# Patient Record
Sex: FEMALE | Race: WHITE | NOT HISPANIC OR LATINO | Employment: OTHER | URBAN - NONMETROPOLITAN AREA
[De-identification: names, ages, dates, MRNs, and addresses within clinical notes are randomized per-mention and may not be internally consistent; named-entity substitution may affect disease eponyms.]

---

## 2017-01-09 ENCOUNTER — INFUSION (OUTPATIENT)
Dept: ONCOLOGY | Facility: CLINIC | Age: 77
End: 2017-01-09

## 2017-01-09 DIAGNOSIS — C50.112 MALIGNANT NEOPLASM OF CENTRAL PORTION OF LEFT FEMALE BREAST (HCC): Primary | ICD-10-CM

## 2017-01-09 RX ORDER — SODIUM CHLORIDE 0.9 % (FLUSH) 0.9 %
10 SYRINGE (ML) INJECTION AS NEEDED
Status: CANCELLED | OUTPATIENT
Start: 2017-01-09

## 2017-01-09 RX ORDER — SODIUM CHLORIDE 0.9 % (FLUSH) 0.9 %
10 SYRINGE (ML) INJECTION AS NEEDED
Status: DISCONTINUED | OUTPATIENT
Start: 2017-01-09 | End: 2017-01-09 | Stop reason: HOSPADM

## 2017-01-09 RX ADMIN — Medication 10 ML: at 11:10

## 2017-03-07 ENCOUNTER — HOSPITAL ENCOUNTER (OUTPATIENT)
Dept: MRI IMAGING | Age: 77
Discharge: HOME | End: 2017-03-07
Payer: MEDICARE

## 2017-03-07 DIAGNOSIS — R26.89 BALANCE PROBLEM: ICD-10-CM

## 2017-03-07 PROCEDURE — 6360000004 HC RX CONTRAST MEDICATION: Performed by: INTERNAL MEDICINE

## 2017-03-07 PROCEDURE — 70553 MRI BRAIN STEM W/O & W/DYE: CPT

## 2017-03-07 PROCEDURE — A9579 GAD-BASE MR CONTRAST NOS,1ML: HCPCS | Performed by: INTERNAL MEDICINE

## 2017-03-07 RX ADMIN — GADOPENTETATE DIMEGLUMINE 17.5 ML: 469.01 INJECTION INTRAVENOUS at 09:56

## 2017-03-13 DIAGNOSIS — C50.112 MALIGNANT NEOPLASM OF CENTRAL PORTION OF LEFT FEMALE BREAST (HCC): Primary | ICD-10-CM

## 2017-03-14 ENCOUNTER — LAB (OUTPATIENT)
Dept: ONCOLOGY | Facility: CLINIC | Age: 77
End: 2017-03-14

## 2017-03-14 ENCOUNTER — OFFICE VISIT (OUTPATIENT)
Dept: ONCOLOGY | Facility: CLINIC | Age: 77
End: 2017-03-14

## 2017-03-14 VITALS
DIASTOLIC BLOOD PRESSURE: 74 MMHG | TEMPERATURE: 97.2 F | RESPIRATION RATE: 18 BRPM | WEIGHT: 185.8 LBS | OXYGEN SATURATION: 98 % | HEART RATE: 95 BPM | BODY MASS INDEX: 29.86 KG/M2 | HEIGHT: 66 IN | SYSTOLIC BLOOD PRESSURE: 130 MMHG

## 2017-03-14 DIAGNOSIS — C50.112 MALIGNANT NEOPLASM OF CENTRAL PORTION OF LEFT FEMALE BREAST (HCC): Primary | ICD-10-CM

## 2017-03-14 PROBLEM — C50.411 MALIGNANT NEOPLASM OF UPPER-OUTER QUADRANT OF RIGHT FEMALE BREAST (HCC): Status: ACTIVE | Noted: 2017-03-14

## 2017-03-14 LAB
ALBUMIN SERPL-MCNC: 4.6 G/DL (ref 3.5–5)
ALBUMIN/GLOB SERPL: 2.1 G/DL
ALP SERPL-CCNC: 70 U/L (ref 38–126)
ALT SERPL W P-5'-P-CCNC: 19 U/L (ref 9–52)
ANION GAP SERPL CALCULATED.3IONS-SCNC: 14 MMOL/L
AST SERPL-CCNC: 22 U/L (ref 5–40)
AUTO MIXED CELLS #: 0.6 10*3/UL (ref 0.1–1.5)
AUTO MIXED CELLS %: 8.9 % (ref 0.2–15.1)
BILIRUB SERPL-MCNC: 0.5 MG/DL (ref 0.2–1.3)
BUN BLD-MCNC: 25 MG/DL (ref 7–26)
BUN/CREAT SERPL: 27.8 (ref 7–25)
CALCIUM SPEC-SCNC: 9.5 MG/DL (ref 8.4–10.2)
CHLORIDE SERPL-SCNC: 101 MMOL/L (ref 98–107)
CO2 SERPL-SCNC: 30 MMOL/L (ref 22–30)
CREAT BLD-MCNC: 0.9 MG/DL (ref 0.7–1.4)
ERYTHROCYTE [DISTWIDTH] IN BLOOD BY AUTOMATED COUNT: 13.6 % (ref 11.5–14.5)
GFR SERPL CREATININE-BSD FRML MDRD: 61 ML/MIN/1.73
GLOBULIN UR ELPH-MCNC: 2.2 GM/DL
GLUCOSE BLD-MCNC: 115 MG/DL (ref 75–110)
HCT VFR BLD AUTO: 41.9 % (ref 37–47)
HGB BLD-MCNC: 13.8 G/DL (ref 12–16)
LYMPHOCYTES # BLD AUTO: 2.4 10*3/MM3 (ref 0.8–7)
LYMPHOCYTES NFR BLD AUTO: 36.2 % (ref 10–58.5)
MCH RBC QN AUTO: 32.1 PG (ref 27–31)
MCHC RBC AUTO-ENTMCNC: 32.9 G/DL (ref 33–37)
MCV RBC AUTO: 97.4 FL (ref 81–99)
NEUTROPHILS # BLD AUTO: 3.6 10*3/MM3 (ref 2–7.8)
NEUTROPHILS NFR BLD AUTO: 54.9 % (ref 37–92)
PLATELET # BLD AUTO: 361 10*3/MM3 (ref 130–400)
PMV BLD AUTO: 8 FL (ref 6–12)
POTASSIUM BLD-SCNC: 4.2 MMOL/L (ref 3.6–5)
PROT SERPL-MCNC: 6.8 G/DL (ref 6.3–8.2)
RBC # BLD AUTO: 4.3 10*6/MM3 (ref 4.2–5.4)
SODIUM BLD-SCNC: 145 MMOL/L (ref 137–145)
WBC NRBC COR # BLD: 6.5 10*3/MM3 (ref 4.8–10.8)

## 2017-03-14 PROCEDURE — 85025 COMPLETE CBC W/AUTO DIFF WBC: CPT | Performed by: INTERNAL MEDICINE

## 2017-03-14 PROCEDURE — 99214 OFFICE O/P EST MOD 30 MIN: CPT | Performed by: INTERNAL MEDICINE

## 2017-03-14 PROCEDURE — 36415 COLL VENOUS BLD VENIPUNCTURE: CPT | Performed by: INTERNAL MEDICINE

## 2017-03-14 PROCEDURE — 80053 COMPREHEN METABOLIC PANEL: CPT | Performed by: INTERNAL MEDICINE

## 2017-03-14 RX ORDER — SODIUM CHLORIDE 0.9 % (FLUSH) 0.9 %
10 SYRINGE (ML) INJECTION AS NEEDED
Status: DISCONTINUED | OUTPATIENT
Start: 2017-03-14 | End: 2017-03-14 | Stop reason: HOSPADM

## 2017-03-14 RX ORDER — SODIUM CHLORIDE 0.9 % (FLUSH) 0.9 %
10 SYRINGE (ML) INJECTION AS NEEDED
Status: CANCELLED | OUTPATIENT
Start: 2017-03-14

## 2017-03-14 RX ORDER — GLIPIZIDE 5 MG/1
5 TABLET ORAL DAILY
COMMUNITY

## 2017-03-14 RX ORDER — PRAVASTATIN SODIUM 40 MG
40 TABLET ORAL DAILY
COMMUNITY

## 2017-03-14 RX ADMIN — Medication 10 ML: at 12:00

## 2017-03-14 NOTE — PROGRESS NOTES
[facility]  HEMATOLOGY & ONCOLOGY        Subjective     VISIT DIAGNOSIS: No diagnosis found.    REASON FOR VISIT:     Chief Complaint   Patient presents with   • Follow-up     Breast CA f/u; she is here for f/u visit today. She c/o having several episodes of syncope and has seen her PCP for testing and has been referred to a Neurologist. She c/o increased dizziness. She has no other c/o. She will need mammo raul for last July 2017.         HEMATOLOGY / ONCOLOGY HISTORY:   Oncology/Hematology History    Tp: [id number] Mp: M0 Size: 2.6 cm Histopathologic Grade: G3? Histopathologic Type: DuctalInvasive  (NOS), left central? Stage  Grouping: IIA  08/06/2008: Core biopsy: at left breast mass, 12 o'clock: Infiltrating ductal carcinoma, gr 3, with foci of tumor necrosis? DNA index 1.78  (aneuploid), ER/TN 0%, her2/kofi 2+ (FISH ), p53 0%, Ki67  59%.  08/28/2008: MastectomyL  partial, USguided  needle localization, with SNB: IDC, gr 3, tumor measures 2.6 cm in greatest dimension,  with necrosis seen, extending to original deep margin....additional deep margin adds 1 cm to final margin of excision, residual fibrocystic  mastopathy? 2 left axillary sentinel nodes: 0/2+ve  Dose dense Adriamycin/cyclophosphamide, followed by weekly Taxol administered between September 2008 and March 2009 .  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  Followed by Radiation therapy        Malignant neoplasm of central portion of left female breast    11/9/2016 Initial Diagnosis    Malignant neoplasm of central portion of left female breast     [No treatment plan]  Cancer Staging Information:  No matching staging information was found for the patient.      INTERVAL HISTORY  Patient ID: Lorena Valdes is a 76 y.o. year old female         Review of Systems         Medications:    Current Outpatient Prescriptions   Medication Sig Dispense Refill   • glipiZIDE (GLUCOTROL) 5 MG tablet Take 5 mg  by mouth 2 (Two) Times a Day Before Meals.     • pravastatin (PRAVACHOL) 40 MG tablet Take 40 mg by mouth Daily.     • aspirin 81 MG EC tablet Take 81 mg by mouth daily.       • Calcium Carb-Cholecalciferol (CALCIUM 600+D3) 600-200 MG-UNIT tablet Take 1 tablet by mouth 2 (Two) Times a Day.     • Cholecalciferol (VITAMIN D3) 400 UNITS capsule Take 1 capsule by mouth Daily.     • ibuprofen (ADVIL,MOTRIN) 400 MG tablet Take 400 mg by mouth Every 6 (Six) Hours As Needed for mild pain (1-3).     • Omega-3 Fatty Acids (FISH OIL) 1000 MG capsule capsule Take 1,000 mg by mouth Daily.     • vitamin C (ASCORBIC ACID) 500 MG tablet Take 500 mg by mouth daily.         No current facility-administered medications for this visit.        ALLERGIES:  No Known Allergies    Objective      @VITALS    Current Status 3/14/2017   ECOG score 1       General Appearance: Patient is awake, alert, oriented and in no acute distress. Patient is welldeveloped, wellnourished, and appears stated age.  HEENT: Normocephalic. Sclerae clear, conjunctiva pink, extraocular movements intact, pupils, round, reactive to light and  accommodation. Mouth and throat are clear with moist oral mucosa.  NECK: Supple, no jugular venous distention, thyroid not enlarged.  LYMPH: No cervical, supraclavicular, axillary, or inguinal lymphadenopathy.  CHEST: Equal bilateral expansion, AP  diameter normal, resonant percussion note  LUNGS: Good air movement, no rales, rhonchi, rubs or wheezes with auscultation  CARDIO: Regular sinus rhythm, no murmurs, gallops or rubs.  ABDOMEN: Nondistended, soft, No tenderness, no guarding, no rebound, No hepatosplenomegaly. No abdominal masses. Bowel sounds positive. No hernia  GENITALIA: Not examined.  BREASTS: Not examined.  MUSKEL: No joint swelling, decreased motion, or inflammation  EXTREMS: No edema, clubbing, cyanosis, No varicose veins.  NEURO: Grossly nonfocal, Gait is coordinated and smooth, Cognition is preserved.  SKIN: No  rashes, no ecchymoses, no petechia.  PSYCH: Oriented to time, place and person. Memory is preserved. Mood and affect appear normal      RECENT LABS:  Lab on 03/14/2017   Component Date Value Ref Range Status   • Glucose 03/14/2017 115* 75 - 110 mg/dL Final   • BUN 03/14/2017 25  7 - 26 mg/dL Final   • Creatinine 03/14/2017 0.90  0.70 - 1.40 mg/dL Final   • Sodium 03/14/2017 145  137 - 145 mmol/L Final   • Potassium 03/14/2017 4.2  3.6 - 5.0 mmol/L Final   • Chloride 03/14/2017 101  98 - 107 mmol/L Final   • CO2 03/14/2017 30.0  22.0 - 30.0 mmol/L Final   • Calcium 03/14/2017 9.5  8.4 - 10.2 mg/dL Final   • Total Protein 03/14/2017 6.8  6.3 - 8.2 g/dL Final   • Albumin 03/14/2017 4.60  3.50 - 5.00 g/dL Final   • ALT (SGPT) 03/14/2017 19  9 - 52 U/L Final   • AST (SGOT) 03/14/2017 22  5 - 40 U/L Final   • Alkaline Phosphatase 03/14/2017 70  38 - 126 U/L Final   • Total Bilirubin 03/14/2017 0.5  0.2 - 1.3 mg/dL Final   • eGFR Non African Amer 03/14/2017 61  >60 mL/min/1.73 Final   • Globulin 03/14/2017 2.2  gm/dL Final   • A/G Ratio 03/14/2017 2.1  g/dL Final   • BUN/Creatinine Ratio 03/14/2017 27.8* 7.0 - 25.0 Final   • Anion Gap 03/14/2017 14.0  mmol/L Final   • WBC 03/14/2017 6.50  4.80 - 10.80 10*3/mm3 Final   • RBC 03/14/2017 4.30  4.20 - 5.40 10*6/mm3 Final   • Hemoglobin 03/14/2017 13.8  12.0 - 16.0 g/dL Final   • Hematocrit 03/14/2017 41.9  37.0 - 47.0 % Final   • MCV 03/14/2017 97.4  81.0 - 99.0 fL Final   • MCH 03/14/2017 32.1* 27.0 - 31.0 pg Final   • MCHC 03/14/2017 32.9* 33.0 - 37.0 g/dL Final   • RDW 03/14/2017 13.6  11.5 - 14.5 % Final   • MPV 03/14/2017 8.0  6.0 - 12.0 fL Final   • Platelets 03/14/2017 361  130 - 400 10*3/mm3 Final   • Neutrophil % 03/14/2017 54.9  37.0 - 92.0 % Final   • Lymphocyte % 03/14/2017 36.2  10.0 - 58.5 % Final   • Auto Mixed Cells % 03/14/2017 8.9  0.2 - 15.1 % Final   • Neutrophils, Absolute 03/14/2017 3.60  2.00 - 7.80 10*3/mm3 Final   • Lymphocytes, Absolute 03/14/2017  2.40  0.80 - 7.00 10*3/mm3 Final   • Auto Mixed Cells # 03/14/2017 0.60  0.10 - 1.50 10*3/uL Final       RADIOLOGY:  No results found.         Assessment/Plan          T2 NP M0 left-sided breast cancer 2008 status post lumpectomy 4 cycles of AC and radiation.  No evidence of recurrence clinically doing well.  Last mammogram done back in August 2016 was normal.  Transient ischemic attacks oral by neurologist.  Return to clinic in 6 months time CBC and chemistries are normal.            Mynor Preciado MD    3/14/2017    11:46 AM

## 2017-04-07 ENCOUNTER — OFFICE VISIT (OUTPATIENT)
Dept: NEUROLOGY | Age: 77
End: 2017-04-07
Payer: MEDICARE

## 2017-04-07 VITALS
DIASTOLIC BLOOD PRESSURE: 78 MMHG | HEART RATE: 71 BPM | BODY MASS INDEX: 30.11 KG/M2 | SYSTOLIC BLOOD PRESSURE: 149 MMHG | OXYGEN SATURATION: 97 % | HEIGHT: 66 IN | WEIGHT: 187.38 LBS

## 2017-04-07 DIAGNOSIS — R53.1 WEAKNESS: Primary | ICD-10-CM

## 2017-04-07 DIAGNOSIS — R26.89 IMBALANCE: ICD-10-CM

## 2017-04-07 DIAGNOSIS — R20.0 NUMBNESS: ICD-10-CM

## 2017-04-07 DIAGNOSIS — G62.9 NEUROPATHY: ICD-10-CM

## 2017-04-07 PROCEDURE — G8417 CALC BMI ABV UP PARAM F/U: HCPCS | Performed by: PSYCHIATRY & NEUROLOGY

## 2017-04-07 PROCEDURE — 1036F TOBACCO NON-USER: CPT | Performed by: PSYCHIATRY & NEUROLOGY

## 2017-04-07 PROCEDURE — 99204 OFFICE O/P NEW MOD 45 MIN: CPT | Performed by: PSYCHIATRY & NEUROLOGY

## 2017-04-07 PROCEDURE — 1123F ACP DISCUSS/DSCN MKR DOCD: CPT | Performed by: PSYCHIATRY & NEUROLOGY

## 2017-04-07 PROCEDURE — 3017F COLORECTAL CA SCREEN DOC REV: CPT | Performed by: PSYCHIATRY & NEUROLOGY

## 2017-04-07 PROCEDURE — G8400 PT W/DXA NO RESULTS DOC: HCPCS | Performed by: PSYCHIATRY & NEUROLOGY

## 2017-04-07 PROCEDURE — 1090F PRES/ABSN URINE INCON ASSESS: CPT | Performed by: PSYCHIATRY & NEUROLOGY

## 2017-04-07 PROCEDURE — 4040F PNEUMOC VAC/ADMIN/RCVD: CPT | Performed by: PSYCHIATRY & NEUROLOGY

## 2017-04-07 PROCEDURE — G8427 DOCREV CUR MEDS BY ELIG CLIN: HCPCS | Performed by: PSYCHIATRY & NEUROLOGY

## 2017-04-07 RX ORDER — GLIPIZIDE 5 MG/1
TABLET ORAL
COMMUNITY
Start: 2016-08-18 | End: 2017-08-30 | Stop reason: DRUGHIGH

## 2017-05-09 ENCOUNTER — INFUSION (OUTPATIENT)
Dept: ONCOLOGY | Facility: CLINIC | Age: 77
End: 2017-05-09

## 2017-05-09 DIAGNOSIS — C50.112 MALIGNANT NEOPLASM OF CENTRAL PORTION OF LEFT FEMALE BREAST (HCC): Primary | ICD-10-CM

## 2017-05-09 RX ORDER — SODIUM CHLORIDE 0.9 % (FLUSH) 0.9 %
10 SYRINGE (ML) INJECTION AS NEEDED
Status: CANCELLED | OUTPATIENT
Start: 2017-05-09

## 2017-05-09 RX ORDER — SODIUM CHLORIDE 0.9 % (FLUSH) 0.9 %
10 SYRINGE (ML) INJECTION AS NEEDED
Status: DISCONTINUED | OUTPATIENT
Start: 2017-05-09 | End: 2017-05-09 | Stop reason: HOSPADM

## 2017-05-09 RX ADMIN — Medication 10 ML: at 12:15

## 2017-07-17 ENCOUNTER — TELEPHONE (OUTPATIENT)
Dept: SURGERY | Age: 77
End: 2017-07-17

## 2017-08-28 DIAGNOSIS — C50.112 MALIGNANT NEOPLASM OF CENTRAL PORTION OF LEFT FEMALE BREAST (HCC): Primary | ICD-10-CM

## 2017-08-29 ENCOUNTER — OFFICE VISIT (OUTPATIENT)
Dept: ONCOLOGY | Facility: CLINIC | Age: 77
End: 2017-08-29

## 2017-08-29 ENCOUNTER — LAB (OUTPATIENT)
Dept: LAB | Facility: HOSPITAL | Age: 77
End: 2017-08-29

## 2017-08-29 VITALS
OXYGEN SATURATION: 98 % | TEMPERATURE: 97.4 F | HEART RATE: 86 BPM | RESPIRATION RATE: 18 BRPM | HEIGHT: 66 IN | BODY MASS INDEX: 29.73 KG/M2 | DIASTOLIC BLOOD PRESSURE: 74 MMHG | WEIGHT: 185 LBS | SYSTOLIC BLOOD PRESSURE: 136 MMHG

## 2017-08-29 DIAGNOSIS — M85.80 OSTEOPENIA: ICD-10-CM

## 2017-08-29 DIAGNOSIS — C50.112 MALIGNANT NEOPLASM OF CENTRAL PORTION OF LEFT FEMALE BREAST (HCC): Primary | ICD-10-CM

## 2017-08-29 DIAGNOSIS — C50.412 MALIGNANT NEOPLASM OF UPPER-OUTER QUADRANT OF LEFT FEMALE BREAST (HCC): Primary | ICD-10-CM

## 2017-08-29 DIAGNOSIS — R93.7 ABNORMAL FINDINGS ON DIAGNOSTIC IMAGING OF OTHER PARTS OF MUSCULOSKELETAL SYSTEM: ICD-10-CM

## 2017-08-29 LAB
ALBUMIN SERPL-MCNC: 4 G/DL (ref 3.5–5)
ALBUMIN/GLOB SERPL: 1 G/DL (ref 1.1–2.5)
ALP SERPL-CCNC: 74 U/L (ref 24–120)
ALT SERPL W P-5'-P-CCNC: 23 U/L (ref 0–54)
ANION GAP SERPL CALCULATED.3IONS-SCNC: 7 MMOL/L (ref 4–13)
AST SERPL-CCNC: 23 U/L (ref 7–45)
AUTO MIXED CELLS #: 0.8 10*3/MM3 (ref 0.1–2.6)
AUTO MIXED CELLS %: 9.7 % (ref 0.1–24)
BILIRUB SERPL-MCNC: 0.5 MG/DL (ref 0.1–1)
BUN BLD-MCNC: 20 MG/DL (ref 5–21)
BUN/CREAT SERPL: 20.8
CALCIUM SPEC-SCNC: 9.4 MG/DL (ref 8.4–10.4)
CHLORIDE SERPL-SCNC: 103 MMOL/L (ref 98–110)
CO2 SERPL-SCNC: 27 MMOL/L (ref 24–31)
CREAT BLD-MCNC: 0.96 MG/DL (ref 0.5–1.4)
ERYTHROCYTE [DISTWIDTH] IN BLOOD BY AUTOMATED COUNT: 12.6 % (ref 12–15)
GFR SERPL CREATININE-BSD FRML MDRD: 57 ML/MIN/1.73
GLOBULIN UR ELPH-MCNC: 4 GM/DL
GLUCOSE BLD-MCNC: 124 MG/DL (ref 70–100)
HCT VFR BLD AUTO: 36.7 % (ref 37–47)
HGB BLD-MCNC: 12.8 G/DL (ref 12–16)
HOLD SPECIMEN: NORMAL
LYMPHOCYTES # BLD AUTO: 2.6 10*3/MM3 (ref 0.8–7)
LYMPHOCYTES NFR BLD AUTO: 31.3 % (ref 15–45)
MCH RBC QN AUTO: 31.4 PG (ref 28–32)
MCHC RBC AUTO-ENTMCNC: 34.9 G/DL (ref 33–36)
MCV RBC AUTO: 90 FL (ref 82–98)
NEUTROPHILS # BLD AUTO: 4.9 10*3/MM3 (ref 1.5–8.3)
NEUTROPHILS NFR BLD AUTO: 59 % (ref 39–78)
PLATELET # BLD AUTO: 342 10*3/MM3 (ref 130–400)
PMV BLD AUTO: 8.2 FL (ref 6–12)
POTASSIUM BLD-SCNC: 4.3 MMOL/L (ref 3.5–5.3)
PROT SERPL-MCNC: 8 G/DL (ref 6.3–8.7)
RBC # BLD AUTO: 4.08 10*6/MM3 (ref 4.2–5.4)
SODIUM BLD-SCNC: 137 MMOL/L (ref 135–145)
WBC NRBC COR # BLD: 8.3 10*3/MM3 (ref 4.8–10.8)

## 2017-08-29 PROCEDURE — 80053 COMPREHEN METABOLIC PANEL: CPT | Performed by: INTERNAL MEDICINE

## 2017-08-29 PROCEDURE — 99214 OFFICE O/P EST MOD 30 MIN: CPT | Performed by: INTERNAL MEDICINE

## 2017-08-29 PROCEDURE — 85025 COMPLETE CBC W/AUTO DIFF WBC: CPT | Performed by: INTERNAL MEDICINE

## 2017-08-29 PROCEDURE — 36415 COLL VENOUS BLD VENIPUNCTURE: CPT

## 2017-08-29 NOTE — PROGRESS NOTES
[facility]  HEMATOLOGY & ONCOLOGY        Subjective     VISIT DIAGNOSIS: No diagnosis found.    REASON FOR VISIT:     Chief Complaint   Patient presents with   • Follow-up     Breast Ca f/u:  She is here for f/u visit today. No new c/o today        HEMATOLOGY / ONCOLOGY HISTORY:   Oncology/Hematology History    Tp: [id number] Mp: M0 Size: 2.6 cm Histopathologic Grade: G3? Histopathologic Type: DuctalInvasive  (NOS), left central? Stage  Grouping: IIA  08/06/2008: Core biopsy: at left breast mass, 12 o'clock: Infiltrating ductal carcinoma, gr 3, with foci of tumor necrosis? DNA index 1.78  (aneuploid), ER/OK 0%, her2/kofi 2+ (FISH ), p53 0%, Ki67  59%.  08/28/2008: MastectomyL  partial, USguided  needle localization, with SNB: IDC, gr 3, tumor measures 2.6 cm in greatest dimension,  with necrosis seen, extending to original deep margin....additional deep margin adds 1 cm to final margin of excision, residual fibrocystic  mastopathy? 2 left axillary sentinel nodes: 0/2+ve  Dose dense Adriamycin/cyclophosphamide, followed by weekly Taxol administered between September 2008 and March 2009 .  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  Followed by Radiation therapy        Malignant neoplasm of central portion of left female breast    11/9/2016 Initial Diagnosis     Malignant neoplasm of central portion of left female breast          Cancer Staging Information:  No matching staging information was found for the patient.      INTERVAL HISTORY  Patient ID: Lorena Valdes is a 76 y.o. year old female with a history of breast cancer in 2008.  She has no complaint except for occasional fatigue denies any lumps or bumps of the breasts.  She is due for mammogram tomorrow.  She is also followed following up with the surgeon.    Past Medical History:   Past Medical History:   Diagnosis Date   • Bladder disorder    • Fibrocystic disease of breast    • Heart disease    •  History of bone density study 2011   • Hyperglycemia    • Hyperlipidemia    • Hypotension    • Left ventricular diastolic dysfunction    • Malignant neoplasm of central portion of left female breast 11/9/2016   • Uterine prolapse      Past Surgical History:   Past Surgical History:   Procedure Laterality Date   • BREAST LUMPECTOMY  2008   • COLONOSCOPY  2010   • MAMMO BILATERAL  07/17/2013    Dr. Sabillon   • PAP SMEAR  2010     Social History:   Social History     Social History   • Marital status: Single     Spouse name: N/A   • Number of children: N/A   • Years of education: N/A     Occupational History   • Not on file.     Social History Main Topics   • Smoking status: Unknown If Ever Smoked   • Smokeless tobacco: Not on file   • Alcohol use Not on file   • Drug use: No   • Sexual activity: Not on file     Other Topics Concern   • Not on file     Social History Narrative     Family History:   Family History   Problem Relation Age of Onset   • Heart disease Father    • No Known Problems Daughter    • No Known Problems Son    • Hypertension Daughter    • Hypertension Daughter        Review of Systems   Constitutional: Positive for fatigue. Negative for activity change, appetite change, chills and unexpected weight change.   HENT: Negative.    Endocrine: Negative.    Genitourinary: Negative for dysuria, flank pain, frequency and hematuria.        Stress incontinent   Musculoskeletal: Negative.    Skin: Negative.    Allergic/Immunologic: Negative.    Neurological: Negative.    Hematological: Negative.    Psychiatric/Behavioral: Negative.         Performance Status:  Asymptomatic    Medications:    Current Outpatient Prescriptions   Medication Sig Dispense Refill   • aspirin 81 MG EC tablet Take 81 mg by mouth daily.       • Calcium Carb-Cholecalciferol (CALCIUM 600+D3) 600-200 MG-UNIT tablet Take 1 tablet by mouth 2 (Two) Times a Day.     • Cholecalciferol (VITAMIN D3) 400 UNITS capsule Take 1 capsule by mouth Daily.   "   • glipiZIDE (GLUCOTROL) 5 MG tablet Take 5 mg by mouth 2 (Two) Times a Day Before Meals.     • ibuprofen (ADVIL,MOTRIN) 400 MG tablet Take 400 mg by mouth Every 6 (Six) Hours As Needed for mild pain (1-3).     • Omega-3 Fatty Acids (FISH OIL) 1000 MG capsule capsule Take 1,000 mg by mouth Daily.     • pravastatin (PRAVACHOL) 40 MG tablet Take 40 mg by mouth Daily.     • vitamin C (ASCORBIC ACID) 500 MG tablet Take 500 mg by mouth daily.         No current facility-administered medications for this visit.        ALLERGIES:  No Known Allergies    Objective      Vitals:    08/29/17 1128   BP: 136/74   Pulse: 86   Resp: 18   Temp: 97.4 °F (36.3 °C)   TempSrc: Tympanic   SpO2: 98%   Weight: 185 lb (83.9 kg)   Height: 66\" (167.6 cm)         Current Status 8/29/2017   ECOG score 0         Physical Exam  General Appearance: Patient is awake, alert, oriented and in no acute distress. Patient is welldeveloped, wellnourished, and appears stated age.  HEENT: Normocephalic. Sclerae clear, conjunctiva pink, extraocular movements intact, pupils, round, reactive to light and  accommodation. Mouth and throat are clear with moist oral mucosa.  NECK: Supple, no jugular venous distention, thyroid not enlarged.  LYMPH: No cervical, supraclavicular, axillary, or inguinal lymphadenopathy.  CHEST: Equal bilateral expansion, AP  diameter normal, resonant percussion note  LUNGS: Good air movement, no rales, rhonchi, rubs or wheezes with auscultation  CARDIO: Regular sinus rhythm, no murmurs, gallops or rubs.  ABDOMEN: Nondistended, soft, No tenderness, no guarding, no rebound, No hepatosplenomegaly. No abdominal masses. Bowel sounds positive. No hernia  GENITALIA: Not examined.  BREASTS: left breast lumpectomy, no lumps or bumps, no nipple discharge. Right breast unremarkable. Bilateral dense breast.  MUSKEL: No joint swelling, decreased motion, or inflammation  EXTREMS: No edema, clubbing, cyanosis, No varicose veins.  NEURO: Grossly " nonfocal, Gait is coordinated and smooth, Cognition is preserved.  SKIN: No rashes, no ecchymoses, no petechia.  PSYCH: Oriented to time, place and person. Memory is preserved. Mood and affect appear normal  RECENT LABS:  Orders Only on 08/28/2017   Component Date Value Ref Range Status   • WBC 08/29/2017 8.30  4.80 - 10.80 10*3/mm3 Final   • RBC 08/29/2017 4.08* 4.20 - 5.40 10*6/mm3 Final   • Hemoglobin 08/29/2017 12.8  12.0 - 16.0 g/dL Final   • Hematocrit 08/29/2017 36.7* 37.0 - 47.0 % Final   • MCV 08/29/2017 90.0  82.0 - 98.0 fL Final   • MCH 08/29/2017 31.4  28.0 - 32.0 pg Final   • MCHC 08/29/2017 34.9  33.0 - 36.0 g/dL Final   • RDW 08/29/2017 12.6  12.0 - 15.0 % Final   • MPV 08/29/2017 8.2  6.0 - 12.0 fL Final   • Platelets 08/29/2017 342  130 - 400 10*3/mm3 Final   • Neutrophil % 08/29/2017 59.0  39.0 - 78.0 % Final   • Lymphocyte % 08/29/2017 31.3  15.0 - 45.0 % Final   • Auto Mixed Cells % 08/29/2017 9.7  0.1 - 24.0 % Final   • Neutrophils, Absolute 08/29/2017 4.90  1.50 - 8.30 10*3/mm3 Final   • Lymphocytes, Absolute 08/29/2017 2.60  0.80 - 7.00 10*3/mm3 Final   • Auto Mixed Cells # 08/29/2017 0.80  0.10 - 2.60 10*3/mm3 Final       RADIOLOGY:  No results found.         Assessment/Plan 76-year-old female diagnosed with left breast cancer T2 and 0 M0 2008 status post lumpectomy, ER/WV negative, HER-2 positive by fish,status post Adriamycin one cycle, discontinued due to cardiomyopathy.  Status post 4 cycles Cytoxan and 12 cycles of weekly Taxol.  Status postradiation therapy.   Last mammogram 2016 unremarkable.    Patient Active Problem List   Diagnosis   • Malignant neoplasm of central portion of left female breast   • Malignant neoplasm of upper-outer quadrant of right female breast          1.Breast cancer: NGOC I will follow up on her mammogram.  See her in one year.    2.  Osteopenia: We will get her DEXA scan.  Continue calcium and vitamin D      Olayinka Diaz MD    8/29/2017    11:38  AM

## 2017-08-30 ENCOUNTER — OFFICE VISIT (OUTPATIENT)
Dept: SURGERY | Age: 77
End: 2017-08-30
Payer: MEDICARE

## 2017-08-30 ENCOUNTER — HOSPITAL ENCOUNTER (OUTPATIENT)
Dept: WOMENS IMAGING | Age: 77
Discharge: HOME | End: 2017-08-30
Payer: MEDICARE

## 2017-08-30 VITALS
HEIGHT: 66 IN | BODY MASS INDEX: 29.89 KG/M2 | HEART RATE: 80 BPM | DIASTOLIC BLOOD PRESSURE: 80 MMHG | SYSTOLIC BLOOD PRESSURE: 130 MMHG | WEIGHT: 186 LBS

## 2017-08-30 DIAGNOSIS — Z12.31 ENCOUNTER FOR SCREENING MAMMOGRAM FOR HIGH-RISK PATIENT: ICD-10-CM

## 2017-08-30 DIAGNOSIS — Z12.31 ENCOUNTER FOR SCREENING MAMMOGRAM FOR HIGH-RISK PATIENT: Primary | ICD-10-CM

## 2017-08-30 DIAGNOSIS — M85.80 SENILE OSTEOPENIA: ICD-10-CM

## 2017-08-30 PROCEDURE — G8417 CALC BMI ABV UP PARAM F/U: HCPCS | Performed by: PHYSICIAN ASSISTANT

## 2017-08-30 PROCEDURE — 4040F PNEUMOC VAC/ADMIN/RCVD: CPT | Performed by: PHYSICIAN ASSISTANT

## 2017-08-30 PROCEDURE — 77080 DXA BONE DENSITY AXIAL: CPT

## 2017-08-30 PROCEDURE — 3017F COLORECTAL CA SCREEN DOC REV: CPT | Performed by: PHYSICIAN ASSISTANT

## 2017-08-30 PROCEDURE — G8427 DOCREV CUR MEDS BY ELIG CLIN: HCPCS | Performed by: PHYSICIAN ASSISTANT

## 2017-08-30 PROCEDURE — 77063 BREAST TOMOSYNTHESIS BI: CPT

## 2017-08-30 PROCEDURE — 1036F TOBACCO NON-USER: CPT | Performed by: PHYSICIAN ASSISTANT

## 2017-08-30 PROCEDURE — 99212 OFFICE O/P EST SF 10 MIN: CPT | Performed by: PHYSICIAN ASSISTANT

## 2017-08-30 PROCEDURE — G8399 PT W/DXA RESULTS DOCUMENT: HCPCS | Performed by: PHYSICIAN ASSISTANT

## 2017-08-30 PROCEDURE — 1123F ACP DISCUSS/DSCN MKR DOCD: CPT | Performed by: PHYSICIAN ASSISTANT

## 2017-08-30 PROCEDURE — 1090F PRES/ABSN URINE INCON ASSESS: CPT | Performed by: PHYSICIAN ASSISTANT

## 2017-08-30 RX ORDER — PRAVASTATIN SODIUM 40 MG
40 TABLET ORAL
COMMUNITY
End: 2018-04-17 | Stop reason: SDUPTHER

## 2017-08-30 RX ORDER — GLIPIZIDE 5 MG/1
2.5 TABLET ORAL
COMMUNITY
Start: 2016-08-18 | End: 2017-09-26 | Stop reason: SDUPTHER

## 2017-09-10 PROBLEM — Z12.11 SCREENING FOR COLORECTAL CANCER: Status: ACTIVE | Noted: 2017-09-10

## 2017-09-10 PROBLEM — Z12.12 SCREENING FOR COLORECTAL CANCER: Status: ACTIVE | Noted: 2017-09-10

## 2017-09-10 PROBLEM — Z87.891 FORMER SMOKER: Status: ACTIVE | Noted: 2017-09-10

## 2017-09-10 PROBLEM — M81.6 LOCALIZED OSTEOPOROSIS WITHOUT CURRENT PATHOLOGICAL FRACTURE: Status: ACTIVE | Noted: 2017-09-10

## 2017-09-10 PROBLEM — F41.1 GENERALIZED ANXIETY DISORDER: Status: ACTIVE | Noted: 2017-09-10

## 2017-09-10 PROBLEM — E78.00 PURE HYPERCHOLESTEROLEMIA: Status: ACTIVE | Noted: 2017-09-10

## 2017-09-10 PROBLEM — E55.9 VITAMIN D DEFICIENCY: Status: ACTIVE | Noted: 2017-09-10

## 2017-09-10 PROBLEM — E11.9 TYPE 2 DIABETES MELLITUS WITHOUT COMPLICATION (HCC): Status: ACTIVE | Noted: 2017-09-10

## 2017-09-12 DIAGNOSIS — E78.00 PURE HYPERCHOLESTEROLEMIA: ICD-10-CM

## 2017-09-12 DIAGNOSIS — E11.9 TYPE 2 DIABETES MELLITUS WITHOUT COMPLICATION, WITHOUT LONG-TERM CURRENT USE OF INSULIN (HCC): ICD-10-CM

## 2017-09-12 DIAGNOSIS — E55.9 VITAMIN D DEFICIENCY: Primary | ICD-10-CM

## 2017-09-12 DIAGNOSIS — Z01.89 ROUTINE LAB DRAW: ICD-10-CM

## 2017-09-13 DIAGNOSIS — E78.00 PURE HYPERCHOLESTEROLEMIA: ICD-10-CM

## 2017-09-13 DIAGNOSIS — E11.9 TYPE 2 DIABETES MELLITUS WITHOUT COMPLICATION, WITHOUT LONG-TERM CURRENT USE OF INSULIN (HCC): ICD-10-CM

## 2017-09-13 DIAGNOSIS — E55.9 VITAMIN D DEFICIENCY: ICD-10-CM

## 2017-09-13 DIAGNOSIS — Z01.89 ROUTINE LAB DRAW: ICD-10-CM

## 2017-09-13 LAB
ALBUMIN SERPL-MCNC: 3.7 G/DL (ref 3.5–5.2)
ALP BLD-CCNC: 69 U/L (ref 35–104)
ALT SERPL-CCNC: 9 U/L (ref 5–33)
ANION GAP SERPL CALCULATED.3IONS-SCNC: 13 MMOL/L (ref 7–19)
AST SERPL-CCNC: 15 U/L (ref 5–32)
BACTERIA: ABNORMAL /HPF
BILIRUB SERPL-MCNC: 0.5 MG/DL (ref 0.2–1.2)
BILIRUBIN URINE: NEGATIVE
BLOOD, URINE: ABNORMAL
BUN BLDV-MCNC: 24 MG/DL (ref 8–23)
CALCIUM SERPL-MCNC: 9.4 MG/DL (ref 8.8–10.2)
CHLORIDE BLD-SCNC: 103 MMOL/L (ref 98–111)
CHOLESTEROL, TOTAL: 167 MG/DL (ref 160–199)
CLARITY: ABNORMAL
CO2: 25 MMOL/L (ref 22–29)
COLOR: YELLOW
CREAT SERPL-MCNC: 0.7 MG/DL (ref 0.5–0.9)
CREATININE URINE: 86.5 MG/DL (ref 4.2–622)
EPITHELIAL CELLS, UA: 6 /HPF (ref 0–5)
GFR NON-AFRICAN AMERICAN: >60
GLUCOSE BLD-MCNC: 124 MG/DL (ref 74–109)
GLUCOSE URINE: NEGATIVE MG/DL
HBA1C MFR BLD: 7.2 %
HCT VFR BLD CALC: 38.2 % (ref 37–47)
HDLC SERPL-MCNC: 57 MG/DL (ref 65–121)
HEMOGLOBIN: 12.8 G/DL (ref 12–16)
HYALINE CASTS: 5 /HPF (ref 0–8)
KETONES, URINE: NEGATIVE MG/DL
LDL CHOLESTEROL CALCULATED: 88 MG/DL
LEUKOCYTE ESTERASE, URINE: ABNORMAL
MCH RBC QN AUTO: 31.7 PG (ref 27–31)
MCHC RBC AUTO-ENTMCNC: 33.5 G/DL (ref 33–37)
MCV RBC AUTO: 94.6 FL (ref 81–99)
MICROALBUMIN UR-MCNC: 2.5 MG/DL (ref 0–19)
MICROALBUMIN/CREAT UR-RTO: 28.9 MG/G
NITRITE, URINE: POSITIVE
PDW BLD-RTO: 13 % (ref 11.5–14.5)
PH UA: 6
PLATELET # BLD: 342 K/UL (ref 130–400)
PMV BLD AUTO: 9.2 FL (ref 9.4–12.3)
POTASSIUM SERPL-SCNC: 4.5 MMOL/L (ref 3.5–5)
PROTEIN UA: NEGATIVE MG/DL
RBC # BLD: 4.04 M/UL (ref 4.2–5.4)
RBC UA: 7 /HPF (ref 0–4)
SODIUM BLD-SCNC: 141 MMOL/L (ref 136–145)
SPECIFIC GRAVITY UA: 1.02
TOTAL PROTEIN: 8 G/DL (ref 6.6–8.7)
TRIGL SERPL-MCNC: 112 MG/DL (ref 150–199)
TSH SERPL DL<=0.05 MIU/L-ACNC: 4.76 UIU/ML (ref 0.27–4.2)
UROBILINOGEN, URINE: 0.2 E.U./DL
WBC # BLD: 7.7 K/UL (ref 4.8–10.8)
WBC UA: 646 /HPF (ref 0–5)

## 2017-09-16 LAB
ORGANISM: ABNORMAL
URINE CULTURE, ROUTINE: ABNORMAL
URINE CULTURE, ROUTINE: ABNORMAL
VITAMIN D2 AND D3, TOTAL: 30.1 NG/ML (ref 30–80)
VITAMIN D2, 25 HYDROXY: <1 NG/ML
VITAMIN D3,25 HYDROXY: 30.1 NG/ML

## 2017-09-18 RX ORDER — DICYCLOMINE HYDROCHLORIDE 10 MG/1
CAPSULE ORAL NIGHTLY
COMMUNITY
Start: 2016-04-18 | End: 2018-01-23 | Stop reason: CLARIF

## 2017-09-20 ENCOUNTER — OFFICE VISIT (OUTPATIENT)
Dept: INTERNAL MEDICINE | Age: 77
End: 2017-09-20
Payer: MEDICARE

## 2017-09-20 VITALS
WEIGHT: 185 LBS | DIASTOLIC BLOOD PRESSURE: 84 MMHG | HEART RATE: 94 BPM | OXYGEN SATURATION: 96 % | BODY MASS INDEX: 29.73 KG/M2 | SYSTOLIC BLOOD PRESSURE: 124 MMHG | HEIGHT: 66 IN | TEMPERATURE: 98.8 F

## 2017-09-20 DIAGNOSIS — Z12.12 SCREENING FOR COLORECTAL CANCER: ICD-10-CM

## 2017-09-20 DIAGNOSIS — M81.6 LOCALIZED OSTEOPOROSIS WITHOUT CURRENT PATHOLOGICAL FRACTURE: ICD-10-CM

## 2017-09-20 DIAGNOSIS — E55.9 VITAMIN D DEFICIENCY: ICD-10-CM

## 2017-09-20 DIAGNOSIS — E11.9 TYPE 2 DIABETES MELLITUS WITHOUT COMPLICATION, WITHOUT LONG-TERM CURRENT USE OF INSULIN (HCC): ICD-10-CM

## 2017-09-20 DIAGNOSIS — B96.20 E. COLI UTI: ICD-10-CM

## 2017-09-20 DIAGNOSIS — Z12.11 SCREENING FOR COLORECTAL CANCER: ICD-10-CM

## 2017-09-20 DIAGNOSIS — N39.0 E. COLI UTI: ICD-10-CM

## 2017-09-20 DIAGNOSIS — E78.00 PURE HYPERCHOLESTEROLEMIA: ICD-10-CM

## 2017-09-20 DIAGNOSIS — Z00.00 MEDICARE ANNUAL WELLNESS VISIT, SUBSEQUENT: Primary | ICD-10-CM

## 2017-09-20 DIAGNOSIS — F41.1 GENERALIZED ANXIETY DISORDER: ICD-10-CM

## 2017-09-20 PROBLEM — Z01.89 ROUTINE LAB DRAW: Status: RESOLVED | Noted: 2017-09-12 | Resolved: 2017-09-20

## 2017-09-20 PROCEDURE — G8417 CALC BMI ABV UP PARAM F/U: HCPCS | Performed by: INTERNAL MEDICINE

## 2017-09-20 PROCEDURE — G8427 DOCREV CUR MEDS BY ELIG CLIN: HCPCS | Performed by: INTERNAL MEDICINE

## 2017-09-20 PROCEDURE — 1123F ACP DISCUSS/DSCN MKR DOCD: CPT | Performed by: INTERNAL MEDICINE

## 2017-09-20 PROCEDURE — 99213 OFFICE O/P EST LOW 20 MIN: CPT | Performed by: INTERNAL MEDICINE

## 2017-09-20 PROCEDURE — G0439 PPPS, SUBSEQ VISIT: HCPCS | Performed by: INTERNAL MEDICINE

## 2017-09-20 PROCEDURE — 4005F PHARM THX FOR OP RXD: CPT | Performed by: INTERNAL MEDICINE

## 2017-09-20 PROCEDURE — 1036F TOBACCO NON-USER: CPT | Performed by: INTERNAL MEDICINE

## 2017-09-20 PROCEDURE — 4040F PNEUMOC VAC/ADMIN/RCVD: CPT | Performed by: INTERNAL MEDICINE

## 2017-09-20 PROCEDURE — G8399 PT W/DXA RESULTS DOCUMENT: HCPCS | Performed by: INTERNAL MEDICINE

## 2017-09-20 PROCEDURE — 3017F COLORECTAL CA SCREEN DOC REV: CPT | Performed by: INTERNAL MEDICINE

## 2017-09-20 PROCEDURE — 1090F PRES/ABSN URINE INCON ASSESS: CPT | Performed by: INTERNAL MEDICINE

## 2017-09-20 PROCEDURE — 3046F HEMOGLOBIN A1C LEVEL >9.0%: CPT | Performed by: INTERNAL MEDICINE

## 2017-09-20 RX ORDER — CIPROFLOXACIN 250 MG/1
250 TABLET, FILM COATED ORAL 2 TIMES DAILY
Qty: 10 TABLET | Refills: 0 | Status: SHIPPED | OUTPATIENT
Start: 2017-09-20 | End: 2017-09-25

## 2017-09-20 ASSESSMENT — ENCOUNTER SYMPTOMS
DIARRHEA: 0
SINUS PRESSURE: 0
WHEEZING: 0
SORE THROAT: 0
CHEST TIGHTNESS: 0
TROUBLE SWALLOWING: 0
EYE REDNESS: 0
BLOOD IN STOOL: 0
EYE PAIN: 0
VOMITING: 0
CONSTIPATION: 0
COUGH: 0
COLOR CHANGE: 0
VOICE CHANGE: 0
NAUSEA: 0
ABDOMINAL PAIN: 0

## 2017-09-20 ASSESSMENT — PATIENT HEALTH QUESTIONNAIRE - PHQ9
2. FEELING DOWN, DEPRESSED OR HOPELESS: 1
SUM OF ALL RESPONSES TO PHQ QUESTIONS 1-9: 2
SUM OF ALL RESPONSES TO PHQ9 QUESTIONS 1 & 2: 2
1. LITTLE INTEREST OR PLEASURE IN DOING THINGS: 1

## 2017-09-26 RX ORDER — GLIPIZIDE 5 MG/1
TABLET ORAL
Qty: 30 TABLET | Refills: 3 | Status: SHIPPED | OUTPATIENT
Start: 2017-09-26 | End: 2018-09-23 | Stop reason: SDUPTHER

## 2017-10-24 ENCOUNTER — INFUSION (OUTPATIENT)
Dept: ONCOLOGY | Facility: CLINIC | Age: 77
End: 2017-10-24

## 2017-10-24 DIAGNOSIS — C50.112 MALIGNANT NEOPLASM OF CENTRAL PORTION OF LEFT FEMALE BREAST, UNSPECIFIED ESTROGEN RECEPTOR STATUS (HCC): Primary | ICD-10-CM

## 2017-10-24 RX ORDER — SODIUM CHLORIDE 0.9 % (FLUSH) 0.9 %
10 SYRINGE (ML) INJECTION AS NEEDED
Status: DISCONTINUED | OUTPATIENT
Start: 2017-10-24 | End: 2017-10-24 | Stop reason: HOSPADM

## 2017-10-24 RX ORDER — SODIUM CHLORIDE 0.9 % (FLUSH) 0.9 %
10 SYRINGE (ML) INJECTION AS NEEDED
Status: CANCELLED | OUTPATIENT
Start: 2017-10-24

## 2017-10-24 RX ADMIN — Medication 10 ML: at 11:51

## 2018-01-03 ENCOUNTER — INFUSION (OUTPATIENT)
Dept: ONCOLOGY | Facility: CLINIC | Age: 78
End: 2018-01-03

## 2018-01-03 DIAGNOSIS — C50.112 MALIGNANT NEOPLASM OF CENTRAL PORTION OF LEFT FEMALE BREAST, UNSPECIFIED ESTROGEN RECEPTOR STATUS (HCC): Primary | ICD-10-CM

## 2018-01-03 RX ORDER — SODIUM CHLORIDE 0.9 % (FLUSH) 0.9 %
10 SYRINGE (ML) INJECTION AS NEEDED
Status: CANCELLED | OUTPATIENT
Start: 2018-01-03

## 2018-01-03 RX ORDER — SODIUM CHLORIDE 0.9 % (FLUSH) 0.9 %
10 SYRINGE (ML) INJECTION AS NEEDED
Status: DISCONTINUED | OUTPATIENT
Start: 2018-01-03 | End: 2018-01-03 | Stop reason: HOSPADM

## 2018-01-03 RX ADMIN — Medication 10 ML: at 14:48

## 2018-01-22 DIAGNOSIS — E55.9 VITAMIN D DEFICIENCY: ICD-10-CM

## 2018-01-22 DIAGNOSIS — E11.9 TYPE 2 DIABETES MELLITUS WITHOUT COMPLICATION, WITHOUT LONG-TERM CURRENT USE OF INSULIN (HCC): ICD-10-CM

## 2018-01-22 LAB
ALBUMIN SERPL-MCNC: 3.9 G/DL (ref 3.5–5.2)
ALP BLD-CCNC: 72 U/L (ref 35–104)
ALT SERPL-CCNC: 7 U/L (ref 5–33)
ANION GAP SERPL CALCULATED.3IONS-SCNC: 14 MMOL/L (ref 7–19)
AST SERPL-CCNC: 14 U/L (ref 5–32)
BILIRUB SERPL-MCNC: 0.5 MG/DL (ref 0.2–1.2)
BUN BLDV-MCNC: 26 MG/DL (ref 8–23)
CALCIUM SERPL-MCNC: 9.5 MG/DL (ref 8.8–10.2)
CHLORIDE BLD-SCNC: 100 MMOL/L (ref 98–111)
CO2: 27 MMOL/L (ref 22–29)
CREAT SERPL-MCNC: 0.8 MG/DL (ref 0.5–0.9)
GFR NON-AFRICAN AMERICAN: >60
GLUCOSE BLD-MCNC: 116 MG/DL (ref 74–109)
HBA1C MFR BLD: 6.7 %
POTASSIUM SERPL-SCNC: 4.1 MMOL/L (ref 3.5–5)
SODIUM BLD-SCNC: 141 MMOL/L (ref 136–145)
TOTAL PROTEIN: 8.1 G/DL (ref 6.6–8.7)
VITAMIN D 25-HYDROXY: 42 NG/ML

## 2018-01-23 ENCOUNTER — OFFICE VISIT (OUTPATIENT)
Dept: INTERNAL MEDICINE | Age: 78
End: 2018-01-23
Payer: MEDICARE

## 2018-01-23 VITALS
RESPIRATION RATE: 18 BRPM | DIASTOLIC BLOOD PRESSURE: 87 MMHG | HEART RATE: 66 BPM | HEIGHT: 66 IN | BODY MASS INDEX: 29.57 KG/M2 | SYSTOLIC BLOOD PRESSURE: 138 MMHG | WEIGHT: 184 LBS | OXYGEN SATURATION: 97 %

## 2018-01-23 DIAGNOSIS — E55.9 VITAMIN D DEFICIENCY: Primary | ICD-10-CM

## 2018-01-23 DIAGNOSIS — E78.00 PURE HYPERCHOLESTEROLEMIA: ICD-10-CM

## 2018-01-23 DIAGNOSIS — E11.9 TYPE 2 DIABETES MELLITUS WITHOUT COMPLICATION, WITHOUT LONG-TERM CURRENT USE OF INSULIN (HCC): ICD-10-CM

## 2018-01-23 DIAGNOSIS — F41.1 GENERALIZED ANXIETY DISORDER: ICD-10-CM

## 2018-01-23 PROCEDURE — 99214 OFFICE O/P EST MOD 30 MIN: CPT | Performed by: INTERNAL MEDICINE

## 2018-01-23 PROCEDURE — G8417 CALC BMI ABV UP PARAM F/U: HCPCS | Performed by: INTERNAL MEDICINE

## 2018-01-23 PROCEDURE — 4040F PNEUMOC VAC/ADMIN/RCVD: CPT | Performed by: INTERNAL MEDICINE

## 2018-01-23 PROCEDURE — 1090F PRES/ABSN URINE INCON ASSESS: CPT | Performed by: INTERNAL MEDICINE

## 2018-01-23 PROCEDURE — 1036F TOBACCO NON-USER: CPT | Performed by: INTERNAL MEDICINE

## 2018-01-23 PROCEDURE — G8427 DOCREV CUR MEDS BY ELIG CLIN: HCPCS | Performed by: INTERNAL MEDICINE

## 2018-01-23 PROCEDURE — 1123F ACP DISCUSS/DSCN MKR DOCD: CPT | Performed by: INTERNAL MEDICINE

## 2018-01-23 PROCEDURE — G8484 FLU IMMUNIZE NO ADMIN: HCPCS | Performed by: INTERNAL MEDICINE

## 2018-01-23 PROCEDURE — G8399 PT W/DXA RESULTS DOCUMENT: HCPCS | Performed by: INTERNAL MEDICINE

## 2018-01-23 ASSESSMENT — ENCOUNTER SYMPTOMS
COUGH: 0
CONSTIPATION: 0
CHEST TIGHTNESS: 0
WHEEZING: 0
ABDOMINAL PAIN: 0
SORE THROAT: 0

## 2018-01-23 NOTE — PROGRESS NOTES
very well controlled, she will continue her current dose of pravastatin 40 mg daily     Type 2 diabetes mellitus without complication, without long-term current use of insulin ([facility] [address]- her A1c is better! 6.7 ( 7.2)- cont Current glipizide dose     Generalized anxiety disorder- her anxiety issues are daily but she does not want to take rx        Previous history of breast cancer, follows with dr Jose Cardenas had recent mammogram    Orders Placed This Encounter   Procedures    Lipid Panel    Hemoglobin A1C    Comprehensive Metabolic Panel    Vitamin D 25 Hydroxy     New Prescriptions    No medications on file        Return in about 6 months (around 8/3/2018). There are no Patient Instructions on file for this visit. EMR Dragon/transcription disclaimer:Significant part of this  encounter note is electronic transcription/translation of spoken language to printed text. The electronic translation of spoken language may be erroneous, or at times, nonsensical words or phrases may be inadvertently transcribed.  Although I have reviewed the note for such errors, some may still exist.

## 2018-02-28 ENCOUNTER — INFUSION (OUTPATIENT)
Dept: ONCOLOGY | Facility: CLINIC | Age: 78
End: 2018-02-28

## 2018-02-28 DIAGNOSIS — C50.112 MALIGNANT NEOPLASM OF CENTRAL PORTION OF LEFT FEMALE BREAST, UNSPECIFIED ESTROGEN RECEPTOR STATUS (HCC): Primary | ICD-10-CM

## 2018-02-28 RX ORDER — SODIUM CHLORIDE 0.9 % (FLUSH) 0.9 %
10 SYRINGE (ML) INJECTION AS NEEDED
Status: CANCELLED | OUTPATIENT
Start: 2018-02-28

## 2018-02-28 RX ORDER — SODIUM CHLORIDE 0.9 % (FLUSH) 0.9 %
10 SYRINGE (ML) INJECTION AS NEEDED
Status: DISCONTINUED | OUTPATIENT
Start: 2018-02-28 | End: 2018-02-28 | Stop reason: HOSPADM

## 2018-02-28 RX ADMIN — Medication 10 ML: at 11:45

## 2018-04-12 PROBLEM — Z12.12 SCREENING FOR COLORECTAL CANCER: Status: RESOLVED | Noted: 2017-09-10 | Resolved: 2018-04-12

## 2018-04-12 PROBLEM — Z12.11 SCREENING FOR COLORECTAL CANCER: Status: RESOLVED | Noted: 2017-09-10 | Resolved: 2018-04-12

## 2018-04-12 PROBLEM — Z00.00 MEDICARE ANNUAL WELLNESS VISIT, SUBSEQUENT: Status: RESOLVED | Noted: 2017-09-20 | Resolved: 2018-04-12

## 2018-04-17 RX ORDER — PRAVASTATIN SODIUM 40 MG
TABLET ORAL
Qty: 30 TABLET | Refills: 5 | Status: SHIPPED | OUTPATIENT
Start: 2018-04-17 | End: 2019-02-06 | Stop reason: SDUPTHER

## 2018-04-24 ENCOUNTER — INFUSION (OUTPATIENT)
Dept: ONCOLOGY | Facility: CLINIC | Age: 78
End: 2018-04-24

## 2018-04-24 DIAGNOSIS — C50.112 MALIGNANT NEOPLASM OF CENTRAL PORTION OF LEFT FEMALE BREAST, UNSPECIFIED ESTROGEN RECEPTOR STATUS (HCC): ICD-10-CM

## 2018-06-19 ENCOUNTER — INFUSION (OUTPATIENT)
Dept: ONCOLOGY | Facility: CLINIC | Age: 78
End: 2018-06-19

## 2018-06-19 DIAGNOSIS — C50.112 MALIGNANT NEOPLASM OF CENTRAL PORTION OF LEFT FEMALE BREAST, UNSPECIFIED ESTROGEN RECEPTOR STATUS (HCC): Primary | ICD-10-CM

## 2018-06-19 RX ORDER — SODIUM CHLORIDE 0.9 % (FLUSH) 0.9 %
10 SYRINGE (ML) INJECTION AS NEEDED
Status: DISCONTINUED | OUTPATIENT
Start: 2018-06-19 | End: 2018-06-19 | Stop reason: HOSPADM

## 2018-06-19 RX ORDER — SODIUM CHLORIDE 0.9 % (FLUSH) 0.9 %
10 SYRINGE (ML) INJECTION AS NEEDED
Status: CANCELLED | OUTPATIENT
Start: 2018-06-19

## 2018-06-19 RX ADMIN — Medication 10 ML: at 10:15

## 2018-07-23 DIAGNOSIS — E55.9 VITAMIN D DEFICIENCY: ICD-10-CM

## 2018-07-23 DIAGNOSIS — E11.9 TYPE 2 DIABETES MELLITUS WITHOUT COMPLICATION, WITHOUT LONG-TERM CURRENT USE OF INSULIN (HCC): ICD-10-CM

## 2018-07-23 DIAGNOSIS — E78.00 PURE HYPERCHOLESTEROLEMIA: ICD-10-CM

## 2018-07-23 LAB
ALBUMIN SERPL-MCNC: 3.9 G/DL (ref 3.5–5.2)
ALP BLD-CCNC: 69 U/L (ref 35–104)
ALT SERPL-CCNC: 9 U/L (ref 5–33)
ANION GAP SERPL CALCULATED.3IONS-SCNC: 12 MMOL/L (ref 7–19)
AST SERPL-CCNC: 16 U/L (ref 5–32)
BILIRUB SERPL-MCNC: 0.4 MG/DL (ref 0.2–1.2)
BUN BLDV-MCNC: 22 MG/DL (ref 8–23)
CALCIUM SERPL-MCNC: 9.2 MG/DL (ref 8.8–10.2)
CHLORIDE BLD-SCNC: 103 MMOL/L (ref 98–111)
CHOLESTEROL, TOTAL: 160 MG/DL (ref 160–199)
CO2: 26 MMOL/L (ref 22–29)
CREAT SERPL-MCNC: 0.8 MG/DL (ref 0.5–0.9)
GFR NON-AFRICAN AMERICAN: >60
GLUCOSE BLD-MCNC: 92 MG/DL (ref 74–109)
HBA1C MFR BLD: 6.9 % (ref 4–6)
HDLC SERPL-MCNC: 55 MG/DL (ref 65–121)
LDL CHOLESTEROL CALCULATED: 86 MG/DL
POTASSIUM SERPL-SCNC: 4.2 MMOL/L (ref 3.5–5)
SODIUM BLD-SCNC: 141 MMOL/L (ref 136–145)
TOTAL PROTEIN: 7.9 G/DL (ref 6.6–8.7)
TRIGL SERPL-MCNC: 96 MG/DL (ref 0–149)
VITAMIN D 25-HYDROXY: 41.5 NG/ML

## 2018-08-07 ENCOUNTER — OFFICE VISIT (OUTPATIENT)
Dept: INTERNAL MEDICINE | Age: 78
End: 2018-08-07
Payer: MEDICARE

## 2018-08-07 ENCOUNTER — HOSPITAL ENCOUNTER (OUTPATIENT)
Dept: GENERAL RADIOLOGY | Age: 78
Discharge: HOME | End: 2018-08-07
Payer: MEDICARE

## 2018-08-07 VITALS
WEIGHT: 181.2 LBS | HEIGHT: 66 IN | SYSTOLIC BLOOD PRESSURE: 136 MMHG | DIASTOLIC BLOOD PRESSURE: 80 MMHG | BODY MASS INDEX: 29.12 KG/M2 | HEART RATE: 80 BPM | OXYGEN SATURATION: 98 %

## 2018-08-07 DIAGNOSIS — F41.1 GENERALIZED ANXIETY DISORDER: ICD-10-CM

## 2018-08-07 DIAGNOSIS — E11.9 TYPE 2 DIABETES MELLITUS WITHOUT COMPLICATION, WITHOUT LONG-TERM CURRENT USE OF INSULIN (HCC): ICD-10-CM

## 2018-08-07 DIAGNOSIS — R35.0 URINARY FREQUENCY: ICD-10-CM

## 2018-08-07 DIAGNOSIS — R79.89 ELEVATED TSH: ICD-10-CM

## 2018-08-07 DIAGNOSIS — R53.83 OTHER FATIGUE: ICD-10-CM

## 2018-08-07 DIAGNOSIS — M25.572 LEFT ANKLE PAIN, UNSPECIFIED CHRONICITY: ICD-10-CM

## 2018-08-07 DIAGNOSIS — E78.00 PURE HYPERCHOLESTEROLEMIA: ICD-10-CM

## 2018-08-07 DIAGNOSIS — E11.9 TYPE 2 DIABETES MELLITUS WITHOUT COMPLICATION, WITHOUT LONG-TERM CURRENT USE OF INSULIN (HCC): Primary | ICD-10-CM

## 2018-08-07 DIAGNOSIS — E55.9 VITAMIN D DEFICIENCY: ICD-10-CM

## 2018-08-07 LAB
BACTERIA: ABNORMAL /HPF
BASOPHILS ABSOLUTE: 0 K/UL (ref 0–0.2)
BASOPHILS RELATIVE PERCENT: 0.5 % (ref 0–1)
BILIRUBIN URINE: NEGATIVE
BLOOD, URINE: NEGATIVE
CLARITY: ABNORMAL
COLOR: YELLOW
EOSINOPHILS ABSOLUTE: 0.2 K/UL (ref 0–0.6)
EOSINOPHILS RELATIVE PERCENT: 2.4 % (ref 0–5)
EPITHELIAL CELLS, UA: 3 /HPF (ref 0–5)
GLUCOSE URINE: NEGATIVE MG/DL
HCT VFR BLD CALC: 37.8 % (ref 37–47)
HEMOGLOBIN: 12.4 G/DL (ref 12–16)
HYALINE CASTS: 7 /HPF (ref 0–8)
KETONES, URINE: NEGATIVE MG/DL
LEUKOCYTE ESTERASE, URINE: ABNORMAL
LYMPHOCYTES ABSOLUTE: 3 K/UL (ref 1.1–4.5)
LYMPHOCYTES RELATIVE PERCENT: 35.7 % (ref 20–40)
MCH RBC QN AUTO: 30.5 PG (ref 27–31)
MCHC RBC AUTO-ENTMCNC: 32.8 G/DL (ref 33–37)
MCV RBC AUTO: 93.1 FL (ref 81–99)
MONOCYTES ABSOLUTE: 0.9 K/UL (ref 0–0.9)
MONOCYTES RELATIVE PERCENT: 10.7 % (ref 0–10)
NEUTROPHILS ABSOLUTE: 4.2 K/UL (ref 1.5–7.5)
NEUTROPHILS RELATIVE PERCENT: 50.5 % (ref 50–65)
NITRITE, URINE: POSITIVE
PDW BLD-RTO: 13 % (ref 11.5–14.5)
PH UA: 6.5
PLATELET # BLD: 343 K/UL (ref 130–400)
PMV BLD AUTO: 9 FL (ref 9.4–12.3)
PROTEIN UA: NEGATIVE MG/DL
RBC # BLD: 4.06 M/UL (ref 4.2–5.4)
RBC UA: 5 /HPF (ref 0–4)
SPECIFIC GRAVITY UA: 1.02
T4 FREE: 1 NG/DL (ref 0.9–1.7)
TSH SERPL DL<=0.05 MIU/L-ACNC: 2.71 UIU/ML (ref 0.27–4.2)
URINE REFLEX TO CULTURE: YES
UROBILINOGEN, URINE: 0.2 E.U./DL
WBC # BLD: 8.3 K/UL (ref 4.8–10.8)
WBC UA: 111 /HPF (ref 0–5)

## 2018-08-07 PROCEDURE — 1036F TOBACCO NON-USER: CPT | Performed by: INTERNAL MEDICINE

## 2018-08-07 PROCEDURE — 1101F PT FALLS ASSESS-DOCD LE1/YR: CPT | Performed by: INTERNAL MEDICINE

## 2018-08-07 PROCEDURE — 73600 X-RAY EXAM OF ANKLE: CPT

## 2018-08-07 PROCEDURE — 1090F PRES/ABSN URINE INCON ASSESS: CPT | Performed by: INTERNAL MEDICINE

## 2018-08-07 PROCEDURE — G8399 PT W/DXA RESULTS DOCUMENT: HCPCS | Performed by: INTERNAL MEDICINE

## 2018-08-07 PROCEDURE — G8417 CALC BMI ABV UP PARAM F/U: HCPCS | Performed by: INTERNAL MEDICINE

## 2018-08-07 PROCEDURE — 99214 OFFICE O/P EST MOD 30 MIN: CPT | Performed by: INTERNAL MEDICINE

## 2018-08-07 PROCEDURE — 1123F ACP DISCUSS/DSCN MKR DOCD: CPT | Performed by: INTERNAL MEDICINE

## 2018-08-07 PROCEDURE — 4040F PNEUMOC VAC/ADMIN/RCVD: CPT | Performed by: INTERNAL MEDICINE

## 2018-08-07 PROCEDURE — G8427 DOCREV CUR MEDS BY ELIG CLIN: HCPCS | Performed by: INTERNAL MEDICINE

## 2018-08-07 ASSESSMENT — ENCOUNTER SYMPTOMS
SORE THROAT: 0
CHEST TIGHTNESS: 0
WHEEZING: 0
CONSTIPATION: 0
ABDOMINAL PAIN: 0
COUGH: 0

## 2018-08-07 NOTE — PROGRESS NOTES
Procedure Laterality Date    BREAST BIOPSY  8-6-2008    U/S Guided Mammotome Biopsy Left Breast x 2  infiltrating ductal carcinoma, grade III, ER/AR negative    BREAST BIOPSY  8-5-2009    Stereotactic biopsy right breast  No evidence of malignancy    BREAST BIOPSY Left 8/2015    COLONOSCOPY      MASTECTOMY, PARTIAL  8-     Left partial mastectomy and left sentinel node biopsy  2.6 cm infiltrating ductal carcinoma, grade III, 0/2 nodes positive, immunohistochemistry negative for micrometastasis     Current Outpatient Prescriptions   Medication Sig Dispense Refill    pravastatin (PRAVACHOL) 40 MG tablet TAKE ONE TABLET BY MOUTH ONCE DAILY 30 tablet 5    glipiZIDE (GLUCOTROL) 5 MG tablet TAKE ONE-HALF TABLET BY MOUTH ONCE DAILY 30 tablet 3    Calcium-Vitamin D 600-200 MG-UNIT TABS Take by mouth every morning (before breakfast)       aspirin 81 MG EC tablet Take 81 mg by mouth daily.  FISH OIL by Does not apply route.  Ascorbic Acid (VITAMIN C) 500 MG tablet Take 500 mg by mouth daily. No current facility-administered medications for this visit. No Known Allergies  Social History   Substance Use Topics    Smoking status: Former Smoker    Smokeless tobacco: Never Used    Alcohol use No      Comment: occ      Family History   Problem Relation Age of Onset    Diabetes Father     Heart Disease Father     Hypertension Mother        Review of Systems   Constitutional: Positive for fatigue. Negative for chills and fever. HENT: Negative for congestion, ear pain, nosebleeds, postnasal drip and sore throat. Respiratory: Negative for cough, chest tightness and wheezing. Cardiovascular: Negative for chest pain, palpitations and leg swelling. Gastrointestinal: Negative for abdominal pain and constipation. Genitourinary: Negative for dysuria and urgency. Musculoskeletal: Negative. Negative for arthralgias. Skin: Negative for rash.    Neurological: Negative for

## 2018-08-10 ENCOUNTER — TELEPHONE (OUTPATIENT)
Dept: INTERNAL MEDICINE | Age: 78
End: 2018-08-10

## 2018-08-10 LAB
ORGANISM: ABNORMAL
URINE CULTURE, ROUTINE: ABNORMAL
URINE CULTURE, ROUTINE: ABNORMAL

## 2018-08-10 RX ORDER — CEFUROXIME AXETIL 250 MG/1
250 TABLET ORAL 2 TIMES DAILY
Qty: 10 TABLET | Refills: 0 | Status: SHIPPED | OUTPATIENT
Start: 2018-08-10 | End: 2018-08-15

## 2018-08-10 NOTE — TELEPHONE ENCOUNTER
----- Message from Louisa Fuentes MD sent at 8/10/2018  1:33 PM CDT -----  Urine culture does show infection with bacteria  Suggest she takes Ceftin 250 twice daily for 5 days

## 2018-08-29 DIAGNOSIS — C50.112 MALIGNANT NEOPLASM OF CENTRAL PORTION OF LEFT FEMALE BREAST, UNSPECIFIED ESTROGEN RECEPTOR STATUS (HCC): Primary | ICD-10-CM

## 2018-09-04 ENCOUNTER — APPOINTMENT (OUTPATIENT)
Dept: LAB | Facility: HOSPITAL | Age: 78
End: 2018-09-04

## 2018-09-04 ENCOUNTER — OFFICE VISIT (OUTPATIENT)
Dept: SURGERY | Age: 78
End: 2018-09-04
Payer: MEDICARE

## 2018-09-04 ENCOUNTER — OFFICE VISIT (OUTPATIENT)
Dept: ONCOLOGY | Facility: CLINIC | Age: 78
End: 2018-09-04

## 2018-09-04 ENCOUNTER — HOSPITAL ENCOUNTER (OUTPATIENT)
Dept: WOMENS IMAGING | Age: 78
Discharge: HOME | End: 2018-09-04
Payer: MEDICARE

## 2018-09-04 VITALS
DIASTOLIC BLOOD PRESSURE: 70 MMHG | SYSTOLIC BLOOD PRESSURE: 122 MMHG | HEART RATE: 72 BPM | WEIGHT: 185 LBS | BODY MASS INDEX: 29.73 KG/M2 | HEIGHT: 66 IN

## 2018-09-04 VITALS
TEMPERATURE: 97.1 F | HEIGHT: 66 IN | SYSTOLIC BLOOD PRESSURE: 138 MMHG | DIASTOLIC BLOOD PRESSURE: 86 MMHG | OXYGEN SATURATION: 96 % | RESPIRATION RATE: 16 BRPM | HEART RATE: 84 BPM | BODY MASS INDEX: 29.6 KG/M2 | WEIGHT: 184.2 LBS

## 2018-09-04 DIAGNOSIS — C50.112 MALIGNANT NEOPLASM OF CENTRAL PORTION OF LEFT FEMALE BREAST, UNSPECIFIED ESTROGEN RECEPTOR STATUS (HCC): Primary | ICD-10-CM

## 2018-09-04 DIAGNOSIS — Z12.31 ENCOUNTER FOR SCREENING MAMMOGRAM FOR HIGH-RISK PATIENT: ICD-10-CM

## 2018-09-04 DIAGNOSIS — C50.411 MALIGNANT NEOPLASM OF UPPER-OUTER QUADRANT OF RIGHT BREAST IN FEMALE, ESTROGEN RECEPTOR NEGATIVE (HCC): Primary | ICD-10-CM

## 2018-09-04 DIAGNOSIS — Z12.39 SCREENING BREAST EXAMINATION: Primary | ICD-10-CM

## 2018-09-04 DIAGNOSIS — Z17.1 MALIGNANT NEOPLASM OF UPPER-OUTER QUADRANT OF RIGHT BREAST IN FEMALE, ESTROGEN RECEPTOR NEGATIVE (HCC): Primary | ICD-10-CM

## 2018-09-04 LAB
ALBUMIN SERPL-MCNC: 3.8 G/DL (ref 3.5–5)
ALBUMIN/GLOB SERPL: 1.1 G/DL (ref 1.1–2.5)
ALP SERPL-CCNC: 78 U/L (ref 24–120)
ALT SERPL W P-5'-P-CCNC: 20 U/L (ref 0–54)
ANION GAP SERPL CALCULATED.3IONS-SCNC: 6 MMOL/L (ref 4–13)
AST SERPL-CCNC: 27 U/L (ref 7–45)
BASOPHILS # BLD AUTO: 0.03 10*3/MM3 (ref 0–0.2)
BASOPHILS NFR BLD AUTO: 0.4 % (ref 0–2)
BILIRUB SERPL-MCNC: 0.4 MG/DL (ref 0.1–1)
BUN BLD-MCNC: 18 MG/DL (ref 5–21)
BUN/CREAT SERPL: 24 (ref 7–25)
CALCIUM SPEC-SCNC: 8.8 MG/DL (ref 8.4–10.4)
CHLORIDE SERPL-SCNC: 102 MMOL/L (ref 98–110)
CO2 SERPL-SCNC: 32 MMOL/L (ref 24–31)
CREAT BLD-MCNC: 0.75 MG/DL (ref 0.5–1.4)
DEPRECATED RDW RBC AUTO: 42.9 FL (ref 40–54)
EOSINOPHIL # BLD AUTO: 0.28 10*3/MM3 (ref 0–0.7)
EOSINOPHIL NFR BLD AUTO: 4 % (ref 0–4)
ERYTHROCYTE [DISTWIDTH] IN BLOOD BY AUTOMATED COUNT: 13.1 % (ref 12–15)
GFR SERPL CREATININE-BSD FRML MDRD: 75 ML/MIN/1.73
GLOBULIN UR ELPH-MCNC: 3.6 GM/DL
GLUCOSE BLD-MCNC: 124 MG/DL (ref 70–100)
HCT VFR BLD AUTO: 36.8 % (ref 37–47)
HGB BLD-MCNC: 12.6 G/DL (ref 12–16)
HOLD SPECIMEN: NORMAL
HOLD SPECIMEN: NORMAL
IMM GRANULOCYTES # BLD: 0.02 10*3/MM3 (ref 0–0.03)
IMM GRANULOCYTES NFR BLD: 0.3 % (ref 0–5)
LYMPHOCYTES # BLD AUTO: 2.1 10*3/MM3 (ref 0.72–4.86)
LYMPHOCYTES NFR BLD AUTO: 30.3 % (ref 15–45)
MCH RBC QN AUTO: 30.9 PG (ref 28–32)
MCHC RBC AUTO-ENTMCNC: 34.2 G/DL (ref 33–36)
MCV RBC AUTO: 90.2 FL (ref 82–98)
MONOCYTES # BLD AUTO: 0.97 10*3/MM3 (ref 0.19–1.3)
MONOCYTES NFR BLD AUTO: 14 % (ref 4–12)
NEUTROPHILS # BLD AUTO: 3.53 10*3/MM3 (ref 1.87–8.4)
NEUTROPHILS NFR BLD AUTO: 51 % (ref 39–78)
NRBC BLD MANUAL-RTO: 0 /100 WBC (ref 0–0)
PLATELET # BLD AUTO: 344 10*3/MM3 (ref 130–400)
PMV BLD AUTO: 8.9 FL (ref 6–12)
POTASSIUM BLD-SCNC: 3.8 MMOL/L (ref 3.5–5.3)
PROT SERPL-MCNC: 7.4 G/DL (ref 6.3–8.7)
RBC # BLD AUTO: 4.08 10*6/MM3 (ref 4.2–5.4)
SODIUM BLD-SCNC: 140 MMOL/L (ref 135–145)
WBC NRBC COR # BLD: 6.93 10*3/MM3 (ref 4.8–10.8)

## 2018-09-04 PROCEDURE — 80053 COMPREHEN METABOLIC PANEL: CPT | Performed by: INTERNAL MEDICINE

## 2018-09-04 PROCEDURE — 85025 COMPLETE CBC W/AUTO DIFF WBC: CPT | Performed by: INTERNAL MEDICINE

## 2018-09-04 PROCEDURE — G8427 DOCREV CUR MEDS BY ELIG CLIN: HCPCS | Performed by: PHYSICIAN ASSISTANT

## 2018-09-04 PROCEDURE — 77063 BREAST TOMOSYNTHESIS BI: CPT

## 2018-09-04 PROCEDURE — 99212 OFFICE O/P EST SF 10 MIN: CPT | Performed by: PHYSICIAN ASSISTANT

## 2018-09-04 PROCEDURE — G8399 PT W/DXA RESULTS DOCUMENT: HCPCS | Performed by: PHYSICIAN ASSISTANT

## 2018-09-04 PROCEDURE — 4040F PNEUMOC VAC/ADMIN/RCVD: CPT | Performed by: PHYSICIAN ASSISTANT

## 2018-09-04 PROCEDURE — G8417 CALC BMI ABV UP PARAM F/U: HCPCS | Performed by: PHYSICIAN ASSISTANT

## 2018-09-04 PROCEDURE — 36415 COLL VENOUS BLD VENIPUNCTURE: CPT

## 2018-09-04 PROCEDURE — 1101F PT FALLS ASSESS-DOCD LE1/YR: CPT | Performed by: PHYSICIAN ASSISTANT

## 2018-09-04 PROCEDURE — 99213 OFFICE O/P EST LOW 20 MIN: CPT | Performed by: INTERNAL MEDICINE

## 2018-09-04 PROCEDURE — 1036F TOBACCO NON-USER: CPT | Performed by: PHYSICIAN ASSISTANT

## 2018-09-04 PROCEDURE — 1090F PRES/ABSN URINE INCON ASSESS: CPT | Performed by: PHYSICIAN ASSISTANT

## 2018-09-04 PROCEDURE — 1123F ACP DISCUSS/DSCN MKR DOCD: CPT | Performed by: PHYSICIAN ASSISTANT

## 2018-09-04 NOTE — PROGRESS NOTES
[facility]  HEMATOLOGY & ONCOLOGY        Subjective     VISIT DIAGNOSIS: No diagnosis found.    REASON FOR VISIT:     No chief complaint on file.       HEMATOLOGY / ONCOLOGY HISTORY:   Oncology/Hematology History    Tp: P1vCjS5 Mp: M0 Size: 2.6 cm Histopathologic Grade: G3? Histopathologic Type: DuctalInvasive  (NOS), left central? Stage  Grouping: IIA  08/06/2008: Core biopsy: at left breast mass, 12 o'clock: Infiltrating ductal carcinoma, gr 3, with foci of tumor necrosis? DNA index 1.78  (aneuploid), ER/IN 0%, her2/kofi 2+ (FISH ), p53 0%, Ki67  59%.  08/28/2008: MastectomyL  partial, USguided  needle localization, with SNB: IDC, gr 3, tumor measures 2.6 cm in greatest dimension,  with necrosis seen, extending to original deep margin....additional deep margin adds 1 cm to final margin of excision, residual fibrocystic  mastopathy? 2 left axillary sentinel nodes: 0/2+ve  Dose dense Adriamycin/cyclophosphamide, followed by weekly Taxol administered between September 2008 and March 2009 .  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  Followed by Radiation therapy        Malignant neoplasm of central portion of left female breast (CMS/HCC)    11/9/2016 Initial Diagnosis     Malignant neoplasm of central portion of left female breast          Cancer Staging Information:  No matching staging information was found for the patient.      INTERVAL HISTORY  Patient ID: Lorena Valdes is a 77 y.o. year old female with a history of breast cancer in 2008.  She has no complaint except for occasional fatigue denies any lumps or bumps of the breasts.  She is due for mammogram today.  She is also followed following up with the surgeon.  --no issues or concerns    Past Medical History:   Past Medical History:   Diagnosis Date   • Bladder disorder    • Fibrocystic disease of breast    • Heart disease    • History of bone density study 2011   • Hyperglycemia    •  Hyperlipidemia    • Hypotension    • Left ventricular diastolic dysfunction    • Malignant neoplasm of central portion of left female breast (CMS/HCC) 11/9/2016   • Osteopenia 8/29/2017   • Uterine prolapse      Past Surgical History:   Past Surgical History:   Procedure Laterality Date   • BREAST LUMPECTOMY  2008   • COLONOSCOPY  2010   • MAMMO BILATERAL  07/17/2013    Dr. Sabillon   • PAP SMEAR  2010     Social History:   Social History     Social History   • Marital status: Single     Spouse name: N/A   • Number of children: N/A   • Years of education: N/A     Occupational History   • Not on file.     Social History Main Topics   • Smoking status: Unknown If Ever Smoked   • Smokeless tobacco: Not on file   • Alcohol use Not on file   • Drug use: No   • Sexual activity: Not on file     Other Topics Concern   • Not on file     Social History Narrative   • No narrative on file     Family History:   Family History   Problem Relation Age of Onset   • Heart disease Father    • No Known Problems Daughter    • No Known Problems Son    • Hypertension Daughter    • Hypertension Daughter        Review of Systems   Constitutional: Positive for fatigue. Negative for activity change, appetite change, chills and unexpected weight change.   HENT: Negative.    Endocrine: Negative.    Genitourinary: Negative for dysuria, flank pain, frequency and hematuria.        Stress incontinent   Musculoskeletal: Negative.    Skin: Negative.    Allergic/Immunologic: Negative.    Neurological: Negative.    Hematological: Negative.    Psychiatric/Behavioral: Negative.         Performance Status:  Asymptomatic    Medications:    Current Outpatient Prescriptions   Medication Sig Dispense Refill   • aspirin 81 MG EC tablet Take 81 mg by mouth daily.       • Calcium Carb-Cholecalciferol (CALCIUM 600+D3) 600-200 MG-UNIT tablet Take 1 tablet by mouth 2 (Two) Times a Day.     • Cholecalciferol (VITAMIN D3) 400 UNITS capsule Take 1 capsule by mouth  "Daily.     • glipiZIDE (GLUCOTROL) 5 MG tablet Take 5 mg by mouth 2 (Two) Times a Day Before Meals.     • ibuprofen (ADVIL,MOTRIN) 400 MG tablet Take 400 mg by mouth Every 6 (Six) Hours As Needed for mild pain (1-3).     • Omega-3 Fatty Acids (FISH OIL) 1000 MG capsule capsule Take 1,000 mg by mouth Daily.     • pravastatin (PRAVACHOL) 40 MG tablet Take 40 mg by mouth Daily.     • vitamin C (ASCORBIC ACID) 500 MG tablet Take 500 mg by mouth daily.         No current facility-administered medications for this visit.        ALLERGIES:  No Known Allergies    Objective      Vitals:    09/04/18 0920   Resp: 16   Weight: 83.6 kg (184 lb 3.2 oz)   Height: 167.6 cm (65.98\")         Current Status 9/4/2018   ECOG score 0         Physical Exam    General Appearance: Patient is awake, alert, oriented and in no acute distress. Patient is welldeveloped, wellnourished, and appears stated age.  HEENT: Normocephalic. Sclerae clear, conjunctiva pink, extraocular movements intact, pupils, round, reactive to light and  accommodation. Mouth and throat are clear with moist oral mucosa.  NECK: Supple, no jugular venous distention, thyroid not enlarged.  LYMPH: No cervical, supraclavicular, axillary, or inguinal lymphadenopathy.  CHEST: Equal bilateral expansion, AP  diameter normal, resonant percussion note  LUNGS: Good air movement, no rales, rhonchi, rubs or wheezes with auscultation  CARDIO: Regular sinus rhythm, no murmurs, gallops or rubs.  ABDOMEN: Nondistended, soft, No tenderness, no guarding, no rebound, No hepatosplenomegaly. No abdominal masses. Bowel sounds positive. No hernia  GENITALIA: Not examined.  BREASTS: left breast lumpectomy, no lumps or bumps, no nipple discharge. Right breast unremarkable. Bilateral dense breast.  MUSKEL: No joint swelling, decreased motion, or inflammation  EXTREMS: No edema, clubbing, cyanosis, No varicose veins.  NEURO: Grossly nonfocal, Gait is coordinated and smooth, Cognition is " preserved.  SKIN: No rashes, no ecchymoses, no petechia.  PSYCH: Oriented to time, place and person. Memory is preserved. Mood and affect appear normal  RECENT LABS:  No visits with results within 7 Day(s) from this visit.   Latest known visit with results is:   Lab on 08/29/2017   Component Date Value Ref Range Status   • Extra Tube 08/29/2017 Hold for add-ons.   Final    Auto resulted.       RADIOLOGY:  No results found.         Assessment/Plan 76-year-old female diagnosed with left breast cancer T2 and 0 M0 2008 status post lumpectomy, ER/NH negative, HER-2 positive by fish,status post Adriamycin one cycle, discontinued due to cardiomyopathy.  Status post 4 cycles Cytoxan and 12 cycles of weekly Taxol.  Status postradiation therapy.   Last mammogram 8/30/ 2017 unremarkable. Next mammo today    Patient Active Problem List   Diagnosis   • Malignant neoplasm of central portion of left female breast (CMS/HCC)   • Malignant neoplasm of upper-outer quadrant of right female breast (CMS/HCC)   • Osteopenia          1.Breast cancer: NGOC I will follow up on her mammogram.  See her in one year.    2.  Osteoporosis: Postmenopausal bone density screening. Senile osteopenia.  REFERENCE EXAM.  No reference studies are available for comparison.   FINDINGS.  Bone densitometry has been performed using a [employer] bone  densitometer. The routine evaluation includes the lumbar spine and  left hip.  BMD     T-score    1.014        - 1.5       Lumbar Spine (L1-L4)  0.683        - 2.6       Femoral Neck  0.786        - 1.8       Total Hip  Osteoporosis based on left femoral neck T score.   IMPRESSION.  Osteoporosis, high fracture risk.  Signed by Dr Tom Valencia on 8/30/2017 9:38 AM  --discussed pros and cons of prolia. We need dental clearance. She will think about it and let us know. Continue calcium and vitamin D  3. Diabetes: on glipizide  4. Hyperlipidemia: on lipitor      Lowelliagene Diaz MD    9/4/2018    9:22 AM

## 2018-09-05 NOTE — PROGRESS NOTES
Biopsy Left Breast x 2 infiltrating ductal carcinoma, grade III, ER/CO negative. PLAN:   Follow-up in 1 year for physical exam and bilateral mammograms.

## 2018-09-24 RX ORDER — GLIPIZIDE 5 MG/1
TABLET ORAL
Qty: 30 TABLET | Refills: 3 | Status: SHIPPED | OUTPATIENT
Start: 2018-09-24 | End: 2019-02-06 | Stop reason: SDUPTHER

## 2018-11-05 ENCOUNTER — INFUSION (OUTPATIENT)
Dept: ONCOLOGY | Facility: CLINIC | Age: 78
End: 2018-11-05

## 2018-11-05 DIAGNOSIS — C50.112 MALIGNANT NEOPLASM OF CENTRAL PORTION OF LEFT FEMALE BREAST, UNSPECIFIED ESTROGEN RECEPTOR STATUS (HCC): Primary | ICD-10-CM

## 2018-11-05 RX ORDER — SODIUM CHLORIDE 0.9 % (FLUSH) 0.9 %
10 SYRINGE (ML) INJECTION AS NEEDED
Status: CANCELLED | OUTPATIENT
Start: 2018-11-05

## 2018-11-05 RX ORDER — SODIUM CHLORIDE 0.9 % (FLUSH) 0.9 %
10 SYRINGE (ML) INJECTION AS NEEDED
Status: DISCONTINUED | OUTPATIENT
Start: 2018-11-05 | End: 2018-11-05 | Stop reason: HOSPADM

## 2018-11-05 RX ADMIN — Medication 10 ML: at 11:03

## 2019-01-08 ENCOUNTER — INFUSION (OUTPATIENT)
Dept: ONCOLOGY | Facility: CLINIC | Age: 79
End: 2019-01-08

## 2019-01-08 DIAGNOSIS — C50.112 MALIGNANT NEOPLASM OF CENTRAL PORTION OF LEFT FEMALE BREAST, UNSPECIFIED ESTROGEN RECEPTOR STATUS (HCC): Primary | ICD-10-CM

## 2019-01-08 RX ORDER — SODIUM CHLORIDE 0.9 % (FLUSH) 0.9 %
10 SYRINGE (ML) INJECTION AS NEEDED
Status: DISCONTINUED | OUTPATIENT
Start: 2019-01-08 | End: 2019-01-08 | Stop reason: HOSPADM

## 2019-01-08 RX ORDER — SODIUM CHLORIDE 0.9 % (FLUSH) 0.9 %
10 SYRINGE (ML) INJECTION AS NEEDED
Status: CANCELLED | OUTPATIENT
Start: 2019-01-08

## 2019-01-08 RX ADMIN — Medication 10 ML: at 10:57

## 2019-02-05 DIAGNOSIS — F41.1 GENERALIZED ANXIETY DISORDER: ICD-10-CM

## 2019-02-05 DIAGNOSIS — E55.9 VITAMIN D DEFICIENCY: ICD-10-CM

## 2019-02-05 DIAGNOSIS — E78.00 PURE HYPERCHOLESTEROLEMIA: ICD-10-CM

## 2019-02-05 DIAGNOSIS — E11.9 TYPE 2 DIABETES MELLITUS WITHOUT COMPLICATION, WITHOUT LONG-TERM CURRENT USE OF INSULIN (HCC): ICD-10-CM

## 2019-02-05 LAB
ALBUMIN SERPL-MCNC: 3.8 G/DL (ref 3.5–5.2)
ALP BLD-CCNC: 71 U/L (ref 35–104)
ALT SERPL-CCNC: 7 U/L (ref 5–33)
ANION GAP SERPL CALCULATED.3IONS-SCNC: 14 MMOL/L (ref 7–19)
AST SERPL-CCNC: 14 U/L (ref 5–32)
BILIRUB SERPL-MCNC: 0.4 MG/DL (ref 0.2–1.2)
BUN BLDV-MCNC: 19 MG/DL (ref 8–23)
CALCIUM SERPL-MCNC: 9.2 MG/DL (ref 8.8–10.2)
CHLORIDE BLD-SCNC: 104 MMOL/L (ref 98–111)
CHOLESTEROL, TOTAL: 165 MG/DL (ref 160–199)
CO2: 25 MMOL/L (ref 22–29)
CREAT SERPL-MCNC: 0.8 MG/DL (ref 0.5–0.9)
CREATININE URINE: 102.8 MG/DL (ref 4.2–622)
GFR NON-AFRICAN AMERICAN: >60
GLUCOSE BLD-MCNC: 122 MG/DL (ref 74–109)
HBA1C MFR BLD: 6.7 % (ref 4–6)
HDLC SERPL-MCNC: 63 MG/DL (ref 65–121)
LDL CHOLESTEROL CALCULATED: 84 MG/DL
MICROALBUMIN UR-MCNC: 2.7 MG/DL (ref 0–19)
MICROALBUMIN/CREAT UR-RTO: 26.3 MG/G
POTASSIUM SERPL-SCNC: 4.1 MMOL/L (ref 3.5–5)
SODIUM BLD-SCNC: 143 MMOL/L (ref 136–145)
TOTAL PROTEIN: 7.6 G/DL (ref 6.6–8.7)
TRIGL SERPL-MCNC: 88 MG/DL (ref 0–149)
VITAMIN D 25-HYDROXY: 48.1 NG/ML

## 2019-02-06 ENCOUNTER — OFFICE VISIT (OUTPATIENT)
Dept: INTERNAL MEDICINE | Age: 79
End: 2019-02-06
Payer: MEDICARE

## 2019-02-06 VITALS
WEIGHT: 188 LBS | RESPIRATION RATE: 18 BRPM | BODY MASS INDEX: 29.51 KG/M2 | HEART RATE: 79 BPM | OXYGEN SATURATION: 97 % | HEIGHT: 67 IN | SYSTOLIC BLOOD PRESSURE: 148 MMHG | DIASTOLIC BLOOD PRESSURE: 90 MMHG

## 2019-02-06 DIAGNOSIS — Z00.00 MEDICARE ANNUAL WELLNESS VISIT, SUBSEQUENT: Primary | ICD-10-CM

## 2019-02-06 DIAGNOSIS — R03.0 ELEVATED BLOOD PRESSURE READING: ICD-10-CM

## 2019-02-06 DIAGNOSIS — E11.9 TYPE 2 DIABETES MELLITUS WITHOUT COMPLICATION, WITHOUT LONG-TERM CURRENT USE OF INSULIN (HCC): ICD-10-CM

## 2019-02-06 DIAGNOSIS — R53.83 FATIGUE, UNSPECIFIED TYPE: ICD-10-CM

## 2019-02-06 DIAGNOSIS — E55.9 VITAMIN D DEFICIENCY: ICD-10-CM

## 2019-02-06 DIAGNOSIS — F41.1 GENERALIZED ANXIETY DISORDER: ICD-10-CM

## 2019-02-06 DIAGNOSIS — M81.6 LOCALIZED OSTEOPOROSIS WITHOUT CURRENT PATHOLOGICAL FRACTURE: ICD-10-CM

## 2019-02-06 DIAGNOSIS — R79.89 ELEVATED TSH: ICD-10-CM

## 2019-02-06 PROCEDURE — 1123F ACP DISCUSS/DSCN MKR DOCD: CPT | Performed by: INTERNAL MEDICINE

## 2019-02-06 PROCEDURE — 1101F PT FALLS ASSESS-DOCD LE1/YR: CPT | Performed by: INTERNAL MEDICINE

## 2019-02-06 PROCEDURE — 1036F TOBACCO NON-USER: CPT | Performed by: INTERNAL MEDICINE

## 2019-02-06 PROCEDURE — 99214 OFFICE O/P EST MOD 30 MIN: CPT | Performed by: INTERNAL MEDICINE

## 2019-02-06 PROCEDURE — 1090F PRES/ABSN URINE INCON ASSESS: CPT | Performed by: INTERNAL MEDICINE

## 2019-02-06 PROCEDURE — G8484 FLU IMMUNIZE NO ADMIN: HCPCS | Performed by: INTERNAL MEDICINE

## 2019-02-06 PROCEDURE — 4040F PNEUMOC VAC/ADMIN/RCVD: CPT | Performed by: INTERNAL MEDICINE

## 2019-02-06 PROCEDURE — G8417 CALC BMI ABV UP PARAM F/U: HCPCS | Performed by: INTERNAL MEDICINE

## 2019-02-06 PROCEDURE — G8399 PT W/DXA RESULTS DOCUMENT: HCPCS | Performed by: INTERNAL MEDICINE

## 2019-02-06 PROCEDURE — G0439 PPPS, SUBSEQ VISIT: HCPCS | Performed by: INTERNAL MEDICINE

## 2019-02-06 PROCEDURE — G8427 DOCREV CUR MEDS BY ELIG CLIN: HCPCS | Performed by: INTERNAL MEDICINE

## 2019-02-06 RX ORDER — LISINOPRIL 10 MG/1
10 TABLET ORAL DAILY
Qty: 30 TABLET | Refills: 3 | Status: SHIPPED | OUTPATIENT
Start: 2019-02-06 | End: 2019-02-09 | Stop reason: SINTOL

## 2019-02-06 RX ORDER — GLIPIZIDE 5 MG/1
TABLET ORAL
Qty: 135 TABLET | Refills: 1 | Status: SHIPPED | OUTPATIENT
Start: 2019-02-06 | End: 2020-02-07 | Stop reason: SDUPTHER

## 2019-02-06 RX ORDER — PRAVASTATIN SODIUM 40 MG
TABLET ORAL
Qty: 90 TABLET | Refills: 1 | Status: SHIPPED | OUTPATIENT
Start: 2019-02-06 | End: 2020-02-07 | Stop reason: SDUPTHER

## 2019-02-06 ASSESSMENT — ENCOUNTER SYMPTOMS
TROUBLE SWALLOWING: 0
SORE THROAT: 0
CHEST TIGHTNESS: 0
EYE REDNESS: 0
DIARRHEA: 0
VOMITING: 0
ABDOMINAL PAIN: 0
VOICE CHANGE: 0
COLOR CHANGE: 0
EYE PAIN: 0
COUGH: 0
BLOOD IN STOOL: 0
CONSTIPATION: 0
SINUS PRESSURE: 0
WHEEZING: 0
NAUSEA: 0

## 2019-02-06 ASSESSMENT — PATIENT HEALTH QUESTIONNAIRE - PHQ9
SUM OF ALL RESPONSES TO PHQ QUESTIONS 1-9: 0
SUM OF ALL RESPONSES TO PHQ QUESTIONS 1-9: 0

## 2019-02-06 ASSESSMENT — ANXIETY QUESTIONNAIRES: GAD7 TOTAL SCORE: 0

## 2019-02-06 ASSESSMENT — LIFESTYLE VARIABLES: HOW OFTEN DO YOU HAVE A DRINK CONTAINING ALCOHOL: 0

## 2019-02-09 ENCOUNTER — OFFICE VISIT (OUTPATIENT)
Dept: URGENT CARE | Age: 79
End: 2019-02-09
Payer: MEDICARE

## 2019-02-09 VITALS
SYSTOLIC BLOOD PRESSURE: 190 MMHG | TEMPERATURE: 97.8 F | WEIGHT: 186.2 LBS | HEIGHT: 67 IN | BODY MASS INDEX: 29.22 KG/M2 | RESPIRATION RATE: 18 BRPM | HEART RATE: 73 BPM | OXYGEN SATURATION: 98 % | DIASTOLIC BLOOD PRESSURE: 98 MMHG

## 2019-02-09 DIAGNOSIS — I10 ESSENTIAL HYPERTENSION: Primary | ICD-10-CM

## 2019-02-09 PROCEDURE — 1123F ACP DISCUSS/DSCN MKR DOCD: CPT | Performed by: FAMILY MEDICINE

## 2019-02-09 PROCEDURE — 1036F TOBACCO NON-USER: CPT | Performed by: FAMILY MEDICINE

## 2019-02-09 PROCEDURE — G8399 PT W/DXA RESULTS DOCUMENT: HCPCS | Performed by: FAMILY MEDICINE

## 2019-02-09 PROCEDURE — 99213 OFFICE O/P EST LOW 20 MIN: CPT | Performed by: FAMILY MEDICINE

## 2019-02-09 PROCEDURE — 1101F PT FALLS ASSESS-DOCD LE1/YR: CPT | Performed by: FAMILY MEDICINE

## 2019-02-09 PROCEDURE — G8417 CALC BMI ABV UP PARAM F/U: HCPCS | Performed by: FAMILY MEDICINE

## 2019-02-09 PROCEDURE — G8484 FLU IMMUNIZE NO ADMIN: HCPCS | Performed by: FAMILY MEDICINE

## 2019-02-09 PROCEDURE — G8427 DOCREV CUR MEDS BY ELIG CLIN: HCPCS | Performed by: FAMILY MEDICINE

## 2019-02-09 PROCEDURE — 4040F PNEUMOC VAC/ADMIN/RCVD: CPT | Performed by: FAMILY MEDICINE

## 2019-02-09 PROCEDURE — 1090F PRES/ABSN URINE INCON ASSESS: CPT | Performed by: FAMILY MEDICINE

## 2019-02-09 RX ORDER — AMLODIPINE BESYLATE 5 MG/1
5 TABLET ORAL DAILY
Qty: 30 TABLET | Refills: 0 | Status: SHIPPED | OUTPATIENT
Start: 2019-02-09 | End: 2019-02-18 | Stop reason: DRUGHIGH

## 2019-02-11 ENCOUNTER — OFFICE VISIT (OUTPATIENT)
Dept: INTERNAL MEDICINE | Age: 79
End: 2019-02-11
Payer: MEDICARE

## 2019-02-11 VITALS
HEART RATE: 76 BPM | OXYGEN SATURATION: 92 % | WEIGHT: 182 LBS | HEIGHT: 67 IN | DIASTOLIC BLOOD PRESSURE: 92 MMHG | SYSTOLIC BLOOD PRESSURE: 152 MMHG | BODY MASS INDEX: 28.56 KG/M2 | RESPIRATION RATE: 18 BRPM

## 2019-02-11 DIAGNOSIS — I10 ESSENTIAL HYPERTENSION: Primary | ICD-10-CM

## 2019-02-11 DIAGNOSIS — F41.1 GENERALIZED ANXIETY DISORDER: ICD-10-CM

## 2019-02-11 DIAGNOSIS — R03.0 ELEVATED BLOOD PRESSURE READING: ICD-10-CM

## 2019-02-11 DIAGNOSIS — E11.9 TYPE 2 DIABETES MELLITUS WITHOUT COMPLICATION, WITHOUT LONG-TERM CURRENT USE OF INSULIN (HCC): ICD-10-CM

## 2019-02-11 PROCEDURE — 1123F ACP DISCUSS/DSCN MKR DOCD: CPT | Performed by: INTERNAL MEDICINE

## 2019-02-11 PROCEDURE — 1101F PT FALLS ASSESS-DOCD LE1/YR: CPT | Performed by: INTERNAL MEDICINE

## 2019-02-11 PROCEDURE — G8484 FLU IMMUNIZE NO ADMIN: HCPCS | Performed by: INTERNAL MEDICINE

## 2019-02-11 PROCEDURE — G8427 DOCREV CUR MEDS BY ELIG CLIN: HCPCS | Performed by: INTERNAL MEDICINE

## 2019-02-11 PROCEDURE — 1036F TOBACCO NON-USER: CPT | Performed by: INTERNAL MEDICINE

## 2019-02-11 PROCEDURE — 99213 OFFICE O/P EST LOW 20 MIN: CPT | Performed by: INTERNAL MEDICINE

## 2019-02-11 PROCEDURE — 4040F PNEUMOC VAC/ADMIN/RCVD: CPT | Performed by: INTERNAL MEDICINE

## 2019-02-11 PROCEDURE — G8417 CALC BMI ABV UP PARAM F/U: HCPCS | Performed by: INTERNAL MEDICINE

## 2019-02-11 PROCEDURE — G8399 PT W/DXA RESULTS DOCUMENT: HCPCS | Performed by: INTERNAL MEDICINE

## 2019-02-11 PROCEDURE — 1090F PRES/ABSN URINE INCON ASSESS: CPT | Performed by: INTERNAL MEDICINE

## 2019-02-11 RX ORDER — TRIAMTERENE AND HYDROCHLOROTHIAZIDE 37.5; 25 MG/1; MG/1
TABLET ORAL
Qty: 30 TABLET | Refills: 3 | Status: SHIPPED | OUTPATIENT
Start: 2019-02-11 | End: 2020-02-07 | Stop reason: SDUPTHER

## 2019-02-11 ASSESSMENT — ENCOUNTER SYMPTOMS
ABDOMINAL PAIN: 0
COUGH: 0
CHEST TIGHTNESS: 0
SORE THROAT: 0
WHEEZING: 0
COLOR CHANGE: 0
SHORTNESS OF BREATH: 0
CONSTIPATION: 0
COUGH: 0

## 2019-02-15 ENCOUNTER — TELEPHONE (OUTPATIENT)
Dept: INTERNAL MEDICINE | Age: 79
End: 2019-02-15

## 2019-02-15 DIAGNOSIS — I10 ESSENTIAL HYPERTENSION: ICD-10-CM

## 2019-02-18 RX ORDER — AMLODIPINE BESYLATE 5 MG/1
7.5 TABLET ORAL DAILY
Qty: 30 TABLET | Refills: 0 | Status: SHIPPED
Start: 2019-02-18 | End: 2019-03-01 | Stop reason: SDUPTHER

## 2019-02-22 ENCOUNTER — OFFICE VISIT (OUTPATIENT)
Dept: INTERNAL MEDICINE | Age: 79
End: 2019-02-22
Payer: MEDICARE

## 2019-02-22 VITALS
BODY MASS INDEX: 29.99 KG/M2 | HEIGHT: 66 IN | WEIGHT: 186.6 LBS | DIASTOLIC BLOOD PRESSURE: 78 MMHG | HEART RATE: 86 BPM | SYSTOLIC BLOOD PRESSURE: 122 MMHG | OXYGEN SATURATION: 96 %

## 2019-02-22 DIAGNOSIS — R35.0 URINARY FREQUENCY: ICD-10-CM

## 2019-02-22 DIAGNOSIS — J20.9 ACUTE BRONCHITIS AND BRONCHIOLITIS: ICD-10-CM

## 2019-02-22 DIAGNOSIS — R05.9 COUGH: ICD-10-CM

## 2019-02-22 DIAGNOSIS — E11.9 TYPE 2 DIABETES MELLITUS WITHOUT COMPLICATION, WITHOUT LONG-TERM CURRENT USE OF INSULIN (HCC): ICD-10-CM

## 2019-02-22 DIAGNOSIS — I10 ESSENTIAL HYPERTENSION: Primary | ICD-10-CM

## 2019-02-22 DIAGNOSIS — J21.9 ACUTE BRONCHITIS AND BRONCHIOLITIS: ICD-10-CM

## 2019-02-22 DIAGNOSIS — R03.0 ELEVATED BLOOD PRESSURE READING: ICD-10-CM

## 2019-02-22 LAB
BACTERIA: ABNORMAL /HPF
BILIRUBIN URINE: NEGATIVE
BLOOD, URINE: NEGATIVE
CLARITY: ABNORMAL
COLOR: YELLOW
EPITHELIAL CELLS, UA: 3 /HPF (ref 0–5)
GLUCOSE URINE: NEGATIVE MG/DL
HYALINE CASTS: 4 /HPF (ref 0–8)
KETONES, URINE: NEGATIVE MG/DL
LEUKOCYTE ESTERASE, URINE: ABNORMAL
NITRITE, URINE: POSITIVE
PH UA: 6
PROTEIN UA: NEGATIVE MG/DL
RBC UA: 4 /HPF (ref 0–4)
SPECIFIC GRAVITY UA: 1.02
UROBILINOGEN, URINE: 0.2 E.U./DL
WBC UA: 255 /HPF (ref 0–5)

## 2019-02-22 PROCEDURE — G8417 CALC BMI ABV UP PARAM F/U: HCPCS | Performed by: INTERNAL MEDICINE

## 2019-02-22 PROCEDURE — 1036F TOBACCO NON-USER: CPT | Performed by: INTERNAL MEDICINE

## 2019-02-22 PROCEDURE — 1101F PT FALLS ASSESS-DOCD LE1/YR: CPT | Performed by: INTERNAL MEDICINE

## 2019-02-22 PROCEDURE — G8484 FLU IMMUNIZE NO ADMIN: HCPCS | Performed by: INTERNAL MEDICINE

## 2019-02-22 PROCEDURE — G8399 PT W/DXA RESULTS DOCUMENT: HCPCS | Performed by: INTERNAL MEDICINE

## 2019-02-22 PROCEDURE — G8427 DOCREV CUR MEDS BY ELIG CLIN: HCPCS | Performed by: INTERNAL MEDICINE

## 2019-02-22 PROCEDURE — 1090F PRES/ABSN URINE INCON ASSESS: CPT | Performed by: INTERNAL MEDICINE

## 2019-02-22 PROCEDURE — 99214 OFFICE O/P EST MOD 30 MIN: CPT | Performed by: INTERNAL MEDICINE

## 2019-02-22 PROCEDURE — 4040F PNEUMOC VAC/ADMIN/RCVD: CPT | Performed by: INTERNAL MEDICINE

## 2019-02-22 PROCEDURE — 1123F ACP DISCUSS/DSCN MKR DOCD: CPT | Performed by: INTERNAL MEDICINE

## 2019-02-22 RX ORDER — CEFUROXIME AXETIL 500 MG/1
500 TABLET ORAL 2 TIMES DAILY
Qty: 14 TABLET | Refills: 0 | Status: SHIPPED | OUTPATIENT
Start: 2019-02-22 | End: 2019-09-11 | Stop reason: SDUPTHER

## 2019-02-22 ASSESSMENT — ENCOUNTER SYMPTOMS
SINUS PAIN: 1
SORE THROAT: 0
WHEEZING: 0
SINUS PRESSURE: 1
CHEST TIGHTNESS: 0
COUGH: 1
ABDOMINAL PAIN: 0
CONSTIPATION: 0

## 2019-02-25 LAB
ORGANISM: ABNORMAL
URINE CULTURE, ROUTINE: ABNORMAL
URINE CULTURE, ROUTINE: ABNORMAL

## 2019-03-01 ENCOUNTER — TELEPHONE (OUTPATIENT)
Dept: INTERNAL MEDICINE | Age: 79
End: 2019-03-01

## 2019-03-01 DIAGNOSIS — I10 ESSENTIAL HYPERTENSION: ICD-10-CM

## 2019-03-01 RX ORDER — AMLODIPINE BESYLATE 5 MG/1
7.5 TABLET ORAL DAILY
Qty: 45 TABLET | Refills: 0 | Status: SHIPPED | OUTPATIENT
Start: 2019-03-01 | End: 2019-04-02 | Stop reason: SDUPTHER

## 2019-03-05 ENCOUNTER — INFUSION (OUTPATIENT)
Dept: ONCOLOGY | Facility: CLINIC | Age: 79
End: 2019-03-05

## 2019-03-05 DIAGNOSIS — C50.112 MALIGNANT NEOPLASM OF CENTRAL PORTION OF LEFT FEMALE BREAST, UNSPECIFIED ESTROGEN RECEPTOR STATUS (HCC): Primary | ICD-10-CM

## 2019-03-05 RX ORDER — SODIUM CHLORIDE 0.9 % (FLUSH) 0.9 %
10 SYRINGE (ML) INJECTION AS NEEDED
Status: CANCELLED | OUTPATIENT
Start: 2019-03-05

## 2019-03-05 RX ORDER — SODIUM CHLORIDE 0.9 % (FLUSH) 0.9 %
10 SYRINGE (ML) INJECTION AS NEEDED
Status: DISCONTINUED | OUTPATIENT
Start: 2019-03-05 | End: 2019-03-05 | Stop reason: HOSPADM

## 2019-03-05 RX ADMIN — Medication 10 ML: at 11:20

## 2019-03-08 PROBLEM — Z00.00 MEDICARE ANNUAL WELLNESS VISIT, SUBSEQUENT: Status: RESOLVED | Noted: 2017-09-20 | Resolved: 2019-03-08

## 2019-04-02 DIAGNOSIS — I10 ESSENTIAL HYPERTENSION: ICD-10-CM

## 2019-04-02 RX ORDER — AMLODIPINE BESYLATE 5 MG/1
TABLET ORAL
Qty: 45 TABLET | Refills: 0 | Status: SHIPPED | OUTPATIENT
Start: 2019-04-02 | End: 2019-04-25 | Stop reason: SDUPTHER

## 2019-04-25 ENCOUNTER — OFFICE VISIT (OUTPATIENT)
Dept: INTERNAL MEDICINE | Age: 79
End: 2019-04-25
Payer: MEDICARE

## 2019-04-25 ENCOUNTER — TELEPHONE (OUTPATIENT)
Dept: INTERNAL MEDICINE | Age: 79
End: 2019-04-25

## 2019-04-25 VITALS
DIASTOLIC BLOOD PRESSURE: 68 MMHG | WEIGHT: 187 LBS | RESPIRATION RATE: 18 BRPM | SYSTOLIC BLOOD PRESSURE: 132 MMHG | HEART RATE: 83 BPM | BODY MASS INDEX: 29.35 KG/M2 | HEIGHT: 67 IN | OXYGEN SATURATION: 95 %

## 2019-04-25 DIAGNOSIS — E11.9 TYPE 2 DIABETES MELLITUS WITHOUT COMPLICATION, WITHOUT LONG-TERM CURRENT USE OF INSULIN (HCC): ICD-10-CM

## 2019-04-25 DIAGNOSIS — I10 ESSENTIAL HYPERTENSION: Primary | ICD-10-CM

## 2019-04-25 DIAGNOSIS — I10 ESSENTIAL HYPERTENSION: ICD-10-CM

## 2019-04-25 DIAGNOSIS — R60.0 FLUID RETENTION IN LEGS: ICD-10-CM

## 2019-04-25 LAB
ANION GAP SERPL CALCULATED.3IONS-SCNC: 14 MMOL/L (ref 7–19)
BUN BLDV-MCNC: 22 MG/DL (ref 8–23)
CALCIUM SERPL-MCNC: 9.5 MG/DL (ref 8.8–10.2)
CHLORIDE BLD-SCNC: 100 MMOL/L (ref 98–111)
CO2: 27 MMOL/L (ref 22–29)
CREAT SERPL-MCNC: 0.8 MG/DL (ref 0.5–0.9)
GFR NON-AFRICAN AMERICAN: >60
GLUCOSE BLD-MCNC: 96 MG/DL (ref 74–109)
POTASSIUM SERPL-SCNC: 3.4 MMOL/L (ref 3.5–5)
SODIUM BLD-SCNC: 141 MMOL/L (ref 136–145)

## 2019-04-25 PROCEDURE — 1036F TOBACCO NON-USER: CPT | Performed by: INTERNAL MEDICINE

## 2019-04-25 PROCEDURE — 1123F ACP DISCUSS/DSCN MKR DOCD: CPT | Performed by: INTERNAL MEDICINE

## 2019-04-25 PROCEDURE — G8427 DOCREV CUR MEDS BY ELIG CLIN: HCPCS | Performed by: INTERNAL MEDICINE

## 2019-04-25 PROCEDURE — G8417 CALC BMI ABV UP PARAM F/U: HCPCS | Performed by: INTERNAL MEDICINE

## 2019-04-25 PROCEDURE — 1090F PRES/ABSN URINE INCON ASSESS: CPT | Performed by: INTERNAL MEDICINE

## 2019-04-25 PROCEDURE — 99213 OFFICE O/P EST LOW 20 MIN: CPT | Performed by: INTERNAL MEDICINE

## 2019-04-25 PROCEDURE — 4040F PNEUMOC VAC/ADMIN/RCVD: CPT | Performed by: INTERNAL MEDICINE

## 2019-04-25 PROCEDURE — G8399 PT W/DXA RESULTS DOCUMENT: HCPCS | Performed by: INTERNAL MEDICINE

## 2019-04-25 RX ORDER — AMLODIPINE BESYLATE 5 MG/1
5 TABLET ORAL DAILY
Qty: 30 TABLET | Refills: 3 | Status: SHIPPED | OUTPATIENT
Start: 2019-04-25 | End: 2019-07-10

## 2019-04-25 ASSESSMENT — ENCOUNTER SYMPTOMS
WHEEZING: 0
COUGH: 0
CHEST TIGHTNESS: 0
CONSTIPATION: 0
ABDOMINAL PAIN: 0
SORE THROAT: 0

## 2019-04-25 NOTE — PROGRESS NOTES
Chief Complaint   Patient presents with    Hypertension    Leg Swelling    Fatigue     History of presenting illness:  Mary Ellen Orozco is a66 y.o. female who presents today for follow up on her chronic medical conditions as noted below.         Essential hypertension  Fluid retention in legs    She is feeling tired  She has developed fluid retention every evening in her lower extremities          Patient Active Problem List    Diagnosis Date Noted    Essential hypertension 02/11/2019    Elevated TSH 08/07/2018    Vitamin D deficiency 09/10/2017    Pure hypercholesterolemia 09/10/2017    Type 2 diabetes mellitus without complication, without long-term current use of insulin ([facility] [address] 09/10/2017    Localized osteoporosis without current pathological fracture 09/10/2017     Overview Note:     2017 hip neck -2.6; lspine -1/8      Generalized anxiety disorder 09/10/2017    Former smoker 09/10/2017    Numbness 04/07/2017    Neuropathy 04/07/2017    Imbalance 04/07/2017    Malignant neoplasm of central portion of female breast ([facility] [address] 08/06/2008    Estrogen receptor negative tumor status 08/06/2008     Past Medical History:   Diagnosis Date    Back pain     Breast cancer ([facility] [address]     Hyperlipidemia     Pneumonia     Type II or unspecified type diabetes mellitus without mention of complication, not stated as uncontrolled     diet controlled    Varicose veins     Wears glasses       Past Surgical History:   Procedure Laterality Date    BREAST BIOPSY  8-6-2008    U/S Guided Mammotome Biopsy Left Breast x 2  infiltrating ductal carcinoma, grade III, ER/GA negative    BREAST BIOPSY  8-5-2009    Stereotactic biopsy right breast  No evidence of malignancy    BREAST BIOPSY Left 8/2015    COLONOSCOPY      MASTECTOMY, PARTIAL  8-     Left partial mastectomy and left sentinel node biopsy  2.6 cm infiltrating ductal carcinoma, grade III, 0/2 nodes positive, immunohistochemistry negative for micrometastasis     Current Outpatient Medications   Medication Sig Dispense Refill    amLODIPine (NORVASC) 5 MG tablet Take 1 tablet by mouth daily 30 tablet 3    triamterene-hydrochlorothiazide (MAXZIDE-25) 37.5-25 MG per tablet Take 1/2 tablet in am 30 tablet 3    glipiZIDE (GLUCOTROL) 5 MG tablet TAKE ONE-HALF TABLET BY MOUTH ONCE DAILY 135 tablet 1    pravastatin (PRAVACHOL) 40 MG tablet TAKE ONE TABLET BY MOUTH ONCE DAILY 90 tablet 1    Calcium-Vitamin D 600-200 MG-UNIT TABS Take by mouth every morning (before breakfast)       aspirin 81 MG EC tablet Take 81 mg by mouth daily.  FISH OIL by Does not apply route.  Ascorbic Acid (VITAMIN C) 500 MG tablet Take 500 mg by mouth daily. No current facility-administered medications for this visit. Allergies   Allergen Reactions    Tape Kathlee Cresson Tape]      Latex Tape     Social History     Tobacco Use    Smoking status: Former Smoker     Packs/day: 0.25     Years: 20.00     Pack years: 5.00     Types: Pipe, Cigarettes     Last attempt to quit: 1979     Years since quittin.2    Smokeless tobacco: Never Used   Substance Use Topics    Alcohol use: No     Comment: occ      Family History   Problem Relation Age of Onset    Diabetes Father     Heart Disease Father     Hypertension Mother        Review of Systems   Constitutional: Positive for fatigue. Negative for chills and fever. HENT: Negative for congestion, ear pain, nosebleeds, postnasal drip and sore throat. Respiratory: Negative for cough, chest tightness and wheezing. Cardiovascular: Positive for leg swelling. Negative for chest pain and palpitations. Gastrointestinal: Negative for abdominal pain and constipation. Genitourinary: Negative for dysuria and urgency. Musculoskeletal: Negative. Negative for arthralgias. Skin: Negative for rash. Neurological: Negative for dizziness and headaches. Psychiatric/Behavioral: Negative.       Vitals:    19 1108   BP: 132/68   Pulse: 83   Resp: 18   SpO2: 95%   Weight: 187 lb (84.8 kg)   Height: 5' 6.5\" (1.689 m)     Body mass index is 29.73 kg/m². Physical Exam   Constitutional: She is oriented to person, place, and time. She appears well-developed and well-nourished. HENT:   Right Ear: External ear normal.   Left Ear: External ear normal.   Mouth/Throat: Oropharynx is clear and moist. No oropharyngeal exudate. Eyes: Pupils are equal, round, and reactive to light. Conjunctivae are normal.   Neck: Neck supple. No JVD present. No thyromegaly present. Cardiovascular: Normal rate. No murmur heard. Trace bilateral ankle edema   Pulmonary/Chest: Breath sounds normal. No respiratory distress. She has no wheezes. She has no rales. She exhibits no tenderness. Abdominal: Soft. Bowel sounds are normal.   Musculoskeletal: Normal range of motion. Lymphadenopathy:     She has no cervical adenopathy. Neurological: She is oriented to person, place, and time. Skin: Skin is warm. No rash noted. Lab Review   No visits with results within 2 Month(s) from this visit.    Latest known visit with results is:   Orders Only on 02/22/2019   Component Date Value    Urine Culture, Routine 02/22/2019 *                    Value:>100,000 CFU/ml  Mixed skin sy present      Organism 02/22/2019 Escherichia coli*    Urine Culture, Routine 02/22/2019 Moderate growth     Color, UA 02/22/2019 YELLOW     Clarity, UA 02/22/2019 CLOUDY*    Glucose, Ur 02/22/2019 Negative     Bilirubin Urine 02/22/2019 Negative     Ketones, Urine 02/22/2019 Negative     Specific Gravity, UA 02/22/2019 1.020     Blood, Urine 02/22/2019 Negative     pH, UA 02/22/2019 6.0     Protein, UA 02/22/2019 Negative     Urobilinogen, Urine 02/22/2019 0.2     Nitrite, Urine 02/22/2019 POSITIVE*    Leukocyte Esterase, Urine 02/22/2019 MODERATE*    Bacteria, UA 02/22/2019 4+     Hyaline Casts, UA 02/22/2019 4     WBC, UA 02/22/2019 255*    RBC, UA 02/22/2019 4     Epi Cells 02/22/2019 3            ASSESSMENT/PLAN:    Essential hypertension  LE fluid retention    The medications today  Check BMP  Decrease amlodipine from 7.5 mg daily to 5 mg daily  Continue Maxide daily in a.m. Return follow-up for blood pressure checks/further plans one week  Possibly or would need to decrease amlodipine further and restart smaller dose of lisinopril in combination treatment with amlodipine  Patient states that lisinopril never caused side effects it just \"did not work [employer]"  -     Basic Metabolic Panel; Future  -     amLODIPine (NORVASC) 5 MG tablet; Take 1 tablet by mouth daily              Orders Placed This Encounter   Procedures    Basic Metabolic Panel     New Prescriptions    No medications on file         No follow-ups on file. There are no Patient Instructions on file for this visit. EMR Dragon/transcription disclaimer:Significant part of this  encounter note is electronic transcription/translationof spoken language to printed text. The electronic translation of spoken language may be erroneous, or at times, nonsensical words or phrases may be inadvertently transcribed.  Although I have reviewed the note for sucherrors, some may still exist.

## 2019-04-25 NOTE — TELEPHONE ENCOUNTER
----- Message from Louisa Fuentes MD sent at 4/25/2019  3:18 PM CDT -----  Potassium level is low 3.4  Suggest she takes potassium 10 meq RX daily with her Maxzide  ( when skips maxzide no need to take it on those days)

## 2019-04-26 RX ORDER — POTASSIUM CHLORIDE 750 MG/1
10 TABLET, EXTENDED RELEASE ORAL DAILY
Qty: 30 TABLET | Refills: 5 | Status: SHIPPED | OUTPATIENT
Start: 2019-04-26 | End: 2020-02-07 | Stop reason: SDUPTHER

## 2019-05-02 ENCOUNTER — OFFICE VISIT (OUTPATIENT)
Dept: INTERNAL MEDICINE | Age: 79
End: 2019-05-02
Payer: MEDICARE

## 2019-05-02 VITALS
HEART RATE: 83 BPM | HEIGHT: 66 IN | DIASTOLIC BLOOD PRESSURE: 70 MMHG | OXYGEN SATURATION: 98 % | WEIGHT: 186 LBS | SYSTOLIC BLOOD PRESSURE: 136 MMHG | RESPIRATION RATE: 20 BRPM | BODY MASS INDEX: 29.89 KG/M2

## 2019-05-02 DIAGNOSIS — R60.0 FLUID RETENTION IN LEGS: ICD-10-CM

## 2019-05-02 DIAGNOSIS — I10 ESSENTIAL HYPERTENSION: Primary | ICD-10-CM

## 2019-05-02 PROCEDURE — G8399 PT W/DXA RESULTS DOCUMENT: HCPCS | Performed by: INTERNAL MEDICINE

## 2019-05-02 PROCEDURE — 1036F TOBACCO NON-USER: CPT | Performed by: INTERNAL MEDICINE

## 2019-05-02 PROCEDURE — 1090F PRES/ABSN URINE INCON ASSESS: CPT | Performed by: INTERNAL MEDICINE

## 2019-05-02 PROCEDURE — 4040F PNEUMOC VAC/ADMIN/RCVD: CPT | Performed by: INTERNAL MEDICINE

## 2019-05-02 PROCEDURE — G8417 CALC BMI ABV UP PARAM F/U: HCPCS | Performed by: INTERNAL MEDICINE

## 2019-05-02 PROCEDURE — G8427 DOCREV CUR MEDS BY ELIG CLIN: HCPCS | Performed by: INTERNAL MEDICINE

## 2019-05-02 PROCEDURE — 1123F ACP DISCUSS/DSCN MKR DOCD: CPT | Performed by: INTERNAL MEDICINE

## 2019-05-02 PROCEDURE — 99213 OFFICE O/P EST LOW 20 MIN: CPT | Performed by: INTERNAL MEDICINE

## 2019-05-02 ASSESSMENT — ENCOUNTER SYMPTOMS
SORE THROAT: 0
CHEST TIGHTNESS: 0
COUGH: 0
ABDOMINAL PAIN: 0
CONSTIPATION: 0
WHEEZING: 0

## 2019-05-02 NOTE — PROGRESS NOTES
Chief Complaint   Patient presents with    Hypertension     One week follow up for blood pressure. History of presenting illness:  Ginger Silva is a66 y.o. female who presents today for follow up on her chronic medical conditions as noted below. Essential hypertension  Fluid retention in legs    Patient was seen here on 4/25/19 for these conditions  Following changes were made=  amlodipine been decreased from 7.5 to 5 mg daily  Maxide continued daily in a.m.     States that has not been checking her bp but feels some better  Patient Active Problem List    Diagnosis Date Noted    Essential hypertension 02/11/2019    Elevated TSH 08/07/2018    Vitamin D deficiency 09/10/2017    Pure hypercholesterolemia 09/10/2017    Type 2 diabetes mellitus without complication, without long-term current use of insulin ([facility] [address] 09/10/2017    Localized osteoporosis without current pathological fracture 09/10/2017     Overview Note:     2017 hip neck -2.6; lspine -1/8      Generalized anxiety disorder 09/10/2017    Former smoker 09/10/2017    Numbness 04/07/2017    Neuropathy 04/07/2017    Imbalance 04/07/2017    Malignant neoplasm of central portion of female breast ([address] 08/06/2008    Estrogen receptor negative tumor status 08/06/2008     Past Medical History:   Diagnosis Date    Back pain     Breast cancer ([address]     Hyperlipidemia     Pneumonia     Type II or unspecified type diabetes mellitus without mention of complication, not stated as uncontrolled     diet controlled    Varicose veins     Wears glasses       Past Surgical History:   Procedure Laterality Date    BREAST BIOPSY  8-6-2008    U/S Guided Mammotome Biopsy Left Breast x 2  infiltrating ductal carcinoma, grade III, ER/ME negative    BREAST BIOPSY  8-5-2009    Stereotactic biopsy right breast  No evidence of malignancy    BREAST BIOPSY Left 8/2015    COLONOSCOPY      MASTECTOMY, PARTIAL  8-     Left partial mastectomy and left sentinel node biopsy  2.6 cm infiltrating ductal carcinoma, grade III, 0/2 nodes positive, immunohistochemistry negative for micrometastasis     Current Outpatient Medications   Medication Sig Dispense Refill    potassium chloride (KLOR-CON M) 10 MEQ extended release tablet Take 1 tablet by mouth daily 30 tablet 5    amLODIPine (NORVASC) 5 MG tablet Take 1 tablet by mouth daily 30 tablet 3    triamterene-hydrochlorothiazide (MAXZIDE-25) 37.5-25 MG per tablet Take 1/2 tablet in am 30 tablet 3    glipiZIDE (GLUCOTROL) 5 MG tablet TAKE ONE-HALF TABLET BY MOUTH ONCE DAILY 135 tablet 1    pravastatin (PRAVACHOL) 40 MG tablet TAKE ONE TABLET BY MOUTH ONCE DAILY 90 tablet 1    Calcium-Vitamin D 600-200 MG-UNIT TABS Take by mouth every morning (before breakfast)       aspirin 81 MG EC tablet Take 81 mg by mouth daily.  Ascorbic Acid (VITAMIN C) 500 MG tablet Take 500 mg by mouth daily. No current facility-administered medications for this visit. Allergies   Allergen Reactions    Tape Kathlee Clark Tape]      Latex Tape     Social History     Tobacco Use    Smoking status: Former Smoker     Packs/day: 0.25     Years: 20.00     Pack years: 5.00     Types: Pipe, Cigarettes     Last attempt to quit: 1979     Years since quittin.2    Smokeless tobacco: Never Used   Substance Use Topics    Alcohol use: No     Comment: occ      Family History   Problem Relation Age of Onset    Diabetes Father     Heart Disease Father     Hypertension Mother        Review of Systems   Constitutional: Positive for fatigue. Negative for chills and fever. HENT: Negative for congestion, ear pain, nosebleeds, postnasal drip and sore throat. Respiratory: Negative for cough, chest tightness and wheezing. Cardiovascular: Positive for leg swelling. Negative for chest pain and palpitations. Fluid retention better   Gastrointestinal: Negative for abdominal pain and constipation.    Genitourinary: Negative for dysuria and urgency. Musculoskeletal: Negative. Negative for arthralgias. Skin: Negative for rash. Neurological: Negative for dizziness and headaches. Psychiatric/Behavioral: Negative. Vitals:    05/02/19 1136   BP: 136/70   Pulse: 83   Resp: 20   SpO2: 98%   Weight: 186 lb (84.4 kg)   Height: 5' 6\" (1.676 m)     Body mass index is 30.02 kg/m². Physical Exam   Constitutional: She is oriented to person, place, and time. She appears well-developed and well-nourished. HENT:   Right Ear: External ear normal.   Left Ear: External ear normal.   Mouth/Throat: Oropharynx is clear and moist. No oropharyngeal exudate. Eyes: Pupils are equal, round, and reactive to light. Conjunctivae are normal.   Neck: Neck supple. No JVD present. No thyromegaly present. Cardiovascular: Normal rate and normal heart sounds. No murmur heard. Trace ankle edema   Pulmonary/Chest: Breath sounds normal. No respiratory distress. She has no wheezes. She has no rales. She exhibits no tenderness. Abdominal: Soft. Bowel sounds are normal.   Musculoskeletal: Normal range of motion. Lymphadenopathy:     She has no cervical adenopathy. Neurological: She is oriented to person, place, and time. Skin: Skin is warm. No rash noted. Lab Review   Orders Only on 04/25/2019   Component Date Value    Sodium 04/25/2019 141     Potassium 04/25/2019 3.4*    Chloride 04/25/2019 100     CO2 04/25/2019 27     Anion Gap 04/25/2019 14     Glucose 04/25/2019 96     BUN 04/25/2019 22     CREATININE 04/25/2019 0.8     GFR Non- 04/25/2019 >60     Calcium 04/25/2019 9.5            ASSESSMENT/PLAN:      Essential hypertension  Fluid retention in legs    Amlodipine continue 5 mg daily in a.m.   Maxide one tablet daily  Monitor closely  Higher doses of amlodipine would cause her to have more fluid retention so if the blood pressure remains elevated would consider adding ACE inhibitor small dose  Monitor blood pressure at home  The Dash diet instructions to the patient              No orders of the defined types were placed in this encounter. New Prescriptions    No medications on file         Return in about 3 months (around 7/24/2019) for Medication check. Patient Instructions     Patient Education        DASH Diet: Care Instructions  Your Care Instructions    The DASH diet is an eating plan that can help lower your blood pressure. DASH stands for Dietary Approaches to Stop Hypertension. Hypertension is high blood pressure. The DASH diet focuses on eating foods that are high in calcium, potassium, and magnesium. These nutrients can lower blood pressure. The foods that are highest in these nutrients are fruits, vegetables, low-fat dairy products, nuts, seeds, and legumes. But taking calcium, potassium, and magnesium supplements instead of eating foods that are high in those nutrients does not have the same effect. The DASH diet also includes whole grains, fish, and poultry. The DASH diet is one of several lifestyle changes your doctor may recommend to lower your high blood pressure. Your doctor may also want you to decrease the amount of sodium in your diet. Lowering sodium while following the DASH diet can lower blood pressure even further than just the DASH diet alone. Follow-up care is a key part of your treatment and safety. Be sure to make and go to all appointments, and call your doctor if you are having problems. It's also a good idea to know your test results and keep a list of the medicines you take. How can you care for yourself at home? Following the DASH diet  · Eat 4 to 5 servings of fruit each day. A serving is 1 medium-sized piece of fruit, ½ cup chopped or canned fruit, 1/4 cup dried fruit, or 4 ounces (½ cup) of fruit juice. Choose fruit more often than fruit juice. · Eat 4 to 5 servings of vegetables each day.  A serving is 1 cup of lettuce or raw leafy vegetables, ½ cup of chopped or cooked vegetables, or 4 ounces (½ cup) of vegetable juice. Choose vegetables more often than vegetable juice. · Get 2 to 3 servings of low-fat and fat-free dairy each day. A serving is 8 ounces of milk, 1 cup of yogurt, or 1 ½ ounces of cheese. · Eat 6 to 8 servings of grains each day. A serving is 1 slice of bread, 1 ounce of dry cereal, or ½ cup of cooked rice, pasta, or cooked cereal. Try to choose whole-grain products as much as possible. · Limit lean meat, poultry, and fish to 2 servings each day. A serving is 3 ounces, about the size of a deck of cards. · Eat 4 to 5 servings of nuts, seeds, and legumes (cooked dried beans, lentils, and split peas) each week. A serving is 1/3 cup of nuts, 2 tablespoons of seeds, or ½ cup of cooked beans or peas. · Limit fats and oils to 2 to 3 servings each day. A serving is 1 teaspoon of vegetable oil or 2 tablespoons of salad dressing. · Limit sweets and added sugars to 5 servings or less a week. A serving is 1 tablespoon jelly or jam, ½ cup sorbet, or 1 cup of lemonade. · Eat less than 2,300 milligrams (mg) of sodium a day. If you limit your sodium to 1,500 mg a day, you can lower your blood pressure even more. Tips for success  · Start small. Do not try to make dramatic changes to your diet all at once. You might feel that you are missing out on your favorite foods and then be more likely to not follow the plan. Make small changes, and stick with them. Once those changes become habit, add a few more changes. · Try some of the following:  ? Make it a goal to eat a fruit or vegetable at every meal and at snacks. This will make it easy to get the recommended amount of fruits and vegetables each day. ? Try yogurt topped with fruit and nuts for a snack or healthy dessert. ? Add lettuce, tomato, cucumber, and onion to sandwiches. ? Combine a ready-made pizza crust with low-fat mozzarella cheese and lots of vegetable toppings.  Try using tomatoes, squash, spinach, broccoli, carrots, cauliflower, and onions. ? Have a variety of cut-up vegetables with a low-fat dip as an appetizer instead of chips and dip. ? Sprinkle sunflower seeds or chopped almonds over salads. Or try adding chopped walnuts or almonds to cooked vegetables. ? Try some vegetarian meals using beans and peas. Add garbanzo or kidney beans to salads. Make burritos and tacos with mashed perez beans or black beans. Where can you learn more? Go to https://"[email or web address]pepiceweb.KupiBonus. org and sign in to your [employer] account. Enter F033 in the Branch2 box to learn more about \"DASH Diet: Care Instructions. \"     If you do not have an account, please click on the \"Sign Up Now\" link. Current as of: July 22, 2018  Content Version: 11.9  © 0270-4220 [employer]. Care instructions adapted under license by [facility] [place] ([facility]). If you have questions about a medical condition or this instruction, always ask your healthcare professional. [place] [address] any warranty or liability for your use of this information. EMR Dragon/transcription disclaimer:Significant part of this  encounter note is electronic transcription/translationof spoken language to printed text. The electronic translation of spoken language may be erroneous, or at times, nonsensical words or phrases may be inadvertently transcribed.  Although I have reviewed the note for sucherrors, some may still exist.

## 2019-05-02 NOTE — PATIENT INSTRUCTIONS
Patient Education        DASH Diet: Care Instructions  Your Care Instructions    The DASH diet is an eating plan that can help lower your blood pressure. DASH stands for Dietary Approaches to Stop Hypertension. Hypertension is high blood pressure. The DASH diet focuses on eating foods that are high in calcium, potassium, and magnesium. These nutrients can lower blood pressure. The foods that are highest in these nutrients are fruits, vegetables, low-fat dairy products, nuts, seeds, and legumes. But taking calcium, potassium, and magnesium supplements instead of eating foods that are high in those nutrients does not have the same effect. The DASH diet also includes whole grains, fish, and poultry. The DASH diet is one of several lifestyle changes your doctor may recommend to lower your high blood pressure. Your doctor may also want you to decrease the amount of sodium in your diet. Lowering sodium while following the DASH diet can lower blood pressure even further than just the DASH diet alone. Follow-up care is a key part of your treatment and safety. Be sure to make and go to all appointments, and call your doctor if you are having problems. It's also a good idea to know your test results and keep a list of the medicines you take. How can you care for yourself at home? Following the DASH diet  · Eat 4 to 5 servings of fruit each day. A serving is 1 medium-sized piece of fruit, ½ cup chopped or canned fruit, 1/4 cup dried fruit, or 4 ounces (½ cup) of fruit juice. Choose fruit more often than fruit juice. · Eat 4 to 5 servings of vegetables each day. A serving is 1 cup of lettuce or raw leafy vegetables, ½ cup of chopped or cooked vegetables, or 4 ounces (½ cup) of vegetable juice. Choose vegetables more often than vegetable juice. · Get 2 to 3 servings of low-fat and fat-free dairy each day. A serving is 8 ounces of milk, 1 cup of yogurt, or 1 ½ ounces of cheese. · Eat 6 to 8 servings of grains each day. A serving is 1 slice of bread, 1 ounce of dry cereal, or ½ cup of cooked rice, pasta, or cooked cereal. Try to choose whole-grain products as much as possible. · Limit lean meat, poultry, and fish to 2 servings each day. A serving is 3 ounces, about the size of a deck of cards. · Eat 4 to 5 servings of nuts, seeds, and legumes (cooked dried beans, lentils, and split peas) each week. A serving is 1/3 cup of nuts, 2 tablespoons of seeds, or ½ cup of cooked beans or peas. · Limit fats and oils to 2 to 3 servings each day. A serving is 1 teaspoon of vegetable oil or 2 tablespoons of salad dressing. · Limit sweets and added sugars to 5 servings or less a week. A serving is 1 tablespoon jelly or jam, ½ cup sorbet, or 1 cup of lemonade. · Eat less than 2,300 milligrams (mg) of sodium a day. If you limit your sodium to 1,500 mg a day, you can lower your blood pressure even more. Tips for success  · Start small. Do not try to make dramatic changes to your diet all at once. You might feel that you are missing out on your favorite foods and then be more likely to not follow the plan. Make small changes, and stick with them. Once those changes become habit, add a few more changes. · Try some of the following:  ? Make it a goal to eat a fruit or vegetable at every meal and at snacks. This will make it easy to get the recommended amount of fruits and vegetables each day. ? Try yogurt topped with fruit and nuts for a snack or healthy dessert. ? Add lettuce, tomato, cucumber, and onion to sandwiches. ? Combine a ready-made pizza crust with low-fat mozzarella cheese and lots of vegetable toppings. Try using tomatoes, squash, spinach, broccoli, carrots, cauliflower, and onions. ? Have a variety of cut-up vegetables with a low-fat dip as an appetizer instead of chips and dip. ? Sprinkle sunflower seeds or chopped almonds over salads. Or try adding chopped walnuts or almonds to cooked vegetables.   ? Try some vegetarian meals using beans and peas. Add garbanzo or kidney beans to salads. Make burritos and tacos with mashed perez beans or black beans. Where can you learn more? Go to https://chduaneeb.[employer]. org and sign in to your Broadlinkt account. Enter K277 in the [employer] box to learn more about \"DASH Diet: Care Instructions. \"     If you do not have an account, please click on the \"Sign Up Now\" link. Current as of: July 22, 2018  Content Version: 11.9  © 2533-9747 [employer]. Care instructions adapted under license by [facility] ([facility]). If you have questions about a medical condition or this instruction, always ask your healthcare professional. [address] any warranty or liability for your use of this information.

## 2019-05-07 ENCOUNTER — INFUSION (OUTPATIENT)
Dept: ONCOLOGY | Facility: CLINIC | Age: 79
End: 2019-05-07

## 2019-05-07 DIAGNOSIS — C50.112 MALIGNANT NEOPLASM OF CENTRAL PORTION OF LEFT FEMALE BREAST, UNSPECIFIED ESTROGEN RECEPTOR STATUS (HCC): ICD-10-CM

## 2019-05-09 DIAGNOSIS — I10 ESSENTIAL HYPERTENSION: ICD-10-CM

## 2019-05-09 RX ORDER — AMLODIPINE BESYLATE 5 MG/1
TABLET ORAL
Qty: 45 TABLET | Refills: 0 | Status: SHIPPED | OUTPATIENT
Start: 2019-05-09 | End: 2019-07-10 | Stop reason: SDUPTHER

## 2019-05-09 NOTE — TELEPHONE ENCOUNTER
Last Appointment Date: 5/2/2019  Next Appointment Date: 7/10/2019    Allergies   Allergen Reactions    Tape [place] [employer] Tape]      Latex Tape       Patient needs refill on   Requested Prescriptions     Pending Prescriptions Disp Refills    amLODIPine (NORVASC) 5 MG tablet [Pharmacy Med Name: AMLODIPINE 5MG TAB] 45 tablet 0     Sig: TAKE 1 & 1/2 (ONE & ONE-HALF) TABLETS BY MOUTH ONCE DAILY

## 2019-07-05 DIAGNOSIS — E11.9 TYPE 2 DIABETES MELLITUS WITHOUT COMPLICATION, WITHOUT LONG-TERM CURRENT USE OF INSULIN (HCC): ICD-10-CM

## 2019-07-05 DIAGNOSIS — F41.1 GENERALIZED ANXIETY DISORDER: ICD-10-CM

## 2019-07-05 DIAGNOSIS — E55.9 VITAMIN D DEFICIENCY: ICD-10-CM

## 2019-07-05 DIAGNOSIS — R79.89 ELEVATED TSH: ICD-10-CM

## 2019-07-05 DIAGNOSIS — Z00.00 MEDICARE ANNUAL WELLNESS VISIT, SUBSEQUENT: ICD-10-CM

## 2019-07-05 DIAGNOSIS — M81.6 LOCALIZED OSTEOPOROSIS WITHOUT CURRENT PATHOLOGICAL FRACTURE: ICD-10-CM

## 2019-07-05 DIAGNOSIS — R53.83 FATIGUE, UNSPECIFIED TYPE: ICD-10-CM

## 2019-07-05 LAB
ALBUMIN SERPL-MCNC: 4.1 G/DL (ref 3.5–5.2)
ALP BLD-CCNC: 78 U/L (ref 35–104)
ALT SERPL-CCNC: 13 U/L (ref 5–33)
ANION GAP SERPL CALCULATED.3IONS-SCNC: 15 MMOL/L (ref 7–19)
AST SERPL-CCNC: 20 U/L (ref 5–32)
BILIRUB SERPL-MCNC: 0.5 MG/DL (ref 0.2–1.2)
BUN BLDV-MCNC: 22 MG/DL (ref 8–23)
CALCIUM SERPL-MCNC: 9.7 MG/DL (ref 8.8–10.2)
CHLORIDE BLD-SCNC: 101 MMOL/L (ref 98–111)
CHOLESTEROL, TOTAL: 168 MG/DL (ref 160–199)
CO2: 25 MMOL/L (ref 22–29)
CREAT SERPL-MCNC: 0.9 MG/DL (ref 0.5–0.9)
GFR NON-AFRICAN AMERICAN: >60
GLUCOSE BLD-MCNC: 138 MG/DL (ref 74–109)
HBA1C MFR BLD: 6.7 % (ref 4–6)
HDLC SERPL-MCNC: 61 MG/DL (ref 65–121)
LDL CHOLESTEROL CALCULATED: 92 MG/DL
POTASSIUM SERPL-SCNC: 4.1 MMOL/L (ref 3.5–5)
SODIUM BLD-SCNC: 141 MMOL/L (ref 136–145)
TOTAL PROTEIN: 8 G/DL (ref 6.6–8.7)
TRIGL SERPL-MCNC: 73 MG/DL (ref 0–149)
TSH SERPL DL<=0.05 MIU/L-ACNC: 2.92 UIU/ML (ref 0.27–4.2)
VITAMIN D 25-HYDROXY: 46.5 NG/ML

## 2019-07-09 ENCOUNTER — INFUSION (OUTPATIENT)
Dept: ONCOLOGY | Facility: CLINIC | Age: 79
End: 2019-07-09

## 2019-07-09 DIAGNOSIS — C50.112 MALIGNANT NEOPLASM OF CENTRAL PORTION OF LEFT FEMALE BREAST, UNSPECIFIED ESTROGEN RECEPTOR STATUS (HCC): Primary | ICD-10-CM

## 2019-07-09 RX ORDER — SODIUM CHLORIDE 0.9 % (FLUSH) 0.9 %
10 SYRINGE (ML) INJECTION AS NEEDED
Status: DISCONTINUED | OUTPATIENT
Start: 2019-07-09 | End: 2019-07-09 | Stop reason: HOSPADM

## 2019-07-09 RX ORDER — SODIUM CHLORIDE 0.9 % (FLUSH) 0.9 %
10 SYRINGE (ML) INJECTION AS NEEDED
Status: CANCELLED | OUTPATIENT
Start: 2019-07-09

## 2019-07-09 RX ADMIN — Medication 10 ML: at 11:23

## 2019-07-10 ENCOUNTER — OFFICE VISIT (OUTPATIENT)
Dept: INTERNAL MEDICINE | Age: 79
End: 2019-07-10
Payer: MEDICARE

## 2019-07-10 VITALS
WEIGHT: 188 LBS | HEIGHT: 66 IN | DIASTOLIC BLOOD PRESSURE: 94 MMHG | RESPIRATION RATE: 20 BRPM | SYSTOLIC BLOOD PRESSURE: 150 MMHG | HEART RATE: 91 BPM | BODY MASS INDEX: 30.22 KG/M2 | OXYGEN SATURATION: 94 %

## 2019-07-10 DIAGNOSIS — R60.0 FLUID RETENTION IN LEGS: ICD-10-CM

## 2019-07-10 DIAGNOSIS — E11.9 TYPE 2 DIABETES MELLITUS WITHOUT COMPLICATION, WITHOUT LONG-TERM CURRENT USE OF INSULIN (HCC): Primary | ICD-10-CM

## 2019-07-10 DIAGNOSIS — I10 ESSENTIAL HYPERTENSION: ICD-10-CM

## 2019-07-10 DIAGNOSIS — E55.9 VITAMIN D DEFICIENCY: ICD-10-CM

## 2019-07-10 DIAGNOSIS — E78.00 PURE HYPERCHOLESTEROLEMIA: ICD-10-CM

## 2019-07-10 PROCEDURE — 4040F PNEUMOC VAC/ADMIN/RCVD: CPT | Performed by: INTERNAL MEDICINE

## 2019-07-10 PROCEDURE — 1090F PRES/ABSN URINE INCON ASSESS: CPT | Performed by: INTERNAL MEDICINE

## 2019-07-10 PROCEDURE — G8399 PT W/DXA RESULTS DOCUMENT: HCPCS | Performed by: INTERNAL MEDICINE

## 2019-07-10 PROCEDURE — G8427 DOCREV CUR MEDS BY ELIG CLIN: HCPCS | Performed by: INTERNAL MEDICINE

## 2019-07-10 PROCEDURE — 1123F ACP DISCUSS/DSCN MKR DOCD: CPT | Performed by: INTERNAL MEDICINE

## 2019-07-10 PROCEDURE — 99214 OFFICE O/P EST MOD 30 MIN: CPT | Performed by: INTERNAL MEDICINE

## 2019-07-10 PROCEDURE — G8417 CALC BMI ABV UP PARAM F/U: HCPCS | Performed by: INTERNAL MEDICINE

## 2019-07-10 PROCEDURE — 1036F TOBACCO NON-USER: CPT | Performed by: INTERNAL MEDICINE

## 2019-07-10 RX ORDER — LISINOPRIL 40 MG/1
20 TABLET ORAL DAILY
Qty: 30 TABLET | Refills: 1 | Status: SHIPPED | OUTPATIENT
Start: 2019-07-10 | End: 2019-08-21

## 2019-07-10 RX ORDER — AMLODIPINE BESYLATE 5 MG/1
TABLET ORAL
Qty: 30 TABLET | Refills: 0 | Status: SHIPPED | OUTPATIENT
Start: 2019-07-10 | End: 2019-09-11

## 2019-07-10 ASSESSMENT — ENCOUNTER SYMPTOMS
BACK PAIN: 1
CONSTIPATION: 0
SORE THROAT: 0
WHEEZING: 0
CHEST TIGHTNESS: 0
ABDOMINAL PAIN: 0
COUGH: 0

## 2019-07-10 NOTE — PROGRESS NOTES
Glucose 07/05/2019 138*    BUN 07/05/2019 22     CREATININE 07/05/2019 0.9     GFR Non- 07/05/2019 >60     Calcium 07/05/2019 9.7     Total Protein 07/05/2019 8.0     Alb 07/05/2019 4.1     Total Bilirubin 07/05/2019 0.5     Alkaline Phosphatase 07/05/2019 78     ALT 07/05/2019 13     AST 07/05/2019 20     Hemoglobin A1C 07/05/2019 6.7*    Cholesterol, Total 07/05/2019 168     Triglycerides 07/05/2019 73     HDL 07/05/2019 61*    LDL Calculated 07/05/2019 92            ASSESSMENT/PLAN:    Osteoporosis  Vitamin D deficiency- her vitamin D level now good 46 ( 48) ( 41 )(42), cont  2000 iu vit d daily     Pure hypercholesterolemia- her lipid panel is very well controlled, LDL 92(84)- she will continue her current dose of pravastatin 40 mg daily     Type 2 diabetes mellitus without complication, without long-term current use of insulin ([facility])- her A1c is well 6.7 ( 6.7) (6.9) ( 6.7) ( 7.2)- cont Current glipizide dose     Generalized anxiety disorder- her anxiety issues are daily but she does not want to take rx     HTN  Blood pressure elevated, significantly  She was supposed to be taking amlodipine 7.5 mg daily but only has been taking 2.5 mg daily  She states that now her friends are telling her that this is \"not a good medication and can cause a lot of trouble\" her blood pressure has been running high at home but patient is blaming blood pressure medication and not elevated blood pressure for her bad feeling of fatigue and headaches    I have suggested her today following  1. Continue amlodipine at lower dose of half tablet equaling 2.5 mg daily in the evening  2. Lisinopril she would be taking half tablet daily of 40 mg in the morning-20 mg dose total  3. She will continue Maxide half tablet daily  4.   Record blood pressure readings  Return here 6 weeks with blood pressure readings                 Orders Placed This Encounter   Procedures    Hemoglobin A1C    Comprehensive Metabolic Panel    CBC Auto Differential    Lipid Panel    TSH without Reflex    Vitamin D 25 Hydroxy    Urinalysis     New Prescriptions    LISINOPRIL (PRINIVIL;ZESTRIL) 40 MG TABLET    Take 0.5 tablets by mouth daily         Return in about 6 weeks (around 8/21/2019) for Medication check. There are no Patient Instructions on file for this visit. EMR Dragon/transcription disclaimer:Significant part of this  encounter note is electronic transcription/translationof spoken language to printed text. The electronic translation of spoken language may be erroneous, or at times, nonsensical words or phrases may be inadvertently transcribed.  Although I have reviewed the note for sucherrors, some may still exist.

## 2019-08-20 ENCOUNTER — HOSPITAL ENCOUNTER (OUTPATIENT)
Dept: WOMENS IMAGING | Age: 79
Discharge: HOME | End: 2019-08-20
Payer: MEDICARE

## 2019-08-20 DIAGNOSIS — Z12.39 SCREENING BREAST EXAMINATION: ICD-10-CM

## 2019-08-20 PROCEDURE — 77063 BREAST TOMOSYNTHESIS BI: CPT

## 2019-08-21 ENCOUNTER — OFFICE VISIT (OUTPATIENT)
Dept: INTERNAL MEDICINE | Age: 79
End: 2019-08-21
Payer: MEDICARE

## 2019-08-21 VITALS
RESPIRATION RATE: 18 BRPM | HEART RATE: 77 BPM | OXYGEN SATURATION: 95 % | HEIGHT: 66 IN | DIASTOLIC BLOOD PRESSURE: 84 MMHG | SYSTOLIC BLOOD PRESSURE: 136 MMHG | WEIGHT: 190 LBS | BODY MASS INDEX: 30.53 KG/M2

## 2019-08-21 DIAGNOSIS — R35.0 URINARY FREQUENCY: ICD-10-CM

## 2019-08-21 DIAGNOSIS — R05.9 COUGH: ICD-10-CM

## 2019-08-21 DIAGNOSIS — N81.10 BLADDER PROLAPSE, FEMALE, ACQUIRED: ICD-10-CM

## 2019-08-21 DIAGNOSIS — R03.0 ELEVATED BLOOD PRESSURE READING: ICD-10-CM

## 2019-08-21 DIAGNOSIS — N39.3 STRESS INCONTINENCE: ICD-10-CM

## 2019-08-21 DIAGNOSIS — I10 ESSENTIAL HYPERTENSION: Primary | ICD-10-CM

## 2019-08-21 DIAGNOSIS — R60.0 FLUID RETENTION IN LEGS: ICD-10-CM

## 2019-08-21 PROBLEM — G62.9 NEUROPATHY: Status: RESOLVED | Noted: 2017-04-07 | Resolved: 2019-08-21

## 2019-08-21 LAB
BACTERIA: ABNORMAL /HPF
BILIRUBIN URINE: NEGATIVE
BLOOD, URINE: ABNORMAL
CLARITY: ABNORMAL
COLOR: YELLOW
EPITHELIAL CELLS, UA: ABNORMAL /HPF
GLUCOSE URINE: NEGATIVE MG/DL
KETONES, URINE: NEGATIVE MG/DL
LEUKOCYTE ESTERASE, URINE: ABNORMAL
NITRITE, URINE: POSITIVE
PH UA: 6 (ref 5–8)
PROTEIN UA: NEGATIVE MG/DL
RBC UA: ABNORMAL /HPF (ref 0–2)
SPECIFIC GRAVITY UA: 1.01 (ref 1–1.03)
URINE REFLEX TO CULTURE: YES
UROBILINOGEN, URINE: 0.2 E.U./DL
WBC UA: ABNORMAL /HPF (ref 0–5)

## 2019-08-21 PROCEDURE — G8417 CALC BMI ABV UP PARAM F/U: HCPCS | Performed by: INTERNAL MEDICINE

## 2019-08-21 PROCEDURE — 1036F TOBACCO NON-USER: CPT | Performed by: INTERNAL MEDICINE

## 2019-08-21 PROCEDURE — G8427 DOCREV CUR MEDS BY ELIG CLIN: HCPCS | Performed by: INTERNAL MEDICINE

## 2019-08-21 PROCEDURE — G8399 PT W/DXA RESULTS DOCUMENT: HCPCS | Performed by: INTERNAL MEDICINE

## 2019-08-21 PROCEDURE — 1090F PRES/ABSN URINE INCON ASSESS: CPT | Performed by: INTERNAL MEDICINE

## 2019-08-21 PROCEDURE — 99214 OFFICE O/P EST MOD 30 MIN: CPT | Performed by: INTERNAL MEDICINE

## 2019-08-21 PROCEDURE — 1123F ACP DISCUSS/DSCN MKR DOCD: CPT | Performed by: INTERNAL MEDICINE

## 2019-08-21 PROCEDURE — 4040F PNEUMOC VAC/ADMIN/RCVD: CPT | Performed by: INTERNAL MEDICINE

## 2019-08-21 RX ORDER — LOSARTAN POTASSIUM 100 MG/1
50 TABLET ORAL 2 TIMES DAILY
Qty: 30 TABLET | Refills: 1 | Status: SHIPPED | OUTPATIENT
Start: 2019-08-21 | End: 2019-10-19 | Stop reason: SDUPTHER

## 2019-08-21 RX ORDER — CIPROFLOXACIN 250 MG/1
250 TABLET, FILM COATED ORAL 2 TIMES DAILY
Qty: 10 TABLET | Refills: 0 | Status: SHIPPED | OUTPATIENT
Start: 2019-08-21 | End: 2020-02-07 | Stop reason: SDUPTHER

## 2019-08-21 ASSESSMENT — ENCOUNTER SYMPTOMS
ABDOMINAL PAIN: 0
SORE THROAT: 0
COUGH: 1
CONSTIPATION: 0
WHEEZING: 0
CHEST TIGHTNESS: 0

## 2019-08-21 NOTE — PROGRESS NOTES
Years since quittin.5    Smokeless tobacco: Never Used   Substance Use Topics    Alcohol use: No     Comment: occ      Family History   Problem Relation Age of Onset    Diabetes Father     Heart Disease Father     Hypertension Mother        Review of Systems   Constitutional: Positive for fatigue. Negative for chills and fever. HENT: Negative for congestion, ear pain, nosebleeds, postnasal drip and sore throat. Respiratory: Positive for cough. Negative for chest tightness and wheezing. Cardiovascular: Negative for chest pain, palpitations and leg swelling. Gastrointestinal: Negative for abdominal pain and constipation. Genitourinary: Positive for frequency. Negative for dysuria and urgency. Musculoskeletal: Negative. Negative for arthralgias. Skin: Negative for rash. Neurological: Negative for dizziness and headaches. Psychiatric/Behavioral: Negative. Vitals:    19 0754   BP: 136/84   Pulse: 77   Resp: 18   SpO2: 95%   Weight: 190 lb (86.2 kg)   Height: 5' 6\" (1.676 m)     Body mass index is 30.67 kg/m². Physical Exam   Constitutional: She is oriented to person, place, and time. She appears well-developed and well-nourished. HENT:   Right Ear: External ear normal.   Left Ear: External ear normal.   Mouth/Throat: Oropharyngeal exudate present. Eyes: Pupils are equal, round, and reactive to light. Conjunctivae are normal.   Neck: Neck supple. No JVD present. No thyromegaly present. Cardiovascular: Normal rate and normal heart sounds. No murmur heard. Pulmonary/Chest: No respiratory distress. She has no wheezes. She has rales. She exhibits no tenderness. Abdominal: Soft. Bowel sounds are normal.   Musculoskeletal: Normal range of motion. Lymphadenopathy:     She has no cervical adenopathy. Neurological: She is oriented to person, place, and time. Skin: Skin is warm. No rash noted.        Lab Review   Orders Only on 2019   Component Date Value   

## 2019-08-23 LAB
ORGANISM: ABNORMAL
URINE CULTURE, ROUTINE: ABNORMAL
URINE CULTURE, ROUTINE: ABNORMAL

## 2019-08-26 ENCOUNTER — OFFICE VISIT (OUTPATIENT)
Dept: SURGERY | Age: 79
End: 2019-08-26
Payer: MEDICARE

## 2019-08-26 VITALS — TEMPERATURE: 98.6 F | HEIGHT: 66 IN | WEIGHT: 191 LBS | BODY MASS INDEX: 30.7 KG/M2

## 2019-08-26 DIAGNOSIS — Z12.39 SCREENING BREAST EXAMINATION: ICD-10-CM

## 2019-08-26 DIAGNOSIS — Z85.3 PERSONAL HISTORY OF MALIGNANT NEOPLASM OF BREAST: Primary | ICD-10-CM

## 2019-08-26 PROCEDURE — G8399 PT W/DXA RESULTS DOCUMENT: HCPCS | Performed by: PHYSICIAN ASSISTANT

## 2019-08-26 PROCEDURE — G8427 DOCREV CUR MEDS BY ELIG CLIN: HCPCS | Performed by: PHYSICIAN ASSISTANT

## 2019-08-26 PROCEDURE — 1036F TOBACCO NON-USER: CPT | Performed by: PHYSICIAN ASSISTANT

## 2019-08-26 PROCEDURE — G8417 CALC BMI ABV UP PARAM F/U: HCPCS | Performed by: PHYSICIAN ASSISTANT

## 2019-08-26 PROCEDURE — 1090F PRES/ABSN URINE INCON ASSESS: CPT | Performed by: PHYSICIAN ASSISTANT

## 2019-08-26 PROCEDURE — 1123F ACP DISCUSS/DSCN MKR DOCD: CPT | Performed by: PHYSICIAN ASSISTANT

## 2019-08-26 PROCEDURE — 4040F PNEUMOC VAC/ADMIN/RCVD: CPT | Performed by: PHYSICIAN ASSISTANT

## 2019-08-26 PROCEDURE — 99213 OFFICE O/P EST LOW 20 MIN: CPT | Performed by: PHYSICIAN ASSISTANT

## 2019-08-26 RX ORDER — MAGNESIUM 30 MG
30 TABLET ORAL 2 TIMES DAILY
COMMUNITY

## 2019-08-29 ENCOUNTER — TELEPHONE (OUTPATIENT)
Dept: INTERNAL MEDICINE | Age: 79
End: 2019-08-29

## 2019-09-03 DIAGNOSIS — C50.112 MALIGNANT NEOPLASM OF CENTRAL PORTION OF LEFT FEMALE BREAST, UNSPECIFIED ESTROGEN RECEPTOR STATUS (HCC): Primary | ICD-10-CM

## 2019-09-04 ENCOUNTER — OFFICE VISIT (OUTPATIENT)
Dept: ONCOLOGY | Facility: CLINIC | Age: 79
End: 2019-09-04

## 2019-09-04 ENCOUNTER — APPOINTMENT (OUTPATIENT)
Dept: LAB | Facility: HOSPITAL | Age: 79
End: 2019-09-04

## 2019-09-04 VITALS
HEART RATE: 96 BPM | OXYGEN SATURATION: 94 % | SYSTOLIC BLOOD PRESSURE: 132 MMHG | DIASTOLIC BLOOD PRESSURE: 72 MMHG | RESPIRATION RATE: 16 BRPM | BODY MASS INDEX: 30.53 KG/M2 | TEMPERATURE: 95.7 F | HEIGHT: 66 IN | WEIGHT: 190 LBS

## 2019-09-04 DIAGNOSIS — Z17.1 MALIGNANT NEOPLASM OF UPPER-OUTER QUADRANT OF RIGHT BREAST IN FEMALE, ESTROGEN RECEPTOR NEGATIVE (HCC): ICD-10-CM

## 2019-09-04 DIAGNOSIS — C50.411 MALIGNANT NEOPLASM OF UPPER-OUTER QUADRANT OF RIGHT BREAST IN FEMALE, ESTROGEN RECEPTOR NEGATIVE (HCC): ICD-10-CM

## 2019-09-04 DIAGNOSIS — C50.112 MALIGNANT NEOPLASM OF CENTRAL PORTION OF LEFT FEMALE BREAST, UNSPECIFIED ESTROGEN RECEPTOR STATUS (HCC): Primary | ICD-10-CM

## 2019-09-04 LAB
ALBUMIN SERPL-MCNC: 4 G/DL (ref 3.5–5)
ALBUMIN/GLOB SERPL: 1 G/DL (ref 1.1–2.5)
ALP SERPL-CCNC: 74 U/L (ref 24–120)
ALT SERPL W P-5'-P-CCNC: 21 U/L (ref 0–54)
ANION GAP SERPL CALCULATED.3IONS-SCNC: 7 MMOL/L (ref 4–13)
AST SERPL-CCNC: 34 U/L (ref 7–45)
BASOPHILS # BLD AUTO: 0.03 10*3/MM3 (ref 0–0.2)
BASOPHILS NFR BLD AUTO: 0.4 % (ref 0–1.5)
BILIRUB SERPL-MCNC: 0.4 MG/DL (ref 0.1–1)
BUN BLD-MCNC: 19 MG/DL (ref 5–21)
BUN/CREAT SERPL: 20 (ref 7–25)
CALCIUM SPEC-SCNC: 9.2 MG/DL (ref 8.4–10.4)
CHLORIDE SERPL-SCNC: 106 MMOL/L (ref 98–110)
CO2 SERPL-SCNC: 28 MMOL/L (ref 24–31)
CREAT BLD-MCNC: 0.95 MG/DL (ref 0.5–1.4)
DEPRECATED RDW RBC AUTO: 44.4 FL (ref 37–54)
EOSINOPHIL # BLD AUTO: 0.08 10*3/MM3 (ref 0–0.4)
EOSINOPHIL NFR BLD AUTO: 1.1 % (ref 0.3–6.2)
ERYTHROCYTE [DISTWIDTH] IN BLOOD BY AUTOMATED COUNT: 13.2 % (ref 12.3–15.4)
GFR SERPL CREATININE-BSD FRML MDRD: 57 ML/MIN/1.73
GLOBULIN UR ELPH-MCNC: 3.9 GM/DL
GLUCOSE BLD-MCNC: 175 MG/DL (ref 70–100)
HCT VFR BLD AUTO: 35.7 % (ref 34–46.6)
HGB BLD-MCNC: 12.2 G/DL (ref 12–15.9)
HOLD SPECIMEN: NORMAL
HOLD SPECIMEN: NORMAL
IMM GRANULOCYTES # BLD AUTO: 0.01 10*3/MM3 (ref 0–0.05)
IMM GRANULOCYTES NFR BLD AUTO: 0.1 % (ref 0–0.5)
LYMPHOCYTES # BLD AUTO: 2.4 10*3/MM3 (ref 0.7–3.1)
LYMPHOCYTES NFR BLD AUTO: 33.5 % (ref 19.6–45.3)
MCH RBC QN AUTO: 31.3 PG (ref 26.6–33)
MCHC RBC AUTO-ENTMCNC: 34.2 G/DL (ref 31.5–35.7)
MCV RBC AUTO: 91.5 FL (ref 79–97)
MONOCYTES # BLD AUTO: 0.96 10*3/MM3 (ref 0.1–0.9)
MONOCYTES NFR BLD AUTO: 13.4 % (ref 5–12)
NEUTROPHILS # BLD AUTO: 3.69 10*3/MM3 (ref 1.7–7)
NEUTROPHILS NFR BLD AUTO: 51.5 % (ref 42.7–76)
NRBC BLD AUTO-RTO: 0 /100 WBC (ref 0–0.2)
PLATELET # BLD AUTO: 377 10*3/MM3 (ref 140–450)
PMV BLD AUTO: 9 FL (ref 6–12)
POTASSIUM BLD-SCNC: 3.8 MMOL/L (ref 3.5–5.3)
PROT SERPL-MCNC: 7.9 G/DL (ref 6.3–8.7)
RBC # BLD AUTO: 3.9 10*6/MM3 (ref 3.77–5.28)
SODIUM BLD-SCNC: 141 MMOL/L (ref 135–145)
WBC NRBC COR # BLD: 7.17 10*3/MM3 (ref 3.4–10.8)

## 2019-09-04 PROCEDURE — 36415 COLL VENOUS BLD VENIPUNCTURE: CPT | Performed by: INTERNAL MEDICINE

## 2019-09-04 PROCEDURE — 99214 OFFICE O/P EST MOD 30 MIN: CPT | Performed by: INTERNAL MEDICINE

## 2019-09-04 PROCEDURE — 80053 COMPREHEN METABOLIC PANEL: CPT | Performed by: INTERNAL MEDICINE

## 2019-09-04 PROCEDURE — 85025 COMPLETE CBC W/AUTO DIFF WBC: CPT | Performed by: INTERNAL MEDICINE

## 2019-09-04 RX ORDER — LOSARTAN POTASSIUM 100 MG/1
100 TABLET ORAL DAILY
Refills: 1 | COMMUNITY
Start: 2019-08-21

## 2019-09-04 RX ORDER — POTASSIUM CHLORIDE 750 MG/1
10 TABLET, EXTENDED RELEASE ORAL DAILY
COMMUNITY
Start: 2019-04-26

## 2019-09-04 RX ORDER — MAGNESIUM 30 MG
30 TABLET ORAL
COMMUNITY

## 2019-09-04 NOTE — PROGRESS NOTES
[facility]  HEMATOLOGY & ONCOLOGY        Subjective     VISIT DIAGNOSIS: No diagnosis found.    REASON FOR VISIT:     Chief Complaint   Patient presents with   • Breast Cancer     1 year follow up, recently had mammogram        HEMATOLOGY / ONCOLOGY HISTORY:   Oncology/Hematology History    Tp: [id number] Mp: M0 Size: 2.6 cm Histopathologic Grade: G3? Histopathologic Type: DuctalInvasive  (NOS), left central? Stage  Grouping: IIA  08/06/2008: Core biopsy: at left breast mass, 12 o'clock: Infiltrating ductal carcinoma, gr 3, with foci of tumor necrosis? DNA index 1.78  (aneuploid), ER/MN 0%, her2/kofi 2+ (FISH ), p53 0%, Ki67  59%.  08/28/2008: MastectomyL  partial, USguided  needle localization, with SNB: IDC, gr 3, tumor measures 2.6 cm in greatest dimension,  with necrosis seen, extending to original deep margin....additional deep margin adds 1 cm to final margin of excision, residual fibrocystic  mastopathy? 2 left axillary sentinel nodes: 0/2+ve  Dose dense Adriamycin/cyclophosphamide, followed by weekly Taxol administered between September 2008 and March 2009 .  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  Followed by Radiation therapy        Malignant neoplasm of central portion of left female breast (CMS/HCC)    11/9/2016 Initial Diagnosis     Malignant neoplasm of central portion of left female breast            Cancer Staging Information:  No matching staging information was found for the patient.      INTERVAL HISTORY  Patient ID: Lorena Valdes is a 78 y.o. year old female with a history of breast cancer in 2008.  She has no complaint except for occasional fatigue denies any lumps or bumps of the breasts.  She is due for mammogram today.  She is also followed following up with the surgeon.  --no issues or concerns    Past Medical History:   Past Medical History:   Diagnosis Date   • Bladder disorder    • Fibrocystic disease of breast    •  Heart disease    • History of bone density study 2011   • Hyperglycemia    • Hyperlipidemia    • Hypotension    • Left ventricular diastolic dysfunction    • Malignant neoplasm of central portion of left female breast (CMS/HCC) 11/9/2016   • Osteopenia 8/29/2017   • Uterine prolapse      Past Surgical History:   Past Surgical History:   Procedure Laterality Date   • BREAST LUMPECTOMY  2008   • COLONOSCOPY  2010   • MAMMO BILATERAL  07/17/2013    Dr. Sabillon   • PAP SMEAR  2010     Social History:   Social History     Socioeconomic History   • Marital status: Single     Spouse name: Not on file   • Number of children: Not on file   • Years of education: Not on file   • Highest education level: Not on file   Tobacco Use   • Smoking status: Unknown If Ever Smoked   Substance and Sexual Activity   • Drug use: No     Family History:   Family History   Problem Relation Age of Onset   • Heart disease Father    • No Known Problems Daughter    • No Known Problems Son    • Hypertension Daughter    • Hypertension Daughter        Review of Systems   Constitutional: Positive for fatigue. Negative for activity change, appetite change, chills and unexpected weight change.   HENT: Negative.    Endocrine: Negative.    Genitourinary: Negative for dysuria, flank pain, frequency and hematuria.        Stress incontinent   Musculoskeletal: Negative.    Skin: Negative.    Allergic/Immunologic: Negative.    Neurological: Negative.    Hematological: Negative.    Psychiatric/Behavioral: Negative.         Performance Status:  Asymptomatic    Medications:    Current Outpatient Medications   Medication Sig Dispense Refill   • aspirin 81 MG EC tablet Take 81 mg by mouth daily.       • Calcium Carb-Cholecalciferol (CALCIUM 600+D3) 600-200 MG-UNIT tablet Take 1 tablet by mouth 2 (Two) Times a Day.     • Cholecalciferol (VITAMIN D3) 400 UNITS capsule Take 1 capsule by mouth Daily.     • glipiZIDE (GLUCOTROL) 5 MG tablet Take 5 mg by mouth 2 (Two)  "Times a Day Before Meals.     • ibuprofen (ADVIL,MOTRIN) 400 MG tablet Take 400 mg by mouth Every 6 (Six) Hours As Needed for mild pain (1-3).     • losartan (COZAAR) 100 MG tablet TAKE 1 2 (ONE HALF) TABLET BY MOUTH TWICE DAILY  1   • magnesium 30 MG tablet Take 30 mg by mouth.     • potassium chloride (K-DUR,KLOR-CON) 10 MEQ CR tablet Take 10 mEq by mouth.     • pravastatin (PRAVACHOL) 40 MG tablet Take 40 mg by mouth Daily.     • vitamin C (ASCORBIC ACID) 500 MG tablet Take 500 mg by mouth daily.       • Omega-3 Fatty Acids (FISH OIL) 1000 MG capsule capsule Take 1,000 mg by mouth Daily.       No current facility-administered medications for this visit.        ALLERGIES:    Allergies   Allergen Reactions   • Adhesive Tape Unknown (See Comments)     Latex Tape       Objective      Vitals:    09/04/19 1136   BP: 132/72   Pulse: 96   Resp: 16   Temp: 95.7 °F (35.4 °C)   SpO2: 94%   Weight: 86.2 kg (190 lb)   Height: 167.6 cm (65.98\")   PainSc: 0-No pain         Current Status 9/4/2019   ECOG score 0         Physical Exam  General Appearance: Patient is awake, alert, oriented and in no acute distress. Patient is welldeveloped, wellnourished, and appears stated age.  HEENT: Normocephalic. Sclerae clear, conjunctiva pink, extraocular movements intact, pupils, round, reactive to light and  accommodation. Mouth and throat are clear with moist oral mucosa.  NECK: Supple, no jugular venous distention, thyroid not enlarged.  LYMPH: No cervical, supraclavicular, axillary, or inguinal lymphadenopathy.  CHEST: Equal bilateral expansion, AP  diameter normal, resonant percussion note  LUNGS: Good air movement, no rales, rhonchi, rubs or wheezes with auscultation  CARDIO: Regular sinus rhythm, no murmurs, gallops or rubs.  ABDOMEN: Nondistended, soft, No tenderness, no guarding, no rebound, No hepatosplenomegaly. No abdominal masses. Bowel sounds positive. No hernia  GENITALIA: Not examined.  BREASTS: left breast lumpectomy, no " lumps or bumps, no nipple discharge. Right breast unremarkable. Bilateral dense breast.  MUSKEL: No joint swelling, decreased motion, or inflammation  EXTREMS: No edema, clubbing, cyanosis, No varicose veins.  NEURO: Grossly nonfocal, Gait is coordinated and smooth, Cognition is preserved.  SKIN: No rashes, no ecchymoses, no petechia.  PSYCH: Oriented to time, place and person. Memory is preserved. Mood and affect appear normal  RECENT LABS:  Appointment on 09/04/2019   Component Date Value Ref Range Status   • Extra Tube 09/04/2019 Hold for add-ons.   Final    Auto resulted.   • Extra Tube 09/04/2019 Hold for add-ons.   Final    Auto resulted.   Orders Only on 09/03/2019   Component Date Value Ref Range Status   • Glucose 09/04/2019 175* 70 - 100 mg/dL Final   • BUN 09/04/2019 19  5 - 21 mg/dL Final   • Creatinine 09/04/2019 0.95  0.50 - 1.40 mg/dL Final   • Sodium 09/04/2019 141  135 - 145 mmol/L Final   • Potassium 09/04/2019 3.8  3.5 - 5.3 mmol/L Final   • Chloride 09/04/2019 106  98 - 110 mmol/L Final   • CO2 09/04/2019 28.0  24.0 - 31.0 mmol/L Final   • Calcium 09/04/2019 9.2  8.4 - 10.4 mg/dL Final   • Total Protein 09/04/2019 7.9  6.3 - 8.7 g/dL Final   • Albumin 09/04/2019 4.00  3.50 - 5.00 g/dL Final   • ALT (SGPT) 09/04/2019 21  0 - 54 U/L Final   • AST (SGOT) 09/04/2019 34  7 - 45 U/L Final   • Alkaline Phosphatase 09/04/2019 74  24 - 120 U/L Final   • Total Bilirubin 09/04/2019 0.4  0.1 - 1.0 mg/dL Final   • eGFR Non African Amer 09/04/2019 57* >60 mL/min/1.73 Final   • Globulin 09/04/2019 3.9  gm/dL Final   • A/G Ratio 09/04/2019 1.0* 1.1 - 2.5 g/dL Final   • BUN/Creatinine Ratio 09/04/2019 20.0  7.0 - 25.0 Final   • Anion Gap 09/04/2019 7.0  4.0 - 13.0 mmol/L Final   • WBC 09/04/2019 7.17  3.40 - 10.80 10*3/mm3 Final   • RBC 09/04/2019 3.90  3.77 - 5.28 10*6/mm3 Final   • Hemoglobin 09/04/2019 12.2  12.0 - 15.9 g/dL Final   • Hematocrit 09/04/2019 35.7  34.0 - 46.6 % Final   • MCV 09/04/2019 91.5   79.0 - 97.0 fL Final   • MCH 09/04/2019 31.3  26.6 - 33.0 pg Final   • MCHC 09/04/2019 34.2  31.5 - 35.7 g/dL Final   • RDW 09/04/2019 13.2  12.3 - 15.4 % Final   • RDW-SD 09/04/2019 44.4  37.0 - 54.0 fl Final   • MPV 09/04/2019 9.0  6.0 - 12.0 fL Final   • Platelets 09/04/2019 377  140 - 450 10*3/mm3 Final   • Neutrophil % 09/04/2019 51.5  42.7 - 76.0 % Final   • Lymphocyte % 09/04/2019 33.5  19.6 - 45.3 % Final   • Monocyte % 09/04/2019 13.4* 5.0 - 12.0 % Final   • Eosinophil % 09/04/2019 1.1  0.3 - 6.2 % Final   • Basophil % 09/04/2019 0.4  0.0 - 1.5 % Final   • Immature Grans % 09/04/2019 0.1  0.0 - 0.5 % Final   • Neutrophils, Absolute 09/04/2019 3.69  1.70 - 7.00 10*3/mm3 Final   • Lymphocytes, Absolute 09/04/2019 2.40  0.70 - 3.10 10*3/mm3 Final   • Monocytes, Absolute 09/04/2019 0.96* 0.10 - 0.90 10*3/mm3 Final   • Eosinophils, Absolute 09/04/2019 0.08  0.00 - 0.40 10*3/mm3 Final   • Basophils, Absolute 09/04/2019 0.03  0.00 - 0.20 10*3/mm3 Final   • Immature Grans, Absolute 09/04/2019 0.01  0.00 - 0.05 10*3/mm3 Final   • nRBC 09/04/2019 0.0  0.0 - 0.2 /100 WBC Final       RADIOLOGY:  No results found.         Assessment/Plan 76-year-old female diagnosed with left breast cancer T2 and 0 M0 2008 status post lumpectomy, ER/NC negative, HER-2 positive by fish,status post Adriamycin one cycle, discontinued due to cardiomyopathy.  Status post 4 cycles Cytoxan and 12 cycles of weekly Taxol.  Status postradiation therapy.   Last mammogram 8/30/ 2017 unremarkable. 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  Osteoporosis: Postmenopausal bone density screening. Senile osteopenia.  REFERENCE EXAM.  No reference studies are available for comparison.    FINDINGS.  Bone densitometry has been performed using a ALEXANDALEXA bone  densitometer. The routine evaluation includes the lumbar spine and  left hip.  BMD     T-score    1.014        - 1.5       Lumbar Spine (L1-L4)  0.683        - 2.6       Femoral Neck  0.786        - 1.8       Total Hip  Osteoporosis based on left femoral neck T score.   IMPRESSION.  Osteoporosis, high fracture risk.  Signed by Dr Tom Valencia on 8/30/2017 9:38 AM  --discussed pros and cons of prolia. We need dental clearance. She will think about it and let us know. Continue calcium and vitamin D  3. Diabetes: on glipizide  4. Hyperlipidemia: on lipitor      Lowelliageli Arley Diaz MD    9/4/2019    2:42 PM

## 2019-09-11 ENCOUNTER — OFFICE VISIT (OUTPATIENT)
Dept: INTERNAL MEDICINE | Age: 79
End: 2019-09-11
Payer: MEDICARE

## 2019-09-11 ENCOUNTER — INFUSION (OUTPATIENT)
Dept: ONCOLOGY | Facility: CLINIC | Age: 79
End: 2019-09-11

## 2019-09-11 VITALS
SYSTOLIC BLOOD PRESSURE: 128 MMHG | DIASTOLIC BLOOD PRESSURE: 78 MMHG | WEIGHT: 190 LBS | OXYGEN SATURATION: 98 % | RESPIRATION RATE: 18 BRPM | BODY MASS INDEX: 29.82 KG/M2 | TEMPERATURE: 98.1 F | HEART RATE: 76 BPM | HEIGHT: 67 IN

## 2019-09-11 DIAGNOSIS — J21.9 ACUTE BRONCHITIS AND BRONCHIOLITIS: ICD-10-CM

## 2019-09-11 DIAGNOSIS — R05.9 COUGH: ICD-10-CM

## 2019-09-11 DIAGNOSIS — J20.9 ACUTE BRONCHITIS AND BRONCHIOLITIS: ICD-10-CM

## 2019-09-11 DIAGNOSIS — R03.0 ELEVATED BLOOD PRESSURE READING: ICD-10-CM

## 2019-09-11 DIAGNOSIS — I10 ESSENTIAL HYPERTENSION: Primary | ICD-10-CM

## 2019-09-11 DIAGNOSIS — C50.112 MALIGNANT NEOPLASM OF CENTRAL PORTION OF LEFT FEMALE BREAST, UNSPECIFIED ESTROGEN RECEPTOR STATUS (HCC): Primary | ICD-10-CM

## 2019-09-11 DIAGNOSIS — R09.81 SINUS CONGESTION: ICD-10-CM

## 2019-09-11 PROCEDURE — G8427 DOCREV CUR MEDS BY ELIG CLIN: HCPCS | Performed by: INTERNAL MEDICINE

## 2019-09-11 PROCEDURE — 1123F ACP DISCUSS/DSCN MKR DOCD: CPT | Performed by: INTERNAL MEDICINE

## 2019-09-11 PROCEDURE — 4040F PNEUMOC VAC/ADMIN/RCVD: CPT | Performed by: INTERNAL MEDICINE

## 2019-09-11 PROCEDURE — G8399 PT W/DXA RESULTS DOCUMENT: HCPCS | Performed by: INTERNAL MEDICINE

## 2019-09-11 PROCEDURE — 99214 OFFICE O/P EST MOD 30 MIN: CPT | Performed by: INTERNAL MEDICINE

## 2019-09-11 PROCEDURE — 1090F PRES/ABSN URINE INCON ASSESS: CPT | Performed by: INTERNAL MEDICINE

## 2019-09-11 PROCEDURE — 1036F TOBACCO NON-USER: CPT | Performed by: INTERNAL MEDICINE

## 2019-09-11 PROCEDURE — G8417 CALC BMI ABV UP PARAM F/U: HCPCS | Performed by: INTERNAL MEDICINE

## 2019-09-11 RX ORDER — SODIUM CHLORIDE 0.9 % (FLUSH) 0.9 %
10 SYRINGE (ML) INJECTION AS NEEDED
Status: DISCONTINUED | OUTPATIENT
Start: 2019-09-11 | End: 2019-09-11 | Stop reason: HOSPADM

## 2019-09-11 RX ORDER — CEFUROXIME AXETIL 500 MG/1
500 TABLET ORAL 2 TIMES DAILY
Qty: 14 TABLET | Refills: 0 | Status: SHIPPED | OUTPATIENT
Start: 2019-09-11 | End: 2019-09-18

## 2019-09-11 RX ORDER — SODIUM CHLORIDE 0.9 % (FLUSH) 0.9 %
10 SYRINGE (ML) INJECTION AS NEEDED
Status: CANCELLED | OUTPATIENT
Start: 2019-09-11

## 2019-09-11 RX ADMIN — Medication 10 ML: at 10:40

## 2019-09-11 ASSESSMENT — ENCOUNTER SYMPTOMS
CHEST TIGHTNESS: 0
COUGH: 1
WHEEZING: 0
CONSTIPATION: 0
SORE THROAT: 0
SINUS PRESSURE: 1
SINUS PAIN: 1
ABDOMINAL PAIN: 0

## 2019-09-11 NOTE — PROGRESS NOTES
08/21/2019 1.014     Blood, Urine 08/21/2019 TRACE*    pH, UA 08/21/2019 6.0     Protein, UA 08/21/2019 Negative     Urobilinogen, Urine 08/21/2019 0.2     Nitrite, Urine 08/21/2019 POSITIVE*    Leukocyte Esterase, Urine 08/21/2019 LARGE*    Urine Reflex to Culture 08/21/2019 YES     Urine Culture, Routine 08/21/2019 *                    Value:>100,000 CFU/ml  Mixed skin sy present      Organism 08/21/2019 Escherichia coli*    Urine Culture, Routine 08/21/2019 Moderate growth     WBC, UA 08/21/2019 TNTC*    RBC, UA 08/21/2019 2-4     Epi Cells 08/21/2019 5-10     Bacteria, UA 08/21/2019 2+            ASSESSMENT/PLAN:    Essential hypertension  Elevated blood pressure reading- now bp good range  Blood pressure is doing better now  Continue following  1.continue Maxide half tablet daily  2. Continue  losartan 100 - 1/2 bid    Acute bronchitis and bronchiolitis  Cough  Sinus congestion    Sample for Breo ellipta  RX ceftin x 7 days              No orders of the defined types were placed in this encounter. New Prescriptions    No medications on file         No follow-ups on file. There are no Patient Instructions on file for this visit. EMR Dragon/transcription disclaimer:Significant part of this  encounter note is electronic transcription/translationof spoken language to printed text. The electronic translation of spoken language may be erroneous, or at times, nonsensical words or phrases may be inadvertently transcribed.  Although I have reviewed the note for sucherrors, some may still exist.

## 2019-10-21 ENCOUNTER — TELEPHONE (OUTPATIENT)
Dept: INTERNAL MEDICINE | Age: 79
End: 2019-10-21

## 2019-10-21 RX ORDER — LOSARTAN POTASSIUM 100 MG/1
TABLET ORAL
Qty: 30 TABLET | Refills: 3 | Status: SHIPPED | OUTPATIENT
Start: 2019-10-21 | End: 2020-02-07 | Stop reason: SDUPTHER

## 2019-11-13 ENCOUNTER — INFUSION (OUTPATIENT)
Dept: ONCOLOGY | Facility: CLINIC | Age: 79
End: 2019-11-13

## 2019-11-13 DIAGNOSIS — C50.112 MALIGNANT NEOPLASM OF CENTRAL PORTION OF LEFT FEMALE BREAST, UNSPECIFIED ESTROGEN RECEPTOR STATUS (HCC): Primary | ICD-10-CM

## 2019-11-13 RX ORDER — SODIUM CHLORIDE 0.9 % (FLUSH) 0.9 %
10 SYRINGE (ML) INJECTION AS NEEDED
Status: DISCONTINUED | OUTPATIENT
Start: 2019-11-13 | End: 2019-11-13 | Stop reason: HOSPADM

## 2019-11-13 RX ORDER — SODIUM CHLORIDE 0.9 % (FLUSH) 0.9 %
10 SYRINGE (ML) INJECTION AS NEEDED
Status: CANCELLED | OUTPATIENT
Start: 2019-11-13

## 2019-11-13 RX ADMIN — Medication 10 ML: at 13:18

## 2020-01-13 ENCOUNTER — INFUSION (OUTPATIENT)
Dept: ONCOLOGY | Facility: CLINIC | Age: 80
End: 2020-01-13

## 2020-01-13 DIAGNOSIS — C50.112 MALIGNANT NEOPLASM OF CENTRAL PORTION OF LEFT FEMALE BREAST, UNSPECIFIED ESTROGEN RECEPTOR STATUS (HCC): Primary | ICD-10-CM

## 2020-01-13 RX ORDER — HEPARIN SODIUM (PORCINE) LOCK FLUSH IV SOLN 100 UNIT/ML 100 UNIT/ML
500 SOLUTION INTRAVENOUS AS NEEDED
Status: CANCELLED | OUTPATIENT
Start: 2020-01-13

## 2020-01-13 RX ORDER — SODIUM CHLORIDE 0.9 % (FLUSH) 0.9 %
10 SYRINGE (ML) INJECTION AS NEEDED
Status: DISCONTINUED | OUTPATIENT
Start: 2020-01-13 | End: 2020-01-13 | Stop reason: HOSPADM

## 2020-01-13 RX ORDER — SODIUM CHLORIDE 0.9 % (FLUSH) 0.9 %
10 SYRINGE (ML) INJECTION AS NEEDED
Status: CANCELLED | OUTPATIENT
Start: 2020-01-13

## 2020-01-13 RX ADMIN — Medication 10 ML: at 11:50

## 2020-01-30 DIAGNOSIS — R60.0 FLUID RETENTION IN LEGS: ICD-10-CM

## 2020-01-30 DIAGNOSIS — I10 ESSENTIAL HYPERTENSION: ICD-10-CM

## 2020-01-30 DIAGNOSIS — E55.9 VITAMIN D DEFICIENCY: ICD-10-CM

## 2020-01-30 DIAGNOSIS — E78.00 PURE HYPERCHOLESTEROLEMIA: ICD-10-CM

## 2020-01-30 DIAGNOSIS — E11.9 TYPE 2 DIABETES MELLITUS WITHOUT COMPLICATION, WITHOUT LONG-TERM CURRENT USE OF INSULIN (HCC): ICD-10-CM

## 2020-01-30 LAB
ALBUMIN SERPL-MCNC: 4 G/DL (ref 3.5–5.2)
ALP BLD-CCNC: 74 U/L (ref 35–104)
ALT SERPL-CCNC: 9 U/L (ref 5–33)
ANION GAP SERPL CALCULATED.3IONS-SCNC: 12 MMOL/L (ref 7–19)
AST SERPL-CCNC: 15 U/L (ref 5–32)
BACTERIA: ABNORMAL /HPF
BASOPHILS ABSOLUTE: 0.1 K/UL (ref 0–0.2)
BASOPHILS RELATIVE PERCENT: 0.6 % (ref 0–1)
BILIRUB SERPL-MCNC: 0.5 MG/DL (ref 0.2–1.2)
BILIRUBIN URINE: NEGATIVE
BLOOD, URINE: ABNORMAL
BUN BLDV-MCNC: 25 MG/DL (ref 8–23)
CALCIUM SERPL-MCNC: 9.4 MG/DL (ref 8.8–10.2)
CHLORIDE BLD-SCNC: 103 MMOL/L (ref 98–111)
CHOLESTEROL, TOTAL: 168 MG/DL (ref 160–199)
CLARITY: ABNORMAL
CO2: 26 MMOL/L (ref 22–29)
COLOR: YELLOW
CREAT SERPL-MCNC: 1 MG/DL (ref 0.5–0.9)
EOSINOPHILS ABSOLUTE: 0.3 K/UL (ref 0–0.6)
EOSINOPHILS RELATIVE PERCENT: 3.9 % (ref 0–5)
EPITHELIAL CELLS, UA: 5 /HPF (ref 0–5)
GFR NON-AFRICAN AMERICAN: 54
GLUCOSE BLD-MCNC: 132 MG/DL (ref 74–109)
GLUCOSE URINE: NEGATIVE MG/DL
HBA1C MFR BLD: 6.9 % (ref 4–6)
HCT VFR BLD CALC: 38.8 % (ref 37–47)
HDLC SERPL-MCNC: 60 MG/DL (ref 65–121)
HEMOGLOBIN: 12.2 G/DL (ref 12–16)
HYALINE CASTS: 7 /HPF (ref 0–8)
IMMATURE GRANULOCYTES #: 0 K/UL
KETONES, URINE: NEGATIVE MG/DL
LDL CHOLESTEROL CALCULATED: 86 MG/DL
LEUKOCYTE ESTERASE, URINE: ABNORMAL
LYMPHOCYTES ABSOLUTE: 3.2 K/UL (ref 1.1–4.5)
LYMPHOCYTES RELATIVE PERCENT: 37.4 % (ref 20–40)
MCH RBC QN AUTO: 30.2 PG (ref 27–31)
MCHC RBC AUTO-ENTMCNC: 31.4 G/DL (ref 33–37)
MCV RBC AUTO: 96 FL (ref 81–99)
MONOCYTES ABSOLUTE: 0.8 K/UL (ref 0–0.9)
MONOCYTES RELATIVE PERCENT: 9.7 % (ref 0–10)
NEUTROPHILS ABSOLUTE: 4.1 K/UL (ref 1.5–7.5)
NEUTROPHILS RELATIVE PERCENT: 48.2 % (ref 50–65)
NITRITE, URINE: POSITIVE
PDW BLD-RTO: 12.9 % (ref 11.5–14.5)
PH UA: 7 (ref 5–8)
PLATELET # BLD: 381 K/UL (ref 130–400)
PMV BLD AUTO: 9.7 FL (ref 9.4–12.3)
POTASSIUM SERPL-SCNC: 4.1 MMOL/L (ref 3.5–5)
PROTEIN UA: NEGATIVE MG/DL
RBC # BLD: 4.04 M/UL (ref 4.2–5.4)
RBC UA: 8 /HPF (ref 0–4)
SODIUM BLD-SCNC: 141 MMOL/L (ref 136–145)
SPECIFIC GRAVITY UA: 1.02 (ref 1–1.03)
TOTAL PROTEIN: 7.8 G/DL (ref 6.6–8.7)
TRIGL SERPL-MCNC: 111 MG/DL (ref 0–149)
TSH SERPL DL<=0.05 MIU/L-ACNC: 4.13 UIU/ML (ref 0.27–4.2)
UROBILINOGEN, URINE: 0.2 E.U./DL
VITAMIN D 25-HYDROXY: 34.1 NG/ML
WBC # BLD: 8.4 K/UL (ref 4.8–10.8)
WBC UA: 801 /HPF (ref 0–5)

## 2020-02-07 ENCOUNTER — OFFICE VISIT (OUTPATIENT)
Dept: INTERNAL MEDICINE | Age: 80
End: 2020-02-07
Payer: MEDICARE

## 2020-02-07 VITALS
HEIGHT: 67 IN | SYSTOLIC BLOOD PRESSURE: 150 MMHG | WEIGHT: 191 LBS | HEART RATE: 103 BPM | BODY MASS INDEX: 29.98 KG/M2 | DIASTOLIC BLOOD PRESSURE: 80 MMHG | OXYGEN SATURATION: 98 %

## 2020-02-07 PROCEDURE — 1123F ACP DISCUSS/DSCN MKR DOCD: CPT | Performed by: INTERNAL MEDICINE

## 2020-02-07 PROCEDURE — G8417 CALC BMI ABV UP PARAM F/U: HCPCS | Performed by: INTERNAL MEDICINE

## 2020-02-07 PROCEDURE — G8482 FLU IMMUNIZE ORDER/ADMIN: HCPCS | Performed by: INTERNAL MEDICINE

## 2020-02-07 PROCEDURE — 4040F PNEUMOC VAC/ADMIN/RCVD: CPT | Performed by: INTERNAL MEDICINE

## 2020-02-07 PROCEDURE — 1090F PRES/ABSN URINE INCON ASSESS: CPT | Performed by: INTERNAL MEDICINE

## 2020-02-07 PROCEDURE — 1036F TOBACCO NON-USER: CPT | Performed by: INTERNAL MEDICINE

## 2020-02-07 PROCEDURE — G8399 PT W/DXA RESULTS DOCUMENT: HCPCS | Performed by: INTERNAL MEDICINE

## 2020-02-07 PROCEDURE — 99214 OFFICE O/P EST MOD 30 MIN: CPT | Performed by: INTERNAL MEDICINE

## 2020-02-07 PROCEDURE — G0439 PPPS, SUBSEQ VISIT: HCPCS | Performed by: INTERNAL MEDICINE

## 2020-02-07 PROCEDURE — G8427 DOCREV CUR MEDS BY ELIG CLIN: HCPCS | Performed by: INTERNAL MEDICINE

## 2020-02-07 RX ORDER — LOSARTAN POTASSIUM 100 MG/1
TABLET ORAL
Qty: 30 TABLET | Refills: 3 | Status: SHIPPED | OUTPATIENT
Start: 2020-02-07 | End: 2020-06-29

## 2020-02-07 RX ORDER — POTASSIUM CHLORIDE 750 MG/1
10 TABLET, EXTENDED RELEASE ORAL DAILY
Qty: 30 TABLET | Refills: 5 | Status: SHIPPED | OUTPATIENT
Start: 2020-02-07 | End: 2021-02-26 | Stop reason: SDUPTHER

## 2020-02-07 RX ORDER — GLIPIZIDE 5 MG/1
TABLET ORAL
Qty: 135 TABLET | Refills: 1 | Status: SHIPPED | OUTPATIENT
Start: 2020-02-07 | End: 2020-11-16 | Stop reason: SDUPTHER

## 2020-02-07 RX ORDER — TRIAMTERENE AND HYDROCHLOROTHIAZIDE 37.5; 25 MG/1; MG/1
TABLET ORAL
Qty: 30 TABLET | Refills: 3 | Status: SHIPPED | OUTPATIENT
Start: 2020-02-07 | End: 2021-02-26 | Stop reason: SDUPTHER

## 2020-02-07 RX ORDER — OMEPRAZOLE 20 MG/1
20 CAPSULE, DELAYED RELEASE ORAL
Qty: 30 CAPSULE | Refills: 5 | Status: SHIPPED | OUTPATIENT
Start: 2020-02-07 | End: 2021-02-26 | Stop reason: SDUPTHER

## 2020-02-07 RX ORDER — CIPROFLOXACIN 250 MG/1
250 TABLET, FILM COATED ORAL 2 TIMES DAILY
Qty: 10 TABLET | Refills: 0 | Status: SHIPPED | OUTPATIENT
Start: 2020-02-07 | End: 2020-02-12

## 2020-02-07 RX ORDER — PRAVASTATIN SODIUM 40 MG
TABLET ORAL
Qty: 90 TABLET | Refills: 1 | Status: SHIPPED | OUTPATIENT
Start: 2020-02-07 | End: 2021-02-26 | Stop reason: SDUPTHER

## 2020-02-07 ASSESSMENT — ENCOUNTER SYMPTOMS
CONSTIPATION: 0
SORE THROAT: 0
EYE REDNESS: 0
BACK PAIN: 1
ABDOMINAL PAIN: 0
VOICE CHANGE: 0
VOMITING: 0
COUGH: 0
DIARRHEA: 0
SINUS PRESSURE: 0
COLOR CHANGE: 0
TROUBLE SWALLOWING: 0
WHEEZING: 0
EYE PAIN: 0
BLOOD IN STOOL: 0
NAUSEA: 0
CHEST TIGHTNESS: 0

## 2020-02-07 ASSESSMENT — PATIENT HEALTH QUESTIONNAIRE - PHQ9
SUM OF ALL RESPONSES TO PHQ QUESTIONS 1-9: 0
SUM OF ALL RESPONSES TO PHQ QUESTIONS 1-9: 0

## 2020-02-07 ASSESSMENT — LIFESTYLE VARIABLES
HOW OFTEN DURING THE LAST YEAR HAVE YOU HAD A FEELING OF GUILT OR REMORSE AFTER DRINKING: 0
AUDIT TOTAL SCORE: 1
HOW OFTEN DURING THE LAST YEAR HAVE YOU BEEN UNABLE TO REMEMBER WHAT HAPPENED THE NIGHT BEFORE BECAUSE YOU HAD BEEN DRINKING: 0
HAS A RELATIVE, FRIEND, DOCTOR, OR ANOTHER HEALTH PROFESSIONAL EXPRESSED CONCERN ABOUT YOUR DRINKING OR SUGGESTED YOU CUT DOWN: 0
HOW OFTEN DURING THE LAST YEAR HAVE YOU NEEDED AN ALCOHOLIC DRINK FIRST THING IN THE MORNING TO GET YOURSELF GOING AFTER A NIGHT OF HEAVY DRINKING: 0
HOW MANY STANDARD DRINKS CONTAINING ALCOHOL DO YOU HAVE ON A TYPICAL DAY: 0
HAVE YOU OR SOMEONE ELSE BEEN INJURED AS A RESULT OF YOUR DRINKING: 0
AUDIT-C TOTAL SCORE: 1
HOW OFTEN DO YOU HAVE A DRINK CONTAINING ALCOHOL: 1
HOW OFTEN DURING THE LAST YEAR HAVE YOU FAILED TO DO WHAT WAS NORMALLY EXPECTED FROM YOU BECAUSE OF DRINKING: 0
HOW OFTEN DO YOU HAVE SIX OR MORE DRINKS ON ONE OCCASION: 0
HOW OFTEN DURING THE LAST YEAR HAVE YOU FOUND THAT YOU WERE NOT ABLE TO STOP DRINKING ONCE YOU HAD STARTED: 0

## 2020-02-07 NOTE — PROGRESS NOTES
Diagnosis Date    Back pain     Breast cancer ([facility] [address]     Hyperlipidemia     Pneumonia     Type II or unspecified type diabetes mellitus without mention of complication, not stated as uncontrolled     diet controlled    Varicose veins     Wears glasses           Past Surgical History:   Procedure Laterality Date    BREAST BIOPSY  8-6-2008    U/S Guided Mammotome Biopsy Left Breast x 2  infiltrating ductal carcinoma, grade III, ER/AK negative    BREAST BIOPSY  8-5-2009    Stereotactic biopsy right breast  No evidence of malignancy    BREAST BIOPSY Left 8/2015    COLONOSCOPY      MASTECTOMY, PARTIAL  8-     Left partial mastectomy and left sentinel node biopsy  2.6 cm infiltrating ductal carcinoma, grade III, 0/2 nodes positive, immunohistochemistry negative for micrometastasis       Current Outpatient Medications   Medication Sig Dispense Refill    glipiZIDE (GLUCOTROL) 5 MG tablet TAKE ONE-HALF TABLET BY MOUTH ONCE DAILY 135 tablet 1    losartan (COZAAR) 100 MG tablet TAKE 1/2 (ONE-HALF) TABLET BY MOUTH TWICE DAILY 30 tablet 3    potassium chloride (KLOR-CON M) 10 MEQ extended release tablet Take 1 tablet by mouth daily 30 tablet 5    pravastatin (PRAVACHOL) 40 MG tablet TAKE ONE TABLET BY MOUTH ONCE DAILY 90 tablet 1    triamterene-hydrochlorothiazide (MAXZIDE-25) 37.5-25 MG per tablet Take 1/2 tablet in am 30 tablet 3    ciprofloxacin (CIPRO) 250 MG tablet Take 1 tablet by mouth 2 times daily for 5 days 10 tablet 0    omeprazole (PRILOSEC) 20 MG delayed release capsule Take 1 capsule by mouth every morning (before breakfast) 30 capsule 5    magnesium 30 MG tablet Take 30 mg by mouth 2 times daily      Calcium-Vitamin D 600-200 MG-UNIT TABS Take by mouth every morning (before breakfast)       aspirin 81 MG EC tablet Take 81 mg by mouth daily.  Ascorbic Acid (VITAMIN C) 500 MG tablet Take 500 mg by mouth daily. No current facility-administered medications for this visit. biopsy right breast  No evidence of malignancy    BREAST BIOPSY Left 8/2015    COLONOSCOPY      MASTECTOMY, PARTIAL  8-     Left partial mastectomy and left sentinel node biopsy  2.6 cm infiltrating ductal carcinoma, grade III, 0/2 nodes positive, immunohistochemistry negative for micrometastasis         Lab Review   Orders Only on 01/30/2020   Component Date Value    Color, UA 01/30/2020 Yellow     Clarity, UA 01/30/2020 SLCLOUDY     Glucose, Ur 01/30/2020 Negative     Bilirubin Urine 01/30/2020 Negative     Ketones, Urine 01/30/2020 Negative     Specific Gravity, UA 01/30/2020 1.025     Blood, Urine 01/30/2020 TRACE-INTACT*    pH, UA 01/30/2020 7.0     Protein, UA 01/30/2020 Negative     Urobilinogen, Urine 01/30/2020 0.2     Nitrite, Urine 01/30/2020 POSITIVE*    Leukocyte Esterase, Urine 01/30/2020 LARGE*    Vit D, 25-Hydroxy 01/30/2020 34.1     TSH 01/30/2020 4.130     Cholesterol, Total 01/30/2020 168     Triglycerides 01/30/2020 111     HDL 01/30/2020 60*    LDL Calculated 01/30/2020 86     WBC 01/30/2020 8.4     RBC 01/30/2020 4.04*    Hemoglobin 01/30/2020 12.2     Hematocrit 01/30/2020 38.8     MCV 01/30/2020 96.0     MCH 01/30/2020 30.2     MCHC 01/30/2020 31.4*    RDW 01/30/2020 12.9     Platelets 18/02/9028 381     MPV 01/30/2020 9.7     Neutrophils % 01/30/2020 48.2*    Lymphocytes % 01/30/2020 37.4     Monocytes % 01/30/2020 9.7     Eosinophils % 01/30/2020 3.9     Basophils % 01/30/2020 0.6     Neutrophils Absolute 01/30/2020 4.1     Immature Granulocytes # 01/30/2020 0.0     Lymphocytes Absolute 01/30/2020 3.2     Monocytes Absolute 01/30/2020 0.80     Eosinophils Absolute 01/30/2020 0.30     Basophils Absolute 01/30/2020 0.10     Sodium 01/30/2020 141     Potassium 01/30/2020 4.1     Chloride 01/30/2020 103     CO2 01/30/2020 26     Anion Gap 01/30/2020 12     Glucose 01/30/2020 132*    BUN 01/30/2020 25*    CREATININE 01/30/2020 1.0*    GFR Non- 01/30/2020 54*    Calcium 01/30/2020 9.4     Total Protein 01/30/2020 7.8     Alb 01/30/2020 4.0     Total Bilirubin 01/30/2020 0.5     Alkaline Phosphatase 01/30/2020 74     ALT 01/30/2020 9     AST 01/30/2020 15     Hemoglobin A1C 01/30/2020 6.9*    Bacteria, UA 01/30/2020 4+     Hyaline Casts, UA 01/30/2020 7     WBC, UA 01/30/2020 801*    RBC, UA 01/30/2020 8*    Epi Cells 01/30/2020 5          Review of Systems   Constitutional: Positive for fatigue. Negative for chills and fever. HENT: Negative for congestion, ear pain, postnasal drip, sinus pressure, sore throat, trouble swallowing and voice change. Eyes: Negative for pain, redness and visual disturbance. Respiratory: Negative for cough, chest tightness and wheezing. Cardiovascular: Negative for chest pain, palpitations and leg swelling. Gastrointestinal: Negative for abdominal pain, blood in stool, constipation, diarrhea, nausea and vomiting. Endocrine: Negative for polydipsia and polyuria. Genitourinary: Negative for dysuria, enuresis, flank pain, frequency and urgency. Musculoskeletal: Positive for arthralgias and back pain. Negative for gait problem and joint swelling. Skin: Negative for color change and rash. Neurological: Negative for dizziness, tremors, syncope, facial asymmetry, speech difficulty, weakness, numbness and headaches. Psychiatric/Behavioral: Positive for sleep disturbance. Negative for agitation, behavioral problems, confusion and suicidal ideas. The patient is nervous/anxious. Vitals:    02/07/20 0936 02/07/20 1015   BP: (!) 170/80 (!) 150/80   Site: Right Upper Arm    Position: Sitting    Cuff Size: Large Adult    Pulse: 103    SpO2: 98%    Weight: 191 lb (86.6 kg)    Height: 5' 6.5\" (1.689 m)       Wt Readings from Last 3 Encounters:   02/07/20 191 lb (86.6 kg)   09/11/19 190 lb (86.2 kg)   08/26/19 191 lb (86.6 kg)   Body mass index is 30.37 kg/m².   BP Readings from Metabolic Panel    Lipid Panel     New Prescriptions    OMEPRAZOLE (PRILOSEC) 20 MG DELAYED RELEASE CAPSULE    Take 1 capsule by mouth every morning (before breakfast)        Return in about 4 months (around 6/7/2020) for Medication check. There are no Patient Instructions on file for this visit. Orders Placed This Encounter   Procedures    DEXA BONE DENSITY 2 SITES     Standing Status:   Future     Standing Expiration Date:   2/7/2021     Order Specific Question:   Reason for exam:     Answer:   y    Hemoglobin A1C     Standing Status:   Future     Standing Expiration Date:   2/6/2021    Comprehensive Metabolic Panel     Standing Status:   Future     Standing Expiration Date:   2/6/2021    Lipid Panel     Standing Status:   Future     Standing Expiration Date:   2/6/2021     Order Specific Question:   Is Patient Fasting?/# of Hours     Answer:   y       EMR Dragon/transcriptiondisclaimer:Significant part of this  encounter note is electronic transcription/translation of spoken language to printed text. The electronic translation of spoken language may be erroneous, or at times, nonsensical wordsor phrases may be inadvertently transcribed.  Although I have reviewed the note for such errors, some may still exist.

## 2020-02-25 ENCOUNTER — TELEPHONE (OUTPATIENT)
Dept: INTERNAL MEDICINE | Age: 80
End: 2020-02-25

## 2020-02-25 RX ORDER — METOPROLOL SUCCINATE 50 MG/1
25 TABLET, EXTENDED RELEASE ORAL DAILY
Qty: 30 TABLET | Refills: 1 | Status: SHIPPED | OUTPATIENT
Start: 2020-02-25 | End: 2020-07-13

## 2020-02-25 NOTE — TELEPHONE ENCOUNTER
Metoprolol er 50 take 1/2 daily in am   Bp readigns + HR in 2 weeks   ----- Message -----   From: Karly Hernández MA   Sent: 2/21/2020   4:42 PM CST   To: Stew Hudson MD   Subject: Edit                                               The scan below was edited by Shakila Cerda on 2/21/2020 at 4:42 PM; it is attached to the following: Christopher Check [[id number]]. Pt has been notified of this. Medication has been sent in. Pt states that she can not get this filled until the 1st of the month due to her money situation. I told pt to make sure she send us BP and HR Readings 2 weeks from when she starts this new medication.

## 2020-03-09 ENCOUNTER — INFUSION (OUTPATIENT)
Dept: ONCOLOGY | Facility: CLINIC | Age: 80
End: 2020-03-09

## 2020-03-09 DIAGNOSIS — C50.112 MALIGNANT NEOPLASM OF CENTRAL PORTION OF LEFT FEMALE BREAST, UNSPECIFIED ESTROGEN RECEPTOR STATUS (HCC): Primary | ICD-10-CM

## 2020-03-09 PROCEDURE — 96523 IRRIG DRUG DELIVERY DEVICE: CPT | Performed by: INTERNAL MEDICINE

## 2020-03-09 RX ORDER — HEPARIN SODIUM (PORCINE) LOCK FLUSH IV SOLN 100 UNIT/ML 100 UNIT/ML
500 SOLUTION INTRAVENOUS AS NEEDED
Status: CANCELLED | OUTPATIENT
Start: 2020-03-09

## 2020-03-09 RX ORDER — HEPARIN SODIUM (PORCINE) LOCK FLUSH IV SOLN 100 UNIT/ML 100 UNIT/ML
500 SOLUTION INTRAVENOUS AS NEEDED
Status: DISCONTINUED | OUTPATIENT
Start: 2020-03-09 | End: 2020-03-09 | Stop reason: HOSPADM

## 2020-03-09 RX ORDER — SODIUM CHLORIDE 0.9 % (FLUSH) 0.9 %
10 SYRINGE (ML) INJECTION AS NEEDED
Status: CANCELLED | OUTPATIENT
Start: 2020-03-09

## 2020-03-09 RX ORDER — SODIUM CHLORIDE 0.9 % (FLUSH) 0.9 %
10 SYRINGE (ML) INJECTION AS NEEDED
Status: DISCONTINUED | OUTPATIENT
Start: 2020-03-09 | End: 2020-03-09 | Stop reason: HOSPADM

## 2020-03-09 RX ADMIN — Medication 10 ML: at 11:37

## 2020-03-09 RX ADMIN — HEPARIN SODIUM (PORCINE) LOCK FLUSH IV SOLN 100 UNIT/ML 500 UNITS: 100 SOLUTION at 11:37

## 2020-05-05 ENCOUNTER — INFUSION (OUTPATIENT)
Dept: ONCOLOGY | Facility: CLINIC | Age: 80
End: 2020-05-05

## 2020-05-05 DIAGNOSIS — C50.112 MALIGNANT NEOPLASM OF CENTRAL PORTION OF LEFT FEMALE BREAST, UNSPECIFIED ESTROGEN RECEPTOR STATUS (HCC): Primary | ICD-10-CM

## 2020-05-05 RX ORDER — SODIUM CHLORIDE 0.9 % (FLUSH) 0.9 %
10 SYRINGE (ML) INJECTION AS NEEDED
Status: CANCELLED | OUTPATIENT
Start: 2020-05-05

## 2020-05-05 RX ORDER — HEPARIN SODIUM (PORCINE) LOCK FLUSH IV SOLN 100 UNIT/ML 100 UNIT/ML
500 SOLUTION INTRAVENOUS AS NEEDED
Status: CANCELLED | OUTPATIENT
Start: 2020-05-05

## 2020-05-05 RX ORDER — HEPARIN SODIUM (PORCINE) LOCK FLUSH IV SOLN 100 UNIT/ML 100 UNIT/ML
500 SOLUTION INTRAVENOUS AS NEEDED
Status: DISCONTINUED | OUTPATIENT
Start: 2020-05-05 | End: 2020-05-05 | Stop reason: HOSPADM

## 2020-05-05 RX ORDER — SODIUM CHLORIDE 0.9 % (FLUSH) 0.9 %
10 SYRINGE (ML) INJECTION AS NEEDED
Status: DISCONTINUED | OUTPATIENT
Start: 2020-05-05 | End: 2020-05-05 | Stop reason: HOSPADM

## 2020-05-05 RX ADMIN — HEPARIN SODIUM (PORCINE) LOCK FLUSH IV SOLN 100 UNIT/ML 500 UNITS: 100 SOLUTION at 11:22

## 2020-05-05 RX ADMIN — Medication 10 ML: at 11:22

## 2020-06-16 ENCOUNTER — INFUSION (OUTPATIENT)
Dept: ONCOLOGY | Facility: CLINIC | Age: 80
End: 2020-06-16

## 2020-06-16 DIAGNOSIS — C50.112 MALIGNANT NEOPLASM OF CENTRAL PORTION OF LEFT FEMALE BREAST, UNSPECIFIED ESTROGEN RECEPTOR STATUS (HCC): Primary | ICD-10-CM

## 2020-06-16 RX ORDER — HEPARIN SODIUM (PORCINE) LOCK FLUSH IV SOLN 100 UNIT/ML 100 UNIT/ML
500 SOLUTION INTRAVENOUS AS NEEDED
Status: CANCELLED | OUTPATIENT
Start: 2020-06-16

## 2020-06-16 RX ORDER — HEPARIN SODIUM (PORCINE) LOCK FLUSH IV SOLN 100 UNIT/ML 100 UNIT/ML
500 SOLUTION INTRAVENOUS AS NEEDED
Status: DISCONTINUED | OUTPATIENT
Start: 2020-06-16 | End: 2020-06-16 | Stop reason: HOSPADM

## 2020-06-16 RX ORDER — SODIUM CHLORIDE 0.9 % (FLUSH) 0.9 %
10 SYRINGE (ML) INJECTION AS NEEDED
Status: CANCELLED | OUTPATIENT
Start: 2020-06-16

## 2020-06-16 RX ORDER — SODIUM CHLORIDE 0.9 % (FLUSH) 0.9 %
10 SYRINGE (ML) INJECTION AS NEEDED
Status: DISCONTINUED | OUTPATIENT
Start: 2020-06-16 | End: 2020-06-16 | Stop reason: HOSPADM

## 2020-06-16 RX ADMIN — HEPARIN SODIUM (PORCINE) LOCK FLUSH IV SOLN 100 UNIT/ML 500 UNITS: 100 SOLUTION at 11:19

## 2020-06-16 RX ADMIN — Medication 10 ML: at 11:18

## 2020-06-29 RX ORDER — LOSARTAN POTASSIUM 100 MG/1
TABLET ORAL
Qty: 30 TABLET | Refills: 5 | Status: SHIPPED | OUTPATIENT
Start: 2020-06-29 | End: 2021-01-13

## 2020-07-13 RX ORDER — METOPROLOL SUCCINATE 50 MG/1
TABLET, EXTENDED RELEASE ORAL
Qty: 30 TABLET | Refills: 0 | Status: SHIPPED | OUTPATIENT
Start: 2020-07-13 | End: 2021-02-26 | Stop reason: SDUPTHER

## 2020-07-13 NOTE — TELEPHONE ENCOUNTER
Kulwinder Lezama called requesting a refill of the below medication which has been pended for you:     Requested Prescriptions     Pending Prescriptions Disp Refills    metoprolol succinate (TOPROL XL) 50 MG extended release tablet [Pharmacy Med Name: Metoprolol Succinate ER 50 MG Oral Tablet Extended Release 24 Hour] 30 tablet 0     Sig: Take 1/2 (one-half) tablet by mouth once daily       Last Appointment Date: 2/7/2020  Next Appointment Date: Visit date not found    Allergies   Allergen Reactions    Tape Olvin Young]      Latex Tape

## 2020-08-11 ENCOUNTER — CLINICAL SUPPORT (OUTPATIENT)
Dept: ONCOLOGY | Facility: CLINIC | Age: 80
End: 2020-08-11

## 2020-08-11 DIAGNOSIS — C50.112 MALIGNANT NEOPLASM OF CENTRAL PORTION OF LEFT FEMALE BREAST, UNSPECIFIED ESTROGEN RECEPTOR STATUS (HCC): Primary | ICD-10-CM

## 2020-08-11 DIAGNOSIS — Z45.2 ENCOUNTER FOR CARE RELATED TO VASCULAR ACCESS PORT: ICD-10-CM

## 2020-08-11 PROCEDURE — 96523 IRRIG DRUG DELIVERY DEVICE: CPT | Performed by: NURSE PRACTITIONER

## 2020-08-11 RX ORDER — SODIUM CHLORIDE 0.9 % (FLUSH) 0.9 %
10 SYRINGE (ML) INJECTION AS NEEDED
Status: DISCONTINUED | OUTPATIENT
Start: 2020-08-11 | End: 2020-08-11 | Stop reason: HOSPADM

## 2020-08-11 RX ORDER — HEPARIN SODIUM (PORCINE) LOCK FLUSH IV SOLN 100 UNIT/ML 100 UNIT/ML
500 SOLUTION INTRAVENOUS AS NEEDED
Status: CANCELLED | OUTPATIENT
Start: 2020-08-11

## 2020-08-11 RX ORDER — SODIUM CHLORIDE 0.9 % (FLUSH) 0.9 %
10 SYRINGE (ML) INJECTION AS NEEDED
Status: CANCELLED | OUTPATIENT
Start: 2020-08-11

## 2020-08-11 RX ORDER — HEPARIN SODIUM (PORCINE) LOCK FLUSH IV SOLN 100 UNIT/ML 100 UNIT/ML
500 SOLUTION INTRAVENOUS AS NEEDED
Status: DISCONTINUED | OUTPATIENT
Start: 2020-08-11 | End: 2020-08-11 | Stop reason: HOSPADM

## 2020-08-11 RX ADMIN — Medication 10 ML: at 11:49

## 2020-08-11 RX ADMIN — HEPARIN SODIUM (PORCINE) LOCK FLUSH IV SOLN 100 UNIT/ML 500 UNITS: 100 SOLUTION at 11:50

## 2020-08-25 ENCOUNTER — TELEPHONE (OUTPATIENT)
Dept: WOMENS IMAGING | Age: 80
End: 2020-08-25

## 2020-08-25 ENCOUNTER — OFFICE VISIT (OUTPATIENT)
Dept: SURGERY | Age: 80
End: 2020-08-25
Payer: MEDICARE

## 2020-08-25 ENCOUNTER — HOSPITAL ENCOUNTER (OUTPATIENT)
Dept: WOMENS IMAGING | Age: 80
Discharge: HOME | End: 2020-08-25
Payer: MEDICARE

## 2020-08-25 VITALS
HEART RATE: 63 BPM | BODY MASS INDEX: 31.02 KG/M2 | SYSTOLIC BLOOD PRESSURE: 149 MMHG | TEMPERATURE: 96.9 F | DIASTOLIC BLOOD PRESSURE: 84 MMHG | WEIGHT: 193 LBS | HEIGHT: 66 IN

## 2020-08-25 PROCEDURE — G8427 DOCREV CUR MEDS BY ELIG CLIN: HCPCS | Performed by: PHYSICIAN ASSISTANT

## 2020-08-25 PROCEDURE — 77080 DXA BONE DENSITY AXIAL: CPT

## 2020-08-25 PROCEDURE — 4040F PNEUMOC VAC/ADMIN/RCVD: CPT | Performed by: PHYSICIAN ASSISTANT

## 2020-08-25 PROCEDURE — 1036F TOBACCO NON-USER: CPT | Performed by: PHYSICIAN ASSISTANT

## 2020-08-25 PROCEDURE — 1123F ACP DISCUSS/DSCN MKR DOCD: CPT | Performed by: PHYSICIAN ASSISTANT

## 2020-08-25 PROCEDURE — 1090F PRES/ABSN URINE INCON ASSESS: CPT | Performed by: PHYSICIAN ASSISTANT

## 2020-08-25 PROCEDURE — 77063 BREAST TOMOSYNTHESIS BI: CPT

## 2020-08-25 PROCEDURE — G8417 CALC BMI ABV UP PARAM F/U: HCPCS | Performed by: PHYSICIAN ASSISTANT

## 2020-08-25 PROCEDURE — 99213 OFFICE O/P EST LOW 20 MIN: CPT | Performed by: PHYSICIAN ASSISTANT

## 2020-08-25 PROCEDURE — G8399 PT W/DXA RESULTS DOCUMENT: HCPCS | Performed by: PHYSICIAN ASSISTANT

## 2020-08-25 NOTE — PROGRESS NOTES
left breast.     BI-RADS CATEGORY 0: NEED ADDITIONAL EVALUATION      History: Right breast asymmetry    Right diagnostic mammogram: Spot compression view of the asymmetry in    the inferior right breast performed in the MLO orientation with a true    lateral view the right breast. Computer-aided detection and lisbeth    synthesis utilized with today's exam.    COMPARISON: 8/25/2020 and 8/20/2019    FINDINGS: The rounded asymmetry in the inferior right breast on the    recent screening MLO study is not seen on the true lateral view and    resolves with spot compression. Initial finding consistent with    summation artifact. Return to annual screening mammography    recommended.         Impression    Impression:    1. Summation artifact in the inferior right breast on the recent    screening 8/25/2020 study. Return to annual screening mammography    recommended if no new clinical concerns developed in the interim. 2. BI-RADS CATEGORY 2: BENIGN FINDINGS. BREAST EXAM:  The left lumpectomy incision is well healed. There are appropriate post operative and post radiation changes on the left. There is some thickening in the left breast in the area of her lumpectomy. The right breast exam is normal.      IMPRESSION:    Personal history of left breast cancer  Abnormal right mammogram    PLAN:  I had ordered a left US for the patient and it was scheduled, but when she came in for additional views of the right breast they assumed the left breast US order was an error and did not do it. They did not call me to discuss this. It is difficult to reach the patient because she stays with her son most of the time. I reviewed her left mammogram images again. Her left breast has been stable mammographically for the last several years. I will hold off bringing her back in for now, but she was instructed to call me with any changes or concerns.       15 minutes spent, which includes face to face with patient, record review, evaluation, planning, and education. I spent over 50% of this visit counseling patient.

## 2020-08-26 ENCOUNTER — HOSPITAL ENCOUNTER (OUTPATIENT)
Dept: WOMENS IMAGING | Age: 80
Discharge: HOME | End: 2020-08-26
Payer: MEDICARE

## 2020-08-26 PROCEDURE — G0279 TOMOSYNTHESIS, MAMMO: HCPCS

## 2020-08-31 DIAGNOSIS — C50.411 MALIGNANT NEOPLASM OF UPPER-OUTER QUADRANT OF RIGHT BREAST IN FEMALE, ESTROGEN RECEPTOR POSITIVE (HCC): Primary | ICD-10-CM

## 2020-08-31 DIAGNOSIS — Z17.0 MALIGNANT NEOPLASM OF UPPER-OUTER QUADRANT OF RIGHT BREAST IN FEMALE, ESTROGEN RECEPTOR POSITIVE (HCC): Primary | ICD-10-CM

## 2020-09-01 ENCOUNTER — OFFICE VISIT (OUTPATIENT)
Dept: ONCOLOGY | Facility: CLINIC | Age: 80
End: 2020-09-01

## 2020-09-01 ENCOUNTER — LAB (OUTPATIENT)
Dept: LAB | Facility: HOSPITAL | Age: 80
End: 2020-09-01

## 2020-09-01 VITALS
HEART RATE: 94 BPM | RESPIRATION RATE: 16 BRPM | SYSTOLIC BLOOD PRESSURE: 142 MMHG | OXYGEN SATURATION: 98 % | TEMPERATURE: 97.8 F | DIASTOLIC BLOOD PRESSURE: 88 MMHG | BODY MASS INDEX: 31.39 KG/M2 | WEIGHT: 195.3 LBS | HEIGHT: 66 IN

## 2020-09-01 DIAGNOSIS — Z17.0 MALIGNANT NEOPLASM OF UPPER-OUTER QUADRANT OF RIGHT BREAST IN FEMALE, ESTROGEN RECEPTOR POSITIVE (HCC): Primary | ICD-10-CM

## 2020-09-01 DIAGNOSIS — C50.112 MALIGNANT NEOPLASM OF CENTRAL PORTION OF LEFT BREAST IN FEMALE, ESTROGEN RECEPTOR NEGATIVE (HCC): ICD-10-CM

## 2020-09-01 DIAGNOSIS — C50.411 MALIGNANT NEOPLASM OF UPPER-OUTER QUADRANT OF RIGHT BREAST IN FEMALE, ESTROGEN RECEPTOR POSITIVE (HCC): ICD-10-CM

## 2020-09-01 DIAGNOSIS — Z17.0 MALIGNANT NEOPLASM OF UPPER-OUTER QUADRANT OF RIGHT BREAST IN FEMALE, ESTROGEN RECEPTOR POSITIVE (HCC): ICD-10-CM

## 2020-09-01 DIAGNOSIS — Z17.1 MALIGNANT NEOPLASM OF CENTRAL PORTION OF LEFT BREAST IN FEMALE, ESTROGEN RECEPTOR NEGATIVE (HCC): ICD-10-CM

## 2020-09-01 DIAGNOSIS — C50.411 MALIGNANT NEOPLASM OF UPPER-OUTER QUADRANT OF RIGHT BREAST IN FEMALE, ESTROGEN RECEPTOR POSITIVE (HCC): Primary | ICD-10-CM

## 2020-09-01 LAB
ALBUMIN SERPL-MCNC: 3.9 G/DL (ref 3.5–5.2)
ALBUMIN/GLOB SERPL: 1 G/DL
ALP SERPL-CCNC: 80 U/L (ref 39–117)
ALT SERPL W P-5'-P-CCNC: 11 U/L (ref 1–33)
ANION GAP SERPL CALCULATED.3IONS-SCNC: 11 MMOL/L (ref 5–15)
AST SERPL-CCNC: 16 U/L (ref 1–32)
BASOPHILS # BLD AUTO: 0.04 10*3/MM3 (ref 0–0.2)
BASOPHILS NFR BLD AUTO: 0.5 % (ref 0–1.5)
BILIRUB SERPL-MCNC: 0.2 MG/DL (ref 0–1.2)
BUN SERPL-MCNC: 27 MG/DL (ref 8–23)
BUN/CREAT SERPL: 23.3 (ref 7–25)
CALCIUM SPEC-SCNC: 9.1 MG/DL (ref 8.6–10.5)
CEA SERPL-MCNC: 2.94 NG/ML
CHLORIDE SERPL-SCNC: 100 MMOL/L (ref 98–107)
CO2 SERPL-SCNC: 27 MMOL/L (ref 22–29)
CREAT SERPL-MCNC: 1.16 MG/DL (ref 0.57–1)
DEPRECATED RDW RBC AUTO: 42.8 FL (ref 37–54)
EOSINOPHIL # BLD AUTO: 0.25 10*3/MM3 (ref 0–0.4)
EOSINOPHIL NFR BLD AUTO: 2.9 % (ref 0.3–6.2)
ERYTHROCYTE [DISTWIDTH] IN BLOOD BY AUTOMATED COUNT: 12.8 % (ref 12.3–15.4)
GFR SERPL CREATININE-BSD FRML MDRD: 45 ML/MIN/1.73
GLOBULIN UR ELPH-MCNC: 3.8 GM/DL
GLUCOSE SERPL-MCNC: 220 MG/DL (ref 65–99)
HCT VFR BLD AUTO: 34 % (ref 34–46.6)
HGB BLD-MCNC: 11.6 G/DL (ref 12–15.9)
IMM GRANULOCYTES # BLD AUTO: 0.03 10*3/MM3 (ref 0–0.05)
IMM GRANULOCYTES NFR BLD AUTO: 0.3 % (ref 0–0.5)
LYMPHOCYTES # BLD AUTO: 3.3 10*3/MM3 (ref 0.7–3.1)
LYMPHOCYTES NFR BLD AUTO: 37.8 % (ref 19.6–45.3)
MCH RBC QN AUTO: 31.4 PG (ref 26.6–33)
MCHC RBC AUTO-ENTMCNC: 34.1 G/DL (ref 31.5–35.7)
MCV RBC AUTO: 91.9 FL (ref 79–97)
MONOCYTES # BLD AUTO: 0.9 10*3/MM3 (ref 0.1–0.9)
MONOCYTES NFR BLD AUTO: 10.3 % (ref 5–12)
NEUTROPHILS NFR BLD AUTO: 4.22 10*3/MM3 (ref 1.7–7)
NEUTROPHILS NFR BLD AUTO: 48.2 % (ref 42.7–76)
NRBC BLD AUTO-RTO: 0 /100 WBC (ref 0–0.2)
PLATELET # BLD AUTO: 347 10*3/MM3 (ref 140–450)
PMV BLD AUTO: 9.3 FL (ref 6–12)
POTASSIUM SERPL-SCNC: 4.1 MMOL/L (ref 3.5–5.2)
PROT SERPL-MCNC: 7.7 G/DL (ref 6–8.5)
RBC # BLD AUTO: 3.7 10*6/MM3 (ref 3.77–5.28)
SODIUM SERPL-SCNC: 138 MMOL/L (ref 136–145)
WBC # BLD AUTO: 8.74 10*3/MM3 (ref 3.4–10.8)

## 2020-09-01 PROCEDURE — 82378 CARCINOEMBRYONIC ANTIGEN: CPT | Performed by: NURSE PRACTITIONER

## 2020-09-01 PROCEDURE — 80053 COMPREHEN METABOLIC PANEL: CPT

## 2020-09-01 PROCEDURE — 36415 COLL VENOUS BLD VENIPUNCTURE: CPT

## 2020-09-01 PROCEDURE — 99214 OFFICE O/P EST MOD 30 MIN: CPT | Performed by: NURSE PRACTITIONER

## 2020-09-01 PROCEDURE — 86300 IMMUNOASSAY TUMOR CA 15-3: CPT | Performed by: NURSE PRACTITIONER

## 2020-09-01 PROCEDURE — 85025 COMPLETE CBC W/AUTO DIFF WBC: CPT

## 2020-09-01 RX ORDER — METOPROLOL SUCCINATE 50 MG/1
25 TABLET, EXTENDED RELEASE ORAL DAILY
COMMUNITY
Start: 2020-07-13

## 2020-09-01 RX ORDER — TRIAMTERENE AND HYDROCHLOROTHIAZIDE 37.5; 25 MG/1; MG/1
0.5 TABLET ORAL DAILY
COMMUNITY
Start: 2020-02-07

## 2020-09-01 RX ORDER — OMEPRAZOLE 20 MG/1
20 CAPSULE, DELAYED RELEASE ORAL DAILY
COMMUNITY
Start: 2020-02-07

## 2020-09-01 NOTE — PROGRESS NOTES
[facility]  HEMATOLOGY & ONCOLOGY    [facility] ONC [facility] HEMATOLOGY AND ONCOLOGY  [address]  [place] [address] [phone]    Patient Name: Lorena Valdes  Encounter Date: 09/01/2020  YOB: 1940  Patient Number: [id number]    Chief Complaint   Patient presents with   • Breast Cancer     Here for f/u       REASON FOR VISIT:  Ms. Valdes is a 80 yo female with a history of breast cancer that was diagonsed in 2008.  She had a Stage IIA left breast cancer that was hormone receptor negative and Her 2 positive by FISH.  She underwent lumpectomy then adjuvant Adriamycin Cytoxan.  She only had one dose of Adriamycin and developed cardiomyopathy and thereofre the adriamycin was discontinued.  She had then weekly Taxol with all chemotherapy being completed in March 2009.  She then had adjuvant radiation therapy.     She continues to do very well. She has had no recurrence of her disease.  She continues to have mammograms with the last one being on August 25, 2020 and there was an abnormality in the right breast therefore she had a spot compression on the 26th that was negative.  Her last bone density was also on 8/25/2020 that showed osteoporosis which she states is being followed by her pcp.      Her labs today show a WBC of 8.74, Hgb of 11.6 and platelets of 347,000. Her GFR is at 45ml/min and gluoces 220 nonfasting.     She has no complaints today and feels well other than hip pain bilaterally at times.       Oncology/Hematology History    Tp: [id number] Mp: M0 Size: 2.6 cm Histopathologic Grade: G3? Histopathologic Type: DuctalInvasive  (NOS), left central? Stage  Grouping: IIA  08/06/2008: Core biopsy: at left breast mass, 12 o'clock: Infiltrating ductal carcinoma, gr 3, with foci of tumor necrosis? DNA index 1.78  (aneuploid), ER/[place] 0%, her2/kofi 2+ (FISH ), p53 0%, Ki67  59%.  08/28/2008: MastectomyL  partial,  USguided  needle localization, with SNB: IDC, gr 3, tumor measures 2.6 cm in greatest dimension,  with necrosis seen, extending to original deep margin....additional deep margin adds 1 cm to final margin of excision, residual fibrocystic  mastopathy? 2 left axillary sentinel nodes: 0/2+ve  Dose dense Adriamycin/cyclophosphamide, followed by weekly Taxol administered between September 2008 and March 2009 .  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  Followed by Radiation therapy        Malignant neoplasm of central portion of left female breast (CMS/HCC)    11/9/2016 Initial Diagnosis     Malignant neoplasm of central portion of left female breast      1/13/2020 - 8/11/2020 Chemotherapy     OP CENTRAL VENOUS ACCESS DEVICE ACCESS, CARE, AND MAINTENANCE (CVAD)           PAST MEDICAL HISTORY:  ALLERGIES:  Allergies   Allergen Reactions   • Adhesive Tape Unknown (See Comments)     Latex Tape       CURRENT MEDICATIONS:  Outpatient Encounter Medications as of 9/1/2020   Medication Sig Dispense Refill   • aspirin 81 MG EC tablet Take 81 mg by mouth daily.       • Calcium Carb-Cholecalciferol (CALCIUM 600+D3) 600-200 MG-UNIT tablet Take 1 tablet by mouth 2 (Two) Times a Day.     • Cholecalciferol (VITAMIN D3) 400 UNITS capsule Take 1 capsule by mouth Daily.     • glipiZIDE (GLUCOTROL) 5 MG tablet Take 5 mg by mouth 2 (Two) Times a Day Before Meals.     • ibuprofen (ADVIL,MOTRIN) 400 MG tablet Take 400 mg by mouth Every 6 (Six) Hours As Needed for mild pain (1-3).     • losartan (COZAAR) 100 MG tablet TAKE 1 2 (ONE HALF) TABLET BY MOUTH TWICE DAILY  1   • magnesium 30 MG tablet Take 30 mg by mouth.     • metoprolol succinate XL (TOPROL-XL) 50 MG 24 hr tablet Take 1/2 (one-half) tablet by mouth once daily     • omeprazole (priLOSEC) 20 MG capsule Take 20 mg by mouth.     • potassium chloride (K-DUR,KLOR-CON) 10 MEQ CR tablet Take 10 mEq by mouth.     • pravastatin (PRAVACHOL) 40 MG  tablet Take 40 mg by mouth Daily.     • triamterene-hydrochlorothiazide (MAXZIDE-25) 37.5-25 MG per tablet Take 1/2 tablet in am     • [DISCONTINUED] Omega-3 Fatty Acids (FISH OIL) 1000 MG capsule capsule Take 1,000 mg by mouth Daily.     • [DISCONTINUED] vitamin C (ASCORBIC ACID) 500 MG tablet Take 500 mg by mouth daily.         No facility-administered encounter medications on file as of 9/1/2020.      ADULT ILLNESSES:  Patient Active Problem List   Diagnosis Code   • Malignant neoplasm of central portion of left female breast (CMS/HCC) C50.112   • Malignant neoplasm of upper-outer quadrant of right female breast (CMS/HCC) C50.411   • Osteopenia M85.80   • Encounter for care related to vascular access port Z45.2     SURGERIES:  Past Surgical History:   Procedure Laterality Date   • BREAST LUMPECTOMY  2008   • COLONOSCOPY  2010   • MAMMO BILATERAL  07/17/2013    Dr. Sabillon   • PAP SMEAR  2010     HEALTH MAINTENANCE ITEMS:    Last Completed Mammogram       Status Date      MAMMOGRAM Done 8/25/2020 Ext Proc: [facility] SCREENING DIGITAL BREAST TOMOSYNTHESIS BI     5 mm round focal asymmetry within the inferior right breast ; SPOT COMPRESSION VIEW NEGATIVE 8/26/2020.        FAMILY HISTORY:  Family History   Problem Relation Age of Onset   • Cancer Mother    • Heart disease Father    • No Known Problems Daughter    • No Known Problems Son    • Hypertension Daughter    • Hypertension Daughter    • Other Brother         polioi   • Cancer Paternal Aunt    • No Known Problems Maternal Grandmother    • No Known Problems Maternal Grandfather    • No Known Problems Paternal Grandmother    • No Known Problems Paternal Grandfather    • Drug abuse Brother      SOCIAL HISTORY:  Social History     Socioeconomic History   • Marital status: Single     Spouse name: Not on file   • Number of children: Not on file   • Years of education: Not on file   • Highest education level: Not on file   Tobacco Use   • Smoking status: Former Smoker      "Types: Cigarettes     Last attempt to quit: 1974     Years since quittin.6   • Smokeless tobacco: Never Used   Substance and Sexual Activity   • Alcohol use: Yes     Alcohol/week: 1.0 standard drinks     Types: 1 Cans of beer per week     Frequency: Never     Comment: occasionally   • Drug use: No   • Sexual activity: Defer       REVIEW OF SYSTEMS:  Review of Systems   Constitutional: Negative for activity change, appetite change, chills, diaphoresis, fatigue, fever and unexpected weight loss.   HENT: Negative for ear pain, nosebleeds, sinus pressure, sore throat and voice change.    Eyes: Negative for blurred vision, double vision, pain and visual disturbance.   Respiratory: Negative for cough and shortness of breath.    Cardiovascular: Negative for chest pain, palpitations and leg swelling.   Gastrointestinal: Negative for abdominal pain, anal bleeding, blood in stool, constipation, diarrhea, nausea and vomiting.   Endocrine: Negative for heat intolerance, polydipsia and polyuria.   Genitourinary: Negative for dysuria, frequency, hematuria, urgency and urinary incontinence.   Musculoskeletal: Positive for arthralgias and myalgias.   Skin: Negative for rash and skin lesions.   Neurological: Negative for dizziness, tremors, seizures, syncope, speech difficulty, weakness and headache.   Hematological: Negative for adenopathy. Does not bruise/bleed easily.   Psychiatric/Behavioral: Negative for dysphoric mood, sleep disturbance, suicidal ideas and depressed mood.       /88   Pulse 94   Temp 97.8 °F (36.6 °C) (Temporal)   Resp 16   Ht 167.6 cm (66\")   Wt 88.6 kg (195 lb 4.8 oz)   SpO2 98%   Breastfeeding No   BMI 31.52 kg/m²  Body surface area is 1.98 meters squared.  Pain Score    20 1056   PainSc: 0-No pain       Physical Exam:  Physical Exam   Constitutional: She is oriented to person, place, and time. She appears well-developed and well-nourished.   HENT:   Head: Atraumatic.   Mouth/Throat: " Oropharynx is clear and moist.   Eyes: Pupils are equal, round, and reactive to light. EOM are normal. No scleral icterus.   Neck: Trachea normal. Neck supple. No JVD present.   Cardiovascular: Normal rate, regular rhythm and normal pulses. Exam reveals no gallop and no friction rub.   No murmur heard.  Pulmonary/Chest: Effort normal and breath sounds normal. She has no wheezes. She has no rhonchi. She has no rales.   Abdominal: Soft. Normal appearance. There is no tenderness. There is no rebound and no guarding.   Lymphadenopathy:     She has no cervical adenopathy.     She has no axillary adenopathy.        Right: No supraclavicular adenopathy present.        Left: No supraclavicular adenopathy present.   Neurological: She is alert and oriented to person, place, and time. She has normal strength. No sensory deficit.   Psychiatric: She has a normal mood and affect. Judgment normal.       Lorena Valdes reports a pain score of 0.      Patient's Body mass index is 31.52 kg/m². BMI is above normal parameters. Recommendations include: defer to pcp.      LABS    Lab Results - Last 18 Months   Lab Units 01/30/20  1048 09/04/19  1111   HEMOGLOBIN g/dL 12.2 12.2   HEMATOCRIT % 38.8 35.7   MCV fL 96.0 91.5   WBC K/uL 8.4 7.17   RDW % 12.9 13.2   MPV fL 9.7 9.0   PLATELETS K/uL 381 377   IMM GRAN % %  --  0.1   NEUTROS ABS K/uL 4.1 3.69   LYMPHS ABS K/uL 3.2 2.40   MONOS ABS K/uL 0.80 0.96*   EOS ABS K/uL 0.30 0.08   BASOS ABS K/uL 0.10 0.03   IMMATURE GRANS (ABS) K/uL 0.0 0.01   NRBC /100 WBC  --  0.0       Lab Results - Last 18 Months   Lab Units 01/30/20  1048 09/04/19  1111 07/05/19  0940 04/25/19  1246   GLUCOSE mg/dL 132* 175* 138* 96   SODIUM mmol/L 141 141 141 141   POTASSIUM mmol/L 4.1 3.8 4.1 3.4*   TOTAL CO2 mmol/L 26  --  25 27   CO2 mmol/L  --  28.0  --   --    CHLORIDE mmol/L 103 106 101 100   ANION GAP mmol/L 12 7.0 15 14   CREATININE mg/dL 1* 0.95 0.9 0.8   BUN mg/dL 25* 19 22 22   BUN / CREAT RATIO   --   20.0  --   --    CALCIUM mg/dL 9.4 9.2 9.7 9.5   EGFR IF NONAFRICN AM  54* 57* >60 >60   ALK PHOS U/L 74 74 78  --    TOTAL PROTEIN g/dL 7.8 7.9 8.0  --    ALT (SGPT) U/L 9 21 13  --    AST (SGOT) U/L 15 34 20  --    BILIRUBIN mg/dL 0.5 0.4 0.5  --    ALBUMIN g/dL 4 4.00 4.1  --    GLOBULIN gm/dL  --  3.9  --   --      Lab Results - Last 18 Months   Lab Units 01/30/20  1048 07/05/19  0940   TSH uIU/mL 4.130 2.920     ASSESSMENT  1. Left Breast Cancer diagnosed in 2008  · Initial stage: AJCC TNM [id number], Stage IIA, ER - [place] -  , Her 2 kofi 2+, Fish positive.    · Treatment : Left Lumpectomy, adjuvant chemotherpay with Adriamycin Cytoxan x 1 cycle due to cardiomyopathy, Taxol weekly for 12 weeks and then adjuvant radiation therapy.   · Disease status: No evidence of recurrence.     2. Osteoporosis ,last BMD 8/2020, She continues on calcium. She will discuss plan with pcpc  3.  Type 2 DM: on oral medications per pcp  4.   GERD - on prilosec and controlled  5.  HTN - on Maxizide and controlled   6.  Anemia likely secondary to CKD stage III, substrates have been normal.     PLAN  1.  Counseled pt regarding lab work as stated above. Low GFR, anemia otherwise normal.   2 . Reviewed mammmogram from August 2020 with patient as well as BMD  3.  Encouraged continuation of yearly mammograms and routine screendings.   4.  Continue to follow with pcp and other specialists  5.  Return in 1 years for follow up and preoffice cbc, cmp and cea, ca 27-29.    I spent 32 total minutes, face-to-face, caring for Lorena today.  Greater than 50% of this time involved counseling and/or coordination of care as documented within this note regarding the patient's illness(es), pros and cons of various treatment options, instructions and/or risk reduction.    Liv Shay, ANNMARIE   09/01/2020  8:25 PM

## 2020-09-02 LAB — CANCER AG27-29 SERPL-ACNC: 39.1 U/ML (ref 0–38.6)

## 2020-09-10 ENCOUNTER — TELEPHONE (OUTPATIENT)
Dept: INTERNAL MEDICINE | Age: 80
End: 2020-09-10

## 2020-09-10 NOTE — TELEPHONE ENCOUNTER
Tried to contact pt to see about getting her June appt that she NO Showed re-scheduled. Pt's phone just rings with no  and no voicemail.

## 2020-10-07 ENCOUNTER — OFFICE VISIT (OUTPATIENT)
Dept: GASTROENTEROLOGY | Facility: CLINIC | Age: 80
End: 2020-10-07

## 2020-10-07 VITALS
DIASTOLIC BLOOD PRESSURE: 76 MMHG | HEART RATE: 78 BPM | BODY MASS INDEX: 31.18 KG/M2 | SYSTOLIC BLOOD PRESSURE: 138 MMHG | TEMPERATURE: 97.1 F | HEIGHT: 66 IN | WEIGHT: 194 LBS | OXYGEN SATURATION: 99 %

## 2020-10-07 DIAGNOSIS — Z12.11 COLON CANCER SCREENING: Primary | ICD-10-CM

## 2020-10-07 PROCEDURE — S0260 H&P FOR SURGERY: HCPCS | Performed by: NURSE PRACTITIONER

## 2020-10-07 RX ORDER — SODIUM, POTASSIUM,MAG SULFATES 17.5-3.13G
1 SOLUTION, RECONSTITUTED, ORAL ORAL EVERY 12 HOURS
Qty: 2 BOTTLE | Refills: 0 | Status: ON HOLD | OUTPATIENT
Start: 2020-10-07 | End: 2020-11-11

## 2020-10-07 NOTE — PROGRESS NOTES
Chief Complaint   Patient presents with   • Colon Cancer Screening     Pt presents today for colon recall-last colon was 9/16/2010     Subjective   HPI  Lorena Valdes is a 79 y.o. female who presents as a referral for preventative maintenance. She has no complaints of nausea or vomiting. No change in bowels. No wt loss. No BRBPR. No melena. There is no family hx for colon cancer. No abdominal pain. There was no Cologuard screening this year.  The patient's last colonoscopy was performed on 9/16/2010 with findings of diverticulosis only.  Past Medical History:   Diagnosis Date   • Bladder disorder    • Diverticulosis    • Fibrocystic disease of breast    • Heart disease    • History of bone density study 2011   • Hyperglycemia    • Hyperlipidemia    • Hypotension    • Left ventricular diastolic dysfunction    • Malignant neoplasm of central portion of left female breast (CMS/HCC) 11/9/2016   • Osteopenia 8/29/2017   • Uterine prolapse      Past Surgical History:   Procedure Laterality Date   • BREAST LUMPECTOMY  2008   • CATARACT EXTRACTION Right    • COLONOSCOPY  09/16/2010    Diverticulosis; Repeat 10 years   • MAMMO BILATERAL  07/17/2013    Dr. Sabillon   • PAP SMEAR  2010       Current Outpatient Medications:   •  aspirin 81 MG EC tablet, Take 81 mg by mouth daily.  , Disp: , Rfl:   •  Calcium Carb-Cholecalciferol (CALCIUM 600+D3) 600-200 MG-UNIT tablet, Take 1 tablet by mouth 2 (Two) Times a Day., Disp: , Rfl:   •  Cholecalciferol (VITAMIN D3) 400 UNITS capsule, Take 1 capsule by mouth Daily., Disp: , Rfl:   •  glipiZIDE (GLUCOTROL) 5 MG tablet, Take 5 mg by mouth Daily., Disp: , Rfl:   •  ibuprofen (ADVIL,MOTRIN) 400 MG tablet, Take 400 mg by mouth Every 6 (Six) Hours As Needed for mild pain (1-3)., Disp: , Rfl:   •  losartan (COZAAR) 100 MG tablet, Take 100 mg by mouth Daily., Disp: , Rfl: 1  •  magnesium 30 MG tablet, Take 30 mg by mouth., Disp: , Rfl:   •  metoprolol succinate XL (TOPROL-XL) 50 MG 24 hr  tablet, Take 25 mg by mouth Daily., Disp: , Rfl:   •  omeprazole (priLOSEC) 20 MG capsule, Take 20 mg by mouth Daily., Disp: , Rfl:   •  potassium chloride (K-DUR,KLOR-CON) 10 MEQ CR tablet, Take 10 mEq by mouth Daily., Disp: , Rfl:   •  pravastatin (PRAVACHOL) 40 MG tablet, Take 40 mg by mouth Daily., Disp: , Rfl:   •  triamterene-hydrochlorothiazide (MAXZIDE-25) 37.5-25 MG per tablet, Take 0.5 tablets by mouth Daily., Disp: , Rfl:   •  sodium-potassium-magnesium sulfates (Suprep Bowel Prep Kit) 17.5-3.13-1.6 GM/177ML solution oral solution, Take 1 bottle by mouth Every 12 (Twelve) Hours. Split dose prep as directed by office instructions provided.  2 bottles = one kit., Disp: 2 bottle, Rfl: 0  Allergies   Allergen Reactions   • Adhesive Tape Hives     Latex Tape     Social History     Socioeconomic History   • Marital status: Single     Spouse name: Not on file   • Number of children: Not on file   • Years of education: Not on file   • Highest education level: Not on file   Tobacco Use   • Smoking status: Former Smoker     Types: Cigarettes     Quit date:      Years since quittin.7   • Smokeless tobacco: Never Used   Substance and Sexual Activity   • Alcohol use: Yes     Alcohol/week: 1.0 standard drinks     Types: 1 Cans of beer per week     Frequency: Never     Comment: Occasionally    • Drug use: No   • Sexual activity: Defer     Family History   Problem Relation Age of Onset   • Cancer Mother    • Heart disease Father    • No Known Problems Daughter    • No Known Problems Son    • Hypertension Daughter    • Hypertension Daughter    • Other Brother         polioi   • Cancer Paternal Aunt    • No Known Problems Maternal Grandmother    • No Known Problems Maternal Grandfather    • No Known Problems Paternal Grandmother    • No Known Problems Paternal Grandfather    • Drug abuse Brother    • Colon cancer Neg Hx    • Colon polyps Neg Hx    • Esophageal cancer Neg Hx    • Liver cancer Neg Hx    • Liver  "disease Neg Hx    • Rectal cancer Neg Hx    • Stomach cancer Neg Hx        REVIEW OF SYSTEMS  General: well appearing, no fever chills or sweats, no unexplained wt loss  HEENT: no acute visual or hearing disturbances  Cardiovascular: No chest pain or palpitations  Pulmonary: No shortness of breath, coughing, wheezing or hemoptysis  : No burning, urgency, hematuria, or dysuria  Musculoskeletal: No joint pain or stiffness  Peripheral: no edema  Skin: No lesions or rashes  Neuro: No dizziness, headaches, stroke, syncope  Endocrine: No hot or cold intolerances  Hematological: No blood dyscrasias    Objective   Vitals:    10/07/20 0955   BP: 138/76   BP Location: Right arm   Patient Position: Sitting   Cuff Size: Adult   Pulse: 78   Temp: 97.1 °F (36.2 °C)   TempSrc: Infrared   SpO2: 99%   Weight: 88 kg (194 lb)   Height: 167.6 cm (66\")     Body mass index is 31.31 kg/m².    PHYSICAL EXAM  General: age appropriate well nourished well appearing, no acute distress  Head: normocephalic and atraumatic  Global assessment-supple  Neck-No JVD noted, no lymphadenopathy  Pulmonary-clear to auscultation bilaterally, normal respiratory effort  Cardiovascular-normal rate and rhythm, normal heart sounds, S1 and S2 noted  Abdomen-soft, non tender, non distended, normal bowel sounds all 4 quadrants, no hepatosplenomegaly noted  Extremities-No clubbing cyanosis or edema  Neuro-Non focal, converses appropriately, awake, alert, oriented    Lab Results - Last 18 Months   Lab Units 09/01/20  1215 01/30/20  1048 09/04/19  1111 07/05/19  0940 04/25/19  1246   GLUCOSE mg/dL 220* 132* 175* 138* 96   BUN mg/dL 27* 25* 19 22 22   CREATININE mg/dL 1.16* 1* 0.95 0.9 0.8   SODIUM mmol/L 138 141 141 141 141   POTASSIUM mmol/L 4.1 4.1 3.8 4.1 3.4*   CHLORIDE mmol/L 100 103 106 101 100   TOTAL CO2 mmol/L  --  26  --  25 27   CO2 mmol/L 27.0  --  28.0  --   --    TOTAL PROTEIN g/dL 7.7 7.8 7.9 8.0  --    ALBUMIN g/dL 3.90 4 4.00 4.1  --    ALT (SGPT) " U/L 11 9 21 13  --    AST (SGOT) U/L 16 15 34 20  --    ALK PHOS U/L 80 74 74 78  --    BILIRUBIN mg/dL 0.2 0.5 0.4 0.5  --    GLOBULIN gm/dL 3.8  --  3.9  --   --        Lab Results - Last 18 Months   Lab Units 09/01/20  1215 01/30/20  1048 09/04/19  1111   HEMOGLOBIN g/dL 11.6* 12.2 12.2   HEMATOCRIT % 34.0 38.8 35.7   MCV fL 91.9 96.0 91.5   WBC 10*3/mm3 8.74 8.4 7.17   RDW % 12.8 12.9 13.2   MPV fL 9.3 9.7 9.0   PLATELETS 10*3/mm3 347 381 377       Lab Results - Last 18 Months   Lab Units 01/30/20  1048 07/05/19  0940   TSH uIU/mL 4.130 2.920          Imaging Results (Most Recent)     None        Assessment/Plan   Lorena was seen today for colon cancer screening.    Diagnoses and all orders for this visit:    Colon cancer screening  -     Case Request; Standing  -     Case Request    Other orders  -     Follow Anesthesia Guidelines / Protocol; Future  -     Obtain Informed Consent; Future  -     sodium-potassium-magnesium sulfates (Suprep Bowel Prep Kit) 17.5-3.13-1.6 GM/177ML solution oral solution; Take 1 bottle by mouth Every 12 (Twelve) Hours. Split dose prep as directed by office instructions provided.  2 bottles = one kit.      COLONOSCOPY WITH ANESTHESIA (N/A)       Body mass index is 31.31 kg/m². Patient's Body mass index is 31.31 kg/m². BMI is within normal parameters. No follow-up required..      All risks, benefits, alternatives, and indications of colonoscopy procedure have been discussed with the patient. Risks to include perforation of the colon requiring possible surgery or colostomy, risk of bleeding from biopsies or removal of colon tissue, possibility of missing a colon polyp or cancer, or adverse drug reaction.  Benefits to include the diagnosis and management of disease of the colon and rectum. Alternatives to include barium enema, radiographic evaluation, lab testing or no intervention. Pt verbalizes understanding and agrees.

## 2020-10-12 ENCOUNTER — INFUSION (OUTPATIENT)
Dept: ONCOLOGY | Facility: CLINIC | Age: 80
End: 2020-10-12

## 2020-10-12 DIAGNOSIS — Z45.2 ENCOUNTER FOR CARE RELATED TO VASCULAR ACCESS PORT: Primary | ICD-10-CM

## 2020-10-12 PROCEDURE — 96523 IRRIG DRUG DELIVERY DEVICE: CPT | Performed by: INTERNAL MEDICINE

## 2020-10-12 RX ORDER — SODIUM CHLORIDE 0.9 % (FLUSH) 0.9 %
10 SYRINGE (ML) INJECTION AS NEEDED
Status: CANCELLED | OUTPATIENT
Start: 2020-10-12

## 2020-10-12 RX ORDER — HEPARIN SODIUM (PORCINE) LOCK FLUSH IV SOLN 100 UNIT/ML 100 UNIT/ML
500 SOLUTION INTRAVENOUS AS NEEDED
Status: DISCONTINUED | OUTPATIENT
Start: 2020-10-12 | End: 2020-10-12 | Stop reason: HOSPADM

## 2020-10-12 RX ORDER — SODIUM CHLORIDE 0.9 % (FLUSH) 0.9 %
10 SYRINGE (ML) INJECTION AS NEEDED
Status: DISCONTINUED | OUTPATIENT
Start: 2020-10-12 | End: 2020-10-12 | Stop reason: HOSPADM

## 2020-10-12 RX ORDER — HEPARIN SODIUM (PORCINE) LOCK FLUSH IV SOLN 100 UNIT/ML 100 UNIT/ML
500 SOLUTION INTRAVENOUS AS NEEDED
Status: CANCELLED | OUTPATIENT
Start: 2020-10-12

## 2020-10-12 RX ADMIN — HEPARIN SODIUM (PORCINE) LOCK FLUSH IV SOLN 100 UNIT/ML 500 UNITS: 100 SOLUTION at 11:40

## 2020-10-12 RX ADMIN — Medication 10 ML: at 11:38

## 2020-10-26 ENCOUNTER — TELEPHONE (OUTPATIENT)
Dept: INTERNAL MEDICINE | Age: 80
End: 2020-10-26

## 2020-10-26 NOTE — LETTER
[facility]  [address] [address] [address]  Phone: [phone]  Fax: Madison Buck MD        October 26, 2020    [address] [facility] [address]  [address] [place] [address]      Dear Miakla: This letter is to inform you that the office has tried to contact you numerous times concerning your appointment that was a NO SHOW on 06/02/2020. Dr. Linus Carrington needs to see you in office and get the appropriate labs drawn in order to provide the best care possible. Please give the office a call to re-schedule this appointment.      You can call the office at [phone]    Sincerely,        Umberto Henderson MD

## 2020-11-03 ENCOUNTER — TRANSCRIBE ORDERS (OUTPATIENT)
Dept: ADMINISTRATIVE | Facility: HOSPITAL | Age: 80
End: 2020-11-03

## 2020-11-03 DIAGNOSIS — Z17.1 MALIGNANT NEOPLASM OF UPPER-OUTER QUADRANT OF RIGHT BREAST IN FEMALE, ESTROGEN RECEPTOR NEGATIVE (HCC): ICD-10-CM

## 2020-11-03 DIAGNOSIS — Z12.31 VISIT FOR SCREENING MAMMOGRAM: ICD-10-CM

## 2020-11-03 DIAGNOSIS — M81.6 LOCALIZED OSTEOPOROSIS WITHOUT CURRENT PATHOLOGICAL FRACTURE: ICD-10-CM

## 2020-11-03 DIAGNOSIS — E11.9 TYPE 2 DIABETES MELLITUS WITHOUT COMPLICATION, WITHOUT LONG-TERM CURRENT USE OF INSULIN (HCC): ICD-10-CM

## 2020-11-03 DIAGNOSIS — Z01.818 PRE-OP TESTING: Primary | ICD-10-CM

## 2020-11-03 DIAGNOSIS — C50.411 MALIGNANT NEOPLASM OF UPPER-OUTER QUADRANT OF RIGHT BREAST IN FEMALE, ESTROGEN RECEPTOR NEGATIVE (HCC): ICD-10-CM

## 2020-11-03 LAB
ALBUMIN SERPL-MCNC: 4.1 G/DL (ref 3.5–5.2)
ALP BLD-CCNC: 90 U/L (ref 35–104)
ALT SERPL-CCNC: 14 U/L (ref 5–33)
ANION GAP SERPL CALCULATED.3IONS-SCNC: 8 MMOL/L (ref 7–19)
AST SERPL-CCNC: 18 U/L (ref 5–32)
BILIRUB SERPL-MCNC: 0.3 MG/DL (ref 0.2–1.2)
BUN BLDV-MCNC: 25 MG/DL (ref 8–23)
CALCIUM SERPL-MCNC: 9.9 MG/DL (ref 8.8–10.2)
CHLORIDE BLD-SCNC: 103 MMOL/L (ref 98–111)
CHOLESTEROL, TOTAL: 166 MG/DL (ref 160–199)
CO2: 29 MMOL/L (ref 22–29)
CREAT SERPL-MCNC: 1 MG/DL (ref 0.5–0.9)
GFR AFRICAN AMERICAN: >59
GFR NON-AFRICAN AMERICAN: 53
GLUCOSE BLD-MCNC: 139 MG/DL (ref 74–109)
HBA1C MFR BLD: 7.4 % (ref 4–6)
HDLC SERPL-MCNC: 56 MG/DL (ref 65–121)
LDL CHOLESTEROL CALCULATED: 89 MG/DL
POTASSIUM SERPL-SCNC: 4.7 MMOL/L (ref 3.5–5)
SODIUM BLD-SCNC: 140 MMOL/L (ref 136–145)
TOTAL PROTEIN: 7.9 G/DL (ref 6.6–8.7)
TRIGL SERPL-MCNC: 103 MG/DL (ref 0–149)

## 2020-11-09 ENCOUNTER — LAB (OUTPATIENT)
Dept: LAB | Facility: HOSPITAL | Age: 80
End: 2020-11-09

## 2020-11-09 LAB — SARS-COV-2 RNA PNL SPEC NAA+PROBE: NOT DETECTED

## 2020-11-09 PROCEDURE — C9803 HOPD COVID-19 SPEC COLLECT: HCPCS | Performed by: INTERNAL MEDICINE

## 2020-11-09 PROCEDURE — 87635 SARS-COV-2 COVID-19 AMP PRB: CPT | Performed by: INTERNAL MEDICINE

## 2020-11-11 ENCOUNTER — ANESTHESIA EVENT (OUTPATIENT)
Dept: GASTROENTEROLOGY | Facility: HOSPITAL | Age: 80
End: 2020-11-11

## 2020-11-11 ENCOUNTER — ANESTHESIA (OUTPATIENT)
Dept: GASTROENTEROLOGY | Facility: HOSPITAL | Age: 80
End: 2020-11-11

## 2020-11-11 ENCOUNTER — HOSPITAL ENCOUNTER (OUTPATIENT)
Facility: HOSPITAL | Age: 80
Setting detail: HOSPITAL OUTPATIENT SURGERY
Discharge: HOME | End: 2020-11-11
Attending: INTERNAL MEDICINE | Admitting: INTERNAL MEDICINE

## 2020-11-11 VITALS
RESPIRATION RATE: 17 BRPM | TEMPERATURE: 96.6 F | HEIGHT: 66 IN | SYSTOLIC BLOOD PRESSURE: 120 MMHG | DIASTOLIC BLOOD PRESSURE: 76 MMHG | WEIGHT: 189 LBS | OXYGEN SATURATION: 97 % | BODY MASS INDEX: 30.37 KG/M2 | HEART RATE: 73 BPM

## 2020-11-11 DIAGNOSIS — Z12.11 COLON CANCER SCREENING: ICD-10-CM

## 2020-11-11 LAB — GLUCOSE BLDC GLUCOMTR-MCNC: 100 MG/DL (ref 70–130)

## 2020-11-11 PROCEDURE — 45385 COLONOSCOPY W/LESION REMOVAL: CPT | Performed by: INTERNAL MEDICINE

## 2020-11-11 PROCEDURE — 82962 GLUCOSE BLOOD TEST: CPT

## 2020-11-11 PROCEDURE — 88305 TISSUE EXAM BY PATHOLOGIST: CPT | Performed by: INTERNAL MEDICINE

## 2020-11-11 PROCEDURE — 25010000002 PROPOFOL 10 MG/ML EMULSION: Performed by: NURSE ANESTHETIST, CERTIFIED REGISTERED

## 2020-11-11 RX ORDER — PROPOFOL 10 MG/ML
VIAL (ML) INTRAVENOUS AS NEEDED
Status: DISCONTINUED | OUTPATIENT
Start: 2020-11-11 | End: 2020-11-11 | Stop reason: SURG

## 2020-11-11 RX ORDER — SODIUM CHLORIDE 0.9 % (FLUSH) 0.9 %
10 SYRINGE (ML) INJECTION AS NEEDED
Status: CANCELLED | OUTPATIENT
Start: 2020-11-11

## 2020-11-11 RX ORDER — LIDOCAINE HYDROCHLORIDE 10 MG/ML
0.5 INJECTION, SOLUTION EPIDURAL; INFILTRATION; INTRACAUDAL; PERINEURAL ONCE AS NEEDED
Status: CANCELLED | OUTPATIENT
Start: 2020-11-11

## 2020-11-11 RX ORDER — SODIUM CHLORIDE 0.9 % (FLUSH) 0.9 %
10 SYRINGE (ML) INJECTION EVERY 12 HOURS SCHEDULED
Status: CANCELLED | OUTPATIENT
Start: 2020-11-11

## 2020-11-11 RX ORDER — SODIUM CHLORIDE 9 MG/ML
100 INJECTION, SOLUTION INTRAVENOUS CONTINUOUS
Status: CANCELLED | OUTPATIENT
Start: 2020-11-11

## 2020-11-11 RX ORDER — SODIUM CHLORIDE 9 MG/ML
500 INJECTION, SOLUTION INTRAVENOUS CONTINUOUS PRN
Status: DISCONTINUED | OUTPATIENT
Start: 2020-11-11 | End: 2020-11-11 | Stop reason: HOSPADM

## 2020-11-11 RX ORDER — SODIUM CHLORIDE 0.9 % (FLUSH) 0.9 %
10 SYRINGE (ML) INJECTION AS NEEDED
Status: DISCONTINUED | OUTPATIENT
Start: 2020-11-11 | End: 2020-11-11 | Stop reason: HOSPADM

## 2020-11-11 RX ORDER — METOPROLOL TARTRATE 5 MG/5ML
2 INJECTION INTRAVENOUS ONCE AS NEEDED
Status: COMPLETED | OUTPATIENT
Start: 2020-11-11 | End: 2020-11-11

## 2020-11-11 RX ADMIN — PROPOFOL 50 MG: 10 INJECTION, EMULSION INTRAVENOUS at 10:38

## 2020-11-11 RX ADMIN — PROPOFOL 50 MG: 10 INJECTION, EMULSION INTRAVENOUS at 10:44

## 2020-11-11 RX ADMIN — METOPROLOL TARTRATE 2 MG: 5 INJECTION INTRAVENOUS at 10:01

## 2020-11-11 RX ADMIN — PROPOFOL 50 MG: 10 INJECTION, EMULSION INTRAVENOUS at 10:50

## 2020-11-11 RX ADMIN — PROPOFOL 50 MG: 10 INJECTION, EMULSION INTRAVENOUS at 10:41

## 2020-11-11 RX ADMIN — SODIUM CHLORIDE 500 ML: 9 INJECTION, SOLUTION INTRAVENOUS at 09:55

## 2020-11-11 RX ADMIN — PROPOFOL 50 MG: 10 INJECTION, EMULSION INTRAVENOUS at 10:35

## 2020-11-11 RX ADMIN — LIDOCAINE HYDROCHLORIDE 100 MG: 20 INJECTION, SOLUTION INTRAVENOUS at 10:35

## 2020-11-11 NOTE — H&P
Chief Complaint:   Screening    Subjective     HPI:   She presents for screening colonoscopy.  Last evaluation was 10 years ago and unremarkable.  Family history is negative and she is asymptomatic.    Past Medical History:   Past Medical History:   Diagnosis Date   • Bladder disorder    • Diverticulosis    • Fibrocystic disease of breast    • Heart disease    • History of bone density study 2011   • Hyperglycemia    • Hyperlipidemia    • Hypotension    • Left ventricular diastolic dysfunction    • Malignant neoplasm of central portion of left female breast (CMS/HCC) 11/9/2016   • Osteopenia 8/29/2017   • Uterine prolapse        Past Surgical History:  Past Surgical History:   Procedure Laterality Date   • BREAST LUMPECTOMY  2008   • CATARACT EXTRACTION Right    • COLONOSCOPY  09/16/2010    Diverticulosis; Repeat 10 years   • MAMMO BILATERAL  07/17/2013    Dr. Sabillon   • PAP SMEAR  2010        Family History:  Family History   Problem Relation Age of Onset   • Cancer Mother    • Heart disease Father    • No Known Problems Daughter    • No Known Problems Son    • Hypertension Daughter    • Hypertension Daughter    • Other Brother         polioi   • Cancer Paternal Aunt    • No Known Problems Maternal Grandmother    • No Known Problems Maternal Grandfather    • No Known Problems Paternal Grandmother    • No Known Problems Paternal Grandfather    • Drug abuse Brother    • Colon cancer Neg Hx    • Colon polyps Neg Hx    • Esophageal cancer Neg Hx    • Liver cancer Neg Hx    • Liver disease Neg Hx    • Rectal cancer Neg Hx    • Stomach cancer Neg Hx        Social History:   reports that she quit smoking about 46 years ago. Her smoking use included cigarettes. She has never used smokeless tobacco. She reports current alcohol use of about 1.0 standard drinks of alcohol per week. She reports that she does not use drugs.    Medications:   Medications Prior to Admission   Medication Sig Dispense Refill Last Dose   •  "glipiZIDE (GLUCOTROL) 5 MG tablet Take 5 mg by mouth Daily.   11/10/2020 at Unknown time   • ibuprofen (ADVIL,MOTRIN) 400 MG tablet Take 400 mg by mouth Every 6 (Six) Hours As Needed for mild pain (1-3).   Past Month at Unknown time   • losartan (COZAAR) 100 MG tablet Take 100 mg by mouth Daily.  1 11/11/2020 at Unknown time   • aspirin 81 MG EC tablet Take 81 mg by mouth daily.     11/9/2020   • Calcium Carb-Cholecalciferol (CALCIUM 600+D3) 600-200 MG-UNIT tablet Take 1 tablet by mouth 2 (Two) Times a Day.   11/9/2020   • Cholecalciferol (VITAMIN D3) 400 UNITS capsule Take 1 capsule by mouth Daily.   11/9/2020   • magnesium 30 MG tablet Take 30 mg by mouth.   11/9/2020   • metoprolol succinate XL (TOPROL-XL) 50 MG 24 hr tablet Take 25 mg by mouth Daily.   More than a month at Unknown time   • omeprazole (priLOSEC) 20 MG capsule Take 20 mg by mouth Daily.   11/9/2020   • potassium chloride (K-DUR,KLOR-CON) 10 MEQ CR tablet Take 10 mEq by mouth Daily.   11/9/2020   • pravastatin (PRAVACHOL) 40 MG tablet Take 40 mg by mouth Daily.   11/9/2020   • triamterene-hydrochlorothiazide (MAXZIDE-25) 37.5-25 MG per tablet Take 0.5 tablets by mouth Daily.   More than a month at Unknown time       Allergies:  Adhesive tape    ROS:    General: Weight stable  Resp: No SOA  Cardiovascular: No CP      Objective     /84   Pulse 83   Temp 96.6 °F (35.9 °C) (Temporal)   Resp 18   Ht 167.6 cm (66\")   Wt 85.7 kg (189 lb)   SpO2 94%   BMI 30.51 kg/m²     Physical Exam   Constitutional: Pt is oriented to person, place, and in no distress.  Eyes: No icterus  ENMT: Unremarkable   Cardiovascular: Normal rate, regular rhythm.    Pulmonary/Chest: No distress.  No audible wheezes  Abdominal: Soft.   Skin: Skin is warm and dry.   Psychiatric: Mood, memory, affect and judgment appear normal.     Assessment/Plan     Diagnosis:  Colon cancer screening    Anticipated Surgical Procedure:  Colonoscopy    The risks, benefits, and " alternatives of colonoscopy were reviewed with the patient today.  Risks including perforation of the colon possibly requiring surgery or colostomy.  Additional risks include risk of bleeding from biopsies or removal of colon tissue.  There is also the risk of a drug reaction or problems with anesthesia.  This will be discussed with the further by the anesthesia team on the day of the procedure.  Lastly there is a possibility of missing a colon polyp or cancer.  The benefits include the diagnosis and management of disease of the colon and rectum.  Alternatives to colonoscopy include barium enema, laboratory testing, radiographic evaluation, or no intervention.  The patient verbalizes understanding and agrees.    Much of this encounter note is an electronic transcription/translation of spoken language to printed text. The electronic translation of spoken language may permit erroneous, or at times, nonsensical words or phrases to be inadvertently transcribed; although I have reviewed the note for such errors, some may still exist.

## 2020-11-11 NOTE — ANESTHESIA POSTPROCEDURE EVALUATION
Patient: Lorena Valdes    Procedure Summary     Date: 11/11/20 Room / Location: [facility] ENDOSCOPY 2 / [facility]    Anesthesia Start: 1034 Anesthesia Stop: 1054    Procedure: COLONOSCOPY WITH ANESTHESIA (N/A ) Diagnosis:       Colon cancer screening      (Colon cancer screening [Z12.11])    Surgeon: Jennifer Bee MD Provider: RYAN Jones CRNA    Anesthesia Type: MAC ASA Status: 2          Anesthesia Type: MAC    Vitals  No vitals data found for the desired time range.          Post Anesthesia Care and Evaluation    Patient location during evaluation: PACU  Patient participation: complete - patient participated  Level of consciousness: awake and alert  Pain score: 0  Pain management: adequate  Airway patency: patent  Anesthetic complications: No anesthetic complications    Cardiovascular status: acceptable and stable  Respiratory status: acceptable and unassisted  Hydration status: acceptable

## 2020-11-11 NOTE — ANESTHESIA PREPROCEDURE EVALUATION
" Anesthesia Evaluation     Patient summary reviewed and Nursing notes reviewed   no history of anesthetic complications:  NPO Solid Status: > 8 hours  NPO Liquid Status: > 2 hours           Airway   Mallampati: I  TM distance: >3 FB  Neck ROM: full  Dental          Pulmonary    (-) not a smoker  Cardiovascular   Exercise tolerance: good (4-7 METS)    (+) dysrhythmias (pt with \"fast heart rate\" , no record available for review. Ran out of toprol), CHF Diastolic >=55%, hyperlipidemia,       Neuro/Psych  (-) seizures, TIA, CVA  GI/Hepatic/Renal/Endo    (+) obesity,   diabetes mellitus,   (-) liver disease, no renal disease    Musculoskeletal     Abdominal    Substance History      OB/GYN          Other      history of cancer (breast cancer) remission                    Anesthesia Plan    ASA 2     MAC     intravenous induction     Anesthetic plan, all risks, benefits, and alternatives have been provided, discussed and informed consent has been obtained with: patient.      "

## 2020-11-12 LAB
LAB AP CASE REPORT: NORMAL
PATH REPORT.FINAL DX SPEC: NORMAL
PATH REPORT.GROSS SPEC: NORMAL

## 2020-11-16 ENCOUNTER — OFFICE VISIT (OUTPATIENT)
Dept: INTERNAL MEDICINE | Age: 80
End: 2020-11-16
Payer: MEDICARE

## 2020-11-16 VITALS
HEART RATE: 62 BPM | WEIGHT: 196 LBS | OXYGEN SATURATION: 96 % | SYSTOLIC BLOOD PRESSURE: 132 MMHG | DIASTOLIC BLOOD PRESSURE: 88 MMHG | BODY MASS INDEX: 31.64 KG/M2

## 2020-11-16 DIAGNOSIS — R35.0 INCREASED URINARY FREQUENCY: ICD-10-CM

## 2020-11-16 LAB
BACTERIA: ABNORMAL /HPF
BILIRUBIN URINE: NEGATIVE
BLOOD, URINE: NEGATIVE
CLARITY: ABNORMAL
COLOR: YELLOW
CRYSTALS, UA: ABNORMAL /HPF
EPITHELIAL CELLS, UA: 5 /HPF (ref 0–5)
GLUCOSE URINE: NEGATIVE MG/DL
HYALINE CASTS: 2 /HPF (ref 0–8)
KETONES, URINE: NEGATIVE MG/DL
LEUKOCYTE ESTERASE, URINE: ABNORMAL
NITRITE, URINE: NEGATIVE
PH UA: 5.5 (ref 5–8)
PROTEIN UA: NEGATIVE MG/DL
RBC UA: 2 /HPF (ref 0–4)
SPECIFIC GRAVITY UA: 1.02 (ref 1–1.03)
UROBILINOGEN, URINE: 0.2 E.U./DL
WBC UA: 188 /HPF (ref 0–5)

## 2020-11-16 PROCEDURE — G8399 PT W/DXA RESULTS DOCUMENT: HCPCS | Performed by: INTERNAL MEDICINE

## 2020-11-16 PROCEDURE — 4040F PNEUMOC VAC/ADMIN/RCVD: CPT | Performed by: INTERNAL MEDICINE

## 2020-11-16 PROCEDURE — 1090F PRES/ABSN URINE INCON ASSESS: CPT | Performed by: INTERNAL MEDICINE

## 2020-11-16 PROCEDURE — G8484 FLU IMMUNIZE NO ADMIN: HCPCS | Performed by: INTERNAL MEDICINE

## 2020-11-16 PROCEDURE — G8417 CALC BMI ABV UP PARAM F/U: HCPCS | Performed by: INTERNAL MEDICINE

## 2020-11-16 PROCEDURE — G8427 DOCREV CUR MEDS BY ELIG CLIN: HCPCS | Performed by: INTERNAL MEDICINE

## 2020-11-16 PROCEDURE — 99214 OFFICE O/P EST MOD 30 MIN: CPT | Performed by: INTERNAL MEDICINE

## 2020-11-16 PROCEDURE — G0008 ADMIN INFLUENZA VIRUS VAC: HCPCS | Performed by: INTERNAL MEDICINE

## 2020-11-16 PROCEDURE — 90694 VACC AIIV4 NO PRSRV 0.5ML IM: CPT | Performed by: INTERNAL MEDICINE

## 2020-11-16 PROCEDURE — 1123F ACP DISCUSS/DSCN MKR DOCD: CPT | Performed by: INTERNAL MEDICINE

## 2020-11-16 PROCEDURE — 3051F HG A1C>EQUAL 7.0%<8.0%: CPT | Performed by: INTERNAL MEDICINE

## 2020-11-16 PROCEDURE — 1036F TOBACCO NON-USER: CPT | Performed by: INTERNAL MEDICINE

## 2020-11-16 RX ORDER — GLIPIZIDE 5 MG/1
TABLET ORAL
Qty: 90 TABLET | Refills: 1 | Status: SHIPPED | OUTPATIENT
Start: 2020-11-16 | End: 2021-02-26 | Stop reason: SDUPTHER

## 2020-11-16 ASSESSMENT — ENCOUNTER SYMPTOMS
COUGH: 0
CHEST TIGHTNESS: 0
SORE THROAT: 0
ABDOMINAL PAIN: 0
CONSTIPATION: 0
WHEEZING: 0

## 2020-11-16 NOTE — PROGRESS NOTES
Chief Complaint   Patient presents with    Follow-up     History of presenting illness:  Apoorva Dsouza is a67 y.o. female who presents today for follow up on her chronic medical conditions as noted below.     Vitamin D deficiency- she states she has been taking her supplement     Pure hypercholesterolemia- she states she has been taking her pravastatin dose     Type 2 diabetes mellitus without complication, without long-term current use of insulin ([address]- she has been taking her glipizide small dose 2.5 mg daily      Hypertension  She has been compliant with her blood pressure medications  She has not been checking her blood pressures recently at home     States that her left knee is hurting at times when trying to get up from chait    Patient Active Problem List    Diagnosis Date Noted    Essential hypertension 02/11/2019    Elevated TSH 08/07/2018    Vitamin D deficiency 09/10/2017    Pure hypercholesterolemia 09/10/2017    Type 2 diabetes mellitus without complication, without long-term current use of insulin ([address] 09/10/2017    Localized osteoporosis without current pathological fracture 09/10/2017     Overview Note:     2017 hip neck -2.6; lspine -1/8      Generalized anxiety disorder 09/10/2017    Former smoker 09/10/2017    Numbness 04/07/2017    Imbalance 04/07/2017    Malignant neoplasm of central portion of female breast ([address] 08/06/2008    Estrogen receptor negative tumor status 08/06/2008     Past Medical History:   Diagnosis Date    Back pain     Breast cancer ([address]     Hyperlipidemia     Pneumonia     Type II or unspecified type diabetes mellitus without mention of complication, not stated as uncontrolled     diet controlled    Varicose veins     Wears glasses       Past Surgical History:   Procedure Laterality Date    BREAST BIOPSY  8-6-2008    U/S Guided Mammotome Biopsy Left Breast x 2  infiltrating ductal carcinoma, grade III, ER/[place] negative    BREAST BIOPSY  8-5-2009 Stereotactic biopsy right breast  No evidence of malignancy    BREAST BIOPSY Left 2015    COLONOSCOPY      MASTECTOMY, PARTIAL  2008     Left partial mastectomy and left sentinel node biopsy  2.6 cm infiltrating ductal carcinoma, grade III, 0/2 nodes positive, immunohistochemistry negative for micrometastasis     Current Outpatient Medications   Medication Sig Dispense Refill    glipiZIDE (GLUCOTROL) 5 MG tablet TAKE ONE-HALF TABLET twice/ day 90 tablet 1    metoprolol succinate (TOPROL XL) 50 MG extended release tablet Take 1/2 (one-half) tablet by mouth once daily 30 tablet 0    losartan (COZAAR) 100 MG tablet Take 1/2 (one-half) tablet by mouth twice daily 30 tablet 5    potassium chloride (KLOR-CON M) 10 MEQ extended release tablet Take 1 tablet by mouth daily 30 tablet 5    pravastatin (PRAVACHOL) 40 MG tablet TAKE ONE TABLET BY MOUTH ONCE DAILY 90 tablet 1    triamterene-hydrochlorothiazide (MAXZIDE-25) 37.5-25 MG per tablet Take 1/2 tablet in am 30 tablet 3    omeprazole (PRILOSEC) 20 MG delayed release capsule Take 1 capsule by mouth every morning (before breakfast) 30 capsule 5    magnesium 30 MG tablet Take 30 mg by mouth 2 times daily      Calcium-Vitamin D 600-200 MG-UNIT TABS Take by mouth every morning (before breakfast)       aspirin 81 MG EC tablet Take 81 mg by mouth daily.  Ascorbic Acid (VITAMIN C) 500 MG tablet Take 500 mg by mouth daily. No current facility-administered medications for this visit.       Allergies   Allergen Reactions    Tape [place] Tape]      Latex Tape     Social History     Tobacco Use    Smoking status: Former Smoker     Packs/day: 0.25     Years: 20.00     Pack years: 5.00     Types: Pipe, Cigarettes     Last attempt to quit: 1979     Years since quittin.8    Smokeless tobacco: Never Used   Substance Use Topics    Alcohol use: No     Comment: occ      Family History   Problem Relation Age of Onset    Diabetes Father    Khadar Heart Disease Father     Hypertension Mother        Review of Systems   Constitutional: Positive for fatigue. Negative for chills and fever. HENT: Negative for congestion, ear pain, nosebleeds, postnasal drip and sore throat. Respiratory: Negative for cough, chest tightness and wheezing. Cardiovascular: Negative for chest pain, palpitations and leg swelling. Gastrointestinal: Negative for abdominal pain and constipation. Genitourinary: Negative for dysuria and urgency. Musculoskeletal: Positive for arthralgias. Skin: Negative for rash. Neurological: Negative for dizziness and headaches. Psychiatric/Behavioral: Positive for sleep disturbance. The patient is nervous/anxious. Vitals:    11/16/20 1316   BP: 132/88   Pulse: 62   SpO2: 96%   Weight: 196 lb (88.9 kg)     Body mass index is 31.64 kg/m². Physical Exam  Constitutional:       Appearance: She is well-developed. HENT:      Right Ear: External ear normal.      Left Ear: External ear normal.      Mouth/Throat:      Pharynx: No oropharyngeal exudate. Eyes:      Conjunctiva/sclera: Conjunctivae normal.      Pupils: Pupils are equal, round, and reactive to light. Neck:      Musculoskeletal: Neck supple. Thyroid: No thyromegaly. Vascular: No JVD. Cardiovascular:      Rate and Rhythm: Normal rate. Heart sounds: Normal heart sounds. No murmur. Pulmonary:      Effort: No respiratory distress. Breath sounds: Normal breath sounds. No wheezing or rales. Chest:      Chest wall: No tenderness. Abdominal:      General: Bowel sounds are normal.      Palpations: Abdomen is soft. Lymphadenopathy:      Cervical: No cervical adenopathy. Skin:     General: Skin is warm. Findings: No rash. Neurological:      Mental Status: She is oriented to person, place, and time.          Lab Review   Orders Only on 11/03/2020   Component Date Value    Cholesterol, Total 11/03/2020 166     Triglycerides 11/03/2020 103     Urine Ratio    Urinalysis    Urinalysis     New Prescriptions    No medications on file         No follow-ups on file. There are no Patient Instructions on file for this visit. EMR Dragon/transcription disclaimer:Significant part of this  encounter note is electronic transcription/translationof spoken language to printed text. The electronic translation of spoken language may be erroneous, or at times, nonsensical words or phrases may be inadvertently transcribed.  Although I have reviewed the note for sucherrors, some may still exist.

## 2020-11-18 ENCOUNTER — TELEPHONE (OUTPATIENT)
Dept: INTERNAL MEDICINE | Age: 80
End: 2020-11-18

## 2020-11-18 LAB
ORGANISM: ABNORMAL
URINE CULTURE, ROUTINE: ABNORMAL
URINE CULTURE, ROUTINE: ABNORMAL

## 2020-11-18 RX ORDER — CEFUROXIME AXETIL 250 MG/1
250 TABLET ORAL 2 TIMES DAILY
Qty: 10 TABLET | Refills: 0 | Status: SHIPPED | OUTPATIENT
Start: 2020-11-18 | End: 2020-11-23

## 2020-11-18 NOTE — TELEPHONE ENCOUNTER
----- Message from Mattie Hair MD sent at 11/18/2020  7:48 AM CST -----  Urine culture is positive, suggest patient takes Ceftin 250 twice daily x5 days

## 2020-12-07 ENCOUNTER — INFUSION (OUTPATIENT)
Dept: ONCOLOGY | Facility: CLINIC | Age: 80
End: 2020-12-07

## 2020-12-07 DIAGNOSIS — Z45.2 ENCOUNTER FOR CARE RELATED TO VASCULAR ACCESS PORT: Primary | ICD-10-CM

## 2020-12-07 PROCEDURE — 96523 IRRIG DRUG DELIVERY DEVICE: CPT | Performed by: NURSE PRACTITIONER

## 2020-12-07 RX ORDER — SODIUM CHLORIDE 0.9 % (FLUSH) 0.9 %
10 SYRINGE (ML) INJECTION AS NEEDED
Status: CANCELLED | OUTPATIENT
Start: 2020-12-07

## 2020-12-07 RX ORDER — HEPARIN SODIUM (PORCINE) LOCK FLUSH IV SOLN 100 UNIT/ML 100 UNIT/ML
500 SOLUTION INTRAVENOUS AS NEEDED
Status: CANCELLED | OUTPATIENT
Start: 2020-12-07

## 2020-12-07 RX ORDER — SODIUM CHLORIDE 0.9 % (FLUSH) 0.9 %
10 SYRINGE (ML) INJECTION AS NEEDED
Status: DISCONTINUED | OUTPATIENT
Start: 2020-12-07 | End: 2020-12-07 | Stop reason: HOSPADM

## 2020-12-07 RX ORDER — HEPARIN SODIUM (PORCINE) LOCK FLUSH IV SOLN 100 UNIT/ML 100 UNIT/ML
500 SOLUTION INTRAVENOUS AS NEEDED
Status: DISCONTINUED | OUTPATIENT
Start: 2020-12-07 | End: 2020-12-07 | Stop reason: HOSPADM

## 2020-12-07 RX ADMIN — HEPARIN SODIUM (PORCINE) LOCK FLUSH IV SOLN 100 UNIT/ML 500 UNITS: 100 SOLUTION at 11:30

## 2020-12-07 RX ADMIN — Medication 10 ML: at 11:27

## 2021-01-13 RX ORDER — LOSARTAN POTASSIUM 100 MG/1
TABLET ORAL
Qty: 30 TABLET | Refills: 0 | Status: SHIPPED | OUTPATIENT
Start: 2021-01-13 | End: 2021-02-09

## 2021-01-27 ENCOUNTER — TELEPHONE (OUTPATIENT)
Dept: ONCOLOGY | Facility: CLINIC | Age: 81
End: 2021-01-27

## 2021-01-27 NOTE — TELEPHONE ENCOUNTER
Caller: Lorena    Relationship to patient: Pt    Best call back number: [phone]     Type of visit: Port flush    Requested date: 02/04 @ 10am    If rescheduling, when is the original appointment: 02/01

## 2021-02-04 ENCOUNTER — INFUSION (OUTPATIENT)
Dept: ONCOLOGY | Facility: CLINIC | Age: 81
End: 2021-02-04

## 2021-02-04 DIAGNOSIS — I10 ESSENTIAL HYPERTENSION: ICD-10-CM

## 2021-02-04 DIAGNOSIS — E78.00 PURE HYPERCHOLESTEROLEMIA: ICD-10-CM

## 2021-02-04 DIAGNOSIS — Z45.2 ENCOUNTER FOR CARE RELATED TO VASCULAR ACCESS PORT: Primary | ICD-10-CM

## 2021-02-04 DIAGNOSIS — R94.4 DECREASED GFR: ICD-10-CM

## 2021-02-04 DIAGNOSIS — E11.9 TYPE 2 DIABETES MELLITUS WITHOUT COMPLICATION, WITHOUT LONG-TERM CURRENT USE OF INSULIN (HCC): ICD-10-CM

## 2021-02-04 DIAGNOSIS — E55.9 VITAMIN D DEFICIENCY: ICD-10-CM

## 2021-02-04 LAB
ALBUMIN SERPL-MCNC: 3.8 G/DL (ref 3.5–5.2)
ALP BLD-CCNC: 83 U/L (ref 35–104)
ALT SERPL-CCNC: 9 U/L (ref 5–33)
ANION GAP SERPL CALCULATED.3IONS-SCNC: 10 MMOL/L (ref 7–19)
AST SERPL-CCNC: 17 U/L (ref 5–32)
BACTERIA: ABNORMAL /HPF
BASOPHILS ABSOLUTE: 0 K/UL (ref 0–0.2)
BASOPHILS RELATIVE PERCENT: 0.2 % (ref 0–1)
BILIRUB SERPL-MCNC: 0.4 MG/DL (ref 0.2–1.2)
BILIRUBIN URINE: NEGATIVE
BLOOD, URINE: ABNORMAL
BUN BLDV-MCNC: 24 MG/DL (ref 8–23)
CALCIUM SERPL-MCNC: 9.1 MG/DL (ref 8.8–10.2)
CHLORIDE BLD-SCNC: 101 MMOL/L (ref 98–111)
CHOLESTEROL, TOTAL: 149 MG/DL (ref 160–199)
CLARITY: ABNORMAL
CO2: 28 MMOL/L (ref 22–29)
COLOR: YELLOW
CREAT SERPL-MCNC: 0.9 MG/DL (ref 0.5–0.9)
CREATININE URINE: 158.7 MG/DL (ref 4.2–622)
CRYSTALS, UA: ABNORMAL /HPF
EOSINOPHILS ABSOLUTE: 0 K/UL (ref 0–0.6)
EOSINOPHILS RELATIVE PERCENT: 0.5 % (ref 0–5)
EPITHELIAL CELLS, UA: 10 /HPF (ref 0–5)
GFR AFRICAN AMERICAN: >59
GFR NON-AFRICAN AMERICAN: >60
GLUCOSE BLD-MCNC: 104 MG/DL (ref 74–109)
GLUCOSE URINE: NEGATIVE MG/DL
HBA1C MFR BLD: 7 % (ref 4–6)
HCT VFR BLD CALC: 36.7 % (ref 37–47)
HDLC SERPL-MCNC: 48 MG/DL (ref 65–121)
HEMOGLOBIN: 11.9 G/DL (ref 12–16)
HYALINE CASTS: 5 /HPF (ref 0–8)
IMMATURE GRANULOCYTES #: 0 K/UL
KETONES, URINE: NEGATIVE MG/DL
LDL CHOLESTEROL CALCULATED: 85 MG/DL
LEUKOCYTE ESTERASE, URINE: ABNORMAL
LYMPHOCYTES ABSOLUTE: 3.7 K/UL (ref 1.1–4.5)
LYMPHOCYTES RELATIVE PERCENT: 42.1 % (ref 20–40)
MCH RBC QN AUTO: 30.7 PG (ref 27–31)
MCHC RBC AUTO-ENTMCNC: 32.4 G/DL (ref 33–37)
MCV RBC AUTO: 94.8 FL (ref 81–99)
MICROALBUMIN UR-MCNC: 3 MG/DL (ref 0–19)
MICROALBUMIN/CREAT UR-RTO: 18.9 MG/G
MONOCYTES ABSOLUTE: 1.1 K/UL (ref 0–0.9)
MONOCYTES RELATIVE PERCENT: 12.4 % (ref 0–10)
NEUTROPHILS ABSOLUTE: 3.9 K/UL (ref 1.5–7.5)
NEUTROPHILS RELATIVE PERCENT: 44.6 % (ref 50–65)
NITRITE, URINE: POSITIVE
PDW BLD-RTO: 13 % (ref 11.5–14.5)
PH UA: 6 (ref 5–8)
PLATELET # BLD: 326 K/UL (ref 130–400)
PMV BLD AUTO: 9 FL (ref 9.4–12.3)
POTASSIUM SERPL-SCNC: 3.9 MMOL/L (ref 3.5–5)
PROTEIN UA: ABNORMAL MG/DL
RBC # BLD: 3.87 M/UL (ref 4.2–5.4)
RBC UA: 4 /HPF (ref 0–4)
SODIUM BLD-SCNC: 139 MMOL/L (ref 136–145)
SPECIFIC GRAVITY UA: 1.02 (ref 1–1.03)
TOTAL PROTEIN: 7.7 G/DL (ref 6.6–8.7)
TRIGL SERPL-MCNC: 78 MG/DL (ref 0–149)
UROBILINOGEN, URINE: 0.2 E.U./DL
WBC # BLD: 8.7 K/UL (ref 4.8–10.8)
WBC UA: 461 /HPF (ref 0–5)

## 2021-02-04 RX ORDER — SODIUM CHLORIDE 0.9 % (FLUSH) 0.9 %
10 SYRINGE (ML) INJECTION AS NEEDED
Status: CANCELLED | OUTPATIENT
Start: 2021-02-04

## 2021-02-04 RX ORDER — HEPARIN SODIUM (PORCINE) LOCK FLUSH IV SOLN 100 UNIT/ML 100 UNIT/ML
500 SOLUTION INTRAVENOUS AS NEEDED
Status: DISCONTINUED | OUTPATIENT
Start: 2021-02-04 | End: 2021-02-04 | Stop reason: HOSPADM

## 2021-02-04 RX ORDER — HEPARIN SODIUM (PORCINE) LOCK FLUSH IV SOLN 100 UNIT/ML 100 UNIT/ML
500 SOLUTION INTRAVENOUS AS NEEDED
Status: CANCELLED | OUTPATIENT
Start: 2021-02-04

## 2021-02-04 RX ORDER — SODIUM CHLORIDE 0.9 % (FLUSH) 0.9 %
10 SYRINGE (ML) INJECTION AS NEEDED
Status: DISCONTINUED | OUTPATIENT
Start: 2021-02-04 | End: 2021-02-04 | Stop reason: HOSPADM

## 2021-02-04 RX ADMIN — HEPARIN SODIUM (PORCINE) LOCK FLUSH IV SOLN 100 UNIT/ML 500 UNITS: 100 SOLUTION at 10:25

## 2021-02-04 RX ADMIN — Medication 10 ML: at 10:24

## 2021-02-09 RX ORDER — LOSARTAN POTASSIUM 100 MG/1
TABLET ORAL
Qty: 30 TABLET | Refills: 0 | Status: SHIPPED | OUTPATIENT
Start: 2021-02-09 | End: 2021-02-26 | Stop reason: SDUPTHER

## 2021-02-09 NOTE — TELEPHONE ENCOUNTER
Julian Johnson called requesting a refill of the below medication which has been pended for you:     Requested Prescriptions     Pending Prescriptions Disp Refills    losartan (COZAAR) 100 MG tablet [Pharmacy Med Name: Losartan Potassium 100 MG Oral Tablet] 30 tablet 0     Sig: Take 1/2 (one-half) tablet by mouth twice daily       Last Appointment Date: 11/16/2020  Next Appointment Date: 2/16/2021    Allergies   Allergen Reactions    Tape [place] Tape]      Latex Tape

## 2021-02-26 ENCOUNTER — OFFICE VISIT (OUTPATIENT)
Dept: INTERNAL MEDICINE | Age: 81
End: 2021-02-26
Payer: MEDICARE

## 2021-02-26 VITALS
HEIGHT: 66 IN | DIASTOLIC BLOOD PRESSURE: 80 MMHG | HEART RATE: 57 BPM | BODY MASS INDEX: 30.53 KG/M2 | SYSTOLIC BLOOD PRESSURE: 130 MMHG | OXYGEN SATURATION: 92 % | RESPIRATION RATE: 18 BRPM | WEIGHT: 190 LBS

## 2021-02-26 DIAGNOSIS — E55.9 VITAMIN D DEFICIENCY: ICD-10-CM

## 2021-02-26 DIAGNOSIS — E78.00 PURE HYPERCHOLESTEROLEMIA: ICD-10-CM

## 2021-02-26 DIAGNOSIS — R10.13 EPIGASTRIC ABDOMINAL PAIN: ICD-10-CM

## 2021-02-26 DIAGNOSIS — R14.0 BLOATING: ICD-10-CM

## 2021-02-26 DIAGNOSIS — Z00.00 ROUTINE GENERAL MEDICAL EXAMINATION AT A HEALTH CARE FACILITY: ICD-10-CM

## 2021-02-26 DIAGNOSIS — Z11.59 NEED FOR HEPATITIS C SCREENING TEST: ICD-10-CM

## 2021-02-26 DIAGNOSIS — M81.6 LOCALIZED OSTEOPOROSIS WITHOUT CURRENT PATHOLOGICAL FRACTURE: ICD-10-CM

## 2021-02-26 DIAGNOSIS — R94.4 DECREASED GFR: ICD-10-CM

## 2021-02-26 DIAGNOSIS — I10 ESSENTIAL HYPERTENSION: ICD-10-CM

## 2021-02-26 DIAGNOSIS — R11.0 NAUSEA: ICD-10-CM

## 2021-02-26 DIAGNOSIS — Z00.00 MEDICARE ANNUAL WELLNESS VISIT, SUBSEQUENT: Primary | ICD-10-CM

## 2021-02-26 DIAGNOSIS — E11.9 TYPE 2 DIABETES MELLITUS WITHOUT COMPLICATION, WITHOUT LONG-TERM CURRENT USE OF INSULIN (HCC): ICD-10-CM

## 2021-02-26 PROCEDURE — G8399 PT W/DXA RESULTS DOCUMENT: HCPCS | Performed by: INTERNAL MEDICINE

## 2021-02-26 PROCEDURE — 1123F ACP DISCUSS/DSCN MKR DOCD: CPT | Performed by: INTERNAL MEDICINE

## 2021-02-26 PROCEDURE — 99214 OFFICE O/P EST MOD 30 MIN: CPT | Performed by: INTERNAL MEDICINE

## 2021-02-26 PROCEDURE — G0439 PPPS, SUBSEQ VISIT: HCPCS | Performed by: INTERNAL MEDICINE

## 2021-02-26 PROCEDURE — 1036F TOBACCO NON-USER: CPT | Performed by: INTERNAL MEDICINE

## 2021-02-26 PROCEDURE — G8427 DOCREV CUR MEDS BY ELIG CLIN: HCPCS | Performed by: INTERNAL MEDICINE

## 2021-02-26 PROCEDURE — 1090F PRES/ABSN URINE INCON ASSESS: CPT | Performed by: INTERNAL MEDICINE

## 2021-02-26 PROCEDURE — 3051F HG A1C>EQUAL 7.0%<8.0%: CPT | Performed by: INTERNAL MEDICINE

## 2021-02-26 PROCEDURE — G8484 FLU IMMUNIZE NO ADMIN: HCPCS | Performed by: INTERNAL MEDICINE

## 2021-02-26 PROCEDURE — 4040F PNEUMOC VAC/ADMIN/RCVD: CPT | Performed by: INTERNAL MEDICINE

## 2021-02-26 PROCEDURE — G8417 CALC BMI ABV UP PARAM F/U: HCPCS | Performed by: INTERNAL MEDICINE

## 2021-02-26 RX ORDER — TRIAMTERENE AND HYDROCHLOROTHIAZIDE 37.5; 25 MG/1; MG/1
TABLET ORAL
Qty: 30 TABLET | Refills: 3 | Status: SHIPPED | OUTPATIENT
Start: 2021-02-26 | End: 2021-07-26 | Stop reason: SDUPTHER

## 2021-02-26 RX ORDER — POTASSIUM CHLORIDE 750 MG/1
10 TABLET, EXTENDED RELEASE ORAL DAILY
Qty: 90 TABLET | Refills: 1 | Status: SHIPPED | OUTPATIENT
Start: 2021-02-26 | End: 2021-07-26 | Stop reason: SDUPTHER

## 2021-02-26 RX ORDER — LOSARTAN POTASSIUM 100 MG/1
TABLET ORAL
Qty: 90 TABLET | Refills: 1 | Status: SHIPPED | OUTPATIENT
Start: 2021-02-26 | End: 2021-07-26 | Stop reason: SDUPTHER

## 2021-02-26 RX ORDER — GLIPIZIDE 5 MG/1
TABLET ORAL
Qty: 90 TABLET | Refills: 1 | Status: SHIPPED | OUTPATIENT
Start: 2021-02-26 | End: 2021-07-26 | Stop reason: SDUPTHER

## 2021-02-26 RX ORDER — OMEPRAZOLE 20 MG/1
20 CAPSULE, DELAYED RELEASE ORAL
Qty: 90 CAPSULE | Refills: 1 | Status: SHIPPED | OUTPATIENT
Start: 2021-02-26 | End: 2021-07-26 | Stop reason: SDUPTHER

## 2021-02-26 RX ORDER — PRAVASTATIN SODIUM 40 MG
TABLET ORAL
Qty: 90 TABLET | Refills: 1 | Status: SHIPPED | OUTPATIENT
Start: 2021-02-26 | End: 2021-07-26 | Stop reason: SDUPTHER

## 2021-02-26 RX ORDER — METOPROLOL SUCCINATE 50 MG/1
TABLET, EXTENDED RELEASE ORAL
Qty: 45 TABLET | Refills: 1 | Status: SHIPPED | OUTPATIENT
Start: 2021-02-26 | End: 2021-07-26 | Stop reason: SDUPTHER

## 2021-02-26 ASSESSMENT — ENCOUNTER SYMPTOMS
CONSTIPATION: 0
COUGH: 0
CHEST TIGHTNESS: 0
ABDOMINAL DISTENTION: 1
ABDOMINAL PAIN: 0
WHEEZING: 0
SORE THROAT: 0
NAUSEA: 1

## 2021-02-26 ASSESSMENT — PATIENT HEALTH QUESTIONNAIRE - PHQ9
1. LITTLE INTEREST OR PLEASURE IN DOING THINGS: 0
SUM OF ALL RESPONSES TO PHQ QUESTIONS 1-9: 0

## 2021-02-26 NOTE — PROGRESS NOTES
Chief Complaint   Patient presents with    Multiple medical problems     History of presenting illness:  Sarita Chin is a67 y.o. female who presents today for follow up on her chronic medical conditions as noted below.     Vitamin D deficiency- she states she has been taking her supplement     Pure hypercholesterolemia- she states she has been taking her pravastatin dose     Type 2 diabetes mellitus without complication, without long-term current use of insulin ([address]- she has been taking her glipizide small dose 2.5 mg daily      Hypertension  She has been compliant with her blood pressure medications  She has not been checking her blood pressures recently at home     States that her left knee is hurting at times when trying to get up from chait    Patient Active Problem List    Diagnosis Date Noted    Essential hypertension 02/11/2019    Elevated TSH 08/07/2018    Vitamin D deficiency 09/10/2017    Pure hypercholesterolemia 09/10/2017    Type 2 diabetes mellitus without complication, without long-term current use of insulin ([address] 09/10/2017    Localized osteoporosis without current pathological fracture 09/10/2017     Overview Note:     2017 hip neck -2.6; lspine -1/8  8/2020 hip neck -2.6/ L spine L1 -1.6      Generalized anxiety disorder 09/10/2017    Former smoker 09/10/2017    Numbness 04/07/2017    Imbalance 04/07/2017    Malignant neoplasm of central portion of female breast ([address] 08/06/2008    Estrogen receptor negative tumor status 08/06/2008     Past Medical History:   Diagnosis Date    Back pain     Breast cancer ([address]     Hyperlipidemia     Pneumonia     Type II or unspecified type diabetes mellitus without mention of complication, not stated as uncontrolled     diet controlled    Varicose veins     Wears glasses       Past Surgical History:   Procedure Laterality Date    BREAST BIOPSY  8-6-2008 U/S Guided Mammotome Biopsy Left Breast x 2  infiltrating ductal carcinoma, grade III, ER/[place] negative    BREAST BIOPSY  2009    Stereotactic biopsy right breast  No evidence of malignancy    BREAST BIOPSY Left 2015    COLONOSCOPY      MASTECTOMY, PARTIAL  2008     Left partial mastectomy and left sentinel node biopsy  2.6 cm infiltrating ductal carcinoma, grade III, 0/2 nodes positive, immunohistochemistry negative for micrometastasis     Current Outpatient Medications   Medication Sig Dispense Refill    triamterene-hydroCHLOROthiazide (MAXZIDE-25) 37.5-25 MG per tablet Take 1/2 tablet in am 30 tablet 3    pravastatin (PRAVACHOL) 40 MG tablet TAKE ONE TABLET BY MOUTH ONCE DAILY 90 tablet 1    potassium chloride (KLOR-CON M) 10 MEQ extended release tablet Take 1 tablet by mouth daily 90 tablet 1    omeprazole (PRILOSEC) 20 MG delayed release capsule Take 1 capsule by mouth every morning (before breakfast) 90 capsule 1    metoprolol succinate (TOPROL XL) 50 MG extended release tablet Take 1/2 (one-half) tablet by mouth once daily 45 tablet 1    losartan (COZAAR) 100 MG tablet Take 1/2 tablet twice daily 90 tablet 1    glipiZIDE (GLUCOTROL) 5 MG tablet TAKE ONE-HALF TABLET twice/ day 90 tablet 1    magnesium 30 MG tablet Take 30 mg by mouth 2 times daily      Calcium-Vitamin D 600-200 MG-UNIT TABS Take by mouth every morning (before breakfast)       aspirin 81 MG EC tablet Take 81 mg by mouth daily.  Ascorbic Acid (VITAMIN C) 500 MG tablet Take 500 mg by mouth daily. No current facility-administered medications for this visit.       Allergies   Allergen Reactions    Tape [place] Tape]      Latex Tape     Social History     Tobacco Use    Smoking status: Former Smoker     Packs/day: 0.25     Years: 20.00     Pack years: 5.00     Types: Pipe, Cigarettes     Quit date: 1979     Years since quittin.0    Smokeless tobacco: Never Used   Substance Use Topics  Alcohol use: No     Comment: occ      Family History   Problem Relation Age of Onset    Diabetes Father     Heart Disease Father     Hypertension Mother        Review of Systems   Constitutional: Positive for fatigue. Negative for chills and fever. HENT: Negative for congestion, ear pain, nosebleeds, postnasal drip and sore throat. Respiratory: Negative for cough, chest tightness and wheezing. Cardiovascular: Negative for chest pain, palpitations and leg swelling. Gastrointestinal: Positive for abdominal distention and nausea. Negative for abdominal pain and constipation. Genitourinary: Negative for dysuria and urgency. Musculoskeletal: Positive for arthralgias. Skin: Negative for rash. Neurological: Negative for dizziness and headaches. Psychiatric/Behavioral: Negative. Vitals:    02/26/21 1121   BP: 130/80   Site: Left Upper Arm   Position: Sitting   Cuff Size: Large Adult   Pulse: 57   Resp: 18   SpO2: 92%   Weight: 190 lb (86.2 kg)   Height: 5' 6\" (1.676 m)     Body mass index is 30.67 kg/m². Physical Exam  Constitutional:       Appearance: She is well-developed. HENT:      Right Ear: External ear normal.      Left Ear: External ear normal.      Mouth/Throat:      Pharynx: No oropharyngeal exudate. Eyes:      Conjunctiva/sclera: Conjunctivae normal.      Pupils: Pupils are equal, round, and reactive to light. Neck:      Musculoskeletal: Neck supple. Thyroid: No thyromegaly. Vascular: No JVD. Cardiovascular:      Rate and Rhythm: Normal rate. Heart sounds: Normal heart sounds. No murmur. Pulmonary:      Effort: No respiratory distress. Breath sounds: Normal breath sounds. No wheezing or rales. Chest:      Chest wall: No tenderness. Abdominal:      General: Bowel sounds are normal.      Palpations: Abdomen is soft. Lymphadenopathy:      Cervical: No cervical adenopathy. Skin:     General: Skin is warm. Findings: No rash.    Neurological: Mental Status: She is oriented to person, place, and time.          Lab Review   Orders Only on 02/04/2021   Component Date Value    Color, UA 02/04/2021 YELLOW     Clarity, UA 02/04/2021 CLOUDY*    Glucose, Ur 02/04/2021 Negative     Bilirubin Urine 02/04/2021 Negative     Ketones, Urine 02/04/2021 Negative     Specific Gravity, UA 02/04/2021 1.019     Blood, Urine 02/04/2021 TRACE*    pH, UA 02/04/2021 6.0     Protein, UA 02/04/2021 TRACE*    Urobilinogen, Urine 02/04/2021 0.2     Nitrite, Urine 02/04/2021 POSITIVE*    Leukocyte Esterase, Urine 02/04/2021 LARGE*    WBC 02/04/2021 8.7     RBC 02/04/2021 3.87*    Hemoglobin 02/04/2021 11.9*    Hematocrit 02/04/2021 36.7*    MCV 02/04/2021 94.8     MCH 02/04/2021 30.7     MCHC 02/04/2021 32.4*    RDW 02/04/2021 13.0     Platelets 83/87/1613 326     MPV 02/04/2021 9.0*    Neutrophils % 02/04/2021 44.6*    Lymphocytes % 02/04/2021 42.1*    Monocytes % 02/04/2021 12.4*    Eosinophils % 02/04/2021 0.5     Basophils % 02/04/2021 0.2     Neutrophils Absolute 02/04/2021 3.9     Immature Granulocytes # 02/04/2021 0.0     Lymphocytes Absolute 02/04/2021 3.7     Monocytes Absolute 02/04/2021 1.10*    Eosinophils Absolute 02/04/2021 0.00     Basophils Absolute 02/04/2021 0.00     Cholesterol, Total 02/04/2021 149*    Triglycerides 02/04/2021 78     HDL 02/04/2021 48*    LDL Calculated 02/04/2021 85     Hemoglobin A1C 02/04/2021 7.0*    Sodium 02/04/2021 139     Potassium 02/04/2021 3.9     Chloride 02/04/2021 101     CO2 02/04/2021 28     Anion Gap 02/04/2021 10     Glucose 02/04/2021 104     BUN 02/04/2021 24*    CREATININE 02/04/2021 0.9     GFR Non- 02/04/2021 >60     GFR  02/04/2021 >59     Calcium 02/04/2021 9.1     Total Protein 02/04/2021 7.7     Albumin 02/04/2021 3.8     Total Bilirubin 02/04/2021 0.4     Alkaline Phosphatase 02/04/2021 83     ALT 02/04/2021 9 GEETHA Dragon/transcription disclaimer:Significant part of this  encounter note is electronic transcription/translationof spoken language to printed text. The electronic translation of spoken language may be erroneous, or at times, nonsensical words or phrases may be inadvertently transcribed.  Although I have reviewed the note for sucherrors, some may still exist.

## 2021-02-26 NOTE — PATIENT INSTRUCTIONS
Personalized Preventive Plan for Nayan Estrada - 2/26/2021  Medicare offers a range of preventive health benefits. Some of the tests and screenings are paid in full while other may be subject to a deductible, co-insurance, and/or copay. Some of these benefits include a comprehensive review of your medical history including lifestyle, illnesses that may run in your family, and various assessments and screenings as appropriate. After reviewing your medical record and screening and assessments performed today your provider may have ordered immunizations, labs, imaging, and/or referrals for you. A list of these orders (if applicable) as well as your Preventive Care list are included within your After Visit Summary for your review. Other Preventive Recommendations:    · A preventive eye exam performed by an eye specialist is recommended every 1-2 years to screen for glaucoma; cataracts, macular degeneration, and other eye disorders. · A preventive dental visit is recommended every 6 months. · Try to get at least 150 minutes of exercise per week or 10,000 steps per day on a pedometer . · Order or download the FREE \"Exercise & Physical Activity: Your Everyday Guide\" from [employer]. Call 4-151.263.5433 or search The [employer] online. · You need 9403-1488 mg of calcium and 5098-8495 IU of vitamin D per day. It is possible to meet your calcium requirement with diet alone, but a vitamin D supplement is usually necessary to meet this goal.  · When exposed to the sun, use a sunscreen that protects against both UVA and UVB radiation with an SPF of 30 or greater. Reapply every 2 to 3 hours or after sweating, drying off with a towel, or swimming. · Always wear a seat belt when traveling in a car. Always wear a helmet when riding a bicycle or motorcycle.

## 2021-02-26 NOTE — PROGRESS NOTES
Medicare Annual Wellness Visit  Name: Michael Lamas Date: 2021   MRN: [id number] Sex: Female   Age: 79 y.o. Ethnicity: Non-/Non    : 1940 Race: Carmel Gallardo is here for Medicare AWV    Screenings for behavioral, psychosocial and functional/safety risks, and cognitive dysfunction are all negative except as indicated below. These results, as well as other patient data from the [address] form, are documented in Flowsheets linked to this Encounter. Allergies   Allergen Reactions    Tape Buffy Edel Tape]      Latex Tape         Prior to Visit Medications    Medication Sig Taking? Authorizing Provider   triamterene-hydroCHLOROthiazide (MAXZIDE-25) 37.5-25 MG per tablet Take 1/2 tablet in am Yes Homa Cantrell MD   pravastatin (PRAVACHOL) 40 MG tablet TAKE ONE TABLET BY MOUTH ONCE DAILY Yes Homa Cantrell MD   potassium chloride (KLOR-CON M) 10 MEQ extended release tablet Take 1 tablet by mouth daily Yes Homa Cantrell MD   omeprazole (PRILOSEC) 20 MG delayed release capsule Take 1 capsule by mouth every morning (before breakfast) Yes Homa Cantrell MD   metoprolol succinate (TOPROL XL) 50 MG extended release tablet Take 1/2 (one-half) tablet by mouth once daily Yes Homa Cantrell MD   losartan (COZAAR) 100 MG tablet Take 1/2 tablet twice daily Yes Homa Cantrell MD   glipiZIDE (GLUCOTROL) 5 MG tablet TAKE ONE-HALF TABLET twice/ day Yes Homa Cantrell MD   magnesium 30 MG tablet Take 30 mg by mouth 2 times daily Yes Historical Provider, MD   Calcium-Vitamin D 600-200 MG-UNIT TABS Take by mouth every morning (before breakfast)  Yes Historical Provider, MD   aspirin 81 MG EC tablet Take 81 mg by mouth daily. Yes Historical Provider, MD   Ascorbic Acid (VITAMIN C) 500 MG tablet Take 500 mg by mouth daily.    Yes Historical Provider, MD         Past Medical History:   Diagnosis Date    Back pain     Breast cancer ([facility] [address]     Hyperlipidemia     Pneumonia  Type II or unspecified type diabetes mellitus without mention of complication, not stated as uncontrolled     diet controlled    Varicose veins     Wears glasses        Past Surgical History:   Procedure Laterality Date    BREAST BIOPSY  8-6-2008    U/S Guided Mammotome Biopsy Left Breast x 2  infiltrating ductal carcinoma, grade III, ER/[place] negative    BREAST BIOPSY  8-5-2009    Stereotactic biopsy right breast  No evidence of malignancy    BREAST BIOPSY Left 8/2015    COLONOSCOPY      MASTECTOMY, PARTIAL  8-     Left partial mastectomy and left sentinel node biopsy  2.6 cm infiltrating ductal carcinoma, grade III, 0/2 nodes positive, immunohistochemistry negative for micrometastasis         Family History   Problem Relation Age of Onset    Diabetes Father     Heart Disease Father     Hypertension Mother        CareTeam (Including outside providers/suppliers regularly involved in providing care):   Patient Care Team:  Hernesto Davies MD as PCP - General (Internal Medicine)  Hernesto Davies MD as PCP - [facility] Lacey Nevarez Provider    Wt Readings from Last 3 Encounters:   02/26/21 190 lb (86.2 kg)   11/16/20 196 lb (88.9 kg)   08/25/20 193 lb (87.5 kg)     Vitals:    02/26/21 1121   BP: 130/80   Site: Left Upper Arm   Position: Sitting   Cuff Size: Large Adult   Pulse: 57   Resp: 18   SpO2: 92%   Weight: 190 lb (86.2 kg)   Height: 5' 6\" (1.676 m)     Body mass index is 30.67 kg/m². Based upon direct observation of the patient, evaluation of cognition reveals recent and remote memory intact. Patient's complete Health Risk Assessment and screening values have been reviewed and are found in Flowsheets. The following problems were reviewed today and where indicated follow up appointments were made and/or referrals ordered.     Positive Risk Factor Screenings with Interventions:            General Health and ACP:  General  In general, how would you say your health is?: Good In the past 7 days, have you experienced any of the following?  New or Increased Pain, New or Increased Fatigue, Loneliness, Social Isolation, Stress or Anger?: None of These  Do you get the social and emotional support that you need?: Yes  Do you have a Living Will?: (!) No  Advance Directives     Power of Harpreet Chamberlain Will ACP-Advance Directive ACP-Power of     Not on File Not on File Not on File Not on File      General Health Risk Interventions:  · No Living Will: Advance Care Planning addressed with patient today    Health Habits/Nutrition:  Health Habits/Nutrition  Do you exercise for at least 20 minutes 2-3 times per week?: (!) No  Have you lost any weight without trying in the past 3 months?: No  Do you eat only one meal per day?: No  Have you seen the dentist within the past year?: Yes  Body mass index: (!) 30.66  Health Habits/Nutrition Interventions:  · Inadequate physical activity:  patient agrees to increase physical activity as follows: increase physical activity  to 20 minutes x 5 days/week       Personalized Preventive Plan   Current Health Maintenance Status  Immunization History   Administered Date(s) Administered    DTaP 09/20/2012    Influenza, High Dose (Fluzone 65 yrs and older) 12/10/2019    Influenza, Quadv, adjuvanted, 65 yrs +, IM, PF (Fluad) 11/16/2020    Influenza, Triv, inactivated, subunit, adjuvanted, IM (Fluad 65 yrs and older) 12/18/2019    Pneumococcal Conjugate 13-valent (Markell Fabry) 08/18/2016    Pneumococcal Polysaccharide ([id number]) 09/21/2011    Zoster Live (Zostavax) 09/20/2017        Health Maintenance   Topic Date Due    Hepatitis C screen  1940    COVID-19 Vaccine (1 of 2) 10/20/1957    Annual Wellness Visit (AWV)  05/29/2019    Shingles Vaccine (2 of 3) 02/26/2022 (Originally 11/15/2017)    Lipid screen  02/04/2022    Potassium monitoring  02/04/2022    Creatinine monitoring  02/04/2022    DTaP/Tdap/Td vaccine (2 - Tdap) 09/20/2022  Colon cancer screen colonoscopy  11/11/2030    DEXA (modify frequency per FRAX score)  Completed    Flu vaccine  Completed    Pneumococcal 65+ years Vaccine  Completed    Hepatitis A vaccine  Aged Out    Hib vaccine  Aged Out    Meningococcal (ACWY) vaccine  Aged Out     Recommendations for [employer] Due: see orders and patient instructions/AVS.  . Recommended screening schedule for the next 5-10 years is provided to the patient in written form: see Patient Instructions/AVS.     MEDICARE WELLNESS SCREENING/ MAINTENANCE:     1. MAMMOGRAM: This is done by Dr. Mesfin Viramontes  2. SCREENING CSCOPE- was done in 2010, repeat 10 years 2020-patient now over 76, as per GI  3. BONE DENSITY [facility]- repeat 3 yrs  4. PAP SCREENING:NA  5. DIABETES SCREEN - FBS DONE/ ABOVE LABS  6. CARDIOVASCULAR SCREENING- LIPID PANEL  DONE/ ABOVE LABS  7. PNEUMONIA VACCINATION= completed, her blood Pneumovax was 2011 and Prevnar was 2016  8. INFLUENZA VACCINATION: TO BE DONE IN FALL- received  9. HEP B VACCINATION- PT DECLINES  10. HIV/ HEP SCREENING- PT DECLINES  11. OPTOMETRY/OPTHALMOLOGY APPOINTMENT SUGGESTED FOR GLAUCOMA SCREENING- she states that she will make her own appointment for diabetic eye exam        HEALTH PLAN:  1. HEALTHY DIET: Lower carb diet, no added sugar discussed with patient  2. EXERCISE- restart slow walking exercises  3. NO INCREASED FALL RISK ON EXAM       Patient Instructions     Personalized Preventive Plan for Manuel Collado - 2/26/2021  Medicare offers a range of preventive health benefits. Some of the tests and screenings are paid in full while other may be subject to a deductible, co-insurance, and/or copay. Some of these benefits include a comprehensive review of your medical history including lifestyle, illnesses that may run in your family, and various assessments and screenings as appropriate. After reviewing your medical record and screening and assessments performed today your provider may have ordered immunizations, labs, imaging, and/or referrals for you. A list of these orders (if applicable) as well as your Preventive Care list are included within your After Visit Summary for your review. Other Preventive Recommendations:    · A preventive eye exam performed by an eye specialist is recommended every 1-2 years to screen for glaucoma; cataracts, macular degeneration, and other eye disorders. · A preventive dental visit is recommended every 6 months. · Try to get at least 150 minutes of exercise per week or 10,000 steps per day on a pedometer . · Order or download the FREE \"Exercise & Physical Activity: Your Everyday Guide\" from [employer]. Call 8-212.898.4022 or search [employer] online. · You need 8622-4692 mg of calcium and 8470-1170 IU of vitamin D per day. It is possible to meet your calcium requirement with diet alone, but a vitamin D supplement is usually necessary to meet this goal.  · When exposed to the sun, use a sunscreen that protects against both UVA and UVB radiation with an SPF of 30 or greater. Reapply every 2 to 3 hours or after sweating, drying off with a towel, or swimming. · Always wear a seat belt when traveling in a car. Always wear a helmet when riding a bicycle or motorcycle.

## 2021-03-15 ENCOUNTER — HOSPITAL ENCOUNTER (OUTPATIENT)
Dept: ULTRASOUND IMAGING | Age: 81
Discharge: HOME | End: 2021-03-15
Payer: MEDICARE

## 2021-03-15 DIAGNOSIS — R10.13 EPIGASTRIC ABDOMINAL PAIN: ICD-10-CM

## 2021-03-15 DIAGNOSIS — R11.0 NAUSEA: ICD-10-CM

## 2021-03-15 DIAGNOSIS — R14.0 BLOATING: ICD-10-CM

## 2021-03-15 PROCEDURE — 76705 ECHO EXAM OF ABDOMEN: CPT

## 2021-03-17 ENCOUNTER — TELEPHONE (OUTPATIENT)
Dept: INTERNAL MEDICINE | Age: 81
End: 2021-03-17

## 2021-03-17 DIAGNOSIS — K82.8 SLUDGE IN GALLBLADDER: Primary | ICD-10-CM

## 2021-03-17 NOTE — TELEPHONE ENCOUNTER
----- Message from Radford Barthel, MD sent at 3/16/2021  5:55 PM CDT -----  Gallbladder ultrasound shows sludge  Recommend HIDA scan for further evaluation

## 2021-03-19 ENCOUNTER — TELEPHONE (OUTPATIENT)
Dept: INTERNAL MEDICINE | Age: 81
End: 2021-03-19

## 2021-03-19 ENCOUNTER — HOSPITAL ENCOUNTER (OUTPATIENT)
Dept: NUCLEAR MEDICINE | Age: 81
Discharge: HOME | End: 2021-03-21
Payer: MEDICARE

## 2021-03-19 DIAGNOSIS — R11.0 NAUSEA: ICD-10-CM

## 2021-03-19 DIAGNOSIS — R14.0 BLOATING: ICD-10-CM

## 2021-03-19 DIAGNOSIS — R10.13 EPIGASTRIC ABDOMINAL PAIN: Primary | ICD-10-CM

## 2021-03-19 DIAGNOSIS — K82.8 SLUDGE IN GALLBLADDER: ICD-10-CM

## 2021-03-19 PROCEDURE — 78227 HEPATOBIL SYST IMAGE W/DRUG: CPT

## 2021-03-19 PROCEDURE — A9537 TC99M MEBROFENIN: HCPCS | Performed by: INTERNAL MEDICINE

## 2021-03-19 PROCEDURE — 3430000000 HC RX DIAGNOSTIC RADIOPHARMACEUTICAL: Performed by: INTERNAL MEDICINE

## 2021-03-19 RX ADMIN — Medication 5 MILLICURIE: at 12:00

## 2021-03-19 NOTE — TELEPHONE ENCOUNTER
----- Message from Adam Menendez MD sent at 3/19/2021  2:19 PM CDT -----  At last appointment patient was complaining of  Issues with increased gas and bloating  Nausea  Epigastric pain    Her gallbladder function is well  With this complaints we would need to proceed with gastroenterology referral

## 2021-04-01 ENCOUNTER — INFUSION (OUTPATIENT)
Dept: ONCOLOGY | Facility: CLINIC | Age: 81
End: 2021-04-01

## 2021-04-01 DIAGNOSIS — Z45.2 ENCOUNTER FOR CARE RELATED TO VASCULAR ACCESS PORT: Primary | ICD-10-CM

## 2021-04-01 RX ORDER — HEPARIN SODIUM (PORCINE) LOCK FLUSH IV SOLN 100 UNIT/ML 100 UNIT/ML
500 SOLUTION INTRAVENOUS AS NEEDED
Status: CANCELLED | OUTPATIENT
Start: 2021-04-01

## 2021-04-01 RX ORDER — SODIUM CHLORIDE 0.9 % (FLUSH) 0.9 %
10 SYRINGE (ML) INJECTION AS NEEDED
Status: CANCELLED | OUTPATIENT
Start: 2021-04-01

## 2021-04-01 RX ORDER — SODIUM CHLORIDE 0.9 % (FLUSH) 0.9 %
10 SYRINGE (ML) INJECTION AS NEEDED
Status: DISCONTINUED | OUTPATIENT
Start: 2021-04-01 | End: 2021-04-01 | Stop reason: HOSPADM

## 2021-04-01 RX ORDER — HEPARIN SODIUM (PORCINE) LOCK FLUSH IV SOLN 100 UNIT/ML 100 UNIT/ML
500 SOLUTION INTRAVENOUS AS NEEDED
Status: DISCONTINUED | OUTPATIENT
Start: 2021-04-01 | End: 2021-04-01 | Stop reason: HOSPADM

## 2021-04-01 RX ADMIN — Medication 10 ML: at 11:20

## 2021-04-01 RX ADMIN — HEPARIN SODIUM (PORCINE) LOCK FLUSH IV SOLN 100 UNIT/ML 500 UNITS: 100 SOLUTION at 11:21

## 2021-05-27 ENCOUNTER — INFUSION (OUTPATIENT)
Dept: ONCOLOGY | Facility: CLINIC | Age: 81
End: 2021-05-27

## 2021-05-27 DIAGNOSIS — Z45.2 ENCOUNTER FOR CARE RELATED TO VASCULAR ACCESS PORT: Primary | ICD-10-CM

## 2021-05-27 RX ORDER — SODIUM CHLORIDE 0.9 % (FLUSH) 0.9 %
10 SYRINGE (ML) INJECTION AS NEEDED
Status: DISCONTINUED | OUTPATIENT
Start: 2021-05-27 | End: 2021-05-27 | Stop reason: HOSPADM

## 2021-05-27 RX ORDER — HEPARIN SODIUM (PORCINE) LOCK FLUSH IV SOLN 100 UNIT/ML 100 UNIT/ML
500 SOLUTION INTRAVENOUS AS NEEDED
Status: DISCONTINUED | OUTPATIENT
Start: 2021-05-27 | End: 2021-05-27 | Stop reason: HOSPADM

## 2021-05-27 RX ORDER — HEPARIN SODIUM (PORCINE) LOCK FLUSH IV SOLN 100 UNIT/ML 100 UNIT/ML
500 SOLUTION INTRAVENOUS AS NEEDED
Status: CANCELLED | OUTPATIENT
Start: 2021-05-27

## 2021-05-27 RX ORDER — SODIUM CHLORIDE 0.9 % (FLUSH) 0.9 %
10 SYRINGE (ML) INJECTION AS NEEDED
Status: CANCELLED | OUTPATIENT
Start: 2021-05-27

## 2021-05-27 RX ADMIN — Medication 10 ML: at 11:59

## 2021-05-27 RX ADMIN — HEPARIN SODIUM (PORCINE) LOCK FLUSH IV SOLN 100 UNIT/ML 500 UNITS: 100 SOLUTION at 12:00

## 2021-07-22 DIAGNOSIS — E78.00 PURE HYPERCHOLESTEROLEMIA: ICD-10-CM

## 2021-07-22 DIAGNOSIS — R94.4 DECREASED GFR: ICD-10-CM

## 2021-07-22 DIAGNOSIS — I10 ESSENTIAL HYPERTENSION: ICD-10-CM

## 2021-07-22 DIAGNOSIS — E11.9 TYPE 2 DIABETES MELLITUS WITHOUT COMPLICATION, WITHOUT LONG-TERM CURRENT USE OF INSULIN (HCC): ICD-10-CM

## 2021-07-22 DIAGNOSIS — M81.6 LOCALIZED OSTEOPOROSIS WITHOUT CURRENT PATHOLOGICAL FRACTURE: ICD-10-CM

## 2021-07-22 DIAGNOSIS — Z00.00 MEDICARE ANNUAL WELLNESS VISIT, SUBSEQUENT: ICD-10-CM

## 2021-07-22 DIAGNOSIS — E55.9 VITAMIN D DEFICIENCY: ICD-10-CM

## 2021-07-22 DIAGNOSIS — Z11.59 NEED FOR HEPATITIS C SCREENING TEST: ICD-10-CM

## 2021-07-22 LAB
ALBUMIN SERPL-MCNC: 4.1 G/DL (ref 3.5–5.2)
ALP BLD-CCNC: 78 U/L (ref 35–104)
ALT SERPL-CCNC: 9 U/L (ref 5–33)
ANION GAP SERPL CALCULATED.3IONS-SCNC: 10 MMOL/L (ref 7–19)
AST SERPL-CCNC: 16 U/L (ref 5–32)
BILIRUB SERPL-MCNC: 0.5 MG/DL (ref 0.2–1.2)
BUN BLDV-MCNC: 28 MG/DL (ref 8–23)
CALCIUM SERPL-MCNC: 9.6 MG/DL (ref 8.8–10.2)
CHLORIDE BLD-SCNC: 105 MMOL/L (ref 98–111)
CHOLESTEROL, TOTAL: 175 MG/DL (ref 160–199)
CO2: 25 MMOL/L (ref 22–29)
CREAT SERPL-MCNC: 1.2 MG/DL (ref 0.5–0.9)
GFR AFRICAN AMERICAN: 52
GFR NON-AFRICAN AMERICAN: 43
GLUCOSE BLD-MCNC: 107 MG/DL (ref 74–109)
HBA1C MFR BLD: 6.9 % (ref 4–6)
HDLC SERPL-MCNC: 46 MG/DL (ref 65–121)
HEPATITIS C ANTIBODY INTERPRETATION: NORMAL
LDL CHOLESTEROL CALCULATED: 104 MG/DL
POTASSIUM SERPL-SCNC: 4 MMOL/L (ref 3.5–5)
SODIUM BLD-SCNC: 140 MMOL/L (ref 136–145)
TOTAL PROTEIN: 7.8 G/DL (ref 6.6–8.7)
TRIGL SERPL-MCNC: 123 MG/DL (ref 0–149)
VITAMIN D 25-HYDROXY: 44.9 NG/ML

## 2021-07-26 ENCOUNTER — INFUSION (OUTPATIENT)
Dept: ONCOLOGY | Facility: CLINIC | Age: 81
End: 2021-07-26

## 2021-07-26 ENCOUNTER — OFFICE VISIT (OUTPATIENT)
Dept: INTERNAL MEDICINE | Age: 81
End: 2021-07-26
Payer: MEDICARE

## 2021-07-26 ENCOUNTER — HOSPITAL ENCOUNTER (OUTPATIENT)
Dept: GENERAL RADIOLOGY | Age: 81
Discharge: HOME | End: 2021-07-26
Payer: MEDICARE

## 2021-07-26 VITALS
OXYGEN SATURATION: 98 % | DIASTOLIC BLOOD PRESSURE: 70 MMHG | SYSTOLIC BLOOD PRESSURE: 134 MMHG | HEART RATE: 85 BPM | HEIGHT: 66 IN | BODY MASS INDEX: 30.7 KG/M2 | WEIGHT: 191 LBS

## 2021-07-26 DIAGNOSIS — E55.9 VITAMIN D DEFICIENCY: ICD-10-CM

## 2021-07-26 DIAGNOSIS — Z45.2 ENCOUNTER FOR CARE RELATED TO VASCULAR ACCESS PORT: Primary | ICD-10-CM

## 2021-07-26 DIAGNOSIS — R06.02 SHORTNESS OF BREATH: ICD-10-CM

## 2021-07-26 DIAGNOSIS — E11.9 TYPE 2 DIABETES MELLITUS WITHOUT COMPLICATION, WITHOUT LONG-TERM CURRENT USE OF INSULIN (HCC): ICD-10-CM

## 2021-07-26 DIAGNOSIS — E78.00 PURE HYPERCHOLESTEROLEMIA: ICD-10-CM

## 2021-07-26 DIAGNOSIS — R94.4 DECREASED GFR: ICD-10-CM

## 2021-07-26 DIAGNOSIS — R94.4 DECREASED CALCULATED GFR: Primary | ICD-10-CM

## 2021-07-26 DIAGNOSIS — E11.40 TYPE 2 DIABETES MELLITUS WITH DIABETIC NEUROPATHY, WITHOUT LONG-TERM CURRENT USE OF INSULIN (HCC): ICD-10-CM

## 2021-07-26 DIAGNOSIS — R06.09 EXERTIONAL DYSPNEA: ICD-10-CM

## 2021-07-26 DIAGNOSIS — I10 ESSENTIAL HYPERTENSION: ICD-10-CM

## 2021-07-26 PROCEDURE — 1123F ACP DISCUSS/DSCN MKR DOCD: CPT | Performed by: INTERNAL MEDICINE

## 2021-07-26 PROCEDURE — 96523 IRRIG DRUG DELIVERY DEVICE: CPT | Performed by: NURSE PRACTITIONER

## 2021-07-26 PROCEDURE — 71046 X-RAY EXAM CHEST 2 VIEWS: CPT

## 2021-07-26 PROCEDURE — G8417 CALC BMI ABV UP PARAM F/U: HCPCS | Performed by: INTERNAL MEDICINE

## 2021-07-26 PROCEDURE — 1090F PRES/ABSN URINE INCON ASSESS: CPT | Performed by: INTERNAL MEDICINE

## 2021-07-26 PROCEDURE — 1036F TOBACCO NON-USER: CPT | Performed by: INTERNAL MEDICINE

## 2021-07-26 PROCEDURE — 99214 OFFICE O/P EST MOD 30 MIN: CPT | Performed by: INTERNAL MEDICINE

## 2021-07-26 PROCEDURE — 4040F PNEUMOC VAC/ADMIN/RCVD: CPT | Performed by: INTERNAL MEDICINE

## 2021-07-26 PROCEDURE — G8427 DOCREV CUR MEDS BY ELIG CLIN: HCPCS | Performed by: INTERNAL MEDICINE

## 2021-07-26 PROCEDURE — G8399 PT W/DXA RESULTS DOCUMENT: HCPCS | Performed by: INTERNAL MEDICINE

## 2021-07-26 RX ORDER — HEPARIN SODIUM (PORCINE) LOCK FLUSH IV SOLN 100 UNIT/ML 100 UNIT/ML
500 SOLUTION INTRAVENOUS AS NEEDED
Status: CANCELLED | OUTPATIENT
Start: 2021-07-26

## 2021-07-26 RX ORDER — HEPARIN SODIUM (PORCINE) LOCK FLUSH IV SOLN 100 UNIT/ML 100 UNIT/ML
500 SOLUTION INTRAVENOUS AS NEEDED
Status: DISCONTINUED | OUTPATIENT
Start: 2021-07-26 | End: 2021-07-26 | Stop reason: HOSPADM

## 2021-07-26 RX ORDER — METOPROLOL SUCCINATE 50 MG/1
TABLET, EXTENDED RELEASE ORAL
Qty: 45 TABLET | Refills: 1 | Status: SHIPPED | OUTPATIENT
Start: 2021-07-26 | End: 2021-08-18 | Stop reason: CLARIF

## 2021-07-26 RX ORDER — GLIPIZIDE 5 MG/1
TABLET ORAL
Qty: 90 TABLET | Refills: 1 | Status: SHIPPED | OUTPATIENT
Start: 2021-07-26 | End: 2022-02-01

## 2021-07-26 RX ORDER — POTASSIUM CHLORIDE 750 MG/1
10 TABLET, EXTENDED RELEASE ORAL DAILY
Qty: 90 TABLET | Refills: 1 | Status: SHIPPED | OUTPATIENT
Start: 2021-07-26

## 2021-07-26 RX ORDER — LOSARTAN POTASSIUM 100 MG/1
TABLET ORAL
Qty: 90 TABLET | Refills: 1 | Status: SHIPPED | OUTPATIENT
Start: 2021-07-26 | End: 2021-10-27 | Stop reason: SDUPTHER

## 2021-07-26 RX ORDER — SODIUM CHLORIDE 0.9 % (FLUSH) 0.9 %
10 SYRINGE (ML) INJECTION AS NEEDED
Status: DISCONTINUED | OUTPATIENT
Start: 2021-07-26 | End: 2021-07-26 | Stop reason: HOSPADM

## 2021-07-26 RX ORDER — SODIUM CHLORIDE 0.9 % (FLUSH) 0.9 %
10 SYRINGE (ML) INJECTION AS NEEDED
Status: CANCELLED | OUTPATIENT
Start: 2021-07-26

## 2021-07-26 RX ORDER — TRIAMTERENE AND HYDROCHLOROTHIAZIDE 37.5; 25 MG/1; MG/1
TABLET ORAL
Qty: 30 TABLET | Refills: 3 | Status: SHIPPED | OUTPATIENT
Start: 2021-07-26

## 2021-07-26 RX ORDER — OMEPRAZOLE 20 MG/1
20 CAPSULE, DELAYED RELEASE ORAL
Qty: 90 CAPSULE | Refills: 1 | Status: SHIPPED | OUTPATIENT
Start: 2021-07-26 | End: 2021-10-27

## 2021-07-26 RX ORDER — PRAVASTATIN SODIUM 40 MG
TABLET ORAL
Qty: 90 TABLET | Refills: 1 | Status: SHIPPED | OUTPATIENT
Start: 2021-07-26 | End: 2022-07-13 | Stop reason: SDUPTHER

## 2021-07-26 RX ADMIN — HEPARIN SODIUM (PORCINE) LOCK FLUSH IV SOLN 100 UNIT/ML 500 UNITS: 100 SOLUTION at 11:28

## 2021-07-26 RX ADMIN — Medication 10 ML: at 11:27

## 2021-07-26 SDOH — ECONOMIC STABILITY: FOOD INSECURITY: WITHIN THE PAST 12 MONTHS, THE FOOD YOU BOUGHT JUST DIDN'T LAST AND YOU DIDN'T HAVE MONEY TO GET MORE.: PATIENT DECLINED

## 2021-07-26 SDOH — ECONOMIC STABILITY: FOOD INSECURITY: WITHIN THE PAST 12 MONTHS, YOU WORRIED THAT YOUR FOOD WOULD RUN OUT BEFORE YOU GOT MONEY TO BUY MORE.: PATIENT DECLINED

## 2021-07-26 ASSESSMENT — SOCIAL DETERMINANTS OF HEALTH (SDOH): HOW HARD IS IT FOR YOU TO PAY FOR THE VERY BASICS LIKE FOOD, HOUSING, MEDICAL CARE, AND HEATING?: PATIENT DECLINED

## 2021-07-26 ASSESSMENT — ENCOUNTER SYMPTOMS
WHEEZING: 0
COUGH: 0
SORE THROAT: 0
SHORTNESS OF BREATH: 1
CONSTIPATION: 0
CHEST TIGHTNESS: 0
ABDOMINAL PAIN: 0

## 2021-07-26 NOTE — PROGRESS NOTES
Chief Complaint   Patient presents with    Follow-up     History of presenting illness:  Misti Duque is a67 y.o. female who presents today for follow up on her chronic medical conditions as noted below.     Vitamin D deficiency- she states she has been taking her supplement     Pure hypercholesterolemia- she states she has been taking her pravastatin dose     Type 2 diabetes mellitus without complication, without long-term current use of insulin ([address]- she has been taking her glipizide small dose 2.5 mg daily      Hypertension  She has been compliant with her blood pressure medications  She has not been checking her blood pressures recently at home     States that her left knee is hurting at times when trying to get up from chair  Patient Active Problem List    Diagnosis Date Noted    Type 2 diabetes mellitus with diabetic neuropathy 07/26/2021    Essential hypertension 02/11/2019    Elevated TSH 08/07/2018    Vitamin D deficiency 09/10/2017    Pure hypercholesterolemia 09/10/2017    Type 2 diabetes mellitus without complication, without long-term current use of insulin ([address] 09/10/2017    Localized osteoporosis without current pathological fracture 09/10/2017     Overview Note:     2017 hip neck -2.6; lspine -1/8  8/2020 hip neck -2.6/ L spine L1 -1.6      Generalized anxiety disorder 09/10/2017    Former smoker 09/10/2017    Numbness 04/07/2017    Imbalance 04/07/2017    Estrogen receptor negative tumor status 08/06/2008     Past Medical History:   Diagnosis Date    Back pain     Breast cancer ([address]     Hyperlipidemia     Pneumonia     Type II or unspecified type diabetes mellitus without mention of complication, not stated as uncontrolled     diet controlled    Varicose veins     Wears glasses       Past Surgical History:   Procedure Laterality Date    BREAST BIOPSY  8-6-2008    U/S Guided Mammotome Biopsy Left Breast x 2  infiltrating ductal carcinoma, grade III, ER/[place] negative    BREAST BIOPSY  2009    Stereotactic biopsy right breast  No evidence of malignancy    BREAST BIOPSY Left 2015    COLONOSCOPY      MASTECTOMY, PARTIAL  2008     Left partial mastectomy and left sentinel node biopsy  2.6 cm infiltrating ductal carcinoma, grade III, 0/2 nodes positive, immunohistochemistry negative for micrometastasis     Current Outpatient Medications   Medication Sig Dispense Refill    glipiZIDE (GLUCOTROL) 5 MG tablet TAKE ONE-HALF TABLET twice/ day 90 tablet 1    triamterene-hydroCHLOROthiazide (MAXZIDE-25) 37.5-25 MG per tablet Take 1/2 tablet in am 30 tablet 3    pravastatin (PRAVACHOL) 40 MG tablet TAKE ONE TABLET BY MOUTH ONCE DAILY 90 tablet 1    potassium chloride (KLOR-CON M) 10 MEQ extended release tablet Take 1 tablet by mouth daily 90 tablet 1    omeprazole (PRILOSEC) 20 MG delayed release capsule Take 1 capsule by mouth every morning (before breakfast) 90 capsule 1    metoprolol succinate (TOPROL XL) 50 MG extended release tablet Take 1/2 (one-half) tablet by mouth once daily 45 tablet 1    losartan (COZAAR) 100 MG tablet Take 1/2 tablet twice daily 90 tablet 1    magnesium 30 MG tablet Take 30 mg by mouth 2 times daily      Calcium-Vitamin D 600-200 MG-UNIT TABS Take by mouth every morning (before breakfast)       aspirin 81 MG EC tablet Take 81 mg by mouth daily.  Ascorbic Acid (VITAMIN C) 500 MG tablet Take 500 mg by mouth daily. No current facility-administered medications for this visit.      Allergies   Allergen Reactions    Tape Wanda Demarcus Tape]      Latex Tape     Social History     Tobacco Use    Smoking status: Former Smoker     Packs/day: 0.25     Years: 20.00     Pack years: 5.00     Types: Pipe, Cigarettes     Quit date: 1979     Years since quittin.4    Smokeless tobacco: Never Used   Substance Use Topics    Alcohol use: No     Comment: occ      Family History   Problem Relation Age of Onset    Diabetes Father     Heart Disease Father     Hypertension Mother        Review of Systems   Constitutional: Positive for fatigue. Negative for chills and fever. HENT: Negative for congestion, ear pain, nosebleeds, postnasal drip and sore throat. Respiratory: Positive for shortness of breath. Negative for cough, chest tightness and wheezing. Cardiovascular: Negative for chest pain, palpitations and leg swelling. Gastrointestinal: Negative for abdominal pain and constipation. Genitourinary: Negative for dysuria and urgency. Musculoskeletal: Positive for arthralgias. Left knee pain   Skin: Negative for rash. Neurological: Negative for dizziness and headaches. Psychiatric/Behavioral: Negative. Vitals:    07/26/21 1259   BP: 134/70   Pulse: 85   SpO2: 98%   Weight: 191 lb (86.6 kg)   Height: 5' 6\" (1.676 m)     Body mass index is 30.83 kg/m². Physical Exam  Constitutional:       Appearance: She is well-developed. HENT:      Right Ear: External ear normal.      Left Ear: External ear normal.      Mouth/Throat:      Pharynx: No oropharyngeal exudate. Eyes:      Conjunctiva/sclera: Conjunctivae normal.      Pupils: Pupils are equal, round, and reactive to light. Neck:      Thyroid: No thyromegaly. Vascular: No JVD. Cardiovascular:      Rate and Rhythm: Normal rate. Heart sounds: Normal heart sounds. No murmur heard. Pulmonary:      Effort: No respiratory distress. Breath sounds: Normal breath sounds. No wheezing or rales. Chest:      Chest wall: No tenderness. Abdominal:      General: Bowel sounds are normal.      Palpations: Abdomen is soft. Musculoskeletal:      Cervical back: Neck supple. Comments: Mild left knee swelling   Lymphadenopathy:      Cervical: No cervical adenopathy. Skin:     Findings: No rash.          Lab Review   Orders Only on 07/22/2021   Component Date Value    Hep C Ab Interp 07/22/2021 Non-Reactive     Sodium 07/22/2021 140     Potassium 07/22/2021 4.0     Chloride 07/22/2021 105     CO2 07/22/2021 25     Anion Gap 07/22/2021 10     Glucose 07/22/2021 107     BUN 07/22/2021 28*    CREATININE 07/22/2021 1.2*    GFR Non- 07/22/2021 43*    GFR  07/22/2021 52*    Calcium 07/22/2021 9.6     Total Protein 07/22/2021 7.8     Albumin 07/22/2021 4.1     Total Bilirubin 07/22/2021 0.5     Alkaline Phosphatase 07/22/2021 78     ALT 07/22/2021 9     AST 07/22/2021 16     Hemoglobin A1C 07/22/2021 6.9*    Cholesterol, Total 07/22/2021 175     Triglycerides 07/22/2021 123     HDL 07/22/2021 46*    LDL Calculated 07/22/2021 104     Vit D, 25-Hydroxy 07/22/2021 44.9            ASSESSMENT/PLAN:    GFR decline 43 ( 60 in 2/201 )  Increase fluids  repeat bmp 2 weeks    HTN  Blood pressure readings reviewed  Blood pressure now in better range  Kidney function now improved and is normal  Rx  losartan 50 twice daily  Maxide half tablet daily        Osteoporosis  2017 hip neck -2.6; lspine -1/8  8/2020 hip neck -2.6/ L spine L1 -1.6  Lab results discussed with patient  Vitamin D deficiency-   her vitamin D level now good 34 (46 )( 48) ( 41 )(42)  RX 2000 iu vit d daily     Declined GFR  GFR  7/2021 is 43 (2/2021 is normal over 60  Prior 53 in 11/2020 (54 ( 59)  Increase fluid intake recommended     Pure hypercholesterolemia-  Lab results reviewed with patient  Liver functions normal  LDL 104 in 7/2021 (85 in 2/2021)  Prior 89 (86 (92)(84)-   RX pravastatin 40 mg daily     Type 2 diabetes mellitus without complication, without long-term current use of insulin (HCC)-lab results reviewed and discussed with patient  Hemoglobin A1c 7/2021  6.9 (2/2021 is 7.0)  Prior her A1c  7.4 11/2020 (6.9( 6.7) ( 6.7) (6.9)   Diet  Weight loss  RX glipizide from 2.5 daily in a.m. to 2.5 twice daily  Close follow-up in 3 months     Generalized anxiety disorder- her anxiety issues are daily but she does not want to take rx     GERD symptoms  rx omeprazole     Increase dyspnea  Especially exertional  Obtain CXR  Obtain 2 d echo\     Issues with increased gas and bloating  Nausea  Epigastric pain  Had us GB and HIDA  Has refused egd/ GI evaluations  Still refuses today 7/2021    Left knee pain  Refuses ortho evaluation      Orders Placed This Encounter   Procedures    XR CHEST (2 VW)    Basic Metabolic Panel    Hemoglobin A1C    Comprehensive Metabolic Panel    Lipid Panel    ECHO Complete 2D W Doppler W Color     New Prescriptions    No medications on file         No follow-ups on file. There are no Patient Instructions on file for this visit. EMR Dragon/transcription disclaimer:Significant part of this  encounter note is electronic transcription/translationof spoken language to printed text. The electronic translation of spoken language may be erroneous, or at times, nonsensical words or phrases may be inadvertently transcribed.  Although I have reviewed the note for sucherrors, some may still exist.

## 2021-07-30 ENCOUNTER — HOSPITAL ENCOUNTER (OUTPATIENT)
Dept: NON INVASIVE DIAGNOSTICS | Age: 81
Discharge: HOME | End: 2021-07-30
Payer: MEDICARE

## 2021-07-30 DIAGNOSIS — R06.09 EXERTIONAL DYSPNEA: ICD-10-CM

## 2021-07-30 LAB
LV EF: 53 %
LVEF MODALITY: NORMAL

## 2021-07-30 PROCEDURE — C8929 TTE W OR WO FOL WCON,DOPPLER: HCPCS

## 2021-07-30 PROCEDURE — 6360000004 HC RX CONTRAST MEDICATION: Performed by: INTERNAL MEDICINE

## 2021-07-30 RX ADMIN — PERFLUTREN 1.65 MG: 6.52 INJECTION, SUSPENSION INTRAVENOUS at 08:35

## 2021-08-04 DIAGNOSIS — R06.02 SHORTNESS OF BREATH: Primary | ICD-10-CM

## 2021-08-04 DIAGNOSIS — R06.09 EXERTIONAL DYSPNEA: ICD-10-CM

## 2021-08-04 DIAGNOSIS — I51.89 HYPOKINESIA OF LEFT VENTRICLE: ICD-10-CM

## 2021-08-04 DIAGNOSIS — R93.89 ABNORMAL CHEST X-RAY: ICD-10-CM

## 2021-08-09 DIAGNOSIS — R94.4 DECREASED CALCULATED GFR: ICD-10-CM

## 2021-08-09 DIAGNOSIS — R94.4 DECREASED CALCULATED GFR: Primary | ICD-10-CM

## 2021-08-09 LAB
ANION GAP SERPL CALCULATED.3IONS-SCNC: 11 MMOL/L (ref 7–19)
BUN BLDV-MCNC: 32 MG/DL (ref 8–23)
CALCIUM SERPL-MCNC: 9.7 MG/DL (ref 8.8–10.2)
CHLORIDE BLD-SCNC: 104 MMOL/L (ref 98–111)
CO2: 26 MMOL/L (ref 22–29)
CREAT SERPL-MCNC: 1.6 MG/DL (ref 0.5–0.9)
GFR AFRICAN AMERICAN: 38
GFR NON-AFRICAN AMERICAN: 31
GLUCOSE BLD-MCNC: 108 MG/DL (ref 74–109)
POTASSIUM SERPL-SCNC: 4.1 MMOL/L (ref 3.5–5)
SODIUM BLD-SCNC: 141 MMOL/L (ref 136–145)

## 2021-08-18 ENCOUNTER — HOSPITAL ENCOUNTER (OUTPATIENT)
Dept: GENERAL RADIOLOGY | Age: 81
Discharge: HOME | End: 2021-08-18
Payer: MEDICARE

## 2021-08-18 ENCOUNTER — OFFICE VISIT (OUTPATIENT)
Dept: INTERNAL MEDICINE | Age: 81
End: 2021-08-18
Payer: MEDICARE

## 2021-08-18 VITALS
HEART RATE: 78 BPM | DIASTOLIC BLOOD PRESSURE: 80 MMHG | WEIGHT: 195 LBS | RESPIRATION RATE: 18 BRPM | BODY MASS INDEX: 31.47 KG/M2 | OXYGEN SATURATION: 98 % | SYSTOLIC BLOOD PRESSURE: 156 MMHG

## 2021-08-18 DIAGNOSIS — R06.09 EXERTIONAL DYSPNEA: ICD-10-CM

## 2021-08-18 DIAGNOSIS — R93.89 ABNORMAL CHEST X-RAY: ICD-10-CM

## 2021-08-18 DIAGNOSIS — R06.02 SHORTNESS OF BREATH: ICD-10-CM

## 2021-08-18 DIAGNOSIS — I51.89 VENTRICULAR HYPOKINESIS: ICD-10-CM

## 2021-08-18 DIAGNOSIS — E11.9 TYPE 2 DIABETES MELLITUS WITHOUT COMPLICATION, WITHOUT LONG-TERM CURRENT USE OF INSULIN (HCC): ICD-10-CM

## 2021-08-18 DIAGNOSIS — I10 ESSENTIAL HYPERTENSION: ICD-10-CM

## 2021-08-18 DIAGNOSIS — R94.4 DECREASED CALCULATED GFR: ICD-10-CM

## 2021-08-18 DIAGNOSIS — R91.8 PULMONARY INFILTRATES: ICD-10-CM

## 2021-08-18 DIAGNOSIS — R03.0 ELEVATED BLOOD PRESSURE READING: ICD-10-CM

## 2021-08-18 DIAGNOSIS — N18.31 STAGE 3A CHRONIC KIDNEY DISEASE (HCC): Primary | ICD-10-CM

## 2021-08-18 PROCEDURE — 1036F TOBACCO NON-USER: CPT | Performed by: INTERNAL MEDICINE

## 2021-08-18 PROCEDURE — 1090F PRES/ABSN URINE INCON ASSESS: CPT | Performed by: INTERNAL MEDICINE

## 2021-08-18 PROCEDURE — G8399 PT W/DXA RESULTS DOCUMENT: HCPCS | Performed by: INTERNAL MEDICINE

## 2021-08-18 PROCEDURE — 4040F PNEUMOC VAC/ADMIN/RCVD: CPT | Performed by: INTERNAL MEDICINE

## 2021-08-18 PROCEDURE — 99214 OFFICE O/P EST MOD 30 MIN: CPT | Performed by: INTERNAL MEDICINE

## 2021-08-18 PROCEDURE — G8417 CALC BMI ABV UP PARAM F/U: HCPCS | Performed by: INTERNAL MEDICINE

## 2021-08-18 PROCEDURE — 71046 X-RAY EXAM CHEST 2 VIEWS: CPT

## 2021-08-18 PROCEDURE — 1123F ACP DISCUSS/DSCN MKR DOCD: CPT | Performed by: INTERNAL MEDICINE

## 2021-08-18 PROCEDURE — G8428 CUR MEDS NOT DOCUMENT: HCPCS | Performed by: INTERNAL MEDICINE

## 2021-08-18 RX ORDER — AMLODIPINE BESYLATE 2.5 MG/1
2.5 TABLET ORAL
Qty: 30 TABLET | Refills: 5 | Status: SHIPPED | OUTPATIENT
Start: 2021-08-18 | End: 2021-08-23

## 2021-08-18 ASSESSMENT — ENCOUNTER SYMPTOMS
CHEST TIGHTNESS: 0
COUGH: 0
CONSTIPATION: 0
ABDOMINAL PAIN: 0
SHORTNESS OF BREATH: 1
WHEEZING: 0
SORE THROAT: 0

## 2021-08-18 NOTE — PROGRESS NOTES
Amlodipine       Chief Complaint   Patient presents with    Follow-up     1 week     History of presenting illness:  Misti Duque is a67 y.o. female who presents today for follow up on her chronic medical conditions as noted below.       Patient presents today here for follow-up  When I saw her in July she had multiple lab abnormalities, multiple tests were ordered  We had difficulty communicating with the patient since she did not answer phone eventually letter was sent to the patient and today she is here for follow-up together with her daughter  Patient Active Problem List    Diagnosis Date Noted    Type 2 diabetes mellitus with diabetic neuropathy 07/26/2021    Essential hypertension 02/11/2019    Elevated TSH 08/07/2018    Vitamin D deficiency 09/10/2017    Pure hypercholesterolemia 09/10/2017    Type 2 diabetes mellitus without complication, without long-term current use of insulin ([address] 09/10/2017    Localized osteoporosis without current pathological fracture 09/10/2017     Overview Note:     2017 hip neck -2.6; lspine -1/8  8/2020 hip neck -2.6/ L spine L1 -1.6      Generalized anxiety disorder 09/10/2017    Former smoker 09/10/2017    Numbness 04/07/2017    Imbalance 04/07/2017    Estrogen receptor negative tumor status 08/06/2008     Past Medical History:   Diagnosis Date    Back pain     Breast cancer ([address]     Hyperlipidemia     Pneumonia     Type II or unspecified type diabetes mellitus without mention of complication, not stated as uncontrolled     diet controlled    Varicose veins     Wears glasses       Past Surgical History:   Procedure Laterality Date    BREAST BIOPSY  8-6-2008    U/S Guided Mammotome Biopsy Left Breast x 2  infiltrating ductal carcinoma, grade III, ER/MS negative    BREAST BIOPSY  8-5-2009    Stereotactic biopsy right breast  No evidence of malignancy    BREAST BIOPSY Left 8/2015    COLONOSCOPY      MASTECTOMY, PARTIAL  8-     Left partial mastectomy and left sentinel node biopsy  2.6 cm infiltrating ductal carcinoma, grade III, 0/2 nodes positive, immunohistochemistry negative for micrometastasis     Current Outpatient Medications   Medication Sig Dispense Refill    amLODIPine (NORVASC) 2.5 MG tablet Take 1 tablet by mouth Daily with supper 30 tablet 5    glipiZIDE (GLUCOTROL) 5 MG tablet TAKE ONE-HALF TABLET twice/ day 90 tablet 1    triamterene-hydroCHLOROthiazide (MAXZIDE-25) 37.5-25 MG per tablet Take 1/2 tablet in am (Patient not taking: Reported on 2021) 30 tablet 3    pravastatin (PRAVACHOL) 40 MG tablet TAKE ONE TABLET BY MOUTH ONCE DAILY 90 tablet 1    potassium chloride (KLOR-CON M) 10 MEQ extended release tablet Take 1 tablet by mouth daily 90 tablet 1    omeprazole (PRILOSEC) 20 MG delayed release capsule Take 1 capsule by mouth every morning (before breakfast) 90 capsule 1    losartan (COZAAR) 100 MG tablet Take 1/2 tablet twice daily 90 tablet 1    magnesium 30 MG tablet Take 30 mg by mouth 2 times daily      Calcium-Vitamin D 600-200 MG-UNIT TABS Take by mouth every morning (before breakfast)       aspirin 81 MG EC tablet Take 81 mg by mouth daily.  Ascorbic Acid (VITAMIN C) 500 MG tablet Take 500 mg by mouth daily. No current facility-administered medications for this visit. Allergies   Allergen Reactions    Tape Minier Mat Tape]      Latex Tape     Social History     Tobacco Use    Smoking status: Former Smoker     Packs/day: 0.25     Years: 20.00     Pack years: 5.00     Types: Pipe, Cigarettes     Quit date: 1979     Years since quittin.5    Smokeless tobacco: Never Used   Substance Use Topics    Alcohol use: No     Comment: occ      Family History   Problem Relation Age of Onset    Diabetes Father     Heart Disease Father     Hypertension Mother        Review of Systems   Constitutional: Positive for fatigue. Negative for chills and fever. HENT: Positive for congestion.  Negative for ear pain, nosebleeds, postnasal drip and sore throat. Respiratory: Positive for shortness of breath. Negative for cough, chest tightness and wheezing. Cardiovascular: Negative for chest pain, palpitations and leg swelling. Gastrointestinal: Negative for abdominal pain and constipation. Genitourinary: Negative for dysuria and urgency. Musculoskeletal: Positive for arthralgias. Skin: Negative for rash. Neurological: Negative for dizziness and headaches. Psychiatric/Behavioral: Positive for sleep disturbance. Vitals:    08/18/21 1023 08/18/21 1029 08/18/21 1058   BP: (!) 156/90 (!) 160/86 (!) 156/80   Site: Right Upper Arm Right Upper Arm    Position: Sitting     Cuff Size: Large Adult     Pulse: 78     Resp: 18     SpO2: 98%     Weight: 195 lb (88.5 kg)       Body mass index is 31.47 kg/m². Physical Exam  Constitutional:       Appearance: She is well-developed. HENT:      Right Ear: External ear normal.      Left Ear: External ear normal.      Mouth/Throat:      Pharynx: No oropharyngeal exudate. Eyes:      Conjunctiva/sclera: Conjunctivae normal.      Pupils: Pupils are equal, round, and reactive to light. Neck:      Thyroid: No thyromegaly. Vascular: No JVD. Cardiovascular:      Rate and Rhythm: Normal rate. Heart sounds: Normal heart sounds. No murmur heard. Pulmonary:      Effort: No respiratory distress. Breath sounds: Rales present. No wheezing. Chest:      Chest wall: No tenderness. Abdominal:      General: Bowel sounds are normal.      Palpations: Abdomen is soft. Musculoskeletal:      Cervical back: Neck supple. Lymphadenopathy:      Cervical: No cervical adenopathy. Skin:     Findings: No rash.          Lab Review   Orders Only on 08/09/2021   Component Date Value    Sodium 08/09/2021 141     Potassium 08/09/2021 4.1     Chloride 08/09/2021 104     CO2 08/09/2021 26     Anion Gap 08/09/2021 11     Glucose 08/09/2021 108     BUN 08/09/2021 32*    CREATININE 08/09/2021 1.6*    GFR Non- 08/09/2021 31*    GFR  08/09/2021 38*    Calcium 08/09/2021 9.7    Hospital Outpatient Visit on 07/30/2021   Component Date Value    Left Ventricular Ejectio* 07/30/2021 53     LVEF MODALITY 07/30/2021 ECHO    Orders Only on 07/22/2021   Component Date Value    Hep C Ab Interp 07/22/2021 Non-Reactive     Sodium 07/22/2021 140     Potassium 07/22/2021 4.0     Chloride 07/22/2021 105     CO2 07/22/2021 25     Anion Gap 07/22/2021 10     Glucose 07/22/2021 107     BUN 07/22/2021 28*    CREATININE 07/22/2021 1.2*    GFR Non- 07/22/2021 43*    GFR  07/22/2021 52*    Calcium 07/22/2021 9.6     Total Protein 07/22/2021 7.8     Albumin 07/22/2021 4.1     Total Bilirubin 07/22/2021 0.5     Alkaline Phosphatase 07/22/2021 78     ALT 07/22/2021 9     AST 07/22/2021 16     Hemoglobin A1C 07/22/2021 6.9*    Cholesterol, Total 07/22/2021 175     Triglycerides 07/22/2021 123     HDL 07/22/2021 46*    LDL Calculated 07/22/2021 104     Vit D, 25-Hydroxy 07/22/2021 44.9            ASSESSMENT/PLAN:  CKD stage III  Declined GFR   We have been trying to contact this patient since July 26  Patient did not respond to her phone calls and only called back this week on 8/16/2021 which is 3 weeks later and only we were able to give her instructions for medication changes this week    GFR  7/2021 is 43  Subsequently repeat GFR even lower at 31  Repeat lab testing needed today  Her prior baseline GFR beginning of 2021 around 60   Lab results today  GFR on 8/18/2021 is 43  Recommend to obtain renal ultrasound, orders placed  Nephrology referral has been placed  Increased fluid intake is recommended      HTN  Blood pressure readings reviewed  After reviewing today's lab I need to make following changes to her current regimen  1.  Losartan 100 mg half tablet, continue taking half tablet twice daily  2. Resume Maxide 37.5/20 5/2 tablet daily in a.m.  3. Add amlodipine 2.5 mg p.o. daily  4. Blood pressure readings to me in 1 to 2 weeks     Exertional dyspnea  Recent echocardiogram shows ventricular wall hypokinesis  Cardiology appointment requested for opinion    Bilateral pulmonary infiltrates, possibly atelectasis on chest x-ray in July 2021  Repeat chest x-ray as needed  Orders placed for today       Orders Placed This Encounter   Procedures    Magnesium     New Prescriptions    AMLODIPINE (NORVASC) 2.5 MG TABLET    Take 1 tablet by mouth Daily with supper         No follow-ups on file. There are no Patient Instructions on file for this visit. EMR Dragon/transcription disclaimer:Significant part of this  encounter note is electronic transcription/translationof spoken language to printed text. The electronic translation of spoken language may be erroneous, or at times, nonsensical words or phrases may be inadvertently transcribed.  Although I have reviewed the note for sucherrors, some may still exist.

## 2021-08-23 ENCOUNTER — OFFICE VISIT (OUTPATIENT)
Dept: CARDIOLOGY CLINIC | Age: 81
End: 2021-08-23
Payer: MEDICARE

## 2021-08-23 VITALS
SYSTOLIC BLOOD PRESSURE: 138 MMHG | HEIGHT: 66 IN | HEART RATE: 54 BPM | BODY MASS INDEX: 31.34 KG/M2 | WEIGHT: 195 LBS | DIASTOLIC BLOOD PRESSURE: 84 MMHG

## 2021-08-23 DIAGNOSIS — R06.02 SOB (SHORTNESS OF BREATH): ICD-10-CM

## 2021-08-23 DIAGNOSIS — E78.00 PURE HYPERCHOLESTEROLEMIA: ICD-10-CM

## 2021-08-23 DIAGNOSIS — I10 ESSENTIAL HYPERTENSION: Primary | ICD-10-CM

## 2021-08-23 PROCEDURE — G8399 PT W/DXA RESULTS DOCUMENT: HCPCS | Performed by: INTERNAL MEDICINE

## 2021-08-23 PROCEDURE — 1090F PRES/ABSN URINE INCON ASSESS: CPT | Performed by: INTERNAL MEDICINE

## 2021-08-23 PROCEDURE — G8417 CALC BMI ABV UP PARAM F/U: HCPCS | Performed by: INTERNAL MEDICINE

## 2021-08-23 PROCEDURE — 1123F ACP DISCUSS/DSCN MKR DOCD: CPT | Performed by: INTERNAL MEDICINE

## 2021-08-23 PROCEDURE — 4040F PNEUMOC VAC/ADMIN/RCVD: CPT | Performed by: INTERNAL MEDICINE

## 2021-08-23 PROCEDURE — G8427 DOCREV CUR MEDS BY ELIG CLIN: HCPCS | Performed by: INTERNAL MEDICINE

## 2021-08-23 PROCEDURE — 93000 ELECTROCARDIOGRAM COMPLETE: CPT | Performed by: INTERNAL MEDICINE

## 2021-08-23 PROCEDURE — 99214 OFFICE O/P EST MOD 30 MIN: CPT | Performed by: INTERNAL MEDICINE

## 2021-08-23 PROCEDURE — 1036F TOBACCO NON-USER: CPT | Performed by: INTERNAL MEDICINE

## 2021-08-23 ASSESSMENT — ENCOUNTER SYMPTOMS
VOMITING: 0
FACIAL SWELLING: 0
WHEEZING: 0
SORE THROAT: 0
EYE DISCHARGE: 0
COUGH: 0
NAUSEA: 0
ABDOMINAL DISTENTION: 0
EYE PAIN: 0
BLOOD IN STOOL: 0
SHORTNESS OF BREATH: 0
APNEA: 0
ABDOMINAL PAIN: 0
CHEST TIGHTNESS: 0
EYE REDNESS: 0
CONSTIPATION: 0
DIARRHEA: 0

## 2021-08-23 NOTE — PROGRESS NOTES
Cardiology Office Visit Note  [address]  [id number]  Phone: [phone]  Fax: [phone]                            Date:  8/23/2021  Patient: Rojelio Cannon  Age:  79 y. o., 1940    Referral: Flaco Murray MD    REASON FOR VISIT:  New Patient (soa ) and Hyperlipidemia         PROBLEM LIST:    Patient Active Problem List    Diagnosis Date Noted    Type 2 diabetes mellitus with diabetic neuropathy 07/26/2021     Priority: Low    Essential hypertension 02/11/2019     Priority: Low    Elevated TSH 08/07/2018     Priority: Low    Vitamin D deficiency 09/10/2017     Priority: Low    Pure hypercholesterolemia 09/10/2017     Priority: Low    Type 2 diabetes mellitus without complication, without long-term current use of insulin ([facility] 09/10/2017     Priority: Low    Localized osteoporosis without current pathological fracture 09/10/2017     Priority: Low     Overview Note:     2017 hip neck -2.6; lspine -1/8  8/2020 hip neck -2.6/ L spine L1 -1.6      Generalized anxiety disorder 09/10/2017     Priority: Low    Former smoker 09/10/2017     Priority: Low    Numbness 04/07/2017     Priority: Low    Imbalance 04/07/2017     Priority: Low    Estrogen receptor negative tumor status 08/06/2008     Priority: Low         PRESENTATION: Rojelio Cannon is a 79y.o. year old female who has a past medical history of chronic kidney disease, obesity, hypertension breast cancer status post lumpectomy and chemo and radiation therapy over 10 years ago. 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 She is seen today for further evaluation management of shortness of breath and dyspnea on exertion which has been ongoing for some time now. Symptoms are not lifestyle limiting however concerning. She also more recently developed substernal chest pressure which is not specifically correlated with exertion.   Her blood pressure on her home monitor has been trending favorably. Her blood pressure in office today is acceptable (she had not taken her morning medications. Blood pressure today 138/84). She is presently on Cozaar and Maxide. It was recommended that she would start low-dose amlodipine in the past week or so however she has not yet picked this up from her pharmacy. Notably she was also on metoprolol at some time and this was discontinued 2 weeks ago as as per her account her palpitations had stopped and metoprolol was deemed no longer needed. On review of her diagnostics she had a recent echocardiogram which was largely reassuring with ejection fraction 50 to 55% without major valvular heart disease and evidence for mild pulmonary hypertension. Most recent labs creatinine 1.2 (down from 1.6) and GFR 43 (up from 31). HDL 46, triglycerides 123 and . Chest x-ray dated 8/18/2021 stable from prior exams. REVIEW OF SYSTEMS:  Review of Systems   Constitutional: Negative for chills, fatigue and fever. HENT: Negative for congestion, facial swelling, hearing loss and sore throat. Eyes: Negative for pain, discharge, redness and visual disturbance. Respiratory: Negative for apnea, cough, chest tightness, shortness of breath and wheezing. Cardiovascular: Negative for chest pain, palpitations and leg swelling. Gastrointestinal: Negative for abdominal distention, abdominal pain, blood in stool, constipation, diarrhea, nausea and vomiting. Endocrine: Negative for polydipsia, polyphagia and polyuria. Genitourinary: Negative for dysuria, flank pain, frequency and hematuria. Musculoskeletal: Negative for joint swelling, myalgias and neck pain. Skin: Negative for pallor and rash. Neurological: Negative for dizziness, syncope, speech difficulty, light-headedness, numbness and headaches. Psychiatric/Behavioral: Negative for confusion, hallucinations and sleep disturbance.        Past Medical History:      Diagnosis Date    Back pain     [facility] cancer ([facility]     Chronic kidney disease     Hyperlipidemia     Pneumonia     Type II or unspecified type diabetes mellitus without mention of complication, not stated as uncontrolled     diet controlled    Varicose veins     Wears glasses        Past Surgical History:      Procedure Laterality Date    BREAST BIOPSY  8-6-2008    U/S Guided Mammotome Biopsy Left Breast x 2  infiltrating ductal carcinoma, grade III, ER/NC negative    BREAST BIOPSY  8-5-2009    Stereotactic biopsy right breast  No evidence of malignancy    BREAST BIOPSY Left 8/2015    COLONOSCOPY      MASTECTOMY, PARTIAL  8-     Left partial mastectomy and left sentinel node biopsy  2.6 cm infiltrating ductal carcinoma, grade III, 0/2 nodes positive, immunohistochemistry negative for micrometastasis       Medications:  Current Outpatient Medications   Medication Sig Dispense Refill    glipiZIDE (GLUCOTROL) 5 MG tablet TAKE ONE-HALF TABLET twice/ day 90 tablet 1    triamterene-hydroCHLOROthiazide (MAXZIDE-25) 37.5-25 MG per tablet Take 1/2 tablet in am 30 tablet 3    pravastatin (PRAVACHOL) 40 MG tablet TAKE ONE TABLET BY MOUTH ONCE DAILY 90 tablet 1    potassium chloride (KLOR-CON M) 10 MEQ extended release tablet Take 1 tablet by mouth daily 90 tablet 1    omeprazole (PRILOSEC) 20 MG delayed release capsule Take 1 capsule by mouth every morning (before breakfast) 90 capsule 1    losartan (COZAAR) 100 MG tablet Take 1/2 tablet twice daily 90 tablet 1    magnesium 30 MG tablet Take 30 mg by mouth 2 times daily      Calcium-Vitamin D 600-200 MG-UNIT TABS Take by mouth every morning (before breakfast)       aspirin 81 MG EC tablet Take 81 mg by mouth daily.  Ascorbic Acid (VITAMIN C) 500 MG tablet Take 500 mg by mouth daily. No current facility-administered medications for this visit.        Allergies:  Tape Chalice Lauth tape]    Social History:  Social History Occupational History    Not on file   Tobacco Use    Smoking status: Former Smoker     Packs/day: 0.25     Years: 20.00     Pack years: 5.00     Types: Pipe, Cigarettes     Quit date: 1979     Years since quittin.5    Smokeless tobacco: Never Used   Vaping Use    Vaping Use: Never used   Substance and Sexual Activity    Alcohol use: No     Comment: occ    Drug use: No    Sexual activity: Not on file         Family History:       Problem Relation Age of Onset    Diabetes Father     Heart Disease Father     Hypertension Mother          Physical Examination:  /84   Pulse 54   Ht 5' 6\" (1.676 m)   Wt 195 lb (88.5 kg)   BMI 31.47 kg/m²   Physical Exam  Vitals reviewed. Constitutional:       General: She is not in acute distress. Appearance: Normal appearance. She is not ill-appearing, toxic-appearing or diaphoretic. HENT:      Head: Normocephalic and atraumatic. Eyes:      General: No scleral icterus. Right eye: No discharge. Left eye: No discharge. Conjunctiva/sclera: Conjunctivae normal.   Neck:      Vascular: No carotid bruit. Cardiovascular:      Rate and Rhythm: Normal rate and regular rhythm. No extrasystoles are present. Chest Wall: PMI is not displaced. No thrill. Pulses:           Carotid pulses are 3+ on the right side and 3+ on the left side. Radial pulses are 3+ on the right side and 3+ on the left side. Dorsalis pedis pulses are 3+ on the right side and 3+ on the left side. Posterior tibial pulses are 3+ on the right side and 3+ on the left side. Heart sounds: S1 normal and S2 normal. No murmur heard. No friction rub. No gallop. Pulmonary:      Effort: Pulmonary effort is normal. No tachypnea or respiratory distress. Breath sounds: Normal breath sounds. No stridor. No wheezing, rhonchi or rales. Chest:      Chest wall: No tenderness.    Abdominal:      General: Bowel sounds are normal. There is no distension. Palpations: Abdomen is soft. There is no mass. Tenderness: There is no abdominal tenderness. There is no guarding. Musculoskeletal:      Cervical back: Normal range of motion and neck supple. No rigidity. Right lower leg: No edema. Left lower leg: Edema present. Skin:     General: Skin is warm and dry. Coloration: Skin is not jaundiced. Findings: No erythema or rash. Neurological:      General: No focal deficit present. Mental Status: She is alert and oriented to person, place, and time. Mental status is at baseline. Psychiatric:         Mood and Affect: Mood normal.         Behavior: Behavior normal.         Thought Content: Thought content normal.           Labs:   CBC: No results found for: CBC   BMP: No results found for: BMP    BNP: No results found for: BNPINT   PT/INR:   Prothrombin Time   Date Value Ref Range Status   07/23/2011 11.20 9.7 - 12.5 SEC Final     INR   Date Value Ref Range Status   07/23/2011 1.03 0.90 - 1.10 Final     Comment:     INR  < or = 1.3  Normal  INR = 2.0 - 3.0  Therapeutic  INR = 2.5 - 3.5  Therapeutic for patients with mechanical                   prosthetic heart valve  MI prophylaxis  & MI prophylaxisINR  > or = 3.5  Abnormal/Elevated  INR  > or = 5.0  Critical (requires immediate physician                   notification)       APTT:  No results found for: APTT   CARDIAC ENZYMES:   Troponin I   Date Value Ref Range Status   07/24/2011 < 0.01 0.000 - 0.780 NG/ML Final     Comment:     0-0.10 ng/ml     Reference range for individuals without                   ischemic myocardial disease. Clinical                   correlation suggested. 0.11-.78 ng/ml   Values of troponin in this range suggest                   that a repeat assay be drawn 6-8 hours                   after the current specimen. 0.79-7.5 ng/ml   Values in this range suggest myocardial                   injury.   Peak troponin concentrations between this range are associated with                    unstable angina and percutaneous coronary                   intervention. >or =7.5 ng/ml   In the absence of external chest trauma                   values of the troponin concentration in                   this range suggest myocardial infarction. LIPID PANEL: No results found for: [facility]  LIVER PROFILE:   AST   Date Value Ref Range Status   07/22/2021 16 5 - 32 U/L Final     ALT   Date Value Ref Range Status   07/22/2021 9 5 - 33 U/L Final     Albumin   Date Value Ref Range Status   07/22/2021 4.1 3.5 - 5.2 g/dL Final              Imaging:    - EKG :  Sinus bradycardia 57 bpm.  Blocked PACs. ASSESSMENT and PLAN:    Constellation of symptoms including shortness of breath, dyspnea on exertion and substernal chest pain in patient with multiple cardiovascular risk factors including diabetes mellitus, dyslipidemia, hypertension, advancing age and chronic kidney disease. Recent echocardiogram notable for preserved ejection fraction without major valvular heart disease. Patient with notable mild pulmonary hypertension and history of COPD and radiation to the chest (breast cancer diagnosed greater than 10 years ago status post chemotherapy and radiation). EKG with frequent supraventricular ectopy (blocked APCs). Obtain treadmill nuclear medicine stress test for further risk evaluation, exclude inducible myocardial ischemia  Continue baby aspirin daily  Continue statin therapy for goal LDL less than 70    Essential hypertension, goal blood pressure less than 130/80  Chronic kidney disease, last creatinine 1.2 and GFR 43  Peripheral edema  Continue current dose of losartan and triamterenehydrochlorothiazide, monitor renal function closely. Avoid hypotensive episodes. Has not yet started amlodipine with normal blood pressure in the office as well as home. Discontinue amlodipine at this time.   Patient has been asked to monitor her blood pressure and heart rate at home. On return visit to bring in blood pressure monitoring log and sphygmomanometer to verify accuracy of device. Low-sodium diet, 2 g per 24 hours. Appointment scheduled with nephrologist in the near future. Non-insulin-dependent diabetes mellitus  On glipizide. Titration of medications as warranted as per primary care provider        Return in about 4 weeks (around 9/20/2021). Electronically signed by Billie Bowser MD on 8/23/2021 at 9:11 AM    Billie Bowser MD, ALFRED, [facility]  Noninvasive Cardiology Consultant    This dictation was generated by voice recognition computer software. Although all attempts are made to edit the dictation for accuracy, there may be errors in the transcription that are not intended.

## 2021-08-27 ENCOUNTER — HOSPITAL ENCOUNTER (OUTPATIENT)
Dept: ULTRASOUND IMAGING | Age: 81
Discharge: HOME | End: 2021-08-27
Payer: MEDICARE

## 2021-08-27 DIAGNOSIS — R94.4 DECREASED CALCULATED GFR: ICD-10-CM

## 2021-08-27 PROCEDURE — 76770 US EXAM ABDO BACK WALL COMP: CPT

## 2021-08-30 ENCOUNTER — HOSPITAL ENCOUNTER (OUTPATIENT)
Dept: WOMENS IMAGING | Age: 81
Discharge: HOME | End: 2021-08-30
Payer: MEDICARE

## 2021-08-30 DIAGNOSIS — Z12.31 VISIT FOR SCREENING MAMMOGRAM: ICD-10-CM

## 2021-08-30 PROCEDURE — 77067 SCR MAMMO BI INCL CAD: CPT

## 2021-08-31 ENCOUNTER — TELEPHONE (OUTPATIENT)
Dept: INTERNAL MEDICINE | Age: 81
End: 2021-08-31

## 2021-08-31 DIAGNOSIS — Z17.1 MALIGNANT NEOPLASM OF CENTRAL PORTION OF LEFT BREAST IN FEMALE, ESTROGEN RECEPTOR NEGATIVE (HCC): Primary | ICD-10-CM

## 2021-08-31 DIAGNOSIS — C50.112 MALIGNANT NEOPLASM OF CENTRAL PORTION OF LEFT BREAST IN FEMALE, ESTROGEN RECEPTOR NEGATIVE (HCC): Primary | ICD-10-CM

## 2021-08-31 NOTE — TELEPHONE ENCOUNTER
When patient went to cardiology office her blood pressure was normal at that visit  Per cardiology notes they wanted to see her readings and then decide on further recommendations  Blood pressure needs to be communicated to cardiology for further instructions

## 2021-08-31 NOTE — TELEPHONE ENCOUNTER
Blood pressure readings that patient sent to us now show the blood pressure and heart rate are in good range  She would need to continue medications as per our last office visit   1. Losartan 100 mg half tablet, continue taking half tablet twice daily   2. Maxide 37.5/20 5/2 tablet daily in a.m.    3. amlodipine 2.5 mg p.o. daily

## 2021-09-01 ENCOUNTER — LAB (OUTPATIENT)
Dept: LAB | Facility: HOSPITAL | Age: 81
End: 2021-09-01

## 2021-09-01 ENCOUNTER — OFFICE VISIT (OUTPATIENT)
Dept: ONCOLOGY | Facility: CLINIC | Age: 81
End: 2021-09-01

## 2021-09-01 VITALS
DIASTOLIC BLOOD PRESSURE: 62 MMHG | WEIGHT: 192.1 LBS | BODY MASS INDEX: 30.87 KG/M2 | SYSTOLIC BLOOD PRESSURE: 128 MMHG | OXYGEN SATURATION: 92 % | HEIGHT: 66 IN | HEART RATE: 72 BPM | TEMPERATURE: 97.2 F | RESPIRATION RATE: 16 BRPM

## 2021-09-01 DIAGNOSIS — Z17.1 MALIGNANT NEOPLASM OF CENTRAL PORTION OF LEFT BREAST IN FEMALE, ESTROGEN RECEPTOR NEGATIVE (HCC): ICD-10-CM

## 2021-09-01 DIAGNOSIS — N18.31 STAGE 3A CHRONIC KIDNEY DISEASE (HCC): ICD-10-CM

## 2021-09-01 DIAGNOSIS — C50.112 MALIGNANT NEOPLASM OF CENTRAL PORTION OF LEFT FEMALE BREAST, UNSPECIFIED ESTROGEN RECEPTOR STATUS (HCC): ICD-10-CM

## 2021-09-01 DIAGNOSIS — D64.9 ANEMIA, UNSPECIFIED TYPE: ICD-10-CM

## 2021-09-01 DIAGNOSIS — C50.112 MALIGNANT NEOPLASM OF CENTRAL PORTION OF LEFT BREAST IN FEMALE, ESTROGEN RECEPTOR NEGATIVE (HCC): ICD-10-CM

## 2021-09-01 DIAGNOSIS — Z45.2 ENCOUNTER FOR CARE RELATED TO VASCULAR ACCESS PORT: ICD-10-CM

## 2021-09-01 DIAGNOSIS — D64.9 ANEMIA, UNSPECIFIED TYPE: Primary | ICD-10-CM

## 2021-09-01 LAB
ALBUMIN SERPL-MCNC: 3.9 G/DL (ref 3.5–5.2)
ALBUMIN/GLOB SERPL: 1.1 G/DL
ALP SERPL-CCNC: 87 U/L (ref 39–117)
ALT SERPL W P-5'-P-CCNC: 9 U/L (ref 1–33)
ANION GAP SERPL CALCULATED.3IONS-SCNC: 10 MMOL/L (ref 5–15)
AST SERPL-CCNC: 17 U/L (ref 1–32)
BASOPHILS # BLD AUTO: 0.04 10*3/MM3 (ref 0–0.2)
BASOPHILS NFR BLD AUTO: 0.5 % (ref 0–1.5)
BILIRUB SERPL-MCNC: 0.5 MG/DL (ref 0–1.2)
BUN SERPL-MCNC: 22 MG/DL (ref 8–23)
BUN/CREAT SERPL: 19 (ref 7–25)
CALCIUM SPEC-SCNC: 9.2 MG/DL (ref 8.6–10.5)
CEA SERPL-MCNC: 2.76 NG/ML
CHLORIDE SERPL-SCNC: 102 MMOL/L (ref 98–107)
CO2 SERPL-SCNC: 26 MMOL/L (ref 22–29)
CREAT SERPL-MCNC: 1.16 MG/DL (ref 0.57–1)
DEPRECATED RDW RBC AUTO: 44.6 FL (ref 37–54)
EOSINOPHIL # BLD AUTO: 0.2 10*3/MM3 (ref 0–0.4)
EOSINOPHIL NFR BLD AUTO: 2.7 % (ref 0.3–6.2)
ERYTHROCYTE [DISTWIDTH] IN BLOOD BY AUTOMATED COUNT: 13.2 % (ref 12.3–15.4)
FERRITIN SERPL-MCNC: 91.14 NG/ML (ref 13–150)
GFR SERPL CREATININE-BSD FRML MDRD: 45 ML/MIN/1.73
GLOBULIN UR ELPH-MCNC: 3.6 GM/DL
GLUCOSE SERPL-MCNC: 228 MG/DL (ref 65–99)
HCT VFR BLD AUTO: 33.1 % (ref 34–46.6)
HGB BLD-MCNC: 11.1 G/DL (ref 12–15.9)
IMM GRANULOCYTES # BLD AUTO: 0.02 10*3/MM3 (ref 0–0.05)
IMM GRANULOCYTES NFR BLD AUTO: 0.3 % (ref 0–0.5)
IRON 24H UR-MRATE: 104 MCG/DL (ref 37–145)
IRON SATN MFR SERPL: 30 % (ref 20–50)
LYMPHOCYTES # BLD AUTO: 2.33 10*3/MM3 (ref 0.7–3.1)
LYMPHOCYTES NFR BLD AUTO: 31.3 % (ref 19.6–45.3)
MAGNESIUM SERPL-MCNC: 1.7 MG/DL (ref 1.6–2.4)
MCH RBC QN AUTO: 31.4 PG (ref 26.6–33)
MCHC RBC AUTO-ENTMCNC: 33.5 G/DL (ref 31.5–35.7)
MCV RBC AUTO: 93.8 FL (ref 79–97)
MONOCYTES # BLD AUTO: 0.73 10*3/MM3 (ref 0.1–0.9)
MONOCYTES NFR BLD AUTO: 9.8 % (ref 5–12)
NEUTROPHILS NFR BLD AUTO: 4.13 10*3/MM3 (ref 1.7–7)
NEUTROPHILS NFR BLD AUTO: 55.4 % (ref 42.7–76)
NRBC BLD AUTO-RTO: 0 /100 WBC (ref 0–0.2)
PLATELET # BLD AUTO: 354 10*3/MM3 (ref 140–450)
PMV BLD AUTO: 9.2 FL (ref 6–12)
POTASSIUM SERPL-SCNC: 3.8 MMOL/L (ref 3.5–5.2)
PROT SERPL-MCNC: 7.5 G/DL (ref 6–8.5)
RBC # BLD AUTO: 3.53 10*6/MM3 (ref 3.77–5.28)
SODIUM SERPL-SCNC: 138 MMOL/L (ref 136–145)
TIBC SERPL-MCNC: 343 MCG/DL (ref 298–536)
TRANSFERRIN SERPL-MCNC: 230 MG/DL (ref 200–360)
WBC # BLD AUTO: 7.45 10*3/MM3 (ref 3.4–10.8)

## 2021-09-01 PROCEDURE — 99213 OFFICE O/P EST LOW 20 MIN: CPT | Performed by: NURSE PRACTITIONER

## 2021-09-01 PROCEDURE — 36415 COLL VENOUS BLD VENIPUNCTURE: CPT

## 2021-09-01 PROCEDURE — 83540 ASSAY OF IRON: CPT

## 2021-09-01 PROCEDURE — 84466 ASSAY OF TRANSFERRIN: CPT

## 2021-09-01 PROCEDURE — 83735 ASSAY OF MAGNESIUM: CPT

## 2021-09-01 PROCEDURE — 82378 CARCINOEMBRYONIC ANTIGEN: CPT

## 2021-09-01 PROCEDURE — 86300 IMMUNOASSAY TUMOR CA 15-3: CPT

## 2021-09-01 PROCEDURE — 85025 COMPLETE CBC W/AUTO DIFF WBC: CPT

## 2021-09-01 PROCEDURE — 82728 ASSAY OF FERRITIN: CPT | Performed by: NURSE PRACTITIONER

## 2021-09-01 PROCEDURE — 80053 COMPREHEN METABOLIC PANEL: CPT

## 2021-09-01 RX ORDER — SODIUM CHLORIDE 0.9 % (FLUSH) 0.9 %
10 SYRINGE (ML) INJECTION AS NEEDED
Status: CANCELLED | OUTPATIENT
Start: 2021-09-01

## 2021-09-01 RX ORDER — SODIUM CHLORIDE 0.9 % (FLUSH) 0.9 %
10 SYRINGE (ML) INJECTION AS NEEDED
Status: DISCONTINUED | OUTPATIENT
Start: 2021-09-01 | End: 2021-09-15 | Stop reason: HOSPADM

## 2021-09-01 RX ORDER — HEPARIN SODIUM (PORCINE) LOCK FLUSH IV SOLN 100 UNIT/ML 100 UNIT/ML
500 SOLUTION INTRAVENOUS AS NEEDED
Status: CANCELLED | OUTPATIENT
Start: 2021-09-01

## 2021-09-01 RX ORDER — HEPARIN SODIUM (PORCINE) LOCK FLUSH IV SOLN 100 UNIT/ML 100 UNIT/ML
500 SOLUTION INTRAVENOUS AS NEEDED
Status: DISCONTINUED | OUTPATIENT
Start: 2021-09-01 | End: 2021-09-15 | Stop reason: HOSPADM

## 2021-09-01 RX ADMIN — Medication 10 ML: at 12:33

## 2021-09-01 RX ADMIN — HEPARIN SODIUM (PORCINE) LOCK FLUSH IV SOLN 100 UNIT/ML 500 UNITS: 100 SOLUTION at 12:33

## 2021-09-01 NOTE — PROGRESS NOTES
[facility]  HEMATOLOGY & ONCOLOGY    [facility] HEMATOLOGY AND ONCOLOGY  [address]  formerly [facility] [address] [phone]    Patient Name: Lorena Valdes  Encounter Date: 09/01/2020  YOB: 1940  Patient Number: [id number]    Chief Complaint   Patient presents with   • Breast Cancer     follow up       REASON FOR VISIT:  Ms. Valdes is a 81 yo female with a history of breast cancer that was diagonsed in 2008.  She had a Stage IIA left breast cancer that was hormone receptor negative and Her 2 positive by FISH.  She underwent lumpectomy then adjuvant Adriamycin Cytoxan.  She only had one dose of Adriamycin and developed cardiomyopathy and thereofre the adriamycin was discontinued.  She had then weekly Taxol with all chemotherapy being completed in March 2009.  She then had adjuvant radiation therapy.     She continues to do very well. She has had no recurrence of her disease.  She continues to have mammograms with the last one being on August 30,2021.  Her last bone density was also on 8/25/2020 that showed osteoporosis which she states is being followed by her pcp.  She plans to see Elizabeth at Dr Lamb on 9.9.2021.  She also sees nephrology 9.7.2021 as well.      Her labs today show a WBC of 7.45, Hgb of 11.1 and platelets of 354,000. Her GFR is at 45ml/min and nonfasting glucose of 220. CMP is otherwise normal.      She has no complaints today and feels well other than hip pain bilaterally at times.  She has maintained a good weight.  She has had no GI upset.  No headaches or dizziness.  No fever chills night sweats.  No shortness of breath or cough.      Oncology/Hematology History Overview Note   Tp: [id number] Mp: M0 Size: 2.6 cm Histopathologic Grade: G3? Histopathologic Type: DuctalInvasive  (NOS), left central? Stage  Grouping: IIA  08/06/2008: Core biopsy: at left breast mass, 12 o'clock: Infiltrating ductal  carcinoma, gr 3, with foci of tumor necrosis? DNA index 1.78  (aneuploid), ER/AK 0%, her2/kofi 2+ (FISH ), p53 0%, Ki67  59%.  08/28/2008: MastectomyL  partial, USguided  needle localization, with SNB: IDC, gr 3, tumor measures 2.6 cm in greatest dimension,  with necrosis seen, extending to original deep margin....additional deep margin adds 1 cm to final margin of excision, residual fibrocystic  mastopathy? 2 left axillary sentinel nodes: 0/2+ve  Dose dense Adriamycin/cyclophosphamide, followed by weekly Taxol administered between September 2008 and March 2009 .  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  Followed by Radiation therapy     Malignant neoplasm of central portion of left female breast (CMS/HCC)   11/9/2016 Initial Diagnosis    Malignant neoplasm of central portion of left female breast     1/13/2020 - 8/11/2020 Chemotherapy    OP CENTRAL VENOUS ACCESS DEVICE ACCESS, CARE, AND MAINTENANCE (CVAD)           PAST MEDICAL HISTORY:  ALLERGIES:  Allergies   Allergen Reactions   • Adhesive Tape Hives     Latex Tape       CURRENT MEDICATIONS:  Outpatient Encounter Medications as of 9/1/2021   Medication Sig Dispense Refill   • aspirin 81 MG EC tablet Take 81 mg by mouth daily.       • Calcium Carb-Cholecalciferol (CALCIUM 600+D3) 600-200 MG-UNIT tablet Take 1 tablet by mouth 2 (Two) Times a Day.     • Cholecalciferol (VITAMIN D3) 400 UNITS capsule Take 1 capsule by mouth Daily.     • glipiZIDE (GLUCOTROL) 5 MG tablet Take 5 mg by mouth Daily.     • ibuprofen (ADVIL,MOTRIN) 400 MG tablet Take 400 mg by mouth Every 6 (Six) Hours As Needed for mild pain (1-3).     • losartan (COZAAR) 100 MG tablet Take 100 mg by mouth Daily.  1   • magnesium 30 MG tablet Take 30 mg by mouth.     • metoprolol succinate XL (TOPROL-XL) 50 MG 24 hr tablet Take 25 mg by mouth Daily.     • omeprazole (priLOSEC) 20 MG capsule Take 20 mg by mouth Daily.     • potassium chloride (K-DUR,KLOR-CON) 10  MEQ CR tablet Take 10 mEq by mouth Daily.     • pravastatin (PRAVACHOL) 40 MG tablet Take 40 mg by mouth Daily.     • triamterene-hydrochlorothiazide (MAXZIDE-25) 37.5-25 MG per tablet Take 0.5 tablets by mouth Daily.       No facility-administered encounter medications on file as of 9/1/2021.     ADULT ILLNESSES:  Patient Active Problem List   Diagnosis Code   • Malignant neoplasm of central portion of left female breast (CMS/HCC) C50.112   • Malignant neoplasm of upper-outer quadrant of right female breast (CMS/HCC) C50.411   • Osteopenia M85.80   • Encounter for care related to vascular access port Z45.2   • Colon cancer screening Z12.11     SURGERIES:  Past Surgical History:   Procedure Laterality Date   • BREAST LUMPECTOMY  2008   • CATARACT EXTRACTION Right    • COLONOSCOPY  09/16/2010    Diverticulosis; Repeat 10 years   • COLONOSCOPY N/A 11/11/2020    Procedure: COLONOSCOPY WITH ANESTHESIA;  Surgeon: Jennifer Bee MD;  Location: [facility] ENDOSCOPY;  Service: Gastroenterology;  Laterality: N/A;  pre: screening  post: polyp; hemorrhoids  Luly Diez MD   • MAMMO BILATERAL  07/17/2013    Dr. Sabillon   • PAP SMEAR  2010     HEALTH MAINTENANCE ITEMS:    Last Completed Mammogram       Status Date      MAMMOGRAM Done 8/25/2020 Ext Proc: CHG SCREENING DIGITAL BREAST TOMOSYNTHESIS BI     5 mm round focal asymmetry within the inferior right breast ; SPOT COMPRESSION VIEW NEGATIVE 8/26/2020.        FAMILY HISTORY:  Family History   Problem Relation Age of Onset   • Cancer Mother    • Heart disease Father    • No Known Problems Daughter    • No Known Problems Son    • Hypertension Daughter    • Hypertension Daughter    • Other Brother         polioi   • Cancer Paternal Aunt    • No Known Problems Maternal Grandmother    • No Known Problems Maternal Grandfather    • No Known Problems Paternal Grandmother    • No Known Problems Paternal Grandfather    • Drug abuse Brother    • Colon cancer Neg Hx    • Colon polyps Neg Hx     • Esophageal cancer Neg Hx    • Liver cancer Neg Hx    • Liver disease Neg Hx    • Rectal cancer Neg Hx    • Stomach cancer Neg Hx      SOCIAL HISTORY:  Social History     Socioeconomic History   • Marital status: Single     Spouse name: Not on file   • Number of children: Not on file   • Years of education: Not on file   • Highest education level: Not on file   Tobacco Use   • Smoking status: Former Smoker     Types: Cigarettes     Quit date:      Years since quittin.6   • Smokeless tobacco: Never Used   Substance and Sexual Activity   • Alcohol use: Yes     Alcohol/week: 1.0 standard drinks     Types: 1 Cans of beer per week     Comment: Occasionally    • Drug use: No   • Sexual activity: Defer       REVIEW OF SYSTEMS:  Review of Systems   Constitutional: Negative for activity change, appetite change, chills, diaphoresis, fatigue, fever and unexpected weight loss.   HENT: Negative for ear pain, nosebleeds, sinus pressure, sore throat and voice change.    Eyes: Negative for blurred vision, double vision, pain and visual disturbance.   Respiratory: Negative for cough and shortness of breath.    Cardiovascular: Negative for chest pain, palpitations and leg swelling.   Gastrointestinal: Negative for abdominal pain, anal bleeding, blood in stool, constipation, diarrhea, nausea and vomiting.   Endocrine: Negative for heat intolerance, polydipsia and polyuria.   Genitourinary: Negative for dysuria, frequency, hematuria, urgency and urinary incontinence.   Musculoskeletal: Positive for arthralgias and myalgias.   Skin: Negative for rash and skin lesions.   Neurological: Negative for dizziness, tremors, seizures, syncope, speech difficulty, weakness and headache.   Hematological: Negative for adenopathy. Does not bruise/bleed easily.   Psychiatric/Behavioral: Negative for dysphoric mood, sleep disturbance, suicidal ideas and depressed mood.   I reviewed the ROS as documented here and confirmed the accuracy of  "it with the patient today. 9/14/2021       /62   Pulse 72   Temp 97.2 °F (36.2 °C)   Resp 16   Ht 167.6 cm (66\")   Wt 87.1 kg (192 lb 1.6 oz)   SpO2 92%   Breastfeeding No   BMI 31.01 kg/m²  Body surface area is 1.97 meters squared.  Pain Score    09/01/21 1108   PainSc: 0-No pain       Physical Exam:  Physical Exam   Constitutional: She is oriented to person, place, and time. She appears well-developed and well-nourished.   HENT:   Head: Atraumatic.   Mouth/Throat: Oropharynx is clear and moist.   Eyes: Pupils are equal, round, and reactive to light. No scleral icterus.   Neck: Trachea normal.   Cardiovascular: Normal rate, regular rhythm and normal pulses. Exam reveals no gallop and no friction rub.   No murmur heard.  Pulmonary/Chest: Effort normal and breath sounds normal. She has no wheezes. She has no rhonchi. She has no rales.   Abdominal: Soft. Normal appearance. There is no abdominal tenderness. There is no rebound and no guarding.   Lymphadenopathy:     She has no cervical adenopathy.        Right: No supraclavicular adenopathy present.        Left: No supraclavicular adenopathy present.   Neurological: She is alert and oriented to person, place, and time. No sensory deficit.   Psychiatric: Judgment normal.       Lorena Valdes reports a pain score of 0.      Patient's Body mass index is 31.01 kg/m². BMI is above normal parameters. Recommendations include: defer to pcp.      LABS    Lab Results - Last 18 Months   Lab Units 09/01/21  1101 02/04/21  1055 09/01/20  1215   HEMOGLOBIN g/dL 11.1* 11.9* 11.6*   HEMATOCRIT % 33.1* 36.7* 34.0   MCV fL 93.8 94.8 91.9   WBC 10*3/mm3 7.45 8.7 8.74   RDW % 13.2 13.0 12.8   MPV fL 9.2 9.0* 9.3   PLATELETS 10*3/mm3 354 326 347   IMM GRAN % % 0.3  --  0.3   NEUTROS ABS 10*3/mm3 4.13 3.9 4.22   LYMPHS ABS 10*3/mm3 2.33 3.7 3.30*   MONOS ABS 10*3/mm3 0.73 1.10* 0.90   EOS ABS 10*3/mm3 0.20 0.00 0.25   BASOS ABS 10*3/mm3 0.04 0.00 0.04   IMMATURE GRANS (ABS) " 10*3/mm3 0.02 0.0 0.03   NRBC /100 WBC 0.0  --  0.0       Lab Results - Last 18 Months   Lab Units 09/01/21  1101 08/18/21  1115 08/09/21  1326 07/22/21  1021 02/04/21  1055 11/03/20  1134 09/01/20  1215   GLUCOSE mg/dL 228* 166* 108 107 104 139* 220*   SODIUM mmol/L 138 140 141 140 139 140 138   POTASSIUM mmol/L 3.8 4.3 4.1 4.0 3.9 4.7 4.1   TOTAL CO2 mmol/L  --  27 26 25 28 29  --    CO2 mmol/L 26.0  --   --   --   --   --  27.0   CHLORIDE mmol/L 102 104 104 105 101 103 100   ANION GAP mmol/L 10.0 9 11 10 10 8 11.0   CREATININE mg/dL 1.16* 1.2* 1.6* 1.2* 0.9 1* 1.16*   BUN mg/dL 22 20 32* 28* 24* 25* 27*   BUN / CREAT RATIO  19.0  --   --   --   --   --  23.3   CALCIUM mg/dL 9.2 9.5 9.7 9.6 9.1 9.9 9.1   EGFR IF NONAFRICN AM mL/min/1.73 45* 43* 31* 43* >60 53* 45*   ALK PHOS U/L 87  --   --  78 83 90 80   TOTAL PROTEIN g/dL 7.5  --   --  7.8 7.7 7.9 7.7   ALT (SGPT) U/L 9  --   --  9 9 14 11   AST (SGOT) U/L 17  --   --  16 17 18 16   BILIRUBIN mg/dL 0.5  --   --  0.5 0.4 0.3 0.2   ALBUMIN g/dL 3.90  --   --  4.1 3.8 4.1 3.90   GLOBULIN gm/dL 3.6  --   --   --   --   --  3.8       ASSESSMENT  1. Left Breast Cancer diagnosed in 2008  · Initial stage: AJCC TNM [id number], Stage IIA, ER - NE -  , Her 2 kofi 2+, Fish positive.  TRIPLE NEGATIVE DISEASE  · Treatment : Left Lumpectomy, adjuvant chemotherpay with Adriamycin Cytoxan x 1 cycle due to cardiomyopathy, Taxol weekly for 12 weeks and then adjuvant radiation therapy.   · Disease status: No evidence of recurrence.  Markers have remained normal.  CEA 2.76 today and CA 27-29 25.1.    2. Osteoporosis ,last BMD 8/2020, She continues on calcium. She will discuss plan with pcp  3.  Type 2 DM: on oral medications per pcp.  Nonfasting blood sugar 228  4.  GERD - on prilosec and controlled  5.  HTN - on Maxizide and controlled.  BP stable today at 128/62  6.  Anemia likely secondary to CKD stage III, substrates have been normal.  Hemoglobin is overall stable at 11.1.  Patient  would be a candidate for GALLITO therapy if and when her hemoglobin was to drop below 10 and hematocrit below 30.  7.  Iron studies normal with a saturation of 30% and a ferritin of 91.14.  8.  Functioning Mediport    PLAN  1.  Counseled pt regarding lab work as stated above.  2 . Reviewed mammmogram from August 2021 with patient as well as BMD  3.  Encouraged continuation of yearly mammograms and routine screendings.   4.  Continue to follow with pcp and other specialists  5.   Continue port flushes every 6 to 8 weeks  6.  Return in 1 year for follow up and preoffice cbc, cmp and cea, ca 27-29.    I spent 28 minutes caring for Lorena on this date of service. This time includes time spent by me in the following activities: preparing for the visit, reviewing tests, performing a medically appropriate examination and/or evaluation, counseling and educating the patient/family/caregiver, ordering medications, tests, or procedures and documenting information in the medical record.     Liv Shay, APRN   09/01/2021

## 2021-09-01 NOTE — PROGRESS NOTES
[facility]  HEMATOLOGY & ONCOLOGY    [facility] HEMATOLOGY AND ONCOLOGY  [address]  [place] [address] [phone]    Patient Name: Lorena Valdes  Encounter Date: 09/01/2020  YOB: 1940  Patient Number: [id number]    Chief Complaint   Patient presents with   • Breast Cancer     follow up       REASON FOR VISIT:  Ms. Valdes is a 79 yo female with a history of breast cancer that was diagonsed in 2008.  She had a Stage IIA left breast cancer that was hormone receptor negative and Her 2 positive by FISH.  She underwent lumpectomy then adjuvant Adriamycin Cytoxan.  She only had one dose of Adriamycin and developed cardiomyopathy and thereofre the adriamycin was discontinued.  She had then weekly Taxol with all chemotherapy being completed in March 2009.  She then had adjuvant radiation therapy.     She continues to do very well. She has had no recurrence of her disease.  She continues to have mammograms with the last one being on August 30,2021.  Her last bone density was also on 8/25/2020 that showed osteoporosis which she states is being followed by her pcp.  She plans to see Elizabeth at Dr Lamb on 9.9.2021.  She also sees nephrology 9.7.2021 as well.      Her labs today show a WBC of 7.45, Hgb of 11.1 and platelets of 354,000. Her GFR is at 45ml/min and nonfasting glucose of 220. CMP is otherwise normal.      She has no complaints today and feels well other than hip pain bilaterally at times.  She has maintained a good weight.  She has had no GI upset.  No headaches or dizziness.  No fever chills night sweats.  No shortness of breath or cough.      Oncology/Hematology History Overview Note   Tp: [id number] Mp: M0 Size: 2.6 cm Histopathologic Grade: G3? Histopathologic Type: DuctalInvasive  (NOS), left central? Stage  Grouping: IIA  08/06/2008: Core biopsy: at left breast mass, 12 o'clock: Infiltrating ductal  carcinoma, gr 3, with foci of tumor necrosis? DNA index 1.78  (aneuploid), ER/CA 0%, her2/kofi 2+ (FISH ), p53 0%, Ki67  59%.  08/28/2008: MastectomyL  partial, USguided  needle localization, with SNB: IDC, gr 3, tumor measures 2.6 cm in greatest dimension,  with necrosis seen, extending to original deep margin....additional deep margin adds 1 cm to final margin of excision, residual fibrocystic  mastopathy? 2 left axillary sentinel nodes: 0/2+ve  Dose dense Adriamycin/cyclophosphamide, followed by weekly Taxol administered between September 2008 and March 2009 .  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  Followed by Radiation therapy     Malignant neoplasm of central portion of left female breast (CMS/HCC)   11/9/2016 Initial Diagnosis    Malignant neoplasm of central portion of left female breast     1/13/2020 - 8/11/2020 Chemotherapy    OP CENTRAL VENOUS ACCESS DEVICE ACCESS, CARE, AND MAINTENANCE (CVAD)     9/1/2021 -  Chemotherapy    OP CENTRAL VENOUS ACCESS DEVICE ACCESS, CARE, AND MAINTENANCE (CVAD)           PAST MEDICAL HISTORY:  ALLERGIES:  Allergies   Allergen Reactions   • Adhesive Tape Hives     Latex Tape       CURRENT MEDICATIONS:  Outpatient Encounter Medications as of 9/1/2021   Medication Sig Dispense Refill   • aspirin 81 MG EC tablet Take 81 mg by mouth daily.       • Calcium Carb-Cholecalciferol (CALCIUM 600+D3) 600-200 MG-UNIT tablet Take 1 tablet by mouth 2 (Two) Times a Day.     • Cholecalciferol (VITAMIN D3) 400 UNITS capsule Take 1 capsule by mouth Daily.     • glipiZIDE (GLUCOTROL) 5 MG tablet Take 5 mg by mouth Daily.     • ibuprofen (ADVIL,MOTRIN) 400 MG tablet Take 400 mg by mouth Every 6 (Six) Hours As Needed for mild pain (1-3).     • losartan (COZAAR) 100 MG tablet Take 100 mg by mouth Daily.  1   • magnesium 30 MG tablet Take 30 mg by mouth.     • metoprolol succinate XL (TOPROL-XL) 50 MG 24 hr tablet Take 25 mg by mouth Daily.     •  omeprazole (priLOSEC) 20 MG capsule Take 20 mg by mouth Daily.     • potassium chloride (K-DUR,KLOR-CON) 10 MEQ CR tablet Take 10 mEq by mouth Daily.     • pravastatin (PRAVACHOL) 40 MG tablet Take 40 mg by mouth Daily.     • triamterene-hydrochlorothiazide (MAXZIDE-25) 37.5-25 MG per tablet Take 0.5 tablets by mouth Daily.       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 ENDOSCOPY;  Service: Gastroenterology;  Laterality: N/A;  pre: screening  post: polyp; hemorrhoids  Luly Diez MD   • MAMMO BILATERAL  07/17/2013    Dr. Sabillon   • PAP SMEAR  2010     HEALTH MAINTENANCE ITEMS:    Last Completed Mammogram       Status Date      MAMMOGRAM Done 8/25/2020 Ext Proc: [facility] SCREENING DIGITAL BREAST TOMOSYNTHESIS BI     5 mm round focal asymmetry within the inferior right breast ; SPOT COMPRESSION VIEW NEGATIVE 8/26/2020.        FAMILY HISTORY:  Family History   Problem Relation Age of Onset   • Cancer Mother    • Heart disease Father    • No Known Problems Daughter     • No Known Problems Son    • Hypertension Daughter    • Hypertension Daughter    • Other Brother         polioi   • Cancer Paternal Aunt    • No Known Problems Maternal Grandmother    • No Known Problems Maternal Grandfather    • No Known Problems Paternal Grandmother    • No Known Problems Paternal Grandfather    • Drug abuse Brother    • Colon cancer Neg Hx    • Colon polyps Neg Hx    • Esophageal cancer Neg Hx    • Liver cancer Neg Hx    • Liver disease Neg Hx    • Rectal cancer Neg Hx    • Stomach cancer Neg Hx      SOCIAL HISTORY:  Social History     Socioeconomic History   • Marital status: Single     Spouse name: Not on file   • Number of children: Not on file   • Years of education: Not on file   • Highest education level: Not on file   Tobacco Use   • Smoking status: Former Smoker     Types: Cigarettes     Quit date:      Years since quittin.7   • Smokeless tobacco: Never Used   Substance and Sexual Activity   • Alcohol use: Yes     Alcohol/week: 1.0 standard drinks     Types: 1 Cans of beer per week     Comment: Occasionally    • Drug use: No   • Sexual activity: Defer       REVIEW OF SYSTEMS:  Review of Systems   Constitutional: Negative for activity change, appetite change, chills, diaphoresis, fatigue, fever and unexpected weight loss.   HENT: Negative for ear pain, nosebleeds, sinus pressure, sore throat and voice change.    Eyes: Negative for blurred vision, double vision, pain and visual disturbance.   Respiratory: Negative for cough and shortness of breath.    Cardiovascular: Negative for chest pain, palpitations and leg swelling.   Gastrointestinal: Negative for abdominal pain, anal bleeding, blood in stool, constipation, diarrhea, nausea and vomiting.   Endocrine: Negative for heat intolerance, polydipsia and polyuria.   Genitourinary: Negative for dysuria, frequency, hematuria, urgency and urinary incontinence.   Musculoskeletal: Positive for arthralgias and myalgias.   Skin: Negative  "for rash and skin lesions.   Neurological: Negative for dizziness, tremors, seizures, syncope, speech difficulty, weakness and headache.   Hematological: Negative for adenopathy. Does not bruise/bleed easily.   Psychiatric/Behavioral: Negative for dysphoric mood, sleep disturbance, suicidal ideas and depressed mood.   I reviewed the ROS as documented here and confirmed the accuracy of it with the patient today. 9/15/2021       /62   Pulse 72   Temp 97.2 °F (36.2 °C)   Resp 16   Ht 167.6 cm (66\")   Wt 87.1 kg (192 lb 1.6 oz)   SpO2 92%   Breastfeeding No   BMI 31.01 kg/m²  Body surface area is 1.97 meters squared.  Pain Score    09/01/21 1108   PainSc: 0-No pain       Physical Exam:  Physical Exam   Constitutional: She is oriented to person, place, and time. She appears well-developed and well-nourished.   HENT:   Head: Atraumatic.   Mouth/Throat: Oropharynx is clear and moist.   Eyes: Pupils are equal, round, and reactive to light. No scleral icterus.   Neck: Trachea normal.   Cardiovascular: Normal rate, regular rhythm and normal pulses. Exam reveals no gallop and no friction rub.   No murmur heard.  Pulmonary/Chest: Effort normal and breath sounds normal. She has no wheezes. She has no rhonchi. She has no rales.   Abdominal: Soft. Normal appearance. There is no abdominal tenderness. There is no rebound and no guarding.   Lymphadenopathy:     She has no cervical adenopathy.        Right: No supraclavicular adenopathy present.        Left: No supraclavicular adenopathy present.   Neurological: She is alert and oriented to person, place, and time. No sensory deficit.   Psychiatric: Judgment normal.       Lorena Valdes reports a pain score of 0.      Patient's Body mass index is 31.01 kg/m². BMI is above normal parameters. Recommendations include: defer to pcp.      LABS    Lab Results - Last 18 Months   Lab Units 09/01/21  1101 02/04/21  1055 09/01/20  1215   HEMOGLOBIN g/dL 11.1* 11.9* 11.6* "   HEMATOCRIT % 33.1* 36.7* 34.0   MCV fL 93.8 94.8 91.9   WBC 10*3/mm3 7.45 8.7 8.74   RDW % 13.2 13.0 12.8   MPV fL 9.2 9.0* 9.3   PLATELETS 10*3/mm3 354 326 347   IMM GRAN % % 0.3  --  0.3   NEUTROS ABS 10*3/mm3 4.13 3.9 4.22   LYMPHS ABS 10*3/mm3 2.33 3.7 3.30*   MONOS ABS 10*3/mm3 0.73 1.10* 0.90   EOS ABS 10*3/mm3 0.20 0.00 0.25   BASOS ABS 10*3/mm3 0.04 0.00 0.04   IMMATURE GRANS (ABS) 10*3/mm3 0.02 0.0 0.03   NRBC /100 WBC 0.0  --  0.0       Lab Results - Last 18 Months   Lab Units 09/01/21  1101 08/18/21  1115 08/09/21  1326 07/22/21  1021 02/04/21  1055 11/03/20  1134 09/01/20  1215   GLUCOSE mg/dL 228* 166* 108 107 104 139* 220*   SODIUM mmol/L 138 140 141 140 139 140 138   POTASSIUM mmol/L 3.8 4.3 4.1 4.0 3.9 4.7 4.1   TOTAL CO2 mmol/L  --  27 26 25 28 29  --    CO2 mmol/L 26.0  --   --   --   --   --  27.0   CHLORIDE mmol/L 102 104 104 105 101 103 100   ANION GAP mmol/L 10.0 9 11 10 10 8 11.0   CREATININE mg/dL 1.16* 1.2* 1.6* 1.2* 0.9 1* 1.16*   BUN mg/dL 22 20 32* 28* 24* 25* 27*   BUN / CREAT RATIO  19.0  --   --   --   --   --  23.3   CALCIUM mg/dL 9.2 9.5 9.7 9.6 9.1 9.9 9.1   EGFR IF NONAFRICN AM mL/min/1.73 45* 43* 31* 43* >60 53* 45*   ALK PHOS U/L 87  --   --  78 83 90 80   TOTAL PROTEIN g/dL 7.5  --   --  7.8 7.7 7.9 7.7   ALT (SGPT) U/L 9  --   --  9 9 14 11   AST (SGOT) U/L 17  --   --  16 17 18 16   BILIRUBIN mg/dL 0.5  --   --  0.5 0.4 0.3 0.2   ALBUMIN g/dL 3.90  --   --  4.1 3.8 4.1 3.90   GLOBULIN gm/dL 3.6  --   --   --   --   --  3.8       ASSESSMENT  1. Left Breast Cancer diagnosed in 2008  · Initial stage: AJCC TNM [id number], Stage IIA, ER - [place] -  , Her 2 kofi 2+, Fish positive.  TRIPLE NEGATIVE DISEASE  · Treatment : Left Lumpectomy, adjuvant chemotherpay with Adriamycin Cytoxan x 1 cycle due to cardiomyopathy, Taxol weekly for 12 weeks and then adjuvant radiation therapy.   · Disease status: No evidence of recurrence.  Markers have remained normal.  CEA 2.76 today and CA 27-29  25.1.    2. Osteoporosis ,last BMD 8/2020, She continues on calcium. She will discuss plan with pcp  3.  Type 2 DM: on oral medications per pcp.  Nonfasting blood sugar 228  4.  GERD - on prilosec and controlled  5.  HTN - on Maxizide and controlled.  BP stable today at 128/62  6.  Anemia likely secondary to CKD stage III, substrates have been normal.  Hemoglobin is overall stable at 11.1.  Patient would be a candidate for GALLITO therapy if and when her hemoglobin was to drop below 10 and hematocrit below 30.  7.  Iron studies normal with a saturation of 30% and a ferritin of 91.14.  8.  Functioning Mediport    PLAN  1.  Counseled pt regarding lab work as stated above.  2 . Reviewed mammmogram from August 2021 with patient as well as BMD  3.  Encouraged continuation of yearly mammograms and routine screendings.   4.  Continue to follow with pcp and other specialists  5.   Continue port flushes every 6 to 8 weeks  6.  Return in 1 year for follow up and preoffice cbc, cmp and cea, ca 27-29.    I spent 28 minutes caring for Lorena on this date of service. This time includes time spent by me in the following activities: preparing for the visit, reviewing tests, performing a medically appropriate examination and/or evaluation, counseling and educating the patient/family/caregiver, ordering medications, tests, or procedures and documenting information in the medical record.     Liv Shay, APRN   09/01/2021

## 2021-09-02 ENCOUNTER — TELEPHONE (OUTPATIENT)
Dept: ONCOLOGY | Facility: CLINIC | Age: 81
End: 2021-09-02

## 2021-09-02 LAB — CANCER AG27-29 SERPL-ACNC: 25.1 U/ML (ref 0–38.6)

## 2021-09-09 ENCOUNTER — OFFICE VISIT (OUTPATIENT)
Dept: SURGERY | Age: 81
End: 2021-09-09
Payer: MEDICARE

## 2021-09-09 VITALS
TEMPERATURE: 97.3 F | HEIGHT: 66 IN | WEIGHT: 194 LBS | BODY MASS INDEX: 31.18 KG/M2 | DIASTOLIC BLOOD PRESSURE: 85 MMHG | SYSTOLIC BLOOD PRESSURE: 131 MMHG | HEART RATE: 82 BPM

## 2021-09-09 DIAGNOSIS — Z12.31 VISIT FOR SCREENING MAMMOGRAM: Primary | ICD-10-CM

## 2021-09-09 PROCEDURE — 4040F PNEUMOC VAC/ADMIN/RCVD: CPT | Performed by: PHYSICIAN ASSISTANT

## 2021-09-09 PROCEDURE — 99213 OFFICE O/P EST LOW 20 MIN: CPT | Performed by: PHYSICIAN ASSISTANT

## 2021-09-09 PROCEDURE — G8427 DOCREV CUR MEDS BY ELIG CLIN: HCPCS | Performed by: PHYSICIAN ASSISTANT

## 2021-09-09 PROCEDURE — G8417 CALC BMI ABV UP PARAM F/U: HCPCS | Performed by: PHYSICIAN ASSISTANT

## 2021-09-09 PROCEDURE — 1123F ACP DISCUSS/DSCN MKR DOCD: CPT | Performed by: PHYSICIAN ASSISTANT

## 2021-09-09 PROCEDURE — G8399 PT W/DXA RESULTS DOCUMENT: HCPCS | Performed by: PHYSICIAN ASSISTANT

## 2021-09-09 PROCEDURE — 1090F PRES/ABSN URINE INCON ASSESS: CPT | Performed by: PHYSICIAN ASSISTANT

## 2021-09-09 PROCEDURE — 1036F TOBACCO NON-USER: CPT | Performed by: PHYSICIAN ASSISTANT

## 2021-09-09 NOTE — PROGRESS NOTES
HISTORY OF PRESENT ILLNESS:   Mrs. Ky Bonilla is a 79year old white female who is status post 8- Left partial mastectomy and left sentinel node biopsy 2.6 cm infiltrating ductal carcinoma, grade III, ER/ND negative., 0/2 nodes positive, immunohistochemistry negative for micrometastises. She denies weight loss or any other systemic symptoms. EXAM: [facility] DIGITAL SCREEN W OR WO CAD BILATERAL -- 8/30/2021 10:20 AM   INDICATION: Screen. Left breast cancer 2008 status post chemotherapy   and radiation. COMPARISON: 8/26/2020, 8/20/2019, 9/4/2018, 8/30/2017   FAMILY HISTORY OF BREAST CANCER: None   TECHNIQUE: Standard digital mammogram images were obtained. Computer   Aided Detection was utilized. In addition, low dose images were   obtained in each projection. These low-dose images were reconstructed   into 1 mm thick slices and reviewed on the soft copy workstation. BREAST COMPOSITION: Category B -- Scattered fibroglandular densities   (approximately 25-50 percent glandular tissue). FINDINGS: There are no suspicious findings.       Impression   No mammographic evidence of malignancy.  Recommend routine annual   screening mammography. A result letter will be sent to the patient.     BI-RADS CATEGORY 1: NEGATIVE. BREAST EXAM:  The left lumpectomy incision is well healed. There are appropriate post operative and post radiation changes on the left. There is some thickening in the left breast in the area of her lumpectomy. The right breast exam is normal.      IMPRESSION:    Personal history of left breast cancer  Abnormal right mammogram    PLAN:  I will see her in 1 year with bilateral mammograms. She will call sooner with any concerns. 20 minutes spent, which includes face to face with patient, record review, evaluation, planning, and education.

## 2021-09-15 ENCOUNTER — HOSPITAL ENCOUNTER (OUTPATIENT)
Dept: NUCLEAR MEDICINE | Age: 81
Discharge: HOME | End: 2021-09-17
Payer: MEDICARE

## 2021-09-15 DIAGNOSIS — I10 ESSENTIAL HYPERTENSION: ICD-10-CM

## 2021-09-15 DIAGNOSIS — R06.02 SOB (SHORTNESS OF BREATH): ICD-10-CM

## 2021-09-15 LAB
LV EF: 76 %
LVEF MODALITY: NORMAL

## 2021-09-15 PROCEDURE — 3430000000 HC RX DIAGNOSTIC RADIOPHARMACEUTICAL: Performed by: INTERNAL MEDICINE

## 2021-09-15 PROCEDURE — A9500 TC99M SESTAMIBI: HCPCS | Performed by: INTERNAL MEDICINE

## 2021-09-15 PROCEDURE — G1010 CDSM STANSON: HCPCS

## 2021-09-15 RX ADMIN — TETRAKIS(2-METHOXYISOBUTYLISOCYANIDE)COPPER(I) TETRAFLUOROBORATE 30 MILLICURIE: 1 INJECTION, POWDER, LYOPHILIZED, FOR SOLUTION INTRAVENOUS at 10:49

## 2021-09-15 RX ADMIN — TETRAKIS(2-METHOXYISOBUTYLISOCYANIDE)COPPER(I) TETRAFLUOROBORATE 10 MILLICURIE: 1 INJECTION, POWDER, LYOPHILIZED, FOR SOLUTION INTRAVENOUS at 10:48

## 2021-09-16 ENCOUNTER — TELEPHONE (OUTPATIENT)
Dept: CARDIOLOGY CLINIC | Age: 81
End: 2021-09-16

## 2021-09-16 NOTE — RESULT ENCOUNTER NOTE
Xiomara's stress test is positive, suggestive for mild ischemia at the cardiac apex. Given her recurrent symptoms despite meds, will plan for definitive cardiac cath. We will arrange, if she is agreeable.

## 2021-09-17 ENCOUNTER — LAB (OUTPATIENT)
Dept: LAB | Facility: HOSPITAL | Age: 81
End: 2021-09-17

## 2021-09-17 ENCOUNTER — TELEPHONE (OUTPATIENT)
Dept: CARDIOLOGY CLINIC | Age: 81
End: 2021-09-17

## 2021-09-17 DIAGNOSIS — Z01.818 PRE-OP TESTING: Primary | ICD-10-CM

## 2021-09-17 LAB
FOLATE SERPL-MCNC: 10.8 NG/ML (ref 4.78–24.2)
VIT B12 BLD-MCNC: 470 PG/ML (ref 211–946)

## 2021-09-17 PROCEDURE — 82607 VITAMIN B-12: CPT | Performed by: NURSE PRACTITIONER

## 2021-09-17 PROCEDURE — 82746 ASSAY OF FOLIC ACID SERUM: CPT | Performed by: NURSE PRACTITIONER

## 2021-09-17 NOTE — TELEPHONE ENCOUNTER
Called and spoke with patients daughter, mara [facility] scheduled for 10/04/2021 at 1100 with arrival of 0900. Patient is to be NPO after midnight. Patient instructed to arrive through front entrance of hospital and make immediate left. Patient advised they can have one person with them but they both must wear a mask. Patient advised may take morning medications with sip of water. Also advised patient must have COVID testing completed on 10/01/2021 anywhere from 700 am - 1200 pm at [facility]. Advised patient that they will be able to proceed with procedure as long as test results are negative. Patient made aware that if testing is not resulted evening prior to procedure that they may have to be rescheduled and possibly retested. Given instructions on where to go and to self quarantine between testing and procedure. Patient does not  have IV dye allergy. Patient verbally understood.

## 2021-09-17 NOTE — TELEPHONE ENCOUNTER
Pt returned call stating she got results and needed to schedule heart cath. Please contact pt before [phone], if unable to reach pt contact pt's daughter, Aiden Mejia @ [phone].     Thank you

## 2021-09-21 NOTE — TELEPHONE ENCOUNTER
I spoke with patient regarding rescheduling her heart cath with Dr. Arabella Monaco from 10/4/2021 to 10/12/2021. She is going to talk with her daughter, who is her transportation, and call me back tomorrow to confirm a date and time.

## 2021-10-11 ENCOUNTER — OFFICE VISIT (OUTPATIENT)
Age: 81
End: 2021-10-11

## 2021-10-11 DIAGNOSIS — Z11.59 SCREENING FOR VIRAL DISEASE: Primary | ICD-10-CM

## 2021-10-11 LAB — SARS-COV-2, PCR: NOT DETECTED

## 2021-10-11 PROCEDURE — 99999 PR OFFICE/OUTPT VISIT,PROCEDURE ONLY: CPT | Performed by: NURSE PRACTITIONER

## 2021-10-11 ASSESSMENT — ENCOUNTER SYMPTOMS
CONSTIPATION: 0
EYE DISCHARGE: 0
WHEEZING: 0
CHEST TIGHTNESS: 1
VOMITING: 0
BACK PAIN: 0
SHORTNESS OF BREATH: 1
COUGH: 0
ABDOMINAL DISTENTION: 0
BLOOD IN STOOL: 0
DIARRHEA: 0

## 2021-10-11 NOTE — H&P
Patient:  Brady Parish                  1940  MRN: [id number]    PROBLEM LIST:    Patient Active Problem List    Diagnosis Date Noted    Type 2 diabetes mellitus with diabetic neuropathy 07/26/2021     Priority: Low    Essential hypertension 02/11/2019     Priority: Low    Elevated TSH 08/07/2018     Priority: Low    Vitamin D deficiency 09/10/2017     Priority: Low    Pure hypercholesterolemia 09/10/2017     Priority: Low    Type 2 diabetes mellitus without complication, without long-term current use of insulin ([facility] [address] 09/10/2017     Priority: Low    Localized osteoporosis without current pathological fracture 09/10/2017     Priority: Low     Overview Note:     2017 hip neck -2.6; lspine -1/8  8/2020 hip neck -2.6/ L spine L1 -1.6      Generalized anxiety disorder 09/10/2017     Priority: Low    Former smoker 09/10/2017     Priority: Low    Numbness 04/07/2017     Priority: Low    Imbalance 04/07/2017     Priority: Low    Estrogen receptor negative tumor status 08/06/2008     Priority: Low       PRESENTATION: Brady Parish is a 79y.o. year old female who presents with recent onset of exertional shortness of breath and chest pressure with a positive stress nuclear study at low effort tolerance (2:09 mts) with apical ischemia. She has a history of HTN, CKD, obesity, prior left breast Ca treated with lumpectomy/chemo/RT 10 years ago. REVIEW OF SYSTEMS:  Review of Systems   Constitutional: Negative for activity change, fatigue and fever. HENT: Negative for ear pain, hearing loss and tinnitus. Eyes: Negative for discharge and visual disturbance. Respiratory: Positive for chest tightness and shortness of breath. Negative for cough and wheezing. Cardiovascular: Negative for chest pain, palpitations and leg swelling. Gastrointestinal: Negative for abdominal distention, blood in stool, constipation, diarrhea and vomiting.    Endocrine: Negative for cold intolerance, heat intolerance, polydipsia and polyuria. Genitourinary: Negative for dysuria and hematuria. Musculoskeletal: Negative for arthralgias, back pain and myalgias. Skin: Negative for pallor and rash. Neurological: Negative for seizures, syncope, weakness and headaches. Psychiatric/Behavioral: Negative for behavioral problems and dysphoric mood. Past Medical History:      Diagnosis Date    Back pain     Breast cancer ([address]     left 2008    Chronic kidney disease     History of therapeutic radiation     Hx antineoplastic chemo     Hyperlipidemia     Pneumonia     Type II or unspecified type diabetes mellitus without mention of complication, not stated as uncontrolled     diet controlled    Varicose veins     Wears glasses        Past Surgical History:      Procedure Laterality Date    BREAST BIOPSY  08/06/2008    U/S Guided Mammotome Biopsy Left Breast x 2  infiltrating ductal carcinoma, grade III, ER/SC negative    BREAST BIOPSY  08/05/2009    Stereotactic biopsy right breast  No evidence of malignancy    BREAST BIOPSY Left 08/2015    BREAST LUMPECTOMY      CATARACT REMOVAL Right 2021    COLONOSCOPY      MASTECTOMY, PARTIAL  08/28/2008     Left partial mastectomy and left sentinel node biopsy  2.6 cm infiltrating ductal carcinoma, grade III, 0/2 nodes positive, immunohistochemistry negative for micrometastasis       Medications Prior to Admission:    Prior to Admission medications    Medication Sig Start Date End Date Taking?  Authorizing Provider   glipiZIDE (GLUCOTROL) 5 MG tablet TAKE ONE-HALF TABLET twice/ day 7/26/21   Jayjay Hawkins MD   triamterene-hydroCHLOROthiazide [facility]) 37.5-25 MG per tablet Take 1/2 tablet in am 7/26/21   Jayjay Hawkins MD   pravastatin (PRAVACHOL) 40 MG tablet TAKE ONE TABLET BY MOUTH ONCE DAILY 7/26/21   Jayjay Hawkins MD   potassium chloride (KLOR-CON M) 10 MEQ extended release tablet Take 1 tablet by mouth daily 7/26/21   Jayjay Hawkins MD   omeprazole (PRILOSEC) 20 MG delayed release capsule Take 1 capsule by mouth every morning (before breakfast)  Patient not taking: Reported on 2021   Angelia Spencer MD   losartan (COZAAR) 100 MG tablet Take 1/2 tablet twice daily 21   Angelia Spencer MD   magnesium 30 MG tablet Take 30 mg by mouth 2 times daily    Historical Provider, MD   Calcium-Vitamin D 600-200 MG-UNIT TABS Take by mouth every morning (before breakfast)  07   Historical Provider, MD   aspirin 81 MG EC tablet Take 81 mg by mouth daily. Historical Provider, MD   Ascorbic Acid (VITAMIN C) 500 MG tablet Take 500 mg by mouth daily. Historical Provider, MD       Allergies:  Tape Anthonette Small tape]    Past Social History:  Social History     Socioeconomic History    Marital status:      Spouse name: Not on file    Number of children: Not on file    Years of education: Not on file    Highest education level: Not on file   Occupational History    Not on file   Tobacco Use    Smoking status: Former Smoker     Packs/day: 0.25     Years: 20.00     Pack years: 5.00     Types: Pipe, Cigarettes     Quit date: 1979     Years since quittin.7    Smokeless tobacco: Never Used   Vaping Use    Vaping Use: Never used   Substance and Sexual Activity    Alcohol use: No     Comment: occ    Drug use: No    Sexual activity: Not on file   Other Topics Concern    Not on file   Social History Narrative    Not on file     Social Determinants of Health     Financial Resource Strain: Unknown    Difficulty of Paying Living Expenses: Patient refused   Food Insecurity: Unknown    Worried About [address] in the Last Year: Patient refused    [address] in the Last Year: Patient refused   Transportation Needs:     Lack of Transportation (Medical):      Lack of Transportation (Non-Medical):    Physical Activity:     Days of Exercise per Week:     Minutes of Exercise per Session:    Stress:     Feeling of Stress :    Social Connections:  Frequency of Communication with Friends and Family:     Frequency of Social Gatherings with Friends and Family:     Attends [employer] Services:     Active Member of Clubs or Organizations:     Attends Club or Organization Meetings:     Marital Status:    Intimate Partner Violence:     Fear of Current or Ex-Partner:     Emotionally Abused:     Physically Abused:     Sexually Abused:        Family History:       Problem Relation Age of Onset    Diabetes Father     Heart Disease Father     Hypertension Mother      Physical Exam:    Vitals: There were no vitals taken for this visit. 24HR INTAKE/OUTPUT:  No intake or output data in the 24 hours ending 10/11/21 1541    Physical Exam  Constitutional:       General: She is not in acute distress. Appearance: She is obese. She is not diaphoretic. HENT:      Mouth/Throat:      Pharynx: No oropharyngeal exudate. Eyes:      General: No scleral icterus. Right eye: No discharge. Left eye: No discharge. Neck:      Thyroid: No thyromegaly. Vascular: No JVD. Cardiovascular:      Rate and Rhythm: Normal rate and regular rhythm. No extrasystoles are present. Heart sounds: Normal heart sounds, S1 normal and S2 normal. No murmur heard. No systolic murmur is present. No diastolic murmur is present. No friction rub. No gallop. No S3 or S4 sounds. Pulmonary:      Effort: Pulmonary effort is normal. No respiratory distress. Breath sounds: Normal breath sounds. No wheezing or rales. Chest:      Chest wall: No tenderness. Abdominal:      General: Bowel sounds are normal. There is no distension. Palpations: Abdomen is soft. There is no mass. Tenderness: There is no abdominal tenderness. There is no guarding or rebound. Hernia: No hernia is present. Musculoskeletal:         General: Normal range of motion. Skin:     General: Skin is warm. Coloration: Skin is not pale. Findings: No rash. Neurological:      Mental Status: She is alert and oriented to person, place, and time. Cranial Nerves: No cranial nerve deficit. Deep Tendon Reflexes: Reflexes normal.         LAB DATA:  CBC:   Recent Labs     10/11/21  0740   WBC 9.9   HGB 11.1*        BMP:    Recent Labs     10/11/21  0740      K 3.7      CO2 28   BUN 22   CREATININE 1.0*   GLUCOSE 139*     Hepatic:   Recent Labs     10/11/21  0740   AST 17   ALT 9   BILITOT 0.3   ALKPHOS 79     CK, CKMB, Troponin: @LABRCNT (CKTOTAL:3, CKMB:3, TROPONINI:3)@  Pro-BNP: No results for input(s): BNP in the last 72 hours. Lipids: No results for input(s): CHOL, HDL in the last 72 hours. Invalid input(s): LDL  ABGs: No results for input(s): PHART, TFQ0TIM, PO2ART, JRM9FVX, BEART, HGBAE, G2XXZVPS, CARBOXHGBART, 02THERAPY in the last 72 hours. INR: No results for input(s): INR in the last 72 hours. A1c:Invalid input(s): HEMOGLOBIN A1C  URINALYSIS:   Lab Results   Component Value Date    NITRU POSITIVE 02/04/2021    WBCUA 461 02/04/2021    BACTERIA 4+ 02/04/2021    RBCUA 4 02/04/2021    BLOODU TRACE 02/04/2021    SPECGRAV 1.019 02/04/2021    GLUCOSEU Negative 02/04/2021     -----------------------------------------------------------------  IMAGING:  No orders to display     Treadmill  Nuclear Stress Test Report   Procedure date: 09/15/21   Indications: shortness of breath   Procedure: Stress was performed using a standard Killian protocol. Vital   signs and EKG were monitored. Technetium-99 sestamibi was injected in   divided doses, 10.98 mCi and 32.14 mCi respectively for rest and   stress imaging. The patient was discharged in stable condition. Results: Patient had Sarita Crosby during exercise that resolved in   recovery, no chest pain. Baseline EKG demonstrated normal sinus   rhythm, first degree heart block, poor R-wave progression. She   exercised into the first stage, total exercise time 2 minutes 09   seconds.  She achieved a peak heart rate of 150 bpm which is 106%   predicted maximum with no chest pain. EKG during exercise, the   immediate postexercise and subsequent recovery EKGs demonstrates sinus   rhythm with no significant ST-T changes, , negative for ischemia. The resting and stress perfusion images were reviewed in both non   corrected and attenuation corrected formats and compared. In both the   non corrected and attenuation corrected images, there was a small and   mild intensity reversible defect noted at the apex with normal   perfusion otherwise. This is considered positive for apical ischemia   with no scar. Gated LV wall motion demonstrates normal wall motion, no segmental   abnormalities, ejection fraction 76%. Conclusions   This is an abnormal exercise treadmill Cardiolite nuclear stress test   with no chest pain and negative EKG response for ischemia, perfusion   images stress test is mild anterior apical ischemia, no scar and   normal wall motion with no segmental abnormalities on gated images. Signed by Dr sAhley Ingram and Recommendations: This is a 79y.o. year old female with past medical history of HTN, CKD, obesity, prior left breast Ca treated with lumpectomy/chemo/RT 10 years ago with recent onset of CP and exertional SOB with an abnormal low effort tolerance stress test for apical ischemia, being referred for cardiac catheterization. Risks, benefits, alternatives of cardiac catheterization/PCI discussed with the patient and full informed consent obtained.   Acceptable Mallampati score  Consent for moderate conscious sedation  ASA 3            Electronically signed by Toni Mendoza MD on 10/11/2021 at 3:41 PM

## 2021-10-12 ENCOUNTER — HOSPITAL ENCOUNTER (OUTPATIENT)
Dept: CARDIAC CATH/INVASIVE PROCEDURES | Age: 81
Discharge: HOME | End: 2021-10-12
Attending: INTERNAL MEDICINE | Admitting: INTERNAL MEDICINE
Payer: MEDICARE

## 2021-10-12 VITALS
WEIGHT: 191 LBS | TEMPERATURE: 97 F | BODY MASS INDEX: 30.7 KG/M2 | SYSTOLIC BLOOD PRESSURE: 155 MMHG | HEIGHT: 66 IN | HEART RATE: 73 BPM | RESPIRATION RATE: 18 BRPM | DIASTOLIC BLOOD PRESSURE: 85 MMHG | OXYGEN SATURATION: 98 %

## 2021-10-12 LAB
EKG P AXIS: 62 DEGREES
EKG P-R INTERVAL: 192 MS
EKG Q-T INTERVAL: 404 MS
EKG QRS DURATION: 108 MS
EKG QTC CALCULATION (BAZETT): 423 MS
EKG T AXIS: 20 DEGREES

## 2021-10-12 PROCEDURE — 6360000002 HC RX W HCPCS

## 2021-10-12 PROCEDURE — 2709999900 HC NON-CHARGEABLE SUPPLY

## 2021-10-12 PROCEDURE — 93458 L HRT ARTERY/VENTRICLE ANGIO: CPT

## 2021-10-12 PROCEDURE — C1894 INTRO/SHEATH, NON-LASER: HCPCS

## 2021-10-12 PROCEDURE — 93005 ELECTROCARDIOGRAM TRACING: CPT | Performed by: INTERNAL MEDICINE

## 2021-10-12 PROCEDURE — C1887 CATHETER, GUIDING: HCPCS

## 2021-10-12 PROCEDURE — C1769 GUIDE WIRE: HCPCS

## 2021-10-12 PROCEDURE — 6370000000 HC RX 637 (ALT 250 FOR IP): Performed by: INTERNAL MEDICINE

## 2021-10-12 PROCEDURE — 93458 L HRT ARTERY/VENTRICLE ANGIO: CPT | Performed by: INTERNAL MEDICINE

## 2021-10-12 PROCEDURE — 6360000004 HC RX CONTRAST MEDICATION: Performed by: INTERNAL MEDICINE

## 2021-10-12 PROCEDURE — 99152 MOD SED SAME PHYS/QHP 5/>YRS: CPT

## 2021-10-12 PROCEDURE — 93010 ELECTROCARDIOGRAM REPORT: CPT | Performed by: INTERNAL MEDICINE

## 2021-10-12 PROCEDURE — 2500000003 HC RX 250 WO HCPCS

## 2021-10-12 PROCEDURE — 99152 MOD SED SAME PHYS/QHP 5/>YRS: CPT | Performed by: INTERNAL MEDICINE

## 2021-10-12 PROCEDURE — 2580000003 HC RX 258: Performed by: INTERNAL MEDICINE

## 2021-10-12 RX ORDER — CARVEDILOL 6.25 MG/1
6.25 TABLET ORAL 2 TIMES DAILY
Qty: 180 TABLET | Refills: 3 | Status: SHIPPED | OUTPATIENT
Start: 2021-10-12

## 2021-10-12 RX ORDER — ONDANSETRON 2 MG/ML
4 INJECTION INTRAMUSCULAR; INTRAVENOUS EVERY 6 HOURS PRN
Status: DISCONTINUED | OUTPATIENT
Start: 2021-10-12 | End: 2021-10-12 | Stop reason: HOSPADM

## 2021-10-12 RX ORDER — ACETAMINOPHEN 325 MG/1
650 TABLET ORAL EVERY 4 HOURS PRN
Status: DISCONTINUED | OUTPATIENT
Start: 2021-10-12 | End: 2021-10-12 | Stop reason: HOSPADM

## 2021-10-12 RX ORDER — SODIUM CHLORIDE 0.9 % (FLUSH) 0.9 %
5-40 SYRINGE (ML) INJECTION EVERY 12 HOURS SCHEDULED
Status: DISCONTINUED | OUTPATIENT
Start: 2021-10-12 | End: 2021-10-12 | Stop reason: HOSPADM

## 2021-10-12 RX ORDER — SODIUM CHLORIDE 9 MG/ML
25 INJECTION, SOLUTION INTRAVENOUS PRN
Status: DISCONTINUED | OUTPATIENT
Start: 2021-10-12 | End: 2021-10-12 | Stop reason: HOSPADM

## 2021-10-12 RX ORDER — NITROGLYCERIN 0.4 MG/1
0.4 TABLET SUBLINGUAL EVERY 5 MIN PRN
Status: DISCONTINUED | OUTPATIENT
Start: 2021-10-12 | End: 2021-10-12 | Stop reason: HOSPADM

## 2021-10-12 RX ORDER — SODIUM CHLORIDE 0.9 % (FLUSH) 0.9 %
5-40 SYRINGE (ML) INJECTION PRN
Status: DISCONTINUED | OUTPATIENT
Start: 2021-10-12 | End: 2021-10-12 | Stop reason: HOSPADM

## 2021-10-12 RX ORDER — SODIUM CHLORIDE 9 MG/ML
INJECTION, SOLUTION INTRAVENOUS CONTINUOUS
Status: ACTIVE | OUTPATIENT
Start: 2021-10-12 | End: 2021-10-12

## 2021-10-12 RX ORDER — AMLODIPINE BESYLATE 5 MG/1
5 TABLET ORAL ONCE
Status: COMPLETED | OUTPATIENT
Start: 2021-10-12 | End: 2021-10-12

## 2021-10-12 RX ORDER — SODIUM CHLORIDE 9 MG/ML
INJECTION, SOLUTION INTRAVENOUS CONTINUOUS
Status: DISCONTINUED | OUTPATIENT
Start: 2021-10-12 | End: 2021-10-12 | Stop reason: HOSPADM

## 2021-10-12 RX ORDER — ASPIRIN 325 MG
325 TABLET ORAL ONCE
Status: COMPLETED | OUTPATIENT
Start: 2021-10-12 | End: 2021-10-12

## 2021-10-12 RX ORDER — AMLODIPINE BESYLATE 2.5 MG/1
2.5 TABLET ORAL DAILY
Qty: 90 TABLET | Refills: 3 | Status: SHIPPED | OUTPATIENT
Start: 2021-10-12 | End: 2022-10-31 | Stop reason: SDUPTHER

## 2021-10-12 RX ADMIN — IOPAMIDOL 80 ML: 612 INJECTION, SOLUTION INTRAVENOUS at 08:50

## 2021-10-12 RX ADMIN — AMLODIPINE BESYLATE 5 MG: 5 TABLET ORAL at 11:42

## 2021-10-12 RX ADMIN — ASPIRIN 325 MG: 325 TABLET ORAL at 07:36

## 2021-10-12 RX ADMIN — SODIUM CHLORIDE: 9 INJECTION, SOLUTION INTRAVENOUS at 07:30

## 2021-10-12 NOTE — PROGRESS NOTES
Difficult to obtain accurate BP reading d/t right radial arterial access and left arm mastectomy and lymph node removal. BPs being taken in patient's left leg. Patient shows no s/s of distress/hemodynamic instability.   Electronically signed by Casandra Duncan RN on 10/12/2021 at 11:03 AM

## 2021-10-12 NOTE — PROGRESS NOTES
5mg Norvasc PO given per MD order.   Electronically signed by Pepe Walters RN on 10/12/2021 at 12:26 PM

## 2021-10-12 NOTE — PROGRESS NOTES
Cardiac catheterization preliminary note:    Patent coronary arteries with no significant angiographic CAD. Tortuous vessels. Normal LV systolic function with mild diastolic dysfunction. Plan: Medical management.

## 2021-10-12 NOTE — PROGRESS NOTES
Discharge instructions reviewed with patient and patient states understanding. Right radial site c/d/i.  Patient discharged from cath holding with all belongings and AVS.  Electronically signed by Hazel Spangler RN on 10/12/2021 at 1:15 PM

## 2021-10-25 DIAGNOSIS — R06.02 SHORTNESS OF BREATH: ICD-10-CM

## 2021-10-25 DIAGNOSIS — I10 ESSENTIAL HYPERTENSION: ICD-10-CM

## 2021-10-25 DIAGNOSIS — R06.09 EXERTIONAL DYSPNEA: ICD-10-CM

## 2021-10-25 DIAGNOSIS — E11.9 TYPE 2 DIABETES MELLITUS WITHOUT COMPLICATION, WITHOUT LONG-TERM CURRENT USE OF INSULIN (HCC): ICD-10-CM

## 2021-10-25 DIAGNOSIS — E55.9 VITAMIN D DEFICIENCY: ICD-10-CM

## 2021-10-25 DIAGNOSIS — E78.00 PURE HYPERCHOLESTEROLEMIA: ICD-10-CM

## 2021-10-25 DIAGNOSIS — R94.4 DECREASED GFR: ICD-10-CM

## 2021-10-25 DIAGNOSIS — R94.4 DECREASED CALCULATED GFR: ICD-10-CM

## 2021-10-25 LAB
ALBUMIN SERPL-MCNC: 4.1 G/DL (ref 3.5–5.2)
ALP BLD-CCNC: 86 U/L (ref 35–104)
ALT SERPL-CCNC: 9 U/L (ref 5–33)
ANION GAP SERPL CALCULATED.3IONS-SCNC: 10 MMOL/L (ref 7–19)
AST SERPL-CCNC: 14 U/L (ref 5–32)
BILIRUB SERPL-MCNC: 0.4 MG/DL (ref 0.2–1.2)
BUN BLDV-MCNC: 26 MG/DL (ref 8–23)
CALCIUM SERPL-MCNC: 9.3 MG/DL (ref 8.8–10.2)
CHLORIDE BLD-SCNC: 105 MMOL/L (ref 98–111)
CHOLESTEROL, TOTAL: 168 MG/DL (ref 160–199)
CO2: 24 MMOL/L (ref 22–29)
CREAT SERPL-MCNC: 1 MG/DL (ref 0.5–0.9)
GFR AFRICAN AMERICAN: >59
GFR NON-AFRICAN AMERICAN: 53
GLUCOSE BLD-MCNC: 127 MG/DL (ref 74–109)
HBA1C MFR BLD: 6.9 % (ref 4–6)
HDLC SERPL-MCNC: 50 MG/DL (ref 65–121)
LDL CHOLESTEROL CALCULATED: 95 MG/DL
POTASSIUM SERPL-SCNC: 4.4 MMOL/L (ref 3.5–5)
SODIUM BLD-SCNC: 139 MMOL/L (ref 136–145)
TOTAL PROTEIN: 8.1 G/DL (ref 6.6–8.7)
TRIGL SERPL-MCNC: 114 MG/DL (ref 0–149)

## 2021-10-27 ENCOUNTER — OFFICE VISIT (OUTPATIENT)
Dept: INTERNAL MEDICINE | Age: 81
End: 2021-10-27
Payer: MEDICARE

## 2021-10-27 ENCOUNTER — INFUSION (OUTPATIENT)
Dept: ONCOLOGY | Facility: CLINIC | Age: 81
End: 2021-10-27

## 2021-10-27 VITALS
HEART RATE: 78 BPM | BODY MASS INDEX: 30.37 KG/M2 | RESPIRATION RATE: 18 BRPM | WEIGHT: 189 LBS | HEIGHT: 66 IN | SYSTOLIC BLOOD PRESSURE: 128 MMHG | OXYGEN SATURATION: 98 % | DIASTOLIC BLOOD PRESSURE: 70 MMHG

## 2021-10-27 DIAGNOSIS — I10 ESSENTIAL HYPERTENSION: Primary | ICD-10-CM

## 2021-10-27 DIAGNOSIS — I51.89 DIASTOLIC DYSFUNCTION: ICD-10-CM

## 2021-10-27 DIAGNOSIS — N18.31 STAGE 3A CHRONIC KIDNEY DISEASE (HCC): ICD-10-CM

## 2021-10-27 DIAGNOSIS — E55.9 VITAMIN D DEFICIENCY: ICD-10-CM

## 2021-10-27 DIAGNOSIS — Z45.2 ENCOUNTER FOR CARE RELATED TO VASCULAR ACCESS PORT: Primary | ICD-10-CM

## 2021-10-27 DIAGNOSIS — R42 CHRONIC VERTIGO: ICD-10-CM

## 2021-10-27 DIAGNOSIS — E78.00 PURE HYPERCHOLESTEROLEMIA: ICD-10-CM

## 2021-10-27 DIAGNOSIS — E11.40 TYPE 2 DIABETES MELLITUS WITH DIABETIC NEUROPATHY, WITHOUT LONG-TERM CURRENT USE OF INSULIN (HCC): ICD-10-CM

## 2021-10-27 DIAGNOSIS — C50.112 MALIGNANT NEOPLASM OF CENTRAL PORTION OF LEFT FEMALE BREAST, UNSPECIFIED ESTROGEN RECEPTOR STATUS (HCC): ICD-10-CM

## 2021-10-27 DIAGNOSIS — Z23 NEED FOR IMMUNIZATION AGAINST INFLUENZA: ICD-10-CM

## 2021-10-27 PROCEDURE — 99214 OFFICE O/P EST MOD 30 MIN: CPT | Performed by: INTERNAL MEDICINE

## 2021-10-27 PROCEDURE — G8399 PT W/DXA RESULTS DOCUMENT: HCPCS | Performed by: INTERNAL MEDICINE

## 2021-10-27 PROCEDURE — G8417 CALC BMI ABV UP PARAM F/U: HCPCS | Performed by: INTERNAL MEDICINE

## 2021-10-27 PROCEDURE — 90694 VACC AIIV4 NO PRSRV 0.5ML IM: CPT | Performed by: INTERNAL MEDICINE

## 2021-10-27 PROCEDURE — 1090F PRES/ABSN URINE INCON ASSESS: CPT | Performed by: INTERNAL MEDICINE

## 2021-10-27 PROCEDURE — 1123F ACP DISCUSS/DSCN MKR DOCD: CPT | Performed by: INTERNAL MEDICINE

## 2021-10-27 PROCEDURE — G8484 FLU IMMUNIZE NO ADMIN: HCPCS | Performed by: INTERNAL MEDICINE

## 2021-10-27 PROCEDURE — 4040F PNEUMOC VAC/ADMIN/RCVD: CPT | Performed by: INTERNAL MEDICINE

## 2021-10-27 PROCEDURE — 1036F TOBACCO NON-USER: CPT | Performed by: INTERNAL MEDICINE

## 2021-10-27 PROCEDURE — G0008 ADMIN INFLUENZA VIRUS VAC: HCPCS | Performed by: INTERNAL MEDICINE

## 2021-10-27 PROCEDURE — G8427 DOCREV CUR MEDS BY ELIG CLIN: HCPCS | Performed by: INTERNAL MEDICINE

## 2021-10-27 RX ORDER — MECLIZINE HCL 12.5 MG/1
12.5 TABLET ORAL 3 TIMES DAILY PRN
Qty: 30 TABLET | Refills: 1 | Status: SHIPPED | OUTPATIENT
Start: 2021-10-27 | End: 2021-11-11

## 2021-10-27 RX ORDER — SODIUM CHLORIDE 0.9 % (FLUSH) 0.9 %
10 SYRINGE (ML) INJECTION AS NEEDED
Status: CANCELLED | OUTPATIENT
Start: 2021-10-27

## 2021-10-27 RX ORDER — HEPARIN SODIUM (PORCINE) LOCK FLUSH IV SOLN 100 UNIT/ML 100 UNIT/ML
500 SOLUTION INTRAVENOUS AS NEEDED
Status: DISCONTINUED | OUTPATIENT
Start: 2021-10-27 | End: 2021-10-27 | Stop reason: HOSPADM

## 2021-10-27 RX ORDER — LOSARTAN POTASSIUM 100 MG/1
TABLET ORAL
Qty: 90 TABLET | Refills: 1 | Status: SHIPPED | OUTPATIENT
Start: 2021-10-27 | End: 2022-05-09

## 2021-10-27 RX ORDER — FAMOTIDINE 40 MG/1
40 TABLET, FILM COATED ORAL EVERY EVENING
Qty: 30 TABLET | Refills: 3 | Status: SHIPPED | OUTPATIENT
Start: 2021-10-27 | End: 2022-07-13 | Stop reason: SDUPTHER

## 2021-10-27 RX ORDER — SODIUM CHLORIDE 0.9 % (FLUSH) 0.9 %
10 SYRINGE (ML) INJECTION AS NEEDED
Status: DISCONTINUED | OUTPATIENT
Start: 2021-10-27 | End: 2021-10-27 | Stop reason: HOSPADM

## 2021-10-27 RX ORDER — HEPARIN SODIUM (PORCINE) LOCK FLUSH IV SOLN 100 UNIT/ML 100 UNIT/ML
500 SOLUTION INTRAVENOUS AS NEEDED
Status: CANCELLED | OUTPATIENT
Start: 2021-10-27

## 2021-10-27 RX ADMIN — HEPARIN SODIUM (PORCINE) LOCK FLUSH IV SOLN 100 UNIT/ML 500 UNITS: 100 SOLUTION at 10:14

## 2021-10-27 RX ADMIN — Medication 10 ML: at 10:13

## 2021-10-27 ASSESSMENT — ENCOUNTER SYMPTOMS
SORE THROAT: 0
WHEEZING: 0
CONSTIPATION: 0
COUGH: 0
ABDOMINAL PAIN: 0
CHEST TIGHTNESS: 0

## 2021-10-27 NOTE — PROGRESS NOTES
Pt received an injection of High Dose Flu Vaccine in the right deltoid in the office today. Pt has signed an Injection consent form for the injection. Pt does not show any signs of reaction to the injection. Pt tolerated the injection well, and is advised to call the office if he/she notices any kind of reaction to happen once the Pt leaves the office.

## 2021-10-27 NOTE — PROGRESS NOTES
Chief Complaint   Patient presents with    3 Month Follow-Up     History of presenting illness:  Tricia Chavez is [de-identified] y.o. female who presents today for follow up on her chronic medical conditions as noted below.       Patient Active Problem List    Diagnosis Date Noted    Diastolic dysfunction 06/19/4124    Type 2 diabetes mellitus with diabetic neuropathy 07/26/2021    Essential hypertension 02/11/2019    Elevated TSH 08/07/2018    Vitamin D deficiency 09/10/2017    Pure hypercholesterolemia 09/10/2017    Type 2 diabetes mellitus without complication, without long-term current use of insulin ([address] 09/10/2017    Localized osteoporosis without current pathological fracture 09/10/2017     Overview Note:     2017 hip neck -2.6; lspine -1/8  8/2020 hip neck -2.6/ L spine L1 -1.6      Generalized anxiety disorder 09/10/2017    Former smoker 09/10/2017    Numbness 04/07/2017    Imbalance 04/07/2017    Estrogen receptor negative tumor status 08/06/2008     Past Medical History:   Diagnosis Date    Arthritis     Back pain     Breast cancer ([facility] [address]     left 2008    Chronic kidney disease     Concussion     History of therapeutic radiation     Hx antineoplastic chemo     Hyperlipidemia     Hypertension     Osteoporosis     Pneumonia     Type II or unspecified type diabetes mellitus without mention of complication, not stated as uncontrolled     diet controlled    Varicose veins     Wears glasses       Past Surgical History:   Procedure Laterality Date    BREAST BIOPSY  08/06/2008    U/S Guided Mammotome Biopsy Left Breast x 2  infiltrating ductal carcinoma, grade III, ER/RI negative    BREAST BIOPSY  08/05/2009    Stereotactic biopsy right breast  No evidence of malignancy    BREAST BIOPSY Left 08/2015    BREAST LUMPECTOMY      CATARACT REMOVAL Right 2021    COLONOSCOPY      EYE SURGERY      cataract removal    MASTECTOMY, PARTIAL  08/28/2008     Left partial mastectomy and left sentinel node biopsy  2.6 cm infiltrating ductal carcinoma, grade III, 0/2 nodes positive, immunohistochemistry negative for micrometastasis     Current Outpatient Medications   Medication Sig Dispense Refill    famotidine (PEPCID) 40 MG tablet Take 1 tablet by mouth every evening 30 tablet 3    losartan (COZAAR) 100 MG tablet Take 1/2 tablet twice daily 90 tablet 1    meclizine (ANTIVERT) 12.5 MG tablet Take 1 tablet by mouth 3 times daily as needed for Dizziness 30 tablet 1    Famotidine (PEPCID PO) Take by mouth daily as needed      amLODIPine (NORVASC) 2.5 MG tablet Take 1 tablet by mouth daily 90 tablet 3    carvedilol (COREG) 6.25 MG tablet Take 1 tablet by mouth 2 times daily 180 tablet 3    glipiZIDE (GLUCOTROL) 5 MG tablet TAKE ONE-HALF TABLET twice/ day 90 tablet 1    triamterene-hydroCHLOROthiazide (MAXZIDE-25) 37.5-25 MG per tablet Take 1/2 tablet in am 30 tablet 3    pravastatin (PRAVACHOL) 40 MG tablet TAKE ONE TABLET BY MOUTH ONCE DAILY 90 tablet 1    potassium chloride (KLOR-CON M) 10 MEQ extended release tablet Take 1 tablet by mouth daily 90 tablet 1    magnesium 30 MG tablet Take 30 mg by mouth 2 times daily      Calcium-Vitamin D 600-200 MG-UNIT TABS Take by mouth every morning (before breakfast)       aspirin 81 MG EC tablet Take 81 mg by mouth daily.  Ascorbic Acid (VITAMIN C) 500 MG tablet Take 500 mg by mouth daily. No current facility-administered medications for this visit.      Allergies   Allergen Reactions    Poison Ivy Extract     Tape Ricke Guard Tape]      Latex Tape     Social History     Tobacco Use    Smoking status: Former Smoker     Packs/day: 0.25     Years: 20.00     Pack years: 5.00     Types: Pipe, Cigarettes     Quit date: 1979     Years since quittin.7    Smokeless tobacco: Never Used   Substance Use Topics    Alcohol use: No     Comment: occ      Family History   Problem Relation Age of Onset    Heart Disease Father     Diabetes Father     Hypertension Mother        Review of Systems   Constitutional: Positive for fatigue. Negative for chills and fever. HENT: Negative for congestion, ear pain, nosebleeds, postnasal drip and sore throat. Respiratory: Negative for cough, chest tightness and wheezing. Cardiovascular: Negative for chest pain, palpitations and leg swelling. Gastrointestinal: Negative for abdominal pain and constipation. Genitourinary: Negative for dysuria and urgency. Musculoskeletal: Positive for arthralgias. Skin: Negative for rash. Neurological: Positive for dizziness. Negative for headaches. Vitals:    10/27/21 1244   BP: 128/70   Site: Left Upper Arm   Position: Sitting   Cuff Size: Large Adult   Pulse: 78   Resp: 18   SpO2: 98%   Weight: 189 lb (85.7 kg)   Height: 5' 6\" (1.676 m)     Body mass index is 30.51 kg/m². Physical Exam  Constitutional:       Appearance: She is well-developed. HENT:      Right Ear: External ear normal.      Left Ear: External ear normal.      Mouth/Throat:      Pharynx: No oropharyngeal exudate. Eyes:      Conjunctiva/sclera: Conjunctivae normal.      Pupils: Pupils are equal, round, and reactive to light. Neck:      Thyroid: No thyromegaly. Vascular: No JVD. Cardiovascular:      Rate and Rhythm: Normal rate. Heart sounds: Normal heart sounds. No murmur heard. Pulmonary:      Effort: No respiratory distress. Breath sounds: Normal breath sounds. No wheezing or rales. Chest:      Chest wall: No tenderness. Abdominal:      General: Bowel sounds are normal.      Palpations: Abdomen is soft. Musculoskeletal:      Cervical back: Neck supple. Lymphadenopathy:      Cervical: No cervical adenopathy. Skin:     Findings: No rash.          Lab Review   Orders Only on 10/25/2021   Component Date Value    Cholesterol, Total 10/25/2021 168     Triglycerides 10/25/2021 114     HDL 10/25/2021 50*    LDL Calculated 10/25/2021 95     Sodium 10/25/2021 139     Potassium 10/25/2021 4.4     Chloride 10/25/2021 105     CO2 10/25/2021 24     Anion Gap 10/25/2021 10     Glucose 10/25/2021 127*    BUN 10/25/2021 26*    CREATININE 10/25/2021 1.0*    GFR Non- 10/25/2021 53*    GFR  10/25/2021 >59     Calcium 10/25/2021 9.3     Total Protein 10/25/2021 8.1     Albumin 10/25/2021 4.1     Total Bilirubin 10/25/2021 0.4     Alkaline Phosphatase 10/25/2021 86     ALT 10/25/2021 9     AST 10/25/2021 14     Hemoglobin A1C 10/25/2021 6.9*   Admission on 10/12/2021, Discharged on 10/12/2021   Component Date Value    P-R Interval 10/12/2021 192     QRS Duration 10/12/2021 108     Q-T Interval 10/12/2021 404     QTc Calculation ([place]) 10/12/2021 423     P Axis 10/12/2021 62     T Axis 10/12/2021 20    Office Visit on 10/11/2021   Component Date Value    SARS-CoV-2, PCR 10/11/2021 Not Detected    Orders Only on 10/11/2021   Component Date Value    Sodium 10/11/2021 141     Potassium 10/11/2021 3.7     Chloride 10/11/2021 102     CO2 10/11/2021 28     Anion Gap 10/11/2021 11     Glucose 10/11/2021 139*    BUN 10/11/2021 22     CREATININE 10/11/2021 1.0*    GFR Non- 10/11/2021 53*    GFR  10/11/2021 >59     Calcium 10/11/2021 9.2     Total Protein 10/11/2021 7.1     Albumin 10/11/2021 3.8     Total Bilirubin 10/11/2021 0.3     Alkaline Phosphatase 10/11/2021 79     ALT 10/11/2021 9     AST 10/11/2021 17     WBC 10/11/2021 9.9     RBC 10/11/2021 3.49*    Hemoglobin 10/11/2021 11.1*    Hematocrit 10/11/2021 34.0*    MCV 10/11/2021 97.4     MCH 10/11/2021 31.8*    MCHC 10/11/2021 32.6*    RDW 10/11/2021 13.2     Platelets 44/31/9441 400     MPV 10/11/2021 9.4    Hospital Outpatient Visit on 09/15/2021   Component Date Value    Left Ventricular Ejectio* 09/15/2021 76     LVEF MODALITY 09/15/2021 Nuclear            ASSESSMENT/PLAN:    CKD stage III  Declined GFR but better now 10/2021 - 53 ( 31 in 8/2021 )  INCreased fluid intake is recommended    Chronic vertigo for several years but recently has been worse  Recommend vestibular rehabilitation exercises, instructions to the patient  Meclizine 12.5 as needed    HTN  Blood pressure readings reviewed  After reviewing today's lab I need to make following changes to her current regimen  1. Losartan 100 mg half tablet, continue taking half tablet twice daily  2. Resume Maxide 37.5/20 5/2 tablet daily in a.m.  3. Add amlodipine 2.5 mg p.o. daily  4.  Blood pressure readings to me in 1 to 2 weeks    HTN  Blood pressure readings reviewed  Blood pressure now in better range  Kidney function now improved and is normal  Rx  losartan 50 twice daily  Maxide half tablet daily        Osteoporosis  2017 hip neck -2.6; lspine -1/8 8/2020 hip neck -2.6/ L spine L1 -1.6  Lab results discussed with patient  Vitamin D deficiency-   her vitamin D level now good 34 (46 )( 48) ( 41 )(42)  RX 2000 iu vit d daily     Declined GFR  GFR  7/2021 is 43 (2/2021 is normal over 60  Prior 53 in 11/2020 (54 ( 59)  Increase fluid intake recommended     Pure hypercholesterolemia-  Lab results reviewed with patient  Liver functions normal  LDL 95 in 10/2021 (104 in 7/2021 (85 in 2/2021)  RX pravastatin 40 mg daily  Healthy, mostly fiber rich nonstarchy plant-based diet recommended  Recommend to decrease intake of processed foods, simple carbohydrates and animal-based products that high in saturated fats       Type 2 diabetes mellitus without complication, without long-term current use of insulin (HCC)-lab results reviewed and discussed with patient  Hemoglobin A1c 10/2021  6.9 (2/2021 is 7.0)  Prior her A1c  7.4 11/2020 (6.9( 6.7) ( 6.7) (6.9)   Diet  Weight loss  RX glipizide from 2.5 daily in a.m. to 2.5 twice daily  Close follow-up in 3 months     Generalized anxiety disorder- her anxiety issues are daily but she does not want to take rx     GERD symptoms  rx  Pepcid ( off omeprazole per nephro recommendations)  Rx Pepcid 40 mg p.o. sent to the pharmacy     Left knee pain  Refuses ortho evaluation      Need for immunization against influenza  -     INFLUENZA, QUADV, ADJUVANTED, 65 YRS =, IM, PF, PREFILL SYR, 0.5ML (FLUAD)              Orders Placed This Encounter   Procedures    INFLUENZA, QUADV, ADJUVANTED, 65 YRS =, IM, PF, PREFILL SYR, 0.5ML (FLUAD)    Hemoglobin A1C    Comprehensive Metabolic Panel    CBC Auto Differential    Lipid Panel    Microalbumin / Creatinine Urine Ratio    Vitamin D 25 Hydroxy    Urinalysis    TSH without Reflex     New Prescriptions    FAMOTIDINE (PEPCID) 40 MG TABLET    Take 1 tablet by mouth every evening    MECLIZINE (ANTIVERT) 12.5 MG TABLET    Take 1 tablet by mouth 3 times daily as needed for Dizziness         Return in about 4 months (around 2/27/2022) for Annual Physical.   There are no Patient Instructions on file for this visit. EMR Dragon/transcription disclaimer:Significant part of this  encounter note is electronic transcription/translationof spoken language to printed text. The electronic translation of spoken language may be erroneous, or at times, nonsensical words or phrases may be inadvertently transcribed.  Although I have reviewed the note for sucherrors, some may still exist.

## 2021-11-01 ENCOUNTER — OFFICE VISIT (OUTPATIENT)
Dept: CARDIOLOGY CLINIC | Age: 81
End: 2021-11-01
Payer: MEDICARE

## 2021-11-01 VITALS
SYSTOLIC BLOOD PRESSURE: 118 MMHG | HEART RATE: 69 BPM | WEIGHT: 192 LBS | BODY MASS INDEX: 30.86 KG/M2 | HEIGHT: 66 IN | DIASTOLIC BLOOD PRESSURE: 78 MMHG

## 2021-11-01 DIAGNOSIS — R06.02 SOB (SHORTNESS OF BREATH): Primary | ICD-10-CM

## 2021-11-01 PROCEDURE — 1090F PRES/ABSN URINE INCON ASSESS: CPT | Performed by: INTERNAL MEDICINE

## 2021-11-01 PROCEDURE — G8417 CALC BMI ABV UP PARAM F/U: HCPCS | Performed by: INTERNAL MEDICINE

## 2021-11-01 PROCEDURE — 99214 OFFICE O/P EST MOD 30 MIN: CPT | Performed by: INTERNAL MEDICINE

## 2021-11-01 PROCEDURE — G8427 DOCREV CUR MEDS BY ELIG CLIN: HCPCS | Performed by: INTERNAL MEDICINE

## 2021-11-01 PROCEDURE — 4040F PNEUMOC VAC/ADMIN/RCVD: CPT | Performed by: INTERNAL MEDICINE

## 2021-11-01 PROCEDURE — 1123F ACP DISCUSS/DSCN MKR DOCD: CPT | Performed by: INTERNAL MEDICINE

## 2021-11-01 PROCEDURE — G8484 FLU IMMUNIZE NO ADMIN: HCPCS | Performed by: INTERNAL MEDICINE

## 2021-11-01 PROCEDURE — 1036F TOBACCO NON-USER: CPT | Performed by: INTERNAL MEDICINE

## 2021-11-01 PROCEDURE — G8399 PT W/DXA RESULTS DOCUMENT: HCPCS | Performed by: INTERNAL MEDICINE

## 2021-11-02 ASSESSMENT — ENCOUNTER SYMPTOMS
WHEEZING: 0
SORE THROAT: 0
COUGH: 0
EYE REDNESS: 0
DIARRHEA: 0
APNEA: 0
CONSTIPATION: 0
SHORTNESS OF BREATH: 0
EYE PAIN: 0
ABDOMINAL DISTENTION: 0
FACIAL SWELLING: 0
VOMITING: 0
BLOOD IN STOOL: 0
CHEST TIGHTNESS: 0
NAUSEA: 0
ABDOMINAL PAIN: 0
EYE DISCHARGE: 0

## 2021-11-02 NOTE — PROGRESS NOTES
Cardiology Office Visit Note  [facility] – [place]  [address]  Phone: [phone]  Fax: [phone]                            Date:  11/2/2021  Patient: Brady Parish  Age:  [de-identified] y.o., 1940    Referral: Jacquelin Dupont MD    REASON FOR VISIT:  Follow-up (no cardiac symptoms) and Hypertension         PROBLEM LIST:    Patient Active Problem List    Diagnosis Date Noted    Diastolic dysfunction 51/90/1069     Priority: Low    Type 2 diabetes mellitus with diabetic neuropathy 07/26/2021     Priority: Low    Essential hypertension 02/11/2019     Priority: Low    Elevated TSH 08/07/2018     Priority: Low    Vitamin D deficiency 09/10/2017     Priority: Low    Pure hypercholesterolemia 09/10/2017     Priority: Low    Type 2 diabetes mellitus without complication, without long-term current use of insulin ([facility] [address] 09/10/2017     Priority: Low    Localized osteoporosis without current pathological fracture 09/10/2017     Priority: Low     Overview Note:     2017 hip neck -2.6; lspine -1/8  8/2020 hip neck -2.6/ L spine L1 -1.6      Generalized anxiety disorder 09/10/2017     Priority: Low    Former smoker 09/10/2017     Priority: Low    Numbness 04/07/2017     Priority: Low    Imbalance 04/07/2017     Priority: Low    Estrogen receptor negative tumor status 08/06/2008     Priority: Low         PRESENTATION: Brady Parish is a [de-identified]y.o. year old female was multiple medical problems including hypertension, obesity, chronic kidney disease and breast cancer status post lumpectomy and chemotherapy as well as radiation therapy over 10 years ago. She has been experiencing intermittent shortness of breath and dyspnea on exertion for some time now. She had a stress test which was positive suggestive for mild ischemia involving the cardiac apex.   Given her ongoing/recurrent symptoms and abnormal stress test was recommended that she have definitive cardiac angiogram.  The study was diagnostic for nonobstructive coronary artery disease. Notably recent echocardiogram largely reassuring with ejection fraction 50 to 55%, without major valvular heart disease, cardiomyopathy with evidence for only mild pulmonary hypertension. Chest x-rays from recent months stable. She has a history of COPD and is noted radiation to the chest from prior breast cancer treatment. REVIEW OF SYSTEMS:  Review of Systems   Constitutional: Negative for chills, fatigue and fever. HENT: Negative for congestion, facial swelling, hearing loss and sore throat. Eyes: Negative for pain, discharge, redness and visual disturbance. Respiratory: Negative for apnea, cough, chest tightness, shortness of breath and wheezing. Cardiovascular: Negative for chest pain, palpitations and leg swelling. Gastrointestinal: Negative for abdominal distention, abdominal pain, blood in stool, constipation, diarrhea, nausea and vomiting. Endocrine: Negative for polydipsia, polyphagia and polyuria. Genitourinary: Negative for dysuria, flank pain, frequency and hematuria. Musculoskeletal: Negative for joint swelling, myalgias and neck pain. Skin: Negative for pallor and rash. Neurological: Negative for dizziness, syncope, speech difficulty, light-headedness, numbness and headaches. Psychiatric/Behavioral: Negative for confusion, hallucinations and sleep disturbance.        Past Medical History:      Diagnosis Date    Arthritis     Back pain     Breast cancer ([facility] [address]     left 2008    Chronic kidney disease     Concussion     History of therapeutic radiation     Hx antineoplastic chemo     Hyperlipidemia     Hypertension     Osteoporosis     Pneumonia     Type II or unspecified type diabetes mellitus without mention of complication, not stated as uncontrolled     diet controlled    Varicose veins     Wears glasses        Past Surgical History:      Procedure Laterality Date    BREAST BIOPSY  08/06/2008    U/S Guided Mammotome Biopsy Left Breast x 2  infiltrating ductal carcinoma, grade III, ER/ND negative    BREAST BIOPSY  08/05/2009    Stereotactic biopsy right breast  No evidence of malignancy    BREAST BIOPSY Left 08/2015    BREAST LUMPECTOMY      CATARACT REMOVAL Right 2021    COLONOSCOPY      DIAGNOSTIC CARDIAC CATH LAB PROCEDURE      EYE SURGERY      cataract removal    MASTECTOMY, PARTIAL  08/28/2008     Left partial mastectomy and left sentinel node biopsy  2.6 cm infiltrating ductal carcinoma, grade III, 0/2 nodes positive, immunohistochemistry negative for micrometastasis       Medications:  Current Outpatient Medications   Medication Sig Dispense Refill    famotidine (PEPCID) 40 MG tablet Take 1 tablet by mouth every evening 30 tablet 3    losartan (COZAAR) 100 MG tablet Take 1/2 tablet twice daily 90 tablet 1    meclizine (ANTIVERT) 12.5 MG tablet Take 1 tablet by mouth 3 times daily as needed for Dizziness 30 tablet 1    amLODIPine (NORVASC) 2.5 MG tablet Take 1 tablet by mouth daily 90 tablet 3    carvedilol (COREG) 6.25 MG tablet Take 1 tablet by mouth 2 times daily 180 tablet 3    glipiZIDE (GLUCOTROL) 5 MG tablet TAKE ONE-HALF TABLET twice/ day 90 tablet 1    triamterene-hydroCHLOROthiazide (MAXZIDE-25) 37.5-25 MG per tablet Take 1/2 tablet in am 30 tablet 3    pravastatin (PRAVACHOL) 40 MG tablet TAKE ONE TABLET BY MOUTH ONCE DAILY 90 tablet 1    potassium chloride (KLOR-CON M) 10 MEQ extended release tablet Take 1 tablet by mouth daily 90 tablet 1    magnesium 30 MG tablet Take 30 mg by mouth 2 times daily      Calcium-Vitamin D 600-200 MG-UNIT TABS Take by mouth every morning (before breakfast)       aspirin 81 MG EC tablet Take 81 mg by mouth daily.  Ascorbic Acid (VITAMIN C) 500 MG tablet Take 500 mg by mouth daily.         Famotidine (PEPCID PO) Take by mouth daily as needed (Patient not taking: Reported on 11/1/2021)       No current facility-administered medications for this visit. Allergies:  Poison ivy extract and Tape [adhesive tape]    Social History:  Social History     Occupational History    Not on file   Tobacco Use    Smoking status: Former Smoker     Packs/day: 0.25     Years: 20.00     Pack years: 5.00     Types: Pipe, Cigarettes     Quit date: 1979     Years since quittin.7    Smokeless tobacco: Never Used   Vaping Use    Vaping Use: Never used   Substance and Sexual Activity    Alcohol use: No     Comment: occ    Drug use: No    Sexual activity: Not on file         Family History:       Problem Relation Age of Onset    Heart Disease Father     Diabetes Father     Hypertension Mother          Physical Examination:  /78   Pulse 69   Ht 5' 6\" (1.676 m)   Wt 192 lb (87.1 kg)   BMI 30.99 kg/m²   Physical Exam      Labs:   CBC: No results found for: CBC   BMP: No results found for: BMP    BNP: No results found for: BNPINT   PT/INR:   Prothrombin Time   Date Value Ref Range Status   2011 11.20 9.7 - 12.5 SEC Final     INR   Date Value Ref Range Status   2011 1.03 0.90 - 1.10 Final     Comment:     INR  < or = 1.3  Normal  INR = 2.0 - 3.0  Therapeutic  INR = 2.5 - 3.5  Therapeutic for patients with mechanical                   prosthetic heart valve  MI prophylaxis  & MI prophylaxisINR  > or = 3.5  Abnormal/Elevated  INR  > or = 5.0  Critical (requires immediate physician                   notification)       APTT:  No results found for: APTT   CARDIAC ENZYMES:   Troponin I   Date Value Ref Range Status   2011 < 0.01 0.000 - 0.780 NG/ML Final     Comment:     0-0.10 ng/ml     Reference range for individuals without                   ischemic myocardial disease. Clinical                   correlation suggested. 0.11-.78 ng/ml   Values of troponin in this range suggest                   that a repeat assay be drawn 6-8 hours                   after the current specimen.   0.79-7.5 ng/ml   Values in this range suggest dictation for accuracy, there may be errors in the transcription that are not intended.

## 2021-11-24 ENCOUNTER — OFFICE VISIT (OUTPATIENT)
Dept: PULMONOLOGY | Age: 81
End: 2021-11-24
Payer: MEDICARE

## 2021-11-24 VITALS
SYSTOLIC BLOOD PRESSURE: 138 MMHG | HEART RATE: 75 BPM | OXYGEN SATURATION: 97 % | BODY MASS INDEX: 31.05 KG/M2 | TEMPERATURE: 97.5 F | WEIGHT: 193.2 LBS | HEIGHT: 66 IN | DIASTOLIC BLOOD PRESSURE: 72 MMHG

## 2021-11-24 DIAGNOSIS — Z87.891 FORMER SMOKER: ICD-10-CM

## 2021-11-24 DIAGNOSIS — I10 ESSENTIAL HYPERTENSION: ICD-10-CM

## 2021-11-24 DIAGNOSIS — R06.09 DOE (DYSPNEA ON EXERTION): Primary | ICD-10-CM

## 2021-11-24 DIAGNOSIS — I51.89 DIASTOLIC DYSFUNCTION: ICD-10-CM

## 2021-11-24 PROCEDURE — 1036F TOBACCO NON-USER: CPT | Performed by: INTERNAL MEDICINE

## 2021-11-24 PROCEDURE — 1090F PRES/ABSN URINE INCON ASSESS: CPT | Performed by: INTERNAL MEDICINE

## 2021-11-24 PROCEDURE — 4040F PNEUMOC VAC/ADMIN/RCVD: CPT | Performed by: INTERNAL MEDICINE

## 2021-11-24 PROCEDURE — G8417 CALC BMI ABV UP PARAM F/U: HCPCS | Performed by: INTERNAL MEDICINE

## 2021-11-24 PROCEDURE — G8399 PT W/DXA RESULTS DOCUMENT: HCPCS | Performed by: INTERNAL MEDICINE

## 2021-11-24 PROCEDURE — G8427 DOCREV CUR MEDS BY ELIG CLIN: HCPCS | Performed by: INTERNAL MEDICINE

## 2021-11-24 PROCEDURE — 99204 OFFICE O/P NEW MOD 45 MIN: CPT | Performed by: INTERNAL MEDICINE

## 2021-11-24 PROCEDURE — 1123F ACP DISCUSS/DSCN MKR DOCD: CPT | Performed by: INTERNAL MEDICINE

## 2021-11-24 PROCEDURE — G8484 FLU IMMUNIZE NO ADMIN: HCPCS | Performed by: INTERNAL MEDICINE

## 2021-11-24 ASSESSMENT — ENCOUNTER SYMPTOMS
ANAL BLEEDING: 0
COUGH: 0
SHORTNESS OF BREATH: 1
RHINORRHEA: 0
ABDOMINAL DISTENTION: 0
APNEA: 0
BACK PAIN: 0
CHEST TIGHTNESS: 0
ABDOMINAL PAIN: 0
WHEEZING: 0

## 2021-11-24 NOTE — PROGRESS NOTES
Pulmonary and Sleep Medicine    Ingrid Orozco (:  1940) is a [de-identified] y.o. female,New patient, here for evaluation of the following chief complaint(s):  New Patient (Shortness of breath for a year now (Dr. Jesus Wu))      Referring physician:  J Luis Loving Md  [address] [facility] [address],  [facility] [address]     ASSESSMENT/PLAN:  1. READ (dyspnea on exertion)  -     Full PFT Study With Bronchodilator; Future  -     CT CHEST WO CONTRAST; Future  2. Essential hypertension  3. Diastolic dysfunction  4. Former smoker        Dyspnea on exertion of unclear etiology. Likely deconditioning cannot exclude intrinsic lung disease. We will proceed with pulmonary function testing and a CT of the chest to better define underlying anatomy. Aminta Becker MD, [facility], [facility]    Return in about 6 weeks (around 2022). SUBJECTIVE/OBJECTIVE:  The patient is here for evaluation of shortness of breath. She noticed more difficulty breathing dysuria. She noticed her symptoms are worse during the summer. She says that since the weather has cooled off she is feeling better. She denies any wheezing but she admits to rattling in her throat predominantly at night. No pulmonary function study on file. She did have a chest x-ray done earlier this year that showed the bilateral basilar opacities. She quit smoking about 50 years ago. No other significant environmental exposures. Prior to Visit Medications    Medication Sig Taking?  Authorizing Provider   famotidine (PEPCID) 40 MG tablet Take 1 tablet by mouth every evening Yes Rita Gill MD   losartan (COZAAR) 100 MG tablet Take 1/2 tablet twice daily Yes Rita Gill MD   Famotidine (PEPCID PO) Take by mouth daily as needed  Yes Historical Provider, MD   amLODIPine (NORVASC) 2.5 MG tablet Take 1 tablet by mouth daily Yes Denis Crenshaw MD   carvedilol (COREG) 6.25 MG tablet Take 1 tablet by mouth 2 times daily Yes Denis Crenshaw MD   glipiZIDE (GLUCOTROL) 5 MG tablet TAKE ONE-HALF TABLET twice/ day Yes Lawanda Galarza MD   triamterene-hydroCHLOROthiazide (MAXZIDE-25) 37.5-25 MG per tablet Take 1/2 tablet in am Yes Lawanda Galarza MD   pravastatin (PRAVACHOL) 40 MG tablet TAKE ONE TABLET BY MOUTH ONCE DAILY Yes Lawanda Galarza MD   potassium chloride (KLOR-CON M) 10 MEQ extended release tablet Take 1 tablet by mouth daily Yes Lawanda Galarza MD   magnesium 30 MG tablet Take 30 mg by mouth 2 times daily Yes Historical Provider, MD   Calcium-Vitamin D 600-200 MG-UNIT TABS Take by mouth every morning (before breakfast)  Yes Historical Provider, MD   aspirin 81 MG EC tablet Take 81 mg by mouth daily. Yes Historical Provider, MD   Ascorbic Acid (VITAMIN C) 500 MG tablet Take 500 mg by mouth daily. Yes Historical Provider, MD        Review of Systems   Constitutional: Negative for activity change, appetite change, chills, diaphoresis and fatigue. HENT: Negative for congestion, dental problem, drooling, ear discharge, postnasal drip and rhinorrhea. Eyes: Negative for visual disturbance. Respiratory: Positive for shortness of breath. Negative for apnea, cough, chest tightness and wheezing. Gastrointestinal: Negative for abdominal distention, abdominal pain and anal bleeding. Endocrine: Negative for cold intolerance, heat intolerance and polydipsia. Genitourinary: Negative for difficulty urinating, dysuria, enuresis and flank pain. Musculoskeletal: Negative for arthralgias, back pain and gait problem. Allergic/Immunologic: Negative for environmental allergies. Neurological: Negative for dizziness, facial asymmetry, light-headedness and headaches. Vitals:    11/24/21 1156   BP: 138/72   Pulse: 75   Temp: 97.5 °F (36.4 °C)   SpO2: 97%     Physical Exam  Vitals reviewed. Constitutional:       Appearance: Normal appearance. HENT:      Head: Normocephalic and atraumatic. Nose: Nose normal.   Eyes:      Extraocular Movements: Extraocular movements intact. Conjunctiva/sclera: Conjunctivae normal.   Cardiovascular:      Rate and Rhythm: Normal rate and regular rhythm. Heart sounds: No murmur heard. No friction rub. Pulmonary:      Effort: Pulmonary effort is normal. No respiratory distress. Breath sounds: Normal breath sounds. No stridor. No wheezing, rhonchi or rales. Abdominal:      General: There is no distension. Palpations: There is no mass. Tenderness: There is no abdominal tenderness. There is no guarding or rebound. Musculoskeletal:      Cervical back: Normal range of motion and neck supple. Neurological:      Mental Status: She is alert and oriented to person, place, and time. This note was generated used a voice recognition software. Errors in voice recognition may have occurred. An electronic signature was used to authenticate this note.     --Jimmy Smith MD

## 2021-12-22 ENCOUNTER — INFUSION (OUTPATIENT)
Dept: ONCOLOGY | Facility: CLINIC | Age: 81
End: 2021-12-22

## 2021-12-22 DIAGNOSIS — C50.112 MALIGNANT NEOPLASM OF CENTRAL PORTION OF LEFT FEMALE BREAST, UNSPECIFIED ESTROGEN RECEPTOR STATUS (HCC): ICD-10-CM

## 2021-12-22 DIAGNOSIS — Z45.2 ENCOUNTER FOR CARE RELATED TO VASCULAR ACCESS PORT: Primary | ICD-10-CM

## 2021-12-22 RX ORDER — HEPARIN SODIUM (PORCINE) LOCK FLUSH IV SOLN 100 UNIT/ML 100 UNIT/ML
500 SOLUTION INTRAVENOUS AS NEEDED
Status: CANCELLED | OUTPATIENT
Start: 2021-12-22

## 2021-12-22 RX ORDER — HEPARIN SODIUM (PORCINE) LOCK FLUSH IV SOLN 100 UNIT/ML 100 UNIT/ML
500 SOLUTION INTRAVENOUS AS NEEDED
Status: DISCONTINUED | OUTPATIENT
Start: 2021-12-22 | End: 2021-12-22 | Stop reason: HOSPADM

## 2021-12-22 RX ORDER — SODIUM CHLORIDE 0.9 % (FLUSH) 0.9 %
10 SYRINGE (ML) INJECTION AS NEEDED
Status: DISCONTINUED | OUTPATIENT
Start: 2021-12-22 | End: 2021-12-22 | Stop reason: HOSPADM

## 2021-12-22 RX ORDER — SODIUM CHLORIDE 0.9 % (FLUSH) 0.9 %
10 SYRINGE (ML) INJECTION AS NEEDED
Status: CANCELLED | OUTPATIENT
Start: 2021-12-22

## 2021-12-22 RX ADMIN — HEPARIN SODIUM (PORCINE) LOCK FLUSH IV SOLN 100 UNIT/ML 500 UNITS: 100 SOLUTION at 11:08

## 2021-12-22 RX ADMIN — Medication 10 ML: at 11:08

## 2021-12-29 ENCOUNTER — HOSPITAL ENCOUNTER (OUTPATIENT)
Dept: CT IMAGING | Age: 81
Discharge: HOME | End: 2021-12-29
Payer: MEDICARE

## 2021-12-29 DIAGNOSIS — R06.09 DOE (DYSPNEA ON EXERTION): ICD-10-CM

## 2021-12-29 PROCEDURE — 71250 CT THORAX DX C-: CPT

## 2022-01-05 ENCOUNTER — OFFICE VISIT (OUTPATIENT)
Dept: PULMONOLOGY | Age: 82
End: 2022-01-05
Payer: MEDICARE

## 2022-01-05 VITALS
DIASTOLIC BLOOD PRESSURE: 72 MMHG | HEIGHT: 66 IN | WEIGHT: 191 LBS | BODY MASS INDEX: 30.7 KG/M2 | HEART RATE: 75 BPM | SYSTOLIC BLOOD PRESSURE: 122 MMHG | TEMPERATURE: 97.2 F | OXYGEN SATURATION: 93 %

## 2022-01-05 DIAGNOSIS — J43.1 PANLOBULAR EMPHYSEMA (HCC): ICD-10-CM

## 2022-01-05 DIAGNOSIS — Z87.891 FORMER SMOKER: ICD-10-CM

## 2022-01-05 DIAGNOSIS — I51.89 DIASTOLIC DYSFUNCTION: ICD-10-CM

## 2022-01-05 DIAGNOSIS — I10 ESSENTIAL HYPERTENSION: ICD-10-CM

## 2022-01-05 DIAGNOSIS — R06.09 DOE (DYSPNEA ON EXERTION): Primary | ICD-10-CM

## 2022-01-05 DIAGNOSIS — R06.2 WHEEZING: ICD-10-CM

## 2022-01-05 LAB
DISTANCE WALKED: 900 FT
SPO2: 95 %

## 2022-01-05 PROCEDURE — 1123F ACP DISCUSS/DSCN MKR DOCD: CPT | Performed by: INTERNAL MEDICINE

## 2022-01-05 PROCEDURE — G8484 FLU IMMUNIZE NO ADMIN: HCPCS | Performed by: INTERNAL MEDICINE

## 2022-01-05 PROCEDURE — 94618 PULMONARY STRESS TESTING: CPT | Performed by: INTERNAL MEDICINE

## 2022-01-05 PROCEDURE — 1090F PRES/ABSN URINE INCON ASSESS: CPT | Performed by: INTERNAL MEDICINE

## 2022-01-05 PROCEDURE — 3023F SPIROM DOC REV: CPT | Performed by: INTERNAL MEDICINE

## 2022-01-05 PROCEDURE — G8427 DOCREV CUR MEDS BY ELIG CLIN: HCPCS | Performed by: INTERNAL MEDICINE

## 2022-01-05 PROCEDURE — 1036F TOBACCO NON-USER: CPT | Performed by: INTERNAL MEDICINE

## 2022-01-05 PROCEDURE — 4040F PNEUMOC VAC/ADMIN/RCVD: CPT | Performed by: INTERNAL MEDICINE

## 2022-01-05 PROCEDURE — 99214 OFFICE O/P EST MOD 30 MIN: CPT | Performed by: INTERNAL MEDICINE

## 2022-01-05 PROCEDURE — G8417 CALC BMI ABV UP PARAM F/U: HCPCS | Performed by: INTERNAL MEDICINE

## 2022-01-05 PROCEDURE — G8399 PT W/DXA RESULTS DOCUMENT: HCPCS | Performed by: INTERNAL MEDICINE

## 2022-01-05 RX ORDER — ALBUTEROL SULFATE 90 UG/1
2 AEROSOL, METERED RESPIRATORY (INHALATION) 4 TIMES DAILY PRN
Qty: 18 G | Refills: 5 | Status: SHIPPED | OUTPATIENT
Start: 2022-01-05

## 2022-01-05 ASSESSMENT — ENCOUNTER SYMPTOMS
SHORTNESS OF BREATH: 0
RHINORRHEA: 0
WHEEZING: 0
APNEA: 0
CHEST TIGHTNESS: 0
ANAL BLEEDING: 0
ABDOMINAL DISTENTION: 0
BACK PAIN: 0
COUGH: 0
ABDOMINAL PAIN: 0

## 2022-01-05 NOTE — PROGRESS NOTES
Pulmonary and Sleep Medicine    Jayant Narvaez (:  1940) is a [de-identified] y.o. female,Established patient, here for evaluation of the following chief complaint(s):  Follow-up (Ct results)      Referring physician:  No referring provider defined for this encounter. ASSESSMENT/PLAN:  1. READ (dyspnea on exertion)  -     6 Minute Walk Test  -     Pulse oximetry, overnight; Future  2. Essential hypertension  3. Diastolic dysfunction  4. Former smoker  5. Panlobular emphysema (HCC)  -     albuterol sulfate HFA (VENTOLIN HFA) 108 (90 Base) MCG/ACT inhaler; Inhale 2 puffs into the lungs 4 times daily as needed for Wheezing, Disp-18 g, R-5Normal  -     tiotropium-olodaterol (STIOLTO RESPIMAT) 2.5-2.5 MCG/ACT AERS; Inhale 2 puffs into the lungs daily, Disp-1 each, R-11Normal  6. Wheezing  -     albuterol sulfate HFA (VENTOLIN HFA) 108 (90 Base) MCG/ACT inhaler; Inhale 2 puffs into the lungs 4 times daily as needed for Wheezing, Disp-18 g, R-5Normal  -     tiotropium-olodaterol (STIOLTO RESPIMAT) 2.5-2.5 MCG/ACT AERS; Inhale 2 puffs into the lungs daily, Disp-1 each, R-11Normal        We will start her on inhalers as above. Await the PFT. Will get 6 min walk to assess need for oxygen        Juan Tay MD, [facility], [facility]    Return in about 8 weeks (around 3/1/2022). SUBJECTIVE/OBJECTIVE:  Is here for follow-up on her testing. Her CT of the chest showed evidence of emphysema and interstitial fibrosis without honeycombing. Pulmonary function remains pending. She denies using any inhalers at this time. She does wheeze occasionally. Prior to Visit Medications    Medication Sig Taking?  Authorizing Provider   famotidine (PEPCID) 40 MG tablet Take 1 tablet by mouth every evening Yes Mary Patel MD   losartan (COZAAR) 100 MG tablet Take 1/2 tablet twice daily Yes Mary Patel MD   Famotidine (PEPCID PO) Take by mouth daily as needed  Yes Historical Provider, MD   amLODIPine (NORVASC) 2.5 MG Normal appearance. HENT:      Head: Normocephalic and atraumatic. Nose: Nose normal.   Eyes:      Extraocular Movements: Extraocular movements intact. Conjunctiva/sclera: Conjunctivae normal.   Cardiovascular:      Rate and Rhythm: Normal rate and regular rhythm. Heart sounds: No murmur heard. No friction rub. Pulmonary:      Effort: Pulmonary effort is normal. No respiratory distress. Breath sounds: Normal breath sounds. No stridor. No wheezing, rhonchi or rales. Abdominal:      General: There is no distension. Palpations: There is no mass. Tenderness: There is no abdominal tenderness. There is no guarding or rebound. Musculoskeletal:      Cervical back: Normal range of motion and neck supple. Neurological:      Mental Status: She is alert and oriented to person, place, and time. This note was generated used a voice recognition software. Errors in voice recognition may have occurred. An electronic signature was used to authenticate this note.     --Mary Jo Maldonado MD

## 2022-01-13 LAB
ALBUMIN SERPL-MCNC: 3.8 G/DL (ref 3.5–5.2)
ALP BLD-CCNC: 90 U/L (ref 35–104)
ALT SERPL-CCNC: 9 U/L (ref 5–33)
AMORPHOUS: ABNORMAL /HPF
ANION GAP SERPL CALCULATED.3IONS-SCNC: 9 MMOL/L (ref 7–19)
AST SERPL-CCNC: 15 U/L (ref 5–32)
BACTERIA: ABNORMAL /HPF
BASOPHILS ABSOLUTE: 0.1 K/UL (ref 0–0.2)
BASOPHILS RELATIVE PERCENT: 0.5 % (ref 0–1)
BILIRUB SERPL-MCNC: 0.5 MG/DL (ref 0.2–1.2)
BILIRUBIN URINE: NEGATIVE
BLOOD, URINE: NEGATIVE
BUN BLDV-MCNC: 24 MG/DL (ref 8–23)
CALCIUM SERPL-MCNC: 9.5 MG/DL (ref 8.8–10.2)
CHLORIDE BLD-SCNC: 102 MMOL/L (ref 98–111)
CLARITY: ABNORMAL
CO2: 29 MMOL/L (ref 22–29)
COLOR: YELLOW
CREAT SERPL-MCNC: 1.1 MG/DL (ref 0.5–0.9)
CREATININE URINE: 186.8 MG/DL (ref 4.2–622)
EOSINOPHILS ABSOLUTE: 0.1 K/UL (ref 0–0.6)
EOSINOPHILS RELATIVE PERCENT: 1.4 % (ref 0–5)
EPITHELIAL CELLS, UA: ABNORMAL /HPF
GFR AFRICAN AMERICAN: 58
GFR NON-AFRICAN AMERICAN: 48
GLUCOSE BLD-MCNC: 203 MG/DL (ref 74–109)
GLUCOSE URINE: NEGATIVE MG/DL
HCT VFR BLD CALC: 36.9 % (ref 37–47)
HEMOGLOBIN: 11.5 G/DL (ref 12–16)
IMMATURE GRANULOCYTES #: 0 K/UL
KETONES, URINE: NEGATIVE MG/DL
LEUKOCYTE ESTERASE, URINE: ABNORMAL
LYMPHOCYTES ABSOLUTE: 3.1 K/UL (ref 1.1–4.5)
LYMPHOCYTES RELATIVE PERCENT: 34.1 % (ref 20–40)
MAGNESIUM: 1.9 MG/DL (ref 1.6–2.4)
MCH RBC QN AUTO: 30.3 PG (ref 27–31)
MCHC RBC AUTO-ENTMCNC: 31.2 G/DL (ref 33–37)
MCV RBC AUTO: 97.1 FL (ref 81–99)
MONOCYTES ABSOLUTE: 0.8 K/UL (ref 0–0.9)
MONOCYTES RELATIVE PERCENT: 9.1 % (ref 0–10)
NEUTROPHILS ABSOLUTE: 5 K/UL (ref 1.5–7.5)
NEUTROPHILS RELATIVE PERCENT: 54.7 % (ref 50–65)
NITRITE, URINE: POSITIVE
PARATHYROID HORMONE INTACT: 59.5 PG/ML (ref 15–65)
PDW BLD-RTO: 13.2 % (ref 11.5–14.5)
PH UA: 6 (ref 5–8)
PLATELET # BLD: 369 K/UL (ref 130–400)
PMV BLD AUTO: 9.8 FL (ref 9.4–12.3)
POTASSIUM SERPL-SCNC: 4.1 MMOL/L (ref 3.5–5)
PROTEIN PROTEIN: 16 MG/DL (ref 15–45)
PROTEIN UA: NEGATIVE MG/DL
RBC # BLD: 3.8 M/UL (ref 4.2–5.4)
SODIUM BLD-SCNC: 140 MMOL/L (ref 136–145)
SPECIFIC GRAVITY UA: 1.02 (ref 1–1.03)
TOTAL PROTEIN: 7.5 G/DL (ref 6.6–8.7)
URIC ACID, SERUM: 4.7 MG/DL (ref 2.4–5.7)
UROBILINOGEN, URINE: 1 E.U./DL
WBC # BLD: 9.1 K/UL (ref 4.8–10.8)
WBC UA: ABNORMAL /HPF (ref 0–5)

## 2022-02-01 RX ORDER — GLIPIZIDE 5 MG/1
TABLET ORAL
Qty: 90 TABLET | Refills: 0 | Status: SHIPPED | OUTPATIENT
Start: 2022-02-01 | End: 2022-05-09

## 2022-02-01 NOTE — TELEPHONE ENCOUNTER
Sofia Finders called requesting a refill of the below medication which has been pended for you:     Requested Prescriptions     Pending Prescriptions Disp Refills    glipiZIDE (GLUCOTROL) 5 MG tablet [Pharmacy Med Name: glipiZIDE 5 MG Oral Tablet] 90 tablet 0     Sig: Take 1/2 (one-half) tablet by mouth twice daily       Last Appointment Date: 10/27/2021  Next Appointment Date: 3/9/2022    Allergies   Allergen Reactions    Poison Ivy Extract     Tape Garnell  Tape]      Latex Tape

## 2022-02-02 ENCOUNTER — INFUSION (OUTPATIENT)
Dept: ONCOLOGY | Facility: CLINIC | Age: 82
End: 2022-02-02

## 2022-02-02 DIAGNOSIS — C50.112 MALIGNANT NEOPLASM OF CENTRAL PORTION OF LEFT FEMALE BREAST, UNSPECIFIED ESTROGEN RECEPTOR STATUS: ICD-10-CM

## 2022-02-02 DIAGNOSIS — Z45.2 ENCOUNTER FOR CARE RELATED TO VASCULAR ACCESS PORT: Primary | ICD-10-CM

## 2022-02-02 RX ORDER — HEPARIN SODIUM (PORCINE) LOCK FLUSH IV SOLN 100 UNIT/ML 100 UNIT/ML
500 SOLUTION INTRAVENOUS AS NEEDED
Status: DISCONTINUED | OUTPATIENT
Start: 2022-02-02 | End: 2022-02-02 | Stop reason: HOSPADM

## 2022-02-02 RX ORDER — HEPARIN SODIUM (PORCINE) LOCK FLUSH IV SOLN 100 UNIT/ML 100 UNIT/ML
500 SOLUTION INTRAVENOUS AS NEEDED
Status: CANCELLED | OUTPATIENT
Start: 2022-02-02

## 2022-02-02 RX ORDER — SODIUM CHLORIDE 0.9 % (FLUSH) 0.9 %
10 SYRINGE (ML) INJECTION AS NEEDED
Status: CANCELLED | OUTPATIENT
Start: 2022-02-02

## 2022-02-02 RX ORDER — SODIUM CHLORIDE 0.9 % (FLUSH) 0.9 %
10 SYRINGE (ML) INJECTION AS NEEDED
Status: DISCONTINUED | OUTPATIENT
Start: 2022-02-02 | End: 2022-02-02 | Stop reason: HOSPADM

## 2022-02-02 RX ADMIN — Medication 10 ML: at 12:03

## 2022-02-02 RX ADMIN — HEPARIN SODIUM (PORCINE) LOCK FLUSH IV SOLN 100 UNIT/ML 500 UNITS: 100 SOLUTION at 12:03

## 2022-02-23 DIAGNOSIS — Z11.52 ENCOUNTER FOR SCREENING FOR COVID-19: Primary | ICD-10-CM

## 2022-02-23 LAB — SARS-COV-2, PCR: NOT DETECTED

## 2022-02-25 ENCOUNTER — HOSPITAL ENCOUNTER (OUTPATIENT)
Dept: PULMONOLOGY | Age: 82
Discharge: HOME | End: 2022-02-25

## 2022-03-01 DIAGNOSIS — E78.00 PURE HYPERCHOLESTEROLEMIA: ICD-10-CM

## 2022-03-01 DIAGNOSIS — I10 ESSENTIAL HYPERTENSION: ICD-10-CM

## 2022-03-01 DIAGNOSIS — E11.40 TYPE 2 DIABETES MELLITUS WITH DIABETIC NEUROPATHY, WITHOUT LONG-TERM CURRENT USE OF INSULIN (HCC): ICD-10-CM

## 2022-03-01 DIAGNOSIS — E55.9 VITAMIN D DEFICIENCY: ICD-10-CM

## 2022-03-01 DIAGNOSIS — R42 CHRONIC VERTIGO: ICD-10-CM

## 2022-03-01 DIAGNOSIS — Z23 NEED FOR IMMUNIZATION AGAINST INFLUENZA: ICD-10-CM

## 2022-03-01 LAB
ALBUMIN SERPL-MCNC: 3.9 G/DL (ref 3.5–5.2)
ALP BLD-CCNC: 82 U/L (ref 35–104)
ALT SERPL-CCNC: 7 U/L (ref 5–33)
ANION GAP SERPL CALCULATED.3IONS-SCNC: 13 MMOL/L (ref 7–19)
AST SERPL-CCNC: 14 U/L (ref 5–32)
BACTERIA: ABNORMAL /HPF
BASOPHILS ABSOLUTE: 0 K/UL (ref 0–0.2)
BASOPHILS RELATIVE PERCENT: 0.4 % (ref 0–1)
BILIRUB SERPL-MCNC: 0.4 MG/DL (ref 0.2–1.2)
BILIRUBIN URINE: NEGATIVE
BLOOD, URINE: ABNORMAL
BUN BLDV-MCNC: 24 MG/DL (ref 8–23)
CALCIUM SERPL-MCNC: 9.5 MG/DL (ref 8.8–10.2)
CHLORIDE BLD-SCNC: 104 MMOL/L (ref 98–111)
CHOLESTEROL, TOTAL: 168 MG/DL (ref 160–199)
CLARITY: ABNORMAL
CO2: 24 MMOL/L (ref 22–29)
COLOR: YELLOW
CREAT SERPL-MCNC: 1 MG/DL (ref 0.5–0.9)
CREATININE URINE: 107.2 MG/DL (ref 4.2–622)
CRYSTALS, UA: ABNORMAL /HPF
EOSINOPHILS ABSOLUTE: 0.2 K/UL (ref 0–0.6)
EOSINOPHILS RELATIVE PERCENT: 2 % (ref 0–5)
EPITHELIAL CELLS, UA: 9 /HPF (ref 0–5)
GFR AFRICAN AMERICAN: >59
GFR NON-AFRICAN AMERICAN: 53
GLUCOSE BLD-MCNC: 130 MG/DL (ref 74–109)
GLUCOSE URINE: NEGATIVE MG/DL
HBA1C MFR BLD: 7 % (ref 4–6)
HCT VFR BLD CALC: 35.7 % (ref 37–47)
HDLC SERPL-MCNC: 51 MG/DL (ref 65–121)
HEMOGLOBIN: 11.4 G/DL (ref 12–16)
HYALINE CASTS: 7 /HPF (ref 0–8)
IMMATURE GRANULOCYTES #: 0 K/UL
KETONES, URINE: NEGATIVE MG/DL
LDL CHOLESTEROL CALCULATED: 95 MG/DL
LEUKOCYTE ESTERASE, URINE: ABNORMAL
LYMPHOCYTES ABSOLUTE: 3.1 K/UL (ref 1.1–4.5)
LYMPHOCYTES RELATIVE PERCENT: 34.2 % (ref 20–40)
MCH RBC QN AUTO: 30.9 PG (ref 27–31)
MCHC RBC AUTO-ENTMCNC: 31.9 G/DL (ref 33–37)
MCV RBC AUTO: 96.7 FL (ref 81–99)
MICROALBUMIN UR-MCNC: 1.5 MG/DL (ref 0–19)
MICROALBUMIN/CREAT UR-RTO: 14 MG/G
MONOCYTES ABSOLUTE: 0.9 K/UL (ref 0–0.9)
MONOCYTES RELATIVE PERCENT: 10.2 % (ref 0–10)
NEUTROPHILS ABSOLUTE: 4.7 K/UL (ref 1.5–7.5)
NEUTROPHILS RELATIVE PERCENT: 52.9 % (ref 50–65)
NITRITE, URINE: POSITIVE
PDW BLD-RTO: 13.5 % (ref 11.5–14.5)
PH UA: 5.5 (ref 5–8)
PLATELET # BLD: 373 K/UL (ref 130–400)
PMV BLD AUTO: 9.7 FL (ref 9.4–12.3)
POTASSIUM SERPL-SCNC: 4.2 MMOL/L (ref 3.5–5)
PROTEIN UA: NEGATIVE MG/DL
RBC # BLD: 3.69 M/UL (ref 4.2–5.4)
RBC UA: 6 /HPF (ref 0–4)
SODIUM BLD-SCNC: 141 MMOL/L (ref 136–145)
SPECIFIC GRAVITY UA: 1.01 (ref 1–1.03)
TOTAL PROTEIN: 7.6 G/DL (ref 6.6–8.7)
TRIGL SERPL-MCNC: 108 MG/DL (ref 0–149)
TSH SERPL DL<=0.05 MIU/L-ACNC: 5.12 UIU/ML (ref 0.27–4.2)
UROBILINOGEN, URINE: 0.2 E.U./DL
VITAMIN D 25-HYDROXY: 43.8 NG/ML
WBC # BLD: 8.9 K/UL (ref 4.8–10.8)
WBC UA: 364 /HPF (ref 0–5)

## 2022-03-02 ENCOUNTER — OFFICE VISIT (OUTPATIENT)
Dept: PULMONOLOGY | Age: 82
End: 2022-03-02
Payer: MEDICARE

## 2022-03-02 ENCOUNTER — APPOINTMENT (OUTPATIENT)
Dept: LAB | Facility: HOSPITAL | Age: 82
End: 2022-03-02

## 2022-03-02 VITALS
SYSTOLIC BLOOD PRESSURE: 116 MMHG | BODY MASS INDEX: 31.27 KG/M2 | WEIGHT: 194.6 LBS | OXYGEN SATURATION: 97 % | DIASTOLIC BLOOD PRESSURE: 70 MMHG | HEIGHT: 66 IN | HEART RATE: 64 BPM

## 2022-03-02 DIAGNOSIS — Z87.891 FORMER SMOKER: ICD-10-CM

## 2022-03-02 DIAGNOSIS — I10 ESSENTIAL HYPERTENSION: ICD-10-CM

## 2022-03-02 DIAGNOSIS — R06.09 DOE (DYSPNEA ON EXERTION): Primary | ICD-10-CM

## 2022-03-02 DIAGNOSIS — I51.89 DIASTOLIC DYSFUNCTION: ICD-10-CM

## 2022-03-02 DIAGNOSIS — R06.2 WHEEZING: ICD-10-CM

## 2022-03-02 DIAGNOSIS — J43.1 PANLOBULAR EMPHYSEMA (HCC): ICD-10-CM

## 2022-03-02 PROCEDURE — G8399 PT W/DXA RESULTS DOCUMENT: HCPCS | Performed by: INTERNAL MEDICINE

## 2022-03-02 PROCEDURE — G8417 CALC BMI ABV UP PARAM F/U: HCPCS | Performed by: INTERNAL MEDICINE

## 2022-03-02 PROCEDURE — 4040F PNEUMOC VAC/ADMIN/RCVD: CPT | Performed by: INTERNAL MEDICINE

## 2022-03-02 PROCEDURE — 3023F SPIROM DOC REV: CPT | Performed by: INTERNAL MEDICINE

## 2022-03-02 PROCEDURE — G8427 DOCREV CUR MEDS BY ELIG CLIN: HCPCS | Performed by: INTERNAL MEDICINE

## 2022-03-02 PROCEDURE — 1123F ACP DISCUSS/DSCN MKR DOCD: CPT | Performed by: INTERNAL MEDICINE

## 2022-03-02 PROCEDURE — G8484 FLU IMMUNIZE NO ADMIN: HCPCS | Performed by: INTERNAL MEDICINE

## 2022-03-02 PROCEDURE — 1090F PRES/ABSN URINE INCON ASSESS: CPT | Performed by: INTERNAL MEDICINE

## 2022-03-02 PROCEDURE — 1036F TOBACCO NON-USER: CPT | Performed by: INTERNAL MEDICINE

## 2022-03-02 PROCEDURE — 99214 OFFICE O/P EST MOD 30 MIN: CPT | Performed by: INTERNAL MEDICINE

## 2022-03-02 ASSESSMENT — ENCOUNTER SYMPTOMS
CHEST TIGHTNESS: 0
ABDOMINAL DISTENTION: 0
RHINORRHEA: 0
BACK PAIN: 0
COUGH: 0
APNEA: 0
SHORTNESS OF BREATH: 1
ANAL BLEEDING: 0
ABDOMINAL PAIN: 0
WHEEZING: 1

## 2022-03-02 NOTE — PROGRESS NOTES
Pulmonary and Sleep Medicine    Theodora Mclean (:  1940) is a [de-identified] y.o. female,Established patient, here for evaluation of the following chief complaint(s):  Follow-up (Pt came in for a follow up. PFT was canceled due to weather and overnight has not been completed yet.)      Referring physician:  No referring provider defined for this encounter. ASSESSMENT/PLAN:  1. READ (dyspnea on exertion)  2. Essential hypertension  3. Diastolic dysfunction  4. Wheezing  5. Panlobular emphysema ([address]  6. Former smoker        Continue current management with inhalers. Follow-up after the pulmonary function test.       Juan Tay [facility], [facility], [facility]    Return in about 3 months (around 2022). SUBJECTIVE/OBJECTIVE:  The patient is here for follow-up on shortness of breath wheezing. Pulmonary function study has been rescheduled due to malfunction in the PFT machine. She is using her inhalers she feels they help her symptoms. She is able to do her activities of daily living      Prior to Visit Medications    Medication Sig Taking?  Authorizing Provider   glipiZIDE (GLUCOTROL) 5 MG tablet Take 1/2 (one-half) tablet by mouth twice daily Yes Zach Castellanos MD   albuterol sulfate HFA (VENTOLIN HFA) 108 (90 Base) MCG/ACT inhaler Inhale 2 puffs into the lungs 4 times daily as needed for Wheezing Yes Leonidas Jordan MD   tiotropium-olodaterol (STIOLTO RESPIMAT) 2.5-2.5 MCG/ACT AERS Inhale 2 puffs into the lungs daily Yes Juan Tay MD   famotidine (PEPCID) 40 MG tablet Take 1 tablet by mouth every evening Yes Zach Castellanos MD   losartan (COZAAR) 100 MG tablet Take 1/2 tablet twice daily Yes Zach Castellanos MD   Famotidine (PEPCID PO) Take by mouth daily as needed  Yes Historical Provider, MD   amLODIPine (NORVASC) 2.5 MG tablet Take 1 tablet by mouth daily Yes Cesar James MD   carvedilol (COREG) 6.25 MG tablet Take 1 tablet by mouth 2 times daily Yes Cesar James MD triamterene-hydroCHLOROthiazide (MAXZIDE-25) 37.5-25 MG per tablet Take 1/2 tablet in am Yes Fabio James MD   pravastatin (PRAVACHOL) 40 MG tablet TAKE ONE TABLET BY MOUTH ONCE DAILY Yes Fabio James MD   potassium chloride (KLOR-CON M) 10 MEQ extended release tablet Take 1 tablet by mouth daily Yes Fabio James MD   magnesium 30 MG tablet Take 30 mg by mouth 2 times daily Yes Historical Provider, MD   Calcium-Vitamin D 600-200 MG-UNIT TABS Take by mouth every morning (before breakfast)  Yes Historical Provider, MD   aspirin 81 MG EC tablet Take 81 mg by mouth daily. Yes Historical Provider, MD   Ascorbic Acid (VITAMIN C) 500 MG tablet Take 500 mg by mouth daily. Yes Historical Provider, MD        Review of Systems   Constitutional: Negative for activity change, appetite change, chills, diaphoresis and fatigue. HENT: Negative for congestion, dental problem, drooling, ear discharge, postnasal drip and rhinorrhea. Eyes: Negative for visual disturbance. Respiratory: Positive for shortness of breath and wheezing. Negative for apnea, cough and chest tightness. Gastrointestinal: Negative for abdominal distention, abdominal pain and anal bleeding. Endocrine: Negative for cold intolerance, heat intolerance and polydipsia. Genitourinary: Negative for difficulty urinating, dysuria, enuresis and flank pain. Musculoskeletal: Negative for arthralgias, back pain and gait problem. Allergic/Immunologic: Negative for environmental allergies. Neurological: Negative for dizziness, facial asymmetry, light-headedness and headaches. Vitals:    03/02/22 1158   BP: 116/70   Pulse: 64   SpO2: 97%     Physical Exam  Vitals reviewed. Constitutional:       Appearance: Normal appearance. HENT:      Head: Normocephalic and atraumatic. Nose: Nose normal.   Eyes:      Extraocular Movements: Extraocular movements intact.       Conjunctiva/sclera: Conjunctivae normal.   Cardiovascular:      Rate and Rhythm: Normal rate and regular rhythm. Heart sounds: No murmur heard. No friction rub. Pulmonary:      Effort: Pulmonary effort is normal. No respiratory distress. Breath sounds: Normal breath sounds. No stridor. No wheezing, rhonchi or rales. Abdominal:      General: There is no distension. Palpations: There is no mass. Tenderness: There is no abdominal tenderness. There is no guarding or rebound. Musculoskeletal:      Cervical back: Normal range of motion and neck supple. Neurological:      Mental Status: She is alert and oriented to person, place, and time. This note was generated used a voice recognition software. Errors in voice recognition may have occurred. An electronic signature was used to authenticate this note.     --Ban Ghosh MD

## 2022-03-09 ENCOUNTER — OFFICE VISIT (OUTPATIENT)
Dept: INTERNAL MEDICINE | Age: 82
End: 2022-03-09
Payer: MEDICARE

## 2022-03-09 VITALS
BODY MASS INDEX: 31.02 KG/M2 | WEIGHT: 193 LBS | SYSTOLIC BLOOD PRESSURE: 130 MMHG | HEIGHT: 66 IN | HEART RATE: 76 BPM | DIASTOLIC BLOOD PRESSURE: 68 MMHG | RESPIRATION RATE: 18 BRPM | OXYGEN SATURATION: 97 %

## 2022-03-09 DIAGNOSIS — J43.1 PANLOBULAR EMPHYSEMA (HCC): ICD-10-CM

## 2022-03-09 DIAGNOSIS — R94.4 DECREASED CALCULATED GFR: ICD-10-CM

## 2022-03-09 DIAGNOSIS — E78.00 PURE HYPERCHOLESTEROLEMIA: ICD-10-CM

## 2022-03-09 DIAGNOSIS — N18.31 STAGE 3A CHRONIC KIDNEY DISEASE (HCC): ICD-10-CM

## 2022-03-09 DIAGNOSIS — Z00.00 MEDICARE ANNUAL WELLNESS VISIT, SUBSEQUENT: Primary | ICD-10-CM

## 2022-03-09 DIAGNOSIS — R42 CHRONIC VERTIGO: ICD-10-CM

## 2022-03-09 DIAGNOSIS — I10 ESSENTIAL HYPERTENSION: ICD-10-CM

## 2022-03-09 DIAGNOSIS — E55.9 VITAMIN D DEFICIENCY: ICD-10-CM

## 2022-03-09 DIAGNOSIS — Z23 NEED FOR PNEUMOCOCCAL VACCINATION: ICD-10-CM

## 2022-03-09 DIAGNOSIS — E11.40 TYPE 2 DIABETES MELLITUS WITH DIABETIC NEUROPATHY, WITHOUT LONG-TERM CURRENT USE OF INSULIN (HCC): ICD-10-CM

## 2022-03-09 DIAGNOSIS — M81.6 LOCALIZED OSTEOPOROSIS WITHOUT CURRENT PATHOLOGICAL FRACTURE: ICD-10-CM

## 2022-03-09 DIAGNOSIS — I51.89 DIASTOLIC DYSFUNCTION: ICD-10-CM

## 2022-03-09 PROCEDURE — G8417 CALC BMI ABV UP PARAM F/U: HCPCS | Performed by: INTERNAL MEDICINE

## 2022-03-09 PROCEDURE — 90732 PPSV23 VACC 2 YRS+ SUBQ/IM: CPT | Performed by: INTERNAL MEDICINE

## 2022-03-09 PROCEDURE — G8427 DOCREV CUR MEDS BY ELIG CLIN: HCPCS | Performed by: INTERNAL MEDICINE

## 2022-03-09 PROCEDURE — 1090F PRES/ABSN URINE INCON ASSESS: CPT | Performed by: INTERNAL MEDICINE

## 2022-03-09 PROCEDURE — G8484 FLU IMMUNIZE NO ADMIN: HCPCS | Performed by: INTERNAL MEDICINE

## 2022-03-09 PROCEDURE — G8399 PT W/DXA RESULTS DOCUMENT: HCPCS | Performed by: INTERNAL MEDICINE

## 2022-03-09 PROCEDURE — 4040F PNEUMOC VAC/ADMIN/RCVD: CPT | Performed by: INTERNAL MEDICINE

## 2022-03-09 PROCEDURE — 99214 OFFICE O/P EST MOD 30 MIN: CPT | Performed by: INTERNAL MEDICINE

## 2022-03-09 PROCEDURE — 1036F TOBACCO NON-USER: CPT | Performed by: INTERNAL MEDICINE

## 2022-03-09 PROCEDURE — 3023F SPIROM DOC REV: CPT | Performed by: INTERNAL MEDICINE

## 2022-03-09 PROCEDURE — G0439 PPPS, SUBSEQ VISIT: HCPCS | Performed by: INTERNAL MEDICINE

## 2022-03-09 PROCEDURE — G0009 ADMIN PNEUMOCOCCAL VACCINE: HCPCS | Performed by: INTERNAL MEDICINE

## 2022-03-09 PROCEDURE — 3051F HG A1C>EQUAL 7.0%<8.0%: CPT | Performed by: INTERNAL MEDICINE

## 2022-03-09 PROCEDURE — 1123F ACP DISCUSS/DSCN MKR DOCD: CPT | Performed by: INTERNAL MEDICINE

## 2022-03-09 ASSESSMENT — LIFESTYLE VARIABLES: HOW OFTEN DO YOU HAVE A DRINK CONTAINING ALCOHOL: NEVER

## 2022-03-09 ASSESSMENT — PATIENT HEALTH QUESTIONNAIRE - PHQ9
SUM OF ALL RESPONSES TO PHQ QUESTIONS 1-9: 0
SUM OF ALL RESPONSES TO PHQ QUESTIONS 1-9: 0
2. FEELING DOWN, DEPRESSED OR HOPELESS: 0
SUM OF ALL RESPONSES TO PHQ QUESTIONS 1-9: 0
1. LITTLE INTEREST OR PLEASURE IN DOING THINGS: 0
SUM OF ALL RESPONSES TO PHQ9 QUESTIONS 1 & 2: 0
SUM OF ALL RESPONSES TO PHQ QUESTIONS 1-9: 0

## 2022-03-09 ASSESSMENT — ENCOUNTER SYMPTOMS
WHEEZING: 0
ABDOMINAL PAIN: 0
CONSTIPATION: 0
COUGH: 0
SORE THROAT: 0
CHEST TIGHTNESS: 0

## 2022-03-09 NOTE — PROGRESS NOTES
Chief Complaint   Patient presents with    Multiple medical problems     Pt has an Overnight Oximetry test coming up and a lung volume test, also sees her Kidney Dr on 03/15/2022     History of presenting illness:  Lalo Guillaume is [de-identified] y.o. female who presents today for follow up on her chronic medical conditions as noted below.     Patient Active Problem List    Diagnosis Date Noted    Stage 3a chronic kidney disease ([address] 03/09/2022    READ (dyspnea on exertion) 83/62/8143    Diastolic dysfunction 48/77/5799    Type 2 diabetes mellitus with diabetic neuropathy 07/26/2021    Essential hypertension 02/11/2019    Elevated TSH 08/07/2018    Vitamin D deficiency 09/10/2017    Pure hypercholesterolemia 09/10/2017    Type 2 diabetes mellitus without complication, without long-term current use of insulin ([facility] 09/10/2017    Localized osteoporosis without current pathological fracture 09/10/2017     Overview Note:     2017 hip neck -2.6; lspine -1/8  8/2020 hip neck -2.6/ L spine L1 -1.6      Generalized anxiety disorder 09/10/2017    Former smoker 09/10/2017    Numbness 04/07/2017    Imbalance 04/07/2017    Estrogen receptor negative tumor status 08/06/2008     Past Medical History:   Diagnosis Date    Arthritis     Back pain     Breast cancer ([facility]     left 2008    Chronic kidney disease     Concussion     History of therapeutic radiation     Hx antineoplastic chemo     Hyperlipidemia     Hypertension     Osteoporosis     Pneumonia     Type II or unspecified type diabetes mellitus without mention of complication, not stated as uncontrolled     diet controlled    Varicose veins     Wears glasses       Past Surgical History:   Procedure Laterality Date    BREAST BIOPSY  08/06/2008    U/S Guided Mammotome Biopsy Left Breast x 2  infiltrating ductal carcinoma, grade III, ER/SD negative    BREAST BIOPSY  08/05/2009    Stereotactic biopsy right breast  No evidence of malignancy    BREAST Latex Tape     Social History     Tobacco Use    Smoking status: Former Smoker     Packs/day: 0.25     Years: 20.00     Pack years: 5.00     Types: Pipe, Cigarettes     Quit date: 1979     Years since quittin.1    Smokeless tobacco: Never Used   Substance Use Topics    Alcohol use: No     Comment: occ      Family History   Problem Relation Age of Onset    Heart Disease Father     Diabetes Father     Hypertension Mother        Review of Systems   Constitutional: Positive for fatigue. Negative for chills and fever. HENT: Negative for congestion, ear pain, nosebleeds, postnasal drip and sore throat. Respiratory: Negative for cough, chest tightness and wheezing. Cardiovascular: Negative for chest pain, palpitations and leg swelling. Gastrointestinal: Negative for abdominal pain and constipation. Genitourinary: Negative for dysuria and urgency. Musculoskeletal: Positive for arthralgias. Skin: Negative for rash. Neurological: Negative for dizziness and headaches. Psychiatric/Behavioral: Negative. Vitals:    22 1203   BP: 130/68   Site: Left Upper Arm   Position: Sitting   Cuff Size: Large Adult   Pulse: 76   Resp: 18   SpO2: 97%   Weight: 193 lb (87.5 kg)   Height: 5' 6\" (1.676 m)     Body mass index is 31.15 kg/m². Physical Exam  Constitutional:       Appearance: She is well-developed. HENT:      Right Ear: External ear normal.      Left Ear: External ear normal.      Mouth/Throat:      Pharynx: No oropharyngeal exudate. Eyes:      Conjunctiva/sclera: Conjunctivae normal.      Pupils: Pupils are equal, round, and reactive to light. Neck:      Thyroid: No thyromegaly. Vascular: No JVD. Cardiovascular:      Rate and Rhythm: Normal rate. Heart sounds: Normal heart sounds. No murmur heard. Pulmonary:      Effort: No respiratory distress. Breath sounds: Rales present. No wheezing. Chest:      Chest wall: No tenderness.    Abdominal:      General: Bowel sounds are normal.      Palpations: Abdomen is soft. Musculoskeletal:      Cervical back: Neck supple. Lymphadenopathy:      Cervical: No cervical adenopathy. Skin:     Findings: No rash.          Lab Review   Orders Only on 03/01/2022   Component Date Value    TSH 03/01/2022 5.120*    Color, UA 03/01/2022 YELLOW     Clarity, UA 03/01/2022 TURBID*    Glucose, Ur 03/01/2022 Negative     Bilirubin Urine 03/01/2022 Negative     Ketones, Urine 03/01/2022 Negative     Specific Gravity, UA 03/01/2022 1.015     Blood, Urine 03/01/2022 TRACE*    pH, UA 03/01/2022 5.5     Protein, UA 03/01/2022 Negative     Urobilinogen, Urine 03/01/2022 0.2     Nitrite, Urine 03/01/2022 POSITIVE*    Leukocyte Esterase, Urine 03/01/2022 LARGE*    Vit D, 25-Hydroxy 03/01/2022 43.8     Microalbumin, Random Uri* 03/01/2022 1.50     Creatinine, Ur 03/01/2022 107.2     Microalbumin Creatinine * 03/01/2022 14.0     Cholesterol, Total 03/01/2022 168     Triglycerides 03/01/2022 108     HDL 03/01/2022 51*    LDL Calculated 03/01/2022 95     WBC 03/01/2022 8.9     RBC 03/01/2022 3.69*    Hemoglobin 03/01/2022 11.4*    Hematocrit 03/01/2022 35.7*    MCV 03/01/2022 96.7     MCH 03/01/2022 30.9     MCHC 03/01/2022 31.9*    RDW 03/01/2022 13.5     Platelets 79/02/4363 373     MPV 03/01/2022 9.7     Neutrophils % 03/01/2022 52.9     Lymphocytes % 03/01/2022 34.2     Monocytes % 03/01/2022 10.2*    Eosinophils % 03/01/2022 2.0     Basophils % 03/01/2022 0.4     Neutrophils Absolute 03/01/2022 4.7     Immature Granulocytes # 03/01/2022 0.0     Lymphocytes Absolute 03/01/2022 3.1     Monocytes Absolute 03/01/2022 0.90     Eosinophils Absolute 03/01/2022 0.20     Basophils Absolute 03/01/2022 0.00     Sodium 03/01/2022 141     Potassium 03/01/2022 4.2     Chloride 03/01/2022 104     CO2 03/01/2022 24     Anion Gap 03/01/2022 13     Glucose 03/01/2022 130*    BUN 03/01/2022 24*    CREATININE 03/01/2022 1.0*    GFR Non- 03/01/2022 53*    GFR  03/01/2022 >59     Calcium 03/01/2022 9.5     Total Protein 03/01/2022 7.6     Albumin 03/01/2022 3.9     Total Bilirubin 03/01/2022 0.4     Alkaline Phosphatase 03/01/2022 82     ALT 03/01/2022 7     AST 03/01/2022 14     Hemoglobin A1C 03/01/2022 7.0*    Bacteria, UA 03/01/2022 4+*    Crystals, UA 03/01/2022 NEG*    Hyaline Casts, UA 03/01/2022 7     WBC, UA 03/01/2022 364*    RBC, UA 03/01/2022 6*    Epithelial Cells, UA 03/01/2022 9    Orders Only on 02/23/2022   Component Date Value    SARS-CoV-2, PCR 02/23/2022 Not Detected    Orders Only on 01/13/2022   Component Date Value    Magnesium 01/13/2022 1.9    Orders Only on 01/13/2022   Component Date Value    Uric Acid, Serum 01/13/2022 4.7    Orders Only on 01/13/2022   Component Date Value    Sodium 01/13/2022 140     Potassium 01/13/2022 4.1     Chloride 01/13/2022 102     CO2 01/13/2022 29     Anion Gap 01/13/2022 9     Glucose 01/13/2022 203*    BUN 01/13/2022 24*    CREATININE 01/13/2022 1.1*    GFR Non- 01/13/2022 48*    GFR  01/13/2022 58*    Calcium 01/13/2022 9.5     Total Protein 01/13/2022 7.5     Albumin 01/13/2022 3.8     Total Bilirubin 01/13/2022 0.5     Alkaline Phosphatase 01/13/2022 90     ALT 01/13/2022 9     AST 01/13/2022 15    Orders Only on 01/13/2022   Component Date Value    PTH 01/13/2022 59.5    Orders Only on 01/13/2022   Component Date Value    Creatinine, Ur 01/13/2022 186.8    Orders Only on 01/13/2022   Component Date Value    Protein, Ur 01/13/2022 16    Orders Only on 01/13/2022   Component Date Value    WBC, UA 01/13/2022 16-20*    Epithelial Cells, UA 01/13/2022 5-10     Bacteria, UA 01/13/2022 4+*    Amorphous, UA 01/13/2022 1+*   Orders Only on 01/13/2022   Component Date Value    Color, UA 01/13/2022 YELLOW     Clarity, UA 01/13/2022 TURBID*    Glucose, Ur she does not want to take rx     GERD symptoms  rx  Pepcid ( off omeprazole per nephro recommendations)  Rx Pepcid 40 mg p.o. sent to the pharmacy     Left knee pain  Refuses ortho evaluation           Orders Placed This Encounter   Procedures    Hemoglobin A1C    Comprehensive Metabolic Panel    Lipid Panel    Vitamin D 25 Hydroxy     New Prescriptions    No medications on file         Return for Medicare Annual Wellness Visit in 1 year. EMR Dragon/transcription disclaimer:Significant part of this  encounter note is electronic transcription/translationof spoken language to printed text. The electronic translation of spoken language may be erroneous, or at times, nonsensical words or phrases may be inadvertently transcribed.  Although I have reviewed the note for sucherrors, some may still exist.

## 2022-03-09 NOTE — PROGRESS NOTES
Pulse: 76   Resp: 18   SpO2: 97%   Weight: 193 lb (87.5 kg)   Height: 5' 6\" (1.676 m)      Body mass index is 31.15 kg/m². Allergies   Allergen Reactions    Poison Ivy Extract     Tape Evlyn Nasreen Tape]      Latex Tape     Prior to Visit Medications    Medication Sig Taking? Authorizing Provider   glipiZIDE (GLUCOTROL) 5 MG tablet Take 1/2 (one-half) tablet by mouth twice daily Yes Garcia Jamil MD   albuterol sulfate HFA (VENTOLIN HFA) 108 (90 Base) MCG/ACT inhaler Inhale 2 puffs into the lungs 4 times daily as needed for Wheezing Yes Michaela Bazzi MD   tiotropium-olodaterol (STIOLTO RESPIMAT) 2.5-2.5 MCG/ACT AERS Inhale 2 puffs into the lungs daily Yes Juan Tay MD   famotidine (PEPCID) 40 MG tablet Take 1 tablet by mouth every evening Yes Garcia Jamil MD   losartan (COZAAR) 100 MG tablet Take 1/2 tablet twice daily Yes Garcia Jamil MD   Famotidine (PEPCID PO) Take by mouth daily as needed  Yes Historical Provider, MD   amLODIPine (NORVASC) 2.5 MG tablet Take 1 tablet by mouth daily Yes Arpan Jimenez MD   carvedilol (COREG) 6.25 MG tablet Take 1 tablet by mouth 2 times daily Yes Arpan Jimenez MD   triamterene-hydroCHLOROthiazide (MAXZIDE-25) 37.5-25 MG per tablet Take 1/2 tablet in am Yes Garcia Jamil MD   pravastatin (PRAVACHOL) 40 MG tablet TAKE ONE TABLET BY MOUTH ONCE DAILY Yes Garcia Jamil MD   potassium chloride (KLOR-CON M) 10 MEQ extended release tablet Take 1 tablet by mouth daily Yes Garcia Jamil MD   magnesium 30 MG tablet Take 30 mg by mouth 2 times daily Yes Historical Provider, MD   Calcium-Vitamin D 600-200 MG-UNIT TABS Take by mouth every morning (before breakfast)  Yes Historical Provider, MD   aspirin 81 MG EC tablet Take 81 mg by mouth daily. Yes Historical Provider, MD   Ascorbic Acid (VITAMIN C) 500 MG tablet Take 500 mg by mouth daily.    Yes Historical Provider, MD [place] (Including outside providers/suppliers regularly involved in providing care): Patient Care Team:  Silvia Mccauley MD as PCP - General (Internal Medicine)  Silvia Mccauley MD as PCP - [facility] Empaneled Provider    Reviewed and updated this visit:  Tobacco  Allergies  Meds  Med Hx  Surg Hx  Soc Hx  Fam Hx                   1. MAMMOGRAM: This is done by Dr. Coco Govea  2. SCREENING CSCOPE- was done in 2010, repeat 10 years 2020-patient now over 76, as per GI  3. BONE DENSITY [address] [facility]- repeat 3 yrs  4. PAP SCREENING:NA  5. DIABETES SCREEN - FBS DONE/ ABOVE LABS  6. CARDIOVASCULAR SCREENING- LIPID PANEL  DONE/ ABOVE LABS  7. PNEUMONIA VACCINATION= completed, her blood Pneumovax was 2011 and Prevnar was 2016  PPSV 23 3/2022  8. INFLUENZA VACCINATION: TO BE DONE IN FALL- received  9. HEP B VACCINATION- PT DECLINES  10. HIV/ HEP SCREENING- PT DECLINES  11. OPTOMETRY/OPTHALMOLOGY APPOINTMENT SUGGESTED FOR GLAUCOMA SCREENING- she states that she will make her own appointment for diabetic eye exam        HEALTH PLAN:  1. HEALTHY DIET: Lower carb diet, no added sugar discussed with patient  2. EXERCISE- restart slow walking exercises  3. NO INCREASED FALL RISK ON EXAM    Patient Instructions     Personalized Preventive Plan for Indira Sports - 3/9/2022  Medicare offers a range of preventive health benefits. Some of the tests and screenings are paid in full while other may be subject to a deductible, co-insurance, and/or copay. Some of these benefits include a comprehensive review of your medical history including lifestyle, illnesses that may run in your family, and various assessments and screenings as appropriate. After reviewing your medical record and screening and assessments performed today your provider may have ordered immunizations, labs, imaging, and/or referrals for you. A list of these orders (if applicable) as well as your Preventive Care list are included within your After Visit Summary for your review.     Other Preventive Recommendations:    · A preventive eye exam performed by an eye specialist is recommended every 1-2 years to screen for glaucoma; cataracts, macular degeneration, and other eye disorders. · A preventive dental visit is recommended every 6 months. · Try to get at least 150 minutes of exercise per week or 10,000 steps per day on a pedometer . · Order or download the FREE \"Exercise & Physical Activity: Your Everyday Guide\" from [employer]. Call 6-187.579.4765 or search [employer] online. · You need 3341-0065 mg of calcium and 9625-5372 IU of vitamin D per day. It is possible to meet your calcium requirement with diet alone, but a vitamin D supplement is usually necessary to meet this goal.  · When exposed to the sun, use a sunscreen that protects against both UVA and UVB radiation with an SPF of 30 or greater. Reapply every 2 to 3 hours or after sweating, drying off with a towel, or swimming. · Always wear a seat belt when traveling in a car. Always wear a helmet when riding a bicycle or motorcycle.

## 2022-03-09 NOTE — PATIENT INSTRUCTIONS
Personalized Preventive Plan for Chung Bermeo - 3/9/2022  Medicare offers a range of preventive health benefits. Some of the tests and screenings are paid in full while other may be subject to a deductible, co-insurance, and/or copay. Some of these benefits include a comprehensive review of your medical history including lifestyle, illnesses that may run in your family, and various assessments and screenings as appropriate. After reviewing your medical record and screening and assessments performed today your provider may have ordered immunizations, labs, imaging, and/or referrals for you. A list of these orders (if applicable) as well as your Preventive Care list are included within your After Visit Summary for your review. Other Preventive Recommendations:    · A preventive eye exam performed by an eye specialist is recommended every 1-2 years to screen for glaucoma; cataracts, macular degeneration, and other eye disorders. · A preventive dental visit is recommended every 6 months. · Try to get at least 150 minutes of exercise per week or 10,000 steps per day on a pedometer . · Order or download the FREE \"Exercise & Physical Activity: Your Everyday Guide\" from [employer]. Call 7-373.614.2104 or search [employer] online. · You need 1352-5265 mg of calcium and 0092-6960 IU of vitamin D per day. It is possible to meet your calcium requirement with diet alone, but a vitamin D supplement is usually necessary to meet this goal.  · When exposed to the sun, use a sunscreen that protects against both UVA and UVB radiation with an SPF of 30 or greater. Reapply every 2 to 3 hours or after sweating, drying off with a towel, or swimming. · Always wear a seat belt when traveling in a car. Always wear a helmet when riding a bicycle or motorcycle.

## 2022-03-31 ENCOUNTER — APPOINTMENT (OUTPATIENT)
Dept: LAB | Facility: HOSPITAL | Age: 82
End: 2022-03-31

## 2022-04-26 ENCOUNTER — INFUSION (OUTPATIENT)
Dept: ONCOLOGY | Facility: CLINIC | Age: 82
End: 2022-04-26

## 2022-04-26 ENCOUNTER — APPOINTMENT (OUTPATIENT)
Dept: LAB | Facility: HOSPITAL | Age: 82
End: 2022-04-26

## 2022-04-26 DIAGNOSIS — Z45.2 ENCOUNTER FOR CARE RELATED TO VASCULAR ACCESS PORT: Primary | ICD-10-CM

## 2022-04-26 DIAGNOSIS — C50.112 MALIGNANT NEOPLASM OF CENTRAL PORTION OF LEFT FEMALE BREAST, UNSPECIFIED ESTROGEN RECEPTOR STATUS: ICD-10-CM

## 2022-04-26 RX ORDER — HEPARIN SODIUM (PORCINE) LOCK FLUSH IV SOLN 100 UNIT/ML 100 UNIT/ML
500 SOLUTION INTRAVENOUS AS NEEDED
Status: DISCONTINUED | OUTPATIENT
Start: 2022-04-26 | End: 2022-04-26 | Stop reason: HOSPADM

## 2022-04-26 RX ORDER — SODIUM CHLORIDE 0.9 % (FLUSH) 0.9 %
10 SYRINGE (ML) INJECTION AS NEEDED
Status: CANCELLED | OUTPATIENT
Start: 2022-04-26

## 2022-04-26 RX ORDER — HEPARIN SODIUM (PORCINE) LOCK FLUSH IV SOLN 100 UNIT/ML 100 UNIT/ML
500 SOLUTION INTRAVENOUS AS NEEDED
Status: CANCELLED | OUTPATIENT
Start: 2022-04-26

## 2022-04-26 RX ORDER — SODIUM CHLORIDE 0.9 % (FLUSH) 0.9 %
10 SYRINGE (ML) INJECTION AS NEEDED
Status: DISCONTINUED | OUTPATIENT
Start: 2022-04-26 | End: 2022-04-26 | Stop reason: HOSPADM

## 2022-04-26 RX ADMIN — HEPARIN SODIUM (PORCINE) LOCK FLUSH IV SOLN 100 UNIT/ML 500 UNITS: 100 SOLUTION at 11:45

## 2022-04-26 RX ADMIN — Medication 10 ML: at 11:44

## 2022-05-09 LAB — SARS-COV-2, PCR: NOT DETECTED

## 2022-05-09 RX ORDER — GLIPIZIDE 5 MG/1
TABLET ORAL
Qty: 90 TABLET | Refills: 1 | Status: SHIPPED | OUTPATIENT
Start: 2022-05-09 | End: 2022-07-13 | Stop reason: SDUPTHER

## 2022-05-09 RX ORDER — LOSARTAN POTASSIUM 100 MG/1
TABLET ORAL
Qty: 90 TABLET | Refills: 1 | Status: SHIPPED | OUTPATIENT
Start: 2022-05-09

## 2022-05-09 NOTE — TELEPHONE ENCOUNTER
[facility] called requesting a refill of the below medication which has been pended for you:     Requested Prescriptions     Pending Prescriptions Disp Refills    losartan (COZAAR) 100 MG tablet [Pharmacy Med Name: Losartan Potassium 100 MG Oral Tablet] 90 tablet 1     Sig: Take 1/2 (one-half) tablet by mouth twice daily    glipiZIDE (GLUCOTROL) 5 MG tablet [Pharmacy Med Name: glipiZIDE 5 MG Oral Tablet] 90 tablet 1     Sig: Take 1/2 (one-half) tablet by mouth twice daily       Last Appointment Date: 3/9/2022  Next Appointment Date: 7/12/2022    Allergies   Allergen Reactions    Poison Ivy Extract     Tape Sivan Quintanilla Tape]      Latex Tape

## 2022-05-11 ENCOUNTER — HOSPITAL ENCOUNTER (OUTPATIENT)
Dept: PULMONOLOGY | Age: 82
Discharge: HOME | End: 2022-05-11
Payer: MEDICARE

## 2022-05-11 VITALS
RESPIRATION RATE: 16 BRPM | WEIGHT: 193 LBS | HEIGHT: 66 IN | HEART RATE: 62 BPM | SYSTOLIC BLOOD PRESSURE: 135 MMHG | BODY MASS INDEX: 31.02 KG/M2 | OXYGEN SATURATION: 97 % | DIASTOLIC BLOOD PRESSURE: 86 MMHG

## 2022-05-11 DIAGNOSIS — R06.09 DOE (DYSPNEA ON EXERTION): ICD-10-CM

## 2022-05-11 PROBLEM — I12.9 HYPERTENSIVE RENAL DISEASE: Status: ACTIVE | Noted: 2022-05-11

## 2022-05-11 PROBLEM — N18.32 CHRONIC KIDNEY DISEASE, STAGE 3B (HCC): Status: ACTIVE | Noted: 2022-03-09

## 2022-05-11 PROBLEM — Z96.1 PRESENCE OF INTRAOCULAR LENS: Status: ACTIVE | Noted: 2019-12-27

## 2022-05-11 PROBLEM — H25.9 AGE-RELATED CATARACT: Status: ACTIVE | Noted: 2019-12-27

## 2022-05-11 PROCEDURE — 94060 EVALUATION OF WHEEZING: CPT

## 2022-05-11 PROCEDURE — 94726 PLETHYSMOGRAPHY LUNG VOLUMES: CPT | Performed by: INTERNAL MEDICINE

## 2022-05-11 PROCEDURE — 94729 DIFFUSING CAPACITY: CPT

## 2022-05-11 PROCEDURE — 94060 EVALUATION OF WHEEZING: CPT | Performed by: INTERNAL MEDICINE

## 2022-05-11 PROCEDURE — 6360000002 HC RX W HCPCS: Performed by: INTERNAL MEDICINE

## 2022-05-11 PROCEDURE — 94727 GAS DIL/WSHOT DETER LNG VOL: CPT

## 2022-05-11 PROCEDURE — 94729 DIFFUSING CAPACITY: CPT | Performed by: INTERNAL MEDICINE

## 2022-05-11 RX ORDER — ALBUTEROL SULFATE 2.5 MG/3ML
2.5 SOLUTION RESPIRATORY (INHALATION) EVERY 6 HOURS PRN
Status: DISCONTINUED | OUTPATIENT
Start: 2022-05-11 | End: 2022-05-13 | Stop reason: HOSPADM

## 2022-05-11 RX ADMIN — ALBUTEROL SULFATE 2.5 MG: 2.5 SOLUTION RESPIRATORY (INHALATION) at 10:17

## 2022-05-11 NOTE — LETTER
Pulmonary Fuction Testing Lab  [address] [facility]  Office [phone] Fax [phone]    April 19, 2022    Dear Layla Gama,      This is to reminder for your upcoming appointment on 5/11/2022 at 10 AM. Our current policy states even if you have had a Covid vaccine you must have your COVID-19 test on 5/9/2022 between 9 AM and 12 PM. You need to quarantine once you have done your Covid test until your appointment. If you are unable to quarantine, please wear either a regular mask or N95 mask. We are required to have results back prior to your appointment. If your test is positive you will be called with those results. If you choose to have your Covid test done elsewhere you must have the printed test results in hand with you on the day of your appointment. We can not accept a copy off your phone. To have you Covid test please go to the follow site:    Outpatient Lab in the [facility]  [address] [id number] Monday thru Friday 6:30 AM - 4:30 PM     If you do not wish to have the Covid test done or need to reschedule please call [phone]. Please do not use any bronchodilators (rescue inhalers, breathing treatments, ect.) 4 hours prior to test and no long acting respiratory medications 12-24 hours before test. These medications will cause test results to be inaccurate. On the day of your appointment you will come through the Main Entrance of [facility] in order to register [facility]. Upon entering the building, you will take an immediate left and go to the [facility] registration desk then sign in. Please be here 30 minutes before your appointment to get registered. Please note that if you are more than 15 minutes late, we will need to reschedule. Thank you for entrusting us at [facility]) with your care. We look forward to seeing for you.     [facility]) Pulmonary Function Lab

## 2022-05-11 NOTE — PROGRESS NOTES
[facility]  HEMATOLOGY & ONCOLOGY    [facility] [place]OLOG[facility] AND ONCOLOGY  [address]  [facility] [id number] [phone]    Patient Name: Lorena Valdes  Encounter Date: 05/13/2022  YOB: 1940  Patient Number: [id number]      REASON FOR VISIT:  Ms. Lorena Valdes is a 80 yo female with a history of breast cancer that was diagnosed in 2008.  She had a stage IIA left breast cancer that was hormone receptor negative and Her 2 positive by FISH.  She underwent lumpectomy then adjuvant Adriamycin Cytoxan.  She only had one dose of Adriamycin and developed cardiomyopathy and thereofre the adriamycin was discontinued.  She had then weekly Taxol with all chemotherapy being completed in March 2009.  She then had adjuvant radiation therapy.     She continues to have mammograms with the last one being on August 30,2021.  Her last bone density was also on 8/25/2020 that showed osteoporosis which she states is being followed by her pcp.      Oncology/Hematology History Overview Note   Tp: [id number] Mp: M0 Size: 2.6 cm Histopathologic Grade: G3? Histopathologic Type: DuctalInvasive  (NOS), left central? Stage  Grouping: IIA  08/06/2008: Core biopsy: at left breast mass, 12 o'clock: Infiltrating ductal carcinoma, gr 3, with foci of tumor necrosis? DNA index 1.78  (aneuploid), [place] 0%, her2/kofi 2+ (FISH ), p53 0%, Ki67  59%.  08/28/2008: MastectomyL  partial, USguided  needle localization, with SNB: IDC, gr 3, tumor measures 2.6 cm in greatest dimension,  with necrosis seen, extending to original deep margin....additional deep margin adds 1 cm to final margin of excision, residual fibrocystic  mastopathy? 2 left axillary sentinel nodes: 0/2+ve  Dose dense Adriamycin/cyclophosphamide, followed by weekly Taxol administered between September 2008 and March 2009 .  Adriamycin discontinued after one cycle due to anthracycline induced  cardiomyopathy.  Followed by Radiation therapy     Malignant neoplasm of central portion of left female breast (CMS/HCC)   11/9/2016 Initial Diagnosis    Malignant neoplasm of central portion of left female breast     1/13/2020 - 8/11/2020 Chemotherapy    OP CENTRAL VENOUS ACCESS DEVICE ACCESS, CARE, AND MAINTENANCE (CVAD)     9/1/2021 -  Chemotherapy    OP CENTRAL VENOUS ACCESS DEVICE ACCESS, CARE, AND MAINTENANCE (CVAD)           PAST MEDICAL HISTORY:  ALLERGIES:  Allergies   Allergen Reactions   • Adhesive Tape Hives     Latex Tape       CURRENT MEDICATIONS:  Outpatient Encounter Medications as of 9/1/2021   Medication Sig Dispense Refill   • aspirin 81 MG EC tablet Take 81 mg by mouth daily.       • Calcium Carb-Cholecalciferol (CALCIUM 600+D3) 600-200 MG-UNIT tablet Take 1 tablet by mouth 2 (Two) Times a Day.     • Cholecalciferol (VITAMIN D3) 400 UNITS capsule Take 1 capsule by mouth Daily.     • glipiZIDE (GLUCOTROL) 5 MG tablet Take 5 mg by mouth Daily.     • ibuprofen (ADVIL,MOTRIN) 400 MG tablet Take 400 mg by mouth Every 6 (Six) Hours As Needed for mild pain (1-3).     • losartan (COZAAR) 100 MG tablet Take 100 mg by mouth Daily.  1   • magnesium 30 MG tablet Take 30 mg by mouth.     • metoprolol succinate XL (TOPROL-XL) 50 MG 24 hr tablet Take 25 mg by mouth Daily.     • omeprazole (priLOSEC) 20 MG capsule Take 20 mg by mouth Daily.     • potassium chloride (K-DUR,KLOR-CON) 10 MEQ CR tablet Take 10 mEq by mouth Daily.     • pravastatin (PRAVACHOL) 40 MG tablet Take 40 mg by mouth Daily.     • triamterene-hydrochlorothiazide (MAXZIDE-25) 37.5-25 MG per tablet Take 0.5 tablets by mouth Daily.       Facility-Administered Encounter Medications as of 9/1/2021   Medication Dose Route Frequency Provider Last Rate Last Admin   • heparin injection 500 Units  500 Units Intravenous PRN Liv Shay APRN   500 Units at 09/01/21 1233   • sodium chloride 0.9 % flush 10 mL  10 mL Intravenous PRN Jaspal  Liv Thea, APRN   10 mL at 09/01/21 1233     ADULT ILLNESSES:  Patient Active Problem List   Diagnosis Code   • Malignant neoplasm of central portion of left female breast (CMS/HCC) C50.112   • Malignant neoplasm of upper-outer quadrant of right female breast (CMS/HCC) C50.411   • Osteopenia M85.80   • Encounter for care related to vascular access port Z45.2   • Colon cancer screening Z12.11     SURGERIES:  Past Surgical History:   Procedure Laterality Date   • BREAST LUMPECTOMY  2008   • CATARACT EXTRACTION Right    • COLONOSCOPY  09/16/2010    Diverticulosis; Repeat 10 years   • COLONOSCOPY N/A 11/11/2020    Procedure: COLONOSCOPY WITH ANESTHESIA;  Surgeon: Jennifer Bee MD;  Location: [facility] ENDOSCOPY;  Service: Gastroenterology;  Laterality: N/A;  pre: screening  post: polyp; hemorrhoids  Luly Diez MD   • MAMMO BILATERAL  07/17/2013    Dr. Sabillon   • PAP SMEAR  2010     HEALTH MAINTENANCE ITEMS:    Last Completed Mammogram       Status Date      MAMMOGRAM Done 8/25/2020 Ext Proc: CHG SCREENING DIGITAL BREAST TOMOSYNTHESIS BI     5 mm round focal asymmetry within the inferior right breast ; SPOT COMPRESSION VIEW NEGATIVE 8/26/2020.        FAMILY HISTORY:  Family History   Problem Relation Age of Onset   • Cancer Mother    • Heart disease Father    • No Known Problems Daughter    • No Known Problems Son    • Hypertension Daughter    • Hypertension Daughter    • Other Brother         polioi   • Cancer Paternal Aunt    • No Known Problems Maternal Grandmother    • No Known Problems Maternal Grandfather    • No Known Problems Paternal Grandmother    • No Known Problems Paternal Grandfather    • Drug abuse Brother    • Colon cancer Neg Hx    • Colon polyps Neg Hx    • Esophageal cancer Neg Hx    • Liver cancer Neg Hx    • Liver disease Neg Hx    • Rectal cancer Neg Hx    • Stomach cancer Neg Hx      SOCIAL HISTORY:  Social History     Socioeconomic History   • Marital status: Single     Spouse name: Not on  "file   • Number of children: Not on file   • Years of education: Not on file   • Highest education level: Not on file   Tobacco Use   • Smoking status: Former Smoker     Types: Cigarettes     Quit date:      Years since quittin.7   • Smokeless tobacco: Never Used   Substance and Sexual Activity   • Alcohol use: Yes     Alcohol/week: 1.0 standard drinks     Types: 1 Cans of beer per week     Comment: Occasionally    • Drug use: No   • Sexual activity: Defer       REVIEW OF SYSTEMS:  Review of Systems   Constitutional: Negative for activity change, appetite change, chills, diaphoresis, fatigue, fever and unexpected weight loss. No weight changes (192 lb today) since he last visit.  Lives alone.  A few children live next-door.  Manages the chores, errands and driving.  Is up and about \"all the time.\"  Says she still push mows her yard.  HENT: Negative for ear pain, nosebleeds, sinus pressure, sore throat and voice change.    Eyes: Negative for blurred vision, double vision, pain and visual disturbance.   Respiratory: Negative for cough and shortness of breath.    Cardiovascular: Negative for chest pain, palpitations and leg swelling.   Gastrointestinal: Negative for abdominal pain, anal bleeding, blood in stool, constipation, diarrhea, nausea and vomiting.   Endocrine: Negative for heat intolerance, polydipsia and polyuria.   Genitourinary: Negative for dysuria, frequency, hematuria, urgency but wears pads/pull ups due to urinary incontinence.   Musculoskeletal: Positive for arthralgias (knees) but no myalgias.   Skin: Negative for rash and skin lesions.   Neurological: Negative for dizziness, tremors, seizures, syncope, speech difficulty, weakness and headache.   Hematological: Negative for adenopathy. Does not bruise/bleed easily.   Psychiatric/Behavioral: Negative for dysphoric mood, sleep disturbance, suicidal ideas and depressed mood.       Vitals:    22 1124   BP: 132/78   Pulse: 82   Resp: 16 "   Temp: 96.7 °F (35.9 °C)   SpO2: 94%         Physical Exam:  Physical Exam   Constitutional: She is oriented to person, place, and time. She is a bit heavy set, modestly kept elderly female, appears well-developed and well-nourished.  ECOG 0  HENT:   Head: Atraumatic.   Mouth/Throat: Is wearing a surgical mask today.  Otherwise oropharynx is clear and moist.   Eyes: Pupils are equal, round, and reactive to light. No scleral icterus.   Neck: Trachea normal.   Cardiovascular: Normal rate, regular rhythm and normal pulses. Exam reveals no gallop and no friction rub.   No murmur heard.  Pulmonary/Chest: Effort normal and breath sounds normal. She has no wheezes. She has no rhonchi. She has no rales. Port in the left upper chest is well-seated.  Breasts: Chaperoned exam: Brittany Davis MA--right breast without masses, skin changes no nipple discharge.  Left breast mastectomy site is well-healed.  The site is depressed from scar retraction.  No underlying nodularity.  There is radiation associated skin firmness of the entire breast but no pigmentation.  Abdominal: Soft. Normal appearance. There is no abdominal tenderness. There is no rebound and no guarding.   Lymphadenopathy:     She has no cervical adenopathy.        Right: No supraclavicular adenopathy present.        Left: No supraclavicular adenopathy present.   Neurological: She is alert and oriented to person, place, and time. No sensory deficit.   Psychiatric: Judgment normal.       Lorena Valdes reports a pain score of 0.      Patient's Body mass index is 31.01 kg/m². BMI is above normal parameters. Recommendations include: defer to pcp.      LABS    Lab Results - Last 18 Months   Lab Units 09/01/21  1101 02/04/21  1055 09/01/20  1215   HEMOGLOBIN g/dL 11.1* 11.9* 11.6*   HEMATOCRIT % 33.1* 36.7* 34.0   MCV fL 93.8 94.8 91.9   WBC 10*3/mm3 7.45 8.7 8.74   RDW % 13.2 13.0 12.8   MPV fL 9.2 9.0* 9.3   PLATELETS 10*3/mm3 354 326 347   IMM GRAN % % 0.3  --  0.3    NEUTROS ABS 10*3/mm3 4.13 3.9 4.22   LYMPHS ABS 10*3/mm3 2.33 3.7 3.30*   MONOS ABS 10*3/mm3 0.73 1.10* 0.90   EOS ABS 10*3/mm3 0.20 0.00 0.25   BASOS ABS 10*3/mm3 0.04 0.00 0.04   IMMATURE GRANS (ABS) 10*3/mm3 0.02 0.0 0.03   NRBC /100 WBC 0.0  --  0.0       Lab Results - Last 18 Months   Lab Units 09/01/21  1101 08/18/21  1115 08/09/21  1326 07/22/21  1021 02/04/21  1055 11/03/20  1134 09/01/20  1215   GLUCOSE mg/dL 228* 166* 108 107 104 139* 220*   SODIUM mmol/L 138 140 141 140 139 140 138   POTASSIUM mmol/L 3.8 4.3 4.1 4.0 3.9 4.7 4.1   TOTAL CO2 mmol/L  --  27 26 25 28 29  --    CO2 mmol/L 26.0  --   --   --   --   --  27.0   CHLORIDE mmol/L 102 104 104 105 101 103 100   ANION GAP mmol/L 10.0 9 11 10 10 8 11.0   CREATININE mg/dL 1.16* 1.2* 1.6* 1.2* 0.9 1* 1.16*   BUN mg/dL 22 20 32* 28* 24* 25* 27*   BUN / CREAT RATIO  19.0  --   --   --   --   --  23.3   CALCIUM mg/dL 9.2 9.5 9.7 9.6 9.1 9.9 9.1   EGFR IF NONAFRICN AM mL/min/1.73 45* 43* 31* 43* >60 53* 45*   ALK PHOS U/L 87  --   --  78 83 90 80   TOTAL PROTEIN g/dL 7.5  --   --  7.8 7.7 7.9 7.7   ALT (SGPT) U/L 9  --   --  9 9 14 11   AST (SGOT) U/L 17  --   --  16 17 18 16   BILIRUBIN mg/dL 0.5  --   --  0.5 0.4 0.3 0.2   ALBUMIN g/dL 3.90  --   --  4.1 3.8 4.1 3.90   GLOBULIN gm/dL 3.6  --   --   --   --   --  3.8       ASSESSMENT  1. Left Breast Cancer diagnosed in 2008  · Initial stage: AJCC TNM [id number], Stage IIA, ER - [place] -  , Her 2 kofi 2+, Fish positive.  TRIPLE NEGATIVE DISEASE  · Treatment : Left Lumpectomy, adjuvant chemotherpay with Adriamycin Cytoxan x 1 cycle due to cardiomyopathy, Taxol weekly for 12 weeks and then adjuvant radiation therapy.   · Disease status:   · 08/30/2021-mammogram: No mammographic evidence of malignancy.  BI-RADS Category 1: Negative.    2. Osteoporosis ,last BMD 8/2020, She continues on calcium. She will discuss plan with pcp  3.  Type 2 DM: on oral medications per pcp.    4.  GERD - on prilosec and controlled  5.   "HTN - on Maxizide and controlled.  BP stable today at 128/62  6.  Anemia likely secondary to CKD stage III, substrates have been normal.    -- Hgb 11.5; MCV 95.7, 05/13/2022 (prior range;  Hgb 11.1-12.2; MCV 93.8- 97.4)   --Would be a candidate for GALLITO therapy if and when her hemoglobin < 10 and hematocrit < 30.  7.  Functioning Mediport.  Does not want removed.  \"I prefer to have it in for fluids and blood work.\"  8.  Chronic kidney disease, stage III  --GFR 39 mL/min, 05/13/2022 (prior:  48-53)    PLAN  1.  Apprised of labs, 05/13/2022 hyperglycemia, depressed (worse) GFR otherwise stable CMP, mild (stable) anemia otherwise stable CBC.  Tumor markers pending   2.  Refer to nephrology re:  Worsening GFR  3.  Schedule mammogram, 08/30/2022  4.  Port removal recommended.  The potential for infection and thrombosis explained.  Patient wants to leave it in for now.  \"Maybe next time.\"    5...Continue to follow with pcp and other specialists  6.  Continue port flushes every 6 to 8 weeks  7.  Return in 12 months for follow up and preoffice cbc, cmp and cea, ca 27-29.    I spent 33 minutes caring for Lorena on this date of service. This time includes time spent by me in the following activities: preparing for the visit, reviewing tests, performing a medically appropriate examination and/or evaluation, counseling and educating the patient/family/caregiver, ordering medications, tests, or procedures and documenting information in the medical record.           "

## 2022-05-13 ENCOUNTER — LAB (OUTPATIENT)
Dept: LAB | Facility: HOSPITAL | Age: 82
End: 2022-05-13

## 2022-05-13 ENCOUNTER — OFFICE VISIT (OUTPATIENT)
Dept: ONCOLOGY | Facility: CLINIC | Age: 82
End: 2022-05-13

## 2022-05-13 VITALS
BODY MASS INDEX: 30.92 KG/M2 | HEART RATE: 82 BPM | RESPIRATION RATE: 16 BRPM | WEIGHT: 192.4 LBS | SYSTOLIC BLOOD PRESSURE: 132 MMHG | HEIGHT: 66 IN | TEMPERATURE: 96.7 F | OXYGEN SATURATION: 94 % | DIASTOLIC BLOOD PRESSURE: 78 MMHG

## 2022-05-13 DIAGNOSIS — C50.411 MALIGNANT NEOPLASM OF UPPER-OUTER QUADRANT OF RIGHT BREAST IN FEMALE, ESTROGEN RECEPTOR POSITIVE: ICD-10-CM

## 2022-05-13 DIAGNOSIS — Z17.0 MALIGNANT NEOPLASM OF UPPER-OUTER QUADRANT OF RIGHT BREAST IN FEMALE, ESTROGEN RECEPTOR POSITIVE: Primary | ICD-10-CM

## 2022-05-13 DIAGNOSIS — C50.411 MALIGNANT NEOPLASM OF UPPER-OUTER QUADRANT OF RIGHT BREAST IN FEMALE, ESTROGEN RECEPTOR POSITIVE: Primary | ICD-10-CM

## 2022-05-13 DIAGNOSIS — Z12.31 ENCOUNTER FOR SCREENING MAMMOGRAM FOR MALIGNANT NEOPLASM OF BREAST: ICD-10-CM

## 2022-05-13 DIAGNOSIS — Z17.0 MALIGNANT NEOPLASM OF UPPER-OUTER QUADRANT OF RIGHT BREAST IN FEMALE, ESTROGEN RECEPTOR POSITIVE: ICD-10-CM

## 2022-05-13 PROBLEM — E11.21 DIABETIC RENAL DISEASE (HCC): Status: ACTIVE | Noted: 2022-05-13

## 2022-05-13 PROBLEM — I12.9 HYPERTENSIVE RENAL DISEASE: Status: ACTIVE | Noted: 2022-05-11

## 2022-05-13 PROBLEM — N18.32 CHRONIC KIDNEY DISEASE, STAGE 3B: Status: ACTIVE | Noted: 2022-03-09

## 2022-05-13 LAB
ALBUMIN SERPL-MCNC: 4 G/DL (ref 3.5–5.2)
ALBUMIN/GLOB SERPL: 1 G/DL
ALP SERPL-CCNC: 75 U/L (ref 39–117)
ALT SERPL W P-5'-P-CCNC: 7 U/L (ref 1–33)
ANION GAP SERPL CALCULATED.3IONS-SCNC: 8 MMOL/L (ref 5–15)
AST SERPL-CCNC: 13 U/L (ref 1–32)
BASOPHILS # BLD AUTO: 0.04 10*3/MM3 (ref 0–0.2)
BASOPHILS NFR BLD AUTO: 0.5 % (ref 0–1.5)
BILIRUB SERPL-MCNC: 0.3 MG/DL (ref 0–1.2)
BUN SERPL-MCNC: 29 MG/DL (ref 8–23)
BUN/CREAT SERPL: 21.5 (ref 7–25)
CALCIUM SPEC-SCNC: 9.2 MG/DL (ref 8.6–10.5)
CEA SERPL-MCNC: 3.07 NG/ML
CHLORIDE SERPL-SCNC: 105 MMOL/L (ref 98–107)
CO2 SERPL-SCNC: 25 MMOL/L (ref 22–29)
CREAT SERPL-MCNC: 1.35 MG/DL (ref 0.57–1)
DEPRECATED RDW RBC AUTO: 46.5 FL (ref 37–54)
EGFRCR SERPLBLD CKD-EPI 2021: 39.8 ML/MIN/1.73
EOSINOPHIL # BLD AUTO: 0.13 10*3/MM3 (ref 0–0.4)
EOSINOPHIL NFR BLD AUTO: 1.5 % (ref 0.3–6.2)
ERYTHROCYTE [DISTWIDTH] IN BLOOD BY AUTOMATED COUNT: 13.2 % (ref 12.3–15.4)
GLOBULIN UR ELPH-MCNC: 3.9 GM/DL
GLUCOSE SERPL-MCNC: 245 MG/DL (ref 65–99)
HCT VFR BLD AUTO: 35.2 % (ref 34–46.6)
HGB BLD-MCNC: 11.5 G/DL (ref 12–15.9)
IMM GRANULOCYTES # BLD AUTO: 0.02 10*3/MM3 (ref 0–0.05)
IMM GRANULOCYTES NFR BLD AUTO: 0.2 % (ref 0–0.5)
LYMPHOCYTES # BLD AUTO: 2.95 10*3/MM3 (ref 0.7–3.1)
LYMPHOCYTES NFR BLD AUTO: 33.7 % (ref 19.6–45.3)
MCH RBC QN AUTO: 31.3 PG (ref 26.6–33)
MCHC RBC AUTO-ENTMCNC: 32.7 G/DL (ref 31.5–35.7)
MCV RBC AUTO: 95.7 FL (ref 79–97)
MONOCYTES # BLD AUTO: 0.89 10*3/MM3 (ref 0.1–0.9)
MONOCYTES NFR BLD AUTO: 10.2 % (ref 5–12)
NEUTROPHILS NFR BLD AUTO: 4.73 10*3/MM3 (ref 1.7–7)
NEUTROPHILS NFR BLD AUTO: 53.9 % (ref 42.7–76)
NRBC BLD AUTO-RTO: 0 /100 WBC (ref 0–0.2)
PLATELET # BLD AUTO: 382 10*3/MM3 (ref 140–450)
PMV BLD AUTO: 9.3 FL (ref 6–12)
POTASSIUM SERPL-SCNC: 4.3 MMOL/L (ref 3.5–5.2)
PROT SERPL-MCNC: 7.9 G/DL (ref 6–8.5)
RBC # BLD AUTO: 3.68 10*6/MM3 (ref 3.77–5.28)
SODIUM SERPL-SCNC: 138 MMOL/L (ref 136–145)
WBC NRBC COR # BLD: 8.76 10*3/MM3 (ref 3.4–10.8)

## 2022-05-13 PROCEDURE — 85025 COMPLETE CBC W/AUTO DIFF WBC: CPT

## 2022-05-13 PROCEDURE — 86300 IMMUNOASSAY TUMOR CA 15-3: CPT

## 2022-05-13 PROCEDURE — 99214 OFFICE O/P EST MOD 30 MIN: CPT | Performed by: INTERNAL MEDICINE

## 2022-05-13 PROCEDURE — 36415 COLL VENOUS BLD VENIPUNCTURE: CPT

## 2022-05-13 PROCEDURE — 82378 CARCINOEMBRYONIC ANTIGEN: CPT

## 2022-05-13 PROCEDURE — 80053 COMPREHEN METABOLIC PANEL: CPT

## 2022-05-13 RX ORDER — FAMOTIDINE 20 MG/1
20 TABLET, FILM COATED ORAL 2 TIMES DAILY
COMMUNITY

## 2022-05-13 RX ORDER — ALBUTEROL SULFATE 90 UG/1
2 AEROSOL, METERED RESPIRATORY (INHALATION)
COMMUNITY
Start: 2022-01-05

## 2022-05-14 LAB — CANCER AG27-29 SERPL-ACNC: 27.3 U/ML (ref 0–38.6)

## 2022-05-16 ENCOUNTER — TELEPHONE (OUTPATIENT)
Dept: ONCOLOGY | Facility: CLINIC | Age: 82
End: 2022-05-16

## 2022-05-16 NOTE — TELEPHONE ENCOUNTER
----- Message from Justin Canas MD sent at 5/13/2022  1:43 PM CDT -----  Refer to pcp and nephrology re:  Worsening hyperglycemia and GFR

## 2022-05-16 NOTE — TELEPHONE ENCOUNTER
Contacted patient Lorena Valdes regarding her lab results.  Glucose: 245  Creatinine: 1.35  Patient v/u and said that she had ate prior to her lab draw. She is diabetic and does take a oral diabetes medication. Patient informed that at next visit she should come in fasting for a true reading. Pt v/u/.  Copy of labs to be faxed to PCP Dr Luly Diez for review.   Lorena does have a f/u raul with Dr Diez early June 2022    Copy of her labs sent to Dr Israel for review  Patient encouraged to call if she has any concerns, problems, or questions prior to her next raul apt. Pt. V/u.

## 2022-05-17 ENCOUNTER — TELEPHONE (OUTPATIENT)
Dept: INTERNAL MEDICINE | Age: 82
End: 2022-05-17

## 2022-05-17 NOTE — TELEPHONE ENCOUNTER
Dr. Liset Samuels office faxed over some labs on the pt stating that they wan the pt to see Dr. Jose Gallagher and a Nephrologist due to Worsening Hyperglycemia and decreased GFR. I have tried to reach out the pt numerous times to get this appt set up. Phone just rings.

## 2022-06-02 ENCOUNTER — OFFICE VISIT (OUTPATIENT)
Dept: PULMONOLOGY | Age: 82
End: 2022-06-02
Payer: MEDICARE

## 2022-06-02 VITALS
SYSTOLIC BLOOD PRESSURE: 140 MMHG | BODY MASS INDEX: 31.02 KG/M2 | HEART RATE: 68 BPM | HEIGHT: 66 IN | OXYGEN SATURATION: 97 % | DIASTOLIC BLOOD PRESSURE: 78 MMHG | WEIGHT: 193 LBS

## 2022-06-02 DIAGNOSIS — J98.4 RESTRICTIVE LUNG DISEASE: Primary | ICD-10-CM

## 2022-06-02 DIAGNOSIS — R06.09 DOE (DYSPNEA ON EXERTION): ICD-10-CM

## 2022-06-02 DIAGNOSIS — J84.10 PULMONARY FIBROSIS (HCC): ICD-10-CM

## 2022-06-02 DIAGNOSIS — I10 ESSENTIAL HYPERTENSION: ICD-10-CM

## 2022-06-02 DIAGNOSIS — I51.89 DIASTOLIC DYSFUNCTION: ICD-10-CM

## 2022-06-02 DIAGNOSIS — J98.4 RESTRICTIVE LUNG DISEASE: ICD-10-CM

## 2022-06-02 LAB
RHEUMATOID FACTOR: <10 IU/ML
SEDIMENTATION RATE, ERYTHROCYTE: 50 MM/HR (ref 0–25)

## 2022-06-02 PROCEDURE — 1123F ACP DISCUSS/DSCN MKR DOCD: CPT | Performed by: INTERNAL MEDICINE

## 2022-06-02 PROCEDURE — 1036F TOBACCO NON-USER: CPT | Performed by: INTERNAL MEDICINE

## 2022-06-02 PROCEDURE — 1090F PRES/ABSN URINE INCON ASSESS: CPT | Performed by: INTERNAL MEDICINE

## 2022-06-02 PROCEDURE — G8417 CALC BMI ABV UP PARAM F/U: HCPCS | Performed by: INTERNAL MEDICINE

## 2022-06-02 PROCEDURE — G8399 PT W/DXA RESULTS DOCUMENT: HCPCS | Performed by: INTERNAL MEDICINE

## 2022-06-02 PROCEDURE — G8427 DOCREV CUR MEDS BY ELIG CLIN: HCPCS | Performed by: INTERNAL MEDICINE

## 2022-06-02 PROCEDURE — 99214 OFFICE O/P EST MOD 30 MIN: CPT | Performed by: INTERNAL MEDICINE

## 2022-06-02 ASSESSMENT — ENCOUNTER SYMPTOMS
SHORTNESS OF BREATH: 1
ANAL BLEEDING: 0
RHINORRHEA: 0
BACK PAIN: 0
CHEST TIGHTNESS: 0
ABDOMINAL DISTENTION: 0
ABDOMINAL PAIN: 0
COUGH: 0
APNEA: 0
WHEEZING: 1

## 2022-06-02 NOTE — PROGRESS NOTES
Pulmonary and Sleep Medicine    Nivia Wilkinson (:  1940) is a [de-identified] y.o. female,Established patient, here for evaluation of the following chief complaint(s):  Follow-up (Pt came in today for results of PFT.)      Referring physician:  No referring provider defined for this encounter. ASSESSMENT/PLAN:  1. Restrictive lung disease  -     Sedimentation Rate; Future  -     AGUS Screen with Reflex; Future  -     Rheumatoid Factor; Future  2. Pulmonary fibrosis ([address]  3. READ (dyspnea on exertion)  4. Essential hypertension  5. Diastolic dysfunction        Restrictive lung disease pattern seen on the pulmonary function study with a reduced diffusing capacity is very likely secondary to pulmonary fibrosis seen on CT done in December. We will get autoimmune work-up. Overall she is feeling that her symptoms are improved however. Juan Tay MD, [facility], [facility]    No follow-ups on file. SUBJECTIVE/OBJECTIVE:  The patient is here for follow-up on shortness of breath. Her pulmonary function study showed mild restriction. There was no evidence of small airway disease. She continues to be short of breath but her symptoms are improved. CT of the chest done in December showed some evidence of pulmonary fibrosis. Prior to Visit Medications    Medication Sig Taking?  Authorizing Provider   losartan (COZAAR) 100 MG tablet Take 1/2 (one-half) tablet by mouth twice daily Yes Francisco Isidro MD   glipiZIDE (GLUCOTROL) 5 MG tablet Take 1/2 (one-half) tablet by mouth twice daily Yes Francisco Isidro MD   albuterol sulfate HFA (VENTOLIN HFA) 108 (90 Base) MCG/ACT inhaler Inhale 2 puffs into the lungs 4 times daily as needed for Wheezing Yes Neymar Holden MD   tiotropium-olodaterol (STIOLTO RESPIMAT) 2.5-2.5 MCG/ACT AERS Inhale 2 puffs into the lungs daily Yes Juan Tay MD   famotidine (PEPCID) 40 MG tablet Take 1 tablet by mouth every evening Yes Francisco Isidro MD   Famotidine (PEPCID PO) Take by mouth daily as needed  Yes Historical Provider, MD   amLODIPine (NORVASC) 2.5 MG tablet Take 1 tablet by mouth daily Yes Windy Seth MD   carvedilol (COREG) 6.25 MG tablet Take 1 tablet by mouth 2 times daily Yes Windy Seth MD   triamterene-hydroCHLOROthiazide (MAXZIDE-25) 37.5-25 MG per tablet Take 1/2 tablet in am Yes Rhoda Simpson MD   pravastatin (PRAVACHOL) 40 MG tablet TAKE ONE TABLET BY MOUTH ONCE DAILY Yes Rhoda Simpson MD   potassium chloride (KLOR-CON M) 10 MEQ extended release tablet Take 1 tablet by mouth daily Yes Rhoda Simpson MD   magnesium 30 MG tablet Take 30 mg by mouth 2 times daily Yes Historical Provider, MD   Calcium-Vitamin D 600-200 MG-UNIT TABS Take by mouth every morning (before breakfast)  Yes Historical Provider, MD   aspirin 81 MG EC tablet Take 81 mg by mouth daily. Yes Historical Provider, MD   Ascorbic Acid (VITAMIN C) 500 MG tablet Take 500 mg by mouth daily. Yes Historical Provider, MD        Review of Systems   Constitutional: Negative for activity change, appetite change, chills, diaphoresis and fatigue. HENT: Negative for congestion, dental problem, drooling, ear discharge, postnasal drip and rhinorrhea. Eyes: Negative for visual disturbance. Respiratory: Positive for shortness of breath and wheezing. Negative for apnea, cough and chest tightness. Gastrointestinal: Negative for abdominal distention, abdominal pain and anal bleeding. Endocrine: Negative for cold intolerance, heat intolerance and polydipsia. Genitourinary: Negative for difficulty urinating, dysuria, enuresis and flank pain. Musculoskeletal: Negative for arthralgias, back pain and gait problem. Allergic/Immunologic: Negative for environmental allergies. Neurological: Negative for dizziness, facial asymmetry, light-headedness and headaches.        Vitals:    06/02/22 1115   BP: (!) 140/78   Pulse: 68   SpO2: 97%     BMI Readings from Last 1 Encounters:   06/02/22 31.15 kg/m² Physical Exam        This note was generated used a voice recognition software. Errors in voice recognition may have occurred. An electronic signature was used to authenticate this note.     --Robles Ruiz MD

## 2022-06-05 LAB — ANA IGG, ELISA: NORMAL

## 2022-06-23 ENCOUNTER — INFUSION (OUTPATIENT)
Dept: ONCOLOGY | Facility: CLINIC | Age: 82
End: 2022-06-23

## 2022-06-23 DIAGNOSIS — C50.112 MALIGNANT NEOPLASM OF CENTRAL PORTION OF LEFT FEMALE BREAST, UNSPECIFIED ESTROGEN RECEPTOR STATUS: ICD-10-CM

## 2022-06-23 DIAGNOSIS — Z45.2 ENCOUNTER FOR CARE RELATED TO VASCULAR ACCESS PORT: Primary | ICD-10-CM

## 2022-06-23 RX ORDER — HEPARIN SODIUM (PORCINE) LOCK FLUSH IV SOLN 100 UNIT/ML 100 UNIT/ML
500 SOLUTION INTRAVENOUS AS NEEDED
Status: DISCONTINUED | OUTPATIENT
Start: 2022-06-23 | End: 2022-06-23 | Stop reason: HOSPADM

## 2022-06-23 RX ORDER — SODIUM CHLORIDE 0.9 % (FLUSH) 0.9 %
10 SYRINGE (ML) INJECTION AS NEEDED
Status: DISCONTINUED | OUTPATIENT
Start: 2022-06-23 | End: 2022-06-23 | Stop reason: HOSPADM

## 2022-06-23 RX ORDER — SODIUM CHLORIDE 0.9 % (FLUSH) 0.9 %
10 SYRINGE (ML) INJECTION AS NEEDED
Status: CANCELLED | OUTPATIENT
Start: 2022-06-23

## 2022-06-23 RX ORDER — HEPARIN SODIUM (PORCINE) LOCK FLUSH IV SOLN 100 UNIT/ML 100 UNIT/ML
500 SOLUTION INTRAVENOUS AS NEEDED
Status: CANCELLED | OUTPATIENT
Start: 2022-06-23

## 2022-06-23 RX ADMIN — Medication 10 ML: at 11:24

## 2022-06-23 RX ADMIN — HEPARIN SODIUM (PORCINE) LOCK FLUSH IV SOLN 100 UNIT/ML 500 UNITS: 100 SOLUTION at 11:24

## 2022-06-30 ENCOUNTER — OFFICE VISIT (OUTPATIENT)
Dept: PULMONOLOGY | Age: 82
End: 2022-06-30
Payer: MEDICARE

## 2022-06-30 VITALS
HEART RATE: 54 BPM | WEIGHT: 192.6 LBS | SYSTOLIC BLOOD PRESSURE: 118 MMHG | DIASTOLIC BLOOD PRESSURE: 80 MMHG | BODY MASS INDEX: 30.95 KG/M2 | HEIGHT: 66 IN | OXYGEN SATURATION: 99 %

## 2022-06-30 DIAGNOSIS — J84.10 PULMONARY FIBROSIS (HCC): ICD-10-CM

## 2022-06-30 DIAGNOSIS — I10 ESSENTIAL HYPERTENSION: ICD-10-CM

## 2022-06-30 DIAGNOSIS — R06.09 DOE (DYSPNEA ON EXERTION): ICD-10-CM

## 2022-06-30 DIAGNOSIS — J98.4 RESTRICTIVE LUNG DISEASE: Primary | ICD-10-CM

## 2022-06-30 DIAGNOSIS — I51.89 DIASTOLIC DYSFUNCTION: ICD-10-CM

## 2022-06-30 PROCEDURE — G8417 CALC BMI ABV UP PARAM F/U: HCPCS | Performed by: INTERNAL MEDICINE

## 2022-06-30 PROCEDURE — 1123F ACP DISCUSS/DSCN MKR DOCD: CPT | Performed by: INTERNAL MEDICINE

## 2022-06-30 PROCEDURE — G8427 DOCREV CUR MEDS BY ELIG CLIN: HCPCS | Performed by: INTERNAL MEDICINE

## 2022-06-30 PROCEDURE — 1090F PRES/ABSN URINE INCON ASSESS: CPT | Performed by: INTERNAL MEDICINE

## 2022-06-30 PROCEDURE — G8399 PT W/DXA RESULTS DOCUMENT: HCPCS | Performed by: INTERNAL MEDICINE

## 2022-06-30 PROCEDURE — 99214 OFFICE O/P EST MOD 30 MIN: CPT | Performed by: INTERNAL MEDICINE

## 2022-06-30 PROCEDURE — 1036F TOBACCO NON-USER: CPT | Performed by: INTERNAL MEDICINE

## 2022-06-30 ASSESSMENT — ENCOUNTER SYMPTOMS
WHEEZING: 0
ABDOMINAL DISTENTION: 0
ABDOMINAL PAIN: 0
ANAL BLEEDING: 0
RHINORRHEA: 0
APNEA: 0
BACK PAIN: 0
CHEST TIGHTNESS: 0
COUGH: 0
SHORTNESS OF BREATH: 1

## 2022-06-30 NOTE — PROGRESS NOTES
Pulmonary and Sleep Medicine    Betina Doshi (:  1940) is a [de-identified] y.o. female,Established patient, here for evaluation of the following chief complaint(s):  Follow-up (Pt came in today for a follow up with lab results. )      Referring physician:  No referring provider defined for this encounter. ASSESSMENT/PLAN:  1. Restrictive lung disease  -     Full PFT Study With Bronchodilator; Future  2. Pulmonary fibrosis ([address]  3. READ (dyspnea on exertion)  4. Essential hypertension  5. Diastolic dysfunction        Continue current management to repeat pulmonary function study in 6 months to assess for stability of her lung volumes. Shortness of breath is improved. Kirk Zimmerman [facility], [facility]    Return in about 6 months (around 2022). SUBJECTIVE/OBJECTIVE:  She is here for follow-up on restrictive lung disease. She denies new complaints. Had autoimmune work-up that was largely negative. Her ESR is elevated in a nonspecific way however. Oxygen saturation 99% on room air. Prior to Visit Medications    Medication Sig Taking?  Authorizing Provider   losartan (COZAAR) 100 MG tablet Take 1/2 (one-half) tablet by mouth twice daily Yes Elena Montgomery MD   glipiZIDE (GLUCOTROL) 5 MG tablet Take 1/2 (one-half) tablet by mouth twice daily Yes Elena Montgomery MD   albuterol sulfate HFA (VENTOLIN HFA) 108 (90 Base) MCG/ACT inhaler Inhale 2 puffs into the lungs 4 times daily as needed for Wheezing Yes Kirk Zimmerman MD   tiotropium-olodaterol (STIOLTO RESPIMAT) 2.5-2.5 MCG/ACT AERS Inhale 2 puffs into the lungs daily Yes Juan Tay MD   famotidine (PEPCID) 40 MG tablet Take 1 tablet by mouth every evening Yes Elena Montgomery MD   amLODIPine (NORVASC) 2.5 MG tablet Take 1 tablet by mouth daily Yes Xiao Medina MD   carvedilol (COREG) 6.25 MG tablet Take 1 tablet by mouth 2 times daily Yes Xiao Medina MD   triamterene-hydroCHLOROthiazide (MAXZIDE-25) 37.5-25 MG per tablet Take 1/2 tablet in am Yes Mann Rodriguez MD   pravastatin (PRAVACHOL) 40 MG tablet TAKE ONE TABLET BY MOUTH ONCE DAILY Yes Mann Rodriguez MD   potassium chloride (KLOR-CON M) 10 MEQ extended release tablet Take 1 tablet by mouth daily Yes Mann Rodriguez MD   magnesium 30 MG tablet Take 30 mg by mouth 2 times daily Yes Historical Provider, MD   Calcium-Vitamin D 600-200 MG-UNIT TABS Take by mouth every morning (before breakfast)  Yes Historical Provider, MD   aspirin 81 MG EC tablet Take 81 mg by mouth daily. Yes Historical Provider, MD   Ascorbic Acid (VITAMIN C) 500 MG tablet Take 500 mg by mouth daily. Yes Historical Provider, MD        Review of Systems   Constitutional: Negative for activity change, appetite change, chills, diaphoresis and fatigue. HENT: Negative for congestion, dental problem, drooling, ear discharge, postnasal drip and rhinorrhea. Eyes: Negative for visual disturbance. Respiratory: Positive for shortness of breath. Negative for apnea, cough, chest tightness and wheezing. Gastrointestinal: Negative for abdominal distention, abdominal pain and anal bleeding. Endocrine: Negative for cold intolerance, heat intolerance and polydipsia. Genitourinary: Negative for difficulty urinating, dysuria, enuresis and flank pain. Musculoskeletal: Negative for arthralgias, back pain and gait problem. Allergic/Immunologic: Negative for environmental allergies. Neurological: Negative for dizziness, facial asymmetry, light-headedness and headaches. Vitals:    06/30/22 1157   BP: 118/80   Pulse: 54   SpO2: 99%     BMI Readings from Last 1 Encounters:   06/30/22 31.09 kg/m²     . ONOTRVS[51      Physical Exam        This note was generated used a voice recognition software. Errors in voice recognition may have occurred. An electronic signature was used to authenticate this note.     --Orville Del Toro MD

## 2022-07-08 DIAGNOSIS — R42 CHRONIC VERTIGO: ICD-10-CM

## 2022-07-08 DIAGNOSIS — I51.89 DIASTOLIC DYSFUNCTION: ICD-10-CM

## 2022-07-08 DIAGNOSIS — E55.9 VITAMIN D DEFICIENCY: ICD-10-CM

## 2022-07-08 DIAGNOSIS — E11.40 TYPE 2 DIABETES MELLITUS WITH DIABETIC NEUROPATHY, WITHOUT LONG-TERM CURRENT USE OF INSULIN (HCC): ICD-10-CM

## 2022-07-08 DIAGNOSIS — N18.31 STAGE 3A CHRONIC KIDNEY DISEASE (HCC): ICD-10-CM

## 2022-07-08 DIAGNOSIS — Z00.00 MEDICARE ANNUAL WELLNESS VISIT, SUBSEQUENT: ICD-10-CM

## 2022-07-08 DIAGNOSIS — R94.4 DECREASED CALCULATED GFR: ICD-10-CM

## 2022-07-08 DIAGNOSIS — J43.1 PANLOBULAR EMPHYSEMA (HCC): ICD-10-CM

## 2022-07-08 DIAGNOSIS — I10 ESSENTIAL HYPERTENSION: ICD-10-CM

## 2022-07-08 DIAGNOSIS — M81.6 LOCALIZED OSTEOPOROSIS WITHOUT CURRENT PATHOLOGICAL FRACTURE: ICD-10-CM

## 2022-07-08 DIAGNOSIS — E78.00 PURE HYPERCHOLESTEROLEMIA: ICD-10-CM

## 2022-07-08 LAB
ALBUMIN SERPL-MCNC: 3.7 G/DL (ref 3.5–5.2)
ALP BLD-CCNC: 77 U/L (ref 35–104)
ALT SERPL-CCNC: 7 U/L (ref 5–33)
ANION GAP SERPL CALCULATED.3IONS-SCNC: 10 MMOL/L (ref 7–19)
AST SERPL-CCNC: 14 U/L (ref 5–32)
BILIRUB SERPL-MCNC: 0.5 MG/DL (ref 0.2–1.2)
BUN BLDV-MCNC: 23 MG/DL (ref 8–23)
CALCIUM SERPL-MCNC: 9.6 MG/DL (ref 8.8–10.2)
CHLORIDE BLD-SCNC: 103 MMOL/L (ref 98–111)
CHOLESTEROL, TOTAL: 148 MG/DL (ref 160–199)
CO2: 27 MMOL/L (ref 22–29)
CREAT SERPL-MCNC: 1.2 MG/DL (ref 0.5–0.9)
GFR AFRICAN AMERICAN: 52
GFR NON-AFRICAN AMERICAN: 43
GLUCOSE BLD-MCNC: 116 MG/DL (ref 74–109)
HBA1C MFR BLD: 7.1 % (ref 4–6)
HDLC SERPL-MCNC: 44 MG/DL (ref 65–121)
LDL CHOLESTEROL CALCULATED: 78 MG/DL
POTASSIUM SERPL-SCNC: 3.7 MMOL/L (ref 3.5–5)
SODIUM BLD-SCNC: 140 MMOL/L (ref 136–145)
TOTAL PROTEIN: 7.4 G/DL (ref 6.6–8.7)
TRIGL SERPL-MCNC: 131 MG/DL (ref 0–149)
VITAMIN D 25-HYDROXY: 43.3 NG/ML

## 2022-07-13 ENCOUNTER — OFFICE VISIT (OUTPATIENT)
Dept: INTERNAL MEDICINE | Age: 82
End: 2022-07-13
Payer: MEDICARE

## 2022-07-13 VITALS
OXYGEN SATURATION: 96 % | HEART RATE: 83 BPM | WEIGHT: 192.38 LBS | BODY MASS INDEX: 31.05 KG/M2 | DIASTOLIC BLOOD PRESSURE: 70 MMHG | SYSTOLIC BLOOD PRESSURE: 128 MMHG

## 2022-07-13 DIAGNOSIS — E55.9 VITAMIN D DEFICIENCY: ICD-10-CM

## 2022-07-13 DIAGNOSIS — E11.40 TYPE 2 DIABETES MELLITUS WITH DIABETIC NEUROPATHY, WITHOUT LONG-TERM CURRENT USE OF INSULIN (HCC): Primary | ICD-10-CM

## 2022-07-13 DIAGNOSIS — I10 ESSENTIAL HYPERTENSION: ICD-10-CM

## 2022-07-13 DIAGNOSIS — R42 CHRONIC VERTIGO: ICD-10-CM

## 2022-07-13 DIAGNOSIS — I51.89 DIASTOLIC DYSFUNCTION: ICD-10-CM

## 2022-07-13 DIAGNOSIS — N18.31 STAGE 3A CHRONIC KIDNEY DISEASE (HCC): ICD-10-CM

## 2022-07-13 DIAGNOSIS — E78.00 PURE HYPERCHOLESTEROLEMIA: ICD-10-CM

## 2022-07-13 DIAGNOSIS — R94.4 DECREASED CALCULATED GFR: ICD-10-CM

## 2022-07-13 DIAGNOSIS — J43.1 PANLOBULAR EMPHYSEMA (HCC): ICD-10-CM

## 2022-07-13 PROCEDURE — 3051F HG A1C>EQUAL 7.0%<8.0%: CPT | Performed by: INTERNAL MEDICINE

## 2022-07-13 PROCEDURE — 1123F ACP DISCUSS/DSCN MKR DOCD: CPT | Performed by: INTERNAL MEDICINE

## 2022-07-13 PROCEDURE — 3023F SPIROM DOC REV: CPT | Performed by: INTERNAL MEDICINE

## 2022-07-13 PROCEDURE — 1036F TOBACCO NON-USER: CPT | Performed by: INTERNAL MEDICINE

## 2022-07-13 PROCEDURE — 1090F PRES/ABSN URINE INCON ASSESS: CPT | Performed by: INTERNAL MEDICINE

## 2022-07-13 PROCEDURE — 99214 OFFICE O/P EST MOD 30 MIN: CPT | Performed by: INTERNAL MEDICINE

## 2022-07-13 PROCEDURE — G8427 DOCREV CUR MEDS BY ELIG CLIN: HCPCS | Performed by: INTERNAL MEDICINE

## 2022-07-13 PROCEDURE — G8417 CALC BMI ABV UP PARAM F/U: HCPCS | Performed by: INTERNAL MEDICINE

## 2022-07-13 PROCEDURE — G8399 PT W/DXA RESULTS DOCUMENT: HCPCS | Performed by: INTERNAL MEDICINE

## 2022-07-13 RX ORDER — PRAVASTATIN SODIUM 40 MG
TABLET ORAL
Qty: 90 TABLET | Refills: 1 | Status: SHIPPED | OUTPATIENT
Start: 2022-07-13

## 2022-07-13 RX ORDER — GLIPIZIDE 5 MG/1
5 TABLET ORAL
Qty: 90 TABLET | Refills: 1 | Status: SHIPPED | OUTPATIENT
Start: 2022-07-13

## 2022-07-13 RX ORDER — FAMOTIDINE 40 MG/1
40 TABLET, FILM COATED ORAL EVERY EVENING
Qty: 30 TABLET | Refills: 3 | Status: SHIPPED | OUTPATIENT
Start: 2022-07-13

## 2022-07-13 ASSESSMENT — ENCOUNTER SYMPTOMS
ABDOMINAL PAIN: 0
COUGH: 0
WHEEZING: 0
BACK PAIN: 1
CHEST TIGHTNESS: 0
CONSTIPATION: 0
SORE THROAT: 0

## 2022-07-13 NOTE — PROGRESS NOTES
Chief Complaint   Patient presents with    Follow-up     States that she doesn't have much energy lately      History of presenting illness:  Arnulfo Mccray is [de-identified] y.o. female who presents today for follow up on her chronic medical conditions as noted below.       Patient Active Problem List    Diagnosis Date Noted    Pulmonary fibrosis ([facility] [address] 06/02/2022     Priority: Medium    Restrictive lung disease 06/02/2022     Priority: Medium    Hypertensive renal disease 05/11/2022     Priority: Medium    Age-related cataract 12/27/2019     Priority: Medium    Presence of intraocular lens 12/27/2019     Priority: Medium    Chronic kidney disease, stage 3b ([address] 03/09/2022    READ (dyspnea on exertion) 49/75/4265    Diastolic dysfunction 29/69/1451    Type 2 diabetes mellitus with diabetic neuropathy 07/26/2021    Essential hypertension 02/11/2019    Elevated TSH 08/07/2018    Vitamin D deficiency 09/10/2017    Pure hypercholesterolemia 09/10/2017    Type 2 diabetes mellitus without complication, without long-term current use of insulin ([address] 09/10/2017    Localized osteoporosis without current pathological fracture 09/10/2017     Overview Note:     2017 hip neck -2.6; lspine -1/8  8/2020 hip neck -2.6/ L spine L1 -1.6      Generalized anxiety disorder 09/10/2017    Former smoker 09/10/2017    Numbness 04/07/2017    Imbalance 04/07/2017    Estrogen receptor negative tumor status 08/06/2008     Past Medical History:   Diagnosis Date    Arthritis     Back pain     Breast cancer ([address]     left 2008    Chronic kidney disease     Concussion     History of therapeutic radiation     Hx antineoplastic chemo     Hyperlipidemia     Hypertension     Osteoporosis     Pneumonia     Type II or unspecified type diabetes mellitus without mention of complication, not stated as uncontrolled     diet controlled    Varicose veins     Wears glasses       Past Surgical History:   Procedure Laterality Date  BREAST BIOPSY  08/06/2008    U/S Guided Mammotome Biopsy Left Breast x 2  infiltrating ductal carcinoma, grade III, ER/NM negative    BREAST BIOPSY  08/05/2009    Stereotactic biopsy right breast  No evidence of malignancy    BREAST BIOPSY Left 08/2015    BREAST LUMPECTOMY      CATARACT REMOVAL Right 2021    COLONOSCOPY      DIAGNOSTIC CARDIAC CATH LAB PROCEDURE      EYE SURGERY      cataract removal    MASTECTOMY, PARTIAL  08/28/2008     Left partial mastectomy and left sentinel node biopsy  2.6 cm infiltrating ductal carcinoma, grade III, 0/2 nodes positive, immunohistochemistry negative for micrometastasis     Current Outpatient Medications   Medication Sig Dispense Refill    glipiZIDE (GLUCOTROL) 5 MG tablet Take 1 tablet by mouth 2 times daily (before meals) 90 tablet 1    pravastatin (PRAVACHOL) 40 MG tablet TAKE ONE TABLET BY MOUTH ONCE DAILY 90 tablet 1    famotidine (PEPCID) 40 MG tablet Take 1 tablet by mouth every evening 30 tablet 3    losartan (COZAAR) 100 MG tablet Take 1/2 (one-half) tablet by mouth twice daily 90 tablet 1    albuterol sulfate HFA (VENTOLIN HFA) 108 (90 Base) MCG/ACT inhaler Inhale 2 puffs into the lungs 4 times daily as needed for Wheezing 18 g 5    tiotropium-olodaterol (STIOLTO RESPIMAT) 2.5-2.5 MCG/ACT AERS Inhale 2 puffs into the lungs daily 1 each 11    amLODIPine (NORVASC) 2.5 MG tablet Take 1 tablet by mouth daily 90 tablet 3    carvedilol (COREG) 6.25 MG tablet Take 1 tablet by mouth 2 times daily 180 tablet 3    triamterene-hydroCHLOROthiazide (MAXZIDE-25) 37.5-25 MG per tablet Take 1/2 tablet in am 30 tablet 3    potassium chloride (KLOR-CON M) 10 MEQ extended release tablet Take 1 tablet by mouth daily 90 tablet 1    magnesium 30 MG tablet Take 30 mg by mouth 2 times daily      Calcium-Vitamin D 600-200 MG-UNIT TABS Take by mouth every morning (before breakfast)       aspirin 81 MG EC tablet Take 81 mg by mouth daily.         Ascorbic Acid (VITAMIN C) 500 MG tablet Take 500 mg by mouth daily. No current facility-administered medications for this visit. Allergies   Allergen Reactions    Adhesive Tape      Latex Tape  Other reaction(s): Unknown    Poison Ivy Extract      Social History     Tobacco Use    Smoking status: Former Smoker     Packs/day: 1.00     Years: 25.00     Pack years: 25.00     Types: Pipe, Cigarettes, Cigars     Quit date: 1979     Years since quittin.4    Smokeless tobacco: Never Used   Substance Use Topics    Alcohol use: No     Comment: occ      Family History   Problem Relation Age of Onset    Heart Disease Father     Diabetes Father     Hypertension Mother        Review of Systems   Constitutional: Positive for fatigue. Negative for chills and fever. HENT: Negative for congestion, ear pain, nosebleeds, postnasal drip and sore throat. Respiratory: Negative for cough, chest tightness and wheezing. Cardiovascular: Negative for chest pain, palpitations and leg swelling. Gastrointestinal: Negative for abdominal pain and constipation. Genitourinary: Negative for dysuria and urgency. Musculoskeletal: Positive for arthralgias and back pain. Skin: Negative for rash. Neurological: Negative for dizziness and headaches. Psychiatric/Behavioral: Positive for sleep disturbance. The patient is nervous/anxious. Vitals:    22 1321   BP: 128/70   Pulse: 83   SpO2: 96%   Weight: 192 lb 6 oz (87.3 kg)     Body mass index is 31.05 kg/m². Physical Exam  Constitutional:       Appearance: She is well-developed. HENT:      Right Ear: External ear normal.      Left Ear: External ear normal.      Mouth/Throat:      Pharynx: No oropharyngeal exudate. Eyes:      Conjunctiva/sclera: Conjunctivae normal.      Pupils: Pupils are equal, round, and reactive to light. Neck:      Thyroid: No thyromegaly. Vascular: No JVD. Cardiovascular:      Rate and Rhythm: Normal rate.       Heart sounds: Normal heart sounds. No murmur heard. Pulmonary:      Effort: No respiratory distress. Breath sounds: Rhonchi and rales present. No wheezing. Chest:      Chest wall: No tenderness. Abdominal:      General: Bowel sounds are normal.      Palpations: Abdomen is soft. Musculoskeletal:      Cervical back: Neck supple. Lymphadenopathy:      Cervical: No cervical adenopathy. Skin:     Findings: No rash. Lab Review   Orders Only on 07/08/2022   Component Date Value    Vit D, 25-Hydroxy 07/08/2022 43.3     Cholesterol, Total 07/08/2022 148*    Triglycerides 07/08/2022 131     HDL 07/08/2022 44*    LDL Calculated 07/08/2022 78     Sodium 07/08/2022 140     Potassium 07/08/2022 3.7     Chloride 07/08/2022 103     CO2 07/08/2022 27     Anion Gap 07/08/2022 10     Glucose 07/08/2022 116*    BUN 07/08/2022 23     CREATININE 07/08/2022 1.2*    GFR Non- 07/08/2022 43*    GFR  07/08/2022 52*    Calcium 07/08/2022 9.6     Total Protein 07/08/2022 7.4     Albumin 07/08/2022 3.7     Total Bilirubin 07/08/2022 0.5     Alkaline Phosphatase 07/08/2022 77     ALT 07/08/2022 7     AST 07/08/2022 14     Hemoglobin A1C 07/08/2022 7.1*   Orders Only on 06/02/2022   Component Date Value    Rheumatoid Factor 06/02/2022 <10.0     AGUS Ab, IgG MICHAEL 06/02/2022 None Detected     Sed Rate 06/02/2022 50*           ASSESSMENT/PLAN:     CKD stage III  Declined GFR -43 in 7/2022  was  53 in 3/2022   INCreased fluid intake is recommended      HTN  Blood pressure readings reviewed  After reviewing today's lab I need to make following changes to her current regimen  1. Losartan 100 mg half tablet, continue taking half tablet twice daily  2. Resume Maxide 37.5/20 5/2 tablet daily in a.m.  3. Add amlodipine 2.5 mg p.o. daily  4.  Blood pressure readings to me in 1 to 2 weeks         Osteoporosis  2017 hip neck -2.6; lspine -1/8  8/2020 hip neck -2.6/ L spine L1 -1.6  Lab results

## 2022-08-24 ENCOUNTER — INFUSION (OUTPATIENT)
Dept: ONCOLOGY | Facility: CLINIC | Age: 82
End: 2022-08-24

## 2022-08-24 DIAGNOSIS — Z45.2 ENCOUNTER FOR CARE RELATED TO VASCULAR ACCESS PORT: Primary | ICD-10-CM

## 2022-08-24 DIAGNOSIS — C50.112 MALIGNANT NEOPLASM OF CENTRAL PORTION OF LEFT FEMALE BREAST, UNSPECIFIED ESTROGEN RECEPTOR STATUS: ICD-10-CM

## 2022-08-24 RX ORDER — HEPARIN SODIUM (PORCINE) LOCK FLUSH IV SOLN 100 UNIT/ML 100 UNIT/ML
500 SOLUTION INTRAVENOUS AS NEEDED
Status: DISCONTINUED | OUTPATIENT
Start: 2022-08-24 | End: 2022-08-24 | Stop reason: HOSPADM

## 2022-08-24 RX ORDER — SODIUM CHLORIDE 0.9 % (FLUSH) 0.9 %
10 SYRINGE (ML) INJECTION AS NEEDED
Status: DISCONTINUED | OUTPATIENT
Start: 2022-08-24 | End: 2022-08-24 | Stop reason: HOSPADM

## 2022-08-24 RX ORDER — HEPARIN SODIUM (PORCINE) LOCK FLUSH IV SOLN 100 UNIT/ML 100 UNIT/ML
500 SOLUTION INTRAVENOUS AS NEEDED
Status: CANCELLED | OUTPATIENT
Start: 2022-08-24

## 2022-08-24 RX ORDER — SODIUM CHLORIDE 0.9 % (FLUSH) 0.9 %
10 SYRINGE (ML) INJECTION AS NEEDED
Status: CANCELLED | OUTPATIENT
Start: 2022-08-24

## 2022-08-24 RX ADMIN — Medication 10 ML: at 11:12

## 2022-08-24 RX ADMIN — HEPARIN SODIUM (PORCINE) LOCK FLUSH IV SOLN 100 UNIT/ML 500 UNITS: 100 SOLUTION at 11:13

## 2022-09-20 ENCOUNTER — HOSPITAL ENCOUNTER (OUTPATIENT)
Dept: WOMENS IMAGING | Age: 82
Discharge: HOME | End: 2022-09-20
Payer: MEDICARE

## 2022-09-20 ENCOUNTER — OFFICE VISIT (OUTPATIENT)
Dept: SURGERY | Age: 82
End: 2022-09-20
Payer: MEDICARE

## 2022-09-20 VITALS
WEIGHT: 192 LBS | SYSTOLIC BLOOD PRESSURE: 128 MMHG | DIASTOLIC BLOOD PRESSURE: 72 MMHG | TEMPERATURE: 96.6 F | BODY MASS INDEX: 30.86 KG/M2 | HEART RATE: 72 BPM | HEIGHT: 66 IN

## 2022-09-20 DIAGNOSIS — Z12.31 VISIT FOR SCREENING MAMMOGRAM: ICD-10-CM

## 2022-09-20 DIAGNOSIS — Z85.3 PERSONAL HISTORY OF MALIGNANT NEOPLASM OF BREAST: Primary | ICD-10-CM

## 2022-09-20 PROCEDURE — G8399 PT W/DXA RESULTS DOCUMENT: HCPCS | Performed by: PHYSICIAN ASSISTANT

## 2022-09-20 PROCEDURE — G8417 CALC BMI ABV UP PARAM F/U: HCPCS | Performed by: PHYSICIAN ASSISTANT

## 2022-09-20 PROCEDURE — 1123F ACP DISCUSS/DSCN MKR DOCD: CPT | Performed by: PHYSICIAN ASSISTANT

## 2022-09-20 PROCEDURE — 77063 BREAST TOMOSYNTHESIS BI: CPT

## 2022-09-20 PROCEDURE — G8427 DOCREV CUR MEDS BY ELIG CLIN: HCPCS | Performed by: PHYSICIAN ASSISTANT

## 2022-09-20 PROCEDURE — 1090F PRES/ABSN URINE INCON ASSESS: CPT | Performed by: PHYSICIAN ASSISTANT

## 2022-09-20 PROCEDURE — 99213 OFFICE O/P EST LOW 20 MIN: CPT | Performed by: PHYSICIAN ASSISTANT

## 2022-09-20 PROCEDURE — 1036F TOBACCO NON-USER: CPT | Performed by: PHYSICIAN ASSISTANT

## 2022-09-20 NOTE — PROGRESS NOTES
HISTORY OF PRESENT ILLNESS:   Mrs. Jessy Chaney is an [de-identified]year old white female who is status post 8- Left partial mastectomy and left sentinel node biopsy 2.6 cm infiltrating ductal carcinoma, grade III, ER/WA negative., 0/2 nodes positive, immunohistochemistry negative for micrometastises. She denies weight loss or any other systemic symptoms. 9/22/2022 5:05 PM   SYDNEE DIGITAL SCREEN W OR WO CAD BILATERAL   SCREENING MAMMOGRAPHY:  Today's examination consists of standard   2-D/3-D combo craniocaudal and oblique views of both breasts. Comparison made with the previous mammogram since August 18, 2016. There are scattered fibroglandular densities. There is redemonstration   of stable lumpectomy scarring in the left breast with associated skin   retraction and thickening in the upper outer quadrant. Scattered   benign calcifications are present. No suspicious findings seen in   either breast. A MediPort is seen in the left superior MLO view. The   mammograms were evaluated using computer aided detection. Impression   Impression:   Bilateral breast, BI-RADS 2, benign. Recommend annual screening   mammography. BREAST EXAM:  The left lumpectomy incision is well healed. There are appropriate post operative and post radiation changes on the left. There is some thickening in the left breast in the area of her lumpectomy. The right breast exam is normal.      IMPRESSION:    Personal history of left breast cancer  Abnormal right mammogram    PLAN:  I will see her in 1 year with bilateral mammograms. She will call sooner with any concerns. 20 minutes spent, which includes face to face with patient, record review, evaluation, planning, and education.

## 2022-10-20 DIAGNOSIS — R94.4 DECREASED CALCULATED GFR: ICD-10-CM

## 2022-10-20 DIAGNOSIS — E11.40 TYPE 2 DIABETES MELLITUS WITH DIABETIC NEUROPATHY, WITHOUT LONG-TERM CURRENT USE OF INSULIN (HCC): ICD-10-CM

## 2022-10-20 DIAGNOSIS — I10 ESSENTIAL HYPERTENSION: ICD-10-CM

## 2022-10-20 DIAGNOSIS — E78.00 PURE HYPERCHOLESTEROLEMIA: ICD-10-CM

## 2022-10-20 DIAGNOSIS — E55.9 VITAMIN D DEFICIENCY: ICD-10-CM

## 2022-10-20 DIAGNOSIS — I51.89 DIASTOLIC DYSFUNCTION: ICD-10-CM

## 2022-10-20 DIAGNOSIS — N18.31 STAGE 3A CHRONIC KIDNEY DISEASE (HCC): ICD-10-CM

## 2022-10-20 LAB
ALBUMIN SERPL-MCNC: 4 G/DL (ref 3.5–5.2)
ALP BLD-CCNC: 88 U/L (ref 35–104)
ALT SERPL-CCNC: 10 U/L (ref 5–33)
ANION GAP SERPL CALCULATED.3IONS-SCNC: 11 MMOL/L (ref 7–19)
AST SERPL-CCNC: 14 U/L (ref 5–32)
BILIRUB SERPL-MCNC: 0.5 MG/DL (ref 0.2–1.2)
BUN BLDV-MCNC: 22 MG/DL (ref 8–23)
CALCIUM SERPL-MCNC: 9.8 MG/DL (ref 8.8–10.2)
CHLORIDE BLD-SCNC: 103 MMOL/L (ref 98–111)
CHOLESTEROL, TOTAL: 173 MG/DL (ref 160–199)
CO2: 26 MMOL/L (ref 22–29)
CREAT SERPL-MCNC: 1.2 MG/DL (ref 0.5–0.9)
GFR SERPL CREATININE-BSD FRML MDRD: 45 ML/MIN/{1.73_M2}
GLUCOSE BLD-MCNC: 138 MG/DL (ref 74–109)
HBA1C MFR BLD: 6.8 % (ref 4–6)
HDLC SERPL-MCNC: 59 MG/DL (ref 65–121)
LDL CHOLESTEROL CALCULATED: 93 MG/DL
POTASSIUM SERPL-SCNC: 4.3 MMOL/L (ref 3.5–5)
SODIUM BLD-SCNC: 140 MMOL/L (ref 136–145)
TOTAL PROTEIN: 7.7 G/DL (ref 6.6–8.7)
TRIGL SERPL-MCNC: 106 MG/DL (ref 0–149)
TSH SERPL DL<=0.05 MIU/L-ACNC: 4.48 UIU/ML (ref 0.27–4.2)

## 2022-10-26 ENCOUNTER — INFUSION (OUTPATIENT)
Dept: ONCOLOGY | Facility: CLINIC | Age: 82
End: 2022-10-26

## 2022-10-26 ENCOUNTER — OFFICE VISIT (OUTPATIENT)
Dept: INTERNAL MEDICINE | Age: 82
End: 2022-10-26
Payer: MEDICARE

## 2022-10-26 VITALS
HEIGHT: 66 IN | HEART RATE: 75 BPM | BODY MASS INDEX: 30.86 KG/M2 | DIASTOLIC BLOOD PRESSURE: 80 MMHG | SYSTOLIC BLOOD PRESSURE: 138 MMHG | OXYGEN SATURATION: 97 % | WEIGHT: 192 LBS

## 2022-10-26 DIAGNOSIS — N18.31 STAGE 3A CHRONIC KIDNEY DISEASE (HCC): ICD-10-CM

## 2022-10-26 DIAGNOSIS — L98.9 SKIN LESION OF FACE: ICD-10-CM

## 2022-10-26 DIAGNOSIS — E55.9 VITAMIN D DEFICIENCY: ICD-10-CM

## 2022-10-26 DIAGNOSIS — Z23 NEED FOR IMMUNIZATION AGAINST INFLUENZA: ICD-10-CM

## 2022-10-26 DIAGNOSIS — E78.00 PURE HYPERCHOLESTEROLEMIA: ICD-10-CM

## 2022-10-26 DIAGNOSIS — I10 ESSENTIAL HYPERTENSION: ICD-10-CM

## 2022-10-26 DIAGNOSIS — C50.112 MALIGNANT NEOPLASM OF CENTRAL PORTION OF LEFT FEMALE BREAST, UNSPECIFIED ESTROGEN RECEPTOR STATUS: ICD-10-CM

## 2022-10-26 DIAGNOSIS — I51.89 DIASTOLIC DYSFUNCTION: ICD-10-CM

## 2022-10-26 DIAGNOSIS — Z45.2 ENCOUNTER FOR CARE RELATED TO VASCULAR ACCESS PORT: Primary | ICD-10-CM

## 2022-10-26 DIAGNOSIS — E11.40 TYPE 2 DIABETES MELLITUS WITH DIABETIC NEUROPATHY, WITHOUT LONG-TERM CURRENT USE OF INSULIN (HCC): Primary | ICD-10-CM

## 2022-10-26 PROCEDURE — 1090F PRES/ABSN URINE INCON ASSESS: CPT | Performed by: INTERNAL MEDICINE

## 2022-10-26 PROCEDURE — 1123F ACP DISCUSS/DSCN MKR DOCD: CPT | Performed by: INTERNAL MEDICINE

## 2022-10-26 PROCEDURE — 3044F HG A1C LEVEL LT 7.0%: CPT | Performed by: INTERNAL MEDICINE

## 2022-10-26 PROCEDURE — 90694 VACC AIIV4 NO PRSRV 0.5ML IM: CPT | Performed by: INTERNAL MEDICINE

## 2022-10-26 PROCEDURE — G0008 ADMIN INFLUENZA VIRUS VAC: HCPCS | Performed by: INTERNAL MEDICINE

## 2022-10-26 PROCEDURE — 3074F SYST BP LT 130 MM HG: CPT | Performed by: INTERNAL MEDICINE

## 2022-10-26 PROCEDURE — G8399 PT W/DXA RESULTS DOCUMENT: HCPCS | Performed by: INTERNAL MEDICINE

## 2022-10-26 PROCEDURE — G8484 FLU IMMUNIZE NO ADMIN: HCPCS | Performed by: INTERNAL MEDICINE

## 2022-10-26 PROCEDURE — G8427 DOCREV CUR MEDS BY ELIG CLIN: HCPCS | Performed by: INTERNAL MEDICINE

## 2022-10-26 PROCEDURE — 1036F TOBACCO NON-USER: CPT | Performed by: INTERNAL MEDICINE

## 2022-10-26 PROCEDURE — 99214 OFFICE O/P EST MOD 30 MIN: CPT | Performed by: INTERNAL MEDICINE

## 2022-10-26 PROCEDURE — 3078F DIAST BP <80 MM HG: CPT | Performed by: INTERNAL MEDICINE

## 2022-10-26 PROCEDURE — G8417 CALC BMI ABV UP PARAM F/U: HCPCS | Performed by: INTERNAL MEDICINE

## 2022-10-26 RX ORDER — SODIUM CHLORIDE 0.9 % (FLUSH) 0.9 %
10 SYRINGE (ML) INJECTION AS NEEDED
Status: CANCELLED | OUTPATIENT
Start: 2022-10-26

## 2022-10-26 RX ORDER — HEPARIN SODIUM (PORCINE) LOCK FLUSH IV SOLN 100 UNIT/ML 100 UNIT/ML
500 SOLUTION INTRAVENOUS AS NEEDED
Status: DISCONTINUED | OUTPATIENT
Start: 2022-10-26 | End: 2022-10-26 | Stop reason: HOSPADM

## 2022-10-26 RX ORDER — HEPARIN SODIUM (PORCINE) LOCK FLUSH IV SOLN 100 UNIT/ML 100 UNIT/ML
500 SOLUTION INTRAVENOUS AS NEEDED
Status: CANCELLED | OUTPATIENT
Start: 2022-10-26

## 2022-10-26 RX ORDER — SODIUM CHLORIDE 0.9 % (FLUSH) 0.9 %
10 SYRINGE (ML) INJECTION AS NEEDED
Status: DISCONTINUED | OUTPATIENT
Start: 2022-10-26 | End: 2022-10-26 | Stop reason: HOSPADM

## 2022-10-26 RX ADMIN — Medication 10 ML: at 11:46

## 2022-10-26 RX ADMIN — HEPARIN SODIUM (PORCINE) LOCK FLUSH IV SOLN 100 UNIT/ML 500 UNITS: 100 SOLUTION at 11:50

## 2022-10-26 SDOH — ECONOMIC STABILITY: FOOD INSECURITY: WITHIN THE PAST 12 MONTHS, YOU WORRIED THAT YOUR FOOD WOULD RUN OUT BEFORE YOU GOT MONEY TO BUY MORE.: NEVER TRUE

## 2022-10-26 SDOH — ECONOMIC STABILITY: FOOD INSECURITY: WITHIN THE PAST 12 MONTHS, THE FOOD YOU BOUGHT JUST DIDN'T LAST AND YOU DIDN'T HAVE MONEY TO GET MORE.: NEVER TRUE

## 2022-10-26 ASSESSMENT — ENCOUNTER SYMPTOMS
COUGH: 0
ABDOMINAL PAIN: 0
CONSTIPATION: 0
BACK PAIN: 1
CHEST TIGHTNESS: 0
SORE THROAT: 0
WHEEZING: 0

## 2022-10-26 ASSESSMENT — SOCIAL DETERMINANTS OF HEALTH (SDOH): HOW HARD IS IT FOR YOU TO PAY FOR THE VERY BASICS LIKE FOOD, HOUSING, MEDICAL CARE, AND HEATING?: NOT HARD AT ALL

## 2022-10-26 NOTE — PROGRESS NOTES
Chief Complaint   Patient presents with    3 Month Follow-Up     History of presenting illness:  Jayro Funk is a81 y.o. female who presents today for follow up on her chronic medical conditions as noted below.     Patient Active Problem List    Diagnosis Date Noted    Pulmonary fibrosis ([address] 06/02/2022     Priority: Medium    Restrictive lung disease 06/02/2022     Priority: Medium    Hypertensive renal disease 05/11/2022     Priority: Medium    Age-related cataract 12/27/2019     Priority: Medium    Presence of intraocular lens 12/27/2019     Priority: Medium    Chronic kidney disease, stage 3b ([address] 03/09/2022    READ (dyspnea on exertion) 29/31/5592    Diastolic dysfunction 36/17/2663    Type 2 diabetes mellitus with diabetic neuropathy 07/26/2021    Essential hypertension 02/11/2019    Elevated TSH 08/07/2018    Vitamin D deficiency 09/10/2017    Pure hypercholesterolemia 09/10/2017    Type 2 diabetes mellitus without complication, without long-term current use of insulin ([address] 09/10/2017    Localized osteoporosis without current pathological fracture 09/10/2017     Overview Note:     2017 hip neck -2.6; lspine -1/8  8/2020 hip neck -2.6/ L spine L1 -1.6      Generalized anxiety disorder 09/10/2017    Former smoker 09/10/2017    Numbness 04/07/2017    Imbalance 04/07/2017    Estrogen receptor negative tumor status 08/06/2008     Past Medical History:   Diagnosis Date    Arthritis     Back pain     Breast cancer ([address]     left 2008    Chronic kidney disease     Concussion     History of therapeutic radiation     Hx antineoplastic chemo     Hyperlipidemia     Hypertension     Osteoporosis     Pneumonia     Type II or unspecified type diabetes mellitus without mention of complication, not stated as uncontrolled     diet controlled    Varicose veins     Wears glasses       Past Surgical History:   Procedure Laterality Date    BREAST BIOPSY  08/06/2008    U/S Guided Mammotome Biopsy Left Breast x 2 infiltrating ductal carcinoma, grade III, ER/CO negative    BREAST BIOPSY  08/05/2009    Stereotactic biopsy right breast  No evidence of malignancy    BREAST BIOPSY Left 08/2015    BREAST LUMPECTOMY      CATARACT REMOVAL Right 2021    COLONOSCOPY      DIAGNOSTIC CARDIAC CATH LAB PROCEDURE      EYE SURGERY      cataract removal    MASTECTOMY, PARTIAL  08/28/2008     Left partial mastectomy and left sentinel node biopsy  2.6 cm infiltrating ductal carcinoma, grade III, 0/2 nodes positive, immunohistochemistry negative for micrometastasis     Current Outpatient Medications   Medication Sig Dispense Refill    glipiZIDE (GLUCOTROL) 5 MG tablet Take 1 tablet by mouth 2 times daily (before meals) 90 tablet 1    pravastatin (PRAVACHOL) 40 MG tablet TAKE ONE TABLET BY MOUTH ONCE DAILY 90 tablet 1    famotidine (PEPCID) 40 MG tablet Take 1 tablet by mouth every evening 30 tablet 3    losartan (COZAAR) 100 MG tablet Take 1/2 (one-half) tablet by mouth twice daily 90 tablet 1    albuterol sulfate HFA (VENTOLIN HFA) 108 (90 Base) MCG/ACT inhaler Inhale 2 puffs into the lungs 4 times daily as needed for Wheezing 18 g 5    tiotropium-olodaterol (STIOLTO RESPIMAT) 2.5-2.5 MCG/ACT AERS Inhale 2 puffs into the lungs daily 1 each 11    amLODIPine (NORVASC) 2.5 MG tablet Take 1 tablet by mouth daily 90 tablet 3    carvedilol (COREG) 6.25 MG tablet Take 1 tablet by mouth 2 times daily 180 tablet 3    triamterene-hydroCHLOROthiazide (MAXZIDE-25) 37.5-25 MG per tablet Take 1/2 tablet in am 30 tablet 3    potassium chloride (KLOR-CON M) 10 MEQ extended release tablet Take 1 tablet by mouth daily 90 tablet 1    magnesium 30 MG tablet Take 30 mg by mouth 2 times daily      Calcium-Vitamin D 600-200 MG-UNIT TABS Take by mouth every morning (before breakfast)       aspirin 81 MG EC tablet Take 81 mg by mouth daily. Ascorbic Acid (VITAMIN C) 500 MG tablet Take 500 mg by mouth daily.          No current facility-administered medications for this visit. Allergies   Allergen Reactions    Adhesive Tape      Latex Tape  Other reaction(s): Unknown    Poison Susie Extract      Social History     Tobacco Use    Smoking status: Former     Packs/day: 1.00     Years: 25.00     Pack years: 25.00     Types: Pipe, Cigarettes, Cigars     Quit date: 1979     Years since quittin.7    Smokeless tobacco: Never   Substance Use Topics    Alcohol use: No     Comment: occ      Family History   Problem Relation Age of Onset    Heart Disease Father     Diabetes Father     Hypertension Mother        Review of Systems   Constitutional:  Positive for fatigue. Negative for chills and fever. HENT:  Negative for congestion, ear pain, nosebleeds, postnasal drip and sore throat. Respiratory:  Negative for cough, chest tightness and wheezing. Cardiovascular:  Negative for chest pain, palpitations and leg swelling. Gastrointestinal:  Negative for abdominal pain and constipation. Genitourinary:  Negative for dysuria and urgency. Musculoskeletal:  Positive for arthralgias and back pain. Skin:  Negative for rash. Neurological:  Negative for dizziness and headaches. Psychiatric/Behavioral: Negative. Vitals:    10/26/22 1313 10/26/22 1337   BP: (!) 158/76 138/80   Pulse: 75    SpO2: 97%    Weight: 192 lb (87.1 kg)    Height: 5' 6\" (1.676 m)      Body mass index is 30.99 kg/m². Physical Exam  Constitutional:       Appearance: She is well-developed. HENT:      Right Ear: External ear normal.      Left Ear: External ear normal.      Mouth/Throat:      Pharynx: No oropharyngeal exudate. Eyes:      Conjunctiva/sclera: Conjunctivae normal.      Pupils: Pupils are equal, round, and reactive to light. Neck:      Thyroid: No thyromegaly. Vascular: No JVD. Cardiovascular:      Rate and Rhythm: Normal rate. Heart sounds: Normal heart sounds. No murmur heard. Pulmonary:      Effort: No respiratory distress.       Breath sounds: Normal breath sounds. No wheezing or rales. Chest:      Chest wall: No tenderness. Abdominal:      General: Bowel sounds are normal.      Palpations: Abdomen is soft. Musculoskeletal:      Cervical back: Neck supple. Comments: LEFT KNEE SWELLING   Lymphadenopathy:      Cervical: No cervical adenopathy. Skin:     Findings: No rash. Lab Review   Orders Only on 10/20/2022   Component Date Value    TSH 10/20/2022 4.480 (A)     Cholesterol, Total 10/20/2022 173     Triglycerides 10/20/2022 106     HDL 10/20/2022 59 (A)     LDL Calculated 10/20/2022 93     Sodium 10/20/2022 140     Potassium 10/20/2022 4.3     Chloride 10/20/2022 103     CO2 10/20/2022 26     Anion Gap 10/20/2022 11     Glucose 10/20/2022 138 (A)     BUN 10/20/2022 22     Creatinine 10/20/2022 1.2 (A)     Est, Glom Filt Rate 10/20/2022 45 (A)     Calcium 10/20/2022 9.8     Total Protein 10/20/2022 7.7     Albumin 10/20/2022 4.0     Total Bilirubin 10/20/2022 0.5     Alkaline Phosphatase 10/20/2022 88     ALT 10/20/2022 10     AST 10/20/2022 14     Hemoglobin A1C 10/20/2022 6.8 (A)            ASSESSMENT/PLAN:    CKD stage III  Declined GFR -45 in 10/2022  Was 43 in 7/2022  was  53 in 3/2022   INCreased fluid intake is recommended      HTN  Blood pressure readings reviewed  After reviewing today's lab I need to make following changes to her current regimen  1. Losartan 100 mg half tablet, continue taking half tablet twice daily  2. Resume Maxide 37.5/20 5/2 tablet daily in a.m.  3. Add amlodipine 2.5 mg p.o. daily  4.  Blood pressure readings to me in 1 to 2 weeks         Osteoporosis  2017 hip neck -2.6; lspine -1/8  8/2020 hip neck -2.6/ L spine L1 -1.6  Lab results discussed with patient  Vitamin D deficiency-   her vitamin D level 43  RX 2000 iu vit d daily        Pure hypercholesterolemia-  Lab results reviewed with patient  Liver functions normal  LDL 78 in 7/2022   RX pravastatin 40 mg daily  Healthy, mostly fiber rich nonstarchy plant-based diet recommended  Recommend to decrease intake of processed foods, simple carbohydrates and animal-based products that high in saturated fats        Type 2 diabetes mellitus without complication, without long-term current use of insulin (HCC)-lab results reviewed and discussed with patient  Hemoglobin A1c 6.8   Diet  Weight loss  RX glipizide  2.5 twice daily  Close follow-up in 3 months     Generalized anxiety disorder- her anxiety issues are daily but she does not want to take rx     GERD symptoms  rx  Pepcid ( off omeprazole per nephro recommendations)  Rx Pepcid 40 mg p.o. sent to the pharmacy     Left knee pain  Refuses ortho evaluation      Chronic vertigo for several years   Recommend vestibular rehabilitation exercises, instructions to the patient  Meclizine 12.5 as needed    Cystic skin lesion left forehead  Procedure dermatology referral  Orders placed      Emphysema  Patient now follows with pulmonology  I have independently reviewed [facility] pulmonology notes and discussed with patient    Need for immunization against influenza  -     Influenza, FLUAD, (age 72 y+), IM, Preservative Free, 0.5 mL              Orders Placed This Encounter   Procedures    Influenza, FLUAD, (age 72 y+), IM, Preservative Free, 0.5 mL    CBC with Auto Differential    Comprehensive Metabolic Panel    Hemoglobin A1C    Lipid Panel    Microalbumin / Creatinine Urine Ratio    TSH    Urinalysis     New Prescriptions    No medications on file         Return in about 18 weeks (around 3/1/2023) for Annual Physical.   There are no Patient Instructions on file for this visit. EMR Dragon/transcription disclaimer:Significant part of this  encounter note is electronic transcription/translationof spoken language to printed text. The electronic translation of spoken language may be erroneous, or at times, nonsensical words or phrases may be inadvertently transcribed.  Although I have reviewed the note for sucherrors, some may still exist.

## 2022-10-31 ENCOUNTER — OFFICE VISIT (OUTPATIENT)
Dept: CARDIOLOGY CLINIC | Age: 82
End: 2022-10-31
Payer: MEDICARE

## 2022-10-31 VITALS
HEART RATE: 63 BPM | WEIGHT: 194 LBS | SYSTOLIC BLOOD PRESSURE: 134 MMHG | DIASTOLIC BLOOD PRESSURE: 74 MMHG | HEIGHT: 66 IN | BODY MASS INDEX: 31.18 KG/M2

## 2022-10-31 DIAGNOSIS — R06.02 SOB (SHORTNESS OF BREATH): Primary | ICD-10-CM

## 2022-10-31 DIAGNOSIS — I10 ESSENTIAL HYPERTENSION: ICD-10-CM

## 2022-10-31 PROCEDURE — 1123F ACP DISCUSS/DSCN MKR DOCD: CPT | Performed by: INTERNAL MEDICINE

## 2022-10-31 PROCEDURE — G8399 PT W/DXA RESULTS DOCUMENT: HCPCS | Performed by: INTERNAL MEDICINE

## 2022-10-31 PROCEDURE — G8484 FLU IMMUNIZE NO ADMIN: HCPCS | Performed by: INTERNAL MEDICINE

## 2022-10-31 PROCEDURE — G8427 DOCREV CUR MEDS BY ELIG CLIN: HCPCS | Performed by: INTERNAL MEDICINE

## 2022-10-31 PROCEDURE — 1036F TOBACCO NON-USER: CPT | Performed by: INTERNAL MEDICINE

## 2022-10-31 PROCEDURE — 3074F SYST BP LT 130 MM HG: CPT | Performed by: INTERNAL MEDICINE

## 2022-10-31 PROCEDURE — 3078F DIAST BP <80 MM HG: CPT | Performed by: INTERNAL MEDICINE

## 2022-10-31 PROCEDURE — G8417 CALC BMI ABV UP PARAM F/U: HCPCS | Performed by: INTERNAL MEDICINE

## 2022-10-31 PROCEDURE — 93000 ELECTROCARDIOGRAM COMPLETE: CPT | Performed by: INTERNAL MEDICINE

## 2022-10-31 PROCEDURE — 1090F PRES/ABSN URINE INCON ASSESS: CPT | Performed by: INTERNAL MEDICINE

## 2022-10-31 PROCEDURE — 99213 OFFICE O/P EST LOW 20 MIN: CPT | Performed by: INTERNAL MEDICINE

## 2022-10-31 RX ORDER — AMLODIPINE BESYLATE 2.5 MG/1
2.5 TABLET ORAL DAILY
Qty: 90 TABLET | Refills: 3 | Status: SHIPPED | OUTPATIENT
Start: 2022-10-31

## 2022-10-31 ASSESSMENT — ENCOUNTER SYMPTOMS
ABDOMINAL PAIN: 0
FACIAL SWELLING: 0
SHORTNESS OF BREATH: 1
BLOOD IN STOOL: 0
ABDOMINAL DISTENTION: 0
EYE REDNESS: 0
EYE PAIN: 0
EYE DISCHARGE: 0
APNEA: 0
NAUSEA: 0
VOMITING: 0
WHEEZING: 0
SORE THROAT: 0
COUGH: 0
CHEST TIGHTNESS: 0
DIARRHEA: 0
CONSTIPATION: 0

## 2022-10-31 NOTE — PROGRESS NOTES
Cardiology Office Visit Note  [facility] [place]  [id number]  Phone: [phone]  Fax: [phone]                            Date:  10/31/2022  Patient: Billy Frankel  Age:  81 y. o., 1940    Referral: No ref. provider found    REASON FOR VISIT:  1 Year Follow Up (Patient complains of hypertension)         PROBLEM LIST:    Patient Active Problem List    Diagnosis Date Noted    Pulmonary fibrosis ([facility] 06/02/2022     Priority: Medium    Restrictive lung disease 06/02/2022     Priority: Medium    Hypertensive renal disease 05/11/2022     Priority: Medium    Age-related cataract 12/27/2019     Priority: Medium    Presence of intraocular lens 12/27/2019     Priority: Medium    Diastolic dysfunction 17/36/1242     Priority: Low    Type 2 diabetes mellitus with diabetic neuropathy 07/26/2021     Priority: Low    Essential hypertension 02/11/2019     Priority: Low    Elevated TSH 08/07/2018     Priority: Low    Vitamin D deficiency 09/10/2017     Priority: Low    Pure hypercholesterolemia 09/10/2017     Priority: Low    Type 2 diabetes mellitus without complication, without long-term current use of insulin ([facility] 09/10/2017     Priority: Low    Localized osteoporosis without current pathological fracture 09/10/2017     Priority: Low     Overview Note:     2017 hip neck -2.6; lspine -1/8  8/2020 hip neck -2.6/ L spine L1 -1.6      Generalized anxiety disorder 09/10/2017     Priority: Low    Former smoker 09/10/2017     Priority: Low    Numbness 04/07/2017     Priority: Low    Imbalance 04/07/2017     Priority: Low    Estrogen receptor negative tumor status 08/06/2008     Priority: Low    Chronic kidney disease, stage 3b ([facility] 03/09/2022    READ (dyspnea on exertion) 11/24/2021         PRESENTATION: Billy Frankel is a 81y.o. year old female is seen today for routine cardiology follow-up appointment.   Her past medical history is notable for hypertension, chronic kidney disease, obesity and remote history of breast cancer status postlumpectomy and chemotherapy as well as radiation therapy over 10 years ago. At the last office visit she was complaining of intermittent chronic shortness of breath and dyspnea on exertion. As part of her work-up she had a stress test which was mildly abnormal.  She subsequently had a diagnostic angiogram which showed nonobstructive coronary artery disease. Echocardiogram around the same time was largely reassuring with ejection fraction 50 to 55% without evidence for major valvulopathy, cardiomyopathy or pericardial effusion. She did have evidence for mild pulmonary hypertension. She was recommended to follow-up with pulmonary medicine. She has since been diagnosed with pulmonary fibrosis and restrictive lung disease, currently on bronchial inhalers with some improvement in symptoms. REVIEW OF SYSTEMS:  Review of Systems   Constitutional:  Negative for chills, fatigue and fever. HENT:  Negative for congestion, facial swelling, hearing loss and sore throat. Eyes:  Negative for pain, discharge, redness and visual disturbance. Respiratory:  Positive for shortness of breath. Negative for apnea, cough, chest tightness and wheezing. Cardiovascular:  Negative for chest pain, palpitations and leg swelling. Gastrointestinal:  Negative for abdominal distention, abdominal pain, blood in stool, constipation, diarrhea, nausea and vomiting. Endocrine: Negative for polydipsia, polyphagia and polyuria. Genitourinary:  Negative for dysuria, flank pain, frequency and hematuria. Musculoskeletal:  Negative for joint swelling, myalgias and neck pain. Skin:  Negative for pallor and rash. Neurological:  Negative for dizziness, syncope, speech difficulty, light-headedness, numbness and headaches. Psychiatric/Behavioral:  Negative for confusion, hallucinations and sleep disturbance.       Past Medical History:      Diagnosis Date    Arthritis     Back pain Breast cancer ([facility]     left 2008    Chronic kidney disease     Concussion     History of therapeutic radiation     Hx antineoplastic chemo     Hyperlipidemia     Hypertension     Osteoporosis     Pneumonia     Type II or unspecified type diabetes mellitus without mention of complication, not stated as uncontrolled     diet controlled    Varicose veins     Wears glasses        Past Surgical History:      Procedure Laterality Date    BREAST BIOPSY  08/06/2008    U/S Guided Mammotome Biopsy Left Breast x 2  infiltrating ductal carcinoma, grade III, ER/SD negative    BREAST BIOPSY  08/05/2009    Stereotactic biopsy right breast  No evidence of malignancy    BREAST BIOPSY Left 08/2015    BREAST LUMPECTOMY      CATARACT REMOVAL Right 2021    COLONOSCOPY      DIAGNOSTIC CARDIAC CATH LAB PROCEDURE      EYE SURGERY      cataract removal    MASTECTOMY, PARTIAL  08/28/2008     Left partial mastectomy and left sentinel node biopsy  2.6 cm infiltrating ductal carcinoma, grade III, 0/2 nodes positive, immunohistochemistry negative for micrometastasis       Medications:  Current Outpatient Medications   Medication Sig Dispense Refill    glipiZIDE (GLUCOTROL) 5 MG tablet Take 1 tablet by mouth 2 times daily (before meals) 90 tablet 1    pravastatin (PRAVACHOL) 40 MG tablet TAKE ONE TABLET BY MOUTH ONCE DAILY 90 tablet 1    famotidine (PEPCID) 40 MG tablet Take 1 tablet by mouth every evening 30 tablet 3    losartan (COZAAR) 100 MG tablet Take 1/2 (one-half) tablet by mouth twice daily 90 tablet 1    albuterol sulfate HFA (VENTOLIN HFA) 108 (90 Base) MCG/ACT inhaler Inhale 2 puffs into the lungs 4 times daily as needed for Wheezing 18 g 5    tiotropium-olodaterol (STIOLTO RESPIMAT) 2.5-2.5 MCG/ACT AERS Inhale 2 puffs into the lungs daily 1 each 11    amLODIPine (NORVASC) 2.5 MG tablet Take 1 tablet by mouth daily 90 tablet 3    carvedilol (COREG) 6.25 MG tablet Take 1 tablet by mouth 2 times daily 180 tablet 3 triamterene-hydroCHLOROthiazide (MAXZIDE-25) 37.5-25 MG per tablet Take 1/2 tablet in am 30 tablet 3    potassium chloride (KLOR-CON M) 10 MEQ extended release tablet Take 1 tablet by mouth daily 90 tablet 1    magnesium 30 MG tablet Take 30 mg by mouth 2 times daily      Calcium-Vitamin D 600-200 MG-UNIT TABS Take by mouth every morning (before breakfast)       aspirin 81 MG EC tablet Take 81 mg by mouth daily. Ascorbic Acid (VITAMIN C) 500 MG tablet Take 500 mg by mouth daily. No current facility-administered medications for this visit. Allergies:  Adhesive tape and Poison ivy extract    Social History:  Social History     Occupational History    Not on file   Tobacco Use    Smoking status: Former     Packs/day: 1.00     Years: 25.00     Pack years: 25.00     Types: Pipe, Cigarettes, Cigars     Quit date: 1979     Years since quittin.7    Smokeless tobacco: Never   Vaping Use    Vaping Use: Never used   Substance and Sexual Activity    Alcohol use: No     Comment: occ    Drug use: No    Sexual activity: Not on file         Family History:       Problem Relation Age of Onset    Heart Disease Father     Diabetes Father     Hypertension Mother          Physical Examination:  /74   Pulse 63   Ht 5' 6\" (1.676 m)   Wt 194 lb (88 kg)   BMI 31.31 kg/m²   Physical Exam  Vitals reviewed. Constitutional:       General: She is not in acute distress. Appearance: Normal appearance. She is not ill-appearing, toxic-appearing or diaphoretic. HENT:      Head: Normocephalic and atraumatic. Eyes:      General: No scleral icterus. Right eye: No discharge. Left eye: No discharge. Conjunctiva/sclera: Conjunctivae normal.   Neck:      Vascular: No carotid bruit. Cardiovascular:      Rate and Rhythm: Normal rate and regular rhythm. No extrasystoles are present. Chest Wall: PMI is not displaced. No thrill.       Heart sounds: S1 normal and S2 normal. No murmur heard.    No friction rub. No gallop. Pulmonary:      Effort: Pulmonary effort is normal. No tachypnea or respiratory distress. Breath sounds: Normal breath sounds. No stridor. No wheezing, rhonchi or rales. Chest:      Chest wall: No tenderness. Abdominal:      General: Bowel sounds are normal. There is no distension. Palpations: Abdomen is soft. There is no mass. Tenderness: There is no abdominal tenderness. There is no guarding. Musculoskeletal:         General: No swelling. Cervical back: Normal range of motion and neck supple. No rigidity. Right lower leg: No edema. Left lower leg: No edema. Skin:     General: Skin is warm and dry. Coloration: Skin is not jaundiced. Findings: No erythema or rash. Neurological:      General: No focal deficit present. Mental Status: She is alert and oriented to person, place, and time. Mental status is at baseline. Psychiatric:         Mood and Affect: Mood normal.         Behavior: Behavior normal.         Thought Content: Thought content normal.         Labs:   CBC: No results found for: CBC   BMP: No results found for: BMP    BNP: No results found for: BNPINT   PT/INR:   Prothrombin Time   Date Value Ref Range Status   07/23/2011 11.20 9.7 - 12.5 SEC Final     INR   Date Value Ref Range Status   07/23/2011 1.03 0.90 - 1.10 Final     Comment:     INR  < or = 1.3  Normal  INR = 2.0 - 3.0  Therapeutic  INR = 2.5 - 3.5  Therapeutic for patients with mechanical                   prosthetic heart valve  MI prophylaxis  & MI prophylaxisINR  > or = 3.5  Abnormal/Elevated  INR  > or = 5.0  Critical (requires immediate physician                   notification)       APTT:  No results found for: APTT   CARDIAC ENZYMES:   Troponin I   Date Value Ref Range Status   07/24/2011 < 0.01 0.000 - 0.780 NG/ML Final     Comment:     0-0.10 ng/ml     Reference range for individuals without                   ischemic myocardial disease. Clinical                   correlation suggested. 0.11-.78 ng/ml   Values of troponin in this range suggest                   that a repeat assay be drawn 6-8 hours                   after the current specimen. 0.79-7.5 ng/ml   Values in this range suggest myocardial                   injury. Peak troponin concentrations                   between this range are associated with                    unstable angina and percutaneous coronary                   intervention. >or =7.5 ng/ml   In the absence of external chest trauma                   values of the troponin concentration in                   this range suggest myocardial infarction. LIPID PANEL: No results found for: [facility]  LIVER PROFILE:   AST   Date Value Ref Range Status   10/20/2022 14 5 - 32 U/L Final     ALT   Date Value Ref Range Status   10/20/2022 10 5 - 33 U/L Final     Albumin   Date Value Ref Range Status   10/20/2022 4.0 3.5 - 5.2 g/dL Final          2D echocardiogram with Doppler with color 7/2021  Summary  Normal left ventricular size with an estimated ejection fraction of approximately 50-55%. Mild left ventricular apical wall hypokinesis. No evidence of left ventricular mass or thrombus noted. Mild concentric left ventricular hypertrophy. E/A flow reversal noted, suggestive for abnormal early relaxation without evidence for clinically significant diastolic dysfunction. Mild dilation of right ventricle with mild RV dysfunction. Mild tricuspid regurgitation with estimated RVSP of 42 mm Hg suggestive for mild pulmonary hypertension. Aortic root and ascending aorta are within normal limits. No evidence of significant pericardial effusion is noted. No previous studies available for comparison.   Signature  Electronically signed by Pino Frias(Interpreting physician) on 07/30/2021 10:44 AM      ASSESSMENT and PLAN:      Pulmonary fibrosis with chronic intermittent shortness of breath, clinically improved on chronic bronchial inhaler therapies. Mild pulmonary hypertension, last known RVSP 42 mmHg with mild RV dilation and mild RV dysfunction  Following closely with pulmonary medicine    Essential hypertension, goal blood pressure less than 130/80  Reasonably well controlled on combination of carvedilol, amlodipine, triamterene hydrochlorothiazide and losartan  Home blood pressure monitoring  Low-sodium diet, less than 2 g per 24 hours    Dyslipidemia  Goal LDL less than 100  Continue pravastatin  LFT and lipid monitoring as per primary care provider      Status post diagnostic cardiac angiogram 10/2021, mild coronary irregularities. Chronic kidney disease, stage III            Electronically signed by Jennyfer Lamar MD on 10/31/2022 at 10:22 AM    Jennyfer Lamar MD, ALFRED, [facility]  Noninvasive Cardiology Consultant    This dictation was generated by voice recognition computer software. Although all attempts are made to edit the dictation for accuracy, there may be errors in the transcription that are not intended.

## 2022-11-17 RX ORDER — LOSARTAN POTASSIUM 100 MG/1
TABLET ORAL
Qty: 90 TABLET | Refills: 0 | Status: SHIPPED | OUTPATIENT
Start: 2022-11-17

## 2022-12-20 RX ORDER — CARVEDILOL 6.25 MG/1
TABLET ORAL
Qty: 180 TABLET | Refills: 2 | Status: SHIPPED | OUTPATIENT
Start: 2022-12-20

## 2022-12-28 ENCOUNTER — INFUSION (OUTPATIENT)
Dept: ONCOLOGY | Facility: CLINIC | Age: 82
End: 2022-12-28

## 2022-12-28 ENCOUNTER — HOSPITAL ENCOUNTER (OUTPATIENT)
Dept: PULMONOLOGY | Age: 82
Discharge: HOME | End: 2022-12-28

## 2022-12-28 DIAGNOSIS — C50.112 MALIGNANT NEOPLASM OF CENTRAL PORTION OF LEFT FEMALE BREAST, UNSPECIFIED ESTROGEN RECEPTOR STATUS: ICD-10-CM

## 2022-12-28 DIAGNOSIS — Z45.2 ENCOUNTER FOR CARE RELATED TO VASCULAR ACCESS PORT: Primary | ICD-10-CM

## 2022-12-28 RX ORDER — SODIUM CHLORIDE 0.9 % (FLUSH) 0.9 %
10 SYRINGE (ML) INJECTION AS NEEDED
Status: CANCELLED | OUTPATIENT
Start: 2022-12-28

## 2022-12-28 RX ORDER — HEPARIN SODIUM (PORCINE) LOCK FLUSH IV SOLN 100 UNIT/ML 100 UNIT/ML
500 SOLUTION INTRAVENOUS AS NEEDED
Status: DISCONTINUED | OUTPATIENT
Start: 2022-12-28 | End: 2022-12-28 | Stop reason: HOSPADM

## 2022-12-28 RX ORDER — HEPARIN SODIUM (PORCINE) LOCK FLUSH IV SOLN 100 UNIT/ML 100 UNIT/ML
500 SOLUTION INTRAVENOUS AS NEEDED
Status: CANCELLED | OUTPATIENT
Start: 2022-12-28

## 2022-12-28 RX ORDER — SODIUM CHLORIDE 0.9 % (FLUSH) 0.9 %
10 SYRINGE (ML) INJECTION AS NEEDED
Status: DISCONTINUED | OUTPATIENT
Start: 2022-12-28 | End: 2022-12-28 | Stop reason: HOSPADM

## 2022-12-28 RX ADMIN — HEPARIN SODIUM (PORCINE) LOCK FLUSH IV SOLN 100 UNIT/ML 500 UNITS: 100 SOLUTION at 11:21

## 2022-12-28 RX ADMIN — Medication 10 ML: at 11:20

## 2023-01-02 NOTE — PROCEDURES
Media Information               Pulmonary Function Study    Interpretation:    The FVC is Normal. FEV1 is Normal. FEV1/FVC ratio is Normal. After bronchodilator therapy there was no significant improvement in FEV1. Total lung capacity is mildly reduced. Residual volume is reduced. Diffusing lung capacity when corrected for alveolar volume is mildly reduced. Impression:    1. Mild restrictive lung disease.         Juan Tay MD, [facility], [facility] bilateral upper extremities/bilateral lower extremities/normal

## 2023-01-05 LAB — SARS-COV-2, PCR: NOT DETECTED

## 2023-01-06 ENCOUNTER — HOSPITAL ENCOUNTER (OUTPATIENT)
Dept: PULMONOLOGY | Age: 83
Discharge: HOME | End: 2023-01-06
Payer: MEDICARE

## 2023-01-06 VITALS — BODY MASS INDEX: 31.18 KG/M2 | HEART RATE: 67 BPM | WEIGHT: 194 LBS | HEIGHT: 66 IN | OXYGEN SATURATION: 99 %

## 2023-01-06 DIAGNOSIS — J98.4 RESTRICTIVE LUNG DISEASE: ICD-10-CM

## 2023-01-06 PROCEDURE — 6360000002 HC RX W HCPCS: Performed by: INTERNAL MEDICINE

## 2023-01-06 PROCEDURE — 94060 EVALUATION OF WHEEZING: CPT

## 2023-01-06 PROCEDURE — 94726 PLETHYSMOGRAPHY LUNG VOLUMES: CPT

## 2023-01-06 PROCEDURE — 94729 DIFFUSING CAPACITY: CPT

## 2023-01-06 RX ORDER — ALBUTEROL SULFATE 2.5 MG/3ML
2.5 SOLUTION RESPIRATORY (INHALATION) EVERY 6 HOURS PRN
Status: DISCONTINUED | OUTPATIENT
Start: 2023-01-06 | End: 2023-01-08 | Stop reason: HOSPADM

## 2023-01-06 RX ADMIN — ALBUTEROL SULFATE 2.5 MG: 2.5 SOLUTION RESPIRATORY (INHALATION) at 10:14

## 2023-01-06 NOTE — PROCEDURES
Media Information    Pulmonary Function Study    Interpretation:    The FVC is normal. FEV1 is Normal. FEV1/FVC ratio is Normal. After bronchodilator therapy there was no significant improvement in FEV1. Total lung capacity is Normal. Residual volume is Normal.      Diffusing lung capacity when corrected for alveolar volume is Normal.         Impression:    Normal pulmonary function.         Juan Tay MD, FCCP, [facility]

## 2023-01-09 ENCOUNTER — OFFICE VISIT (OUTPATIENT)
Dept: PULMONOLOGY | Age: 83
End: 2023-01-09
Payer: MEDICARE

## 2023-01-09 VITALS
TEMPERATURE: 97.4 F | SYSTOLIC BLOOD PRESSURE: 131 MMHG | OXYGEN SATURATION: 99 % | HEIGHT: 66 IN | BODY MASS INDEX: 30.57 KG/M2 | HEART RATE: 61 BPM | DIASTOLIC BLOOD PRESSURE: 72 MMHG | WEIGHT: 190.2 LBS

## 2023-01-09 DIAGNOSIS — R06.2 WHEEZING: ICD-10-CM

## 2023-01-09 DIAGNOSIS — R06.09 DOE (DYSPNEA ON EXERTION): ICD-10-CM

## 2023-01-09 DIAGNOSIS — R06.09 DOE (DYSPNEA ON EXERTION): Primary | ICD-10-CM

## 2023-01-09 DIAGNOSIS — J43.1 PANLOBULAR EMPHYSEMA (HCC): ICD-10-CM

## 2023-01-09 DIAGNOSIS — I51.89 DIASTOLIC DYSFUNCTION: ICD-10-CM

## 2023-01-09 DIAGNOSIS — J84.10 PULMONARY FIBROSIS (HCC): Primary | ICD-10-CM

## 2023-01-09 PROCEDURE — 3023F SPIROM DOC REV: CPT | Performed by: INTERNAL MEDICINE

## 2023-01-09 PROCEDURE — G8399 PT W/DXA RESULTS DOCUMENT: HCPCS | Performed by: INTERNAL MEDICINE

## 2023-01-09 PROCEDURE — 99214 OFFICE O/P EST MOD 30 MIN: CPT | Performed by: INTERNAL MEDICINE

## 2023-01-09 PROCEDURE — G8417 CALC BMI ABV UP PARAM F/U: HCPCS | Performed by: INTERNAL MEDICINE

## 2023-01-09 PROCEDURE — 3078F DIAST BP <80 MM HG: CPT | Performed by: INTERNAL MEDICINE

## 2023-01-09 PROCEDURE — G8484 FLU IMMUNIZE NO ADMIN: HCPCS | Performed by: INTERNAL MEDICINE

## 2023-01-09 PROCEDURE — G8427 DOCREV CUR MEDS BY ELIG CLIN: HCPCS | Performed by: INTERNAL MEDICINE

## 2023-01-09 PROCEDURE — 3075F SYST BP GE 130 - 139MM HG: CPT | Performed by: INTERNAL MEDICINE

## 2023-01-09 PROCEDURE — 1036F TOBACCO NON-USER: CPT | Performed by: INTERNAL MEDICINE

## 2023-01-09 PROCEDURE — 1090F PRES/ABSN URINE INCON ASSESS: CPT | Performed by: INTERNAL MEDICINE

## 2023-01-09 PROCEDURE — 1123F ACP DISCUSS/DSCN MKR DOCD: CPT | Performed by: INTERNAL MEDICINE

## 2023-01-09 ASSESSMENT — ENCOUNTER SYMPTOMS
CHEST TIGHTNESS: 0
ABDOMINAL DISTENTION: 0
COUGH: 0
RHINORRHEA: 0
WHEEZING: 0
APNEA: 0
ABDOMINAL PAIN: 0
BACK PAIN: 0
SHORTNESS OF BREATH: 1
ANAL BLEEDING: 0

## 2023-01-09 NOTE — PROGRESS NOTES
Pulmonary and Sleep Medicine    Eleanor Kidd (:  1940) is a 81 y.o. female,Established patient, here for evaluation of the following chief complaint(s):  Follow-up (Follow up- PFT )      Referring physician:  No referring provider defined for this encounter. ASSESSMENT/PLAN:  1. Pulmonary fibrosis ([address]  -     CT CHEST WO CONTRAST; Future  -     Full PFT Study With Bronchodilator; Future  2. Panlobular emphysema ([address]  3. READ (dyspnea on exertion)  4. Wheezing  5. Diastolic dysfunction      Pulmonary fibrosis however her pulmonary function study is normal.  We will repeat CT of the chest and pulmonary function study in 1 year. Continue current management otherwise. Discussed the above with the patient. Juan Tay MD, [facility], [facility]    No follow-ups on file. SUBJECTIVE/OBJECTIVE:  The patient is here for follow up on restrictive lung disease. She denies new complaints. No significant pulm symptoms. Most recent PFT was normal.      Prior to Visit Medications    Medication Sig Taking?  Authorizing Provider   carvedilol (COREG) 6.25 MG tablet Take 1 tablet by mouth twice daily Yes SAMMY Hill   losartan (COZAAR) 100 MG tablet Take 1/2 (one-half) tablet by mouth twice daily Yes Rex Kern MD   amLODIPine (NORVASC) 2.5 MG tablet Take 1 tablet by mouth daily Yes Christiane Huddleston MD   glipiZIDE (GLUCOTROL) 5 MG tablet Take 1 tablet by mouth 2 times daily (before meals) Yes Rex Kern MD   pravastatin (PRAVACHOL) 40 MG tablet TAKE ONE TABLET BY MOUTH ONCE DAILY Yes Rex Kern MD   famotidine (PEPCID) 40 MG tablet Take 1 tablet by mouth every evening Yes Rex Kern MD   albuterol sulfate HFA (VENTOLIN HFA) 108 (90 Base) MCG/ACT inhaler Inhale 2 puffs into the lungs 4 times daily as needed for Wheezing Yes Eben Payne MD   tiotropium-olodaterol (STIOLTO RESPIMAT) 2.5-2.5 MCG/ACT AERS Inhale 2 puffs into the lungs daily Yes Eben Payne MD triamterene-hydroCHLOROthiazide (MAXZIDE-25) 37.5-25 MG per tablet Take 1/2 tablet in am Yes Silvia Ruano MD   potassium chloride (KLOR-CON M) 10 MEQ extended release tablet Take 1 tablet by mouth daily Yes Silvia Ruano MD   magnesium 30 MG tablet Take 30 mg by mouth 2 times daily Yes Historical Provider, MD   Calcium-Vitamin D 600-200 MG-UNIT TABS Take by mouth every morning (before breakfast)  Yes Historical Provider, MD   aspirin 81 MG EC tablet Take 81 mg by mouth daily. Yes Historical Provider, MD   Ascorbic Acid (VITAMIN C) 500 MG tablet Take 500 mg by mouth daily. Yes Historical Provider, MD        Review of Systems   Constitutional:  Negative for activity change, appetite change, chills, diaphoresis and fatigue. HENT:  Negative for congestion, dental problem, drooling, ear discharge, postnasal drip and rhinorrhea. Eyes:  Negative for visual disturbance. Respiratory:  Positive for shortness of breath. Negative for apnea, cough, chest tightness and wheezing. Gastrointestinal:  Negative for abdominal distention, abdominal pain and anal bleeding. Endocrine: Negative for cold intolerance, heat intolerance and polydipsia. Genitourinary:  Negative for difficulty urinating, dysuria, enuresis and flank pain. Musculoskeletal:  Negative for arthralgias, back pain and gait problem. Allergic/Immunologic: Negative for environmental allergies. Neurological:  Negative for dizziness, facial asymmetry, light-headedness and headaches. Vitals:    01/09/23 1149   BP: 131/72   Pulse: 61   Temp: 97.4 °F (36.3 °C)   SpO2: 99%     BMI Readings from Last 1 Encounters:   01/09/23 30.70 kg/m²         Physical Exam  Vitals reviewed. Constitutional:       Appearance: Normal appearance. HENT:      Head: Normocephalic and atraumatic. Nose: Nose normal.   Eyes:      Extraocular Movements: Extraocular movements intact.       Conjunctiva/sclera: Conjunctivae normal.   Cardiovascular:      Rate and Rhythm: Normal rate and regular rhythm. Heart sounds: No murmur heard. No friction rub. Pulmonary:      Effort: Pulmonary effort is normal. No respiratory distress. Breath sounds: Normal breath sounds. No stridor. No wheezing, rhonchi or rales. Abdominal:      General: There is no distension. Palpations: There is no mass. Tenderness: There is no abdominal tenderness. There is no guarding or rebound. Musculoskeletal:      Cervical back: Normal range of motion and neck supple. Neurological:      Mental Status: She is alert and oriented to person, place, and time. This note was generated using a voice recognition software. Errors in voice recognition may have occurred. An electronic signature was used to authenticate this note.     --Riky Núñez MD

## 2023-02-20 RX ORDER — LOSARTAN POTASSIUM 100 MG/1
TABLET ORAL
Qty: 90 TABLET | Refills: 0 | Status: SHIPPED | OUTPATIENT
Start: 2023-02-20

## 2023-02-22 ENCOUNTER — INFUSION (OUTPATIENT)
Dept: ONCOLOGY | Facility: CLINIC | Age: 83
End: 2023-02-22
Payer: MEDICARE

## 2023-02-22 DIAGNOSIS — C50.112 MALIGNANT NEOPLASM OF CENTRAL PORTION OF LEFT FEMALE BREAST, UNSPECIFIED ESTROGEN RECEPTOR STATUS: ICD-10-CM

## 2023-02-22 DIAGNOSIS — Z45.2 ENCOUNTER FOR CARE RELATED TO VASCULAR ACCESS PORT: Primary | ICD-10-CM

## 2023-02-22 RX ORDER — HEPARIN SODIUM (PORCINE) LOCK FLUSH IV SOLN 100 UNIT/ML 100 UNIT/ML
500 SOLUTION INTRAVENOUS AS NEEDED
Status: DISCONTINUED | OUTPATIENT
Start: 2023-02-22 | End: 2023-02-22 | Stop reason: HOSPADM

## 2023-02-22 RX ORDER — SODIUM CHLORIDE 0.9 % (FLUSH) 0.9 %
10 SYRINGE (ML) INJECTION AS NEEDED
Status: DISCONTINUED | OUTPATIENT
Start: 2023-02-22 | End: 2023-02-22 | Stop reason: HOSPADM

## 2023-02-22 RX ORDER — SODIUM CHLORIDE 0.9 % (FLUSH) 0.9 %
10 SYRINGE (ML) INJECTION AS NEEDED
OUTPATIENT
Start: 2023-02-22

## 2023-02-22 RX ORDER — HEPARIN SODIUM (PORCINE) LOCK FLUSH IV SOLN 100 UNIT/ML 100 UNIT/ML
500 SOLUTION INTRAVENOUS AS NEEDED
OUTPATIENT
Start: 2023-02-22

## 2023-02-22 RX ADMIN — HEPARIN SODIUM (PORCINE) LOCK FLUSH IV SOLN 100 UNIT/ML 500 UNITS: 100 SOLUTION at 13:16

## 2023-02-22 RX ADMIN — Medication 10 ML: at 13:16

## 2023-03-02 DIAGNOSIS — I51.89 DIASTOLIC DYSFUNCTION: ICD-10-CM

## 2023-03-02 DIAGNOSIS — I10 ESSENTIAL HYPERTENSION: ICD-10-CM

## 2023-03-02 DIAGNOSIS — E55.9 VITAMIN D DEFICIENCY: ICD-10-CM

## 2023-03-02 DIAGNOSIS — N18.31 STAGE 3A CHRONIC KIDNEY DISEASE (HCC): ICD-10-CM

## 2023-03-02 DIAGNOSIS — E78.00 PURE HYPERCHOLESTEROLEMIA: ICD-10-CM

## 2023-03-02 DIAGNOSIS — Z23 NEED FOR IMMUNIZATION AGAINST INFLUENZA: ICD-10-CM

## 2023-03-02 DIAGNOSIS — E11.40 TYPE 2 DIABETES MELLITUS WITH DIABETIC NEUROPATHY, WITHOUT LONG-TERM CURRENT USE OF INSULIN (HCC): ICD-10-CM

## 2023-03-02 LAB
ALBUMIN SERPL-MCNC: 3.7 G/DL (ref 3.5–5.2)
ALP BLD-CCNC: 83 U/L (ref 35–104)
ALT SERPL-CCNC: 8 U/L (ref 5–33)
ANION GAP SERPL CALCULATED.3IONS-SCNC: 12 MMOL/L (ref 7–19)
AST SERPL-CCNC: 15 U/L (ref 5–32)
BACTERIA: ABNORMAL /HPF
BASOPHILS ABSOLUTE: 0.1 K/UL (ref 0–0.2)
BASOPHILS RELATIVE PERCENT: 0.5 % (ref 0–1)
BILIRUB SERPL-MCNC: 0.5 MG/DL (ref 0.2–1.2)
BILIRUBIN URINE: NEGATIVE
BLOOD, URINE: ABNORMAL
BUN BLDV-MCNC: 20 MG/DL (ref 8–23)
CALCIUM SERPL-MCNC: 9.4 MG/DL (ref 8.8–10.2)
CHLORIDE BLD-SCNC: 101 MMOL/L (ref 98–111)
CHOLESTEROL, TOTAL: 150 MG/DL (ref 160–199)
CLARITY: ABNORMAL
CO2: 27 MMOL/L (ref 22–29)
COLOR: YELLOW
CREAT SERPL-MCNC: 1.6 MG/DL (ref 0.5–0.9)
CREATININE URINE: 91.9 MG/DL (ref 4.2–622)
CRYSTALS, UA: ABNORMAL /HPF
EOSINOPHILS ABSOLUTE: 0.2 K/UL (ref 0–0.6)
EOSINOPHILS RELATIVE PERCENT: 2 % (ref 0–5)
EPITHELIAL CELLS, UA: 4 /HPF (ref 0–5)
GFR SERPL CREATININE-BSD FRML MDRD: 32 ML/MIN/{1.73_M2}
GLUCOSE BLD-MCNC: 122 MG/DL (ref 74–109)
GLUCOSE URINE: NEGATIVE MG/DL
HBA1C MFR BLD: 6.8 % (ref 4–6)
HCT VFR BLD CALC: 35.6 % (ref 37–47)
HDLC SERPL-MCNC: 54 MG/DL (ref 65–121)
HEMOGLOBIN: 11.5 G/DL (ref 12–16)
HYALINE CASTS: 3 /HPF (ref 0–8)
IMMATURE GRANULOCYTES #: 0 K/UL
KETONES, URINE: NEGATIVE MG/DL
LDL CHOLESTEROL CALCULATED: 79 MG/DL
LEUKOCYTE ESTERASE, URINE: ABNORMAL
LYMPHOCYTES ABSOLUTE: 3.8 K/UL (ref 1.1–4.5)
LYMPHOCYTES RELATIVE PERCENT: 38.4 % (ref 20–40)
MCH RBC QN AUTO: 30.6 PG (ref 27–31)
MCHC RBC AUTO-ENTMCNC: 32.3 G/DL (ref 33–37)
MCV RBC AUTO: 94.7 FL (ref 81–99)
MICROALBUMIN UR-MCNC: 1.3 MG/DL (ref 0–19)
MICROALBUMIN/CREAT UR-RTO: 14.1 MG/G
MONOCYTES ABSOLUTE: 1.1 K/UL (ref 0–0.9)
MONOCYTES RELATIVE PERCENT: 10.7 % (ref 0–10)
NEUTROPHILS ABSOLUTE: 4.8 K/UL (ref 1.5–7.5)
NEUTROPHILS RELATIVE PERCENT: 48.1 % (ref 50–65)
NITRITE, URINE: POSITIVE
PDW BLD-RTO: 12.7 % (ref 11.5–14.5)
PH UA: 6 (ref 5–8)
PLATELET # BLD: 377 K/UL (ref 130–400)
PMV BLD AUTO: 9.3 FL (ref 9.4–12.3)
POTASSIUM SERPL-SCNC: 4.2 MMOL/L (ref 3.5–5)
PROTEIN UA: NEGATIVE MG/DL
RBC # BLD: 3.76 M/UL (ref 4.2–5.4)
RBC UA: 4 /HPF (ref 0–4)
SODIUM BLD-SCNC: 140 MMOL/L (ref 136–145)
SPECIFIC GRAVITY UA: 1.01 (ref 1–1.03)
TOTAL PROTEIN: 7.5 G/DL (ref 6.6–8.7)
TRIGL SERPL-MCNC: 87 MG/DL (ref 0–149)
TSH SERPL DL<=0.05 MIU/L-ACNC: 3.9 UIU/ML (ref 0.27–4.2)
UROBILINOGEN, URINE: 0.2 E.U./DL
WBC # BLD: 9.9 K/UL (ref 4.8–10.8)
WBC UA: 434 /HPF (ref 0–5)

## 2023-03-06 ENCOUNTER — OFFICE VISIT (OUTPATIENT)
Dept: INTERNAL MEDICINE | Age: 83
End: 2023-03-06

## 2023-03-06 VITALS
BODY MASS INDEX: 30.7 KG/M2 | DIASTOLIC BLOOD PRESSURE: 80 MMHG | HEIGHT: 66 IN | HEART RATE: 78 BPM | RESPIRATION RATE: 18 BRPM | OXYGEN SATURATION: 98 % | SYSTOLIC BLOOD PRESSURE: 138 MMHG | WEIGHT: 191 LBS

## 2023-03-06 DIAGNOSIS — Z00.00 MEDICARE ANNUAL WELLNESS VISIT, SUBSEQUENT: Primary | ICD-10-CM

## 2023-03-06 DIAGNOSIS — E11.40 TYPE 2 DIABETES MELLITUS WITH DIABETIC NEUROPATHY, WITHOUT LONG-TERM CURRENT USE OF INSULIN (HCC): ICD-10-CM

## 2023-03-06 DIAGNOSIS — I51.89 DIASTOLIC DYSFUNCTION: ICD-10-CM

## 2023-03-06 DIAGNOSIS — N18.32 CHRONIC KIDNEY DISEASE, STAGE 3B (HCC): ICD-10-CM

## 2023-03-06 DIAGNOSIS — I10 ESSENTIAL HYPERTENSION: ICD-10-CM

## 2023-03-06 DIAGNOSIS — E55.9 VITAMIN D DEFICIENCY: ICD-10-CM

## 2023-03-06 DIAGNOSIS — R94.4 DECREASED CALCULATED GFR: ICD-10-CM

## 2023-03-06 DIAGNOSIS — M81.6 LOCALIZED OSTEOPOROSIS WITHOUT CURRENT PATHOLOGICAL FRACTURE: ICD-10-CM

## 2023-03-06 DIAGNOSIS — E78.00 PURE HYPERCHOLESTEROLEMIA: ICD-10-CM

## 2023-03-06 DIAGNOSIS — J43.1 PANLOBULAR EMPHYSEMA (HCC): ICD-10-CM

## 2023-03-06 DIAGNOSIS — N34.2 INFECTIVE URETHRITIS: ICD-10-CM

## 2023-03-06 RX ORDER — AMLODIPINE BESYLATE 2.5 MG/1
2.5 TABLET ORAL DAILY
Qty: 90 TABLET | Refills: 1 | Status: SHIPPED | OUTPATIENT
Start: 2023-03-06

## 2023-03-06 ASSESSMENT — PATIENT HEALTH QUESTIONNAIRE - PHQ9
1. LITTLE INTEREST OR PLEASURE IN DOING THINGS: 0
SUM OF ALL RESPONSES TO PHQ QUESTIONS 1-9: 0
SUM OF ALL RESPONSES TO PHQ QUESTIONS 1-9: 0
SUM OF ALL RESPONSES TO PHQ9 QUESTIONS 1 & 2: 0
2. FEELING DOWN, DEPRESSED OR HOPELESS: 0
SUM OF ALL RESPONSES TO PHQ QUESTIONS 1-9: 0
SUM OF ALL RESPONSES TO PHQ QUESTIONS 1-9: 0

## 2023-03-06 ASSESSMENT — ENCOUNTER SYMPTOMS
BACK PAIN: 1
COUGH: 0
CHEST TIGHTNESS: 0
CONSTIPATION: 0
ABDOMINAL PAIN: 0
WHEEZING: 0
SORE THROAT: 0

## 2023-03-06 ASSESSMENT — LIFESTYLE VARIABLES: HOW MANY STANDARD DRINKS CONTAINING ALCOHOL DO YOU HAVE ON A TYPICAL DAY: PATIENT DOES NOT DRINK

## 2023-03-06 NOTE — PROGRESS NOTES
History of presenting illness:  Xiomara Gonzalez is a82 y.o. female who presents today for follow up on her chronic medical conditions as noted below.      Patient Active Problem List    Diagnosis Date Noted    Panlobular emphysema (HCC) 01/09/2023     Priority: Medium    Wheezing 01/09/2023     Priority: Medium    Pulmonary fibrosis (HCC) 06/02/2022     Priority: Medium    Restrictive lung disease 06/02/2022     Priority: Medium    Hypertensive renal disease 05/11/2022     Priority: Medium    Age-related cataract 12/27/2019     Priority: Medium    Presence of intraocular lens 12/27/2019     Priority: Medium    Chronic kidney disease, stage 3b (HCC) 03/09/2022    READ (dyspnea on exertion) 11/24/2021    Diastolic dysfunction 10/27/2021    Type 2 diabetes mellitus with diabetic neuropathy 07/26/2021    Essential hypertension 02/11/2019    Elevated TSH 08/07/2018    Vitamin D deficiency 09/10/2017    Pure hypercholesterolemia 09/10/2017    Type 2 diabetes mellitus without complication, without long-term current use of insulin ([facility]) 09/10/2017    Localized osteoporosis without current pathological fracture 09/10/2017     Overview Note:     2017 hip neck -2.6; lspine -1/8  8/2020 hip neck -2.6/ L spine L1 -1.6      Generalized anxiety disorder 09/10/2017    Former smoker 09/10/2017    Numbness 04/07/2017    Imbalance 04/07/2017    Estrogen receptor negative tumor status 08/06/2008     Past Medical History:   Diagnosis Date    Arthritis     Back pain     Breast cancer (HCC)     left 2008    Chronic kidney disease     Concussion     History of therapeutic radiation     Hx antineoplastic chemo     Hyperlipidemia     Hypertension     Osteoporosis     Panlobular emphysema (HCC) 1/9/2023    Pneumonia     Type II or unspecified type diabetes mellitus without mention of complication, not stated as uncontrolled     diet controlled    Varicose veins     Wears glasses       Past Surgical History:   Procedure Laterality Date  BREAST BIOPSY  08/06/2008    U/S Guided Mammotome Biopsy Left Breast x 2  infiltrating ductal carcinoma, grade III, ER/MN negative    BREAST BIOPSY  08/05/2009    Stereotactic biopsy right breast  No evidence of malignancy    BREAST BIOPSY Left 08/2015    BREAST LUMPECTOMY      CATARACT REMOVAL Right 2021    COLONOSCOPY      DIAGNOSTIC CARDIAC CATH LAB PROCEDURE      EYE SURGERY      cataract removal    MASTECTOMY, PARTIAL  08/28/2008     Left partial mastectomy and left sentinel node biopsy  2.6 cm infiltrating ductal carcinoma, grade III, 0/2 nodes positive, immunohistochemistry negative for micrometastasis     Current Outpatient Medications   Medication Sig Dispense Refill    amLODIPine (NORVASC) 2.5 MG tablet Take 1 tablet by mouth daily 90 tablet 1    losartan (COZAAR) 100 MG tablet Take 1/2 (one-half) tablet by mouth twice daily 90 tablet 0    carvedilol (COREG) 6.25 MG tablet Take 1 tablet by mouth twice daily 180 tablet 2    glipiZIDE (GLUCOTROL) 5 MG tablet Take 1 tablet by mouth 2 times daily (before meals) 90 tablet 1    pravastatin (PRAVACHOL) 40 MG tablet TAKE ONE TABLET BY MOUTH ONCE DAILY 90 tablet 1    famotidine (PEPCID) 40 MG tablet Take 1 tablet by mouth every evening 30 tablet 3    albuterol sulfate HFA (VENTOLIN HFA) 108 (90 Base) MCG/ACT inhaler Inhale 2 puffs into the lungs 4 times daily as needed for Wheezing 18 g 5    tiotropium-olodaterol (STIOLTO RESPIMAT) 2.5-2.5 MCG/ACT AERS Inhale 2 puffs into the lungs daily 1 each 11    triamterene-hydroCHLOROthiazide (MAXZIDE-25) 37.5-25 MG per tablet Take 1/2 tablet in am 30 tablet 3    potassium chloride (KLOR-CON M) 10 MEQ extended release tablet Take 1 tablet by mouth daily 90 tablet 1    magnesium 30 MG tablet Take 30 mg by mouth 2 times daily      Calcium-Vitamin D 600-200 MG-UNIT TABS Take by mouth every morning (before breakfast)       aspirin 81 MG EC tablet Take 81 mg by mouth daily.         Ascorbic Acid (VITAMIN C) 500 MG tablet Take 500 mg by mouth daily. No current facility-administered medications for this visit. Allergies   Allergen Reactions    Adhesive Tape      Latex Tape  Other reaction(s): Unknown    Poison Susie Extract      Social History     Tobacco Use    Smoking status: Former     Packs/day: 1.00     Years: 25.00     Pack years: 25.00     Types: Pipe, Cigarettes, Cigars     Quit date: 1979     Years since quittin.1    Smokeless tobacco: Never   Substance Use Topics    Alcohol use: No     Comment: occ      Family History   Problem Relation Age of Onset    Heart Disease Father     Diabetes Father     Hypertension Mother        Review of Systems   Constitutional:  Positive for fatigue. Negative for chills and fever. HENT:  Negative for congestion, ear pain, nosebleeds, postnasal drip and sore throat. Respiratory:  Negative for cough, chest tightness and wheezing. Cardiovascular:  Negative for chest pain, palpitations and leg swelling. Gastrointestinal:  Negative for abdominal pain and constipation. Genitourinary:  Negative for dysuria and urgency. Musculoskeletal:  Positive for arthralgias and back pain. Skin:  Negative for rash. Neurological:  Negative for dizziness and headaches. Psychiatric/Behavioral:  Positive for sleep disturbance. The patient is nervous/anxious. Vitals:    23 1308   BP: 138/80   Site: Right Upper Arm   Position: Sitting   Cuff Size: Large Adult   Pulse: 78   Resp: 18   SpO2: 98%   Weight: 191 lb (86.6 kg)   Height: 5' 6\" (1.676 m)     Body mass index is 30.83 kg/m². Physical Exam  Constitutional:       Appearance: She is well-developed. HENT:      Right Ear: External ear normal.      Left Ear: External ear normal.      Mouth/Throat:      Pharynx: No oropharyngeal exudate. Eyes:      Conjunctiva/sclera: Conjunctivae normal.      Pupils: Pupils are equal, round, and reactive to light. Neck:      Thyroid: No thyromegaly. Vascular: No JVD. Cardiovascular:      Rate and Rhythm: Normal rate. Heart sounds: Normal heart sounds. No murmur heard. Pulmonary:      Effort: No respiratory distress. Breath sounds: Rhonchi and rales present. No wheezing. Chest:      Chest wall: No tenderness. Abdominal:      General: Bowel sounds are normal.      Palpations: Abdomen is soft. Musculoskeletal:      Cervical back: Neck supple. Right lower leg: Edema present. Left lower leg: Edema present. Comments: 1+ ankle edema   Lymphadenopathy:      Cervical: No cervical adenopathy. Skin:     Findings: No rash. Neurological:      Mental Status: She is oriented to person, place, and time.        Lab Review   Orders Only on 03/02/2023   Component Date Value    Color, UA 03/02/2023 YELLOW     Clarity, UA 03/02/2023 TURBID (A)     Glucose, Ur 03/02/2023 Negative     Bilirubin Urine 03/02/2023 Negative     Ketones, Urine 03/02/2023 Negative     Specific Gravity, UA 03/02/2023 1.012     Blood, Urine 03/02/2023 TRACE (A)     pH, UA 03/02/2023 6.0     Protein, UA 03/02/2023 Negative     Urobilinogen, Urine 03/02/2023 0.2     Nitrite, Urine 03/02/2023 POSITIVE (A)     Leukocyte Esterase, Urine 03/02/2023 LARGE (A)     TSH 03/02/2023 3.900     Microalbumin, Random Uri* 03/02/2023 1.30     Creatinine, Ur 03/02/2023 91.9     Microalbumin Creatinine * 03/02/2023 14.1     Cholesterol, Total 03/02/2023 150 (A)     Triglycerides 03/02/2023 87     HDL 03/02/2023 54 (A)     LDL Calculated 03/02/2023 79     Hemoglobin A1C 03/02/2023 6.8 (A)     Sodium 03/02/2023 140     Potassium 03/02/2023 4.2     Chloride 03/02/2023 101     CO2 03/02/2023 27     Anion Gap 03/02/2023 12     Glucose 03/02/2023 122 (A)     BUN 03/02/2023 20     Creatinine 03/02/2023 1.6 (A)     Est, Glom Filt Rate 03/02/2023 32 (A)     Calcium 03/02/2023 9.4     Total Protein 03/02/2023 7.5     Albumin 03/02/2023 3.7     Total Bilirubin 03/02/2023 0.5     Alkaline Phosphatase 03/02/2023 83 ALT 03/02/2023 8     AST 03/02/2023 15     WBC 03/02/2023 9.9     RBC 03/02/2023 3.76 (A)     Hemoglobin 03/02/2023 11.5 (A)     Hematocrit 03/02/2023 35.6 (A)     MCV 03/02/2023 94.7     MCH 03/02/2023 30.6     MCHC 03/02/2023 32.3 (A)     RDW 03/02/2023 12.7     Platelets 74/29/1668 377     MPV 03/02/2023 9.3 (A)     Neutrophils % 03/02/2023 48.1 (A)     Lymphocytes % 03/02/2023 38.4     Monocytes % 03/02/2023 10.7 (A)     Eosinophils % 03/02/2023 2.0     Basophils % 03/02/2023 0.5     Neutrophils Absolute 03/02/2023 4.8     Immature Granulocytes # 03/02/2023 0.0     Lymphocytes Absolute 03/02/2023 3.8     Monocytes Absolute 03/02/2023 1.10 (A)     Eosinophils Absolute 03/02/2023 0.20     Basophils Absolute 03/02/2023 0.10     Bacteria, UA 03/02/2023 4+ (A)     Crystals, UA 03/02/2023 NEG (A)     Hyaline Casts, UA 03/02/2023 3     WBC, UA 03/02/2023 434 (A)     RBC, UA 03/02/2023 4     Epithelial Cells, UA 03/02/2023 4            ASSESSMENT/PLAN:    CKD stage III  Declined GFR -now significantly lower at 32  Prior -45 in 10/2022  Was 43 in 7/2022  was  53 in 3/2022   INCreased fluid intake is recommended  Lab sent to dr Debbie Yanes ( her nephrologist)      HTN    1. Losartan 100 mg half tablet, continue taking half tablet twice daily  2. Resume Maxide 37.5/20 5/2 tablet daily in a.m.  3. Add amlodipine 2.5 mg p.o. daily  4.  Blood pressure readings to me in 1 to 2 weeks         Osteoporosis  2017 hip neck -2.6; lspine -1/8  8/2020 hip neck -2.6/ L spine L1 -1.6  Lab results discussed with patient  Vitamin D deficiency-   her vitamin D level 43  RX 2000 iu vit d daily  Repeat BD needed  obtain        Pure hypercholesterolemia-  Lab results reviewed with patient  Liver functions normal  LDL 79  RX pravastatin 40 mg daily  Healthy, mostly fiber rich nonstarchy plant-based diet recommended  Recommend to decrease intake of processed foods, simple carbohydrates and animal-based products that high in saturated fats Type 2 diabetes mellitus without complication, without long-term current use of insulin ([facility])-lab results reviewed and discussed with patient  Hemoglobin A1c 6.8   Diet  Weight loss  RX glipizide  2.5 twice daily  Close follow-up in 3 months     Generalized anxiety disorder- her anxiety issues are daily but she does not want to take rx     GERD symptoms  rx  Pepcid ( off omeprazole per nephro recommendations)  Rx Pepcid 40 mg p.o. sent to the pharmacy     Left knee pain  Refuses ortho evaluation      Chronic vertigo for several years   Recommend vestibular rehabilitation exercises, instructions to the patient  Meclizine 12.5 as needed       Emphysema  Patient now follows with pulmonology  I have independently reviewed [facility] pulmonology notes and discussed with patient       Hemoglobin and hematocrit declined 11.5/45.6  Could be related to CKD  Stable form 11/2022      UA 4 + bacteria  Obtsin ua and cx today    Orders Placed This Encounter   Procedures    DEXA BONE DENSITY 2 SITES    Urinalysis    Lipid Panel    Hemoglobin A1C    Comprehensive Metabolic Panel     New Prescriptions    No medications on file         Return in 4 months (on 7/6/2023) for Medicare Annual Wellness Visit in 1 year, Medication check. EMR Dragon/transcription disclaimer:Significant part of this  encounter note is electronic transcription/translationof spoken language to printed text. The electronic translation of spoken language may be erroneous, or at times, nonsensical words or phrases may be inadvertently transcribed.  Although I have reviewed the note for sucherrors, some may still exist.

## 2023-03-06 NOTE — PATIENT INSTRUCTIONS
Learning About Being Active as an Older Adult  Why is being active important as you get older? Being active is one of the best things you can do for your health. And it's never too late to start. Being active--or getting active, if you aren't already--has definite benefits. It can:  Give you more energy,  Keep your mind sharp. Improve balance to reduce your risk of falls. Help you manage chronic illness with fewer medicines. No matter how old you are, how fit you are, or what health problems you have, there is a form of activity that will work for you. And the more physical activity you can do, the better your overall health will be. What kinds of activity can help you stay healthy? Being more active will make your daily activities easier. Physical activity includes planned exercise and things you do in daily life. There are four types of activity:  Aerobic. Doing aerobic activity makes your heart and lungs strong. Includes walking, dancing, and gardening. Aim for at least 2½ hours spread throughout the week. It improves your energy and can help you sleep better. Muscle-strengthening. This type of activity can help maintain muscle and strengthen bones. Includes climbing stairs, using resistance bands, and lifting or carrying heavy loads. Aim for at least twice a week. It can help protect the knees and other joints. Stretching. Stretching gives you better range of motion in joints and muscles. Includes upper arm stretches, calf stretches, and gentle yoga. Aim for at least twice a week, preferably after your muscles are warmed up from other activities. It can help you function better in daily life. Balancing. This helps you stay coordinated and have good posture. Includes heel-to-toe walking, muriel chi, and certain types of yoga. Aim for at least 3 days a week. It can reduce your risk of falling.   Even if you have a hard time meeting the recommendations, it's better to be more active than less active. All activity done in each category counts toward your weekly total. You'd be surprised how daily things like carrying groceries, keeping up with grandchildren, and taking the stairs can add up.  What keeps you from being active?  If you've had a hard time being more active, you're not alone. Maybe you remember being able to do more. Or maybe you've never thought of yourself as being active. It's frustrating when you can't do the things you want. Being more active can help. What's holding you back?  Getting started.  Have a goal, but break it into easy tasks. Small steps build into big accomplishments.  Staying motivated.  If you feel like skipping your activity, remember your goal. Maybe you want to move better and stay independent. Every activity gets you one step closer.  Not feeling your best.  Start with 5 minutes of an activity you enjoy. Prove to yourself you can do it. As you get comfortable, increase your time.  You may not be where you want to be. But you're in the process of getting there. Everyone starts somewhere.  How can you find safe ways to stay active?  Talk with your doctor about any physical challenges you're facing. Make a plan with your doctor if you have a health problem or aren't sure how to get started with activity.  If you're already active, ask your doctor if there is anything you should change to stay safe as your body and health change.  If you tend to feel dizzy after you take medicine, avoid activity at that time. Try being active before you take your medicine. This will reduce your risk of falls.  If you plan to be active at home, make sure to clear your space before you get started. Remove things like TV cords, coffee tables, and throw rugs. It's safest to have plenty of space to move freely.  The key to getting more active is to take it slow and steady. Try to improve only a little bit at a time. Pick just one area to improve on at first. And if an activity hurts,  stop and talk to your doctor. Where can you learn more? Go to http://www.sandhu.com/ and enter P600 to learn more about \"Learning About Being Active as an Older Adult. \"  Current as of: October 10, 2022               Content Version: 13.5  © 0758-3840 Health[employer], [employer]. Care instructions adapted under license by [facility] ([facility]). If you have questions about a medical condition or this instruction, always ask your healthcare professional. [place] [address] any warranty or liability for your use of this information. Advance Directives: Care Instructions  Overview  An advance directive is a legal way to state your wishes at the end of your life. It tells your family and your doctor what to do if you can't say what you want. There are two main types of advance directives. You can change them any time your wishes change. Living will. This form tells your family and your doctor your wishes about life support and other treatment. The form is also called a declaration. Medical power of . This form lets you name a person to make treatment decisions for you when you can't speak for yourself. This person is called a health care agent (health care proxy, health care surrogate). The form is also called a durable power of  for health care. If you do not have an advance directive, decisions about your medical care may be made by a family member, or by a doctor or a  who doesn't know you. It may help to think of an advance directive as a gift to the people who care for you. If you have one, they won't have to make tough decisions by themselves. For more information, including forms for your state, see the [address] website (www.caringinfo.org/planning/advance-directives/). Follow-up care is a key part of your treatment and safety. Be sure to make and go to all appointments, and call your doctor if you are having problems.  It's also a good idea to know your test results and keep a list of the medicines you take. What should you include in an advance directive? Many states have a unique advance directive form. (It may ask you to address specific issues.) Or you might use a universal form that's approved by many states. If your form doesn't tell you what to address, it may be hard to know what to include in your advance directive. Use the questions below to help you get started. Who do you want to make decisions about your medical care if you are not able to? What life-support measures do you want if you have a serious illness that gets worse over time or can't be cured? What are you most afraid of that might happen? (Maybe you're afraid of having pain, losing your independence, or being kept alive by machines.)  Where would you prefer to die? (Your home? A hospital? A nursing home?)  Do you want to donate your organs when you die? Do you want certain Scientologist practices performed before you die? When should you call for help? Be sure to contact your doctor if you have any questions. Where can you learn more? Go to http://[employer]sandhu.com/ and enter R264 to learn more about \"Advance Directives: Care Instructions. \"  Current as of: June 16, 2022               Content Version: 13.5  © 2153-0851 Healthwise, Incorporated. Care instructions adapted under license by [facility] ([facility]). If you have questions about a medical condition or this instruction, always ask your healthcare professional. [place] [address] any warranty or liability for your use of this information. Starting a Weight Loss Plan: Care Instructions  Overview     If you're thinking about losing weight, it can be hard to know where to start. Your doctor can help you set up a weight loss plan that best meets your needs. You may want to take a class on nutrition or exercise, or you could join a weight loss support group.  If you have questions about how to make changes to your eating or exercise habits, ask your doctor about seeing a registered dietitian or an exercise specialist.  It can be a big challenge to lose weight. But you don't have to make huge changes at once. Make small changes, and stick with them. When those changes become habit, add a few more changes. If you don't think you're ready to make changes right now, try to pick a date in the future. Make an appointment to see your doctor to discuss whether the time is right for you to start a plan. Follow-up care is a key part of your treatment and safety. Be sure to make and go to all appointments, and call your doctor if you are having problems. It's also a good idea to know your test results and keep a list of the medicines you take. How can you care for yourself at home? Set realistic goals. Many people expect to lose much more weight than is likely. A weight loss of 5% to 10% of your body weight may be enough to improve your health. Get family and friends involved to provide support. Talk to them about why you are trying to lose weight, and ask them to help. They can help by participating in exercise and having meals with you, even if they may be eating something different. Find what works best for you. If you do not have time or do not like to cook, a program that offers meal replacement bars or shakes may be better for you. Or if you like to prepare meals, finding a plan that includes daily menus and recipes may be best.  Ask your doctor about other health professionals who can help you achieve your weight loss goals. A dietitian can help you make healthy changes in your diet. An exercise specialist or  can help you develop a safe and effective exercise program.  A counselor or psychiatrist can help you cope with issues such as depression, anxiety, or family problems that can make it hard to focus on weight loss.   Consider joining a support group for people who are trying to lose weight. Your doctor can suggest groups in your area. Where can you learn more? Go to http://www.woods.com/ and enter U357 to learn more about \"Starting a Weight Loss Plan: Care Instructions. \"  Current as of: August 25, 2022               Content Version: 13.5  © 7600-0394 [employer]. Care instructions adapted under license by [facility] ([facility]). If you have questions about a medical condition or this instruction, always ask your healthcare professional. [place] [address] any warranty or liability for your use of this information. A Healthy Heart: Care Instructions  Your Care Instructions     Coronary artery disease, also called heart disease, occurs when a substance called plaque builds up in the vessels that supply oxygen-rich blood to your heart muscle. This can narrow the blood vessels and reduce blood flow. A heart attack happens when blood flow is completely blocked. A high-fat diet, smoking, and other factors increase the risk of heart disease. Your doctor has found that you have a chance of having heart disease. You can do lots of things to keep your heart healthy. It may not be easy, but you can change your diet, exercise more, and quit smoking. These steps really work to lower your chance of heart disease. Follow-up care is a key part of your treatment and safety. Be sure to make and go to all appointments, and call your doctor if you are having problems. It's also a good idea to know your test results and keep a list of the medicines you take. How can you care for yourself at home? Diet    Use less salt when you cook and eat. This helps lower your blood pressure. Taste food before salting. Add only a little salt when you think you need it.  With time, your taste buds will adjust to less salt.     Eat fewer snack items, fast foods, canned soups, and other high-salt, high-fat, processed foods.     Read food labels and try to avoid saturated and trans fats. They increase your risk of heart disease by raising cholesterol levels.     Limit the amount of solid fat-butter, margarine, and shortening-you eat. Use olive, peanut, or canola oil when you cook. Bake, broil, and steam foods instead of frying them.     Eat a variety of fruit and vegetables every day. Dark green, deep orange, red, or yellow fruits and vegetables are especially good for you. Examples include spinach, carrots, peaches, and berries.     Foods high in fiber can reduce your cholesterol and provide important vitamins and minerals. High-fiber foods include whole-grain cereals and breads, oatmeal, beans, brown rice, citrus fruits, and apples.     Eat lean proteins. Heart-healthy proteins include seafood, lean meats and poultry, eggs, beans, peas, nuts, seeds, and soy products.     Limit drinks and foods with added sugar. These include candy, desserts, and soda pop. Lifestyle changes    If your doctor recommends it, get more exercise. Walking is a good choice. Bit by bit, increase the amount you walk every day. Try for at least 30 minutes on most days of the week. You also may want to swim, bike, or do other activities.     Do not smoke. If you need help quitting, talk to your doctor about stop-smoking programs and medicines. These can increase your chances of quitting for good. Quitting smoking may be the most important step you can take to protect your heart. It is never too late to quit.     Limit alcohol to 2 drinks a day for men and 1 drink a day for women. Too much alcohol can cause health problems.     Manage other health problems such as diabetes, high blood pressure, and high cholesterol. If you think you may have a problem with alcohol or drug use, talk to your doctor. Medicines    Take your medicines exactly as prescribed. Call your doctor if you think you are having a problem with your medicine.     If your doctor recommends aspirin, take the amount directed each day.  Make sure you take aspirin and not another kind of pain reliever, such as acetaminophen (Tylenol). When should you call for help? Call 911 if you have symptoms of a heart attack. These may include:    Chest pain or pressure, or a strange feeling in the chest.     Sweating.     Shortness of breath.     Pain, pressure, or a strange feeling in the back, neck, jaw, or upper belly or in one or both shoulders or arms.     Lightheadedness or sudden weakness.     A fast or irregular heartbeat. After you call 911, the  may tell you to chew 1 adult-strength or 2 to 4 low-dose aspirin. Wait for an ambulance. Do not try to drive yourself. Watch closely for changes in your health, and be sure to contact your doctor if you have any problems. Where can you learn more? Go to http://Greenpie.sandhu.com/ and enter F075 to learn more about \"A Healthy Heart: Care Instructions. \"  Current as of: September 7, 2022               Content Version: 13.5  © 2006-2022 [employer]. Care instructions adapted under license by [facility] ([facility]). If you have questions about a medical condition or this instruction, always ask your healthcare professional. [place] [address] any warranty or liability for your use of this information. Personalized Preventive Plan for Magdi Elizabeth - 3/6/2023  Medicare offers a range of preventive health benefits. Some of the tests and screenings are paid in full while other may be subject to a deductible, co-insurance, and/or copay. Some of these benefits include a comprehensive review of your medical history including lifestyle, illnesses that may run in your family, and various assessments and screenings as appropriate. After reviewing your medical record and screening and assessments performed today your provider may have ordered immunizations, labs, imaging, and/or referrals for you.   A list of these orders (if applicable) as well as your Preventive Care list are included within your After Visit Summary for your review.    Other Preventive Recommendations:    A preventive eye exam performed by an eye specialist is recommended every 1-2 years to screen for glaucoma; cataracts, macular degeneration, and other eye disorders.  A preventive dental visit is recommended every 6 months.  Try to get at least 150 minutes of exercise per week or 10,000 steps per day on a pedometer .  Order or download the FREE \"Exercise & Physical Activity: Your Everyday Guide\" from [employer]. Call 1-972.688.7007 or search [employer] online.  You need 9460-4661 mg of calcium and 2669-9551 IU of vitamin D per day. It is possible to meet your calcium requirement with diet alone, but a vitamin D supplement is usually necessary to meet this goal.  When exposed to the sun, use a sunscreen that protects against both UVA and UVB radiation with an SPF of 30 or greater. Reapply every 2 to 3 hours or after sweating, drying off with a towel, or swimming.  Always wear a seat belt when traveling in a car. Always wear a helmet when riding a bicycle or motorcycle.

## 2023-03-06 NOTE — PROGRESS NOTES
Medicare Annual Wellness Visit    Xiomara Gonzalez is here for Medicare AW     Recommendations for Preventive Services Due: see orders and patient instructions/AVS.  Recommended screening schedule for the next 5-10 years is provided to the patient in written form: see Patient Instructions/AVS.     Return for Medicare Annual Wellness Visit in 1 year.     Subjective     Patient's complete Health Risk Assessment and screening values have been reviewed and are found in Flowsheets. The following problems were reviewed today and where indicated follow up appointments were made and/or referrals ordered.    Positive Risk Factor Screenings with Interventions:                 Weight and Activity:  Physical Activity: Inactive    Days of Exercise per Week: 0 days    Minutes of Exercise per Session: 0 min     On average, how many days per week do you engage in moderate to strenuous exercise (like a brisk walk)?: 0 days  Have you lost any weight without trying in the past 3 months?: No  Body mass index: (!) 30.82      Inactivity Interventions:  Patient declined any further interventions or treatment  Obesity Interventions:  low carbohydrate diet           Advanced Directives:  Do you have a Living Will?: (!) No    Intervention:  has NO advanced directive - information provided          Objective   Vitals:    03/06/23 1308   BP: 138/80   Site: Right Upper Arm   Position: Sitting   Cuff Size: Large Adult   Pulse: 78   Resp: 18   SpO2: 98%   Weight: 191 lb (86.6 kg)   Height: 5' 6\" (1.676 m)      Body mass index is 30.83 kg/m².          Allergies   Allergen Reactions    Adhesive Tape      Latex Tape  Other reaction(s): Unknown    Poison Ivy Extract      Prior to Visit Medications    Medication Sig Taking? Authorizing Provider   amLODIPine (NORVASC) 2.5 MG tablet Take 1 tablet by mouth daily Yes Tequila Quintanilla MD   losartan (COZAAR) 100 MG tablet Take 1/2 (one-half) tablet by mouth twice daily Yes Tequila Quintanilla MD   carvedilol (COREG)  6.25 MG tablet Take 1 tablet by mouth twice daily Yes SAMMY Patel   glipiZIDE (GLUCOTROL) 5 MG tablet Take 1 tablet by mouth 2 times daily (before meals) Yes Aaliyah Escalona MD   pravastatin (PRAVACHOL) 40 MG tablet TAKE ONE TABLET BY MOUTH ONCE DAILY Yes Aaliyah Escalona MD   famotidine (PEPCID) 40 MG tablet Take 1 tablet by mouth every evening Yes Aaliyah Escalona MD   albuterol sulfate HFA (VENTOLIN HFA) 108 (90 Base) MCG/ACT inhaler Inhale 2 puffs into the lungs 4 times daily as needed for Wheezing Yes Amaury Vizcaino MD   tiotropium-olodaterol (STIOLTO RESPIMAT) 2.5-2.5 MCG/ACT AERS Inhale 2 puffs into the lungs daily Yes Juan Tay MD   triamterene-hydroCHLOROthiazide (MAXZIDE-25) 37.5-25 MG per tablet Take 1/2 tablet in am Yes Aaliyah Escalona MD   potassium chloride (KLOR-CON M) 10 MEQ extended release tablet Take 1 tablet by mouth daily Yes Aaliyah Escalona MD   magnesium 30 MG tablet Take 30 mg by mouth 2 times daily Yes Historical Provider, MD   Calcium-Vitamin D 600-200 MG-UNIT TABS Take by mouth every morning (before breakfast)  Yes Historical Provider, MD   aspirin 81 MG EC tablet Take 81 mg by mouth daily. Yes Historical Provider, MD   Ascorbic Acid (VITAMIN C) 500 MG tablet Take 500 mg by mouth daily. Yes Historical Provider, MD [place] (Including outside providers/suppliers regularly involved in providing care):   Patient Care Team:  Aaliyah Escalona MD as PCP - General (Internal Medicine)  Aaliyah Escalona MD as PCP - Empaneled Provider  Amaury Vizcaino MD as Consulting Physician (Pulmonary Disease)     Reviewed and updated this visit:  Tobacco  Allergies  Meds  Med Hx  Surg Hx  Soc Hx  Fam Hx         1. MAMMOGRAM: This is done by Dr. Anthony Victoria  2. SCREENING CSCOPE- was done in 2010, repeat 10 years 2020-patient now over 77, as per GI  3. BONE DENSITY SCREENING: August 2020- repeat 3 yrs  4. PAP SCREENING:NA  5. DIABETES SCREEN - FBS DONE/ ABOVE LABS  6.  CARDIOVASCULAR SCREENING- LIPID PANEL  DONE/ ABOVE LABS  7. PNEUMONIA VACCINATION= completed, her blood Pneumovax was 2011 and Prevnar was 2016  PPSV 23 3/2022  8. INFLUENZA VACCINATION: TO BE DONE IN FALL- received  9. HEP B VACCINATION- PT DECLINES  10. HIV/ HEP SCREENING- PT DECLINES  11. OPTOMETRY/OPTHALMOLOGY APPOINTMENT SUGGESTED FOR GLAUCOMA SCREENING- she states that she will make her own appointment for diabetic eye exam        HEALTH PLAN:  1. HEALTHY DIET: Lower carb diet, no added sugar discussed with patient  2. EXERCISE- restart slow walking exercises  3. NO INCREASED FALL RISK ON EXAM  Patient Instructions        Learning About Being Active as an Older Adult  Why is being active important as you get older? Being active is one of the best things you can do for your health. And it's never too late to start. Being active--or getting active, if you aren't already--has definite benefits. It can:  Give you more energy,  Keep your mind sharp. Improve balance to reduce your risk of falls. Help you manage chronic illness with fewer medicines. No matter how old you are, how fit you are, or what health problems you have, there is a form of activity that will work for you. And the more physical activity you can do, the better your overall health will be. What kinds of activity can help you stay healthy? Being more active will make your daily activities easier. Physical activity includes planned exercise and things you do in daily life. There are four types of activity:  Aerobic. Doing aerobic activity makes your heart and lungs strong. Includes walking, dancing, and gardening. Aim for at least 2½ hours spread throughout the week. It improves your energy and can help you sleep better. Muscle-strengthening. This type of activity can help maintain muscle and strengthen bones. Includes climbing stairs, using resistance bands, and lifting or carrying heavy loads. Aim for at least twice a week.   It can help protect the knees and other joints. Stretching. Stretching gives you better range of motion in joints and muscles. Includes upper arm stretches, calf stretches, and gentle yoga. Aim for at least twice a week, preferably after your muscles are warmed up from other activities. It can help you function better in daily life. Balancing. This helps you stay coordinated and have good posture. Includes heel-to-toe walking, muriel chi, and certain types of yoga. Aim for at least 3 days a week. It can reduce your risk of falling. Even if you have a hard time meeting the recommendations, it's better to be more active than less active. All activity done in each category counts toward your weekly total. You'd be surprised how daily things like carrying groceries, keeping up with grandchildren, and taking the stairs can add up. What keeps you from being active? If you've had a hard time being more active, you're not alone. Maybe you remember being able to do more. Or maybe you've never thought of yourself as being active. It's frustrating when you can't do the things you want. Being more active can help. What's holding you back? Getting started. Have a goal, but break it into easy tasks. Small steps build into big accomplishments. Staying motivated. If you feel like skipping your activity, remember your goal. Maybe you want to move better and stay independent. Every activity gets you one step closer. Not feeling your best.  Start with 5 minutes of an activity you enjoy. Prove to yourself you can do it. As you get comfortable, increase your time. You may not be where you want to be. But you're in the process of getting there. Everyone starts somewhere. How can you find safe ways to stay active? Talk with your doctor about any physical challenges you're facing. Make a plan with your doctor if you have a health problem or aren't sure how to get started with activity.   If you're already active, ask your doctor if there is anything you should change to stay safe as your body and health change. If you tend to feel dizzy after you take medicine, avoid activity at that time. Try being active before you take your medicine. This will reduce your risk of falls. If you plan to be active at home, make sure to clear your space before you get started. Remove things like TV cords, coffee tables, and throw rugs. It's safest to have plenty of space to move freely. The key to getting more active is to take it slow and steady. Try to improve only a little bit at a time. Pick just one area to improve on at first. And if an activity hurts, stop and talk to your doctor. Where can you learn more? Go to http://www.sandhu.com/ and enter P600 to learn more about \"Learning About Being Active as an Older Adult. \"  Current as of: October 10, 2022               Content Version: 13.5  © 9472-9553 [employer]. Care instructions adapted under license by [facility] ([facility]). If you have questions about a medical condition or this instruction, always ask your healthcare professional. [place] [address] any warranty or liability for your use of this information. Advance Directives: Care Instructions  Overview  An advance directive is a legal way to state your wishes at the end of your life. It tells your family and your doctor what to do if you can't say what you want. There are two main types of advance directives. You can change them any time your wishes change. Living will. This form tells your family and your doctor your wishes about life support and other treatment. The form is also called a declaration. Medical power of . This form lets you name a person to make treatment decisions for you when you can't speak for yourself. This person is called a health care agent (health care proxy, health care surrogate). The form is also called a durable power of  for health care.   If you do not have an advance directive, decisions about your medical care may be made by a family member, or by a doctor or a  who doesn't know you. It may help to think of an advance directive as a gift to the people who care for you. If you have one, they won't have to make tough decisions by themselves. For more information, including forms for your state, see the [address] website (www.caringinfo.org/planning/advance-directives/). Follow-up care is a key part of your treatment and safety. Be sure to make and go to all appointments, and call your doctor if you are having problems. It's also a good idea to know your test results and keep a list of the medicines you take. What should you include in an advance directive? Many states have a unique advance directive form. (It may ask you to address specific issues.) Or you might use a universal form that's approved by many states. If your form doesn't tell you what to address, it may be hard to know what to include in your advance directive. Use the questions below to help you get started. Who do you want to make decisions about your medical care if you are not able to? What life-support measures do you want if you have a serious illness that gets worse over time or can't be cured? What are you most afraid of that might happen? (Maybe you're afraid of having pain, losing your independence, or being kept alive by machines.)  Where would you prefer to die? (Your home? A hospital? A nursing home?)  Do you want to donate your organs when you die? Do you want certain [employer] practices performed before you die? When should you call for help? Be sure to contact your doctor if you have any questions. Where can you learn more? Go to http://www.sandhu.com/ and enter R264 to learn more about \"Advance Directives: Care Instructions. \"  Current as of: June 16, 2022               Content Version: 13.5  © 4965-7835 [employer].    Care instructions adapted under license by [facility] ([facility]). If you have questions about a medical condition or this instruction, always ask your healthcare professional. [address] any warranty or liability for your use of this information. Starting a Weight Loss Plan: Care Instructions  Overview     If you're thinking about losing weight, it can be hard to know where to start. Your doctor can help you set up a weight loss plan that best meets your needs. You may want to take a class on nutrition or exercise, or you could join a weight loss support group. If you have questions about how to make changes to your eating or exercise habits, ask your doctor about seeing a registered dietitian or an exercise specialist.  It can be a big challenge to lose weight. But you don't have to make huge changes at once. Make small changes, and stick with them. When those changes become habit, add a few more changes. If you don't think you're ready to make changes right now, try to pick a date in the future. Make an appointment to see your doctor to discuss whether the time is right for you to start a plan. Follow-up care is a key part of your treatment and safety. Be sure to make and go to all appointments, and call your doctor if you are having problems. It's also a good idea to know your test results and keep a list of the medicines you take. How can you care for yourself at home? Set realistic goals. Many people expect to lose much more weight than is likely. A weight loss of 5% to 10% of your body weight may be enough to improve your health. Get family and friends involved to provide support. Talk to them about why you are trying to lose weight, and ask them to help. They can help by participating in exercise and having meals with you, even if they may be eating something different. Find what works best for you.  If you do not have time or do not like to cook, a program that offers meal replacement bars or shakes may be better for you. Or if you like to prepare meals, finding a plan that includes daily menus and recipes may be best.  Ask your doctor about other health professionals who can help you achieve your weight loss goals. A dietitian can help you make healthy changes in your diet. An exercise specialist or  can help you develop a safe and effective exercise program.  A counselor or psychiatrist can help you cope with issues such as depression, anxiety, or family problems that can make it hard to focus on weight loss. Consider joining a support group for people who are trying to lose weight. Your doctor can suggest groups in your area. Where can you learn more? Go to http://www.woods.com/ and enter U357 to learn more about \"Starting a Weight Loss Plan: Care Instructions. \"  Current as of: August 25, 2022               Content Version: 13.5  © 7037-2428 [employer]. Care instructions adapted under license by [facility] ([facility]). If you have questions about a medical condition or this instruction, always ask your healthcare professional. [address] any warranty or liability for your use of this information. A Healthy Heart: Care Instructions  Your Care Instructions     Coronary artery disease, also called heart disease, occurs when a substance called plaque builds up in the vessels that supply oxygen-rich blood to your heart muscle. This can narrow the blood vessels and reduce blood flow. A heart attack happens when blood flow is completely blocked. A high-fat diet, smoking, and other factors increase the risk of heart disease. Your doctor has found that you have a chance of having heart disease. You can do lots of things to keep your heart healthy. It may not be easy, but you can change your diet, exercise more, and quit smoking. These steps really work to lower your chance of heart disease.   Follow-up care is a key part of your treatment and safety. Be sure to make and go to all appointments, and call your doctor if you are having problems. It's also a good idea to know your test results and keep a list of the medicines you take.  How can you care for yourself at home?  Diet    Use less salt when you cook and eat. This helps lower your blood pressure. Taste food before salting. Add only a little salt when you think you need it. With time, your taste buds will adjust to less salt.     Eat fewer snack items, fast foods, canned soups, and other high-salt, high-fat, processed foods.     Read food labels and try to avoid saturated and trans fats. They increase your risk of heart disease by raising cholesterol levels.     Limit the amount of solid fat-butter, margarine, and shortening-you eat. Use olive, peanut, or canola oil when you cook. Bake, broil, and steam foods instead of frying them.     Eat a variety of fruit and vegetables every day. Dark green, deep orange, red, or yellow fruits and vegetables are especially good for you. Examples include spinach, carrots, peaches, and berries.     Foods high in fiber can reduce your cholesterol and provide important vitamins and minerals. High-fiber foods include whole-grain cereals and breads, oatmeal, beans, brown rice, citrus fruits, and apples.     Eat lean proteins. Heart-healthy proteins include seafood, lean meats and poultry, eggs, beans, peas, nuts, seeds, and soy products.     Limit drinks and foods with added sugar. These include candy, desserts, and soda pop.   Lifestyle changes    If your doctor recommends it, get more exercise. Walking is a good choice. Bit by bit, increase the amount you walk every day. Try for at least 30 minutes on most days of the week. You also may want to swim, bike, or do other activities.     Do not smoke. If you need help quitting, talk to your doctor about stop-smoking programs and medicines. These can increase your chances of quitting for good. Quitting  smoking may be the most important step you can take to protect your heart. It is never too late to quit. Limit alcohol to 2 drinks a day for men and 1 drink a day for women. Too much alcohol can cause health problems. Manage other health problems such as diabetes, high blood pressure, and high cholesterol. If you think you may have a problem with alcohol or drug use, talk to your doctor. Medicines    Take your medicines exactly as prescribed. Call your doctor if you think you are having a problem with your medicine. If your doctor recommends aspirin, take the amount directed each day. Make sure you take aspirin and not another kind of pain reliever, such as acetaminophen (Tylenol). When should you call for help? Call 911 if you have symptoms of a heart attack. These may include:    Chest pain or pressure, or a strange feeling in the chest.     Sweating. Shortness of breath. Pain, pressure, or a strange feeling in the back, neck, jaw, or upper belly or in one or both shoulders or arms. Lightheadedness or sudden weakness. A fast or irregular heartbeat. After you call 911, the  may tell you to chew 1 adult-strength or 2 to 4 low-dose aspirin. Wait for an ambulance. Do not try to drive yourself. Watch closely for changes in your health, and be sure to contact your doctor if you have any problems. Where can you learn more? Go to http://www.sandhu.com/ and enter F075 to learn more about \"A Healthy Heart: Care Instructions. \"  Current as of: September 7, 2022               Content Version: 13.5  © 2006-2022 Healthwise[employer]. Care instructions adapted under license by [facility] ([facility]). If you have questions about a medical condition or this instruction, always ask your healthcare professional. [place] [address] any warranty or liability for your use of this information.       Personalized Preventive Plan for Liz Todd Pablito 3/6/2023  Medicare offers a range of preventive health benefits. Some of the tests and screenings are paid in full while other may be subject to a deductible, co-insurance, and/or copay. Some of these benefits include a comprehensive review of your medical history including lifestyle, illnesses that may run in your family, and various assessments and screenings as appropriate. After reviewing your medical record and screening and assessments performed today your provider may have ordered immunizations, labs, imaging, and/or referrals for you. A list of these orders (if applicable) as well as your Preventive Care list are included within your After Visit Summary for your review. Other Preventive Recommendations:    A preventive eye exam performed by an eye specialist is recommended every 1-2 years to screen for glaucoma; cataracts, macular degeneration, and other eye disorders. A preventive dental visit is recommended every 6 months. Try to get at least 150 minutes of exercise per week or 10,000 steps per day on a pedometer . Order or download the FREE \"Exercise & Physical Activity: Your Everyday Guide\" from [employer]. Call 0-507.207.6979 or search [employer] online. You need 6602-4023 mg of calcium and 4637-0004 IU of vitamin D per day. It is possible to meet your calcium requirement with diet alone, but a vitamin D supplement is usually necessary to meet this goal.  When exposed to the sun, use a sunscreen that protects against both UVA and UVB radiation with an SPF of 30 or greater. Reapply every 2 to 3 hours or after sweating, drying off with a towel, or swimming. Always wear a seat belt when traveling in a car. Always wear a helmet when riding a bicycle or motorcycle.    Yoli Miller MD

## 2023-03-07 ENCOUNTER — OFFICE VISIT (OUTPATIENT)
Dept: CARDIOLOGY CLINIC | Age: 83
End: 2023-03-07
Payer: MEDICARE

## 2023-03-07 VITALS
OXYGEN SATURATION: 97 % | BODY MASS INDEX: 30.86 KG/M2 | WEIGHT: 192 LBS | DIASTOLIC BLOOD PRESSURE: 76 MMHG | HEART RATE: 79 BPM | SYSTOLIC BLOOD PRESSURE: 132 MMHG | HEIGHT: 66 IN

## 2023-03-07 DIAGNOSIS — I10 ESSENTIAL HYPERTENSION: ICD-10-CM

## 2023-03-07 PROCEDURE — 1123F ACP DISCUSS/DSCN MKR DOCD: CPT | Performed by: INTERNAL MEDICINE

## 2023-03-07 PROCEDURE — G8399 PT W/DXA RESULTS DOCUMENT: HCPCS | Performed by: INTERNAL MEDICINE

## 2023-03-07 PROCEDURE — G8417 CALC BMI ABV UP PARAM F/U: HCPCS | Performed by: INTERNAL MEDICINE

## 2023-03-07 PROCEDURE — 1036F TOBACCO NON-USER: CPT | Performed by: INTERNAL MEDICINE

## 2023-03-07 PROCEDURE — 3074F SYST BP LT 130 MM HG: CPT | Performed by: INTERNAL MEDICINE

## 2023-03-07 PROCEDURE — 1090F PRES/ABSN URINE INCON ASSESS: CPT | Performed by: INTERNAL MEDICINE

## 2023-03-07 PROCEDURE — G8427 DOCREV CUR MEDS BY ELIG CLIN: HCPCS | Performed by: INTERNAL MEDICINE

## 2023-03-07 PROCEDURE — 99214 OFFICE O/P EST MOD 30 MIN: CPT | Performed by: INTERNAL MEDICINE

## 2023-03-07 PROCEDURE — 3078F DIAST BP <80 MM HG: CPT | Performed by: INTERNAL MEDICINE

## 2023-03-07 PROCEDURE — G8484 FLU IMMUNIZE NO ADMIN: HCPCS | Performed by: INTERNAL MEDICINE

## 2023-03-07 RX ORDER — LOSARTAN POTASSIUM 50 MG/1
50 TABLET ORAL DAILY
Qty: 90 TABLET | Refills: 0 | Status: SHIPPED | OUTPATIENT
Start: 2023-03-07 | End: 2023-06-05

## 2023-03-07 RX ORDER — CARVEDILOL 6.25 MG/1
12.5 TABLET ORAL 2 TIMES DAILY
Qty: 180 TABLET | Refills: 2 | Status: SHIPPED | OUTPATIENT
Start: 2023-03-07

## 2023-03-07 NOTE — PROGRESS NOTES
Cardiology Office Visit Note  [address]  [id number]  Phone: [phone]  Fax: [phone]                            Date:  3/7/2023  Patient: Alvaro Ornelas  Age:  81 y. o., 1940    Referral: No ref. provider found    REASON FOR VISIT:  3 Month Follow-Up (SOB (shortness of breath).  Patient has no current cardiac complaints.)         PROBLEM LIST:    Patient Active Problem List    Diagnosis Date Noted    Panlobular emphysema ([facility] [address] 01/09/2023     Priority: Medium    Wheezing 01/09/2023     Priority: Medium    Pulmonary fibrosis ([facility] [address] 06/02/2022     Priority: Medium    Restrictive lung disease 06/02/2022     Priority: Medium    Hypertensive renal disease 05/11/2022     Priority: Medium    Age-related cataract 12/27/2019     Priority: Medium    Presence of intraocular lens 12/27/2019     Priority: Medium    Diastolic dysfunction 83/18/6257     Priority: Low    Type 2 diabetes mellitus with diabetic neuropathy 07/26/2021     Priority: Low    Essential hypertension 02/11/2019     Priority: Low    Elevated TSH 08/07/2018     Priority: Low    Vitamin D deficiency 09/10/2017     Priority: Low    Pure hypercholesterolemia 09/10/2017     Priority: Low    Type 2 diabetes mellitus without complication, without long-term current use of insulin ([facility] [address] 09/10/2017     Priority: Low    Localized osteoporosis without current pathological fracture 09/10/2017     Priority: Low     Overview Note:     2017 hip neck -2.6; lspine -1/8  8/2020 hip neck -2.6/ L spine L1 -1.6      Generalized anxiety disorder 09/10/2017     Priority: Low    Former smoker 09/10/2017     Priority: Low    Numbness 04/07/2017     Priority: Low    Imbalance 04/07/2017     Priority: Low    Estrogen receptor negative tumor status 08/06/2008     Priority: Low    Chronic kidney disease, stage 3b ([facility] [address] 03/09/2022    READ (dyspnea on exertion) 11/24/2021         PRESENTATION: Alvaro Ornelas is a 81y.o. year old female evaluated today for routine cardiology follow-up. Her past medical history is notable for hypertension, chronic kidney disease, obesity and remote history of breast cancer status postlumpectomy and chemotherapy as well as radiation therapy over 10 years ago. Review of her records notable for stress test which was mildly abnormal.  She subsequently had a diagnostic angiogram which showed nonobstructive coronary artery disease. Echocardiogram around the same time was largely reassuring with ejection fraction 50 to 55% without evidence for major valvulopathy, cardiomyopathy or pericardial effusion. She did have evidence for mild pulmonary hypertension. She was recommended to follow-up with pulmonary medicine. She has since been diagnosed with pulmonary fibrosis and restrictive lung disease, currently on bronchial inhalers with some improvement in chronic shortness of breath. Her energy waxes and wanes. No reports of chest pain. REVIEW OF SYSTEMS:  Review of Systems   Constitutional:  Negative for chills, fatigue and fever. HENT:  Negative for congestion, facial swelling, hearing loss and sore throat. Eyes:  Negative for pain, discharge, redness and visual disturbance. Respiratory:  Negative for apnea, cough, chest tightness, shortness of breath and wheezing. Cardiovascular:  Negative for chest pain, palpitations and leg swelling. Gastrointestinal:  Negative for abdominal distention, abdominal pain, blood in stool, constipation, diarrhea, nausea and vomiting. Endocrine: Negative for polydipsia, polyphagia and polyuria. Genitourinary:  Negative for dysuria, flank pain, frequency and hematuria. Musculoskeletal:  Negative for joint swelling, myalgias and neck pain. Skin:  Negative for pallor and rash. Neurological:  Negative for dizziness, syncope, speech difficulty, light-headedness, numbness and headaches.    Psychiatric/Behavioral:  Negative for confusion, hallucinations and sleep disturbance.       Past Medical History:      Diagnosis Date    Arthritis     Back pain     Breast cancer ([facility] [address]     left 2008    Chronic kidney disease     Concussion     History of therapeutic radiation     Hx antineoplastic chemo     Hyperlipidemia     Hypertension     Osteoporosis     Panlobular emphysema ([facility] [address] 1/9/2023    Pneumonia     Type II or unspecified type diabetes mellitus without mention of complication, not stated as uncontrolled     diet controlled    Varicose veins     Wears glasses        Past Surgical History:      Procedure Laterality Date    BREAST BIOPSY  08/06/2008    U/S Guided Mammotome Biopsy Left Breast x 2  infiltrating ductal carcinoma, grade III, ER/NV negative    BREAST BIOPSY  08/05/2009    Stereotactic biopsy right breast  No evidence of malignancy    BREAST BIOPSY Left 08/2015    BREAST LUMPECTOMY      CATARACT REMOVAL Right 2021    COLONOSCOPY      DIAGNOSTIC CARDIAC CATH LAB PROCEDURE      EYE SURGERY      cataract removal    MASTECTOMY, PARTIAL  08/28/2008     Left partial mastectomy and left sentinel node biopsy  2.6 cm infiltrating ductal carcinoma, grade III, 0/2 nodes positive, immunohistochemistry negative for micrometastasis       Medications:  Current Outpatient Medications   Medication Sig Dispense Refill    amLODIPine (NORVASC) 2.5 MG tablet Take 1 tablet by mouth daily 90 tablet 1    losartan (COZAAR) 100 MG tablet Take 1/2 (one-half) tablet by mouth twice daily 90 tablet 0    carvedilol (COREG) 6.25 MG tablet Take 1 tablet by mouth twice daily 180 tablet 2    glipiZIDE (GLUCOTROL) 5 MG tablet Take 1 tablet by mouth 2 times daily (before meals) 90 tablet 1    pravastatin (PRAVACHOL) 40 MG tablet TAKE ONE TABLET BY MOUTH ONCE DAILY 90 tablet 1    famotidine (PEPCID) 40 MG tablet Take 1 tablet by mouth every evening 30 tablet 3    albuterol sulfate HFA (VENTOLIN HFA) 108 (90 Base) MCG/ACT inhaler Inhale 2 puffs into the lungs 4 times daily as needed for Wheezing 18 g 5 tiotropium-olodaterol (STIOLTO RESPIMAT) 2.5-2.5 MCG/ACT AERS Inhale 2 puffs into the lungs daily 1 each 11    triamterene-hydroCHLOROthiazide (MAXZIDE-25) 37.5-25 MG per tablet Take 1/2 tablet in am 30 tablet 3    potassium chloride (KLOR-CON M) 10 MEQ extended release tablet Take 1 tablet by mouth daily 90 tablet 1    magnesium 30 MG tablet Take 30 mg by mouth 2 times daily      Calcium-Vitamin D 600-200 MG-UNIT TABS Take by mouth every morning (before breakfast)       aspirin 81 MG EC tablet Take 81 mg by mouth daily. Ascorbic Acid (VITAMIN C) 500 MG tablet Take 500 mg by mouth daily. No current facility-administered medications for this visit. Allergies:  Adhesive tape and Poison ivy extract    Social History:  Social History     Occupational History    Not on file   Tobacco Use    Smoking status: Former     Packs/day: 1.00     Years: 25.00     Pack years: 25.00     Types: Pipe, Cigarettes, Cigars     Quit date: 1979     Years since quittin.1    Smokeless tobacco: Never   Vaping Use    Vaping Use: Never used   Substance and Sexual Activity    Alcohol use: No     Comment: occ    Drug use: No    Sexual activity: Not on file         Family History:       Problem Relation Age of Onset    Heart Disease Father     Diabetes Father     Hypertension Mother          Physical Examination:  /76   Pulse 79   Ht 5' 6\" (1.676 m)   Wt 192 lb (87.1 kg)   SpO2 97%   BMI 30.99 kg/m²   Physical Exam  Vitals reviewed. Constitutional:       General: She is not in acute distress. Appearance: She is not ill-appearing, toxic-appearing or diaphoretic. HENT:      Head: Normocephalic and atraumatic. Eyes:      General: No scleral icterus. Right eye: No discharge. Left eye: No discharge. Conjunctiva/sclera: Conjunctivae normal.   Neck:      Vascular: No carotid bruit. Cardiovascular:      Rate and Rhythm: Normal rate and regular rhythm. No extrasystoles are present. Chest Wall: PMI is not displaced. No thrill. Heart sounds: S1 normal and S2 normal. No murmur heard. No friction rub. No gallop. Pulmonary:      Effort: Pulmonary effort is normal. No tachypnea or respiratory distress. Breath sounds: Normal breath sounds. No stridor. No wheezing, rhonchi or rales. Chest:      Chest wall: No tenderness. Abdominal:      General: Bowel sounds are normal. There is no distension. Palpations: Abdomen is soft. There is no mass. Tenderness: There is no abdominal tenderness. There is no guarding. Musculoskeletal:         General: No swelling. Cervical back: Normal range of motion and neck supple. No rigidity. Right lower leg: No edema. Left lower leg: No edema. Skin:     General: Skin is warm and dry. Coloration: Skin is not jaundiced. Findings: No erythema or rash. Neurological:      General: No focal deficit present. Mental Status: She is alert and oriented to person, place, and time. Mental status is at baseline. Psychiatric:         Mood and Affect: Mood normal.         Behavior: Behavior normal.         Thought Content:  Thought content normal.         Labs:   CBC: No results found for: CBC   BMP: No results found for: BMP    BNP: No results found for: BNPINT   PT/INR:   Prothrombin Time   Date Value Ref Range Status   07/23/2011 11.20 9.7 - 12.5 SEC Final     INR   Date Value Ref Range Status   07/23/2011 1.03 0.90 - 1.10 Final     Comment:     INR  < or = 1.3  Normal  INR = 2.0 - 3.0  Therapeutic  INR = 2.5 - 3.5  Therapeutic for patients with mechanical                   prosthetic heart valve  MI prophylaxis  & MI prophylaxisINR  > or = 3.5  Abnormal/Elevated  INR  > or = 5.0  Critical (requires immediate physician                   notification)       APTT:  No results found for: APTT   CARDIAC ENZYMES:   Troponin I   Date Value Ref Range Status   07/24/2011 < 0.01 0.000 - 0.780 NG/ML Final     Comment:     0-0.10 ng/ml     Reference range for individuals without                   ischemic myocardial disease. Clinical                   correlation suggested. 0.11-.78 ng/ml   Values of troponin in this range suggest                   that a repeat assay be drawn 6-8 hours                   after the current specimen. 0.79-7.5 ng/ml   Values in this range suggest myocardial                   injury. Peak troponin concentrations                   between this range are associated with                    unstable angina and percutaneous coronary                   intervention. >or =7.5 ng/ml   In the absence of external chest trauma                   values of the troponin concentration in                   this range suggest myocardial infarction. LIPID PANEL: No results found for: Abrahambranden Bautista  LIVER PROFILE:   AST   Date Value Ref Range Status   03/02/2023 15 5 - 32 U/L Final     ALT   Date Value Ref Range Status   03/02/2023 8 5 - 33 U/L Final     Albumin   Date Value Ref Range Status   03/02/2023 3.7 3.5 - 5.2 g/dL Final              2D echocardiogram with Doppler with color 7/2021  Summary  Normal left ventricular size with an estimated ejection fraction of approximately 50-55%. Mild left ventricular apical wall hypokinesis. No evidence of left ventricular mass or thrombus noted. Mild concentric left ventricular hypertrophy. E/A flow reversal noted, suggestive for abnormal early relaxation without evidence for clinically significant diastolic dysfunction. Mild dilation of right ventricle with mild RV dysfunction. Mild tricuspid regurgitation with estimated RVSP of 42 mm Hg suggestive for mild pulmonary hypertension. Aortic root and ascending aorta are within normal limits. No evidence of significant pericardial effusion is noted. No previous studies available for comparison.     Signature  Electronically signed by Linus Frias(Interpreting physician) on 07/30/2021 10:44 AM          ASSESSMENT and PLAN: Pulmonary fibrosis with chronic intermittent shortness of breath, clinically improved on chronic bronchial inhaler therapies. Mild pulmonary hypertension, last known RVSP 42 mmHg with mild RV dilation and mild RV dysfunction  Following closely with pulmonary medicine    Essential hypertension, goal blood pressure less than 130/80  Continue amlodipine  Increase carvedilol to 12.5 mg p.o. twice daily  Discontinue triamterene hydrochlorothiazide   Decrease losartan dose  Above changes in light of deteriorating renal function  Home blood pressure monitoring  Low-sodium diet, less than 2 g per 24 hours     Dyslipidemia  Goal LDL less than 100  Continue pravastatin  LFT and lipid monitoring as per primary care provider    Status post diagnostic cardiac angiogram 10/2021, mild coronary irregularities. Chronic kidney disease, stage III  Creatinine 1.2 >>> 1.6, BUN within normal limits  Changes as above; losartan dose decreased, triamterene hydrochlorothiazide discontinued  Follow-up repeat labs with PCP             Electronically signed by Josie Benz MD on 3/7/2023 at 9:29 AM    Josie Benz MD, ALFRED, [facility] [place]  Noninvasive Cardiology Consultant    This dictation was generated by voice recognition computer software. Although all attempts are made to edit the dictation for accuracy, there may be errors in the transcription that are not intended.

## 2023-03-08 ASSESSMENT — ENCOUNTER SYMPTOMS
WHEEZING: 0
DIARRHEA: 0
VOMITING: 0
CONSTIPATION: 0
EYE PAIN: 0
BLOOD IN STOOL: 0
SHORTNESS OF BREATH: 0
APNEA: 0
SORE THROAT: 0
FACIAL SWELLING: 0
CHEST TIGHTNESS: 0
COUGH: 0
EYE REDNESS: 0
EYE DISCHARGE: 0
ABDOMINAL PAIN: 0
ABDOMINAL DISTENTION: 0
NAUSEA: 0

## 2023-03-09 ENCOUNTER — TELEPHONE (OUTPATIENT)
Dept: INTERNAL MEDICINE | Age: 83
End: 2023-03-09

## 2023-03-09 DIAGNOSIS — N34.2 INFECTIVE URETHRITIS: Primary | ICD-10-CM

## 2023-03-09 NOTE — TELEPHONE ENCOUNTER
Pt called in wanting to know her Urine results from her appt on 03/07/2023. Looks like we placed the orders, and the pt did give a sample while at the office. The results are not in there. Called over to the lab and they do not have a urine sample there. . Lab said for pt to come back and redo this urine. Tried to call the pt to inform her of this but her Phone is not letting the call go through.

## 2023-04-07 NOTE — TELEPHONE ENCOUNTER
Chantelle Baird called to request a refill on her medication.       Last office visit : 3/6/2023   Next office visit : 7/12/2023     Requested Prescriptions     Pending Prescriptions Disp Refills    glipiZIDE (GLUCOTROL) 5 MG tablet [Pharmacy Med Name: glipiZIDE 5 MG Oral Tablet] 90 tablet 0     Sig: Take 1/2 (one-half) tablet by mouth twice daily            Laine Marsh

## 2023-04-09 RX ORDER — GLIPIZIDE 5 MG/1
TABLET ORAL
Qty: 90 TABLET | Refills: 0 | Status: SHIPPED | OUTPATIENT
Start: 2023-04-09

## 2023-04-11 DIAGNOSIS — I10 ESSENTIAL HYPERTENSION: ICD-10-CM

## 2023-04-11 LAB
ANION GAP SERPL CALCULATED.3IONS-SCNC: 7 MMOL/L (ref 7–19)
BUN SERPL-MCNC: 22 MG/DL (ref 8–23)
CALCIUM SERPL-MCNC: 9.5 MG/DL (ref 8.8–10.2)
CHLORIDE SERPL-SCNC: 106 MMOL/L (ref 98–111)
CO2 SERPL-SCNC: 29 MMOL/L (ref 22–29)
CREAT SERPL-MCNC: 1.4 MG/DL (ref 0.5–0.9)
GLUCOSE SERPL-MCNC: 103 MG/DL (ref 74–109)
POTASSIUM SERPL-SCNC: 3.9 MMOL/L (ref 3.5–5)
SODIUM SERPL-SCNC: 142 MMOL/L (ref 136–145)

## 2023-04-19 ENCOUNTER — INFUSION (OUTPATIENT)
Dept: ONCOLOGY | Facility: CLINIC | Age: 83
End: 2023-04-19
Payer: MEDICARE

## 2023-04-19 DIAGNOSIS — C50.112 MALIGNANT NEOPLASM OF CENTRAL PORTION OF LEFT FEMALE BREAST, UNSPECIFIED ESTROGEN RECEPTOR STATUS: ICD-10-CM

## 2023-04-19 DIAGNOSIS — Z45.2 ENCOUNTER FOR CARE RELATED TO VASCULAR ACCESS PORT: Primary | ICD-10-CM

## 2023-04-19 RX ORDER — SODIUM CHLORIDE 0.9 % (FLUSH) 0.9 %
10 SYRINGE (ML) INJECTION AS NEEDED
Status: DISCONTINUED | OUTPATIENT
Start: 2023-04-19 | End: 2023-04-19 | Stop reason: HOSPADM

## 2023-04-19 RX ORDER — HEPARIN SODIUM (PORCINE) LOCK FLUSH IV SOLN 100 UNIT/ML 100 UNIT/ML
500 SOLUTION INTRAVENOUS AS NEEDED
OUTPATIENT
Start: 2023-04-19

## 2023-04-19 RX ORDER — SODIUM CHLORIDE 0.9 % (FLUSH) 0.9 %
10 SYRINGE (ML) INJECTION AS NEEDED
OUTPATIENT
Start: 2023-04-19

## 2023-04-19 RX ORDER — HEPARIN SODIUM (PORCINE) LOCK FLUSH IV SOLN 100 UNIT/ML 100 UNIT/ML
500 SOLUTION INTRAVENOUS AS NEEDED
Status: DISCONTINUED | OUTPATIENT
Start: 2023-04-19 | End: 2023-04-19 | Stop reason: HOSPADM

## 2023-04-19 RX ADMIN — Medication 10 ML: at 13:13

## 2023-04-19 RX ADMIN — HEPARIN SODIUM (PORCINE) LOCK FLUSH IV SOLN 100 UNIT/ML 500 UNITS: 100 SOLUTION at 13:14

## 2023-05-09 ENCOUNTER — TELEPHONE (OUTPATIENT)
Dept: ONCOLOGY | Facility: CLINIC | Age: 83
End: 2023-05-09

## 2023-05-09 NOTE — TELEPHONE ENCOUNTER
Caller: Lorena Valdes    Relationship: Self    Best call back number: [phone]    What is the best time to reach you: ASAP    Who are you requesting to speak with (clinical staff, provider,  specific staff member): SCHEDULING    What was the call regarding: IS THE 5/15 LAB NEEDED, SINCE THE PT WILL NOW BE COMING IN FOR LAB AND PORT FLUSH 5/30?  PT WANTS TO CANCEL THE 5/15 LAB PLEASE.    Do you require a callback: ONLY IF NEEDED.  PT WILL NOT BE COMING FOR LAB ON 5/15 UNLESS SHE HEARS OTHERWISE.

## 2023-05-30 ENCOUNTER — LAB (OUTPATIENT)
Dept: LAB | Facility: HOSPITAL | Age: 83
End: 2023-05-30

## 2023-05-30 DIAGNOSIS — N18.32 CHRONIC KIDNEY DISEASE, STAGE 3B: ICD-10-CM

## 2023-05-30 DIAGNOSIS — Z17.1 MALIGNANT NEOPLASM OF CENTRAL PORTION OF LEFT BREAST IN FEMALE, ESTROGEN RECEPTOR NEGATIVE: Primary | ICD-10-CM

## 2023-05-30 DIAGNOSIS — C50.112 MALIGNANT NEOPLASM OF CENTRAL PORTION OF LEFT BREAST IN FEMALE, ESTROGEN RECEPTOR NEGATIVE: Primary | ICD-10-CM

## 2023-05-30 LAB
ALBUMIN SERPL-MCNC: 3.8 G/DL (ref 3.5–5.2)
ALBUMIN/GLOB SERPL: 0.9 G/DL
ALP SERPL-CCNC: 71 U/L (ref 39–117)
ALT SERPL W P-5'-P-CCNC: 8 U/L (ref 1–33)
ANION GAP SERPL CALCULATED.3IONS-SCNC: 11 MMOL/L (ref 5–15)
AST SERPL-CCNC: 18 U/L (ref 1–32)
BASOPHILS # BLD AUTO: 0.05 10*3/MM3 (ref 0–0.2)
BASOPHILS NFR BLD AUTO: 0.5 % (ref 0–1.5)
BILIRUB SERPL-MCNC: 0.4 MG/DL (ref 0–1.2)
BUN SERPL-MCNC: 22 MG/DL (ref 8–23)
BUN/CREAT SERPL: 20.4 (ref 7–25)
CALCIUM SPEC-SCNC: 8.8 MG/DL (ref 8.6–10.5)
CEA SERPL-MCNC: 2.8 NG/ML
CHLORIDE SERPL-SCNC: 104 MMOL/L (ref 98–107)
CO2 SERPL-SCNC: 24 MMOL/L (ref 22–29)
CREAT SERPL-MCNC: 1.08 MG/DL (ref 0.57–1)
DEPRECATED RDW RBC AUTO: 47.3 FL (ref 37–54)
EGFRCR SERPLBLD CKD-EPI 2021: 51.7 ML/MIN/1.73
EOSINOPHIL # BLD AUTO: 0.21 10*3/MM3 (ref 0–0.4)
EOSINOPHIL NFR BLD AUTO: 2.2 % (ref 0.3–6.2)
ERYTHROCYTE [DISTWIDTH] IN BLOOD BY AUTOMATED COUNT: 13.7 % (ref 12.3–15.4)
GLOBULIN UR ELPH-MCNC: 4.1 GM/DL
GLUCOSE SERPL-MCNC: 237 MG/DL (ref 65–99)
HCT VFR BLD AUTO: 34.8 % (ref 34–46.6)
HGB BLD-MCNC: 11 G/DL (ref 12–15.9)
IMM GRANULOCYTES # BLD AUTO: 0.03 10*3/MM3 (ref 0–0.05)
IMM GRANULOCYTES NFR BLD AUTO: 0.3 % (ref 0–0.5)
LYMPHOCYTES # BLD AUTO: 3.18 10*3/MM3 (ref 0.7–3.1)
LYMPHOCYTES NFR BLD AUTO: 33.3 % (ref 19.6–45.3)
MCH RBC QN AUTO: 29.8 PG (ref 26.6–33)
MCHC RBC AUTO-ENTMCNC: 31.6 G/DL (ref 31.5–35.7)
MCV RBC AUTO: 94.3 FL (ref 79–97)
MONOCYTES # BLD AUTO: 0.95 10*3/MM3 (ref 0.1–0.9)
MONOCYTES NFR BLD AUTO: 9.9 % (ref 5–12)
NEUTROPHILS NFR BLD AUTO: 5.13 10*3/MM3 (ref 1.7–7)
NEUTROPHILS NFR BLD AUTO: 53.8 % (ref 42.7–76)
NRBC BLD AUTO-RTO: 0 /100 WBC (ref 0–0.2)
PLATELET # BLD AUTO: 365 10*3/MM3 (ref 140–450)
PMV BLD AUTO: 9.2 FL (ref 6–12)
POTASSIUM SERPL-SCNC: 3.4 MMOL/L (ref 3.5–5.2)
PROT SERPL-MCNC: 7.9 G/DL (ref 6–8.5)
RBC # BLD AUTO: 3.69 10*6/MM3 (ref 3.77–5.28)
SODIUM SERPL-SCNC: 139 MMOL/L (ref 136–145)
WBC NRBC COR # BLD: 9.55 10*3/MM3 (ref 3.4–10.8)

## 2023-05-30 PROCEDURE — 36415 COLL VENOUS BLD VENIPUNCTURE: CPT

## 2023-05-30 PROCEDURE — 86300 IMMUNOASSAY TUMOR CA 15-3: CPT | Performed by: INTERNAL MEDICINE

## 2023-05-30 PROCEDURE — 80053 COMPREHEN METABOLIC PANEL: CPT | Performed by: INTERNAL MEDICINE

## 2023-05-30 PROCEDURE — 85025 COMPLETE CBC W/AUTO DIFF WBC: CPT | Performed by: INTERNAL MEDICINE

## 2023-05-30 PROCEDURE — 82378 CARCINOEMBRYONIC ANTIGEN: CPT | Performed by: INTERNAL MEDICINE

## 2023-05-31 ENCOUNTER — TELEPHONE (OUTPATIENT)
Dept: ONCOLOGY | Facility: CLINIC | Age: 83
End: 2023-05-31

## 2023-05-31 LAB — CANCER AG27-29 SERPL-ACNC: 21.2 U/ML (ref 0–38.6)

## 2023-05-31 NOTE — TELEPHONE ENCOUNTER
5/31/23 tried to contact patient Lorena uLcio regarding her low potassium reading and need to send in prescription for oral potassium, unable to contact patient or leave message   5/31/23 @ 3 pm

## 2023-06-01 RX ORDER — POTASSIUM CHLORIDE 20 MEQ/1
20 TABLET, EXTENDED RELEASE ORAL 3 TIMES DAILY
Qty: 6 TABLET | Refills: 0 | Status: CANCELLED | OUTPATIENT
Start: 2023-06-01

## 2023-06-02 NOTE — PROGRESS NOTES
[facility]  HEMATOLOGY & ONCOLOGY    [facility] [place] ONC [facility] HEMATOLOGY AND ONCOLOGY  [address]  [facility] [address] [phone]    Patient Name: Lorena Valdes  Encounter Date: 06/06/2023  YOB: 1940  Patient Number: [id number]      REASON FOR VISIT:  Ms. Lorena Valdes is a 83 yo female with a history of breast cancer that was diagnosed in 2008.  She had a stage IIA left breast cancer that was hormone receptor negative and Her 2 positive by FISH.  She underwent lumpectomy then adjuvant Adriamycin Cytoxan.  She only had one dose of Adriamycin and developed cardiomyopathy and thereofre the adriamycin was discontinued.  She had then weekly Taxol with all chemotherapy being completed in March 2009.  She then had adjuvant radiation therapy.     I have reviewed the HPI and verified with the patient the accuracy of it. No changes to interval history since the information was documented. Justin Canas MD 06/06/23        Oncology/Hematology History Overview Note   Tp: [id number] Mp: M0 Size: 2.6 cm Histopathologic Grade: G3? Histopathologic Type: DuctalInvasive  (NOS), left central? Stage  Grouping: IIA  08/06/2008: Core biopsy: at left breast mass, 12 o'clock: Infiltrating ductal carcinoma, gr 3, with foci of tumor necrosis? DNA index 1.78  (aneuploid), ER/CT 0%, her2/kofi 2+ (FISH ), p53 0%, Ki67  59%.  08/28/2008: MastectomyL  partial, USguided  needle localization, with SNB: IDC, gr 3, tumor measures 2.6 cm in greatest dimension,  with necrosis seen, extending to original deep margin....additional deep margin adds 1 cm to final margin of excision, residual fibrocystic  mastopathy? 2 left axillary sentinel nodes: 0/2+ve  Dose dense Adriamycin/cyclophosphamide, followed by weekly Taxol administered between September 2008 and March 2009 .  Adriamycin discontinued after one cycle due to anthracycline induced  cardiomyopathy.  Followed by Radiation therapy     Malignant neoplasm of central portion of left female breast (CMS/HCC)   11/9/2016 Initial Diagnosis    Malignant neoplasm of central portion of left female breast     1/13/2020 - 8/11/2020 Chemotherapy    OP CENTRAL VENOUS ACCESS DEVICE ACCESS, CARE, AND MAINTENANCE (CVAD)     9/1/2021 -  Chemotherapy    OP CENTRAL VENOUS ACCESS DEVICE ACCESS, CARE, AND MAINTENANCE (CVAD)           PAST MEDICAL HISTORY:  ALLERGIES:  Allergies   Allergen Reactions    Adhesive Tape Hives     Latex Tape       CURRENT MEDICATIONS:  Outpatient Encounter Medications as of 9/1/2021   Medication Sig Dispense Refill    aspirin 81 MG EC tablet Take 81 mg by mouth daily.        Calcium Carb-Cholecalciferol (CALCIUM 600+D3) 600-200 MG-UNIT tablet Take 1 tablet by mouth 2 (Two) Times a Day.      Cholecalciferol (VITAMIN D3) 400 UNITS capsule Take 1 capsule by mouth Daily.      glipiZIDE (GLUCOTROL) 5 MG tablet Take 5 mg by mouth Daily.      ibuprofen (ADVIL,MOTRIN) 400 MG tablet Take 400 mg by mouth Every 6 (Six) Hours As Needed for mild pain (1-3).      losartan (COZAAR) 100 MG tablet Take 100 mg by mouth Daily.  1    magnesium 30 MG tablet Take 30 mg by mouth.      metoprolol succinate XL (TOPROL-XL) 50 MG 24 hr tablet Take 25 mg by mouth Daily.      omeprazole (priLOSEC) 20 MG capsule Take 20 mg by mouth Daily.      potassium chloride (K-DUR,KLOR-CON) 10 MEQ CR tablet Take 10 mEq by mouth Daily.      pravastatin (PRAVACHOL) 40 MG tablet Take 40 mg by mouth Daily.      triamterene-hydrochlorothiazide (MAXZIDE-25) 37.5-25 MG per tablet Take 0.5 tablets by mouth Daily.       Facility-Administered Encounter Medications as of 9/1/2021   Medication Dose Route Frequency Provider Last Rate Last Admin    heparin injection 500 Units  500 Units Intravenous PRN Liv Shay APRN   500 Units at 09/01/21 1233    sodium chloride 0.9 % flush 10 mL  10 mL Intravenous PRN Liv Shay APRN    10 mL at 09/01/21 1233     ADULT ILLNESSES:  Patient Active Problem List   Diagnosis Code    Malignant neoplasm of central portion of left female breast (CMS/HCC) C50.112    Malignant neoplasm of upper-outer quadrant of right female breast (CMS/HCC) C50.411    Osteopenia M85.80    Encounter for care related to vascular access port Z45.2    Colon cancer screening Z12.11     SURGERIES:  Past Surgical History:   Procedure Laterality Date    BREAST LUMPECTOMY  2008    CATARACT EXTRACTION Right     COLONOSCOPY  09/16/2010    Diverticulosis; Repeat 10 years    COLONOSCOPY N/A 11/11/2020    Procedure: COLONOSCOPY WITH ANESTHESIA;  Surgeon: Jennifer Bee MD;  Location: [facility] ENDOSCOPY;  Service: Gastroenterology;  Laterality: N/A;  pre: screening  post: polyp; hemorrhoids  Luly Diez MD    MAMMO BILATERAL  07/17/2013    Dr. Sabillon    PAP SMEAR  2010     HEALTH MAINTENANCE ITEMS:    Last Completed Mammogram         Status Date      MAMMOGRAM Done 8/25/2020 Ext Proc: CHG SCREENING DIGITAL BREAST TOMOSYNTHESIS BI     5 mm round focal asymmetry within the inferior right breast ; SPOT COMPRESSION VIEW NEGATIVE 8/26/2020.          FAMILY HISTORY:  Family History   Problem Relation Age of Onset    Cancer Mother     Heart disease Father     No Known Problems Daughter     No Known Problems Son     Hypertension Daughter     Hypertension Daughter     Other Brother         polioi    Cancer Paternal Aunt     No Known Problems Maternal Grandmother     No Known Problems Maternal Grandfather     No Known Problems Paternal Grandmother     No Known Problems Paternal Grandfather     Drug abuse Brother     Colon cancer Neg Hx     Colon polyps Neg Hx     Esophageal cancer Neg Hx     Liver cancer Neg Hx     Liver disease Neg Hx     Rectal cancer Neg Hx     Stomach cancer Neg Hx      SOCIAL HISTORY:  Social History     Socioeconomic History    Marital status: Single     Spouse name: Not on file    Number of children: Not on file    Years  "of education: Not on file    Highest education level: Not on file   Tobacco Use    Smoking status: Former Smoker     Types: Cigarettes     Quit date:      Years since quittin.7    Smokeless tobacco: Never Used   Substance and Sexual Activity    Alcohol use: Yes     Alcohol/week: 1.0 standard drinks     Types: 1 Cans of beer per week     Comment: Occasionally     Drug use: No    Sexual activity: Defer       REVIEW OF SYSTEMS:  Review of Systems   Constitutional: Negative for activity change, appetite change, chills, diaphoresis, fatigue, fever and unexpected weight loss. Has lost 8 lb (prior 192 lb, now 184 lb) since he last visit.  Lives alone.  2 children live nearby.  Manages the chores, errands and driving.  Is up and about \"all the time.\"  Says she still push mows her yard.  HENT: Negative for ear pain, nosebleeds, sinus pressure, sore throat and voice change.    Eyes: Negative for blurred vision, double vision, pain and visual disturbance.   Respiratory: Negative for cough and shortness of breath.    Cardiovascular: Negative for chest pain, palpitations and leg swelling.   Gastrointestinal: Negative for abdominal pain, anal bleeding, blood in stool, constipation, diarrhea, nausea and vomiting.   Endocrine: Negative for heat intolerance, polydipsia and polyuria.   Genitourinary: Negative for dysuria, frequency, hematuria, urgency but wears pads/pull ups due to urinary incontinence.   Musculoskeletal: Positive for arthralgias (knees) but no myalgias.   Skin: Negative for rash and skin lesions.   Neurological: Negative for dizziness, tremors, seizures, syncope, speech difficulty, weakness and headache.   Hematological: Negative for adenopathy. Does not bruise/bleed easily.   Psychiatric/Behavioral: Negative for dysphoric mood, sleep disturbance, suicidal ideas and depressed mood.       Vitals:    23 1114   BP: 142/74   Pulse: 67   Resp: 18   Temp: 96.5 °F (35.8 °C)   SpO2: 94%           Physical " Exam:  Physical Exam   Constitutional: She is oriented to person, place, and time. She remains a bit heavy set, modestly kept elderly female, appears well-developed and well-nourished.  ECOG 0  HENT:   Head: Atraumatic.   Mouth/Throat: Mouth and oropharynx is clear and moist.   Eyes: Pupils are equal, round, and reactive to light. No scleral icterus.   Neck: Trachea normal.   Cardiovascular: Normal rate, regular rhythm and normal pulses. Exam reveals no gallop and no friction rub.   No murmur heard.  Pulmonary/Chest: Effort normal and breath sounds normal. She has no wheezes. She has no rhonchi. She has no rales. Port in the left upper chest is well-seated.  Breasts: Chaperoned exam: Brittany Davis MA--right breast without masses, skin changes no nipple discharge.  Left breast mastectomy site is well-healed.  The site is depressed from scar retraction.  No underlying nodularity.  There is radiation associated skin firmness of the entire breast but no pigmentation.  Abdominal: Soft. Normal appearance. There is no abdominal tenderness. There is no rebound and no guarding.   Lymphadenopathy:     She has no cervical adenopathy.        Right: No supraclavicular adenopathy present.        Left: No supraclavicular adenopathy present.   Neurological: She is alert and oriented to person, place, and time. No sensory deficit.   Psychiatric: Judgment normal.       LABS    Lab Results - Last 18 Months   Lab Units 09/01/21  1101 02/04/21  1055 09/01/20  1215   HEMOGLOBIN g/dL 11.1* 11.9* 11.6*   HEMATOCRIT % 33.1* 36.7* 34.0   MCV fL 93.8 94.8 91.9   WBC 10*3/mm3 7.45 8.7 8.74   RDW % 13.2 13.0 12.8   MPV fL 9.2 9.0* 9.3   PLATELETS 10*3/mm3 354 326 347   IMM GRAN % % 0.3  --  0.3   NEUTROS ABS 10*3/mm3 4.13 3.9 4.22   LYMPHS ABS 10*3/mm3 2.33 3.7 3.30*   MONOS ABS 10*3/mm3 0.73 1.10* 0.90   EOS ABS 10*3/mm3 0.20 0.00 0.25   BASOS ABS 10*3/mm3 0.04 0.00 0.04   IMMATURE GRANS (ABS) 10*3/mm3 0.02 0.0 0.03   NRBC /100 WBC 0.0  --   0.0       Lab Results - Last 18 Months   Lab Units 09/01/21  1101 08/18/21  1115 08/09/21  1326 07/22/21  1021 02/04/21  1055 11/03/20  1134 09/01/20  1215   GLUCOSE mg/dL 228* 166* 108 107 104 139* 220*   SODIUM mmol/L 138 140 141 140 139 140 138   POTASSIUM mmol/L 3.8 4.3 4.1 4.0 3.9 4.7 4.1   TOTAL CO2 mmol/L  --  27 26 25 28 29  --    CO2 mmol/L 26.0  --   --   --   --   --  27.0   CHLORIDE mmol/L 102 104 104 105 101 103 100   ANION GAP mmol/L 10.0 9 11 10 10 8 11.0   CREATININE mg/dL 1.16* 1.2* 1.6* 1.2* 0.9 1* 1.16*   BUN mg/dL 22 20 32* 28* 24* 25* 27*   BUN / CREAT RATIO  19.0  --   --   --   --   --  23.3   CALCIUM mg/dL 9.2 9.5 9.7 9.6 9.1 9.9 9.1   EGFR IF NONAFRICN AM mL/min/1.73 45* 43* 31* 43* >60 53* 45*   ALK PHOS U/L 87  --   --  78 83 90 80   TOTAL PROTEIN g/dL 7.5  --   --  7.8 7.7 7.9 7.7   ALT (SGPT) U/L 9  --   --  9 9 14 11   AST (SGOT) U/L 17  --   --  16 17 18 16   BILIRUBIN mg/dL 0.5  --   --  0.5 0.4 0.3 0.2   ALBUMIN g/dL 3.90  --   --  4.1 3.8 4.1 3.90   GLOBULIN gm/dL 3.6  --   --   --   --   --  3.8       ASSESSMENT  1. Left Breast Cancer diagnosed in 2008  Initial stage: AJCC TNM [id number], Stage IIA, ER - GA -  , Her 2 kofi 2+, Fish positive.  TRIPLE NEGATIVE DISEASE  Treatment : Left Lumpectomy, adjuvant chemotherpay with Adriamycin Cytoxan x 1 cycle due to cardiomyopathy, Taxol weekly for 12 weeks and then adjuvant radiation therapy.   Disease status:   9/22/22-mammogram: BI-RADS Category 2: Benign.    2. Osteoporosis ,last BMD 8/2020, She continues on calcium. She will discuss plan with pcp  3.  Type 2 DM: on oral medications per pcp.    4.  GERD - on prilosec and controlled  5.  HTN - per pcp  6.  Anemia likely secondary to CKD stage III, substrates have been normal.    -- Hgb 11.0; MCV 94.3, 5/30/23 (prior range;  Hgb 11.1-12.2; MCV 93.8- 97.4)   --Would be a candidate for GALLITO therapy if and when her hemoglobin < 10 and hematocrit < 30.  7.  Functioning Mediport.  Does not want  "removed.  \"Medicare only covers 80%, besides I prefer to have it in for fluids and blood work.\"  8.  Chronic kidney disease, stage III  --GFR 51.7 mL/min, 5/30/23 (prior:  48-53)    PLAN  1.  Apprised of labs, 5/30/23 hyperglycemia, depressed (stable) GFR k+3.4 (Kdur called in) otherwise stable CMP, mild (stable) anemia otherwise stable CBC.  Normal CEA.  Normal CA 27-29  2.  Apprised of mammogram, 9/22/2022 (above).  NGOC.  3.  Schedule mammogram, 9/22/2023    4.  Refer to general surgery Re: Port removal.  Port removal recommended.  The potential for infection and thrombosis explained.  Patient wants to leave it in for now.  \"Maybe next time when I have the funds.\"      5...Continue to follow with pcp and other specialists  6.  Continue port flushes every 8 weeks  7.  Return in 12 months for follow up and preoffice cbc, cmp and cea, ca 27-29.    I spent 29 minutes caring for Lorena on this date of service. This time includes time spent by me in the following activities: preparing for the visit, reviewing tests, performing a medically appropriate examination and/or evaluation, counseling and educating the patient/family/caregiver, ordering medications, tests, or procedures and documenting information in the medical record.           "

## 2023-06-06 ENCOUNTER — OFFICE VISIT (OUTPATIENT)
Dept: ONCOLOGY | Facility: CLINIC | Age: 83
End: 2023-06-06
Payer: MEDICARE

## 2023-06-06 VITALS
BODY MASS INDEX: 29.7 KG/M2 | HEIGHT: 66 IN | SYSTOLIC BLOOD PRESSURE: 142 MMHG | WEIGHT: 184.8 LBS | TEMPERATURE: 96.5 F | OXYGEN SATURATION: 94 % | DIASTOLIC BLOOD PRESSURE: 74 MMHG | RESPIRATION RATE: 18 BRPM | HEART RATE: 67 BPM

## 2023-06-06 DIAGNOSIS — C50.112 MALIGNANT NEOPLASM OF CENTRAL PORTION OF LEFT BREAST IN FEMALE, ESTROGEN RECEPTOR NEGATIVE: Primary | ICD-10-CM

## 2023-06-06 DIAGNOSIS — C50.112 MALIGNANT NEOPLASM OF CENTRAL PORTION OF LEFT FEMALE BREAST, UNSPECIFIED ESTROGEN RECEPTOR STATUS: ICD-10-CM

## 2023-06-06 DIAGNOSIS — Z17.1 MALIGNANT NEOPLASM OF CENTRAL PORTION OF LEFT BREAST IN FEMALE, ESTROGEN RECEPTOR NEGATIVE: Primary | ICD-10-CM

## 2023-06-06 DIAGNOSIS — Z45.2 ENCOUNTER FOR CARE RELATED TO VASCULAR ACCESS PORT: ICD-10-CM

## 2023-06-06 RX ORDER — HEPARIN SODIUM (PORCINE) LOCK FLUSH IV SOLN 100 UNIT/ML 100 UNIT/ML
500 SOLUTION INTRAVENOUS AS NEEDED
Status: DISCONTINUED | OUTPATIENT
Start: 2023-06-06 | End: 2023-06-07 | Stop reason: HOSPADM

## 2023-06-06 RX ORDER — SODIUM CHLORIDE 0.9 % (FLUSH) 0.9 %
10 SYRINGE (ML) INJECTION AS NEEDED
OUTPATIENT
Start: 2023-06-06

## 2023-06-06 RX ORDER — AMLODIPINE BESYLATE 2.5 MG/1
1 TABLET ORAL DAILY
COMMUNITY
Start: 2023-04-24

## 2023-06-06 RX ORDER — CARVEDILOL 12.5 MG/1
1 TABLET ORAL 2 TIMES DAILY
Qty: 60 TABLET | Refills: 3 | COMMUNITY
Start: 2023-04-11 | End: 2023-07-11

## 2023-06-06 RX ORDER — SODIUM CHLORIDE 0.9 % (FLUSH) 0.9 %
10 SYRINGE (ML) INJECTION AS NEEDED
Status: DISCONTINUED | OUTPATIENT
Start: 2023-06-06 | End: 2023-06-07 | Stop reason: HOSPADM

## 2023-06-06 RX ORDER — HEPARIN SODIUM (PORCINE) LOCK FLUSH IV SOLN 100 UNIT/ML 100 UNIT/ML
500 SOLUTION INTRAVENOUS AS NEEDED
OUTPATIENT
Start: 2023-06-06

## 2023-06-06 RX ADMIN — Medication 10 ML: at 11:53

## 2023-06-06 RX ADMIN — HEPARIN SODIUM (PORCINE) LOCK FLUSH IV SOLN 100 UNIT/ML 500 UNITS: 100 SOLUTION at 11:53

## 2023-07-06 DIAGNOSIS — E11.40 TYPE 2 DIABETES MELLITUS WITH DIABETIC NEUROPATHY, WITHOUT LONG-TERM CURRENT USE OF INSULIN (HCC): ICD-10-CM

## 2023-07-06 DIAGNOSIS — I51.89 DIASTOLIC DYSFUNCTION: ICD-10-CM

## 2023-07-06 DIAGNOSIS — I10 ESSENTIAL HYPERTENSION: ICD-10-CM

## 2023-07-06 DIAGNOSIS — M81.6 LOCALIZED OSTEOPOROSIS WITHOUT CURRENT PATHOLOGICAL FRACTURE: ICD-10-CM

## 2023-07-06 DIAGNOSIS — R94.4 DECREASED CALCULATED GFR: ICD-10-CM

## 2023-07-06 DIAGNOSIS — E55.9 VITAMIN D DEFICIENCY: ICD-10-CM

## 2023-07-06 DIAGNOSIS — E78.00 PURE HYPERCHOLESTEROLEMIA: ICD-10-CM

## 2023-07-06 DIAGNOSIS — Z00.00 MEDICARE ANNUAL WELLNESS VISIT, SUBSEQUENT: ICD-10-CM

## 2023-07-06 DIAGNOSIS — N18.32 CHRONIC KIDNEY DISEASE, STAGE 3B (HCC): ICD-10-CM

## 2023-07-06 DIAGNOSIS — J43.1 PANLOBULAR EMPHYSEMA (HCC): ICD-10-CM

## 2023-07-06 DIAGNOSIS — N34.2 INFECTIVE URETHRITIS: ICD-10-CM

## 2023-07-06 LAB
ALBUMIN SERPL-MCNC: 3.7 G/DL (ref 3.5–5.2)
ALP SERPL-CCNC: 70 U/L (ref 35–104)
ALT SERPL-CCNC: 7 U/L (ref 5–33)
ANION GAP SERPL CALCULATED.3IONS-SCNC: 10 MMOL/L (ref 7–19)
AST SERPL-CCNC: 15 U/L (ref 5–32)
BACTERIA URNS QL MICRO: ABNORMAL /HPF
BILIRUB SERPL-MCNC: 0.4 MG/DL (ref 0.2–1.2)
BILIRUB UR QL STRIP: NEGATIVE
BUN SERPL-MCNC: 19 MG/DL (ref 8–23)
CALCIUM SERPL-MCNC: 9.3 MG/DL (ref 8.8–10.2)
CHLORIDE SERPL-SCNC: 107 MMOL/L (ref 98–111)
CHOLEST SERPL-MCNC: 152 MG/DL (ref 160–199)
CLARITY UR: ABNORMAL
CO2 SERPL-SCNC: 25 MMOL/L (ref 22–29)
COLOR UR: YELLOW
CREAT SERPL-MCNC: 1.4 MG/DL (ref 0.5–0.9)
CRYSTALS URNS MICRO: ABNORMAL /HPF
EPI CELLS #/AREA URNS AUTO: 24 /HPF (ref 0–5)
GLUCOSE SERPL-MCNC: 114 MG/DL (ref 74–109)
GLUCOSE UR STRIP.AUTO-MCNC: NEGATIVE MG/DL
HBA1C MFR BLD: 6.5 % (ref 4–6)
HDLC SERPL-MCNC: 44 MG/DL (ref 65–121)
HGB UR STRIP.AUTO-MCNC: ABNORMAL MG/L
HYALINE CASTS #/AREA URNS AUTO: 6 /HPF (ref 0–8)
KETONES UR STRIP.AUTO-MCNC: NEGATIVE MG/DL
LDLC SERPL CALC-MCNC: 83 MG/DL
LEUKOCYTE ESTERASE UR QL STRIP.AUTO: ABNORMAL
NITRITE UR QL STRIP.AUTO: POSITIVE
PH UR STRIP.AUTO: 6 [PH] (ref 5–8)
POTASSIUM SERPL-SCNC: 4 MMOL/L (ref 3.5–5)
PROT SERPL-MCNC: 7.5 G/DL (ref 6.6–8.7)
PROT UR STRIP.AUTO-MCNC: ABNORMAL MG/DL
RBC #/AREA URNS AUTO: 5 /HPF (ref 0–4)
SODIUM SERPL-SCNC: 142 MMOL/L (ref 136–145)
SP GR UR STRIP.AUTO: 1.01 (ref 1–1.03)
TRIGL SERPL-MCNC: 127 MG/DL (ref 0–149)
UROBILINOGEN UR STRIP.AUTO-MCNC: 0.2 E.U./DL
WBC #/AREA URNS AUTO: 692 /HPF (ref 0–5)

## 2023-07-08 LAB
BACTERIA UR CULT: ABNORMAL
BACTERIA UR CULT: ABNORMAL
ORGANISM: ABNORMAL

## 2023-07-12 ENCOUNTER — OFFICE VISIT (OUTPATIENT)
Dept: INTERNAL MEDICINE | Age: 83
End: 2023-07-12
Payer: MEDICARE

## 2023-07-12 VITALS
SYSTOLIC BLOOD PRESSURE: 132 MMHG | OXYGEN SATURATION: 96 % | HEART RATE: 78 BPM | DIASTOLIC BLOOD PRESSURE: 68 MMHG | HEIGHT: 66 IN | RESPIRATION RATE: 18 BRPM | BODY MASS INDEX: 29.89 KG/M2 | WEIGHT: 186 LBS

## 2023-07-12 DIAGNOSIS — N34.2 INFECTIVE URETHRITIS: ICD-10-CM

## 2023-07-12 DIAGNOSIS — J43.1 PANLOBULAR EMPHYSEMA (HCC): ICD-10-CM

## 2023-07-12 DIAGNOSIS — M81.6 LOCALIZED OSTEOPOROSIS WITHOUT CURRENT PATHOLOGICAL FRACTURE: ICD-10-CM

## 2023-07-12 DIAGNOSIS — R94.4 DECREASED CALCULATED GFR: ICD-10-CM

## 2023-07-12 DIAGNOSIS — E11.40 TYPE 2 DIABETES MELLITUS WITH DIABETIC NEUROPATHY, WITHOUT LONG-TERM CURRENT USE OF INSULIN (HCC): Primary | ICD-10-CM

## 2023-07-12 DIAGNOSIS — I51.89 DIASTOLIC DYSFUNCTION: ICD-10-CM

## 2023-07-12 DIAGNOSIS — I10 ESSENTIAL HYPERTENSION: ICD-10-CM

## 2023-07-12 DIAGNOSIS — N18.32 CHRONIC KIDNEY DISEASE, STAGE 3B (HCC): ICD-10-CM

## 2023-07-12 DIAGNOSIS — E55.9 VITAMIN D DEFICIENCY: ICD-10-CM

## 2023-07-12 DIAGNOSIS — E78.00 PURE HYPERCHOLESTEROLEMIA: ICD-10-CM

## 2023-07-12 PROCEDURE — 3023F SPIROM DOC REV: CPT | Performed by: INTERNAL MEDICINE

## 2023-07-12 PROCEDURE — 1036F TOBACCO NON-USER: CPT | Performed by: INTERNAL MEDICINE

## 2023-07-12 PROCEDURE — 1123F ACP DISCUSS/DSCN MKR DOCD: CPT | Performed by: INTERNAL MEDICINE

## 2023-07-12 PROCEDURE — G8399 PT W/DXA RESULTS DOCUMENT: HCPCS | Performed by: INTERNAL MEDICINE

## 2023-07-12 PROCEDURE — 1090F PRES/ABSN URINE INCON ASSESS: CPT | Performed by: INTERNAL MEDICINE

## 2023-07-12 PROCEDURE — G8427 DOCREV CUR MEDS BY ELIG CLIN: HCPCS | Performed by: INTERNAL MEDICINE

## 2023-07-12 PROCEDURE — 3044F HG A1C LEVEL LT 7.0%: CPT | Performed by: INTERNAL MEDICINE

## 2023-07-12 PROCEDURE — 99214 OFFICE O/P EST MOD 30 MIN: CPT | Performed by: INTERNAL MEDICINE

## 2023-07-12 PROCEDURE — 3078F DIAST BP <80 MM HG: CPT | Performed by: INTERNAL MEDICINE

## 2023-07-12 PROCEDURE — 3075F SYST BP GE 130 - 139MM HG: CPT | Performed by: INTERNAL MEDICINE

## 2023-07-12 PROCEDURE — G8417 CALC BMI ABV UP PARAM F/U: HCPCS | Performed by: INTERNAL MEDICINE

## 2023-07-12 ASSESSMENT — ENCOUNTER SYMPTOMS
CONSTIPATION: 0
ABDOMINAL PAIN: 0
CHEST TIGHTNESS: 0
COUGH: 0
SORE THROAT: 0
WHEEZING: 0
BACK PAIN: 1

## 2023-07-12 NOTE — PROGRESS NOTES
Chief Complaint   Patient presents with    Follow-up     4mth f/u     History of presenting illness:  Anshu David is a81 y.o. female who presents today for follow up on her chronic medical conditions as noted below.     Patient Active Problem List    Diagnosis Date Noted    Panlobular emphysema ([address]) 01/09/2023     Priority: Medium    Wheezing 01/09/2023     Priority: Medium    Pulmonary fibrosis ([address]) 06/02/2022     Priority: Medium    Restrictive lung disease 06/02/2022     Priority: Medium    Hypertensive renal disease 05/11/2022     Priority: Medium    Age-related cataract 12/27/2019     Priority: Medium    Presence of intraocular lens 12/27/2019     Priority: Medium    Chronic kidney disease, stage 3b ([address]) 03/09/2022    READ (dyspnea on exertion) 15/03/1395    Diastolic dysfunction 29/77/0899    Type 2 diabetes mellitus with diabetic neuropathy 07/26/2021    Essential hypertension 02/11/2019    Elevated TSH 08/07/2018    Vitamin D deficiency 09/10/2017    Pure hypercholesterolemia 09/10/2017    Type 2 diabetes mellitus without complication, without long-term current use of insulin ([address]) 09/10/2017    Localized osteoporosis without current pathological fracture 09/10/2017     Overview Note:     2017 hip neck -2.6; lspine -1/8  8/2020 hip neck -2.6/ L spine L1 -1.6        Generalized anxiety disorder 09/10/2017    Former smoker 09/10/2017    Numbness 04/07/2017    Imbalance 04/07/2017    Estrogen receptor negative tumor status 08/06/2008     Past Medical History:   Diagnosis Date    Arthritis     Back pain     Breast cancer ([address])     left 2008    Chronic kidney disease     Concussion     History of therapeutic radiation     Hx antineoplastic chemo     Hyperlipidemia     Hypertension     Osteoporosis     Panlobular emphysema ([address]) 1/9/2023    Pneumonia     Type II or unspecified type diabetes mellitus without mention of complication, not stated as uncontrolled     diet controlled    Varicose veins

## 2023-07-26 RX ORDER — FAMOTIDINE 40 MG/1
TABLET, FILM COATED ORAL
Qty: 30 TABLET | Refills: 5 | Status: SHIPPED | OUTPATIENT
Start: 2023-07-26

## 2023-07-26 NOTE — TELEPHONE ENCOUNTER
Mariela Riggins called requesting a refill of the below medication which has been pended for you:     Requested Prescriptions     Pending Prescriptions Disp Refills    famotidine (PEPCID) 40 MG tablet [Pharmacy Med Name: Famotidine 40 MG Oral Tablet] 30 tablet 5     Sig: TAKE 1 TABLET BY MOUTH ONCE DAILY IN THE EVENING       Last Appointment Date: 7/12/2023  Next Appointment Date: 11/14/2023    Allergies   Allergen Reactions    Adhesive Tape      Latex Tape  Other reaction(s): Unknown    Poison Ivy Extract

## 2023-08-01 ENCOUNTER — INFUSION (OUTPATIENT)
Dept: ONCOLOGY | Facility: CLINIC | Age: 83
End: 2023-08-01
Payer: MEDICARE

## 2023-08-01 DIAGNOSIS — C50.112 MALIGNANT NEOPLASM OF CENTRAL PORTION OF LEFT FEMALE BREAST, UNSPECIFIED ESTROGEN RECEPTOR STATUS: Primary | ICD-10-CM

## 2023-08-01 DIAGNOSIS — C50.112 MALIGNANT NEOPLASM OF CENTRAL PORTION OF LEFT FEMALE BREAST: ICD-10-CM

## 2023-08-01 DIAGNOSIS — Z45.2 ENCOUNTER FOR CARE RELATED TO VASCULAR ACCESS PORT: ICD-10-CM

## 2023-08-01 RX ORDER — SODIUM CHLORIDE 0.9 % (FLUSH) 0.9 %
10 SYRINGE (ML) INJECTION AS NEEDED
Status: DISCONTINUED | OUTPATIENT
Start: 2023-08-01 | End: 2023-08-01 | Stop reason: HOSPADM

## 2023-08-01 RX ORDER — HEPARIN SODIUM (PORCINE) LOCK FLUSH IV SOLN 100 UNIT/ML 100 UNIT/ML
500 SOLUTION INTRAVENOUS AS NEEDED
OUTPATIENT
Start: 2023-08-01

## 2023-08-01 RX ORDER — HEPARIN SODIUM (PORCINE) LOCK FLUSH IV SOLN 100 UNIT/ML 100 UNIT/ML
500 SOLUTION INTRAVENOUS AS NEEDED
Status: DISCONTINUED | OUTPATIENT
Start: 2023-08-01 | End: 2023-08-01 | Stop reason: HOSPADM

## 2023-08-01 RX ORDER — SODIUM CHLORIDE 0.9 % (FLUSH) 0.9 %
10 SYRINGE (ML) INJECTION AS NEEDED
OUTPATIENT
Start: 2023-08-01

## 2023-08-01 RX ADMIN — Medication 10 ML: at 11:05

## 2023-08-01 RX ADMIN — HEPARIN SODIUM (PORCINE) LOCK FLUSH IV SOLN 100 UNIT/ML 500 UNITS: 100 SOLUTION at 11:05

## 2023-08-03 RX ORDER — GLIPIZIDE 5 MG/1
TABLET ORAL
Qty: 90 TABLET | Refills: 0 | Status: SHIPPED | OUTPATIENT
Start: 2023-08-03

## 2023-08-03 NOTE — TELEPHONE ENCOUNTER
Bobby Gomez called to request a refill on her medication.       Last office visit : 7/12/2023   Next office visit : 11/14/2023     Requested Prescriptions     Pending Prescriptions Disp Refills    glipiZIDE (GLUCOTROL) 5 MG tablet [Pharmacy Med Name: glipiZIDE 5 MG Oral Tablet] 90 tablet 0     Sig: Take 1/2 (one-half) tablet by mouth twice daily            April Ana María Morales, [facility]

## 2023-08-10 RX ORDER — PRAVASTATIN SODIUM 40 MG
TABLET ORAL
Qty: 90 TABLET | Refills: 1 | Status: SHIPPED | OUTPATIENT
Start: 2023-08-10

## 2023-08-10 NOTE — TELEPHONE ENCOUNTER
Walter Ortiz called requesting a refill of the below medication which has been pended for you:     Requested Prescriptions     Pending Prescriptions Disp Refills    pravastatin (PRAVACHOL) 40 MG tablet [Pharmacy Med Name: Pravastatin Sodium 40 MG Oral Tablet] 90 tablet 1     Sig: Take 1 tablet by mouth once daily       Last Appointment Date: 7/12/2023  Next Appointment Date: 11/14/2023    Allergies   Allergen Reactions    Adhesive Tape      Latex Tape  Other reaction(s): Unknown    Poison Ivy Extract

## 2023-08-17 ENCOUNTER — TELEPHONE (OUTPATIENT)
Dept: SURGERY | Age: 83
End: 2023-08-17

## 2023-08-22 ENCOUNTER — HOSPITAL ENCOUNTER (OUTPATIENT)
Dept: WOMENS IMAGING | Age: 83
Discharge: HOME | End: 2023-08-22
Payer: MEDICARE

## 2023-08-22 DIAGNOSIS — M81.6 LOCALIZED OSTEOPOROSIS WITHOUT CURRENT PATHOLOGICAL FRACTURE: ICD-10-CM

## 2023-08-22 PROCEDURE — 77080 DXA BONE DENSITY AXIAL: CPT

## 2023-09-13 NOTE — TELEPHONE ENCOUNTER
9/13/2023 Reached out to patient to reschedule appointment. Phone just rang could not leave VM. Spoke with daughter, Brooks Kumar, she was going to try to get in touch with her mother and let her know.   nitin

## 2023-09-26 ENCOUNTER — INFUSION (OUTPATIENT)
Dept: ONCOLOGY | Facility: CLINIC | Age: 83
End: 2023-09-26
Payer: MEDICARE

## 2023-09-26 DIAGNOSIS — I10 ESSENTIAL HYPERTENSION: ICD-10-CM

## 2023-09-26 DIAGNOSIS — C50.112 MALIGNANT NEOPLASM OF CENTRAL PORTION OF LEFT FEMALE BREAST, UNSPECIFIED ESTROGEN RECEPTOR STATUS: Primary | ICD-10-CM

## 2023-09-26 DIAGNOSIS — Z45.2 ENCOUNTER FOR CARE RELATED TO VASCULAR ACCESS PORT: ICD-10-CM

## 2023-09-26 RX ORDER — AMLODIPINE BESYLATE 2.5 MG/1
2.5 TABLET ORAL DAILY
Qty: 90 TABLET | Refills: 0 | OUTPATIENT
Start: 2023-09-26

## 2023-09-26 RX ORDER — HEPARIN SODIUM (PORCINE) LOCK FLUSH IV SOLN 100 UNIT/ML 100 UNIT/ML
500 SOLUTION INTRAVENOUS AS NEEDED
Status: DISCONTINUED | OUTPATIENT
Start: 2023-09-26 | End: 2023-09-26 | Stop reason: HOSPADM

## 2023-09-26 RX ORDER — HEPARIN SODIUM (PORCINE) LOCK FLUSH IV SOLN 100 UNIT/ML 100 UNIT/ML
500 SOLUTION INTRAVENOUS AS NEEDED
OUTPATIENT
Start: 2023-09-26

## 2023-09-26 RX ORDER — SODIUM CHLORIDE 0.9 % (FLUSH) 0.9 %
10 SYRINGE (ML) INJECTION AS NEEDED
Status: DISCONTINUED | OUTPATIENT
Start: 2023-09-26 | End: 2023-09-26 | Stop reason: HOSPADM

## 2023-09-26 RX ORDER — SODIUM CHLORIDE 0.9 % (FLUSH) 0.9 %
10 SYRINGE (ML) INJECTION AS NEEDED
OUTPATIENT
Start: 2023-09-26

## 2023-09-26 RX ADMIN — Medication 10 ML: at 11:00

## 2023-09-26 RX ADMIN — HEPARIN SODIUM (PORCINE) LOCK FLUSH IV SOLN 100 UNIT/ML 500 UNITS: 100 SOLUTION at 11:00

## 2023-09-27 DIAGNOSIS — I10 ESSENTIAL HYPERTENSION: ICD-10-CM

## 2023-09-28 RX ORDER — AMLODIPINE BESYLATE 2.5 MG/1
2.5 TABLET ORAL DAILY
Qty: 90 TABLET | Refills: 3 | Status: SHIPPED | OUTPATIENT
Start: 2023-09-28

## 2023-10-23 ENCOUNTER — OFFICE VISIT (OUTPATIENT)
Dept: SURGERY | Age: 83
End: 2023-10-23

## 2023-10-23 ENCOUNTER — HOSPITAL ENCOUNTER (OUTPATIENT)
Dept: WOMENS IMAGING | Age: 83
Discharge: HOME | End: 2023-10-23
Payer: MEDICARE

## 2023-10-23 VITALS
HEIGHT: 66 IN | BODY MASS INDEX: 29.73 KG/M2 | SYSTOLIC BLOOD PRESSURE: 128 MMHG | DIASTOLIC BLOOD PRESSURE: 74 MMHG | WEIGHT: 185 LBS

## 2023-10-23 DIAGNOSIS — Z12.31 VISIT FOR SCREENING MAMMOGRAM: ICD-10-CM

## 2023-10-23 DIAGNOSIS — Z85.3 PERSONAL HISTORY OF MALIGNANT NEOPLASM OF BREAST: Primary | ICD-10-CM

## 2023-10-23 PROCEDURE — 77063 BREAST TOMOSYNTHESIS BI: CPT

## 2023-10-23 NOTE — PROGRESS NOTES
Santana Chavez comes today for her follow-up breast exam.  She has had no new breast complaints. She reports no new palpable masses. There is no skin or nipple changes. There is no nipple discharge. She has no appreciable evidence of supraclavicular or axillary adenopathy. Of note,Ms Joseline Rodriguez  is status post 8- Left partial mastectomy and left sentinel node biopsy 2.6 cm infiltrating ductal carcinoma, grade III, ER/KS negative., 0/2 nodes positive, immunohistochemistry negative for micrometastises.       Patient Active Problem List    Diagnosis Date Noted    Panlobular emphysema ([address]) 01/09/2023    Wheezing 01/09/2023    Pulmonary fibrosis ([address]) 06/02/2022    Restrictive lung disease 06/02/2022    Hypertensive renal disease 05/11/2022    Age-related cataract 12/27/2019    Presence of intraocular lens 12/27/2019    Chronic kidney disease, stage 3b ([address]) 03/09/2022    READ (dyspnea on exertion) 21/39/7478    Diastolic dysfunction 42/95/3121    Type 2 diabetes mellitus with diabetic neuropathy 07/26/2021    Essential hypertension 02/11/2019    Elevated TSH 08/07/2018    Vitamin D deficiency 09/10/2017    Pure hypercholesterolemia 09/10/2017    Type 2 diabetes mellitus without complication, without long-term current use of insulin ([address]) 09/10/2017    Localized osteoporosis without current pathological fracture 09/10/2017    Generalized anxiety disorder 09/10/2017    Former smoker 09/10/2017    Numbness 04/07/2017    Imbalance 04/07/2017    Estrogen receptor negative tumor status 08/06/2008       Current Outpatient Medications   Medication Sig Dispense Refill    amLODIPine (NORVASC) 2.5 MG tablet Take 1 tablet by mouth once daily 90 tablet 3    pravastatin (PRAVACHOL) 40 MG tablet Take 1 tablet by mouth once daily 90 tablet 1    glipiZIDE (GLUCOTROL) 5 MG tablet Take 1/2 (one-half) tablet by mouth twice daily 90 tablet 0    famotidine (PEPCID) 40 MG tablet TAKE 1 TABLET BY MOUTH ONCE DAILY IN THE EVENING

## 2023-11-06 RX ORDER — GLIPIZIDE 5 MG/1
TABLET ORAL
Qty: 90 TABLET | Refills: 0 | Status: SHIPPED | OUTPATIENT
Start: 2023-11-06

## 2023-11-06 NOTE — TELEPHONE ENCOUNTER
Cristian Rendon called to request a refill on her medication.       Last office visit : 7/12/2023   Next office visit : 11/14/2023     Requested Prescriptions     Pending Prescriptions Disp Refills    glipiZIDE (GLUCOTROL) 5 MG tablet [Pharmacy Med Name: glipiZIDE 5 MG Oral Tablet] 90 tablet 0     Sig: Take 1/2 (one-half) tablet by mouth twice daily            Soy Ledezma MA

## 2023-11-10 DIAGNOSIS — E78.00 PURE HYPERCHOLESTEROLEMIA: ICD-10-CM

## 2023-11-10 DIAGNOSIS — E11.40 TYPE 2 DIABETES MELLITUS WITH DIABETIC NEUROPATHY, WITHOUT LONG-TERM CURRENT USE OF INSULIN (HCC): ICD-10-CM

## 2023-11-10 DIAGNOSIS — J43.1 PANLOBULAR EMPHYSEMA (HCC): ICD-10-CM

## 2023-11-10 DIAGNOSIS — N18.32 CHRONIC KIDNEY DISEASE, STAGE 3B (HCC): ICD-10-CM

## 2023-11-10 DIAGNOSIS — I51.89 DIASTOLIC DYSFUNCTION: ICD-10-CM

## 2023-11-10 DIAGNOSIS — N34.2 INFECTIVE URETHRITIS: ICD-10-CM

## 2023-11-10 DIAGNOSIS — M81.6 LOCALIZED OSTEOPOROSIS WITHOUT CURRENT PATHOLOGICAL FRACTURE: ICD-10-CM

## 2023-11-10 DIAGNOSIS — R94.4 DECREASED CALCULATED GFR: ICD-10-CM

## 2023-11-10 DIAGNOSIS — I10 ESSENTIAL HYPERTENSION: ICD-10-CM

## 2023-11-10 DIAGNOSIS — E55.9 VITAMIN D DEFICIENCY: ICD-10-CM

## 2023-11-10 LAB
25(OH)D3 SERPL-MCNC: 43.1 NG/ML
ALBUMIN SERPL-MCNC: 3.7 G/DL (ref 3.5–5.2)
ALP SERPL-CCNC: 73 U/L (ref 35–104)
ALT SERPL-CCNC: 6 U/L (ref 5–33)
ANION GAP SERPL CALCULATED.3IONS-SCNC: 12 MMOL/L (ref 7–19)
AST SERPL-CCNC: 13 U/L (ref 5–32)
BACTERIA URNS QL MICRO: ABNORMAL /HPF
BASOPHILS # BLD: 0.1 K/UL (ref 0–0.2)
BASOPHILS NFR BLD: 0.5 % (ref 0–1)
BILIRUB SERPL-MCNC: 0.4 MG/DL (ref 0.2–1.2)
BILIRUB UR QL STRIP: NEGATIVE
BUN SERPL-MCNC: 17 MG/DL (ref 8–23)
CALCIUM SERPL-MCNC: 9.3 MG/DL (ref 8.8–10.2)
CHLORIDE SERPL-SCNC: 107 MMOL/L (ref 98–111)
CHOLEST SERPL-MCNC: 153 MG/DL (ref 160–199)
CLARITY UR: ABNORMAL
CO2 SERPL-SCNC: 25 MMOL/L (ref 22–29)
COLOR UR: YELLOW
CREAT SERPL-MCNC: 1.2 MG/DL (ref 0.5–0.9)
CRYSTALS URNS MICRO: ABNORMAL /HPF
EOSINOPHIL # BLD: 0.3 K/UL (ref 0–0.6)
EOSINOPHIL NFR BLD: 3.2 % (ref 0–5)
EPI CELLS #/AREA URNS AUTO: 4 /HPF (ref 0–5)
ERYTHROCYTE [DISTWIDTH] IN BLOOD BY AUTOMATED COUNT: 13.4 % (ref 11.5–14.5)
GLUCOSE SERPL-MCNC: 113 MG/DL (ref 74–109)
GLUCOSE UR STRIP.AUTO-MCNC: NEGATIVE MG/DL
HBA1C MFR BLD: 6.7 % (ref 4–6)
HCT VFR BLD AUTO: 36.2 % (ref 37–47)
HDLC SERPL-MCNC: 54 MG/DL (ref 65–121)
HGB BLD-MCNC: 11.7 G/DL (ref 12–16)
HGB UR STRIP.AUTO-MCNC: ABNORMAL MG/L
HYALINE CASTS #/AREA URNS AUTO: 2 /HPF (ref 0–8)
IMM GRANULOCYTES # BLD: 0 K/UL
KETONES UR STRIP.AUTO-MCNC: NEGATIVE MG/DL
LDLC SERPL CALC-MCNC: 81 MG/DL
LEUKOCYTE ESTERASE UR QL STRIP.AUTO: ABNORMAL
LYMPHOCYTES # BLD: 3.5 K/UL (ref 1.1–4.5)
LYMPHOCYTES NFR BLD: 38.5 % (ref 20–40)
MCH RBC QN AUTO: 31.1 PG (ref 27–31)
MCHC RBC AUTO-ENTMCNC: 32.3 G/DL (ref 33–37)
MCV RBC AUTO: 96.3 FL (ref 81–99)
MONOCYTES # BLD: 1 K/UL (ref 0–0.9)
MONOCYTES NFR BLD: 11.2 % (ref 0–10)
NEUTROPHILS # BLD: 4.3 K/UL (ref 1.5–7.5)
NEUTS SEG NFR BLD: 46.5 % (ref 50–65)
NITRITE UR QL STRIP.AUTO: POSITIVE
PH UR STRIP.AUTO: 5.5 [PH] (ref 5–8)
PLATELET # BLD AUTO: 370 K/UL (ref 130–400)
PMV BLD AUTO: 9.4 FL (ref 9.4–12.3)
POTASSIUM SERPL-SCNC: 4.3 MMOL/L (ref 3.5–5)
PROT SERPL-MCNC: 7.6 G/DL (ref 6.6–8.7)
PROT UR STRIP.AUTO-MCNC: NEGATIVE MG/DL
RBC # BLD AUTO: 3.76 M/UL (ref 4.2–5.4)
RBC #/AREA URNS AUTO: 3 /HPF (ref 0–4)
SODIUM SERPL-SCNC: 144 MMOL/L (ref 136–145)
SP GR UR STRIP.AUTO: 1.01 (ref 1–1.03)
TRIGL SERPL-MCNC: 90 MG/DL (ref 0–149)
TSH SERPL DL<=0.005 MIU/L-ACNC: 4.39 UIU/ML (ref 0.27–4.2)
UROBILINOGEN UR STRIP.AUTO-MCNC: 0.2 E.U./DL
WBC # BLD AUTO: 9.2 K/UL (ref 4.8–10.8)
WBC #/AREA URNS AUTO: 313 /HPF (ref 0–5)

## 2023-11-14 ENCOUNTER — OFFICE VISIT (OUTPATIENT)
Dept: INTERNAL MEDICINE | Age: 83
End: 2023-11-14
Payer: MEDICARE

## 2023-11-14 VITALS
BODY MASS INDEX: 29.67 KG/M2 | SYSTOLIC BLOOD PRESSURE: 138 MMHG | WEIGHT: 184.6 LBS | HEIGHT: 66 IN | OXYGEN SATURATION: 98 % | DIASTOLIC BLOOD PRESSURE: 80 MMHG | HEART RATE: 70 BPM

## 2023-11-14 DIAGNOSIS — E55.9 VITAMIN D DEFICIENCY: ICD-10-CM

## 2023-11-14 DIAGNOSIS — N18.31 STAGE 3A CHRONIC KIDNEY DISEASE (HCC): ICD-10-CM

## 2023-11-14 DIAGNOSIS — R26.89 BALANCE PROBLEMS: ICD-10-CM

## 2023-11-14 DIAGNOSIS — R94.4 DECREASED CALCULATED GFR: ICD-10-CM

## 2023-11-14 DIAGNOSIS — E78.00 PURE HYPERCHOLESTEROLEMIA: ICD-10-CM

## 2023-11-14 DIAGNOSIS — N18.32 CHRONIC KIDNEY DISEASE, STAGE 3B (HCC): ICD-10-CM

## 2023-11-14 DIAGNOSIS — I10 ESSENTIAL HYPERTENSION: ICD-10-CM

## 2023-11-14 DIAGNOSIS — E11.40 TYPE 2 DIABETES MELLITUS WITH DIABETIC NEUROPATHY, WITHOUT LONG-TERM CURRENT USE OF INSULIN (HCC): Primary | ICD-10-CM

## 2023-11-14 PROCEDURE — 3075F SYST BP GE 130 - 139MM HG: CPT | Performed by: INTERNAL MEDICINE

## 2023-11-14 PROCEDURE — 3044F HG A1C LEVEL LT 7.0%: CPT | Performed by: INTERNAL MEDICINE

## 2023-11-14 PROCEDURE — 1090F PRES/ABSN URINE INCON ASSESS: CPT | Performed by: INTERNAL MEDICINE

## 2023-11-14 PROCEDURE — G8399 PT W/DXA RESULTS DOCUMENT: HCPCS | Performed by: INTERNAL MEDICINE

## 2023-11-14 PROCEDURE — 3079F DIAST BP 80-89 MM HG: CPT | Performed by: INTERNAL MEDICINE

## 2023-11-14 PROCEDURE — G8417 CALC BMI ABV UP PARAM F/U: HCPCS | Performed by: INTERNAL MEDICINE

## 2023-11-14 PROCEDURE — G8427 DOCREV CUR MEDS BY ELIG CLIN: HCPCS | Performed by: INTERNAL MEDICINE

## 2023-11-14 PROCEDURE — 1036F TOBACCO NON-USER: CPT | Performed by: INTERNAL MEDICINE

## 2023-11-14 PROCEDURE — 99214 OFFICE O/P EST MOD 30 MIN: CPT | Performed by: INTERNAL MEDICINE

## 2023-11-14 PROCEDURE — G8484 FLU IMMUNIZE NO ADMIN: HCPCS | Performed by: INTERNAL MEDICINE

## 2023-11-14 PROCEDURE — 1123F ACP DISCUSS/DSCN MKR DOCD: CPT | Performed by: INTERNAL MEDICINE

## 2023-11-14 ASSESSMENT — ENCOUNTER SYMPTOMS
COUGH: 0
WHEEZING: 0
SHORTNESS OF BREATH: 1
CHEST TIGHTNESS: 0
CONSTIPATION: 0
ABDOMINAL PAIN: 0
SORE THROAT: 0

## 2023-11-21 ENCOUNTER — INFUSION (OUTPATIENT)
Dept: ONCOLOGY | Facility: CLINIC | Age: 83
End: 2023-11-21
Payer: MEDICARE

## 2023-11-21 DIAGNOSIS — C50.112 MALIGNANT NEOPLASM OF CENTRAL PORTION OF LEFT FEMALE BREAST, UNSPECIFIED ESTROGEN RECEPTOR STATUS: Primary | ICD-10-CM

## 2023-11-21 DIAGNOSIS — Z45.2 ENCOUNTER FOR CARE RELATED TO VASCULAR ACCESS PORT: ICD-10-CM

## 2023-11-21 RX ORDER — SODIUM CHLORIDE 0.9 % (FLUSH) 0.9 %
10 SYRINGE (ML) INJECTION AS NEEDED
OUTPATIENT
Start: 2023-11-21

## 2023-11-21 RX ORDER — SODIUM CHLORIDE 0.9 % (FLUSH) 0.9 %
10 SYRINGE (ML) INJECTION AS NEEDED
Status: DISCONTINUED | OUTPATIENT
Start: 2023-11-21 | End: 2023-11-21 | Stop reason: HOSPADM

## 2023-11-21 RX ORDER — HEPARIN SODIUM (PORCINE) LOCK FLUSH IV SOLN 100 UNIT/ML 100 UNIT/ML
500 SOLUTION INTRAVENOUS AS NEEDED
OUTPATIENT
Start: 2023-11-21

## 2023-11-21 RX ORDER — HEPARIN SODIUM (PORCINE) LOCK FLUSH IV SOLN 100 UNIT/ML 100 UNIT/ML
500 SOLUTION INTRAVENOUS AS NEEDED
Status: DISCONTINUED | OUTPATIENT
Start: 2023-11-21 | End: 2023-11-21 | Stop reason: HOSPADM

## 2023-11-21 RX ADMIN — HEPARIN SODIUM (PORCINE) LOCK FLUSH IV SOLN 100 UNIT/ML 500 UNITS: 100 SOLUTION at 11:15

## 2023-11-21 RX ADMIN — Medication 10 ML: at 11:15

## 2023-12-11 ENCOUNTER — OFFICE VISIT (OUTPATIENT)
Dept: CARDIOLOGY CLINIC | Age: 83
End: 2023-12-11
Payer: MEDICARE

## 2023-12-11 VITALS
SYSTOLIC BLOOD PRESSURE: 130 MMHG | DIASTOLIC BLOOD PRESSURE: 76 MMHG | BODY MASS INDEX: 29.57 KG/M2 | HEART RATE: 75 BPM | HEIGHT: 66 IN | WEIGHT: 184 LBS

## 2023-12-11 DIAGNOSIS — R00.2 PALPITATIONS: Primary | ICD-10-CM

## 2023-12-11 PROCEDURE — 3078F DIAST BP <80 MM HG: CPT | Performed by: INTERNAL MEDICINE

## 2023-12-11 PROCEDURE — G8427 DOCREV CUR MEDS BY ELIG CLIN: HCPCS | Performed by: INTERNAL MEDICINE

## 2023-12-11 PROCEDURE — 1036F TOBACCO NON-USER: CPT | Performed by: INTERNAL MEDICINE

## 2023-12-11 PROCEDURE — G8417 CALC BMI ABV UP PARAM F/U: HCPCS | Performed by: INTERNAL MEDICINE

## 2023-12-11 PROCEDURE — 1090F PRES/ABSN URINE INCON ASSESS: CPT | Performed by: INTERNAL MEDICINE

## 2023-12-11 PROCEDURE — 93000 ELECTROCARDIOGRAM COMPLETE: CPT | Performed by: INTERNAL MEDICINE

## 2023-12-11 PROCEDURE — 3075F SYST BP GE 130 - 139MM HG: CPT | Performed by: INTERNAL MEDICINE

## 2023-12-11 PROCEDURE — G8484 FLU IMMUNIZE NO ADMIN: HCPCS | Performed by: INTERNAL MEDICINE

## 2023-12-11 PROCEDURE — G8399 PT W/DXA RESULTS DOCUMENT: HCPCS | Performed by: INTERNAL MEDICINE

## 2023-12-11 PROCEDURE — 1123F ACP DISCUSS/DSCN MKR DOCD: CPT | Performed by: INTERNAL MEDICINE

## 2023-12-11 PROCEDURE — 99214 OFFICE O/P EST MOD 30 MIN: CPT | Performed by: INTERNAL MEDICINE

## 2023-12-11 ASSESSMENT — ENCOUNTER SYMPTOMS
VOMITING: 0
ABDOMINAL PAIN: 0
NAUSEA: 0
ABDOMINAL DISTENTION: 0
WHEEZING: 0
FACIAL SWELLING: 0
CHEST TIGHTNESS: 0
EYE DISCHARGE: 0
EYE REDNESS: 0
DIARRHEA: 0
BLOOD IN STOOL: 0
EYE PAIN: 0
SORE THROAT: 0
SHORTNESS OF BREATH: 0
COUGH: 0
CONSTIPATION: 0
APNEA: 0

## 2023-12-11 NOTE — PROGRESS NOTES
Cardiology Office Visit Note  [address], [facility] [place] [address]  Phone: [phone]  Fax: [phone]                            Date:  12/11/2023  Patient: Fco Daugherty  Age:  81 y. o., 1940    Referral: No ref.  provider found    REASON FOR VISIT:  6 Month Follow-Up         PROBLEM LIST:    Patient Active Problem List    Diagnosis Date Noted    Panlobular emphysema ([address]) 01/09/2023     Priority: Medium    Wheezing 01/09/2023     Priority: Medium    Pulmonary fibrosis ([address]) 06/02/2022     Priority: Medium    Restrictive lung disease 06/02/2022     Priority: Medium    Hypertensive renal disease 05/11/2022     Priority: Medium    Age-related cataract 12/27/2019     Priority: Medium    Presence of intraocular lens 12/27/2019     Priority: Medium    Diastolic dysfunction 25/25/5516     Priority: Low    Type 2 diabetes mellitus with diabetic neuropathy 07/26/2021     Priority: Low    Essential hypertension 02/11/2019     Priority: Low    Elevated TSH 08/07/2018     Priority: Low    Vitamin D deficiency 09/10/2017     Priority: Low    Pure hypercholesterolemia 09/10/2017     Priority: Low    Type 2 diabetes mellitus without complication, without long-term current use of insulin ([address]) 09/10/2017     Priority: Low    Localized osteoporosis without current pathological fracture 09/10/2017     Priority: Low     Overview Note:     2017 hip neck -2.6; lspine -1/8  8/2020 hip neck -2.6/ L spine L1 -1.6  8/2023 hip neck -2.3/ L spin e-1.4      Generalized anxiety disorder 09/10/2017     Priority: Low    Former smoker 09/10/2017     Priority: Low    Numbness 04/07/2017     Priority: Low    Imbalance 04/07/2017     Priority: Low    Estrogen receptor negative tumor status 08/06/2008     Priority: Low    Chronic kidney disease, stage 3b ([address]) 03/09/2022    READ (dyspnea on exertion) 11/24/2021         PRESENTATION: Fco Daugherty is a 81y.o. year old female in today for routine 6-month cardiology

## 2023-12-11 NOTE — PROGRESS NOTES
Cardiology Office Visit Note  [address], [address] [address]  Phone: [phone]  Fax: [phone]                            Date:  12/11/2023  Patient: Laron Downing  Age:  81 y. o., 1940    Referral: No ref.  provider found    REASON FOR VISIT:  6 Month Follow-Up         PROBLEM LIST:    Patient Active Problem List    Diagnosis Date Noted    Panlobular emphysema ([address]) 01/09/2023     Priority: Medium    Wheezing 01/09/2023     Priority: Medium    Pulmonary fibrosis ([address]) 06/02/2022     Priority: Medium    Restrictive lung disease 06/02/2022     Priority: Medium    Hypertensive renal disease 05/11/2022     Priority: Medium    Age-related cataract 12/27/2019     Priority: Medium    Presence of intraocular lens 12/27/2019     Priority: Medium    Diastolic dysfunction 54/47/7650     Priority: Low    Type 2 diabetes mellitus with diabetic neuropathy 07/26/2021     Priority: Low    Essential hypertension 02/11/2019     Priority: Low    Elevated TSH 08/07/2018     Priority: Low    Vitamin D deficiency 09/10/2017     Priority: Low    Pure hypercholesterolemia 09/10/2017     Priority: Low    Type 2 diabetes mellitus without complication, without long-term current use of insulin ([address]) 09/10/2017     Priority: Low    Localized osteoporosis without current pathological fracture 09/10/2017     Priority: Low     Overview Note:     2017 hip neck -2.6; lspine -1/8  8/2020 hip neck -2.6/ L spine L1 -1.6  8/2023 hip neck -2.3/ L spin e-1.4      Generalized anxiety disorder 09/10/2017     Priority: Low    Former smoker 09/10/2017     Priority: Low    Numbness 04/07/2017     Priority: Low    Imbalance 04/07/2017     Priority: Low    Estrogen receptor negative tumor status 08/06/2008     Priority: Low    Chronic kidney disease, stage 3b ([address]) 03/09/2022    READ (dyspnea on exertion) 11/24/2021         PRESENTATION: Laron Downing is a 81y.o. year old female     REVIEW OF SYSTEMS:  Review of

## 2023-12-27 RX ORDER — CARVEDILOL 12.5 MG/1
12.5 TABLET ORAL 2 TIMES DAILY
Qty: 180 TABLET | Refills: 3 | Status: SHIPPED | OUTPATIENT
Start: 2023-12-27

## 2024-01-09 ENCOUNTER — HOSPITAL ENCOUNTER (OUTPATIENT)
Dept: CT IMAGING | Age: 84
Discharge: HOME | End: 2024-01-09
Attending: INTERNAL MEDICINE
Payer: MEDICARE

## 2024-01-09 ENCOUNTER — HOSPITAL ENCOUNTER (OUTPATIENT)
Dept: PULMONOLOGY | Age: 84
Discharge: HOME | End: 2024-01-09
Attending: INTERNAL MEDICINE
Payer: MEDICARE

## 2024-01-09 VITALS — OXYGEN SATURATION: 98 % | RESPIRATION RATE: 16 BRPM | HEART RATE: 74 BPM

## 2024-01-09 DIAGNOSIS — J84.10 PULMONARY FIBROSIS (HCC): ICD-10-CM

## 2024-01-09 PROCEDURE — 94726 PLETHYSMOGRAPHY LUNG VOLUMES: CPT

## 2024-01-09 PROCEDURE — 6360000002 HC RX W HCPCS: Performed by: INTERNAL MEDICINE

## 2024-01-09 PROCEDURE — 71250 CT THORAX DX C-: CPT

## 2024-01-09 PROCEDURE — 94060 EVALUATION OF WHEEZING: CPT

## 2024-01-09 PROCEDURE — 94729 DIFFUSING CAPACITY: CPT

## 2024-01-09 RX ORDER — ALBUTEROL SULFATE 2.5 MG/3ML
2.5 SOLUTION RESPIRATORY (INHALATION) 2 TIMES DAILY PRN
Status: DISCONTINUED | OUTPATIENT
Start: 2024-01-09 | End: 2024-01-11 | Stop reason: HOSPADM

## 2024-01-09 RX ADMIN — ALBUTEROL SULFATE 2.5 MG: 2.5 SOLUTION RESPIRATORY (INHALATION) at 10:52

## 2024-01-09 NOTE — PROCEDURES
Media Information          Pulmonary Function Study    Interpretation:    The FVC is Normal. FEV1 is Normal. FEV1/FVC ratio is Normal. After bronchodilator therapy there was no significant change in FEV1. Total lung capacity is mildly reduced. Residual volume is Normal.      Diffusing lung capacity when corrected for alveolar volume is mildly reduced.         Impression:    Mild restrictive lung disease.         Juan Tay MD, FCCP, [facility]

## 2024-01-12 ENCOUNTER — TELEPHONE (OUTPATIENT)
Dept: PULMONOLOGY | Age: 84
End: 2024-01-12

## 2024-01-15 ENCOUNTER — TELEPHONE (OUTPATIENT)
Dept: ONCOLOGY | Facility: CLINIC | Age: 84
End: 2024-01-15

## 2024-02-06 ENCOUNTER — INFUSION (OUTPATIENT)
Dept: ONCOLOGY | Facility: CLINIC | Age: 84
End: 2024-02-06
Payer: MEDICARE

## 2024-02-06 DIAGNOSIS — Z45.2 ENCOUNTER FOR CARE RELATED TO VASCULAR ACCESS PORT: Primary | ICD-10-CM

## 2024-02-06 RX ORDER — HEPARIN SODIUM (PORCINE) LOCK FLUSH IV SOLN 100 UNIT/ML 100 UNIT/ML
500 SOLUTION INTRAVENOUS AS NEEDED
OUTPATIENT
Start: 2024-02-06

## 2024-02-06 RX ORDER — HEPARIN SODIUM (PORCINE) LOCK FLUSH IV SOLN 100 UNIT/ML 100 UNIT/ML
500 SOLUTION INTRAVENOUS AS NEEDED
Status: DISCONTINUED | OUTPATIENT
Start: 2024-02-06 | End: 2024-02-06 | Stop reason: HOSPADM

## 2024-02-06 RX ORDER — SODIUM CHLORIDE 0.9 % (FLUSH) 0.9 %
10 SYRINGE (ML) INJECTION AS NEEDED
Status: DISCONTINUED | OUTPATIENT
Start: 2024-02-06 | End: 2024-02-06 | Stop reason: HOSPADM

## 2024-02-06 RX ORDER — SODIUM CHLORIDE 0.9 % (FLUSH) 0.9 %
10 SYRINGE (ML) INJECTION AS NEEDED
OUTPATIENT
Start: 2024-02-06

## 2024-02-06 RX ADMIN — Medication 10 ML: at 10:15

## 2024-02-06 RX ADMIN — HEPARIN SODIUM (PORCINE) LOCK FLUSH IV SOLN 100 UNIT/ML 500 UNITS: 100 SOLUTION at 10:15

## 2024-02-09 RX ORDER — GLIPIZIDE 5 MG/1
TABLET ORAL
Qty: 90 TABLET | Refills: 0 | Status: SHIPPED | OUTPATIENT
Start: 2024-02-09

## 2024-02-09 NOTE — TELEPHONE ENCOUNTER
Xiomara called requesting a refill of the below medication which has been pended for you:     Requested Prescriptions     Pending Prescriptions Disp Refills    glipiZIDE (GLUCOTROL) 5 MG tablet [Pharmacy Med Name: glipiZIDE 5 MG Oral Tablet] 90 tablet 0     Sig: Take 1/2 (one-half) tablet by mouth twice daily       Last Appointment Date: 11/14/2023  Next Appointment Date: 3/13/2024    Allergies   Allergen Reactions    Adhesive Tape      Latex Tape  Other reaction(s): Unknown    Poison Ivy Extract

## 2024-02-28 ENCOUNTER — OFFICE VISIT (OUTPATIENT)
Dept: PULMONOLOGY | Age: 84
End: 2024-02-28
Payer: MEDICARE

## 2024-02-28 VITALS
WEIGHT: 189 LBS | HEART RATE: 60 BPM | OXYGEN SATURATION: 98 % | BODY MASS INDEX: 30.37 KG/M2 | TEMPERATURE: 97.8 F | SYSTOLIC BLOOD PRESSURE: 173 MMHG | DIASTOLIC BLOOD PRESSURE: 71 MMHG | HEIGHT: 66 IN

## 2024-02-28 DIAGNOSIS — R06.09 DOE (DYSPNEA ON EXERTION): ICD-10-CM

## 2024-02-28 DIAGNOSIS — R06.2 WHEEZING: ICD-10-CM

## 2024-02-28 DIAGNOSIS — J84.10 PULMONARY FIBROSIS (HCC): Primary | ICD-10-CM

## 2024-02-28 DIAGNOSIS — I51.89 DIASTOLIC DYSFUNCTION: ICD-10-CM

## 2024-02-28 DIAGNOSIS — J43.1 PANLOBULAR EMPHYSEMA (HCC): ICD-10-CM

## 2024-02-28 PROCEDURE — G8417 CALC BMI ABV UP PARAM F/U: HCPCS | Performed by: INTERNAL MEDICINE

## 2024-02-28 PROCEDURE — 3077F SYST BP >= 140 MM HG: CPT | Performed by: INTERNAL MEDICINE

## 2024-02-28 PROCEDURE — G8427 DOCREV CUR MEDS BY ELIG CLIN: HCPCS | Performed by: INTERNAL MEDICINE

## 2024-02-28 PROCEDURE — 3078F DIAST BP <80 MM HG: CPT | Performed by: INTERNAL MEDICINE

## 2024-02-28 PROCEDURE — 3023F SPIROM DOC REV: CPT | Performed by: INTERNAL MEDICINE

## 2024-02-28 PROCEDURE — G8484 FLU IMMUNIZE NO ADMIN: HCPCS | Performed by: INTERNAL MEDICINE

## 2024-02-28 PROCEDURE — G8399 PT W/DXA RESULTS DOCUMENT: HCPCS | Performed by: INTERNAL MEDICINE

## 2024-02-28 PROCEDURE — 1036F TOBACCO NON-USER: CPT | Performed by: INTERNAL MEDICINE

## 2024-02-28 PROCEDURE — 1090F PRES/ABSN URINE INCON ASSESS: CPT | Performed by: INTERNAL MEDICINE

## 2024-02-28 PROCEDURE — 1123F ACP DISCUSS/DSCN MKR DOCD: CPT | Performed by: INTERNAL MEDICINE

## 2024-02-28 PROCEDURE — 99214 OFFICE O/P EST MOD 30 MIN: CPT | Performed by: INTERNAL MEDICINE

## 2024-02-28 ASSESSMENT — ENCOUNTER SYMPTOMS
ABDOMINAL DISTENTION: 0
BACK PAIN: 0
RHINORRHEA: 0
ANAL BLEEDING: 0
WHEEZING: 0
APNEA: 0
COUGH: 0
ABDOMINAL PAIN: 0
SHORTNESS OF BREATH: 1
CHEST TIGHTNESS: 0

## 2024-02-28 NOTE — PROGRESS NOTES
Pulmonary and Sleep Medicine    Xiomara Gonzalez (:  1940) is a 83 y.o. female,Established patient, here for evaluation of the following chief complaint(s):  Follow-up (1 year f/u  CT chest & PFT)      Referring physician:  No referring provider defined for this encounter.     ASSESSMENT/PLAN:  1. Pulmonary fibrosis (HCC)  -     CT CHEST WO CONTRAST; Future  2. READ (dyspnea on exertion)  -     Echo 2d w doppler w color w contrast; Future  3. Panlobular emphysema (HCC)  4. Diastolic dysfunction  5. Wheezing        Will repeat CT of the chest in 3 months to assure stability of the findings and resolution of groundglass opacities.  We will get an echocardiogram to assess her cardiac function due to worsening edema in the lower extremities.  She is apprehensive about using diuretics due to impaired kidney function.       Juan Tay MD, [facility], [facility]    Return in about 4 weeks (around 3/27/2024).    SUBJECTIVE/OBJECTIVE:  Patient is here for follow-up on pulmonary fibrosis and restrictive lung disease.  PFT shows mild restriction according to my own interpretation.  This is not significantly different than prior exams.  Her CT of the chest continues to show fibrotic changes and bronchiectasis that seem to be stable.  She does have a new area of groundglass changes suggestive of an inflammatory process.  She reports a significant edema in her lower extremities.  No recent echocardiogram.        Prior to Visit Medications    Medication Sig Taking? Authorizing Provider   glipiZIDE (GLUCOTROL) 5 MG tablet Take 1/2 (one-half) tablet by mouth twice daily Yes Tequila Quintanilla MD   carvedilol (COREG) 12.5 MG tablet Take 1 tablet by mouth 2 times daily Yes Cherelle Castillo APRN   amLODIPine (NORVASC) 2.5 MG tablet Take 1 tablet by mouth once daily  Patient taking differently: Take 1 tablet by mouth in the morning and at bedtime Yes Binta Draper APRN - NP   pravastatin (PRAVACHOL) 40 MG tablet Take 1 tablet by

## 2024-03-04 ENCOUNTER — HOSPITAL ENCOUNTER (OUTPATIENT)
Dept: NON INVASIVE DIAGNOSTICS | Age: 84
Discharge: HOME | End: 2024-03-04
Payer: MEDICARE

## 2024-03-04 DIAGNOSIS — R06.09 DOE (DYSPNEA ON EXERTION): ICD-10-CM

## 2024-03-04 PROCEDURE — C8929 TTE W OR WO FOL WCON,DOPPLER: HCPCS

## 2024-03-04 PROCEDURE — 6360000004 HC RX CONTRAST MEDICATION: Performed by: INTERNAL MEDICINE

## 2024-03-04 RX ADMIN — PERFLUTREN 1.5 ML: 6.52 INJECTION, SUSPENSION INTRAVENOUS at 15:33

## 2024-03-07 DIAGNOSIS — E78.00 PURE HYPERCHOLESTEROLEMIA: ICD-10-CM

## 2024-03-07 DIAGNOSIS — R94.4 DECREASED CALCULATED GFR: ICD-10-CM

## 2024-03-07 DIAGNOSIS — E11.40 TYPE 2 DIABETES MELLITUS WITH DIABETIC NEUROPATHY, WITHOUT LONG-TERM CURRENT USE OF INSULIN (HCC): ICD-10-CM

## 2024-03-07 DIAGNOSIS — N18.31 STAGE 3A CHRONIC KIDNEY DISEASE (HCC): ICD-10-CM

## 2024-03-07 DIAGNOSIS — N18.32 CHRONIC KIDNEY DISEASE, STAGE 3B (HCC): ICD-10-CM

## 2024-03-07 DIAGNOSIS — E55.9 VITAMIN D DEFICIENCY: ICD-10-CM

## 2024-03-07 DIAGNOSIS — I10 ESSENTIAL HYPERTENSION: ICD-10-CM

## 2024-03-07 LAB
25(OH)D3 SERPL-MCNC: 34.9 NG/ML
ALBUMIN SERPL-MCNC: 4.2 G/DL (ref 3.5–5.2)
ALP SERPL-CCNC: 70 U/L (ref 35–104)
ALT SERPL-CCNC: 7 U/L (ref 5–33)
ANION GAP SERPL CALCULATED.3IONS-SCNC: 8 MMOL/L (ref 7–19)
AST SERPL-CCNC: 13 U/L (ref 5–32)
BACTERIA URNS QL MICRO: ABNORMAL /HPF
BASOPHILS # BLD: 0.1 K/UL (ref 0–0.2)
BASOPHILS NFR BLD: 0.6 % (ref 0–1)
BILIRUB SERPL-MCNC: 0.4 MG/DL (ref 0.2–1.2)
BILIRUB UR QL STRIP: NEGATIVE
BUN SERPL-MCNC: 20 MG/DL (ref 8–23)
CALCIUM SERPL-MCNC: 9.7 MG/DL (ref 8.8–10.2)
CHLORIDE SERPL-SCNC: 101 MMOL/L (ref 98–111)
CHOLEST SERPL-MCNC: 175 MG/DL (ref 160–199)
CLARITY UR: ABNORMAL
CO2 SERPL-SCNC: 27 MMOL/L (ref 22–29)
COLOR UR: YELLOW
CREAT SERPL-MCNC: 1.2 MG/DL (ref 0.5–0.9)
CREAT UR-MCNC: 79.9 MG/DL (ref 28–217)
CRYSTALS URNS MICRO: ABNORMAL /HPF
EOSINOPHIL # BLD: 0.3 K/UL (ref 0–0.6)
EOSINOPHIL NFR BLD: 2.8 % (ref 0–5)
EPI CELLS #/AREA URNS AUTO: 3 /HPF (ref 0–5)
ERYTHROCYTE [DISTWIDTH] IN BLOOD BY AUTOMATED COUNT: 13.5 % (ref 11.5–14.5)
GLUCOSE SERPL-MCNC: 127 MG/DL (ref 74–109)
GLUCOSE UR STRIP.AUTO-MCNC: NEGATIVE MG/DL
HBA1C MFR BLD: 6.8 % (ref 4–6)
HCT VFR BLD AUTO: 35.2 % (ref 37–47)
HDLC SERPL-MCNC: 56 MG/DL (ref 65–121)
HGB BLD-MCNC: 11.4 G/DL (ref 12–16)
HGB UR STRIP.AUTO-MCNC: ABNORMAL MG/L
HYALINE CASTS #/AREA URNS AUTO: 1 /HPF (ref 0–8)
IMM GRANULOCYTES # BLD: 0 K/UL
KETONES UR STRIP.AUTO-MCNC: NEGATIVE MG/DL
LDLC SERPL CALC-MCNC: 100 MG/DL
LEUKOCYTE ESTERASE UR QL STRIP.AUTO: ABNORMAL
LYMPHOCYTES # BLD: 3.5 K/UL (ref 1.1–4.5)
LYMPHOCYTES NFR BLD: 37.1 % (ref 20–40)
MCH RBC QN AUTO: 30.6 PG (ref 27–31)
MCHC RBC AUTO-ENTMCNC: 32.4 G/DL (ref 33–37)
MCV RBC AUTO: 94.6 FL (ref 81–99)
MICROALBUMIN UR-MCNC: <1.2 MG/DL (ref 0–19)
MICROALBUMIN/CREAT UR-RTO: NORMAL MG/G
MONOCYTES # BLD: 1 K/UL (ref 0–0.9)
MONOCYTES NFR BLD: 10.1 % (ref 0–10)
NEUTROPHILS # BLD: 4.6 K/UL (ref 1.5–7.5)
NEUTS SEG NFR BLD: 49 % (ref 50–65)
NITRITE UR QL STRIP.AUTO: POSITIVE
PH UR STRIP.AUTO: 5.5 [PH] (ref 5–8)
PLATELET # BLD AUTO: 350 K/UL (ref 130–400)
PMV BLD AUTO: 9.8 FL (ref 9.4–12.3)
POTASSIUM SERPL-SCNC: 4.1 MMOL/L (ref 3.5–5)
PROT SERPL-MCNC: 7.7 G/DL (ref 6.6–8.7)
PROT UR STRIP.AUTO-MCNC: NEGATIVE MG/DL
RBC # BLD AUTO: 3.72 M/UL (ref 4.2–5.4)
RBC #/AREA URNS AUTO: 2 /HPF (ref 0–4)
SODIUM SERPL-SCNC: 136 MMOL/L (ref 136–145)
SP GR UR STRIP.AUTO: 1.01 (ref 1–1.03)
TRIGL SERPL-MCNC: 93 MG/DL (ref 0–149)
TSH SERPL DL<=0.005 MIU/L-ACNC: 4.72 UIU/ML (ref 0.27–4.2)
UROBILINOGEN UR STRIP.AUTO-MCNC: 0.2 E.U./DL
WBC # BLD AUTO: 9.4 K/UL (ref 4.8–10.8)
WBC #/AREA URNS AUTO: 370 /HPF (ref 0–5)

## 2024-03-13 ENCOUNTER — OFFICE VISIT (OUTPATIENT)
Dept: INTERNAL MEDICINE | Age: 84
End: 2024-03-13
Payer: MEDICARE

## 2024-03-13 VITALS
DIASTOLIC BLOOD PRESSURE: 80 MMHG | BODY MASS INDEX: 30.22 KG/M2 | WEIGHT: 188 LBS | HEART RATE: 82 BPM | HEIGHT: 66 IN | OXYGEN SATURATION: 97 % | SYSTOLIC BLOOD PRESSURE: 124 MMHG

## 2024-03-13 DIAGNOSIS — J43.1 PANLOBULAR EMPHYSEMA (HCC): ICD-10-CM

## 2024-03-13 DIAGNOSIS — E78.00 PURE HYPERCHOLESTEROLEMIA: ICD-10-CM

## 2024-03-13 DIAGNOSIS — M81.6 LOCALIZED OSTEOPOROSIS WITHOUT CURRENT PATHOLOGICAL FRACTURE: ICD-10-CM

## 2024-03-13 DIAGNOSIS — Z00.00 MEDICARE ANNUAL WELLNESS VISIT, SUBSEQUENT: Primary | ICD-10-CM

## 2024-03-13 DIAGNOSIS — E11.40 TYPE 2 DIABETES MELLITUS WITH DIABETIC NEUROPATHY, WITHOUT LONG-TERM CURRENT USE OF INSULIN (HCC): ICD-10-CM

## 2024-03-13 DIAGNOSIS — N18.32 CHRONIC KIDNEY DISEASE, STAGE 3B (HCC): ICD-10-CM

## 2024-03-13 DIAGNOSIS — E55.9 VITAMIN D DEFICIENCY: ICD-10-CM

## 2024-03-13 DIAGNOSIS — I51.89 DIASTOLIC DYSFUNCTION: ICD-10-CM

## 2024-03-13 DIAGNOSIS — I10 ESSENTIAL HYPERTENSION: ICD-10-CM

## 2024-03-13 PROCEDURE — 3074F SYST BP LT 130 MM HG: CPT | Performed by: INTERNAL MEDICINE

## 2024-03-13 PROCEDURE — G8484 FLU IMMUNIZE NO ADMIN: HCPCS | Performed by: INTERNAL MEDICINE

## 2024-03-13 PROCEDURE — 3023F SPIROM DOC REV: CPT | Performed by: INTERNAL MEDICINE

## 2024-03-13 PROCEDURE — 1123F ACP DISCUSS/DSCN MKR DOCD: CPT | Performed by: INTERNAL MEDICINE

## 2024-03-13 PROCEDURE — 3079F DIAST BP 80-89 MM HG: CPT | Performed by: INTERNAL MEDICINE

## 2024-03-13 PROCEDURE — 1036F TOBACCO NON-USER: CPT | Performed by: INTERNAL MEDICINE

## 2024-03-13 PROCEDURE — 3044F HG A1C LEVEL LT 7.0%: CPT | Performed by: INTERNAL MEDICINE

## 2024-03-13 PROCEDURE — G8417 CALC BMI ABV UP PARAM F/U: HCPCS | Performed by: INTERNAL MEDICINE

## 2024-03-13 PROCEDURE — G8399 PT W/DXA RESULTS DOCUMENT: HCPCS | Performed by: INTERNAL MEDICINE

## 2024-03-13 PROCEDURE — 99214 OFFICE O/P EST MOD 30 MIN: CPT | Performed by: INTERNAL MEDICINE

## 2024-03-13 PROCEDURE — G8427 DOCREV CUR MEDS BY ELIG CLIN: HCPCS | Performed by: INTERNAL MEDICINE

## 2024-03-13 PROCEDURE — G0439 PPPS, SUBSEQ VISIT: HCPCS | Performed by: INTERNAL MEDICINE

## 2024-03-13 PROCEDURE — 1090F PRES/ABSN URINE INCON ASSESS: CPT | Performed by: INTERNAL MEDICINE

## 2024-03-13 RX ORDER — LOSARTAN POTASSIUM 25 MG/1
25 TABLET ORAL DAILY
COMMUNITY
Start: 2024-02-28 | End: 2024-03-13 | Stop reason: SDUPTHER

## 2024-03-13 RX ORDER — LOSARTAN POTASSIUM 25 MG/1
25 TABLET ORAL DAILY
Qty: 90 TABLET | Refills: 1 | Status: SHIPPED | OUTPATIENT
Start: 2024-03-13

## 2024-03-13 SDOH — ECONOMIC STABILITY: HOUSING INSECURITY
IN THE LAST 12 MONTHS, WAS THERE A TIME WHEN YOU DID NOT HAVE A STEADY PLACE TO SLEEP OR SLEPT IN A SHELTER (INCLUDING NOW)?: NO

## 2024-03-13 SDOH — ECONOMIC STABILITY: FOOD INSECURITY: WITHIN THE PAST 12 MONTHS, THE FOOD YOU BOUGHT JUST DIDN'T LAST AND YOU DIDN'T HAVE MONEY TO GET MORE.: NEVER TRUE

## 2024-03-13 SDOH — ECONOMIC STABILITY: FOOD INSECURITY: WITHIN THE PAST 12 MONTHS, YOU WORRIED THAT YOUR FOOD WOULD RUN OUT BEFORE YOU GOT MONEY TO BUY MORE.: NEVER TRUE

## 2024-03-13 SDOH — ECONOMIC STABILITY: INCOME INSECURITY: HOW HARD IS IT FOR YOU TO PAY FOR THE VERY BASICS LIKE FOOD, HOUSING, MEDICAL CARE, AND HEATING?: NOT HARD AT ALL

## 2024-03-13 ASSESSMENT — ENCOUNTER SYMPTOMS
CONSTIPATION: 0
SORE THROAT: 0
WHEEZING: 0
COUGH: 0
ABDOMINAL PAIN: 0
CHEST TIGHTNESS: 0

## 2024-03-13 ASSESSMENT — LIFESTYLE VARIABLES
HOW OFTEN DO YOU HAVE A DRINK CONTAINING ALCOHOL: NEVER
HOW MANY STANDARD DRINKS CONTAINING ALCOHOL DO YOU HAVE ON A TYPICAL DAY: PATIENT DOES NOT DRINK

## 2024-03-13 ASSESSMENT — PATIENT HEALTH QUESTIONNAIRE - PHQ9
SUM OF ALL RESPONSES TO PHQ9 QUESTIONS 1 & 2: 0
SUM OF ALL RESPONSES TO PHQ QUESTIONS 1-9: 0
SUM OF ALL RESPONSES TO PHQ QUESTIONS 1-9: 0
1. LITTLE INTEREST OR PLEASURE IN DOING THINGS: 0
SUM OF ALL RESPONSES TO PHQ QUESTIONS 1-9: 0
SUM OF ALL RESPONSES TO PHQ QUESTIONS 1-9: 0
2. FEELING DOWN, DEPRESSED OR HOPELESS: 0

## 2024-03-13 NOTE — PROGRESS NOTES
Medicare Annual Wellness Visit    Xiomara Gonzalez is here for Medicare AW    Recommendations for Preventive Services Due: see orders and patient instructions/AVS.  Recommended screening schedule for the next 5-10 years is provided to the patient in written form: see Patient Instructions/AVS.     No follow-ups on file.     Subjective       Patient's complete Health Risk Assessment and screening values have been reviewed and are found in Flowsheets. The following problems were reviewed today and where indicated follow up appointments were made and/or referrals ordered.    Positive Risk Factor Screenings with Interventions:    Fall Risk:  Do you feel unsteady or are you worried about falling? : (!) yes  2 or more falls in past year?: no  Fall with injury in past year?: no     Interventions:    Reviewed medications, home hazards, visual acuity, and co-morbidities that can increase risk for falls  Patient advised to follow-up in this office for further evaluation and treatment             Activity, Diet, and Weight:  On average, how many days per week do you engage in moderate to strenuous exercise (like a brisk walk)?: 0 days  On average, how many minutes do you engage in exercise at this level?: 0 min    Do you eat balanced/healthy meals regularly?: Yes    Body mass index is 30.34 kg/m². (!) Abnormal      Inactivity Interventions:  Patient declined any further interventions or treatment  Obesity Interventions:  low carbohydrate diet      Dentist Screen:  Have you seen the dentist within the past year?: (!) No    Intervention:  Advised to schedule with their dentist     Vision Screen:  Do you have difficulty driving, watching TV, or doing any of your daily activities because of your eyesight?: No  Have you had an eye exam within the past year?: (!) No  No results found.    Interventions:   Patient encouraged to make appointment with their eye specialist      Advanced Directives:  Do you have a Living Will?: (!)

## 2024-03-13 NOTE — PROGRESS NOTES
Chief Complaint   Patient presents with    Multiple medical problems     History of presenting illness:  Xiomara Gonzalez is a83 y.o. female who presents today for follow up on her chronic medical conditions as noted below.    Patient Active Problem List    Diagnosis Date Noted    Panlobular emphysema (HCC) 01/09/2023     Priority: Medium    Wheezing 01/09/2023     Priority: Medium    Pulmonary fibrosis (HCC) 06/02/2022     Priority: Medium    Restrictive lung disease 06/02/2022     Priority: Medium    Hypertensive renal disease 05/11/2022     Priority: Medium    Age-related cataract 12/27/2019     Priority: Medium    Presence of intraocular lens 12/27/2019     Priority: Medium    READ (dyspnea on exertion) 11/24/2021    Diastolic dysfunction 10/27/2021    Type 2 diabetes mellitus with diabetic neuropathy 07/26/2021    Essential hypertension 02/11/2019    Elevated TSH 08/07/2018    Vitamin D deficiency 09/10/2017    Pure hypercholesterolemia 09/10/2017    Type 2 diabetes mellitus without complication, without long-term current use of insulin (HCC) 09/10/2017    Localized osteoporosis without current pathological fracture 09/10/2017     Overview Note:     2017 hip neck -2.6; lspine -1/8  8/2020 hip neck -2.6/ L spine L1 -1.6  8/2023 hip neck -2.3/ L spin e-1.4      Generalized anxiety disorder 09/10/2017    Former smoker 09/10/2017    Numbness 04/07/2017    Imbalance 04/07/2017    Estrogen receptor negative tumor status 08/06/2008     Past Medical History:   Diagnosis Date    Arthritis     Back pain     Breast cancer (HCC)     left 2008    Chronic kidney disease     Concussion     History of therapeutic radiation     Hx antineoplastic chemo     Hyperlipidemia     Hypertension     Osteoporosis     Panlobular emphysema (HCC) 1/9/2023    Pneumonia     Type II or unspecified type diabetes mellitus without mention of complication, not stated as uncontrolled     diet controlled    Varicose veins     Wears glasses

## 2024-03-19 ENCOUNTER — INFUSION (OUTPATIENT)
Dept: ONCOLOGY | Facility: CLINIC | Age: 84
End: 2024-03-19
Payer: MEDICARE

## 2024-03-19 DIAGNOSIS — Z45.2 ENCOUNTER FOR CARE RELATED TO VASCULAR ACCESS PORT: Primary | ICD-10-CM

## 2024-03-19 RX ORDER — SODIUM CHLORIDE 0.9 % (FLUSH) 0.9 %
10 SYRINGE (ML) INJECTION AS NEEDED
Status: DISCONTINUED | OUTPATIENT
Start: 2024-03-19 | End: 2024-03-19 | Stop reason: HOSPADM

## 2024-03-19 RX ORDER — HEPARIN SODIUM (PORCINE) LOCK FLUSH IV SOLN 100 UNIT/ML 100 UNIT/ML
500 SOLUTION INTRAVENOUS AS NEEDED
OUTPATIENT
Start: 2024-03-19

## 2024-03-19 RX ORDER — HEPARIN SODIUM (PORCINE) LOCK FLUSH IV SOLN 100 UNIT/ML 100 UNIT/ML
500 SOLUTION INTRAVENOUS AS NEEDED
Status: DISCONTINUED | OUTPATIENT
Start: 2024-03-19 | End: 2024-03-19 | Stop reason: HOSPADM

## 2024-03-19 RX ORDER — SODIUM CHLORIDE 0.9 % (FLUSH) 0.9 %
10 SYRINGE (ML) INJECTION AS NEEDED
OUTPATIENT
Start: 2024-03-19

## 2024-03-19 RX ADMIN — Medication 10 ML: at 10:20

## 2024-03-19 RX ADMIN — HEPARIN SODIUM (PORCINE) LOCK FLUSH IV SOLN 100 UNIT/ML 500 UNITS: 100 SOLUTION at 10:20

## 2024-04-01 ENCOUNTER — OFFICE VISIT (OUTPATIENT)
Dept: PULMONOLOGY | Age: 84
End: 2024-04-01
Payer: MEDICARE

## 2024-04-01 VITALS
HEART RATE: 65 BPM | SYSTOLIC BLOOD PRESSURE: 149 MMHG | BODY MASS INDEX: 30.53 KG/M2 | TEMPERATURE: 97 F | HEIGHT: 66 IN | WEIGHT: 190 LBS | DIASTOLIC BLOOD PRESSURE: 76 MMHG | OXYGEN SATURATION: 98 %

## 2024-04-01 DIAGNOSIS — I51.89 DIASTOLIC DYSFUNCTION: ICD-10-CM

## 2024-04-01 DIAGNOSIS — J43.1 PANLOBULAR EMPHYSEMA (HCC): ICD-10-CM

## 2024-04-01 DIAGNOSIS — I11.9 RIGHT VENTRICULAR (RV) HYPERTENSION: ICD-10-CM

## 2024-04-01 DIAGNOSIS — J30.9 ALLERGIC SINUSITIS: ICD-10-CM

## 2024-04-01 DIAGNOSIS — R06.09 DOE (DYSPNEA ON EXERTION): ICD-10-CM

## 2024-04-01 DIAGNOSIS — J84.10 PULMONARY FIBROSIS (HCC): Primary | ICD-10-CM

## 2024-04-01 PROCEDURE — 3023F SPIROM DOC REV: CPT | Performed by: INTERNAL MEDICINE

## 2024-04-01 PROCEDURE — 3078F DIAST BP <80 MM HG: CPT | Performed by: INTERNAL MEDICINE

## 2024-04-01 PROCEDURE — G8399 PT W/DXA RESULTS DOCUMENT: HCPCS | Performed by: INTERNAL MEDICINE

## 2024-04-01 PROCEDURE — G8417 CALC BMI ABV UP PARAM F/U: HCPCS | Performed by: INTERNAL MEDICINE

## 2024-04-01 PROCEDURE — 1090F PRES/ABSN URINE INCON ASSESS: CPT | Performed by: INTERNAL MEDICINE

## 2024-04-01 PROCEDURE — 1123F ACP DISCUSS/DSCN MKR DOCD: CPT | Performed by: INTERNAL MEDICINE

## 2024-04-01 PROCEDURE — 1036F TOBACCO NON-USER: CPT | Performed by: INTERNAL MEDICINE

## 2024-04-01 PROCEDURE — G8427 DOCREV CUR MEDS BY ELIG CLIN: HCPCS | Performed by: INTERNAL MEDICINE

## 2024-04-01 PROCEDURE — 3077F SYST BP >= 140 MM HG: CPT | Performed by: INTERNAL MEDICINE

## 2024-04-01 PROCEDURE — 99214 OFFICE O/P EST MOD 30 MIN: CPT | Performed by: INTERNAL MEDICINE

## 2024-04-01 RX ORDER — FLUTICASONE PROPIONATE 50 MCG
2 SPRAY, SUSPENSION (ML) NASAL DAILY
Qty: 16 G | Refills: 5 | Status: SHIPPED | OUTPATIENT
Start: 2024-04-01

## 2024-04-01 ASSESSMENT — ENCOUNTER SYMPTOMS
SHORTNESS OF BREATH: 0
ABDOMINAL PAIN: 0
ANAL BLEEDING: 0
BACK PAIN: 0
CHEST TIGHTNESS: 0
ABDOMINAL DISTENTION: 0
COUGH: 0
WHEEZING: 0
RHINORRHEA: 0

## 2024-04-01 NOTE — PROGRESS NOTES
Pulmonary and Sleep Medicine    Xiomara Gonzalez (:  1940) is a 83 y.o. female,Established patient, here for evaluation of the following chief complaint(s):  Follow-up (4 week f/u  echo)      Referring physician:  No referring provider defined for this encounter.     ASSESSMENT/PLAN:  1. Pulmonary fibrosis (HCC)  2. READ (dyspnea on exertion)  3. Panlobular emphysema (HCC)  4. Diastolic dysfunction  5. Allergic sinusitis  -     fluticasone (FLONASE) 50 MCG/ACT nasal spray; 2 sprays by Each Nostril route daily, Disp-16 g, R-5Normal  6. Right ventricular (RV) hypertension        Will add Flonase to her current management due to her sinus symptoms.  Await the follow-up CT of the chest I will see her again in May.  Will defer to cardiology for further evaluation of right ventricular systolic hypertension.  Questionable benefit of right heart cath at this time.       Juan Tay MD, [facility]    No follow-ups on file.    SUBJECTIVE/OBJECTIVE:        Patient is here for follow-up on her respiratory status.  She had an echocardiogram that showed normal systolic function.  It was also reported normal diastolic function however her RVSP is 40.  She says her breathing is improved.  She reports having sinus allergies affecting her at this time of the season.        Prior to Visit Medications    Medication Sig Taking? Authorizing Provider   losartan (COZAAR) 25 MG tablet Take 1 tablet by mouth daily Yes Tequila Quintanilla MD   glipiZIDE (GLUCOTROL) 5 MG tablet Take 1/2 (one-half) tablet by mouth twice daily Yes Tequila Quintanilla MD   carvedilol (COREG) 12.5 MG tablet Take 1 tablet by mouth 2 times daily Yes Cherelle Castillo APRN   amLODIPine (NORVASC) 2.5 MG tablet Take 1 tablet by mouth once daily  Patient taking differently: Take 1 tablet by mouth in the morning and at bedtime Yes Binta Draper APRN - NP   pravastatin (PRAVACHOL) 40 MG tablet Take 1 tablet by mouth once daily Yes Tequila Quintanilla MD   famotidine 
No

## 2024-04-03 ENCOUNTER — TELEPHONE (OUTPATIENT)
Dept: PULMONOLOGY | Age: 84
End: 2024-04-03

## 2024-04-03 NOTE — TELEPHONE ENCOUNTER
Patient was told that she get called from office to set up her next office visit after talk with Dr Moran.  Please called the patient.      Thank you

## 2024-05-13 RX ORDER — GLIPIZIDE 5 MG/1
TABLET ORAL
Qty: 90 TABLET | Refills: 0 | Status: SHIPPED | OUTPATIENT
Start: 2024-05-13

## 2024-05-13 NOTE — TELEPHONE ENCOUNTER
Xiomara Gonzalez called to request a refill on her medication.      Last office visit : 3/13/2024   Next office visit : 7/17/2024     Requested Prescriptions     Pending Prescriptions Disp Refills    glipiZIDE (GLUCOTROL) 5 MG tablet [Pharmacy Med Name: glipiZIDE 5 MG Oral Tablet] 90 tablet 0     Sig: Take 1/2 (one-half) tablet by mouth twice daily            Susan Gifford MA

## 2024-05-26 NOTE — PROGRESS NOTES
[facility]  HEMATOLOGY & ONCOLOGY    [facility] HEMATOLOGY AND ONCOLOGY  [address]  [facility] [address] [phone]    Patient Name: Lorena Valdes  Encounter Date: 06/04/2024  YOB: 1940  Patient Number: [id number]      REASON FOR VISIT:  Ms. Lorena Valdes is a 84 yo female with a history of breast cancer that was diagnosed in 2008.  She had a stage IIA left breast cancer that was hormone receptor negative and Her 2 positive by FISH.  She underwent lumpectomy then adjuvant Adriamycin Cytoxan.  She only had one dose of Adriamycin and developed cardiomyopathy and thereofre the adriamycin was discontinued.  She had then weekly Taxol with all chemotherapy being completed in March 2009.  She then had adjuvant radiation therapy.     I have reviewed the HPI and verified with the patient the accuracy of it. No changes to interval history since the information was documented. Justin Canas MD 06/04/24      Oncology/Hematology History Overview Note   Tp: [id number] Mp: M0 Size: 2.6 cm Histopathologic Grade: G3? Histopathologic Type: DuctalInvasive  (NOS), left central? Stage  Grouping: IIA  08/06/2008: Core biopsy: at left breast mass, 12 o'clock: Infiltrating ductal carcinoma, gr 3, with foci of tumor necrosis? DNA index 1.78  (aneuploid), ER/MT 0%, her2/kofi 2+ (FISH ), p53 0%, Ki67  59%.  08/28/2008: MastectomyL  partial, USguided  needle localization, with SNB: IDC, gr 3, tumor measures 2.6 cm in greatest dimension,  with necrosis seen, extending to original deep margin....additional deep margin adds 1 cm to final margin of excision, residual fibrocystic  mastopathy? 2 left axillary sentinel nodes: 0/2+ve  Dose dense Adriamycin/cyclophosphamide, followed by weekly Taxol administered between September 2008 and March 2009 .  Adriamycin discontinued after one cycle due to anthracycline induced  cardiomyopathy.  Followed by Radiation therapy     Malignant neoplasm of central portion of left female breast (CMS/HCC)   11/9/2016 Initial Diagnosis    Malignant neoplasm of central portion of left female breast     1/13/2020 - 8/11/2020 Chemotherapy    OP CENTRAL VENOUS ACCESS DEVICE ACCESS, CARE, AND MAINTENANCE ([facility])     9/1/2021 -  Chemotherapy    OP CENTRAL VENOUS ACCESS DEVICE ACCESS, CARE, AND MAINTENANCE ([facility]AD)           PAST MEDICAL HISTORY:  ALLERGIES:  Allergies   Allergen Reactions    Adhesive Tape Hives     Latex Tape       CURRENT MEDICATIONS:  Outpatient Encounter Medications as of 9/1/2021   Medication Sig Dispense Refill    aspirin 81 MG EC tablet Take 81 mg by mouth daily.        Calcium Carb-Cholecalciferol (CALCIUM 600+D3) 600-200 MG-UNIT tablet Take 1 tablet by mouth 2 (Two) Times a Day.      Cholecalciferol (VITAMIN D3) 400 UNITS capsule Take 1 capsule by mouth Daily.      glipiZIDE (GLUCOTROL) 5 MG tablet Take 5 mg by mouth Daily.      ibuprofen (ADVIL,MOTRIN) 400 MG tablet Take 400 mg by mouth Every 6 (Six) Hours As Needed for mild pain (1-3).      losartan (COZAAR) 100 MG tablet Take 100 mg by mouth Daily.  1    magnesium 30 MG tablet Take 30 mg by mouth.      metoprolol succinate XL (TOPROL-XL) 50 MG 24 hr tablet Take 25 mg by mouth Daily.      omeprazole (priLOSEC) 20 MG capsule Take 20 mg by mouth Daily.      potassium chloride (K-DUR,KLOR-CON) 10 MEQ CR tablet Take 10 mEq by mouth Daily.      pravastatin (PRAVACHOL) 40 MG tablet Take 40 mg by mouth Daily.      triamterene-hydrochlorothiazide (MAXZIDE-25) 37.5-25 MG per tablet Take 0.5 tablets by mouth Daily.       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 Sabillon    PAP SMEAR  2010     HEALTH MAINTENANCE ITEMS:    Last Completed Mammogram         Status Date      MAMMOGRAM Done 8/25/2020 Ext Proc: CHG SCREENING DIGITAL BREAST TOMOSYNTHESIS BI     5 mm round focal asymmetry within the inferior right breast ; SPOT COMPRESSION VIEW NEGATIVE 8/26/2020.          FAMILY HISTORY:  Family History   Problem Relation Age of Onset    Cancer Mother     Heart disease Father     No Known Problems Daughter     No Known Problems Son     Hypertension Daughter     Hypertension Daughter     Other Brother         polioi    Cancer Paternal Aunt     No Known Problems Maternal Grandmother     No Known Problems Maternal Grandfather     No Known Problems Paternal Grandmother     No Known Problems Paternal Grandfather     Drug abuse Brother     Colon cancer Neg Hx     Colon polyps Neg Hx     Esophageal cancer Neg Hx     Liver cancer Neg Hx     Liver disease Neg Hx     Rectal cancer Neg Hx     Stomach cancer Neg Hx      SOCIAL HISTORY:  Social History     Socioeconomic History    Marital status: Single     Spouse name: Not on file    Number of children: Not on file    Years  "of education: Not on file    Highest education level: Not on file   Tobacco Use    Smoking status: Former Smoker     Types: Cigarettes     Quit date:      Years since quittin.7    Smokeless tobacco: Never Used   Substance and Sexual Activity    Alcohol use: Yes     Alcohol/week: 1.0 standard drinks     Types: 1 Cans of beer per week     Comment: Occasionally     Drug use: No    Sexual activity: Defer       REVIEW OF SYSTEMS:  Review of Systems   Constitutional: Negative for activity change, appetite change, chills, diaphoresis, fatigue, fever nor unexpected weight loss.   Lives alone.  2 children live nearby.  Manages the chores, errands and driving.  Is up and about \"all the time.\"  Says she still push mows her yard.  HENT: Negative for ear pain, nosebleeds, sinus pressure, sore throat and voice change.    Eyes: Negative for blurred vision, double vision, pain and visual disturbance.   Respiratory: Negative for cough and shortness of breath.    Cardiovascular: Negative for chest pain, palpitations and leg swelling.   Gastrointestinal: Negative for abdominal pain, anal bleeding, blood in stool, constipation, diarrhea, nausea and vomiting.   Endocrine: Negative for heat intolerance, polydipsia and polyuria.   Genitourinary: Negative for dysuria, frequency, hematuria, urgency but wears pads/pull ups due to urinary incontinence.   Musculoskeletal: Positive for arthralgias (knees) but no myalgias.   Skin: Negative for rash and skin lesions.   Neurological: Negative for dizziness, tremors, seizures, syncope, speech difficulty, weakness and headache.   Hematological: Negative for adenopathy. Does not bruise/bleed easily.   Psychiatric/Behavioral: Negative for dysphoric mood, sleep disturbance, suicidal ideas and depressed mood.       /76   Pulse 82   Temp 96.6 °F (35.9 °C) (Temporal)   Resp 18   Ht 167.6 cm (66\")   Wt 86.2 kg (190 lb 1.6 oz)   SpO2 97%   BMI 30.68 kg/m²          Physical " Exam:  Physical Exam   Constitutional: She is oriented to person, place, and time. She remains a bit heavy set, modestly kept elderly female, appears well-developed and well-nourished.  ECOG 0  HENT:   Head: Atraumatic.   Mouth/Throat: Mouth and oropharynx is clear and moist.   Eyes: Pupils are equal, round, and reactive to light. No scleral icterus.   Neck: Trachea normal.   Cardiovascular: Normal rate, regular rhythm and normal pulses. Exam reveals no gallop and no friction rub.   No murmur heard.  Pulmonary/Chest: Effort normal and breath sounds normal. She has no wheezes. She has no rhonchi. She has no rales. Port in the left upper chest is well-seated.  Breasts: Chaperoned exam: Brittany Davis MA--right breast without masses, skin changes no nipple discharge.  Left breast mastectomy site is well-healed.  The site is depressed from scar retraction.  No underlying nodularity.  There is radiation associated skin firmness of the entire breast but no pigmentation.  Abdominal: Soft. Normal appearance. There is no abdominal tenderness. There is no rebound and no guarding.   Lymphadenopathy:     She has no cervical adenopathy.        Right: No supraclavicular adenopathy present.        Left: No supraclavicular adenopathy present.   Neurological: She is alert and oriented to person, place, and time. No sensory deficit.   Psychiatric: Judgment normal.       LABS    Lab Results - Last 18 Months   Lab Units 09/01/21  1101 02/04/21  1055 09/01/20  1215   HEMOGLOBIN g/dL 11.1* 11.9* 11.6*   HEMATOCRIT % 33.1* 36.7* 34.0   MCV fL 93.8 94.8 91.9   WBC 10*3/mm3 7.45 8.7 8.74   RDW % 13.2 13.0 12.8   MPV fL 9.2 9.0* 9.3   PLATELETS 10*3/mm3 354 326 347   IMM GRAN % % 0.3  --  0.3   NEUTROS ABS 10*3/mm3 4.13 3.9 4.22   LYMPHS ABS 10*3/mm3 2.33 3.7 3.30*   MONOS ABS 10*3/mm3 0.73 1.10* 0.90   EOS ABS 10*3/mm3 0.20 0.00 0.25   BASOS ABS 10*3/mm3 0.04 0.00 0.04   IMMATURE GRANS (ABS) 10*3/mm3 0.02 0.0 0.03   NRBC /100 WBC 0.0  --   0.0       Lab Results - Last 18 Months   Lab Units 09/01/21  1101 08/18/21  1115 08/09/21  1326 07/22/21  1021 02/04/21  1055 11/03/20  1134 09/01/20  1215   GLUCOSE mg/dL 228* 166* 108 107 104 139* 220*   SODIUM mmol/L 138 140 141 140 139 140 138   POTASSIUM mmol/L 3.8 4.3 4.1 4.0 3.9 4.7 4.1   TOTAL CO2 mmol/L  --  27 26 25 28 29  --    CO2 mmol/L 26.0  --   --   --   --   --  27.0   CHLORIDE mmol/L 102 104 104 105 101 103 100   ANION GAP mmol/L 10.0 9 11 10 10 8 11.0   CREATININE mg/dL 1.16* 1.2* 1.6* 1.2* 0.9 1* 1.16*   BUN mg/dL 22 20 32* 28* 24* 25* 27*   BUN / CREAT RATIO  19.0  --   --   --   --   --  23.3   CALCIUM mg/dL 9.2 9.5 9.7 9.6 9.1 9.9 9.1   EGFR IF NONAFRICN AM mL/min/1.73 45* 43* 31* 43* >60 53* 45*   ALK PHOS U/L 87  --   --  78 83 90 80   TOTAL PROTEIN g/dL 7.5  --   --  7.8 7.7 7.9 7.7   ALT (SGPT) U/L 9  --   --  9 9 14 11   AST (SGOT) U/L 17  --   --  16 17 18 16   BILIRUBIN mg/dL 0.5  --   --  0.5 0.4 0.3 0.2   ALBUMIN g/dL 3.90  --   --  4.1 3.8 4.1 3.90   GLOBULIN gm/dL 3.6  --   --   --   --   --  3.8       ASSESSMENT  1. Left Breast Cancer diagnosed in 2008  Initial stage: AJCC TNM [id number], Stage IIA, ER - [place] -  , Her 2 kofi 2+, Fish positive.  TRIPLE NEGATIVE DISEASE  Treatment : Left Lumpectomy, adjuvant chemotherpay with Adriamycin Cytoxan x 1 cycle due to cardiomyopathy, Taxol weekly for 12 weeks and then adjuvant radiation therapy.   Disease status:   10/23/23-mammogram: BI-RADS Category 2: Benign.    2. Osteoporosis. Followed by pcp  - 8/2020, She continues on calcium. She will discuss plan with pcp  - 8/22/23- DEXA scan- Osteopenia.  10 year risk for major osteoporotic fracture is 17% and for hip fracture is 5.4%.   3.  Type 2 DM: on oral medications per pcp.    4.  GERD - on prilosec and controlled  5.  HTN - per pcp  6.  Anemia likely secondary to CKD stage III, substrates have been normal.    -- Hgb 10.8; MCV 93.8, 5/28/24 (prior range;  Hgb 11.1-12.2; MCV 93.8- 97.4)  "  --Would be a candidate for GALLITO therapy if and when her hemoglobin < 10 and hematocrit < 30.  7.  Functioning Mediport.  Does not want removed just yet.  \"Medicare only covers 80%.\"  8.  Chronic kidney disease, stage III  --GFR 42.3 mL/min, 5/28/24 (prior:  39-53)  9.   COPD. Followed by pulmonary- Dr. Salazar  -2/28/24- Follow-up pulmonary.  Plan: Follow-up 3 mo for repeat CT  -1/10/24- CT chest- Interval development of a 6 mL ground-glass nodule of the left lower lobe superior segment and several scattered 5 mm smaller ground-glass nodules of the right upper lobe, probably infectious or inflammatory nature.  However, follow-up CT chest in 3 months is recommended.   -6/1/24- CT chest-Stable pulmonary fibrosis. No acute cardiopulmonary findings. Interval resolution of the previously identified right upper lobe and left lower lobe ground-glass nodules. Atherosclerosis including coronary arteries. Cardiomegaly.   10.  Self directed    PLAN  1.  Apprised of labs, 5/28/24 hyperglycemia, depressed (stable) GFR otherwise stable CMP, mild (stable) anemia otherwise stable CBC.  Normal CEA.  Normal CA 27-29    2.  Apprised of mammogram, 10/23/2024 (above).  [facility].  3.  Schedule mammogram, 10/24/2025    4.  Apprised of CT chest, 6/1/24 (above).  [facility]  5.  Keep appointment with pulmonary on 6/11/24 Re:  Follow-up CT chest    6.   Refer to general surgery when she agrees Re: Port removal.  Port removal recommended.  The potential for infection and thrombosis explained.  Patient wants to leave it in for now.  \"When I have the funds- it'll cost me $3000 that Medicare won't pay and I don't have.\"      7...Continue to follow with pcp and other specialists  8.  Continue port flushes every 8 weeks  9.  Return in 12 months for follow up and preoffice cbc, cmp and cea, ca 27-29.    I spent ~32 minutes caring for Lorena on this date of service. This time includes time spent by me in the following activities: preparing for the " visit, reviewing tests, performing a medically appropriate examination and/or evaluation, counseling and educating the patient/family/caregiver, ordering medications, tests, or procedures and documenting information in the medical record.

## 2024-05-28 ENCOUNTER — LAB (OUTPATIENT)
Dept: LAB | Facility: HOSPITAL | Age: 84
End: 2024-05-28
Payer: MEDICARE

## 2024-05-28 ENCOUNTER — INFUSION (OUTPATIENT)
Dept: ONCOLOGY | Facility: CLINIC | Age: 84
End: 2024-05-28
Payer: MEDICARE

## 2024-05-28 DIAGNOSIS — Z17.1 MALIGNANT NEOPLASM OF CENTRAL PORTION OF LEFT BREAST IN FEMALE, ESTROGEN RECEPTOR NEGATIVE: ICD-10-CM

## 2024-05-28 DIAGNOSIS — C50.112 MALIGNANT NEOPLASM OF CENTRAL PORTION OF LEFT BREAST IN FEMALE, ESTROGEN RECEPTOR NEGATIVE: ICD-10-CM

## 2024-05-28 DIAGNOSIS — Z45.2 ENCOUNTER FOR CARE RELATED TO VASCULAR ACCESS PORT: Primary | ICD-10-CM

## 2024-05-28 LAB
ALBUMIN SERPL-MCNC: 3.8 G/DL (ref 3.5–5.2)
ALBUMIN/GLOB SERPL: 1 G/DL
ALP SERPL-CCNC: 73 U/L (ref 39–117)
ALT SERPL W P-5'-P-CCNC: 7 U/L (ref 1–33)
ANION GAP SERPL CALCULATED.3IONS-SCNC: 9 MMOL/L (ref 5–15)
AST SERPL-CCNC: 13 U/L (ref 1–32)
BASOPHILS # BLD AUTO: 0.05 10*3/MM3 (ref 0–0.2)
BASOPHILS NFR BLD AUTO: 0.5 % (ref 0–1.5)
BILIRUB SERPL-MCNC: 0.4 MG/DL (ref 0–1.2)
BUN SERPL-MCNC: 20 MG/DL (ref 8–23)
BUN/CREAT SERPL: 15.7 (ref 7–25)
CALCIUM SPEC-SCNC: 8.7 MG/DL (ref 8.6–10.5)
CEA SERPL-MCNC: 2.47 NG/ML
CHLORIDE SERPL-SCNC: 104 MMOL/L (ref 98–107)
CO2 SERPL-SCNC: 27 MMOL/L (ref 22–29)
CREAT SERPL-MCNC: 1.27 MG/DL (ref 0.57–1)
DEPRECATED RDW RBC AUTO: 45.7 FL (ref 37–54)
EGFRCR SERPLBLD CKD-EPI 2021: 42.3 ML/MIN/1.73
EOSINOPHIL # BLD AUTO: 0.2 10*3/MM3 (ref 0–0.4)
EOSINOPHIL NFR BLD AUTO: 2 % (ref 0.3–6.2)
ERYTHROCYTE [DISTWIDTH] IN BLOOD BY AUTOMATED COUNT: 13.3 % (ref 12.3–15.4)
GLOBULIN UR ELPH-MCNC: 3.9 GM/DL
GLUCOSE SERPL-MCNC: 151 MG/DL (ref 65–99)
HCT VFR BLD AUTO: 33.1 % (ref 34–46.6)
HGB BLD-MCNC: 10.8 G/DL (ref 12–15.9)
IMM GRANULOCYTES # BLD AUTO: 0.02 10*3/MM3 (ref 0–0.05)
IMM GRANULOCYTES NFR BLD AUTO: 0.2 % (ref 0–0.5)
LYMPHOCYTES # BLD AUTO: 3.8 10*3/MM3 (ref 0.7–3.1)
LYMPHOCYTES NFR BLD AUTO: 38.9 % (ref 19.6–45.3)
MCH RBC QN AUTO: 30.6 PG (ref 26.6–33)
MCHC RBC AUTO-ENTMCNC: 32.6 G/DL (ref 31.5–35.7)
MCV RBC AUTO: 93.8 FL (ref 79–97)
MONOCYTES # BLD AUTO: 0.95 10*3/MM3 (ref 0.1–0.9)
MONOCYTES NFR BLD AUTO: 9.7 % (ref 5–12)
NEUTROPHILS NFR BLD AUTO: 4.75 10*3/MM3 (ref 1.7–7)
NEUTROPHILS NFR BLD AUTO: 48.7 % (ref 42.7–76)
NRBC BLD AUTO-RTO: 0 /100 WBC (ref 0–0.2)
PLATELET # BLD AUTO: 334 10*3/MM3 (ref 140–450)
PMV BLD AUTO: 9.5 FL (ref 6–12)
POTASSIUM SERPL-SCNC: 4.1 MMOL/L (ref 3.5–5.2)
PROT SERPL-MCNC: 7.7 G/DL (ref 6–8.5)
RBC # BLD AUTO: 3.53 10*6/MM3 (ref 3.77–5.28)
SODIUM SERPL-SCNC: 140 MMOL/L (ref 136–145)
WBC NRBC COR # BLD AUTO: 9.77 10*3/MM3 (ref 3.4–10.8)

## 2024-05-28 PROCEDURE — 36415 COLL VENOUS BLD VENIPUNCTURE: CPT

## 2024-05-28 PROCEDURE — 86300 IMMUNOASSAY TUMOR CA 15-3: CPT

## 2024-05-28 PROCEDURE — 82378 CARCINOEMBRYONIC ANTIGEN: CPT

## 2024-05-28 PROCEDURE — 85025 COMPLETE CBC W/AUTO DIFF WBC: CPT

## 2024-05-28 PROCEDURE — 80053 COMPREHEN METABOLIC PANEL: CPT

## 2024-05-28 RX ORDER — HEPARIN SODIUM (PORCINE) LOCK FLUSH IV SOLN 100 UNIT/ML 100 UNIT/ML
500 SOLUTION INTRAVENOUS AS NEEDED
Status: DISCONTINUED | OUTPATIENT
Start: 2024-05-28 | End: 2024-05-28 | Stop reason: HOSPADM

## 2024-05-28 RX ORDER — SODIUM CHLORIDE 0.9 % (FLUSH) 0.9 %
10 SYRINGE (ML) INJECTION AS NEEDED
OUTPATIENT
Start: 2024-05-28

## 2024-05-28 RX ORDER — HEPARIN SODIUM (PORCINE) LOCK FLUSH IV SOLN 100 UNIT/ML 100 UNIT/ML
500 SOLUTION INTRAVENOUS AS NEEDED
OUTPATIENT
Start: 2024-05-28

## 2024-05-28 RX ORDER — SODIUM CHLORIDE 0.9 % (FLUSH) 0.9 %
10 SYRINGE (ML) INJECTION AS NEEDED
Status: DISCONTINUED | OUTPATIENT
Start: 2024-05-28 | End: 2024-05-28 | Stop reason: HOSPADM

## 2024-05-28 RX ADMIN — HEPARIN SODIUM (PORCINE) LOCK FLUSH IV SOLN 100 UNIT/ML 500 UNITS: 100 SOLUTION at 11:15

## 2024-05-28 RX ADMIN — Medication 10 ML: at 11:15

## 2024-05-29 ENCOUNTER — HOSPITAL ENCOUNTER (OUTPATIENT)
Dept: CT IMAGING | Age: 84
Discharge: HOME | End: 2024-05-29
Payer: MEDICARE

## 2024-05-29 DIAGNOSIS — J84.10 PULMONARY FIBROSIS (HCC): ICD-10-CM

## 2024-05-29 LAB — CANCER AG27-29 SERPL-ACNC: 28 U/ML (ref 0–38.6)

## 2024-05-29 PROCEDURE — 71250 CT THORAX DX C-: CPT

## 2024-06-04 ENCOUNTER — OFFICE VISIT (OUTPATIENT)
Dept: ONCOLOGY | Facility: CLINIC | Age: 84
End: 2024-06-04
Payer: MEDICARE

## 2024-06-04 VITALS
WEIGHT: 190.1 LBS | HEART RATE: 82 BPM | DIASTOLIC BLOOD PRESSURE: 76 MMHG | OXYGEN SATURATION: 97 % | RESPIRATION RATE: 18 BRPM | SYSTOLIC BLOOD PRESSURE: 146 MMHG | HEIGHT: 66 IN | TEMPERATURE: 96.6 F | BODY MASS INDEX: 30.55 KG/M2

## 2024-06-04 DIAGNOSIS — C50.112 MALIGNANT NEOPLASM OF CENTRAL PORTION OF LEFT BREAST IN FEMALE, ESTROGEN RECEPTOR NEGATIVE: Primary | ICD-10-CM

## 2024-06-04 DIAGNOSIS — Z17.1 MALIGNANT NEOPLASM OF CENTRAL PORTION OF LEFT BREAST IN FEMALE, ESTROGEN RECEPTOR NEGATIVE: Primary | ICD-10-CM

## 2024-06-11 ENCOUNTER — OFFICE VISIT (OUTPATIENT)
Dept: CARDIOLOGY CLINIC | Age: 84
End: 2024-06-11
Payer: MEDICARE

## 2024-06-11 VITALS
OXYGEN SATURATION: 98 % | HEART RATE: 68 BPM | WEIGHT: 189 LBS | DIASTOLIC BLOOD PRESSURE: 80 MMHG | BODY MASS INDEX: 30.37 KG/M2 | HEIGHT: 66 IN | SYSTOLIC BLOOD PRESSURE: 160 MMHG

## 2024-06-11 DIAGNOSIS — I10 ESSENTIAL HYPERTENSION: Primary | ICD-10-CM

## 2024-06-11 PROCEDURE — 1090F PRES/ABSN URINE INCON ASSESS: CPT | Performed by: INTERNAL MEDICINE

## 2024-06-11 PROCEDURE — G8417 CALC BMI ABV UP PARAM F/U: HCPCS | Performed by: INTERNAL MEDICINE

## 2024-06-11 PROCEDURE — G8399 PT W/DXA RESULTS DOCUMENT: HCPCS | Performed by: INTERNAL MEDICINE

## 2024-06-11 PROCEDURE — G8427 DOCREV CUR MEDS BY ELIG CLIN: HCPCS | Performed by: INTERNAL MEDICINE

## 2024-06-11 PROCEDURE — 1036F TOBACCO NON-USER: CPT | Performed by: INTERNAL MEDICINE

## 2024-06-11 PROCEDURE — 3079F DIAST BP 80-89 MM HG: CPT | Performed by: INTERNAL MEDICINE

## 2024-06-11 PROCEDURE — 99214 OFFICE O/P EST MOD 30 MIN: CPT | Performed by: INTERNAL MEDICINE

## 2024-06-11 PROCEDURE — 3077F SYST BP >= 140 MM HG: CPT | Performed by: INTERNAL MEDICINE

## 2024-06-11 PROCEDURE — 1123F ACP DISCUSS/DSCN MKR DOCD: CPT | Performed by: INTERNAL MEDICINE

## 2024-06-11 NOTE — PROGRESS NOTES
I   Date Value Ref Range Status   07/24/2011 < 0.01 0.000 - 0.780 NG/ML Final     Comment:     0-0.10 ng/ml     Reference range for individuals without                   ischemic myocardial disease.  Clinical                   correlation suggested.  0.11-.78 ng/ml   Values of troponin in this range suggest                   that a repeat assay be drawn 6-8 hours                   after the current specimen.  0.79-7.5 ng/ml   Values in this range suggest myocardial                   injury.  Peak troponin concentrations                   between this range are associated with                    unstable angina and percutaneous coronary                   intervention.  >or =7.5 ng/ml   In the absence of external chest trauma                   values of the troponin concentration in                   this range suggest myocardial infarction.      LIPID PANEL: No results found for: \"LIPIDPAN\"  LIVER PROFILE:   AST   Date Value Ref Range Status   03/07/2024 13 5 - 32 U/L Final     ALT   Date Value Ref Range Status   03/07/2024 7 5 - 33 U/L Final              Imaging:      - Echo :  Summary   LV is normal in size with normal systolic function. LV ejection fraction   estimated 60-65%. Diastolic function appears preserved for age. Normal   wall thickness. Normal wall motion.   RV is normal in size with normal systolic function. Moderate pulmonary   hypertension with RVSP estimated at 40 mmHg.   Left atrium appears normal in size.   Right atrium appears normal in size.   Aortic valve is trileaflet with mild leaflet thickening but preserved   mobility. No stenosis or regurgitation.   Mitral valve is mildly thickened with no significant stenosis. Mild mitral   regurgitation.   Mild tricuspid regurgitation.   No significant pericardial effusion.      Signature      ----------------------------------------------------------------   Electronically signed by Avinash Castro MD(Interpreting physician)   on 03/04/2024 09:07 PM

## 2024-06-12 ENCOUNTER — OFFICE VISIT (OUTPATIENT)
Dept: PULMONOLOGY | Age: 84
End: 2024-06-12
Payer: MEDICARE

## 2024-06-12 VITALS
OXYGEN SATURATION: 94 % | BODY MASS INDEX: 30.31 KG/M2 | DIASTOLIC BLOOD PRESSURE: 67 MMHG | TEMPERATURE: 97 F | HEIGHT: 66 IN | WEIGHT: 188.6 LBS | HEART RATE: 69 BPM | SYSTOLIC BLOOD PRESSURE: 160 MMHG

## 2024-06-12 DIAGNOSIS — J84.10 PULMONARY FIBROSIS (HCC): Primary | ICD-10-CM

## 2024-06-12 DIAGNOSIS — J30.9 ALLERGIC SINUSITIS: ICD-10-CM

## 2024-06-12 DIAGNOSIS — I51.89 DIASTOLIC DYSFUNCTION: ICD-10-CM

## 2024-06-12 DIAGNOSIS — R06.09 DOE (DYSPNEA ON EXERTION): ICD-10-CM

## 2024-06-12 DIAGNOSIS — J43.1 PANLOBULAR EMPHYSEMA (HCC): ICD-10-CM

## 2024-06-12 PROCEDURE — G8417 CALC BMI ABV UP PARAM F/U: HCPCS | Performed by: INTERNAL MEDICINE

## 2024-06-12 PROCEDURE — 3077F SYST BP >= 140 MM HG: CPT | Performed by: INTERNAL MEDICINE

## 2024-06-12 PROCEDURE — 3023F SPIROM DOC REV: CPT | Performed by: INTERNAL MEDICINE

## 2024-06-12 PROCEDURE — 1123F ACP DISCUSS/DSCN MKR DOCD: CPT | Performed by: INTERNAL MEDICINE

## 2024-06-12 PROCEDURE — 1036F TOBACCO NON-USER: CPT | Performed by: INTERNAL MEDICINE

## 2024-06-12 PROCEDURE — 1090F PRES/ABSN URINE INCON ASSESS: CPT | Performed by: INTERNAL MEDICINE

## 2024-06-12 PROCEDURE — 99214 OFFICE O/P EST MOD 30 MIN: CPT | Performed by: INTERNAL MEDICINE

## 2024-06-12 PROCEDURE — G8399 PT W/DXA RESULTS DOCUMENT: HCPCS | Performed by: INTERNAL MEDICINE

## 2024-06-12 PROCEDURE — 3078F DIAST BP <80 MM HG: CPT | Performed by: INTERNAL MEDICINE

## 2024-06-12 PROCEDURE — G8428 CUR MEDS NOT DOCUMENT: HCPCS | Performed by: INTERNAL MEDICINE

## 2024-06-12 ASSESSMENT — ENCOUNTER SYMPTOMS
APNEA: 0
BLOOD IN STOOL: 0
BACK PAIN: 0
DIARRHEA: 0
COUGH: 0
SHORTNESS OF BREATH: 0
SHORTNESS OF BREATH: 0
NAUSEA: 0
FACIAL SWELLING: 0
COUGH: 0
RHINORRHEA: 0
WHEEZING: 0
SORE THROAT: 0
EYE PAIN: 0
APNEA: 0
ABDOMINAL DISTENTION: 0
EYE REDNESS: 0
CONSTIPATION: 0
EYE DISCHARGE: 0
ANAL BLEEDING: 0
ABDOMINAL PAIN: 0
CHEST TIGHTNESS: 0
ABDOMINAL PAIN: 0
CHEST TIGHTNESS: 0
ABDOMINAL DISTENTION: 0
VOMITING: 0
WHEEZING: 0

## 2024-06-12 NOTE — PROGRESS NOTES
Pulmonary and Sleep Medicine    Xiomara Gonzalez (:  1940) is a 83 y.o. female,Established patient, here for evaluation of the following chief complaint(s):  Follow-up (Ct chest Results )      Referring physician:  No referring provider defined for this encounter.     ASSESSMENT/PLAN:  1. Pulmonary fibrosis (HCC)  2. READ (dyspnea on exertion)  3. Panlobular emphysema (HCC)  4. Diastolic dysfunction  5. Allergic sinusitis        Will get a 6-minute walk today to assess her need for oxygen during ambulation since her oxygen saturation is marginal at rest.    Continue Stiolto inhaler.   Continue albuterol inhaler.        Juan Tay [facility], [facility]    Return in about 6 months (around 2024).    SUBJECTIVE/OBJECTIVE:        Patient is here for follow-up on pulmonary fibrosis.  She has been having issues with her blood pressure control.  She denies respiratory complaints at this time.  Her oxygen saturation is about 94% on room air at rest.  CT of the chest was reviewed.  My interpretation of the CT is that she has stable pulmonary fibrosis.        Prior to Visit Medications    Medication Sig Taking? Authorizing Provider   glipiZIDE (GLUCOTROL) 5 MG tablet Take 1/2 (one-half) tablet by mouth twice daily  Tequila Quintanilla MD   fluticasone (FLONASE) 50 MCG/ACT nasal spray 2 sprays by Each Nostril route daily  Juan Tay MD   losartan (COZAAR) 25 MG tablet Take 1 tablet by mouth daily  Tequila Quintanilla MD   carvedilol (COREG) 12.5 MG tablet Take 1 tablet by mouth 2 times daily  Cherelle Castillo APRN   amLODIPine (NORVASC) 2.5 MG tablet Take 1 tablet by mouth once daily  Patient taking differently: Take 1 tablet by mouth in the morning and at bedtime  Binta Draper APRN - NP   pravastatin (PRAVACHOL) 40 MG tablet Take 1 tablet by mouth once daily  Tequila Quintanilla MD   famotidine (PEPCID) 40 MG tablet TAKE 1 TABLET BY MOUTH ONCE DAILY IN THE EVENING  Tequila Quintanilla MD   albuterol sulfate HFA

## 2024-06-27 ENCOUNTER — SCHEDULED TELEPHONE ENCOUNTER (OUTPATIENT)
Dept: CARDIOLOGY CLINIC | Age: 84
End: 2024-06-27
Payer: MEDICARE

## 2024-06-27 DIAGNOSIS — I10 ESSENTIAL HYPERTENSION: Primary | ICD-10-CM

## 2024-06-27 PROCEDURE — 99213 OFFICE O/P EST LOW 20 MIN: CPT | Performed by: CLINICAL NURSE SPECIALIST

## 2024-06-27 PROCEDURE — 1123F ACP DISCUSS/DSCN MKR DOCD: CPT | Performed by: CLINICAL NURSE SPECIALIST

## 2024-06-27 ASSESSMENT — ENCOUNTER SYMPTOMS
VOMITING: 0
FACIAL SWELLING: 0
CHEST TIGHTNESS: 0
WHEEZING: 0
SHORTNESS OF BREATH: 1
ABDOMINAL PAIN: 0
NAUSEA: 0
COUGH: 0
EYE REDNESS: 0

## 2024-06-27 NOTE — PROGRESS NOTES
Documentation:  I communicated with the patient and/or health care decision maker about HTN  Details of this discussion including any medical advice provided:     Total Time: minutes: 11-20 minutes    Xiomara Gonzalez was evaluated through a synchronous (real-time) audio encounter. Patient identification was verified at the start of the visit. She (or guardian if applicable) is aware that this is a billable service, which includes applicable co-pays. This visit was conducted with the patient's (and/or legal guardian's) verbal consent. She has not had a related appointment within my department in the past 7 days or scheduled within the next 24 hours.   The patient was located at Home: [facility] [address]  [facility] [address].  The provider was located at Facility (Appt Dept): [address] [place] [address].  [address]  [place] [address].    Note: not billable if this call serves to triage the patient into an appointment for the relevant concern  Yes, I confirm.       SAMMY Vaughn      Diagnosis Orders   1. Essential hypertension           HPI:    Xiomara Gonzaelz (:  1940) has requested an telephone evaluation for the following concern(s):    Patient's past medical history significant for chronic essential hypertension, chronic kidney disease, obesity and remote history of breast cancer status postlumpectomy and chemotherapy as well as radiation therapy in the remote past.     Diagnostics notable for cardiac angiogram which showed nonobstructive coronary artery disease.  Stress echocardiogram documented EF 50-55% with no evidence for major valvulopathy, cardiomyopathy or pericardial effusion.  She also has restrictive lung disease and mild pulmonary hypertension by echo criteria, on chronic bronchodilator therapy.    Today's visit is for hypertension follow-up.  Her blood pressure was quite elevated in the office at recent visit.  She has been monitoring at home.  See media tab for list of readings.  Her readings have

## 2024-06-27 NOTE — PROGRESS NOTES
[facility]  [address] [address]  Phone: [phone]  Fax: [phone]    OFFICE VISIT:  2024    Xiomara Gonzalez - : 1940    Reason For Visit:  Xiomara is a 83 y.o. female who is here for Follow-up and Essential hypertension      {No diagnosis found. (Refresh or delete this SmartLink)}      HPI  Patient's past medical history significant for chronic essential hypertension, chronic kidney disease, obesity and remote history of breast cancer status postlumpectomy and chemotherapy as well as radiation therapy in the remote past.     Diagnostics notable for cardiac angiogram which showed nonobstructive coronary artery disease.  Stress echocardiogram documented EF 50-55% with no evidence for major valvulopathy, cardiomyopathy or pericardial effusion.  She also has restrictive lung disease and mild pulmonary hypertension by echo criteria, on chronic bronchodilator therapy.    Burning type chest pain at recent visit with Tequila Maldonado MD is PCP.  Xiomara Gonzalez has the following history as recorded in [facility]:    Patient Active Problem List    Diagnosis Date Noted    Panlobular emphysema (HCC) 2023    Wheezing 2023    Pulmonary fibrosis (HCC) 2022    Restrictive lung disease 2022    Hypertensive renal disease 2022    Age-related cataract 2019    Presence of intraocular lens 2019    Allergic sinusitis 2024    READ (dyspnea on exertion) 2021    Diastolic dysfunction 10/27/2021    Type 2 diabetes mellitus with diabetic neuropathy 2021    Essential hypertension 2019    Elevated TSH 2018    Vitamin D deficiency 09/10/2017    Pure hypercholesterolemia 09/10/2017    Type 2 diabetes mellitus without complication, without long-term current use of insulin (HCC) 09/10/2017    Localized osteoporosis without current pathological fracture 09/10/2017    Generalized anxiety disorder 09/10/2017

## 2024-07-12 DIAGNOSIS — I51.89 DIASTOLIC DYSFUNCTION: ICD-10-CM

## 2024-07-12 DIAGNOSIS — E78.00 PURE HYPERCHOLESTEROLEMIA: ICD-10-CM

## 2024-07-12 DIAGNOSIS — M81.6 LOCALIZED OSTEOPOROSIS WITHOUT CURRENT PATHOLOGICAL FRACTURE: ICD-10-CM

## 2024-07-12 DIAGNOSIS — E55.9 VITAMIN D DEFICIENCY: ICD-10-CM

## 2024-07-12 DIAGNOSIS — E11.40 TYPE 2 DIABETES MELLITUS WITH DIABETIC NEUROPATHY, WITHOUT LONG-TERM CURRENT USE OF INSULIN (HCC): ICD-10-CM

## 2024-07-12 DIAGNOSIS — N18.32 CHRONIC KIDNEY DISEASE, STAGE 3B (HCC): ICD-10-CM

## 2024-07-12 DIAGNOSIS — I10 ESSENTIAL HYPERTENSION: ICD-10-CM

## 2024-07-12 DIAGNOSIS — J43.1 PANLOBULAR EMPHYSEMA (HCC): ICD-10-CM

## 2024-07-12 DIAGNOSIS — Z00.00 MEDICARE ANNUAL WELLNESS VISIT, SUBSEQUENT: ICD-10-CM

## 2024-07-12 LAB
ALBUMIN SERPL-MCNC: 3.8 G/DL (ref 3.5–5.2)
ALP SERPL-CCNC: 74 U/L (ref 35–104)
ALT SERPL-CCNC: 8 U/L (ref 5–33)
ANION GAP SERPL CALCULATED.3IONS-SCNC: 16 MMOL/L (ref 7–19)
AST SERPL-CCNC: 14 U/L (ref 5–32)
BASOPHILS # BLD: 0.1 K/UL (ref 0–0.2)
BASOPHILS NFR BLD: 0.4 % (ref 0–1)
BILIRUB SERPL-MCNC: 0.4 MG/DL (ref 0.2–1.2)
BUN SERPL-MCNC: 22 MG/DL (ref 8–23)
CALCIUM SERPL-MCNC: 9.5 MG/DL (ref 8.8–10.2)
CHLORIDE SERPL-SCNC: 106 MMOL/L (ref 98–111)
CHOLEST SERPL-MCNC: 152 MG/DL (ref 160–199)
CO2 SERPL-SCNC: 22 MMOL/L (ref 22–29)
CREAT SERPL-MCNC: 1.3 MG/DL (ref 0.5–0.9)
EOSINOPHIL # BLD: 0.2 K/UL (ref 0–0.6)
EOSINOPHIL NFR BLD: 2 % (ref 0–5)
ERYTHROCYTE [DISTWIDTH] IN BLOOD BY AUTOMATED COUNT: 13.3 % (ref 11.5–14.5)
GLUCOSE SERPL-MCNC: 137 MG/DL (ref 74–109)
HBA1C MFR BLD: 6.7 % (ref 4–6)
HCT VFR BLD AUTO: 32.9 % (ref 37–47)
HDLC SERPL-MCNC: 43 MG/DL (ref 65–121)
HGB BLD-MCNC: 10.7 G/DL (ref 12–16)
IMM GRANULOCYTES # BLD: 0 K/UL
LDLC SERPL CALC-MCNC: 81 MG/DL
LYMPHOCYTES # BLD: 4 K/UL (ref 1.1–4.5)
LYMPHOCYTES NFR BLD: 35.2 % (ref 20–40)
MCH RBC QN AUTO: 31.2 PG (ref 27–31)
MCHC RBC AUTO-ENTMCNC: 32.5 G/DL (ref 33–37)
MCV RBC AUTO: 95.9 FL (ref 81–99)
MONOCYTES # BLD: 1.2 K/UL (ref 0–0.9)
MONOCYTES NFR BLD: 10.5 % (ref 0–10)
NEUTROPHILS # BLD: 5.9 K/UL (ref 1.5–7.5)
NEUTS SEG NFR BLD: 51.7 % (ref 50–65)
PLATELET # BLD AUTO: 357 K/UL (ref 130–400)
PMV BLD AUTO: 9.6 FL (ref 9.4–12.3)
POTASSIUM SERPL-SCNC: 4.5 MMOL/L (ref 3.5–5)
PROT SERPL-MCNC: 7.6 G/DL (ref 6.6–8.7)
RBC # BLD AUTO: 3.43 M/UL (ref 4.2–5.4)
SODIUM SERPL-SCNC: 144 MMOL/L (ref 136–145)
TRIGL SERPL-MCNC: 142 MG/DL (ref 0–149)
WBC # BLD AUTO: 11.4 K/UL (ref 4.8–10.8)

## 2024-07-17 ENCOUNTER — OFFICE VISIT (OUTPATIENT)
Dept: INTERNAL MEDICINE | Age: 84
End: 2024-07-17

## 2024-07-17 VITALS
WEIGHT: 190.8 LBS | DIASTOLIC BLOOD PRESSURE: 78 MMHG | BODY MASS INDEX: 30.67 KG/M2 | HEIGHT: 66 IN | SYSTOLIC BLOOD PRESSURE: 132 MMHG | OXYGEN SATURATION: 97 % | HEART RATE: 72 BPM

## 2024-07-17 DIAGNOSIS — D63.8 CHRONIC DISEASE ANEMIA: ICD-10-CM

## 2024-07-17 DIAGNOSIS — J43.1 PANLOBULAR EMPHYSEMA (HCC): ICD-10-CM

## 2024-07-17 DIAGNOSIS — N18.32 CHRONIC KIDNEY DISEASE, STAGE 3B (HCC): ICD-10-CM

## 2024-07-17 DIAGNOSIS — E03.8 SUBCLINICAL HYPOTHYROIDISM: ICD-10-CM

## 2024-07-17 DIAGNOSIS — E78.00 PURE HYPERCHOLESTEROLEMIA: ICD-10-CM

## 2024-07-17 DIAGNOSIS — E11.40 TYPE 2 DIABETES MELLITUS WITH DIABETIC NEUROPATHY, WITHOUT LONG-TERM CURRENT USE OF INSULIN (HCC): Primary | ICD-10-CM

## 2024-07-17 DIAGNOSIS — J84.10 LUNG FIBROSIS (HCC): ICD-10-CM

## 2024-07-17 DIAGNOSIS — E55.9 VITAMIN D DEFICIENCY: ICD-10-CM

## 2024-07-17 DIAGNOSIS — I10 ESSENTIAL HYPERTENSION: ICD-10-CM

## 2024-07-17 DIAGNOSIS — I51.89 DIASTOLIC DYSFUNCTION: ICD-10-CM

## 2024-07-17 RX ORDER — PRAVASTATIN SODIUM 40 MG
TABLET ORAL
Qty: 90 TABLET | Refills: 1 | Status: SHIPPED | OUTPATIENT
Start: 2024-07-17

## 2024-07-17 RX ORDER — FAMOTIDINE 40 MG/1
40 TABLET, FILM COATED ORAL NIGHTLY
Qty: 30 TABLET | Refills: 5 | Status: SHIPPED | OUTPATIENT
Start: 2024-07-17

## 2024-07-17 RX ORDER — HYDROCHLOROTHIAZIDE 12.5 MG/1
12.5 CAPSULE, GELATIN COATED ORAL EVERY MORNING
Qty: 30 CAPSULE | Refills: 3 | Status: SHIPPED | OUTPATIENT
Start: 2024-07-17

## 2024-07-17 ASSESSMENT — ENCOUNTER SYMPTOMS
SORE THROAT: 0
WHEEZING: 0
COUGH: 0
CHEST TIGHTNESS: 0
CONSTIPATION: 0
ABDOMINAL PAIN: 0

## 2024-07-17 NOTE — PROGRESS NOTES
Chief Complaint   Patient presents with    Follow-up     4 month    Other     155/62 is her monitor   130/70 is ours      History of presenting illness:  Xiomara Gonzalez is a83 y.o. female who presents today for follow up on her chronic medical conditions as noted below.    Patient Active Problem List    Diagnosis Date Noted    Panlobular emphysema (HCC) 01/09/2023     Priority: Medium    Wheezing 01/09/2023     Priority: Medium    Pulmonary fibrosis (HCC) 06/02/2022     Priority: Medium    Restrictive lung disease 06/02/2022     Priority: Medium    Hypertensive renal disease 05/11/2022     Priority: Medium    Age-related cataract 12/27/2019     Priority: Medium    Presence of intraocular lens 12/27/2019     Priority: Medium    Allergic sinusitis 04/01/2024    READ (dyspnea on exertion) 11/24/2021    Diastolic dysfunction 10/27/2021    Type 2 diabetes mellitus with diabetic neuropathy 07/26/2021    Essential hypertension 02/11/2019    Elevated TSH 08/07/2018    Vitamin D deficiency 09/10/2017    Pure hypercholesterolemia 09/10/2017    Type 2 diabetes mellitus without complication, without long-term current use of insulin (HCC) 09/10/2017    Localized osteoporosis without current pathological fracture 09/10/2017     Overview Note:     2017 hip neck -2.6; lspine -1/8  8/2020 hip neck -2.6/ L spine L1 -1.6  8/2023 hip neck -2.3/ L spin e-1.4      Generalized anxiety disorder 09/10/2017    Former smoker 09/10/2017    Numbness 04/07/2017    Imbalance 04/07/2017    Estrogen receptor negative tumor status 08/06/2008     Past Medical History:   Diagnosis Date    Arthritis     Back pain     Breast cancer (HCC)     left 2008    Chronic kidney disease     Concussion     History of therapeutic radiation     Hx antineoplastic chemo     Hyperlipidemia     Hypertension     Osteoporosis     Panlobular emphysema (HCC) 1/9/2023    Pneumonia     Type II or unspecified type diabetes mellitus without mention of complication, not

## 2024-07-30 ENCOUNTER — INFUSION (OUTPATIENT)
Dept: ONCOLOGY | Facility: CLINIC | Age: 84
End: 2024-07-30
Payer: MEDICARE

## 2024-07-30 DIAGNOSIS — Z45.2 ENCOUNTER FOR CARE RELATED TO VASCULAR ACCESS PORT: Primary | ICD-10-CM

## 2024-07-30 RX ORDER — SODIUM CHLORIDE 0.9 % (FLUSH) 0.9 %
10 SYRINGE (ML) INJECTION AS NEEDED
Status: DISCONTINUED | OUTPATIENT
Start: 2024-07-30 | End: 2024-07-30 | Stop reason: HOSPADM

## 2024-07-30 RX ORDER — SODIUM CHLORIDE 0.9 % (FLUSH) 0.9 %
10 SYRINGE (ML) INJECTION AS NEEDED
OUTPATIENT
Start: 2024-07-30

## 2024-07-30 RX ORDER — HEPARIN SODIUM (PORCINE) LOCK FLUSH IV SOLN 100 UNIT/ML 100 UNIT/ML
500 SOLUTION INTRAVENOUS AS NEEDED
OUTPATIENT
Start: 2024-07-30

## 2024-07-30 RX ORDER — HEPARIN SODIUM (PORCINE) LOCK FLUSH IV SOLN 100 UNIT/ML 100 UNIT/ML
500 SOLUTION INTRAVENOUS AS NEEDED
Status: DISCONTINUED | OUTPATIENT
Start: 2024-07-30 | End: 2024-07-30 | Stop reason: HOSPADM

## 2024-07-30 RX ADMIN — Medication 10 ML: at 11:44

## 2024-07-30 RX ADMIN — HEPARIN SODIUM (PORCINE) LOCK FLUSH IV SOLN 100 UNIT/ML 500 UNITS: 100 SOLUTION at 11:44

## 2024-08-13 RX ORDER — GLIPIZIDE 5 MG/1
TABLET ORAL
Qty: 90 TABLET | Refills: 0 | Status: SHIPPED | OUTPATIENT
Start: 2024-08-13

## 2024-08-13 NOTE — TELEPHONE ENCOUNTER
Xiomara Gonzalez called to request a refill on her medication.      Last office visit : 7/17/2024   Next office visit : 10/22/2024     Requested Prescriptions     Pending Prescriptions Disp Refills    glipiZIDE (GLUCOTROL) 5 MG tablet [Pharmacy Med Name: glipiZIDE 5 MG Oral Tablet] 90 tablet 0     Sig: Take 1/2 (one-half) tablet by mouth twice daily            Susan Gifford MA

## 2024-09-24 ENCOUNTER — INFUSION (OUTPATIENT)
Dept: ONCOLOGY | Facility: CLINIC | Age: 84
End: 2024-09-24
Payer: MEDICARE

## 2024-09-24 DIAGNOSIS — Z45.2 ENCOUNTER FOR CARE RELATED TO VASCULAR ACCESS PORT: Primary | ICD-10-CM

## 2024-09-24 RX ORDER — SODIUM CHLORIDE 0.9 % (FLUSH) 0.9 %
10 SYRINGE (ML) INJECTION AS NEEDED
Status: DISCONTINUED | OUTPATIENT
Start: 2024-09-24 | End: 2024-09-24 | Stop reason: HOSPADM

## 2024-09-24 RX ORDER — HEPARIN SODIUM (PORCINE) LOCK FLUSH IV SOLN 100 UNIT/ML 100 UNIT/ML
500 SOLUTION INTRAVENOUS AS NEEDED
OUTPATIENT
Start: 2024-09-24

## 2024-09-24 RX ORDER — HEPARIN SODIUM (PORCINE) LOCK FLUSH IV SOLN 100 UNIT/ML 100 UNIT/ML
500 SOLUTION INTRAVENOUS AS NEEDED
Status: DISCONTINUED | OUTPATIENT
Start: 2024-09-24 | End: 2024-09-24 | Stop reason: HOSPADM

## 2024-09-24 RX ORDER — SODIUM CHLORIDE 0.9 % (FLUSH) 0.9 %
10 SYRINGE (ML) INJECTION AS NEEDED
OUTPATIENT
Start: 2024-09-24

## 2024-09-24 RX ADMIN — Medication 10 ML: at 11:25

## 2024-09-24 RX ADMIN — HEPARIN SODIUM (PORCINE) LOCK FLUSH IV SOLN 100 UNIT/ML 500 UNITS: 100 SOLUTION at 11:25

## 2024-10-04 RX ORDER — LOSARTAN POTASSIUM 25 MG/1
25 TABLET ORAL DAILY
Qty: 90 TABLET | Refills: 0 | Status: SHIPPED | OUTPATIENT
Start: 2024-10-04

## 2024-10-09 DIAGNOSIS — I10 ESSENTIAL HYPERTENSION: ICD-10-CM

## 2024-10-10 RX ORDER — AMLODIPINE BESYLATE 2.5 MG/1
2.5 TABLET ORAL DAILY
Qty: 90 TABLET | Refills: 1 | Status: SHIPPED | OUTPATIENT
Start: 2024-10-10

## 2024-10-18 DIAGNOSIS — D63.8 CHRONIC DISEASE ANEMIA: ICD-10-CM

## 2024-10-18 DIAGNOSIS — E55.9 VITAMIN D DEFICIENCY: ICD-10-CM

## 2024-10-18 DIAGNOSIS — E03.8 SUBCLINICAL HYPOTHYROIDISM: ICD-10-CM

## 2024-10-18 DIAGNOSIS — I51.89 DIASTOLIC DYSFUNCTION: ICD-10-CM

## 2024-10-18 DIAGNOSIS — E78.00 PURE HYPERCHOLESTEROLEMIA: ICD-10-CM

## 2024-10-18 DIAGNOSIS — E11.40 TYPE 2 DIABETES MELLITUS WITH DIABETIC NEUROPATHY, WITHOUT LONG-TERM CURRENT USE OF INSULIN (HCC): ICD-10-CM

## 2024-10-18 DIAGNOSIS — I10 ESSENTIAL HYPERTENSION: ICD-10-CM

## 2024-10-18 DIAGNOSIS — N18.32 CHRONIC KIDNEY DISEASE, STAGE 3B (HCC): ICD-10-CM

## 2024-10-18 LAB
ALBUMIN SERPL-MCNC: 4 G/DL (ref 3.5–5.2)
ALP SERPL-CCNC: 90 U/L (ref 35–104)
ALT SERPL-CCNC: 7 U/L (ref 5–33)
ANION GAP SERPL CALCULATED.3IONS-SCNC: 13 MMOL/L (ref 7–19)
AST SERPL-CCNC: 14 U/L (ref 5–32)
BASOPHILS # BLD: 0 K/UL (ref 0–0.2)
BASOPHILS NFR BLD: 0.4 % (ref 0–1)
BILIRUB SERPL-MCNC: 0.5 MG/DL (ref 0.2–1.2)
BUN SERPL-MCNC: 23 MG/DL (ref 8–23)
CALCIUM SERPL-MCNC: 9.3 MG/DL (ref 8.8–10.2)
CHLORIDE SERPL-SCNC: 99 MMOL/L (ref 98–111)
CHOLEST SERPL-MCNC: 158 MG/DL (ref 0–199)
CO2 SERPL-SCNC: 26 MMOL/L (ref 22–29)
CREAT SERPL-MCNC: 1.2 MG/DL (ref 0.5–0.9)
EOSINOPHIL # BLD: 0.2 K/UL (ref 0–0.6)
EOSINOPHIL NFR BLD: 1.7 % (ref 0–5)
ERYTHROCYTE [DISTWIDTH] IN BLOOD BY AUTOMATED COUNT: 13.1 % (ref 11.5–14.5)
GLUCOSE SERPL-MCNC: 122 MG/DL (ref 70–99)
HBA1C MFR BLD: 7.3 % (ref 4–5.6)
HCT VFR BLD AUTO: 37.2 % (ref 37–47)
HDLC SERPL-MCNC: 51 MG/DL (ref 40–60)
HGB BLD-MCNC: 11.9 G/DL (ref 12–16)
IMM GRANULOCYTES # BLD: 0 K/UL
LDLC SERPL CALC-MCNC: 89 MG/DL
LYMPHOCYTES # BLD: 3.8 K/UL (ref 1.1–4.5)
LYMPHOCYTES NFR BLD: 33.4 % (ref 20–40)
MCH RBC QN AUTO: 31 PG (ref 27–31)
MCHC RBC AUTO-ENTMCNC: 32 G/DL (ref 33–37)
MCV RBC AUTO: 96.9 FL (ref 81–99)
MONOCYTES # BLD: 1.1 K/UL (ref 0–0.9)
MONOCYTES NFR BLD: 9.8 % (ref 0–10)
NEUTROPHILS # BLD: 6.2 K/UL (ref 1.5–7.5)
NEUTS SEG NFR BLD: 54.4 % (ref 50–65)
PLATELET # BLD AUTO: 410 K/UL (ref 130–400)
PMV BLD AUTO: 9.6 FL (ref 9.4–12.3)
POTASSIUM SERPL-SCNC: 3.6 MMOL/L (ref 3.5–5)
PROT SERPL-MCNC: 8.2 G/DL (ref 6.4–8.3)
RBC # BLD AUTO: 3.84 M/UL (ref 4.2–5.4)
SODIUM SERPL-SCNC: 138 MMOL/L (ref 136–145)
T4 FREE SERPL-MCNC: 1.16 NG/DL (ref 0.93–1.7)
TRIGL SERPL-MCNC: 88 MG/DL (ref 0–149)
TSH SERPL DL<=0.005 MIU/L-ACNC: 4.4 UIU/ML (ref 0.27–4.2)
WBC # BLD AUTO: 11.3 K/UL (ref 4.8–10.8)

## 2024-10-22 ENCOUNTER — OFFICE VISIT (OUTPATIENT)
Dept: INTERNAL MEDICINE | Age: 84
End: 2024-10-22
Payer: MEDICARE

## 2024-10-22 VITALS
DIASTOLIC BLOOD PRESSURE: 68 MMHG | OXYGEN SATURATION: 94 % | SYSTOLIC BLOOD PRESSURE: 130 MMHG | WEIGHT: 193.4 LBS | HEART RATE: 66 BPM | BODY MASS INDEX: 31.08 KG/M2 | HEIGHT: 66 IN

## 2024-10-22 DIAGNOSIS — E55.9 VITAMIN D DEFICIENCY: ICD-10-CM

## 2024-10-22 DIAGNOSIS — I51.89 DIASTOLIC DYSFUNCTION: ICD-10-CM

## 2024-10-22 DIAGNOSIS — E03.8 SUBCLINICAL HYPOTHYROIDISM: ICD-10-CM

## 2024-10-22 DIAGNOSIS — E78.00 PURE HYPERCHOLESTEROLEMIA: ICD-10-CM

## 2024-10-22 DIAGNOSIS — Z23 NEED FOR VACCINATION: ICD-10-CM

## 2024-10-22 DIAGNOSIS — D63.8 CHRONIC DISEASE ANEMIA: ICD-10-CM

## 2024-10-22 DIAGNOSIS — N18.32 CHRONIC KIDNEY DISEASE, STAGE 3B (HCC): ICD-10-CM

## 2024-10-22 DIAGNOSIS — E11.40 TYPE 2 DIABETES MELLITUS WITH DIABETIC NEUROPATHY, WITHOUT LONG-TERM CURRENT USE OF INSULIN (HCC): Primary | ICD-10-CM

## 2024-10-22 DIAGNOSIS — I10 ESSENTIAL HYPERTENSION: ICD-10-CM

## 2024-10-22 PROCEDURE — G0008 ADMIN INFLUENZA VIRUS VAC: HCPCS | Performed by: INTERNAL MEDICINE

## 2024-10-22 PROCEDURE — 1090F PRES/ABSN URINE INCON ASSESS: CPT | Performed by: INTERNAL MEDICINE

## 2024-10-22 PROCEDURE — 99214 OFFICE O/P EST MOD 30 MIN: CPT | Performed by: INTERNAL MEDICINE

## 2024-10-22 PROCEDURE — 3078F DIAST BP <80 MM HG: CPT | Performed by: INTERNAL MEDICINE

## 2024-10-22 PROCEDURE — G8417 CALC BMI ABV UP PARAM F/U: HCPCS | Performed by: INTERNAL MEDICINE

## 2024-10-22 PROCEDURE — 90653 IIV ADJUVANT VACCINE IM: CPT | Performed by: INTERNAL MEDICINE

## 2024-10-22 PROCEDURE — 3075F SYST BP GE 130 - 139MM HG: CPT | Performed by: INTERNAL MEDICINE

## 2024-10-22 PROCEDURE — G8427 DOCREV CUR MEDS BY ELIG CLIN: HCPCS | Performed by: INTERNAL MEDICINE

## 2024-10-22 PROCEDURE — G8399 PT W/DXA RESULTS DOCUMENT: HCPCS | Performed by: INTERNAL MEDICINE

## 2024-10-22 PROCEDURE — G8482 FLU IMMUNIZE ORDER/ADMIN: HCPCS | Performed by: INTERNAL MEDICINE

## 2024-10-22 PROCEDURE — 3051F HG A1C>EQUAL 7.0%<8.0%: CPT | Performed by: INTERNAL MEDICINE

## 2024-10-22 PROCEDURE — 1123F ACP DISCUSS/DSCN MKR DOCD: CPT | Performed by: INTERNAL MEDICINE

## 2024-10-22 PROCEDURE — 1036F TOBACCO NON-USER: CPT | Performed by: INTERNAL MEDICINE

## 2024-10-22 RX ORDER — METFORMIN HYDROCHLORIDE 500 MG/1
500 TABLET, EXTENDED RELEASE ORAL NIGHTLY
Qty: 30 TABLET | Refills: 1 | Status: SHIPPED | OUTPATIENT
Start: 2024-10-22

## 2024-10-22 RX ORDER — LOSARTAN POTASSIUM 25 MG/1
25 TABLET ORAL DAILY
Qty: 90 TABLET | Refills: 1 | Status: SHIPPED | OUTPATIENT
Start: 2024-10-22

## 2024-10-22 RX ORDER — MUPIROCIN 20 MG/G
OINTMENT TOPICAL
Qty: 22 G | Refills: 0 | Status: SHIPPED | OUTPATIENT
Start: 2024-10-22 | End: 2024-10-29

## 2024-10-22 RX ORDER — GLIPIZIDE 5 MG/1
5 TABLET ORAL DAILY
Qty: 90 TABLET | Refills: 1 | Status: SHIPPED | OUTPATIENT
Start: 2024-10-22

## 2024-10-22 RX ORDER — CARVEDILOL 12.5 MG/1
12.5 TABLET ORAL 2 TIMES DAILY
Qty: 180 TABLET | Refills: 3 | Status: SHIPPED | OUTPATIENT
Start: 2024-10-22

## 2024-10-22 ASSESSMENT — ENCOUNTER SYMPTOMS
WHEEZING: 0
SORE THROAT: 0
CHEST TIGHTNESS: 0
COUGH: 1
CONSTIPATION: 0
ABDOMINAL PAIN: 0

## 2024-10-22 NOTE — PROGRESS NOTES
Chief Complaint   Patient presents with    3 Month Follow-Up     History of presenting illness:  Xiomara Gonzalez is a84 y.o. female who presents today for follow up on her chronic medical conditions as noted below.      Patient Active Problem List    Diagnosis Date Noted    Panlobular emphysema (HCC) 01/09/2023     Priority: Medium    Wheezing 01/09/2023     Priority: Medium    Pulmonary fibrosis (HCC) 06/02/2022     Priority: Medium    Restrictive lung disease 06/02/2022     Priority: Medium    Hypertensive renal disease 05/11/2022     Priority: Medium    Age-related cataract 12/27/2019     Priority: Medium    Presence of intraocular lens 12/27/2019     Priority: Medium    Allergic sinusitis 04/01/2024    READ (dyspnea on exertion) 11/24/2021    Diastolic dysfunction 10/27/2021    Type 2 diabetes mellitus with diabetic neuropathy 07/26/2021    Essential hypertension 02/11/2019    Elevated TSH 08/07/2018    Vitamin D deficiency 09/10/2017    Pure hypercholesterolemia 09/10/2017    Type 2 diabetes mellitus without complication, without long-term current use of insulin (HCC) 09/10/2017    Localized osteoporosis without current pathological fracture 09/10/2017     Overview Note:     2017 hip neck -2.6; lspine -1/8  8/2020 hip neck -2.6/ L spine L1 -1.6  8/2023 hip neck -2.3/ L spin e-1.4      Generalized anxiety disorder 09/10/2017    Former smoker 09/10/2017    Numbness 04/07/2017    Imbalance 04/07/2017    Estrogen receptor negative tumor status 08/06/2008     Past Medical History:   Diagnosis Date    Arthritis     Back pain     Breast cancer (HCC)     left 2008    Chronic kidney disease     Concussion     History of therapeutic radiation     Hx antineoplastic chemo     Hyperlipidemia     Hypertension     Osteoporosis     Panlobular emphysema (HCC) 1/9/2023    Pneumonia     Type II or unspecified type diabetes mellitus without mention of complication, not stated as uncontrolled     diet controlled    Varicose 
done

## 2024-10-29 ENCOUNTER — OFFICE VISIT (OUTPATIENT)
Dept: SURGERY | Age: 84
End: 2024-10-29
Payer: MEDICARE

## 2024-10-29 ENCOUNTER — HOSPITAL ENCOUNTER (OUTPATIENT)
Dept: WOMENS IMAGING | Age: 84
Discharge: HOME | End: 2024-10-29
Payer: MEDICARE

## 2024-10-29 VITALS — HEART RATE: 73 BPM | WEIGHT: 193 LBS | HEIGHT: 66 IN | OXYGEN SATURATION: 96 % | BODY MASS INDEX: 31.02 KG/M2

## 2024-10-29 DIAGNOSIS — Z85.3 PERSONAL HISTORY OF MALIGNANT NEOPLASM OF BREAST: ICD-10-CM

## 2024-10-29 DIAGNOSIS — Z12.31 VISIT FOR SCREENING MAMMOGRAM: Primary | ICD-10-CM

## 2024-10-29 PROCEDURE — G8417 CALC BMI ABV UP PARAM F/U: HCPCS | Performed by: PHYSICIAN ASSISTANT

## 2024-10-29 PROCEDURE — G8399 PT W/DXA RESULTS DOCUMENT: HCPCS | Performed by: PHYSICIAN ASSISTANT

## 2024-10-29 PROCEDURE — 1159F MED LIST DOCD IN RCRD: CPT | Performed by: PHYSICIAN ASSISTANT

## 2024-10-29 PROCEDURE — 99213 OFFICE O/P EST LOW 20 MIN: CPT | Performed by: PHYSICIAN ASSISTANT

## 2024-10-29 PROCEDURE — 77063 BREAST TOMOSYNTHESIS BI: CPT

## 2024-10-29 PROCEDURE — 1036F TOBACCO NON-USER: CPT | Performed by: PHYSICIAN ASSISTANT

## 2024-10-29 PROCEDURE — 1090F PRES/ABSN URINE INCON ASSESS: CPT | Performed by: PHYSICIAN ASSISTANT

## 2024-10-29 PROCEDURE — G8427 DOCREV CUR MEDS BY ELIG CLIN: HCPCS | Performed by: PHYSICIAN ASSISTANT

## 2024-10-29 PROCEDURE — 1123F ACP DISCUSS/DSCN MKR DOCD: CPT | Performed by: PHYSICIAN ASSISTANT

## 2024-10-29 PROCEDURE — G8482 FLU IMMUNIZE ORDER/ADMIN: HCPCS | Performed by: PHYSICIAN ASSISTANT

## 2024-10-29 NOTE — PROGRESS NOTES
Xiomara Gonzalez comes today for her follow-up breast exam.  She has had no new breast complaints.  She reports no new palpable masses.  There is no skin or nipple changes.  There is no nipple discharge.  She has no appreciable evidence of supraclavicular or axillary adenopathy.      Of note,Ms Gonzalez  is status post 8- Left partial mastectomy and left sentinel node biopsy 2.6 cm infiltrating ductal carcinoma, grade III, ER/MI negative., 0/2 nodes positive, immunohistochemistry negative for micrometastises.      Patient Active Problem List    Diagnosis Date Noted    Panlobular emphysema (HCC) 01/09/2023    Wheezing 01/09/2023    Pulmonary fibrosis (HCC) 06/02/2022    Restrictive lung disease 06/02/2022    Hypertensive renal disease 05/11/2022    Age-related cataract 12/27/2019    Presence of intraocular lens 12/27/2019    Chronic kidney disease, stage 3b (HCC) 03/09/2022    READ (dyspnea on exertion) 11/24/2021    Diastolic dysfunction 10/27/2021    Type 2 diabetes mellitus with diabetic neuropathy 07/26/2021    Essential hypertension 02/11/2019    Elevated TSH 08/07/2018    Vitamin D deficiency 09/10/2017    Pure hypercholesterolemia 09/10/2017    Type 2 diabetes mellitus without complication, without long-term current use of insulin (HCC) 09/10/2017    Localized osteoporosis without current pathological fracture 09/10/2017    Generalized anxiety disorder 09/10/2017    Former smoker 09/10/2017    Numbness 04/07/2017    Imbalance 04/07/2017    Estrogen receptor negative tumor status 08/06/2008       Current Outpatient Medications   Medication Sig Dispense Refill    amLODIPine (NORVASC) 2.5 MG tablet Take 1 tablet by mouth once daily 90 tablet 3    pravastatin (PRAVACHOL) 40 MG tablet Take 1 tablet by mouth once daily 90 tablet 1    glipiZIDE (GLUCOTROL) 5 MG tablet Take 1/2 (one-half) tablet by mouth twice daily 90 tablet 0    famotidine (PEPCID) 40 MG tablet TAKE 1 TABLET BY MOUTH ONCE DAILY IN THE EVENING

## 2024-11-26 ENCOUNTER — INFUSION (OUTPATIENT)
Dept: ONCOLOGY | Facility: CLINIC | Age: 84
End: 2024-11-26
Payer: MEDICARE

## 2024-11-26 DIAGNOSIS — Z45.2 ENCOUNTER FOR CARE RELATED TO VASCULAR ACCESS PORT: Primary | ICD-10-CM

## 2024-11-26 RX ORDER — HEPARIN SODIUM (PORCINE) LOCK FLUSH IV SOLN 100 UNIT/ML 100 UNIT/ML
500 SOLUTION INTRAVENOUS AS NEEDED
OUTPATIENT
Start: 2024-11-26

## 2024-11-26 RX ORDER — HEPARIN SODIUM (PORCINE) LOCK FLUSH IV SOLN 100 UNIT/ML 100 UNIT/ML
500 SOLUTION INTRAVENOUS AS NEEDED
Status: DISCONTINUED | OUTPATIENT
Start: 2024-11-26 | End: 2024-11-26 | Stop reason: HOSPADM

## 2024-11-26 RX ORDER — SODIUM CHLORIDE 0.9 % (FLUSH) 0.9 %
10 SYRINGE (ML) INJECTION AS NEEDED
Status: DISCONTINUED | OUTPATIENT
Start: 2024-11-26 | End: 2024-11-26 | Stop reason: HOSPADM

## 2024-11-26 RX ORDER — SODIUM CHLORIDE 0.9 % (FLUSH) 0.9 %
10 SYRINGE (ML) INJECTION AS NEEDED
OUTPATIENT
Start: 2024-11-26

## 2024-11-26 RX ADMIN — Medication 10 ML: at 11:26

## 2024-11-26 RX ADMIN — HEPARIN SODIUM (PORCINE) LOCK FLUSH IV SOLN 100 UNIT/ML 500 UNITS: 100 SOLUTION at 11:26

## 2024-12-12 ENCOUNTER — OFFICE VISIT (OUTPATIENT)
Dept: PULMONOLOGY | Age: 84
End: 2024-12-12
Payer: MEDICARE

## 2024-12-12 VITALS
DIASTOLIC BLOOD PRESSURE: 76 MMHG | RESPIRATION RATE: 20 BRPM | HEIGHT: 66 IN | SYSTOLIC BLOOD PRESSURE: 120 MMHG | HEART RATE: 68 BPM | BODY MASS INDEX: 28.93 KG/M2 | OXYGEN SATURATION: 96 % | WEIGHT: 180 LBS | TEMPERATURE: 96.9 F

## 2024-12-12 DIAGNOSIS — J84.10 PULMONARY FIBROSIS (HCC): Primary | ICD-10-CM

## 2024-12-12 DIAGNOSIS — R06.09 DOE (DYSPNEA ON EXERTION): ICD-10-CM

## 2024-12-12 DIAGNOSIS — M54.50 ACUTE BILATERAL LOW BACK PAIN, UNSPECIFIED WHETHER SCIATICA PRESENT: ICD-10-CM

## 2024-12-12 DIAGNOSIS — I51.89 DIASTOLIC DYSFUNCTION: ICD-10-CM

## 2024-12-12 DIAGNOSIS — J43.1 PANLOBULAR EMPHYSEMA (HCC): ICD-10-CM

## 2024-12-12 PROCEDURE — 1159F MED LIST DOCD IN RCRD: CPT | Performed by: INTERNAL MEDICINE

## 2024-12-12 PROCEDURE — 3074F SYST BP LT 130 MM HG: CPT | Performed by: INTERNAL MEDICINE

## 2024-12-12 PROCEDURE — 1090F PRES/ABSN URINE INCON ASSESS: CPT | Performed by: INTERNAL MEDICINE

## 2024-12-12 PROCEDURE — 1123F ACP DISCUSS/DSCN MKR DOCD: CPT | Performed by: INTERNAL MEDICINE

## 2024-12-12 PROCEDURE — G8482 FLU IMMUNIZE ORDER/ADMIN: HCPCS | Performed by: INTERNAL MEDICINE

## 2024-12-12 PROCEDURE — 3078F DIAST BP <80 MM HG: CPT | Performed by: INTERNAL MEDICINE

## 2024-12-12 PROCEDURE — G8399 PT W/DXA RESULTS DOCUMENT: HCPCS | Performed by: INTERNAL MEDICINE

## 2024-12-12 PROCEDURE — G8417 CALC BMI ABV UP PARAM F/U: HCPCS | Performed by: INTERNAL MEDICINE

## 2024-12-12 PROCEDURE — 99214 OFFICE O/P EST MOD 30 MIN: CPT | Performed by: INTERNAL MEDICINE

## 2024-12-12 PROCEDURE — 1036F TOBACCO NON-USER: CPT | Performed by: INTERNAL MEDICINE

## 2024-12-12 PROCEDURE — 3023F SPIROM DOC REV: CPT | Performed by: INTERNAL MEDICINE

## 2024-12-12 PROCEDURE — G8427 DOCREV CUR MEDS BY ELIG CLIN: HCPCS | Performed by: INTERNAL MEDICINE

## 2024-12-12 ASSESSMENT — ENCOUNTER SYMPTOMS
COUGH: 0
BACK PAIN: 0
ABDOMINAL PAIN: 0
ANAL BLEEDING: 0
SHORTNESS OF BREATH: 0
ABDOMINAL DISTENTION: 0
WHEEZING: 0
APNEA: 0
CHEST TIGHTNESS: 0
RHINORRHEA: 0

## 2024-12-12 NOTE — PROGRESS NOTES
Pulmonary and Sleep Medicine    Xiomara Gonzalez (:  1940) is a 84 y.o. female,Established patient, here for evaluation of the following chief complaint(s):  Follow-up (6 month follow up for primary fibrosis /Overnight pulse- oximetry completed on 2024/Pt has no concerns or changes since last OV in regards to respiratory symptoms//-Pt presents today with back pain that is hindering her ability to walk- she reports that her back pain is a /10. She had a recent injury on 12/10 and has not been seen for it. )      Referring physician:  No referring provider defined for this encounter.     ASSESSMENT/PLAN:  1. Pulmonary fibrosis (HCC)  2. READ (dyspnea on exertion)  3. Panlobular emphysema (HCC)  4. Diastolic dysfunction  5. Acute bilateral low back pain, unspecified whether sciatica present        Continue current management with the bronchodilators.  Continue current management for her pulmonary fibrosis.  I recommended to the patient to follow-up with Dr. Kapadia regarding her back pain.  Likely vertebral fracture and secondary radiculopathy.       Juan Tay MD, [facility], [facility]    No follow-ups on file.    SUBJECTIVE/OBJECTIVE:        Patient is here for follow-up on pulmonary fibrosis.  She denies respiratory complaints at this time.  She had an overnight oximetry.  She did not require any supplemental oxygen.  She apparently was moving heavy furniture and developed acute low back pain.  She says her pain is worse when she gets up and walk and radiates down both of her flanks and lower extremities.          Continue the following medications as reported by the patient:    Prior to Visit Medications    Medication Sig Taking? Authorizing Provider   glipiZIDE (GLUCOTROL) 5 MG tablet Take 1 tablet by mouth daily Yes Tequila Quintanilla MD   carvedilol (COREG) 12.5 MG tablet Take 1 tablet by mouth 2 times daily Yes Tequila Quintanilla MD   losartan (COZAAR) 25 MG tablet Take 1 tablet by mouth daily Yes

## 2024-12-30 RX ORDER — METFORMIN HYDROCHLORIDE 500 MG/1
500 TABLET, EXTENDED RELEASE ORAL NIGHTLY
Qty: 30 TABLET | Refills: 0 | Status: SHIPPED | OUTPATIENT
Start: 2024-12-30

## 2025-01-16 ENCOUNTER — TELEPHONE (OUTPATIENT)
Dept: CARDIOLOGY CLINIC | Age: 85
End: 2025-01-16

## 2025-01-16 NOTE — TELEPHONE ENCOUNTER
Called patient to see if they would like to r/s their missed appt. There was no VM available. Patient may r/s with the providers next available samantha date and time. 1/16/25 AH

## 2025-01-17 DIAGNOSIS — N18.32 CHRONIC KIDNEY DISEASE, STAGE 3B (HCC): ICD-10-CM

## 2025-01-17 DIAGNOSIS — E55.9 VITAMIN D DEFICIENCY: ICD-10-CM

## 2025-01-17 DIAGNOSIS — E03.8 SUBCLINICAL HYPOTHYROIDISM: ICD-10-CM

## 2025-01-17 DIAGNOSIS — E78.00 PURE HYPERCHOLESTEROLEMIA: ICD-10-CM

## 2025-01-17 DIAGNOSIS — I51.89 DIASTOLIC DYSFUNCTION: ICD-10-CM

## 2025-01-17 DIAGNOSIS — E11.40 TYPE 2 DIABETES MELLITUS WITH DIABETIC NEUROPATHY, WITHOUT LONG-TERM CURRENT USE OF INSULIN (HCC): ICD-10-CM

## 2025-01-17 DIAGNOSIS — I10 ESSENTIAL HYPERTENSION: ICD-10-CM

## 2025-01-17 DIAGNOSIS — Z23 NEED FOR VACCINATION: ICD-10-CM

## 2025-01-17 DIAGNOSIS — D63.8 CHRONIC DISEASE ANEMIA: ICD-10-CM

## 2025-01-17 LAB
ANION GAP SERPL CALCULATED.3IONS-SCNC: 14 MMOL/L (ref 7–19)
BUN SERPL-MCNC: 23 MG/DL (ref 8–23)
CALCIUM SERPL-MCNC: 9.5 MG/DL (ref 8.8–10.2)
CHLORIDE SERPL-SCNC: 98 MMOL/L (ref 98–111)
CO2 SERPL-SCNC: 26 MMOL/L (ref 22–29)
CREAT SERPL-MCNC: 1.2 MG/DL (ref 0.5–0.9)
GLUCOSE SERPL-MCNC: 104 MG/DL (ref 70–99)
HBA1C MFR BLD: 6.5 % (ref 4–5.6)
POTASSIUM SERPL-SCNC: 4.1 MMOL/L (ref 3.5–5)
SODIUM SERPL-SCNC: 138 MMOL/L (ref 136–145)

## 2025-01-21 ENCOUNTER — OFFICE VISIT (OUTPATIENT)
Dept: INTERNAL MEDICINE | Age: 85
End: 2025-01-21
Payer: MEDICARE

## 2025-01-21 VITALS
HEART RATE: 72 BPM | SYSTOLIC BLOOD PRESSURE: 118 MMHG | BODY MASS INDEX: 28.83 KG/M2 | WEIGHT: 179.4 LBS | HEIGHT: 66 IN | DIASTOLIC BLOOD PRESSURE: 68 MMHG | OXYGEN SATURATION: 95 %

## 2025-01-21 DIAGNOSIS — E55.9 VITAMIN D DEFICIENCY: ICD-10-CM

## 2025-01-21 DIAGNOSIS — N18.32 CHRONIC KIDNEY DISEASE, STAGE 3B (HCC): ICD-10-CM

## 2025-01-21 DIAGNOSIS — I10 ESSENTIAL HYPERTENSION: ICD-10-CM

## 2025-01-21 DIAGNOSIS — E78.00 PURE HYPERCHOLESTEROLEMIA: ICD-10-CM

## 2025-01-21 DIAGNOSIS — R09.89 RUNNY NOSE: ICD-10-CM

## 2025-01-21 DIAGNOSIS — E11.40 TYPE 2 DIABETES MELLITUS WITH DIABETIC NEUROPATHY, WITHOUT LONG-TERM CURRENT USE OF INSULIN (HCC): Primary | ICD-10-CM

## 2025-01-21 DIAGNOSIS — E03.8 SUBCLINICAL HYPOTHYROIDISM: ICD-10-CM

## 2025-01-21 DIAGNOSIS — I51.89 DIASTOLIC DYSFUNCTION: ICD-10-CM

## 2025-01-21 PROBLEM — J43.1 PANLOBULAR EMPHYSEMA (HCC): Status: RESOLVED | Noted: 2023-01-09 | Resolved: 2025-01-21

## 2025-01-21 PROBLEM — J84.10 PULMONARY FIBROSIS (HCC): Status: RESOLVED | Noted: 2022-06-02 | Resolved: 2025-01-21

## 2025-01-21 PROCEDURE — G8427 DOCREV CUR MEDS BY ELIG CLIN: HCPCS | Performed by: INTERNAL MEDICINE

## 2025-01-21 PROCEDURE — 1090F PRES/ABSN URINE INCON ASSESS: CPT | Performed by: INTERNAL MEDICINE

## 2025-01-21 PROCEDURE — 3044F HG A1C LEVEL LT 7.0%: CPT | Performed by: INTERNAL MEDICINE

## 2025-01-21 PROCEDURE — G8399 PT W/DXA RESULTS DOCUMENT: HCPCS | Performed by: INTERNAL MEDICINE

## 2025-01-21 PROCEDURE — G8417 CALC BMI ABV UP PARAM F/U: HCPCS | Performed by: INTERNAL MEDICINE

## 2025-01-21 PROCEDURE — 1159F MED LIST DOCD IN RCRD: CPT | Performed by: INTERNAL MEDICINE

## 2025-01-21 PROCEDURE — 1036F TOBACCO NON-USER: CPT | Performed by: INTERNAL MEDICINE

## 2025-01-21 PROCEDURE — 1123F ACP DISCUSS/DSCN MKR DOCD: CPT | Performed by: INTERNAL MEDICINE

## 2025-01-21 PROCEDURE — 3074F SYST BP LT 130 MM HG: CPT | Performed by: INTERNAL MEDICINE

## 2025-01-21 PROCEDURE — 99214 OFFICE O/P EST MOD 30 MIN: CPT | Performed by: INTERNAL MEDICINE

## 2025-01-21 PROCEDURE — 3078F DIAST BP <80 MM HG: CPT | Performed by: INTERNAL MEDICINE

## 2025-01-21 SDOH — ECONOMIC STABILITY: FOOD INSECURITY: WITHIN THE PAST 12 MONTHS, THE FOOD YOU BOUGHT JUST DIDN'T LAST AND YOU DIDN'T HAVE MONEY TO GET MORE.: NEVER TRUE

## 2025-01-21 SDOH — ECONOMIC STABILITY: FOOD INSECURITY: WITHIN THE PAST 12 MONTHS, YOU WORRIED THAT YOUR FOOD WOULD RUN OUT BEFORE YOU GOT MONEY TO BUY MORE.: NEVER TRUE

## 2025-01-21 ASSESSMENT — ENCOUNTER SYMPTOMS
WHEEZING: 0
CONSTIPATION: 0
ABDOMINAL PAIN: 0
COUGH: 0
SORE THROAT: 0
CHEST TIGHTNESS: 0

## 2025-01-21 ASSESSMENT — PATIENT HEALTH QUESTIONNAIRE - PHQ9
1. LITTLE INTEREST OR PLEASURE IN DOING THINGS: NOT AT ALL
SUM OF ALL RESPONSES TO PHQ QUESTIONS 1-9: 0
SUM OF ALL RESPONSES TO PHQ QUESTIONS 1-9: 0
SUM OF ALL RESPONSES TO PHQ9 QUESTIONS 1 & 2: 0
SUM OF ALL RESPONSES TO PHQ QUESTIONS 1-9: 0
SUM OF ALL RESPONSES TO PHQ QUESTIONS 1-9: 0
2. FEELING DOWN, DEPRESSED OR HOPELESS: NOT AT ALL

## 2025-01-21 NOTE — PROGRESS NOTES
for follow- up evaluation             Orders Placed This Encounter   Procedures    CBC with Auto Differential    Comprehensive Metabolic Panel    Hemoglobin A1C    Lipid Panel    Urinalysis    TSH    Vitamin D 25 Hydroxy     New Prescriptions    No medications on file         No follow-ups on file.   There are no Patient Instructions on file for this visit.  EMR Dragon/transcription disclaimer:Significant part of this  encounter note is electronic transcription/translationof spoken language to printed text. The electronic translation of spoken language may be erroneous, or at times, nonsensical words or phrases may be inadvertently transcribed. Although I have reviewed the note for sucherrors, some may still exist.

## 2025-01-28 ENCOUNTER — INFUSION (OUTPATIENT)
Dept: ONCOLOGY | Facility: CLINIC | Age: 85
End: 2025-01-28
Payer: MEDICARE

## 2025-01-28 DIAGNOSIS — Z45.2 ENCOUNTER FOR CARE RELATED TO VASCULAR ACCESS PORT: Primary | ICD-10-CM

## 2025-01-28 RX ORDER — SODIUM CHLORIDE 0.9 % (FLUSH) 0.9 %
10 SYRINGE (ML) INJECTION AS NEEDED
OUTPATIENT
Start: 2025-01-28

## 2025-01-28 RX ORDER — HEPARIN SODIUM (PORCINE) LOCK FLUSH IV SOLN 100 UNIT/ML 100 UNIT/ML
500 SOLUTION INTRAVENOUS AS NEEDED
Status: DISCONTINUED | OUTPATIENT
Start: 2025-01-28 | End: 2025-01-28 | Stop reason: HOSPADM

## 2025-01-28 RX ORDER — SODIUM CHLORIDE 0.9 % (FLUSH) 0.9 %
10 SYRINGE (ML) INJECTION AS NEEDED
Status: DISCONTINUED | OUTPATIENT
Start: 2025-01-28 | End: 2025-01-28 | Stop reason: HOSPADM

## 2025-01-28 RX ORDER — HEPARIN SODIUM (PORCINE) LOCK FLUSH IV SOLN 100 UNIT/ML 100 UNIT/ML
500 SOLUTION INTRAVENOUS AS NEEDED
OUTPATIENT
Start: 2025-01-28

## 2025-01-28 RX ADMIN — Medication 10 ML: at 11:18

## 2025-01-28 RX ADMIN — HEPARIN SODIUM (PORCINE) LOCK FLUSH IV SOLN 100 UNIT/ML 500 UNITS: 100 SOLUTION at 11:27

## 2025-01-31 RX ORDER — METFORMIN HYDROCHLORIDE 500 MG/1
500 TABLET, EXTENDED RELEASE ORAL NIGHTLY
Qty: 30 TABLET | Refills: 3 | Status: SHIPPED | OUTPATIENT
Start: 2025-01-31

## 2025-03-25 ENCOUNTER — INFUSION (OUTPATIENT)
Dept: ONCOLOGY | Facility: CLINIC | Age: 85
End: 2025-03-25
Payer: MEDICARE

## 2025-03-25 DIAGNOSIS — Z45.2 ENCOUNTER FOR CARE RELATED TO VASCULAR ACCESS PORT: Primary | ICD-10-CM

## 2025-03-25 RX ORDER — HEPARIN SODIUM (PORCINE) LOCK FLUSH IV SOLN 100 UNIT/ML 100 UNIT/ML
500 SOLUTION INTRAVENOUS AS NEEDED
Status: DISCONTINUED | OUTPATIENT
Start: 2025-03-25 | End: 2025-03-25 | Stop reason: HOSPADM

## 2025-03-25 RX ORDER — HEPARIN SODIUM (PORCINE) LOCK FLUSH IV SOLN 100 UNIT/ML 100 UNIT/ML
500 SOLUTION INTRAVENOUS AS NEEDED
OUTPATIENT
Start: 2025-03-25

## 2025-03-25 RX ORDER — SODIUM CHLORIDE 0.9 % (FLUSH) 0.9 %
10 SYRINGE (ML) INJECTION AS NEEDED
Status: DISCONTINUED | OUTPATIENT
Start: 2025-03-25 | End: 2025-03-25 | Stop reason: HOSPADM

## 2025-03-25 RX ORDER — SODIUM CHLORIDE 0.9 % (FLUSH) 0.9 %
10 SYRINGE (ML) INJECTION AS NEEDED
OUTPATIENT
Start: 2025-03-25

## 2025-03-25 RX ORDER — HEPARIN SODIUM (PORCINE) LOCK FLUSH IV SOLN 100 UNIT/ML 100 UNIT/ML
500 SOLUTION INTRAVENOUS AS NEEDED
Status: CANCELLED | OUTPATIENT
Start: 2025-03-25

## 2025-03-25 RX ORDER — SODIUM CHLORIDE 0.9 % (FLUSH) 0.9 %
10 SYRINGE (ML) INJECTION AS NEEDED
Status: CANCELLED | OUTPATIENT
Start: 2025-03-25

## 2025-03-25 RX ADMIN — Medication 10 ML: at 11:15

## 2025-03-25 RX ADMIN — HEPARIN SODIUM (PORCINE) LOCK FLUSH IV SOLN 100 UNIT/ML 500 UNITS: 100 SOLUTION at 11:15

## 2025-03-28 DIAGNOSIS — N18.32 CHRONIC KIDNEY DISEASE, STAGE 3B (HCC): ICD-10-CM

## 2025-03-28 DIAGNOSIS — I10 ESSENTIAL HYPERTENSION: ICD-10-CM

## 2025-03-28 DIAGNOSIS — E03.8 SUBCLINICAL HYPOTHYROIDISM: ICD-10-CM

## 2025-03-28 DIAGNOSIS — E78.00 PURE HYPERCHOLESTEROLEMIA: ICD-10-CM

## 2025-03-28 DIAGNOSIS — E55.9 VITAMIN D DEFICIENCY: ICD-10-CM

## 2025-03-28 DIAGNOSIS — E11.40 TYPE 2 DIABETES MELLITUS WITH DIABETIC NEUROPATHY, WITHOUT LONG-TERM CURRENT USE OF INSULIN (HCC): ICD-10-CM

## 2025-03-28 DIAGNOSIS — I51.89 DIASTOLIC DYSFUNCTION: ICD-10-CM

## 2025-03-28 LAB
25(OH)D3 SERPL-MCNC: 32.9 NG/ML
ALBUMIN SERPL-MCNC: 3.6 G/DL (ref 3.5–5.2)
ALP SERPL-CCNC: 69 U/L (ref 35–104)
ALT SERPL-CCNC: 8 U/L (ref 10–35)
ANION GAP SERPL CALCULATED.3IONS-SCNC: 10 MMOL/L (ref 8–16)
AST SERPL-CCNC: 16 U/L (ref 10–35)
BACTERIA #/AREA URNS HPF: ABNORMAL /HPF
BASOPHILS # BLD: 0 K/UL (ref 0–0.2)
BASOPHILS NFR BLD: 0.4 % (ref 0–1)
BILIRUB SERPL-MCNC: 0.3 MG/DL (ref 0.2–1.2)
BILIRUB UR QL STRIP: NEGATIVE
BUN SERPL-MCNC: 26 MG/DL (ref 8–23)
CALCIUM SERPL-MCNC: 9.2 MG/DL (ref 8.8–10.2)
CHLORIDE SERPL-SCNC: 104 MMOL/L (ref 98–107)
CHOLEST SERPL-MCNC: 144 MG/DL (ref 0–199)
CLARITY UR: ABNORMAL
CO2 SERPL-SCNC: 27 MMOL/L (ref 22–29)
COLOR UR: YELLOW
CREAT SERPL-MCNC: 1 MG/DL (ref 0.5–0.9)
EOSINOPHIL # BLD: 0.2 K/UL (ref 0–0.6)
EOSINOPHIL NFR BLD: 2.1 % (ref 0–5)
ERYTHROCYTE [DISTWIDTH] IN BLOOD BY AUTOMATED COUNT: 13.3 % (ref 11.5–14.5)
GLUCOSE SERPL-MCNC: 103 MG/DL (ref 70–99)
GLUCOSE UR STRIP.AUTO-MCNC: NEGATIVE MG/DL
HBA1C MFR BLD: 6.3 % (ref 4–5.6)
HCT VFR BLD AUTO: 31.5 % (ref 37–47)
HDLC SERPL-MCNC: 46 MG/DL (ref 40–60)
HGB BLD-MCNC: 10 G/DL (ref 12–16)
HGB UR STRIP.AUTO-MCNC: ABNORMAL MG/L
HYALINE CASTS #/AREA URNS LPF: ABNORMAL /LPF (ref 0–5)
IMM GRANULOCYTES # BLD: 0 K/UL
KETONES UR STRIP.AUTO-MCNC: NEGATIVE MG/DL
LDLC SERPL CALC-MCNC: 81 MG/DL
LEUKOCYTE ESTERASE UR QL STRIP.AUTO: ABNORMAL
LYMPHOCYTES # BLD: 3.1 K/UL (ref 1.1–4.5)
LYMPHOCYTES NFR BLD: 29.6 % (ref 20–40)
MCH RBC QN AUTO: 29.9 PG (ref 27–31)
MCHC RBC AUTO-ENTMCNC: 31.7 G/DL (ref 33–37)
MCV RBC AUTO: 94.3 FL (ref 81–99)
MONOCYTES # BLD: 1.2 K/UL (ref 0–0.9)
MONOCYTES NFR BLD: 11.4 % (ref 0–10)
NEUTROPHILS # BLD: 6 K/UL (ref 1.5–7.5)
NEUTS SEG NFR BLD: 56.2 % (ref 50–65)
NITRITE UR QL STRIP.AUTO: POSITIVE
PH UR STRIP.AUTO: 5.5 [PH] (ref 5–8)
PLATELET # BLD AUTO: 469 K/UL (ref 130–400)
PMV BLD AUTO: 9.4 FL (ref 9.4–12.3)
POTASSIUM SERPL-SCNC: 3.7 MMOL/L (ref 3.5–5.1)
PROT SERPL-MCNC: 7.7 G/DL (ref 6.4–8.3)
PROT UR STRIP.AUTO-MCNC: ABNORMAL MG/DL
RBC # BLD AUTO: 3.34 M/UL (ref 4.2–5.4)
RBC #/AREA URNS HPF: ABNORMAL /HPF (ref 0–2)
SODIUM SERPL-SCNC: 141 MMOL/L (ref 136–145)
SP GR UR STRIP.AUTO: 1.02 (ref 1–1.03)
SQUAMOUS #/AREA URNS HPF: ABNORMAL /HPF
TRIGL SERPL-MCNC: 86 MG/DL (ref 0–149)
TSH SERPL DL<=0.005 MIU/L-ACNC: 3.61 UIU/ML (ref 0.27–4.2)
URN SPEC COLLECT METH UR: ABNORMAL
UROBILINOGEN UR STRIP.AUTO-MCNC: 0.2 E.U./DL
WBC # BLD AUTO: 10.6 K/UL (ref 4.8–10.8)
WBC #/AREA URNS HPF: ABNORMAL /HPF (ref 0–5)

## 2025-04-04 ENCOUNTER — OFFICE VISIT (OUTPATIENT)
Dept: INTERNAL MEDICINE | Age: 85
End: 2025-04-04

## 2025-04-04 VITALS
BODY MASS INDEX: 27.51 KG/M2 | WEIGHT: 171.2 LBS | OXYGEN SATURATION: 98 % | DIASTOLIC BLOOD PRESSURE: 78 MMHG | HEART RATE: 68 BPM | HEIGHT: 66 IN | SYSTOLIC BLOOD PRESSURE: 118 MMHG

## 2025-04-04 DIAGNOSIS — I51.89 DIASTOLIC DYSFUNCTION: ICD-10-CM

## 2025-04-04 DIAGNOSIS — J43.1 PANLOBULAR EMPHYSEMA (HCC): ICD-10-CM

## 2025-04-04 DIAGNOSIS — M81.6 LOCALIZED OSTEOPOROSIS WITHOUT CURRENT PATHOLOGICAL FRACTURE: ICD-10-CM

## 2025-04-04 DIAGNOSIS — E55.9 VITAMIN D DEFICIENCY: ICD-10-CM

## 2025-04-04 DIAGNOSIS — D63.8 CHRONIC DISEASE ANEMIA: ICD-10-CM

## 2025-04-04 DIAGNOSIS — Z00.00 MEDICARE ANNUAL WELLNESS VISIT, SUBSEQUENT: Primary | ICD-10-CM

## 2025-04-04 DIAGNOSIS — E03.8 SUBCLINICAL HYPOTHYROIDISM: ICD-10-CM

## 2025-04-04 DIAGNOSIS — E78.00 PURE HYPERCHOLESTEROLEMIA: ICD-10-CM

## 2025-04-04 DIAGNOSIS — E11.40 TYPE 2 DIABETES MELLITUS WITH DIABETIC NEUROPATHY, WITHOUT LONG-TERM CURRENT USE OF INSULIN (HCC): ICD-10-CM

## 2025-04-04 DIAGNOSIS — R13.19 ESOPHAGEAL DYSPHAGIA: ICD-10-CM

## 2025-04-04 DIAGNOSIS — N18.32 CHRONIC KIDNEY DISEASE, STAGE 3B (HCC): ICD-10-CM

## 2025-04-04 DIAGNOSIS — R63.4 UNINTENTIONAL WEIGHT LOSS: ICD-10-CM

## 2025-04-04 DIAGNOSIS — I10 ESSENTIAL HYPERTENSION: ICD-10-CM

## 2025-04-04 RX ORDER — AMLODIPINE BESYLATE 2.5 MG/1
2.5 TABLET ORAL DAILY
Qty: 90 TABLET | Refills: 1 | Status: SHIPPED | OUTPATIENT
Start: 2025-04-04

## 2025-04-04 ASSESSMENT — PATIENT HEALTH QUESTIONNAIRE - PHQ9
2. FEELING DOWN, DEPRESSED OR HOPELESS: NOT AT ALL
SUM OF ALL RESPONSES TO PHQ QUESTIONS 1-9: 0
1. LITTLE INTEREST OR PLEASURE IN DOING THINGS: NOT AT ALL
SUM OF ALL RESPONSES TO PHQ QUESTIONS 1-9: 0

## 2025-04-04 ASSESSMENT — ENCOUNTER SYMPTOMS
VOICE CHANGE: 1
TROUBLE SWALLOWING: 1
CHEST TIGHTNESS: 0
CONSTIPATION: 0
ABDOMINAL PAIN: 0
COUGH: 0
WHEEZING: 0
SORE THROAT: 0

## 2025-04-04 ASSESSMENT — LIFESTYLE VARIABLES
HOW MANY STANDARD DRINKS CONTAINING ALCOHOL DO YOU HAVE ON A TYPICAL DAY: PATIENT DOES NOT DRINK
HOW OFTEN DO YOU HAVE A DRINK CONTAINING ALCOHOL: NEVER

## 2025-04-04 NOTE — PROGRESS NOTES
Medicare Annual Wellness Visit    Xiomara Gonzalez is here for Medicare AWV (4 weeks ago patient got choked on food and has been hoarse ever since. Denies sore throat.)     Return in about 3 months (around 7/4/2025) for Medication check.     Subjective       Patient's complete Health Risk Assessment and screening values have been reviewed and are found in Flowsheets. The following problems were reviewed today and where indicated follow up appointments were made and/or referrals ordered.    Positive Risk Factor Screenings with Interventions:    Fall Risk:  Do you feel unsteady or are you worried about falling? : (!) yes  2 or more falls in past year?: no  Fall with injury in past year?: no     Interventions:    Reviewed medications, home hazards, visual acuity, and co-morbidities that can increase risk for falls  Patient advised to follow-up in this office for further evaluation and treatment             Inactivity:  On average, how many days per week do you engage in moderate to strenuous exercise (like a brisk walk)?: 0 days (!) Abnormal  On average, how many minutes do you engage in exercise at this level?: 0 min  Interventions:  Patient declined any further interventions or treatment      Dentist Screen:  Have you seen the dentist within the past year?: (!) No    Intervention:  Advised to schedule with their dentist     Vision Screen:  Do you have difficulty driving, watching TV, or doing any of your daily activities because of your eyesight?: No (does not drive)  Have you had an eye exam within the past year?: (!) No  Interventions:   Patient encouraged to make appointment with their eye specialist     ADL's:   Patient reports needing help with:  Select all that apply: (!) Transportation, Shopping  Interventions:  Patient declined any further interventions or treatment    Advanced Directives:  Do you have a Living Will?: (!) No    Intervention:  has NO advanced directive - information provided          Objective

## 2025-04-04 NOTE — PROGRESS NOTES
Chief Complaint   Patient presents with    Multiple medical problems     4 weeks ago patient got choked on food and has been hoarse ever since. Denies sore throat.     History of presenting illness:  Xiomara Gonzalez is a84 y.o. female who presents today for follow up on her chronic medical conditions as noted below.      Patient Active Problem List    Diagnosis Date Noted    Wheezing 01/09/2023     Priority: Medium    Restrictive lung disease 06/02/2022     Priority: Medium    Hypertensive renal disease 05/11/2022     Priority: Medium    Age-related cataract 12/27/2019     Priority: Medium    Presence of intraocular lens 12/27/2019     Priority: Medium    Allergic sinusitis 04/01/2024    Chronic kidney disease, stage 3b (HCC) 03/09/2022    READ (dyspnea on exertion) 11/24/2021    Diastolic dysfunction 10/27/2021    Type 2 diabetes mellitus with diabetic neuropathy 07/26/2021    Essential hypertension 02/11/2019    Elevated TSH 08/07/2018    Vitamin D deficiency 09/10/2017    Pure hypercholesterolemia 09/10/2017    Type 2 diabetes mellitus without complication, without long-term current use of insulin 09/10/2017    Localized osteoporosis without current pathological fracture 09/10/2017     Overview Note:     2017 hip neck -2.6; lspine -1/8  8/2020 hip neck -2.6/ L spine L1 -1.6  8/2023 hip neck -2.3/ L spin e-1.4      Generalized anxiety disorder 09/10/2017    Former smoker 09/10/2017    Numbness 04/07/2017    Imbalance 04/07/2017    Estrogen receptor negative tumor status 08/06/2008     Past Medical History:   Diagnosis Date    Arthritis     Back pain     Breast cancer     left 2008    Chronic kidney disease     Concussion     History of therapeutic radiation     Hx antineoplastic chemo     Hyperlipidemia     Hypertension     Osteoporosis     Panlobular emphysema (HCC) 1/9/2023    Pneumonia     Type II or unspecified type diabetes mellitus without mention of complication, not stated as uncontrolled     diet

## 2025-04-17 NOTE — PROGRESS NOTES
HISTORY OF PRESENT ILLNESS:   Mrs. Angel Crews is a 69year old white female who is status post 8- Left partial mastectomy and left sentinel node biopsy 2.6 cm infiltrating ductal carcinoma, grade III, ER/AR negative., 0/2 nodes positive, immunohistochemistry negative for micrometastises. She has no new complaints today. She denies weight loss or any other systemic symptoms. SCREENING BILATERAL DIGITAL MAMMOGRAM WITH TOMOSYNTHESIS 8/20/2019   9:20 AM   CLINICAL HISTORY: Screening. Personal history of left breast cancer   undergoing lumpectomy with radiation and chemotherapy in 2008. [facility] COMPARISON STUDIES: 9/4/2018, 8/3/2017, 8/18/2016. FINDINGS:    Digital CC and MLO views of bilateral breasts were obtained. Tomosynthesis in the MLO and CC projections was also performed. There are scattered fibroglandular densities, category B. Left breast   remains smaller than the right related to the prior lumpectomy. There   is persistent left breast skin thickening likely relates to radiation. Scarring also noted within the left axilla/superior chest wall. There   are fat necrosis calcifications within adjacent biopsy clip at the   lumpectomy site in the upper left breast. A biopsy clip is also seen   within the superior right breast, mid depth, at 12:00. This tissue   stable in configuration. Benign coarse and vascular calcifications   seen bilaterally. No suspicious masses or calcifications are   identified. There has been no interval change. This study was   interpreted with CAD. IMPRESSION AND RECOMMENDATION:    No mammographic evidence of malignancy. Post therapeutic changes of   the left breast as described above. Biopsy clip seen bilaterally. Recommendation is for the patient to return for routine mammography in   one year or sooner, if clinically indicated. BI-RADS CATEGORY 2: BENIGN FINDINGS. PHYSICAL EXAM:   The left lumpectomy incision is looking good.  There are mild fibrocystic no

## 2025-05-06 ENCOUNTER — OFFICE VISIT (OUTPATIENT)
Dept: CARDIOLOGY CLINIC | Age: 85
End: 2025-05-06
Payer: MEDICARE

## 2025-05-06 VITALS
BODY MASS INDEX: 26.36 KG/M2 | WEIGHT: 164 LBS | DIASTOLIC BLOOD PRESSURE: 70 MMHG | SYSTOLIC BLOOD PRESSURE: 122 MMHG | HEART RATE: 63 BPM | HEIGHT: 66 IN

## 2025-05-06 DIAGNOSIS — I73.9 PVD (PERIPHERAL VASCULAR DISEASE): ICD-10-CM

## 2025-05-06 DIAGNOSIS — R94.31 ABNORMAL ELECTROCARDIOGRAPHY: ICD-10-CM

## 2025-05-06 DIAGNOSIS — R00.2 PALPITATIONS: Primary | ICD-10-CM

## 2025-05-06 PROCEDURE — G8417 CALC BMI ABV UP PARAM F/U: HCPCS | Performed by: INTERNAL MEDICINE

## 2025-05-06 PROCEDURE — 93000 ELECTROCARDIOGRAM COMPLETE: CPT | Performed by: INTERNAL MEDICINE

## 2025-05-06 PROCEDURE — 1160F RVW MEDS BY RX/DR IN RCRD: CPT | Performed by: INTERNAL MEDICINE

## 2025-05-06 PROCEDURE — 1090F PRES/ABSN URINE INCON ASSESS: CPT | Performed by: INTERNAL MEDICINE

## 2025-05-06 PROCEDURE — 3078F DIAST BP <80 MM HG: CPT | Performed by: INTERNAL MEDICINE

## 2025-05-06 PROCEDURE — 99214 OFFICE O/P EST MOD 30 MIN: CPT | Performed by: INTERNAL MEDICINE

## 2025-05-06 PROCEDURE — 3074F SYST BP LT 130 MM HG: CPT | Performed by: INTERNAL MEDICINE

## 2025-05-06 PROCEDURE — G8427 DOCREV CUR MEDS BY ELIG CLIN: HCPCS | Performed by: INTERNAL MEDICINE

## 2025-05-06 PROCEDURE — 1159F MED LIST DOCD IN RCRD: CPT | Performed by: INTERNAL MEDICINE

## 2025-05-06 PROCEDURE — 1123F ACP DISCUSS/DSCN MKR DOCD: CPT | Performed by: INTERNAL MEDICINE

## 2025-05-06 PROCEDURE — 1036F TOBACCO NON-USER: CPT | Performed by: INTERNAL MEDICINE

## 2025-05-06 PROCEDURE — G8399 PT W/DXA RESULTS DOCUMENT: HCPCS | Performed by: INTERNAL MEDICINE

## 2025-05-06 ASSESSMENT — ENCOUNTER SYMPTOMS
CONSTIPATION: 0
WHEEZING: 0
SORE THROAT: 0
ABDOMINAL PAIN: 0
FACIAL SWELLING: 0
DIARRHEA: 0
COUGH: 0
EYE PAIN: 0
EYE DISCHARGE: 0
ABDOMINAL DISTENTION: 0
EYE REDNESS: 0
VOMITING: 0
BLOOD IN STOOL: 0
SHORTNESS OF BREATH: 0
NAUSEA: 0
CHEST TIGHTNESS: 0
APNEA: 0

## 2025-05-06 NOTE — PROGRESS NOTES
Cardiology Office Visit Note  [address] [facility] [address] [place] [address]  Phone: [phone]  Fax: [phone]                            Date:  5/6/2025  Patient: Xiomara Gonzalez  Age:  84 y.o., 1940    Referral: No ref. provider found    REASON FOR VISIT:  Follow-up         PROBLEM LIST:  Patient Active Problem List    Diagnosis Date Noted    Wheezing 01/09/2023     Priority: Medium    Restrictive lung disease 06/02/2022     Priority: Medium    Hypertensive renal disease 05/11/2022     Priority: Medium    Age-related cataract 12/27/2019     Priority: Medium    Presence of intraocular lens 12/27/2019     Priority: Medium    Diastolic dysfunction 10/27/2021     Priority: Low    Type 2 diabetes mellitus with diabetic neuropathy 07/26/2021     Priority: Low    Essential hypertension 02/11/2019     Priority: Low    Elevated TSH 08/07/2018     Priority: Low    Vitamin D deficiency 09/10/2017     Priority: Low    Pure hypercholesterolemia 09/10/2017     Priority: Low    Type 2 diabetes mellitus without complication, without long-term current use of insulin (HCC) 09/10/2017     Priority: Low    Localized osteoporosis without current pathological fracture 09/10/2017     Priority: Low     Overview Note:     2017 hip neck -2.6; lspine -1/8  8/2020 hip neck -2.6/ L spine L1 -1.6  8/2023 hip neck -2.3/ L spin e-1.4      Generalized anxiety disorder 09/10/2017     Priority: Low    Former smoker 09/10/2017     Priority: Low    Numbness 04/07/2017     Priority: Low    Imbalance 04/07/2017     Priority: Low    Estrogen receptor negative tumor status 08/06/2008     Priority: Low    Allergic sinusitis 04/01/2024    Chronic kidney disease, stage 3b ([facility]) 03/09/2022    READ (dyspnea on exertion) 11/24/2021       ASSESSMENT PLAN:  Assessment & Plan    Chronic essential hypertension, goal blood pressure less than 120/70  Continue amlodipine, carvedilol and losartan   Continue home blood pressure

## 2025-05-16 RX ORDER — METFORMIN HYDROCHLORIDE 500 MG/1
500 TABLET, EXTENDED RELEASE ORAL NIGHTLY
Qty: 30 TABLET | Refills: 0 | Status: SHIPPED | OUTPATIENT
Start: 2025-05-16

## 2025-05-16 NOTE — TELEPHONE ENCOUNTER
Xiomara called requesting a refill of the below medication which has been pended for you:     Requested Prescriptions     Pending Prescriptions Disp Refills    metFORMIN (GLUCOPHAGE-XR) 500 MG extended release tablet 30 tablet 3     Sig: Take 1 tablet by mouth nightly       Last Appointment Date: 4/4/2025  Next Appointment Date: 7/9/2025

## 2025-05-27 ENCOUNTER — LAB (OUTPATIENT)
Dept: LAB | Facility: HOSPITAL | Age: 85
End: 2025-05-27
Payer: MEDICARE

## 2025-05-27 ENCOUNTER — INFUSION (OUTPATIENT)
Dept: ONCOLOGY | Facility: CLINIC | Age: 85
End: 2025-05-27
Payer: MEDICARE

## 2025-05-27 DIAGNOSIS — Z45.2 ENCOUNTER FOR CARE RELATED TO VASCULAR ACCESS PORT: Primary | ICD-10-CM

## 2025-05-27 RX ORDER — SODIUM CHLORIDE 0.9 % (FLUSH) 0.9 %
10 SYRINGE (ML) INJECTION AS NEEDED
Status: DISCONTINUED | OUTPATIENT
Start: 2025-05-27 | End: 2025-05-27 | Stop reason: HOSPADM

## 2025-05-27 RX ORDER — HEPARIN SODIUM (PORCINE) LOCK FLUSH IV SOLN 100 UNIT/ML 100 UNIT/ML
500 SOLUTION INTRAVENOUS AS NEEDED
OUTPATIENT
Start: 2025-05-27

## 2025-05-27 RX ORDER — SODIUM CHLORIDE 0.9 % (FLUSH) 0.9 %
10 SYRINGE (ML) INJECTION AS NEEDED
OUTPATIENT
Start: 2025-05-27

## 2025-05-27 RX ORDER — HEPARIN SODIUM (PORCINE) LOCK FLUSH IV SOLN 100 UNIT/ML 100 UNIT/ML
500 SOLUTION INTRAVENOUS AS NEEDED
Status: DISCONTINUED | OUTPATIENT
Start: 2025-05-27 | End: 2025-05-27 | Stop reason: HOSPADM

## 2025-05-27 RX ADMIN — HEPARIN SODIUM (PORCINE) LOCK FLUSH IV SOLN 100 UNIT/ML 500 UNITS: 100 SOLUTION at 14:40

## 2025-05-27 RX ADMIN — Medication 10 ML: at 14:39

## 2025-06-06 ENCOUNTER — HOSPITAL ENCOUNTER (OUTPATIENT)
Age: 85
Discharge: HOME | End: 2025-06-08
Attending: INTERNAL MEDICINE
Payer: MEDICARE

## 2025-06-06 ENCOUNTER — HOSPITAL ENCOUNTER (OUTPATIENT)
Dept: VASCULAR LAB | Age: 85
Discharge: HOME | End: 2025-06-08
Attending: INTERNAL MEDICINE
Payer: MEDICARE

## 2025-06-06 DIAGNOSIS — I73.9 PVD (PERIPHERAL VASCULAR DISEASE): ICD-10-CM

## 2025-06-06 DIAGNOSIS — R94.31 ABNORMAL ELECTROCARDIOGRAPHY: ICD-10-CM

## 2025-06-06 LAB
VAS IMMEDIATE LEFT ABI: 1.09
VAS IMMEDIATE LEFT DORSALIS PEDIS BP: 151 MMHG
VAS IMMEDIATE RIGHT ABI: 1.08
VAS IMMEDIATE RIGHT BRACHIAL BP: 139 MMHG
VAS IMMEDIATE RIGHT POST TIBIAL BP: 150 MMHG
VAS LEFT ABI: 1.12
VAS LEFT DORSALIS PEDIS BP: 156 MMHG
VAS LEFT PTA BP: 154 MMHG
VAS LEFT TBI: 0.74
VAS LEFT TOE PRESSURE: 103 MMHG
VAS RIGHT ABI: 1.12
VAS RIGHT ARM BP: 139 MMHG
VAS RIGHT DORSALIS PEDIS BP: 151 MMHG
VAS RIGHT PTA BP: 156 MMHG
VAS RIGHT TBI: 0.9
VAS RIGHT TOE PRESSURE: 125 MMHG

## 2025-06-06 PROCEDURE — 93924 LWR XTR VASC STDY BILAT: CPT

## 2025-06-06 PROCEDURE — 6360000004 HC RX CONTRAST MEDICATION: Performed by: INTERNAL MEDICINE

## 2025-06-06 PROCEDURE — 76376 3D RENDER W/INTRP POSTPROCES: CPT

## 2025-06-06 RX ADMIN — SULFUR HEXAFLUORIDE 2 ML: 60.7; .19; .19 INJECTION, POWDER, LYOPHILIZED, FOR SUSPENSION INTRAVENOUS; INTRAVESICAL at 15:05

## 2025-06-07 LAB
ECHO AO ASC DIAM: 2.5 CM
ECHO AO ROOT DIAM: 2.3 CM
ECHO AO SINUS VALSALVA DIAM: 2.4 CM
ECHO AO ST JNCT DIAM: 2.4 CM
ECHO AV AREA PEAK VELOCITY: 2.5 CM2
ECHO AV AREA VTI: 2.4 CM2
ECHO AV MEAN GRADIENT: 4 MMHG
ECHO AV MEAN VELOCITY: 1 M/S
ECHO AV PEAK GRADIENT: 8 MMHG
ECHO AV PEAK VELOCITY: 1.4 M/S
ECHO AV VELOCITY RATIO: 0.86
ECHO AV VTI: 27.4 CM
ECHO EST RA PRESSURE: 3 MMHG
ECHO IVC PROX: 1.3 CM
ECHO LA AREA 2C: 14.7 CM2
ECHO LA AREA 4C: 15 CM2
ECHO LA DIAMETER: 3.2 CM
ECHO LA MAJOR AXIS: 4.8 CM
ECHO LA MINOR AXIS: 4.7 CM
ECHO LA TO AORTIC ROOT RATIO: 1.39
ECHO LA VOL BP: 39 ML (ref 22–52)
ECHO LA VOL MOD A2C: 38 ML (ref 22–52)
ECHO LA VOL MOD A4C: 40 ML (ref 22–52)
ECHO LV E' LATERAL VELOCITY: 7.53 CM/S
ECHO LV E' SEPTAL VELOCITY: 5.7 CM/S
ECHO LV EDV A2C: 86 ML
ECHO LV EDV A4C: 86 ML
ECHO LV EF PHYSICIAN: 53 %
ECHO LV EJECTION FRACTION A2C: 64 %
ECHO LV EJECTION FRACTION A4C: 55 %
ECHO LV EJECTION FRACTION BIPLANE: 58 % (ref 55–100)
ECHO LV ESV A2C: 31 ML
ECHO LV ESV A4C: 39 ML
ECHO LV FRACTIONAL SHORTENING: 30 % (ref 28–44)
ECHO LV INTERNAL DIMENSION DIASTOLIC: 4 CM (ref 3.9–5.3)
ECHO LV INTERNAL DIMENSION SYSTOLIC: 2.8 CM
ECHO LV IVSD: 0.9 CM (ref 0.6–0.9)
ECHO LV MASS 2D: 118.2 G (ref 67–162)
ECHO LV POSTERIOR WALL DIASTOLIC: 1 CM (ref 0.6–0.9)
ECHO LV RELATIVE WALL THICKNESS RATIO: 0.5
ECHO LVOT AREA: 2.8 CM2
ECHO LVOT AV VTI INDEX: 0.85
ECHO LVOT DIAM: 1.9 CM
ECHO LVOT MEAN GRADIENT: 3 MMHG
ECHO LVOT PEAK GRADIENT: 6 MMHG
ECHO LVOT PEAK VELOCITY: 1.2 M/S
ECHO LVOT SV: 66.3 ML
ECHO LVOT VTI: 23.4 CM
ECHO MV A VELOCITY: 0.93 M/S
ECHO MV AREA VTI: 2.5 CM2
ECHO MV E DECELERATION TIME (DT): 264 MS
ECHO MV E VELOCITY: 0.74 M/S
ECHO MV E/A RATIO: 0.8
ECHO MV E/E' LATERAL: 9.83
ECHO MV E/E' RATIO (AVERAGED): 11.4
ECHO MV E/E' SEPTAL: 12.98
ECHO MV LVOT VTI INDEX: 1.12
ECHO MV MAX VELOCITY: 1 M/S
ECHO MV MEAN GRADIENT: 1 MMHG
ECHO MV MEAN VELOCITY: 0.5 M/S
ECHO MV PEAK GRADIENT: 4 MMHG
ECHO MV VTI: 26.3 CM
ECHO RA AREA 4C: 11.3 CM2
ECHO RA VOLUME: 22 ML
ECHO RIGHT VENTRICULAR SYSTOLIC PRESSURE (RVSP): 31 MMHG
ECHO RV BASAL DIMENSION: 3.4 CM
ECHO RV INTERNAL DIMENSION: 2.6 CM
ECHO RV LONGITUDINAL DIMENSION: 5.8 CM
ECHO RV MID DIMENSION: 3.7 CM
ECHO RV TAPSE: 1.7 CM (ref 1.7–?)
ECHO TV REGURGITANT MAX VELOCITY: 2.63 M/S
ECHO TV REGURGITANT PEAK GRADIENT: 28 MMHG

## 2025-06-07 PROCEDURE — 93306 TTE W/DOPPLER COMPLETE: CPT | Performed by: INTERNAL MEDICINE

## 2025-06-07 PROCEDURE — 76376 3D RENDER W/INTRP POSTPROCES: CPT | Performed by: INTERNAL MEDICINE

## 2025-06-11 ENCOUNTER — RESULTS FOLLOW-UP (OUTPATIENT)
Dept: CARDIOLOGY CLINIC | Age: 85
End: 2025-06-11

## 2025-06-11 ENCOUNTER — OFFICE VISIT (OUTPATIENT)
Dept: GASTROENTEROLOGY | Facility: CLINIC | Age: 85
End: 2025-06-11
Payer: MEDICARE

## 2025-06-11 VITALS
HEART RATE: 82 BPM | BODY MASS INDEX: 24.75 KG/M2 | HEIGHT: 66 IN | WEIGHT: 154 LBS | SYSTOLIC BLOOD PRESSURE: 118 MMHG | TEMPERATURE: 97.1 F | OXYGEN SATURATION: 96 % | DIASTOLIC BLOOD PRESSURE: 72 MMHG

## 2025-06-11 DIAGNOSIS — R13.19 ESOPHAGEAL DYSPHAGIA: Primary | ICD-10-CM

## 2025-06-11 DIAGNOSIS — N18.32 CHRONIC KIDNEY DISEASE, STAGE 3B: ICD-10-CM

## 2025-06-11 DIAGNOSIS — R63.4 WEIGHT LOSS: ICD-10-CM

## 2025-06-11 PROBLEM — Z12.11 COLON CANCER SCREENING: Status: RESOLVED | Noted: 2020-10-07 | Resolved: 2025-06-11

## 2025-06-11 PROCEDURE — 93924 LWR XTR VASC STDY BILAT: CPT | Performed by: SURGERY

## 2025-06-11 RX ORDER — METFORMIN HYDROCHLORIDE 500 MG/1
500 TABLET, EXTENDED RELEASE ORAL NIGHTLY
COMMUNITY
Start: 2025-05-16

## 2025-06-11 NOTE — H&P (VIEW-ONLY)
Primary Physician: Luly Diez MD    Chief Complaint   Patient presents with    Difficulty Swallowing     Pt c/o trouble swallowing for at least 2 months-feels like foods/liquids/medications get hung her in her throat and won't go down-states she had a bad episode of getting choked recently and has been hoarse and had a sore throat ever since     Weight Loss     Pt does states she has lost about 40 pounds over the last few months        Subjective     Lorena Valdes is a 84 y.o. female.    HPI  Dysphagia  Over 2 months she has had trouble swallowing.  The swallowing is worse with foods but can occur with liquids.  She is having trouble swallowing pills also.  She has a hoarse voice.  She has had reflux and heartburn for years but lately it has not been an issue.  No N/V.    She has chronic constipation. She may go 1-3 weeks with No BM. This is her normal her entire life and this is NOT a change in bowel habits.     She is not on any PPI given her chronic kidney disease, rather taking Pepcid 40 mg daily.    Weight loss  40 pound weight loss over past 2-4 months. She states it is because she is Not eating as much because it is hard to swallow.    11/11/2020 colonoscopy revealing left-sided colon diverticulosis and nonbleeding hemorrhoids.  There was 1 hyperplastic polyp resected.    No family hx colon cancer    Past Medical History:   Diagnosis Date    Bladder disorder     Chronic kidney disease, stage 3b     Diverticulosis     Emphysema lung     Fibrocystic disease of breast     CONNIE (generalized anxiety disorder)     GERD (gastroesophageal reflux disease)     Heart disease     History of bone density study 2011    History of colon polyps     Hyperglycemia     Hyperlipidemia     Hypotension     Left ventricular diastolic dysfunction     Malignant neoplasm of central portion of left female breast 11/09/2016    Osteopenia 08/29/2017    Restrictive lung disease     Type 2 diabetes mellitus     Uterine prolapse         Past Surgical History:   Procedure Laterality Date    BREAST LUMPECTOMY  2008    CATARACT EXTRACTION Right 2021    COLONOSCOPY  09/16/2010    Diverticulosis; Repeat 10 years    COLONOSCOPY N/A 11/11/2020    One 6mm inflamed hyperplastic polyp in the cecum; Diverticulosis in the left colon; Non-bleeding internal hemorrhoids; Repeat 5 years    MAMMO BILATERAL  07/17/2013    Dr. Sabillon    MASTECTOMY Left 08/28/2008    PAP SMEAR  2010    SKIN CANCER EXCISION          Current Outpatient Medications:     albuterol sulfate  (90 Base) MCG/ACT inhaler, Inhale 2 puffs As Needed., Disp: , Rfl:     amLODIPine (NORVASC) 2.5 MG tablet, Take 1 tablet by mouth Daily., Disp: , Rfl:     aspirin 81 MG EC tablet, Take 81 mg by mouth daily.  , Disp: , Rfl:     Calcium Carb-Cholecalciferol 600-200 MG-UNIT tablet, Take 1 tablet by mouth 2 (Two) Times a Day., Disp: , Rfl:     carvedilol (COREG) 12.5 MG tablet, Take 1 tablet by mouth 2 (Two) Times a Day., Disp: 60 tablet, Rfl: 3    Cholecalciferol (VITAMIN D3) 400 UNITS capsule, Take 1 capsule by mouth Daily., Disp: , Rfl:     famotidine (PEPCID) 20 MG tablet, Take 1 tablet by mouth Daily., Disp: , Rfl:     glipiZIDE (GLUCOTROL) 5 MG tablet, Take 1 tablet by mouth Daily., Disp: , Rfl:     ibuprofen (ADVIL,MOTRIN) 400 MG tablet, Take 1 tablet by mouth Every 6 (Six) Hours As Needed for Mild Pain., Disp: , Rfl:     losartan (COZAAR) 100 MG tablet, Take 1 tablet by mouth Daily., Disp: , Rfl: 1    magnesium 30 MG tablet, Take 1 tablet by mouth., Disp: , Rfl:     metFORMIN ER (GLUCOPHAGE-XR) 500 MG 24 hr tablet, Take 1 tablet by mouth Every Night., Disp: , Rfl:     pravastatin (PRAVACHOL) 40 MG tablet, Take 1 tablet by mouth Daily., Disp: , Rfl:     tiotropium bromide-olodaterol (STIOLTO RESPIMAT) 2.5-2.5 MCG/ACT aerosol solution inhaler, Inhale 2 puffs As Needed., Disp: , Rfl:     Allergies   Allergen Reactions    Adhesive Tape Hives     Latex Tape       Social History  "    Socioeconomic History    Marital status: Single   Tobacco Use    Smoking status: Former     Current packs/day: 0.00     Types: Cigarettes     Quit date:      Years since quittin.4    Smokeless tobacco: Never   Vaping Use    Vaping status: Never Used   Substance and Sexual Activity    Alcohol use: Yes     Alcohol/week: 1.0 standard drink of alcohol     Types: 1 Cans of beer per week     Comment: Occasionally     Drug use: No    Sexual activity: Defer       Family History   Problem Relation Age of Onset    Cancer Mother     Heart disease Father     No Known Problems Daughter     No Known Problems Son     Hypertension Daughter     Hypertension Daughter     Other Brother         polioi    Cancer Paternal Aunt     No Known Problems Maternal Grandmother     No Known Problems Maternal Grandfather     No Known Problems Paternal Grandmother     No Known Problems Paternal Grandfather     Drug abuse Brother     Colon cancer Neg Hx     Colon polyps Neg Hx     Esophageal cancer Neg Hx     Liver cancer Neg Hx     Liver disease Neg Hx     Rectal cancer Neg Hx     Stomach cancer Neg Hx        Review of Systems   Constitutional:  Positive for fatigue and unexpected weight change.   Respiratory:  Negative for shortness of breath.    Cardiovascular:  Negative for chest pain.       Objective     /72 (BP Location: Right arm, Patient Position: Sitting, Cuff Size: Adult)   Pulse 82   Temp 97.1 °F (36.2 °C) (Infrared)   Ht 167.6 cm (66\")   Wt 69.9 kg (154 lb)   SpO2 96%   Breastfeeding No   BMI 24.86 kg/m²     Physical Exam  Vitals reviewed.   Constitutional:       Appearance: Normal appearance.   Cardiovascular:      Rate and Rhythm: Normal rate and regular rhythm.      Heart sounds: Normal heart sounds.   Neurological:      Mental Status: She is alert.         Lab Results - Last 18 Months   Lab Units 25  1155 25  1206 10/18/24  1219 24  1105 24  1059   GLUCOSE mg/dL 103* 104* 122* 137* " 151*   BUN mg/dL 26* 23 23 22 20   CREATININE mg/dL 1.0* 1.2* 1.2* 1.3* 1.27*   SODIUM mmol/L 141 138 138 144 140   POTASSIUM mmol/L 3.7 4.1 3.6 4.5 4.1   CHLORIDE mmol/L 104 98 99 106 104   TOTAL CO2 mmol/L 27 26 26 22  --    CO2 mmol/L  --   --   --   --  27.0   TOTAL PROTEIN g/dL 7.7  --  8.2 7.6 7.7   ALBUMIN g/dL 3.6  --  4.0 3.8 3.8   ALT (SGPT) U/L 8*  --  7 8 7   AST (SGOT) U/L 16  --  14 14 13   ALK PHOS U/L 69  --  90 74 73   BILIRUBIN mg/dL 0.3  --  0.5 0.4 0.4   GLOBULIN gm/dL  --   --   --   --  3.9       Lab Results - Last 18 Months   Lab Units 03/28/25  1155 10/18/24  1219 07/12/24  1105 05/28/24  1059 03/07/24  0943   HEMOGLOBIN g/dL 10* 11.9* 10.7* 10.8* 11.4*   HEMATOCRIT % 31.5* 37.2 32.9* 33.1* 35.2*   MCV fL 94.3 96.9 95.9 93.8 94.6   WBC K/uL 10.6 11.3* 11.4* 9.77 9.4   RDW % 13.3 13.1 13.3 13.3 13.5   MPV fL 9.4 9.6 9.6 9.5 9.8   PLATELETS K/uL 469* 410* 357 334 350       Lab Results - Last 18 Months   Lab Units 03/28/25  1155 10/18/24  1219 03/07/24  0943   TSH uIU/mL 3.61 4.400* 4.720*   VIT D 25 HYDROXY ng/mL 32.9  --  34.9              IMPRESSION/PLAN:    Assessment & Plan      Problem List Items Addressed This Visit       Chronic kidney disease, stage 3b    Overview   3/2025 GFR 56         Esophageal dysphagia - Primary    Overview   Dysphagia upper to mid esophagus worse with meats/breads.  She has had several instances where the food got stuck for quite some time not going up or down.  The difficulty swallowing has led her to not eat as much which has in turn led to weight loss.         Current Assessment & Plan   Plan upper endoscopy tomorrow per Dr. Bee         Relevant Orders    Case Request (Completed)    Follow Anesthesia Guidelines / Protocol    Weight loss    Overview   40 pounds for the past 2 to 4 months.  Patient reports she is just not eating as much because she is afraid the foods getting get stuck.  No previous endoscopy evaluation.  11/11/2020 colonoscopy with 1  hyperplastic polyp and diverticulosis.         Current Assessment & Plan   Her significant weight loss has been discussed at length with the patient and the patient's daughter.  Typically we would recommend colonoscopy for weight loss.  However patient is adamant her weight loss is related to her not eating as much because she is afraid of getting choked.  We will start with just the upper endoscopy.  Further recommendations to come.         Relevant Orders    Case Request (Completed)    Follow Anesthesia Guidelines / Protocol       Endoscopy          ..The risks, benefits, and alternatives of endoscopy were reviewed with the patient today.  Risks including perforation, with or without dilation, possibly requiring surgery.  Additional risks include risk of bleeding.  There is also the risk of a drug reaction or problems with anesthesia.  This will be discussed with the further by the anesthesia team on the day of the procedure. The benefits include the diagnosis and management of disease of the upper digestive tract.  Alternatives to endoscopy include upper GI series, laboratory testing, radiographic evaluation, or no intervention.  The patient verbalizes understanding and agrees.    In accordance with requirements under the Affordable Care Act, [facility] has provided pricing for all hospital services and items on each of its websites. However, a patient's actual cost may differ based on the services the patient receives to meet individual healthcare needs and based on the benefits provided under the patient’s insurance coverage.        Verona Herrera, ANNMARIE  06/11/25  15:18 CDT    Part of this note may be an electronic transcription/translation of spoken language to printed text.

## 2025-06-11 NOTE — PROGRESS NOTES
Primary Physician: Luly Diez MD    Chief Complaint   Patient presents with    Difficulty Swallowing     Pt c/o trouble swallowing for at least 2 months-feels like foods/liquids/medications get hung her in her throat and won't go down-states she had a bad episode of getting choked recently and has been hoarse and had a sore throat ever since     Weight Loss     Pt does states she has lost about 40 pounds over the last few months        Subjective     Lorena Valdes is a 84 y.o. female.    HPI  Dysphagia  Over 2 months she has had trouble swallowing.  The swallowing is worse with foods but can occur with liquids.  She is having trouble swallowing pills also.  She has a hoarse voice.  She has had reflux and heartburn for years but lately it has not been an issue.  No N/V.    She has chronic constipation. She may go 1-3 weeks with No BM. This is her normal her entire life and this is NOT a change in bowel habits.     She is not on any PPI given her chronic kidney disease, rather taking Pepcid 40 mg daily.    Weight loss  40 pound weight loss over past 2-4 months. She states it is because she is Not eating as much because it is hard to swallow.    11/11/2020 colonoscopy revealing left-sided colon diverticulosis and nonbleeding hemorrhoids.  There was 1 hyperplastic polyp resected.    No family hx colon cancer    Past Medical History:   Diagnosis Date    Bladder disorder     Chronic kidney disease, stage 3b     Diverticulosis     Emphysema lung     Fibrocystic disease of breast     CONNIE (generalized anxiety disorder)     GERD (gastroesophageal reflux disease)     Heart disease     History of bone density study 2011    History of colon polyps     Hyperglycemia     Hyperlipidemia     Hypotension     Left ventricular diastolic dysfunction     Malignant neoplasm of central portion of left female breast 11/09/2016    Osteopenia 08/29/2017    Restrictive lung disease     Type 2 diabetes mellitus     Uterine prolapse         Past Surgical History:   Procedure Laterality Date    BREAST LUMPECTOMY  2008    CATARACT EXTRACTION Right 2021    COLONOSCOPY  09/16/2010    Diverticulosis; Repeat 10 years    COLONOSCOPY N/A 11/11/2020    One 6mm inflamed hyperplastic polyp in the cecum; Diverticulosis in the left colon; Non-bleeding internal hemorrhoids; Repeat 5 years    MAMMO BILATERAL  07/17/2013    Dr. Sabillon    MASTECTOMY Left 08/28/2008    PAP SMEAR  2010    SKIN CANCER EXCISION          Current Outpatient Medications:     albuterol sulfate  (90 Base) MCG/ACT inhaler, Inhale 2 puffs As Needed., Disp: , Rfl:     amLODIPine (NORVASC) 2.5 MG tablet, Take 1 tablet by mouth Daily., Disp: , Rfl:     aspirin 81 MG EC tablet, Take 81 mg by mouth daily.  , Disp: , Rfl:     Calcium Carb-Cholecalciferol 600-200 MG-UNIT tablet, Take 1 tablet by mouth 2 (Two) Times a Day., Disp: , Rfl:     carvedilol (COREG) 12.5 MG tablet, Take 1 tablet by mouth 2 (Two) Times a Day., Disp: 60 tablet, Rfl: 3    Cholecalciferol (VITAMIN D3) 400 UNITS capsule, Take 1 capsule by mouth Daily., Disp: , Rfl:     famotidine (PEPCID) 20 MG tablet, Take 1 tablet by mouth Daily., Disp: , Rfl:     glipiZIDE (GLUCOTROL) 5 MG tablet, Take 1 tablet by mouth Daily., Disp: , Rfl:     ibuprofen (ADVIL,MOTRIN) 400 MG tablet, Take 1 tablet by mouth Every 6 (Six) Hours As Needed for Mild Pain., Disp: , Rfl:     losartan (COZAAR) 100 MG tablet, Take 1 tablet by mouth Daily., Disp: , Rfl: 1    magnesium 30 MG tablet, Take 1 tablet by mouth., Disp: , Rfl:     metFORMIN ER (GLUCOPHAGE-XR) 500 MG 24 hr tablet, Take 1 tablet by mouth Every Night., Disp: , Rfl:     pravastatin (PRAVACHOL) 40 MG tablet, Take 1 tablet by mouth Daily., Disp: , Rfl:     tiotropium bromide-olodaterol (STIOLTO RESPIMAT) 2.5-2.5 MCG/ACT aerosol solution inhaler, Inhale 2 puffs As Needed., Disp: , Rfl:     Allergies   Allergen Reactions    Adhesive Tape Hives     Latex Tape       Social History  "    Socioeconomic History    Marital status: Single   Tobacco Use    Smoking status: Former     Current packs/day: 0.00     Types: Cigarettes     Quit date:      Years since quittin.4    Smokeless tobacco: Never   Vaping Use    Vaping status: Never Used   Substance and Sexual Activity    Alcohol use: Yes     Alcohol/week: 1.0 standard drink of alcohol     Types: 1 Cans of beer per week     Comment: Occasionally     Drug use: No    Sexual activity: Defer       Family History   Problem Relation Age of Onset    Cancer Mother     Heart disease Father     No Known Problems Daughter     No Known Problems Son     Hypertension Daughter     Hypertension Daughter     Other Brother         polioi    Cancer Paternal Aunt     No Known Problems Maternal Grandmother     No Known Problems Maternal Grandfather     No Known Problems Paternal Grandmother     No Known Problems Paternal Grandfather     Drug abuse Brother     Colon cancer Neg Hx     Colon polyps Neg Hx     Esophageal cancer Neg Hx     Liver cancer Neg Hx     Liver disease Neg Hx     Rectal cancer Neg Hx     Stomach cancer Neg Hx        Review of Systems   Constitutional:  Positive for fatigue and unexpected weight change.   Respiratory:  Negative for shortness of breath.    Cardiovascular:  Negative for chest pain.       Objective     /72 (BP Location: Right arm, Patient Position: Sitting, Cuff Size: Adult)   Pulse 82   Temp 97.1 °F (36.2 °C) (Infrared)   Ht 167.6 cm (66\")   Wt 69.9 kg (154 lb)   SpO2 96%   Breastfeeding No   BMI 24.86 kg/m²     Physical Exam  Vitals reviewed.   Constitutional:       Appearance: Normal appearance.   Cardiovascular:      Rate and Rhythm: Normal rate and regular rhythm.      Heart sounds: Normal heart sounds.   Neurological:      Mental Status: She is alert.         Lab Results - Last 18 Months   Lab Units 25  1155 25  1206 10/18/24  1219 24  1105 24  1059   GLUCOSE mg/dL 103* 104* 122* 137* " 151*   BUN mg/dL 26* 23 23 22 20   CREATININE mg/dL 1.0* 1.2* 1.2* 1.3* 1.27*   SODIUM mmol/L 141 138 138 144 140   POTASSIUM mmol/L 3.7 4.1 3.6 4.5 4.1   CHLORIDE mmol/L 104 98 99 106 104   TOTAL CO2 mmol/L 27 26 26 22  --    CO2 mmol/L  --   --   --   --  27.0   TOTAL PROTEIN g/dL 7.7  --  8.2 7.6 7.7   ALBUMIN g/dL 3.6  --  4.0 3.8 3.8   ALT (SGPT) U/L 8*  --  7 8 7   AST (SGOT) U/L 16  --  14 14 13   ALK PHOS U/L 69  --  90 74 73   BILIRUBIN mg/dL 0.3  --  0.5 0.4 0.4   GLOBULIN gm/dL  --   --   --   --  3.9       Lab Results - Last 18 Months   Lab Units 03/28/25  1155 10/18/24  1219 07/12/24  1105 05/28/24  1059 03/07/24  0943   HEMOGLOBIN g/dL 10* 11.9* 10.7* 10.8* 11.4*   HEMATOCRIT % 31.5* 37.2 32.9* 33.1* 35.2*   MCV fL 94.3 96.9 95.9 93.8 94.6   WBC K/uL 10.6 11.3* 11.4* 9.77 9.4   RDW % 13.3 13.1 13.3 13.3 13.5   MPV fL 9.4 9.6 9.6 9.5 9.8   PLATELETS K/uL 469* 410* 357 334 350       Lab Results - Last 18 Months   Lab Units 03/28/25  1155 10/18/24  1219 03/07/24  0943   TSH uIU/mL 3.61 4.400* 4.720*   VIT D 25 HYDROXY ng/mL 32.9  --  34.9              IMPRESSION/PLAN:    Assessment & Plan      Problem List Items Addressed This Visit       Chronic kidney disease, stage 3b    Overview   3/2025 GFR 56         Esophageal dysphagia - Primary    Overview   Dysphagia upper to mid esophagus worse with meats/breads.  She has had several instances where the food got stuck for quite some time not going up or down.  The difficulty swallowing has led her to not eat as much which has in turn led to weight loss.         Current Assessment & Plan   Plan upper endoscopy tomorrow per Dr. Bee         Relevant Orders    Case Request (Completed)    Follow Anesthesia Guidelines / Protocol    Weight loss    Overview   40 pounds for the past 2 to 4 months.  Patient reports she is just not eating as much because she is afraid the foods getting get stuck.  No previous endoscopy evaluation.  11/11/2020 colonoscopy with 1  hyperplastic polyp and diverticulosis.         Current Assessment & Plan   Her significant weight loss has been discussed at length with the patient and the patient's daughter.  Typically we would recommend colonoscopy for weight loss.  However patient is adamant her weight loss is related to her not eating as much because she is afraid of getting choked.  We will start with just the upper endoscopy.  Further recommendations to come.         Relevant Orders    Case Request (Completed)    Follow Anesthesia Guidelines / Protocol       Endoscopy          ..The risks, benefits, and alternatives of endoscopy were reviewed with the patient today.  Risks including perforation, with or without dilation, possibly requiring surgery.  Additional risks include risk of bleeding.  There is also the risk of a drug reaction or problems with anesthesia.  This will be discussed with the further by the anesthesia team on the day of the procedure. The benefits include the diagnosis and management of disease of the upper digestive tract.  Alternatives to endoscopy include upper GI series, laboratory testing, radiographic evaluation, or no intervention.  The patient verbalizes understanding and agrees.    In accordance with requirements under the Affordable Care Act, [facility] has provided pricing for all hospital services and items on each of its websites. However, a patient's actual cost may differ based on the services the patient receives to meet individual healthcare needs and based on the benefits provided under the patient’s insurance coverage.        Verona Herrera, ANNMARIE  06/11/25  15:18 CDT    Part of this note may be an electronic transcription/translation of spoken language to printed text.

## 2025-06-11 NOTE — ASSESSMENT & PLAN NOTE
Her significant weight loss has been discussed at length with the patient and the patient's daughter.  Typically we would recommend colonoscopy for weight loss.  However patient is adamant her weight loss is related to her not eating as much because she is afraid of getting choked.  We will start with just the upper endoscopy.  Further recommendations to come.

## 2025-06-12 ENCOUNTER — ANESTHESIA EVENT (OUTPATIENT)
Dept: GASTROENTEROLOGY | Facility: HOSPITAL | Age: 85
End: 2025-06-12
Payer: MEDICARE

## 2025-06-12 ENCOUNTER — HOSPITAL ENCOUNTER (OUTPATIENT)
Facility: HOSPITAL | Age: 85
Setting detail: HOSPITAL OUTPATIENT SURGERY
Discharge: HOME | End: 2025-06-12
Attending: INTERNAL MEDICINE | Admitting: INTERNAL MEDICINE
Payer: MEDICARE

## 2025-06-12 ENCOUNTER — ANESTHESIA (OUTPATIENT)
Dept: GASTROENTEROLOGY | Facility: HOSPITAL | Age: 85
End: 2025-06-12
Payer: MEDICARE

## 2025-06-12 VITALS
TEMPERATURE: 96.4 F | SYSTOLIC BLOOD PRESSURE: 147 MMHG | BODY MASS INDEX: 27.46 KG/M2 | OXYGEN SATURATION: 99 % | HEIGHT: 63 IN | WEIGHT: 155 LBS | RESPIRATION RATE: 20 BRPM | DIASTOLIC BLOOD PRESSURE: 100 MMHG | HEART RATE: 75 BPM

## 2025-06-12 DIAGNOSIS — R63.4 WEIGHT LOSS: ICD-10-CM

## 2025-06-12 DIAGNOSIS — R13.19 ESOPHAGEAL DYSPHAGIA: ICD-10-CM

## 2025-06-12 PROCEDURE — 25010000002 LIDOCAINE PF 2% 2 % SOLUTION: Performed by: NURSE ANESTHETIST, CERTIFIED REGISTERED

## 2025-06-12 PROCEDURE — 88305 TISSUE EXAM BY PATHOLOGIST: CPT | Performed by: INTERNAL MEDICINE

## 2025-06-12 PROCEDURE — 88341 IMHCHEM/IMCYTCHM EA ADD ANTB: CPT | Performed by: INTERNAL MEDICINE

## 2025-06-12 PROCEDURE — 25010000002 PROPOFOL 10 MG/ML EMULSION: Performed by: NURSE ANESTHETIST, CERTIFIED REGISTERED

## 2025-06-12 PROCEDURE — 43239 EGD BIOPSY SINGLE/MULTIPLE: CPT | Performed by: INTERNAL MEDICINE

## 2025-06-12 PROCEDURE — 25810000003 SODIUM CHLORIDE 0.9 % SOLUTION: Performed by: ANESTHESIOLOGY

## 2025-06-12 RX ORDER — PROPOFOL 10 MG/ML
VIAL (ML) INTRAVENOUS AS NEEDED
Status: DISCONTINUED | OUTPATIENT
Start: 2025-06-12 | End: 2025-06-12 | Stop reason: SURG

## 2025-06-12 RX ORDER — LIDOCAINE HYDROCHLORIDE 20 MG/ML
INJECTION, SOLUTION EPIDURAL; INFILTRATION; INTRACAUDAL; PERINEURAL AS NEEDED
Status: DISCONTINUED | OUTPATIENT
Start: 2025-06-12 | End: 2025-06-12 | Stop reason: SURG

## 2025-06-12 RX ORDER — SODIUM CHLORIDE 9 MG/ML
500 INJECTION, SOLUTION INTRAVENOUS CONTINUOUS PRN
Status: DISCONTINUED | OUTPATIENT
Start: 2025-06-12 | End: 2025-06-12 | Stop reason: HOSPADM

## 2025-06-12 RX ORDER — SODIUM CHLORIDE 0.9 % (FLUSH) 0.9 %
10 SYRINGE (ML) INJECTION AS NEEDED
Status: DISCONTINUED | OUTPATIENT
Start: 2025-06-12 | End: 2025-06-12 | Stop reason: HOSPADM

## 2025-06-12 RX ADMIN — SODIUM CHLORIDE 500 ML: 9 INJECTION, SOLUTION INTRAVENOUS at 07:55

## 2025-06-12 RX ADMIN — PROPOFOL 120 MG: 10 INJECTION, EMULSION INTRAVENOUS at 07:58

## 2025-06-12 RX ADMIN — LIDOCAINE HYDROCHLORIDE 100 MG: 20 INJECTION, SOLUTION EPIDURAL; INFILTRATION; INTRACAUDAL; PERINEURAL at 07:58

## 2025-06-12 NOTE — ANESTHESIA PREPROCEDURE EVALUATION
Anesthesia Evaluation     Patient summary reviewed and Nursing notes reviewed   no history of anesthetic complications:   NPO Solid Status: > 8 hours  NPO Liquid Status: > 2 hours           Airway   Mallampati: I  TM distance: >3 FB  Neck ROM: full  Dental    (+) poor dentition        Pulmonary    (-) not a smoker  Cardiovascular   Exercise tolerance: good (4-7 METS)    (+) dysrhythmias Tachycardia, CHF Diastolic >=55%, hyperlipidemia      Neuro/Psych  (+) psychiatric history  (-) seizures, TIA, CVA  GI/Hepatic/Renal/Endo    (+) obesity, renal disease- CRI, diabetes mellitus  (-) liver disease    Musculoskeletal     Abdominal    Substance History      OB/GYN          Other      history of cancer (breast cancer) remission                      Anesthesia Plan    ASA 3     MAC     intravenous induction     Anesthetic plan, risks, benefits, and alternatives have been provided, discussed and informed consent has been obtained with: patient.

## 2025-06-12 NOTE — ANESTHESIA POSTPROCEDURE EVALUATION
"Patient: Lorena Valdes    Procedure Summary       Date: 06/12/25 Room / Location: [facility] ENDOSCOPY 2 / [facility] ENDOSCOPY    Anesthesia Start: 0757 Anesthesia Stop: 0813    Procedure: ESOPHAGOGASTRODUODENOSCOPY WITH ANESTHESIA Diagnosis:       Weight loss      Esophageal dysphagia      (Weight loss [R63.4])      (Esophageal dysphagia [R13.19])    Surgeons: Jennifer Bee MD Provider: Cherri Ford CRNA    Anesthesia Type: MAC ASA Status: 3            Anesthesia Type: MAC    Vitals  Vitals Value Taken Time   /62 06/12/25 08:31   Temp     Pulse 78 06/12/25 08:33   Resp 21 06/12/25 08:30   SpO2 98 % 06/12/25 08:33   Vitals shown include unfiled device data.        Post Anesthesia Care and Evaluation    Patient location during evaluation: PHASE II  Patient participation: complete - patient participated  Level of consciousness: awake and awake and alert  Pain score: 0  Pain management: adequate    Airway patency: patent  Anesthetic complications: No anesthetic complications  PONV Status: none  Cardiovascular status: acceptable  Respiratory status: acceptable  Hydration status: acceptable    Comments: Patient discharged according to acceptable Eren score per RN assessment. See nursing records for further information.     Blood pressure 124/62, pulse 76, temperature 96.4 °F (35.8 °C), temperature source Temporal, resp. rate 21, height 160 cm (63\"), weight 70.3 kg (155 lb), SpO2 97%, not currently breastfeeding.      "

## 2025-06-13 LAB
CYTO UR: NORMAL
LAB AP CASE REPORT: NORMAL
LAB AP DIAGNOSIS COMMENT: NORMAL
Lab: NORMAL
PATH REPORT.FINAL DX SPEC: NORMAL
PATH REPORT.GROSS SPEC: NORMAL

## 2025-06-16 ENCOUNTER — LAB (OUTPATIENT)
Dept: LAB | Facility: HOSPITAL | Age: 85
End: 2025-06-16
Payer: MEDICARE

## 2025-06-16 ENCOUNTER — RESULTS FOLLOW-UP (OUTPATIENT)
Dept: ONCOLOGY | Facility: CLINIC | Age: 85
End: 2025-06-16
Payer: MEDICARE

## 2025-06-16 DIAGNOSIS — C50.411 MALIGNANT NEOPLASM OF UPPER-OUTER QUADRANT OF RIGHT BREAST IN FEMALE, ESTROGEN RECEPTOR POSITIVE: Primary | ICD-10-CM

## 2025-06-16 DIAGNOSIS — C50.112 MALIGNANT NEOPLASM OF CENTRAL PORTION OF LEFT BREAST IN FEMALE, ESTROGEN RECEPTOR NEGATIVE: Primary | ICD-10-CM

## 2025-06-16 DIAGNOSIS — Z17.1 MALIGNANT NEOPLASM OF CENTRAL PORTION OF LEFT BREAST IN FEMALE, ESTROGEN RECEPTOR NEGATIVE: Primary | ICD-10-CM

## 2025-06-16 DIAGNOSIS — Z17.0 MALIGNANT NEOPLASM OF UPPER-OUTER QUADRANT OF RIGHT BREAST IN FEMALE, ESTROGEN RECEPTOR POSITIVE: Primary | ICD-10-CM

## 2025-06-16 PROBLEM — C15.9 SQUAMOUS CELL ESOPHAGEAL CANCER: Status: ACTIVE | Noted: 2025-06-16

## 2025-06-16 PROBLEM — C76.0 HEAD AND NECK CANCER: Status: ACTIVE | Noted: 2025-06-11

## 2025-06-16 PROBLEM — C15.9 SQUAMOUS CELL ESOPHAGEAL CANCER: Status: ACTIVE | Noted: 2025-06-11

## 2025-06-16 LAB
ALBUMIN SERPL-MCNC: 3.3 G/DL (ref 3.5–5.2)
ALBUMIN/GLOB SERPL: 0.7 G/DL
ALP SERPL-CCNC: 55 U/L (ref 39–117)
ALT SERPL W P-5'-P-CCNC: <5 U/L (ref 1–33)
ANION GAP SERPL CALCULATED.3IONS-SCNC: 13 MMOL/L (ref 5–15)
AST SERPL-CCNC: 11 U/L (ref 1–32)
BASOPHILS # BLD AUTO: 0.04 10*3/MM3 (ref 0–0.2)
BASOPHILS NFR BLD AUTO: 0.3 % (ref 0–1.5)
BILIRUB SERPL-MCNC: 0.4 MG/DL (ref 0–1.2)
BUN SERPL-MCNC: 18.4 MG/DL (ref 8–23)
BUN/CREAT SERPL: 17.7 (ref 7–25)
CALCIUM SPEC-SCNC: 9.6 MG/DL (ref 8.6–10.5)
CEA SERPL-MCNC: 1.81 NG/ML
CHLORIDE SERPL-SCNC: 99 MMOL/L (ref 98–107)
CO2 SERPL-SCNC: 26 MMOL/L (ref 22–29)
CREAT SERPL-MCNC: 1.04 MG/DL (ref 0.57–1)
DEPRECATED RDW RBC AUTO: 41.1 FL (ref 37–54)
EGFRCR SERPLBLD CKD-EPI 2021: 53.4 ML/MIN/1.73
EOSINOPHIL # BLD AUTO: 0.08 10*3/MM3 (ref 0–0.4)
EOSINOPHIL NFR BLD AUTO: 0.6 % (ref 0.3–6.2)
ERYTHROCYTE [DISTWIDTH] IN BLOOD BY AUTOMATED COUNT: 12.6 % (ref 12.3–15.4)
GLOBULIN UR ELPH-MCNC: 4.7 GM/DL
GLUCOSE SERPL-MCNC: 138 MG/DL (ref 65–99)
HCT VFR BLD AUTO: 31.5 % (ref 34–46.6)
HGB BLD-MCNC: 10.4 G/DL (ref 12–15.9)
IMM GRANULOCYTES # BLD AUTO: 0.04 10*3/MM3 (ref 0–0.05)
IMM GRANULOCYTES NFR BLD AUTO: 0.3 % (ref 0–0.5)
LYMPHOCYTES # BLD AUTO: 4.12 10*3/MM3 (ref 0.7–3.1)
LYMPHOCYTES NFR BLD AUTO: 33.3 % (ref 19.6–45.3)
MCH RBC QN AUTO: 29.1 PG (ref 26.6–33)
MCHC RBC AUTO-ENTMCNC: 33 G/DL (ref 31.5–35.7)
MCV RBC AUTO: 88 FL (ref 79–97)
MONOCYTES # BLD AUTO: 1.32 10*3/MM3 (ref 0.1–0.9)
MONOCYTES NFR BLD AUTO: 10.7 % (ref 5–12)
NEUTROPHILS NFR BLD AUTO: 54.8 % (ref 42.7–76)
NEUTROPHILS NFR BLD AUTO: 6.76 10*3/MM3 (ref 1.7–7)
NRBC BLD AUTO-RTO: 0 /100 WBC (ref 0–0.2)
PLATELET # BLD AUTO: 465 10*3/MM3 (ref 140–450)
PMV BLD AUTO: 8.8 FL (ref 6–12)
POTASSIUM SERPL-SCNC: 3.8 MMOL/L (ref 3.5–5.2)
PROT SERPL-MCNC: 8 G/DL (ref 6–8.5)
RBC # BLD AUTO: 3.58 10*6/MM3 (ref 3.77–5.28)
SODIUM SERPL-SCNC: 138 MMOL/L (ref 136–145)
WBC NRBC COR # BLD AUTO: 12.36 10*3/MM3 (ref 3.4–10.8)

## 2025-06-16 PROCEDURE — 80053 COMPREHEN METABOLIC PANEL: CPT | Performed by: INTERNAL MEDICINE

## 2025-06-16 PROCEDURE — 82378 CARCINOEMBRYONIC ANTIGEN: CPT | Performed by: INTERNAL MEDICINE

## 2025-06-16 PROCEDURE — 86300 IMMUNOASSAY TUMOR CA 15-3: CPT | Performed by: INTERNAL MEDICINE

## 2025-06-16 PROCEDURE — 85025 COMPLETE CBC W/AUTO DIFF WBC: CPT | Performed by: INTERNAL MEDICINE

## 2025-06-16 PROCEDURE — 36415 COLL VENOUS BLD VENIPUNCTURE: CPT | Performed by: INTERNAL MEDICINE

## 2025-06-16 NOTE — PROGRESS NOTES
Primary Physician: Luly Diez MD    Chief Complaint   Patient presents with    Follow-up     Pt presents today for path follow up-had endo 6/12/2025       Subjective     Lorena Valdes is a 84 y.o. female.    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  Endoscopy showed a mass in the laryngeal area on the right side prior to entering the esophagus.  This mass was seen extending down into the proximal esophagus.  Biopsies confirm squamous cell carcinoma.  Pathology unable to determine primary site.  Cont to have same complaints and is noticing increasing soreness and problems swallowing.  Here to review path.  Labs from last week reviewed--persistent anemia.  CEA normal.  Small bowel bxs normal.      Past Medical History:   Diagnosis Date    Bladder disorder     Chronic kidney disease, stage 3b     Diverticulosis     Emphysema lung     Fibrocystic disease of breast     CONNIE (generalized anxiety disorder)     GERD (gastroesophageal reflux disease)     Heart disease     History of bone density study 2011    History of colon polyps     Hyperglycemia     Hyperlipidemia     Hypotension     Left ventricular diastolic dysfunction     Malignant neoplasm of central portion of left female breast 11/09/2016    Osteopenia 08/29/2017    Restrictive lung disease     Type 2 diabetes mellitus     Uterine prolapse        Past Surgical History:   Procedure Laterality Date    BREAST LUMPECTOMY  2008    CATARACT EXTRACTION Right 2021    COLONOSCOPY  09/16/2010    Diverticulosis; Repeat 10 years    COLONOSCOPY N/A 11/11/2020    One 6mm inflamed hyperplastic polyp in the cecum; Diverticulosis in the left colon; Non-bleeding internal hemorrhoids; Repeat 5 years    ENDOSCOPY N/A 06/12/2025    Exophytic mass found in the larynx, not obstructing the airway; Partially obstructing, rule  out malignancy, esophageal tumor was found in the proximal esophagus-biopsied; Medium-sized HH; Normal stomach; Two non-bleeding angiodysplastic lesions in the duodenum-biopsied    MAMMO BILATERAL  07/17/2013    Dr. Sabillon    MASTECTOMY Left 08/28/2008    PAP SMEAR  2010    SKIN CANCER EXCISION          Current Outpatient Medications:     albuterol sulfate  (90 Base) MCG/ACT inhaler, Inhale 2 puffs As Needed., Disp: , Rfl:     amLODIPine (NORVASC) 2.5 MG tablet, Take 1 tablet by mouth Daily., Disp: , Rfl:     aspirin 81 MG EC tablet, Take 81 mg by mouth daily.  , Disp: , Rfl:     Calcium Carb-Cholecalciferol 600-200 MG-UNIT tablet, Take 1 tablet by mouth 2 (Two) Times a Day., Disp: , Rfl:     carvedilol (COREG) 12.5 MG tablet, Take 1 tablet by mouth 2 (Two) Times a Day., Disp: 60 tablet, Rfl: 3    Cholecalciferol (VITAMIN D3) 400 UNITS capsule, Take 1 capsule by mouth Daily., Disp: , Rfl:     famotidine (PEPCID) 20 MG tablet, Take 1 tablet by mouth Daily., Disp: , Rfl:     glipiZIDE (GLUCOTROL) 5 MG tablet, Take 1 tablet by mouth Daily., Disp: , Rfl:     ibuprofen (ADVIL,MOTRIN) 400 MG tablet, Take 1 tablet by mouth Every 6 (Six) Hours As Needed for Mild Pain., Disp: , Rfl:     losartan (COZAAR) 100 MG tablet, Take 1 tablet by mouth Daily., Disp: , Rfl: 1    magnesium 30 MG tablet, Take 1 tablet by mouth., Disp: , Rfl:     metFORMIN ER (GLUCOPHAGE-XR) 500 MG 24 hr tablet, Take 1 tablet by mouth Every Night., Disp: , Rfl:     pravastatin (PRAVACHOL) 40 MG tablet, Take 1 tablet by mouth Daily., Disp: , Rfl:     tiotropium bromide-olodaterol (STIOLTO RESPIMAT) 2.5-2.5 MCG/ACT aerosol solution inhaler, Inhale 2 puffs As Needed., Disp: , Rfl:     Allergies   Allergen Reactions    Adhesive Tape Hives     Latex Tape       Social History     Socioeconomic History    Marital status: Single   Tobacco Use    Smoking status: Former     Current packs/day: 0.00     Types: Cigarettes     Quit date: 1974     Years since  "quittin.4    Smokeless tobacco: Never   Vaping Use    Vaping status: Never Used   Substance and Sexual Activity    Alcohol use: Yes     Alcohol/week: 1.0 standard drink of alcohol     Types: 1 Cans of beer per week     Comment: Occasionally     Drug use: No    Sexual activity: Defer       Family History   Problem Relation Age of Onset    Cancer Mother     Heart disease Father     No Known Problems Daughter     No Known Problems Son     Hypertension Daughter     Hypertension Daughter     Other Brother         polioi    Cancer Paternal Aunt     No Known Problems Maternal Grandmother     No Known Problems Maternal Grandfather     No Known Problems Paternal Grandmother     No Known Problems Paternal Grandfather     Drug abuse Brother     Colon cancer Neg Hx     Colon polyps Neg Hx     Esophageal cancer Neg Hx     Liver cancer Neg Hx     Liver disease Neg Hx     Rectal cancer Neg Hx     Stomach cancer Neg Hx        Review of Systems   Constitutional:  Positive for unexpected weight change.   HENT:  Positive for trouble swallowing.        Objective     /84 (BP Location: Left arm, Patient Position: Sitting, Cuff Size: Adult)   Pulse 87   Temp 97.7 °F (36.5 °C) (Infrared)   Ht 160 cm (63\")   Wt 70.3 kg (155 lb)   SpO2 98%   Breastfeeding No   BMI 27.46 kg/m²     Physical Exam  Constitutional:       Appearance: She is well-developed.   Pulmonary:      Effort: Pulmonary effort is normal.   Musculoskeletal:         General: Normal range of motion.   Skin:     General: Skin is warm.   Neurological:      Mental Status: She is alert and oriented to person, place, and time.   Psychiatric:         Behavior: Behavior normal.         Lab Results - Last 18 Months   Lab Units 25  1136 25  1155 25  1206 10/18/24  1219 24  1105 24  1059   GLUCOSE mg/dL 138* 103* 104* 122* 137* 151*   BUN mg/dL 18.4 26* 23 23 22 20   CREATININE mg/dL 1.04* 1.0* 1.2* 1.2* 1.3* 1.27*   SODIUM mmol/L 138 141 " 138 138 144 140   POTASSIUM mmol/L 3.8 3.7 4.1 3.6 4.5 4.1   CHLORIDE mmol/L 99 104 98 99 106 104   TOTAL CO2 mmol/L  --  27 26 26 22  --    CO2 mmol/L 26.0  --   --   --   --  27.0   TOTAL PROTEIN g/dL 8.0 7.7  --  8.2 7.6 7.7   ALBUMIN g/dL 3.3* 3.6  --  4.0 3.8 3.8   ALT (SGPT) U/L <5 8*  --  7 8 7   AST (SGOT) U/L 11 16  --  14 14 13   ALK PHOS U/L 55 69  --  90 74 73   BILIRUBIN mg/dL 0.4 0.3  --  0.5 0.4 0.4   GLOBULIN gm/dL 4.7  --   --   --   --  3.9       Lab Results - Last 18 Months   Lab Units 06/16/25  1136 03/28/25  1155 10/18/24  1219 07/12/24  1105 05/28/24  1059 03/07/24  0943   HEMOGLOBIN g/dL 10.4* 10* 11.9* 10.7* 10.8* 11.4*   HEMATOCRIT % 31.5* 31.5* 37.2 32.9* 33.1* 35.2*   MCV fL 88.0 94.3 96.9 95.9 93.8 94.6   WBC 10*3/mm3 12.36* 10.6 11.3* 11.4* 9.77 9.4   RDW % 12.6 13.3 13.1 13.3 13.3 13.5   MPV fL 8.8 9.4 9.6 9.6 9.5 9.8   PLATELETS 10*3/mm3 465* 469* 410* 357 334 350       Lab Results - Last 18 Months   Lab Units 03/28/25  1155 10/18/24  1219 03/07/24  0943   TSH uIU/mL 3.61 4.400* 4.720*   VIT D 25 HYDROXY ng/mL 32.9  --  34.9       Latest Reference Range & Units 09/01/20 12:15 09/01/21 11:01 05/13/22 12:25 05/30/23 11:11 05/28/24 10:59 06/16/25 11:36   CA 27.29 0.0 - 38.6 U/mL 39.1 (H) 25.1 27.3 21.2 28.0 24.7   CEA ng/mL 2.94 2.76 3.07 2.80 2.47 1.81   (H): Data is abnormally high    IMPRESSION/PLAN:    Assessment & Plan      Problem List Items Addressed This Visit          Hematology and Neoplasia    Squamous cell esophageal cancer - Primary    Overview   Dysphagia upper to mid esophagus worse with meats/breads.  She has had several instances where the food got stuck for quite some time not going up or down.  The difficulty swallowing has led her to not eat as much which has in turn led to weight loss.    Endoscopy 6/2025 shows a mass in the right laryngeal area which extends down into the esophagus.  Biopsies from the esophagus show squamous cell carcinoma.  Pathology cannot  determine if this is primary head neck versus esophageal.    Final Diagnosis   1.  Small bowel, biopsy:               - Fragments of duodenal mucosa with no significant pathologic abnormalities.     2.  Proximal esophageal mass, biopsy:               - Involved by moderately differentiated squamous cell carcinoma.               - SEE COMMENT.   Electronically signed by Ellen Sunshine MD on 6/13/2025 at 1520 CDT   Comment    Part 2:  The esophagus is involved by invasive squamous cell carcinoma.  From this small biopsy alone, it is unclear whether or not this represents an esophageal primary, or if this is extension from the described laryngeal mass noted in the electronic medical record.  From this sample, the overlying squamous epithelium appears to be relatively uninvolved, suggesting that this is a mass that is a growing into the wall of the esophagus rather than primarily arising from the esophageal mucosa; however, sampling error is always possible, and clinical correlation is imperative.  As with most squamous cell carcinomas, there are no stains to further differentiate the site of origin; this requires correlation with endoscopy/laryngoscopy or other testing modalities.     The tumor cells are positive for p40, supporting squamous subtype.  They are also positive for p16, whereas the overlying non-dysplastic squamous epithelium is negative.     Recommend referral to ENT.  She already sees Dr. Canas and has an appt next week.  Suspect that she will benefit from XRT.          MEDICAL COMPLEXITY must have 2 out of 3   Moderate Complexity Level 4                                                                                                              1 of the following medical problems:                                                                                               []One chronic illness with mild exacerbation                                                                                 []Two or more stable chronic illness                                                                                              [x]One new problem  []One acute illness with systemic symptoms     Complexity of Data  Reviewed (1 out of the 3 following categories)                                             Category 1 tests, documents, historian (must have 3 points)                                                      []Review of prior external records  [x]Review of results of unique tests  []Ordering unique tests   []Assessment requires an independent historian   Category 2 Interpretation of tests     []Independent interpretation of test read by another doc   Category 3 Discuss Management/tests  []Discussion with external physician     Risk of complications and/or morbidity                                                                                           []Prescription Drug Management     []Decision for elective endoscopic procedure            Jennifer Bee MD  06/18/25  15:39 CDT    Part of this note may be an electronic transcription/translation of spoken language to printed text.

## 2025-06-16 NOTE — TELEPHONE ENCOUNTER
----- Message from Justin Canas sent at 6/16/2025 12:17 PM CDT -----  Repeat CBC with differential in 2 weeks  ----- Message -----  From: Lab, Background User  Sent: 6/16/2025  11:47 AM CDT  To: Justin Canas MD

## 2025-06-17 LAB — CANCER AG27-29 SERPL-ACNC: 24.7 U/ML (ref 0–38.6)

## 2025-06-17 NOTE — TELEPHONE ENCOUNTER
Unable to reach patient. She is scheduled to see Dr. Canas next week. I have add a lab appt following her visit with Dr. Canas on 06/23/25

## 2025-06-17 NOTE — PROGRESS NOTES
[facility]  HEMATOLOGY & ONCOLOGY    [facility] [place] [facility] HEMATOLOGY AND ONCOLOGY  [address]  [facility] [address] [phone]    Patient Name: Lorena Valdes  Encounter Date: 06/23/2025  YOB: 1940  Patient Number: [id number]    REASON FOR VISIT:  Ms. Lorena Valdes is a 85 yo female with a history of breast cancer that was diagnosed in 2008.  She had a stage IIA left breast cancer that was hormone receptor negative and Her 2 positive by FISH.  She underwent lumpectomy then adjuvant Adriamycin Cytoxan.  She only had one dose of Adriamycin and developed cardiomyopathy and thereofre the adriamycin was discontinued.  She had then weekly Taxol with all chemotherapy being completed in March 2009.  She then had adjuvant radiation therapy. She is here with her daughter, Gabriela RODRIGUEZ have reviewed the HPI and verified with the patient the accuracy of it. No changes to interval history since the information was documented. Justin Canas MD 06/23/25      Oncology/Hematology History Overview Note   Tp: [id number] Mp: M0 Size: 2.6 cm Histopathologic Grade: G3? Histopathologic Type: DuctalInvasive  (NOS), left central? Stage  Grouping: IIA  08/06/2008: Core biopsy: at left breast mass, 12 o'clock: Infiltrating ductal carcinoma, gr 3, with foci of tumor necrosis? DNA index 1.78  (aneuploid), ER/MI 0%, her2/kofi 2+ (FISH ), p53 0%, Ki67  59%.  08/28/2008: MastectomyL  partial, USguided  needle localization, with SNB: IDC, gr 3, tumor measures 2.6 cm in greatest dimension,  with necrosis seen, extending to original deep margin....additional deep margin adds 1 cm to final margin of excision, residual fibrocystic  mastopathy? 2 left axillary sentinel nodes: 0/2+ve  Dose dense Adriamycin/cyclophosphamide, followed by weekly Taxol administered between September 2008 and March 2009 .  Adriamycin discontinued after one cycle due to  anthracycline induced cardiomyopathy.  Followed by Radiation therapy     Malignant neoplasm of central portion of left female breast (CMS/HCC)   11/9/2016 Initial Diagnosis    Malignant neoplasm of central portion of left female breast     1/13/2020 - 8/11/2020 Chemotherapy    OP CENTRAL VENOUS ACCESS DEVICE ACCESS, CARE, AND MAINTENANCE (CVAD)     9/1/2021 -  Chemotherapy    OP CENTRAL VENOUS ACCESS DEVICE ACCESS, CARE, AND MAINTENANCE (CVAD)           PAST MEDICAL HISTORY:  ALLERGIES:  Allergies   Allergen Reactions    Adhesive Tape Hives     Latex Tape       CURRENT MEDICATIONS:  Outpatient Encounter Medications as of 9/1/2021   Medication Sig Dispense Refill    aspirin 81 MG EC tablet Take 81 mg by mouth daily.        Calcium Carb-Cholecalciferol (CALCIUM 600+D3) 600-200 MG-UNIT tablet Take 1 tablet by mouth 2 (Two) Times a Day.      Cholecalciferol (VITAMIN D3) 400 UNITS capsule Take 1 capsule by mouth Daily.      glipiZIDE (GLUCOTROL) 5 MG tablet Take 5 mg by mouth Daily.      ibuprofen (ADVIL,MOTRIN) 400 MG tablet Take 400 mg by mouth Every 6 (Six) Hours As Needed for mild pain (1-3).      losartan (COZAAR) 100 MG tablet Take 100 mg by mouth Daily.  1    magnesium 30 MG tablet Take 30 mg by mouth.      metoprolol succinate XL (TOPROL-XL) 50 MG 24 hr tablet Take 25 mg by mouth Daily.      omeprazole (priLOSEC) 20 MG capsule Take 20 mg by mouth Daily.      potassium chloride (K-DUR,KLOR-CON) 10 MEQ CR tablet Take 10 mEq by mouth Daily.      pravastatin (PRAVACHOL) 40 MG tablet Take 40 mg by mouth Daily.      triamterene-hydrochlorothiazide (MAXZIDE-25) 37.5-25 MG per tablet Take 0.5 tablets by mouth Daily.       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 Sabillon    PAP SMEAR  2010     HEALTH MAINTENANCE ITEMS:    Last Completed Mammogram         Status Date      MAMMOGRAM Done 8/25/2020 Ext Proc: CHG SCREENING DIGITAL BREAST TOMOSYNTHESIS BI     5 mm round focal asymmetry within the inferior right breast ; SPOT COMPRESSION VIEW NEGATIVE 8/26/2020.          FAMILY HISTORY:  Family History   Problem Relation Age of Onset    Cancer Mother     Heart disease Father     No Known Problems Daughter     No Known Problems Son     Hypertension Daughter     Hypertension Daughter     Other Brother         polioi    Cancer Paternal Aunt     No Known Problems Maternal Grandmother     No Known Problems Maternal Grandfather     No Known Problems Paternal Grandmother     No Known Problems Paternal Grandfather     Drug abuse Brother     Colon cancer Neg Hx     Colon polyps Neg Hx     Esophageal cancer Neg Hx     Liver cancer Neg Hx     Liver disease Neg Hx     Rectal cancer Neg Hx     Stomach cancer Neg Hx      SOCIAL HISTORY:  Social History     Socioeconomic History    Marital status: Single     Spouse name: Not on file    Number of  "children: Not on file    Years of education: Not on file    Highest education level: Not on file   Tobacco Use    Smoking status: Former Smoker     Types: Cigarettes     Quit date:      Years since quittin.7    Smokeless tobacco: Never Used   Substance and Sexual Activity    Alcohol use: Yes     Alcohol/week: 1.0 standard drinks     Types: 1 Cans of beer per week     Comment: Occasionally     Drug use: No    Sexual activity: Defer       REVIEW OF SYSTEMS:  Review of Systems   Constitutional: +activity change, +appetite change, \"low\".  Has had hoarseness and swallowing difficulties even with liquids for the past 3 mos or so.  +Fatigue.  No chills, diaphoresis, fever.   Lives alone.  2 children live nearby.  Manages the chores, but no longer runs errands and is not driving.  Is up and about \"all the time.\"  Says she no longer push mows her yard.  HENT: Negative for ear pain, nosebleeds, sinus pressure, sore throat and voice change.    Eyes: Negative for blurred vision, double vision, pain and visual disturbance.   Respiratory: +chronic hoarseness.  Negative for cough and shortness of breath.    Cardiovascular: Negative for chest pain, palpitations and leg swelling.   Gastrointestinal: Negative for abdominal pain, anal bleeding, blood in stool, constipation, diarrhea, nausea and vomiting.   Endocrine: Negative for heat intolerance, polydipsia and polyuria.   Genitourinary: Negative for dysuria, frequency, hematuria, urgency but wears pads/pull ups due to urinary incontinence.   Musculoskeletal: Positive for arthralgias (knees) but no myalgias.   Skin: Negative for rash and skin lesions.   Neurological: Negative for dizziness, tremors, seizures, syncope, speech difficulty, weakness and headache.   Hematological: Negative for adenopathy. Does not bruise/bleed easily.   Psychiatric/Behavioral: Negative for dysphoric mood, sleep disturbance, suicidal ideas and depressed mood.       /74   Pulse 85   Temp " "97.7 °F (36.5 °C) (Temporal)   Resp 18   Ht 160 cm (63\")   Wt 67.6 kg (149 lb 1.6 oz)   SpO2 94%   BMI 26.41 kg/m²          Physical Exam:  Physical Exam   Constitutional: She is oriented to person, place, and time. She is a visibly slimmer, modestly kept elderly female, appears well-developed and well-nourished.  ECOG 1  Has lost 41 lbs  HENT:   Head: Atraumatic.   Mouth/Throat: Mouth and oropharynx is clear and moist..  Multiple missing teeth.  Audible hoarseness.  Eyes: Pupils are equal, round, and reactive to light. No scleral icterus.   Neck: Trachea normal. + Palpable adenopathy right neck at level 2.  Cardiovascular: Normal rate, regular rhythm and normal pulses. Exam reveals no gallop and no friction rub.   No murmur heard.  Pulmonary/Chest: Effort normal and breath sounds normal. She has no wheezes. She has no rhonchi. She has no rales. Port in the left upper chest is well-seated.  Breasts: Chaperoned exam:  Michael Mcintosh MA--right breast without masses, skin changes no nipple discharge.  Left breast mastectomy site is well-healed.  The site is depressed from scar retraction and thickness.  No obvious underlying nodularity.  There is radiation associated skin firmness of the entire breast but no pigmentation.  Abdominal: Soft. Normal appearance. There is no abdominal tenderness. There is no rebound and no guarding.   Lymphadenopathy:     She has + right cervical adenopathy.        Right: No supraclavicular adenopathy present.        Left: No supraclavicular adenopathy present.   Neurological: She is alert and oriented to person, place, and time. No sensory deficit.   Psychiatric: Judgment normal.       LABS    Lab Results - Last 18 Months   Lab Units 09/01/21  1101 02/04/21  1055 09/01/20  1215   HEMOGLOBIN g/dL 11.1* 11.9* 11.6*   HEMATOCRIT % 33.1* 36.7* 34.0   MCV fL 93.8 94.8 91.9   WBC 10*3/mm3 7.45 8.7 8.74   RDW % 13.2 13.0 12.8   MPV fL 9.2 9.0* 9.3   PLATELETS 10*3/mm3 354 326 347   IMM " GRAN % % 0.3  --  0.3   NEUTROS ABS 10*3/mm3 4.13 3.9 4.22   LYMPHS ABS 10*3/mm3 2.33 3.7 3.30*   MONOS ABS 10*3/mm3 0.73 1.10* 0.90   EOS ABS 10*3/mm3 0.20 0.00 0.25   BASOS ABS 10*3/mm3 0.04 0.00 0.04   IMMATURE GRANS (ABS) 10*3/mm3 0.02 0.0 0.03   NRBC /100 WBC 0.0  --  0.0       Lab Results - Last 18 Months   Lab Units 09/01/21  1101 08/18/21  1115 08/09/21  1326 07/22/21  1021 02/04/21  1055 11/03/20  1134 09/01/20  1215   GLUCOSE mg/dL 228* 166* 108 107 104 139* 220*   SODIUM mmol/L 138 140 141 140 139 140 138   POTASSIUM mmol/L 3.8 4.3 4.1 4.0 3.9 4.7 4.1   TOTAL CO2 mmol/L  --  27 26 25 28 29  --    CO2 mmol/L 26.0  --   --   --   --   --  27.0   CHLORIDE mmol/L 102 104 104 105 101 103 100   ANION GAP mmol/L 10.0 9 11 10 10 8 11.0   CREATININE mg/dL 1.16* 1.2* 1.6* 1.2* 0.9 1* 1.16*   BUN mg/dL 22 20 32* 28* 24* 25* 27*   BUN / CREAT RATIO  19.0  --   --   --   --   --  23.3   CALCIUM mg/dL 9.2 9.5 9.7 9.6 9.1 9.9 9.1   EGFR IF NONAFRICN AM mL/min/1.73 45* 43* 31* 43* >60 53* 45*   ALK PHOS U/L 87  --   --  78 83 90 80   TOTAL PROTEIN g/dL 7.5  --   --  7.8 7.7 7.9 7.7   ALT (SGPT) U/L 9  --   --  9 9 14 11   AST (SGOT) U/L 17  --   --  16 17 18 16   BILIRUBIN mg/dL 0.5  --   --  0.5 0.4 0.3 0.2   ALBUMIN g/dL 3.90  --   --  4.1 3.8 4.1 3.90   GLOBULIN gm/dL 3.6  --   --   --   --   --  3.8       ASSESSMENT  1.   Squamous cell carcinoma-proximal esophagus p16+  -6/12/2025-EGD: - Exophytic mass found in the larynx, not obstructing the airway. - Partially obstructing, rule out malignancy, esophageal tumor was found in the proximal esophagus. Biopsied. - Medium-sized hiatal hernia. - Normal stomach. - Two non-bleeding angiodysplastic lesions in the duodenum. Biopsied.  Final diagnosis: Small bowel biopsy: Fragments of duodenal mucosa with no significant pathologic abnormalities.  Proximal esophageal mass, biopsy: Involved by moderately differentiated squamous cell carcinoma.  Comment: Esophagus is involved by  invasive squamous cell carcinoma.  From this small biopsy alone, it is unclear whether or not this represents an esophageal primary, or if this is extension from the described laryngeal mass noted in the electronic medical record.  From this sample, the overlying squamous epithelium appears to be relatively uninvolved, suggesting that this is a mass that is a growing into the wall of the esophagus rather than primarily arising from the esophageal mucosa; however, sampling error is always possible, and clinical correlation is imperative.  As with most squamous cell carcinomas, there are no stains to further differentiate the site of origin; this requires correlation with endoscopy/laryngoscopy or other testing modalities.The tumor cells are positive for p40, supporting squamous subtype.  They are also positive for p16, whereas the overlying non-dysplastic squamous epithelium is negative.    2.   Left Breast Cancer diagnosed in 2008  Initial stage: AJCC TNM [id number], Stage IIA, ER - [place] -  , Her 2 kofi 2+, Fish positive.  TRIPLE NEGATIVE DISEASE  Treatment : Left Lumpectomy, adjuvant chemotherpay with Adriamycin Cytoxan x 1 cycle due to cardiomyopathy, Taxol weekly for 12 weeks and then adjuvant radiation therapy.   Disease status:   10/23/23-mammogram: BI-RADS Category 2: Benign.    3. Osteoporosis. Followed by pcp  - 8/2020, She continues on calcium. She will discuss plan with pcp  - 8/22/23- DEXA scan- Osteopenia.  10 year risk for major osteoporotic fracture is 17% and for hip fracture is 5.4%.   4.  Type 2 DM: on oral medications per pcp.    5.  GERD - on prilosec and controlled  6.  Anemia likely secondary to CKD stage III, substrates have been normal.    -- Hgb 10; MCV 90.9, 6/23/2025 (prior range;  Hgb 10-12.2; MCV 93.8- 97.4)   --Would be a candidate for GALLITO therapy if and when her hemoglobin < 10 and hematocrit < 30.  7.  Neutrophilic leukocytosis.  Likely reactive to #1  8.  Chronic kidney disease, stage  III  --GFR 53.4 mL/min, 6/16/25 (prior:  39-53)  9.   COPD. Followed by pulmonary- Dr. Salazar  -2/28/24- Follow-up pulmonary.  Plan: Follow-up 3 mo for repeat CT  -1/10/24- CT chest- Interval development of a 6 mL ground-glass nodule of the left lower lobe superior segment and several scattered 5 mm smaller ground-glass nodules of the right upper lobe, probably infectious or inflammatory nature.  However, follow-up CT chest in 3 months is recommended.   -5/29/24- CT chest-Stable pulmonary fibrosis. No acute cardiopulmonary findings. Interval resolution of the previously identified right upper lobe and left lower lobe ground-glass nodules. Atherosclerosis including coronary arteries. Cardiomegaly.   10.  Self directed    PLAN  1.  Apprised of labs, 6/16/25 -6/23/2025 hyperglycemia, depressed (stable) GFR otherwise stable CMP, WBC 14.1, seg 68, lymph 19, mild (stable) anemia, plat 407.  Normal CEA.  Normal CA 27-29    2.  EGD and biopsies noted above.  Possible laryngeal primary p16 positive squamous cell carcinoma with extension into the proximal esophagus.  Needs further clarification.  ENT appointment on 6/24/2025.    3.  Schedule mammogram, 10/24/2025    4.  Schedule PET scan neck to thighs    5...Continue to follow with pcp and other specialists  6.  Continue port flushes every 8 weeks  7.  Return in 6 weeks for follow up and preoffice cbc, cmp and cea, ca 27-29.    I spent ~43 minutes caring for Lorena on this date of service. This time includes time spent by me in the following activities: preparing for the visit, reviewing tests, performing a medically appropriate examination and/or evaluation, counseling and educating the patient/family/caregiver, ordering medications, tests, or procedures and documenting information in the medical record.

## 2025-06-18 ENCOUNTER — OFFICE VISIT (OUTPATIENT)
Dept: PULMONOLOGY | Age: 85
End: 2025-06-18
Payer: MEDICARE

## 2025-06-18 ENCOUNTER — OFFICE VISIT (OUTPATIENT)
Dept: GASTROENTEROLOGY | Facility: CLINIC | Age: 85
End: 2025-06-18
Payer: MEDICARE

## 2025-06-18 VITALS
OXYGEN SATURATION: 96 % | HEIGHT: 66 IN | HEART RATE: 77 BPM | BODY MASS INDEX: 24.62 KG/M2 | TEMPERATURE: 97.8 F | DIASTOLIC BLOOD PRESSURE: 79 MMHG | SYSTOLIC BLOOD PRESSURE: 122 MMHG | WEIGHT: 153.2 LBS

## 2025-06-18 VITALS
SYSTOLIC BLOOD PRESSURE: 134 MMHG | DIASTOLIC BLOOD PRESSURE: 84 MMHG | BODY MASS INDEX: 27.46 KG/M2 | WEIGHT: 155 LBS | HEART RATE: 87 BPM | HEIGHT: 63 IN | OXYGEN SATURATION: 98 % | TEMPERATURE: 97.7 F

## 2025-06-18 DIAGNOSIS — R13.10 DYSPHAGIA, UNSPECIFIED TYPE: ICD-10-CM

## 2025-06-18 DIAGNOSIS — J43.1 PANLOBULAR EMPHYSEMA (HCC): ICD-10-CM

## 2025-06-18 DIAGNOSIS — I51.89 DIASTOLIC DYSFUNCTION: ICD-10-CM

## 2025-06-18 DIAGNOSIS — C15.9 SQUAMOUS CELL ESOPHAGEAL CANCER: Primary | ICD-10-CM

## 2025-06-18 DIAGNOSIS — R06.09 DOE (DYSPNEA ON EXERTION): ICD-10-CM

## 2025-06-18 DIAGNOSIS — I11.9 RIGHT VENTRICULAR (RV) HYPERTENSION: ICD-10-CM

## 2025-06-18 DIAGNOSIS — J84.10 PULMONARY FIBROSIS (HCC): Primary | ICD-10-CM

## 2025-06-18 PROCEDURE — 99214 OFFICE O/P EST MOD 30 MIN: CPT | Performed by: INTERNAL MEDICINE

## 2025-06-18 PROCEDURE — 99213 OFFICE O/P EST LOW 20 MIN: CPT | Performed by: NURSE PRACTITIONER

## 2025-06-18 PROCEDURE — 1160F RVW MEDS BY RX/DR IN RCRD: CPT | Performed by: INTERNAL MEDICINE

## 2025-06-18 PROCEDURE — 1159F MED LIST DOCD IN RCRD: CPT | Performed by: INTERNAL MEDICINE

## 2025-06-18 PROCEDURE — G8420 CALC BMI NORM PARAMETERS: HCPCS | Performed by: NURSE PRACTITIONER

## 2025-06-18 PROCEDURE — G8427 DOCREV CUR MEDS BY ELIG CLIN: HCPCS | Performed by: NURSE PRACTITIONER

## 2025-06-18 PROCEDURE — G8399 PT W/DXA RESULTS DOCUMENT: HCPCS | Performed by: NURSE PRACTITIONER

## 2025-06-18 PROCEDURE — 3074F SYST BP LT 130 MM HG: CPT | Performed by: NURSE PRACTITIONER

## 2025-06-18 PROCEDURE — 1090F PRES/ABSN URINE INCON ASSESS: CPT | Performed by: NURSE PRACTITIONER

## 2025-06-18 PROCEDURE — 3078F DIAST BP <80 MM HG: CPT | Performed by: NURSE PRACTITIONER

## 2025-06-18 PROCEDURE — 1123F ACP DISCUSS/DSCN MKR DOCD: CPT | Performed by: NURSE PRACTITIONER

## 2025-06-18 PROCEDURE — 3023F SPIROM DOC REV: CPT | Performed by: NURSE PRACTITIONER

## 2025-06-18 PROCEDURE — 1036F TOBACCO NON-USER: CPT | Performed by: NURSE PRACTITIONER

## 2025-06-18 PROCEDURE — 1159F MED LIST DOCD IN RCRD: CPT | Performed by: NURSE PRACTITIONER

## 2025-06-18 ASSESSMENT — ENCOUNTER SYMPTOMS
ALLERGIC/IMMUNOLOGIC NEGATIVE: 1
GASTROINTESTINAL NEGATIVE: 1
RESPIRATORY NEGATIVE: 1
EYES NEGATIVE: 1
TROUBLE SWALLOWING: 1

## 2025-06-18 NOTE — LETTER
June 18, 2025     Luly Diez MD  [address] [place] [address] [address]  [place] [address]    Patient: Lorena Valdes   YOB: 1940   Date of Visit: 6/18/2025     Dear Luly Diez MD:       Thank you for referring Lorena Valdes to me for evaluation. Below are the relevant portions of my assessment and plan of care.    If you have questions, please do not hesitate to call me. I look forward to following Lorena along with you.         Sincerely,        Jennifer Bee MD        CC: MD Rohit Tang Pamela A, MD  06/18/25 [id number]  Sign when Signing Visit  Primary Physician: Luly Diez MD    Chief Complaint   Patient presents with   • Follow-up     Pt presents today for path follow up-had endo 6/12/2025       Subjective    Lorena Valdes is a 84 y.o. female.    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  Endoscopy showed a mass in the laryngeal area on the right side prior to entering the esophagus.  This mass was seen extending down into the proximal esophagus.  Biopsies confirm squamous cell carcinoma.  Pathology unable to determine primary site.  Cont to have same complaints and is noticing increasing soreness and problems swallowing.  Here to review path.  Labs from last week reviewed--persistent anemia.  CEA normal.  Small bowel bxs normal.      Past Medical History:   Diagnosis Date   • Bladder disorder    • Chronic kidney disease, stage 3b    • Diverticulosis    • Emphysema lung    • Fibrocystic disease of breast    • CONNIE (generalized anxiety disorder)    • GERD (gastroesophageal reflux disease)    • Heart disease    • History of bone density study 2011   • History of colon polyps    • Hyperglycemia    • Hyperlipidemia    • Hypotension    • Left ventricular diastolic dysfunction    • Malignant neoplasm of central portion of left  female breast 11/09/2016   • Osteopenia 08/29/2017   • Restrictive lung disease    • Type 2 diabetes mellitus    • Uterine prolapse        Past Surgical History:   Procedure Laterality Date   • BREAST LUMPECTOMY  2008   • CATARACT EXTRACTION Right 2021   • COLONOSCOPY  09/16/2010    Diverticulosis; Repeat 10 years   • COLONOSCOPY N/A 11/11/2020    One 6mm inflamed hyperplastic polyp in the cecum; Diverticulosis in the left colon; Non-bleeding internal hemorrhoids; Repeat 5 years   • ENDOSCOPY N/A 06/12/2025    Exophytic mass found in the larynx, not obstructing the airway; Partially obstructing, rule out malignancy, esophageal tumor was found in the proximal esophagus-biopsied; Medium-sized HH; Normal stomach; Two non-bleeding angiodysplastic lesions in the duodenum-biopsied   • MAMMO BILATERAL  07/17/2013    Dr. Sabillon   • MASTECTOMY Left 08/28/2008   • PAP SMEAR  2010   • SKIN CANCER EXCISION          Current Outpatient Medications:   •  albuterol sulfate  (90 Base) MCG/ACT inhaler, Inhale 2 puffs As Needed., Disp: , Rfl:   •  amLODIPine (NORVASC) 2.5 MG tablet, Take 1 tablet by mouth Daily., Disp: , Rfl:   •  aspirin 81 MG EC tablet, Take 81 mg by mouth daily.  , Disp: , Rfl:   •  Calcium Carb-Cholecalciferol 600-200 MG-UNIT tablet, Take 1 tablet by mouth 2 (Two) Times a Day., Disp: , Rfl:   •  carvedilol (COREG) 12.5 MG tablet, Take 1 tablet by mouth 2 (Two) Times a Day., Disp: 60 tablet, Rfl: 3  •  Cholecalciferol (VITAMIN D3) 400 UNITS capsule, Take 1 capsule by mouth Daily., Disp: , Rfl:   •  famotidine (PEPCID) 20 MG tablet, Take 1 tablet by mouth Daily., Disp: , Rfl:   •  glipiZIDE (GLUCOTROL) 5 MG tablet, Take 1 tablet by mouth Daily., Disp: , Rfl:   •  ibuprofen (ADVIL,MOTRIN) 400 MG tablet, Take 1 tablet by mouth Every 6 (Six) Hours As Needed for Mild Pain., Disp: , Rfl:   •  losartan (COZAAR) 100 MG tablet, Take 1 tablet by mouth Daily., Disp: , Rfl: 1  •  magnesium 30 MG tablet, Take 1 tablet by  "mouth., Disp: , Rfl:   •  metFORMIN ER (GLUCOPHAGE-XR) 500 MG 24 hr tablet, Take 1 tablet by mouth Every Night., Disp: , Rfl:   •  pravastatin (PRAVACHOL) 40 MG tablet, Take 1 tablet by mouth Daily., Disp: , Rfl:   •  tiotropium bromide-olodaterol (STIOLTO RESPIMAT) 2.5-2.5 MCG/ACT aerosol solution inhaler, Inhale 2 puffs As Needed., Disp: , Rfl:     Allergies   Allergen Reactions   • Adhesive Tape Hives     Latex Tape       Social History     Socioeconomic History   • Marital status: Single   Tobacco Use   • Smoking status: Former     Current packs/day: 0.00     Types: Cigarettes     Quit date:      Years since quittin.4   • Smokeless tobacco: Never   Vaping Use   • Vaping status: Never Used   Substance and Sexual Activity   • Alcohol use: Yes     Alcohol/week: 1.0 standard drink of alcohol     Types: 1 Cans of beer per week     Comment: Occasionally    • Drug use: No   • Sexual activity: Defer       Family History   Problem Relation Age of Onset   • Cancer Mother    • Heart disease Father    • No Known Problems Daughter    • No Known Problems Son    • Hypertension Daughter    • Hypertension Daughter    • Other Brother         polioi   • Cancer Paternal Aunt    • No Known Problems Maternal Grandmother    • No Known Problems Maternal Grandfather    • No Known Problems Paternal Grandmother    • No Known Problems Paternal Grandfather    • Drug abuse Brother    • Colon cancer Neg Hx    • Colon polyps Neg Hx    • Esophageal cancer Neg Hx    • Liver cancer Neg Hx    • Liver disease Neg Hx    • Rectal cancer Neg Hx    • Stomach cancer Neg Hx        Review of Systems   Constitutional:  Positive for unexpected weight change.   HENT:  Positive for trouble swallowing.        Objective    /84 (BP Location: Left arm, Patient Position: Sitting, Cuff Size: Adult)   Pulse 87   Temp 97.7 °F (36.5 °C) (Infrared)   Ht 160 cm (63\")   Wt 70.3 kg (155 lb)   SpO2 98%   Breastfeeding No   BMI 27.46 kg/m²     Physical " Exam  Constitutional:       Appearance: She is well-developed.   Pulmonary:      Effort: Pulmonary effort is normal.   Musculoskeletal:         General: Normal range of motion.   Skin:     General: Skin is warm.   Neurological:      Mental Status: She is alert and oriented to person, place, and time.   Psychiatric:         Behavior: Behavior normal.         Lab Results - Last 18 Months   Lab Units 06/16/25  1136 03/28/25  1155 01/17/25  1206 10/18/24  1219 07/12/24  1105 05/28/24  1059   GLUCOSE mg/dL 138* 103* 104* 122* 137* 151*   BUN mg/dL 18.4 26* 23 23 22 20   CREATININE mg/dL 1.04* 1.0* 1.2* 1.2* 1.3* 1.27*   SODIUM mmol/L 138 141 138 138 144 140   POTASSIUM mmol/L 3.8 3.7 4.1 3.6 4.5 4.1   CHLORIDE mmol/L 99 104 98 99 106 104   TOTAL CO2 mmol/L  --  27 26 26 22  --    CO2 mmol/L 26.0  --   --   --   --  27.0   TOTAL PROTEIN g/dL 8.0 7.7  --  8.2 7.6 7.7   ALBUMIN g/dL 3.3* 3.6  --  4.0 3.8 3.8   ALT (SGPT) U/L <5 8*  --  7 8 7   AST (SGOT) U/L 11 16  --  14 14 13   ALK PHOS U/L 55 69  --  90 74 73   BILIRUBIN mg/dL 0.4 0.3  --  0.5 0.4 0.4   GLOBULIN gm/dL 4.7  --   --   --   --  3.9       Lab Results - Last 18 Months   Lab Units 06/16/25  1136 03/28/25  1155 10/18/24  1219 07/12/24  1105 05/28/24  1059 03/07/24  0943   HEMOGLOBIN g/dL 10.4* 10* 11.9* 10.7* 10.8* 11.4*   HEMATOCRIT % 31.5* 31.5* 37.2 32.9* 33.1* 35.2*   MCV fL 88.0 94.3 96.9 95.9 93.8 94.6   WBC 10*3/mm3 12.36* 10.6 11.3* 11.4* 9.77 9.4   RDW % 12.6 13.3 13.1 13.3 13.3 13.5   MPV fL 8.8 9.4 9.6 9.6 9.5 9.8   PLATELETS 10*3/mm3 465* 469* 410* 357 334 350       Lab Results - Last 18 Months   Lab Units 03/28/25  1155 10/18/24  1219 03/07/24  0943   TSH uIU/mL 3.61 4.400* 4.720*   VIT D 25 HYDROXY ng/mL 32.9  --  34.9       [facility] Reference Range & Units 09/01/20 12:15 09/01/21 11:01 05/13/22 12:25 05/30/23 11:11 05/28/24 10:59 06/16/25 11:36   CA 27.29 0.0 - 38.6 U/mL 39.1 (H) 25.1 27.3 21.2 28.0 24.7   CEA ng/mL 2.94 2.76 3.07 2.80 2.47 1.81    (H): Data is abnormally high    IMPRESSION/PLAN:    Assessment & Plan     Problem List Items Addressed This Visit          Hematology and Neoplasia    Squamous cell esophageal cancer - Primary    Overview   Dysphagia upper to mid esophagus worse with meats/breads.  She has had several instances where the food got stuck for quite some time not going up or down.  The difficulty swallowing has led her to not eat as much which has in turn led to weight loss.    Endoscopy 6/2025 shows a mass in the right laryngeal area which extends down into the esophagus.  Biopsies from the esophagus show squamous cell carcinoma.  Pathology cannot determine if this is primary head neck versus esophageal.    Final Diagnosis   1.  Small bowel, biopsy:               - Fragments of duodenal mucosa with no significant pathologic abnormalities.     2.  Proximal esophageal mass, biopsy:               - Involved by moderately differentiated squamous cell carcinoma.               - SEE COMMENT.   Electronically signed by Ellen Sunshine MD on 6/13/2025 at 1520 CDT   Comment    Part 2:  The esophagus is involved by invasive squamous cell carcinoma.  From this small biopsy alone, it is unclear whether or not this represents an esophageal primary, or if this is extension from the described laryngeal mass noted in the electronic medical record.  From this sample, the overlying squamous epithelium appears to be relatively uninvolved, suggesting that this is a mass that is a growing into the wall of the esophagus rather than primarily arising from the esophageal mucosa; however, sampling error is always possible, and clinical correlation is imperative.  As with most squamous cell carcinomas, there are no stains to further differentiate the site of origin; this requires correlation with endoscopy/laryngoscopy or other testing modalities.     The tumor cells are positive for p40, supporting squamous subtype.  They are also positive for p16, whereas  the overlying non-dysplastic squamous epithelium is negative.     Recommend referral to ENT.  She already sees Dr. Canas and has an appt next week.  Suspect that she will benefit from XRT.          MEDICAL COMPLEXITY must have 2 out of 3   Moderate Complexity Level 4                                                                                                              1 of the following medical problems:                                                                                               []One chronic illness with mild exacerbation                                                                                []Two or more stable chronic illness                                                                                              [x]One new problem  []One acute illness with systemic symptoms     Complexity of Data  Reviewed (1 out of the 3 following categories)                                             Category 1 tests, documents, historian (must have 3 points)                                                      []Review of prior external records  [x]Review of results of unique tests  []Ordering unique tests   []Assessment requires an independent historian   Category 2 Interpretation of tests     []Independent interpretation of test read by another doc   Category 3 Discuss Management/tests  []Discussion with external physician     Risk of complications and/or morbidity                                                                                           []Prescription Drug Management     []Decision for elective endoscopic procedure            Jennifer Bee MD  06/18/25  15:39 CDT    Part of this note may be an electronic transcription/translation of spoken language to printed text.

## 2025-06-18 NOTE — PROGRESS NOTES
Pulmonary and Sleep Medicine    Xiomara Gonzalez (:  1940) is a 84 y.o. female,Established patient, here for evaluation of the following chief complaint(s):  6 Month Follow-Up (Pulmonary fibrosis )      Referring physician:  No referring provider defined for this encounter.     ASSESSMENT/PLAN:  1. Pulmonary fibrosis (HCC)  2. READ (dyspnea on exertion)  3. Panlobular emphysema (HCC)  4. Diastolic dysfunction  5. Right ventricular (RV) hypertension  6. Dysphagia, unspecified type        Continue inhalers as prescribed. Keep follow up with GI to discuss dysphagia.        No follow-ups on file.    SUBJECTIVE/OBJECTIVE:        HPI    Patient presents for follow up for pulmonary fibrosis. She is doing well. She does not use inhalers daily, only when needed. She has been seeing GI for problems swallowing. She recently underwent EGD and has follow up today.     Continue the following medications as reported by the patient:    Prior to Visit Medications    Medication Sig Taking? Authorizing Provider   metFORMIN (GLUCOPHAGE-XR) 500 MG extended release tablet Take 1 tablet by mouth nightly Yes Tequila Quintanilla MD   amLODIPine (NORVASC) 2.5 MG tablet Take 1 tablet by mouth daily Yes Tequila Quintanilla MD   carvedilol (COREG) 12.5 MG tablet Take 1 tablet by mouth 2 times daily Yes Tequila Quintanilla MD   losartan (COZAAR) 25 MG tablet Take 1 tablet by mouth daily Yes Tequila Quintanilla MD   famotidine (PEPCID) 40 MG tablet Take 1 tablet by mouth nightly Yes Tequila Quintanilla MD   pravastatin (PRAVACHOL) 40 MG tablet Take 1 tablet by mouth once daily Yes Tequila Quintanilla MD   hydroCHLOROthiazide 12.5 MG capsule Take 1 capsule by mouth every morning Yes Tequila Quintanilla MD   fluticasone (FLONASE) 50 MCG/ACT nasal spray 2 sprays by Each Nostril route daily Yes Juan Tay MD   albuterol sulfate HFA (VENTOLIN HFA) 108 (90 Base) MCG/ACT inhaler Inhale 2 puffs into the lungs 4 times daily as needed for Wheezing Yes Maggi

## 2025-06-18 NOTE — LETTER
June 18, 2025     Luly Diez MD  [address] [place] [address] [address]  [place] [address]    Patient: Lorena Valdes   YOB: 1940   Date of Visit: 6/18/2025     Dear Luly Diez MD:       Thank you for referring Lorena Valdes to me for evaluation. Below are the relevant portions of my assessment and plan of care.    If you have questions, please do not hesitate to call me. I look forward to following Lorena along with you.         Sincerely,        Jennifer Bee MD        CC: No Recipients    Jennifer Bee MD  06/18/25 8720  Sign when Signing Visit  Primary Physician: Luly Diez MD    Chief Complaint   Patient presents with   • Follow-up     Pt presents today for path follow up-had endo 6/12/2025       Subjective    Lorena Valdes is a 84 y.o. female.    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  Endoscopy showed a mass in the laryngeal area on the right side prior to entering the esophagus.  This mass was seen extending down into the proximal esophagus.  Biopsies confirm squamous cell carcinoma.  Pathology unable to determine primary site.  Cont to have same complaints and is noticing increasing soreness and problems swallowing.  Here to review path.  Labs from last week reviewed--persistent anemia.  CEA normal.  Small bowel bxs normal.      Past Medical History:   Diagnosis Date   • Bladder disorder    • Chronic kidney disease, stage 3b    • Diverticulosis    • Emphysema lung    • Fibrocystic disease of breast    • CONNIE (generalized anxiety disorder)    • GERD (gastroesophageal reflux disease)    • Heart disease    • History of bone density study 2011   • History of colon polyps    • Hyperglycemia    • Hyperlipidemia    • Hypotension    • Left ventricular diastolic dysfunction    • Malignant neoplasm of central portion of left female  breast 11/09/2016   • Osteopenia 08/29/2017   • Restrictive lung disease    • Type 2 diabetes mellitus    • Uterine prolapse        Past Surgical History:   Procedure Laterality Date   • BREAST LUMPECTOMY  2008   • CATARACT EXTRACTION Right 2021   • COLONOSCOPY  09/16/2010    Diverticulosis; Repeat 10 years   • COLONOSCOPY N/A 11/11/2020    One 6mm inflamed hyperplastic polyp in the cecum; Diverticulosis in the left colon; Non-bleeding internal hemorrhoids; Repeat 5 years   • ENDOSCOPY N/A 06/12/2025    Exophytic mass found in the larynx, not obstructing the airway; Partially obstructing, rule out malignancy, esophageal tumor was found in the proximal esophagus-biopsied; Medium-sized HH; Normal stomach; Two non-bleeding angiodysplastic lesions in the duodenum-biopsied   • MAMMO BILATERAL  07/17/2013    Dr. Sabillon   • MASTECTOMY Left 08/28/2008   • PAP SMEAR  2010   • SKIN CANCER EXCISION          Current Outpatient Medications:   •  albuterol sulfate  (90 Base) MCG/ACT inhaler, Inhale 2 puffs As Needed., Disp: , Rfl:   •  amLODIPine (NORVASC) 2.5 MG tablet, Take 1 tablet by mouth Daily., Disp: , Rfl:   •  aspirin 81 MG EC tablet, Take 81 mg by mouth daily.  , Disp: , Rfl:   •  Calcium Carb-Cholecalciferol 600-200 MG-UNIT tablet, Take 1 tablet by mouth 2 (Two) Times a Day., Disp: , Rfl:   •  carvedilol (COREG) 12.5 MG tablet, Take 1 tablet by mouth 2 (Two) Times a Day., Disp: 60 tablet, Rfl: 3  •  Cholecalciferol (VITAMIN D3) 400 UNITS capsule, Take 1 capsule by mouth Daily., Disp: , Rfl:   •  famotidine (PEPCID) 20 MG tablet, Take 1 tablet by mouth Daily., Disp: , Rfl:   •  glipiZIDE (GLUCOTROL) 5 MG tablet, Take 1 tablet by mouth Daily., Disp: , Rfl:   •  ibuprofen (ADVIL,MOTRIN) 400 MG tablet, Take 1 tablet by mouth Every 6 (Six) Hours As Needed for Mild Pain., Disp: , Rfl:   •  losartan (COZAAR) 100 MG tablet, Take 1 tablet by mouth Daily., Disp: , Rfl: 1  •  magnesium 30 MG tablet, Take 1 tablet by  "mouth., Disp: , Rfl:   •  metFORMIN ER (GLUCOPHAGE-XR) 500 MG 24 hr tablet, Take 1 tablet by mouth Every Night., Disp: , Rfl:   •  pravastatin (PRAVACHOL) 40 MG tablet, Take 1 tablet by mouth Daily., Disp: , Rfl:   •  tiotropium bromide-olodaterol (STIOLTO RESPIMAT) 2.5-2.5 MCG/ACT aerosol solution inhaler, Inhale 2 puffs As Needed., Disp: , Rfl:     Allergies   Allergen Reactions   • Adhesive Tape Hives     Latex Tape       Social History     Socioeconomic History   • Marital status: Single   Tobacco Use   • Smoking status: Former     Current packs/day: 0.00     Types: Cigarettes     Quit date:      Years since quittin.4   • Smokeless tobacco: Never   Vaping Use   • Vaping status: Never Used   Substance and Sexual Activity   • Alcohol use: Yes     Alcohol/week: 1.0 standard drink of alcohol     Types: 1 Cans of beer per week     Comment: Occasionally    • Drug use: No   • Sexual activity: Defer       Family History   Problem Relation Age of Onset   • Cancer Mother    • Heart disease Father    • No Known Problems Daughter    • No Known Problems Son    • Hypertension Daughter    • Hypertension Daughter    • Other Brother         polioi   • Cancer Paternal Aunt    • No Known Problems Maternal Grandmother    • No Known Problems Maternal Grandfather    • No Known Problems Paternal Grandmother    • No Known Problems Paternal Grandfather    • Drug abuse Brother    • Colon cancer Neg Hx    • Colon polyps Neg Hx    • Esophageal cancer Neg Hx    • Liver cancer Neg Hx    • Liver disease Neg Hx    • Rectal cancer Neg Hx    • Stomach cancer Neg Hx        Review of Systems   Constitutional:  Positive for unexpected weight change.   HENT:  Positive for trouble swallowing.        Objective    /84 (BP Location: Left arm, Patient Position: Sitting, Cuff Size: Adult)   Pulse 87   Temp 97.7 °F (36.5 °C) (Infrared)   Ht 160 cm (63\")   Wt 70.3 kg (155 lb)   SpO2 98%   Breastfeeding No   BMI 27.46 kg/m²     Physical " Exam  Constitutional:       Appearance: She is well-developed.   Pulmonary:      Effort: Pulmonary effort is normal.   Musculoskeletal:         General: Normal range of motion.   Skin:     General: Skin is warm.   Neurological:      Mental Status: She is alert and oriented to person, place, and time.   Psychiatric:         Behavior: Behavior normal.         Lab Results - Last 18 Months   Lab Units 06/16/25  1136 03/28/25  1155 01/17/25  1206 10/18/24  1219 07/12/24  1105 05/28/24  1059   GLUCOSE mg/dL 138* 103* 104* 122* 137* 151*   BUN mg/dL 18.4 26* 23 23 22 20   CREATININE mg/dL 1.04* 1.0* 1.2* 1.2* 1.3* 1.27*   SODIUM mmol/L 138 141 138 138 144 140   POTASSIUM mmol/L 3.8 3.7 4.1 3.6 4.5 4.1   CHLORIDE mmol/L 99 104 98 99 106 104   TOTAL CO2 mmol/L  --  27 26 26 22  --    CO2 mmol/L 26.0  --   --   --   --  27.0   TOTAL PROTEIN g/dL 8.0 7.7  --  8.2 7.6 7.7   ALBUMIN g/dL 3.3* 3.6  --  4.0 3.8 3.8   ALT (SGPT) U/L <5 8*  --  7 8 7   AST (SGOT) U/L 11 16  --  14 14 13   ALK PHOS U/L 55 69  --  90 74 73   BILIRUBIN mg/dL 0.4 0.3  --  0.5 0.4 0.4   GLOBULIN gm/dL 4.7  --   --   --   --  3.9       Lab Results - Last 18 Months   Lab Units 06/16/25  1136 03/28/25  1155 10/18/24  1219 07/12/24  1105 05/28/24  1059 03/07/24  0943   HEMOGLOBIN g/dL 10.4* 10* 11.9* 10.7* 10.8* 11.4*   HEMATOCRIT % 31.5* 31.5* 37.2 32.9* 33.1* 35.2*   MCV fL 88.0 94.3 96.9 95.9 93.8 94.6   WBC 10*3/mm3 12.36* 10.6 11.3* 11.4* 9.77 9.4   RDW % 12.6 13.3 13.1 13.3 13.3 13.5   MPV fL 8.8 9.4 9.6 9.6 9.5 9.8   PLATELETS 10*3/mm3 465* 469* 410* 357 334 350       Lab Results - Last 18 Months   Lab Units 03/28/25  1155 10/18/24  1219 03/07/24  0943   TSH uIU/mL 3.61 4.400* 4.720*   VIT D 25 HYDROXY ng/mL 32.9  --  34.9       [facility] Reference Range & Units 09/01/20 12:15 09/01/21 11:01 05/13/22 12:25 05/30/23 11:11 05/28/24 10:59 06/16/25 11:36   CA 27.29 0.0 - 38.6 U/mL 39.1 (H) 25.1 27.3 21.2 28.0 24.7   CEA ng/mL 2.94 2.76 3.07 2.80 2.47 1.81    (H): Data is abnormally high    IMPRESSION/PLAN:    Assessment & Plan     Problem List Items Addressed This Visit          Hematology and Neoplasia    Squamous cell esophageal cancer - Primary    Overview   Dysphagia upper to mid esophagus worse with meats/breads.  She has had several instances where the food got stuck for quite some time not going up or down.  The difficulty swallowing has led her to not eat as much which has in turn led to weight loss.    Endoscopy 6/2025 shows a mass in the right laryngeal area which extends down into the esophagus.  Biopsies from the esophagus show squamous cell carcinoma.  Pathology cannot determine if this is primary head neck versus esophageal.    Final Diagnosis   1.  Small bowel, biopsy:               - Fragments of duodenal mucosa with no significant pathologic abnormalities.     2.  Proximal esophageal mass, biopsy:               - Involved by moderately differentiated squamous cell carcinoma.               - SEE COMMENT.   Electronically signed by Ellen Sunshine MD on 6/13/2025 at 1520 CDT   Comment    Part 2:  The esophagus is involved by invasive squamous cell carcinoma.  From this small biopsy alone, it is unclear whether or not this represents an esophageal primary, or if this is extension from the described laryngeal mass noted in the electronic medical record.  From this sample, the overlying squamous epithelium appears to be relatively uninvolved, suggesting that this is a mass that is a growing into the wall of the esophagus rather than primarily arising from the esophageal mucosa; however, sampling error is always possible, and clinical correlation is imperative.  As with most squamous cell carcinomas, there are no stains to further differentiate the site of origin; this requires correlation with endoscopy/laryngoscopy or other testing modalities.     The tumor cells are positive for p40, supporting squamous subtype.  They are also positive for p16, whereas  the overlying non-dysplastic squamous epithelium is negative.     Recommend referral to ENT.  She already sees Dr. Canas and has an appt next week.  Suspect that she will benefit from XRT.          MEDICAL COMPLEXITY must have 2 out of 3   Moderate Complexity Level 4                                                                                                              1 of the following medical problems:                                                                                               []One chronic illness with mild exacerbation                                                                                []Two or more stable chronic illness                                                                                              [x]One new problem  []One acute illness with systemic symptoms     Complexity of Data  Reviewed (1 out of the 3 following categories)                                             Category 1 tests, documents, historian (must have 3 points)                                                      []Review of prior external records  [x]Review of results of unique tests  []Ordering unique tests   []Assessment requires an independent historian   Category 2 Interpretation of tests     []Independent interpretation of test read by another doc   Category 3 Discuss Management/tests  []Discussion with external physician     Risk of complications and/or morbidity                                                                                           []Prescription Drug Management     []Decision for elective endoscopic procedure            Jennifer Bee MD  06/18/25  15:39 CDT    Part of this note may be an electronic transcription/translation of spoken language to printed text.

## 2025-06-19 PROBLEM — C15.9 SQUAMOUS CELL ESOPHAGEAL CANCER (HCC): Status: ACTIVE | Noted: 2025-06-19

## 2025-06-23 ENCOUNTER — OFFICE VISIT (OUTPATIENT)
Dept: ONCOLOGY | Facility: CLINIC | Age: 85
End: 2025-06-23
Payer: MEDICARE

## 2025-06-23 ENCOUNTER — LAB (OUTPATIENT)
Dept: LAB | Facility: HOSPITAL | Age: 85
End: 2025-06-23
Payer: MEDICARE

## 2025-06-23 VITALS
TEMPERATURE: 97.7 F | HEIGHT: 63 IN | HEART RATE: 85 BPM | WEIGHT: 149.1 LBS | SYSTOLIC BLOOD PRESSURE: 146 MMHG | BODY MASS INDEX: 26.42 KG/M2 | DIASTOLIC BLOOD PRESSURE: 74 MMHG | OXYGEN SATURATION: 94 % | RESPIRATION RATE: 18 BRPM

## 2025-06-23 DIAGNOSIS — Z12.31 ENCOUNTER FOR SCREENING MAMMOGRAM FOR MALIGNANT NEOPLASM OF BREAST: ICD-10-CM

## 2025-06-23 DIAGNOSIS — C50.112 MALIGNANT NEOPLASM OF CENTRAL PORTION OF LEFT BREAST IN FEMALE, ESTROGEN RECEPTOR NEGATIVE: Primary | ICD-10-CM

## 2025-06-23 DIAGNOSIS — Z17.1 MALIGNANT NEOPLASM OF CENTRAL PORTION OF LEFT BREAST IN FEMALE, ESTROGEN RECEPTOR NEGATIVE: Primary | ICD-10-CM

## 2025-06-23 LAB
BASOPHILS # BLD AUTO: 0.05 10*3/MM3 (ref 0–0.2)
BASOPHILS NFR BLD AUTO: 0.4 % (ref 0–1.5)
DEPRECATED RDW RBC AUTO: 41.6 FL (ref 37–54)
EOSINOPHIL # BLD AUTO: 0.03 10*3/MM3 (ref 0–0.4)
EOSINOPHIL NFR BLD AUTO: 0.2 % (ref 0.3–6.2)
ERYTHROCYTE [DISTWIDTH] IN BLOOD BY AUTOMATED COUNT: 12.5 % (ref 12.3–15.4)
HCT VFR BLD AUTO: 31 % (ref 34–46.6)
HGB BLD-MCNC: 10 G/DL (ref 12–15.9)
HOLD SPECIMEN: NORMAL
IMM GRANULOCYTES # BLD AUTO: 0.06 10*3/MM3 (ref 0–0.05)
IMM GRANULOCYTES NFR BLD AUTO: 0.4 % (ref 0–0.5)
LYMPHOCYTES # BLD AUTO: 2.71 10*3/MM3 (ref 0.7–3.1)
LYMPHOCYTES NFR BLD AUTO: 19.2 % (ref 19.6–45.3)
MCH RBC QN AUTO: 29.3 PG (ref 26.6–33)
MCHC RBC AUTO-ENTMCNC: 32.3 G/DL (ref 31.5–35.7)
MCV RBC AUTO: 90.9 FL (ref 79–97)
MONOCYTES # BLD AUTO: 1.55 10*3/MM3 (ref 0.1–0.9)
MONOCYTES NFR BLD AUTO: 11 % (ref 5–12)
NEUTROPHILS NFR BLD AUTO: 68.8 % (ref 42.7–76)
NEUTROPHILS NFR BLD AUTO: 9.75 10*3/MM3 (ref 1.7–7)
NRBC BLD AUTO-RTO: 0 /100 WBC (ref 0–0.2)
PLATELET # BLD AUTO: 407 10*3/MM3 (ref 140–450)
PMV BLD AUTO: 9.1 FL (ref 6–12)
RBC # BLD AUTO: 3.41 10*6/MM3 (ref 3.77–5.28)
WBC NRBC COR # BLD AUTO: 14.15 10*3/MM3 (ref 3.4–10.8)

## 2025-06-23 PROCEDURE — 99215 OFFICE O/P EST HI 40 MIN: CPT | Performed by: INTERNAL MEDICINE

## 2025-06-23 PROCEDURE — 1159F MED LIST DOCD IN RCRD: CPT | Performed by: INTERNAL MEDICINE

## 2025-06-23 PROCEDURE — 1160F RVW MEDS BY RX/DR IN RCRD: CPT | Performed by: INTERNAL MEDICINE

## 2025-06-23 PROCEDURE — 1126F AMNT PAIN NOTED NONE PRSNT: CPT | Performed by: INTERNAL MEDICINE

## 2025-06-23 PROCEDURE — 85025 COMPLETE CBC W/AUTO DIFF WBC: CPT

## 2025-06-23 PROCEDURE — 36415 COLL VENOUS BLD VENIPUNCTURE: CPT

## 2025-06-24 ENCOUNTER — OFFICE VISIT (OUTPATIENT)
Dept: OTOLARYNGOLOGY | Facility: CLINIC | Age: 85
End: 2025-06-24
Payer: MEDICARE

## 2025-06-24 VITALS — BODY MASS INDEX: 26.4 KG/M2 | HEIGHT: 63 IN | WEIGHT: 149 LBS

## 2025-06-24 DIAGNOSIS — R22.1 MASS OF RIGHT SIDE OF NECK: Primary | ICD-10-CM

## 2025-06-24 DIAGNOSIS — R13.10 DYSPHAGIA, UNSPECIFIED TYPE: ICD-10-CM

## 2025-06-24 DIAGNOSIS — J38.7 MASS OF LARYNX: ICD-10-CM

## 2025-06-24 DIAGNOSIS — R63.4 WEIGHT LOSS: ICD-10-CM

## 2025-06-24 DIAGNOSIS — C15.9 SQUAMOUS CELL ESOPHAGEAL CANCER: ICD-10-CM

## 2025-06-24 PROCEDURE — 1160F RVW MEDS BY RX/DR IN RCRD: CPT | Performed by: NURSE PRACTITIONER

## 2025-06-24 PROCEDURE — 1159F MED LIST DOCD IN RCRD: CPT | Performed by: NURSE PRACTITIONER

## 2025-06-24 PROCEDURE — 31575 DIAGNOSTIC LARYNGOSCOPY: CPT | Performed by: OTOLARYNGOLOGY

## 2025-06-24 PROCEDURE — 99214 OFFICE O/P EST MOD 30 MIN: CPT | Performed by: NURSE PRACTITIONER

## 2025-06-24 NOTE — PROGRESS NOTES
OPERATIVE NOTE:  Lorena Valdes    DATE OF PROCEDURE: 6/24/2025    PROCEDURE:   Flexible Fiberoptic Laryngoscopy    ANESTHESIA:  None    REASON FOR PROCEDURE:  Procedure was recommended for suspicious clinical behavior  Risks, benefits and alternatives were discussed.      DETAILS of OPERATION:  The patient was seated in the exam chair.  A flexible fiberoptic laryngoscopy was performed through the oral cavity.  The scope was introduced into the oral cavity and directed to the level of the glottis, examining the structures of the oropharynx, base of tongue, vallecula, supraglottic larynx, glottic larynx, and hypopharynx.      FINDINGS:  Mucosal surfaces:   The mucosal surfaces demonstrated normal mucosa surfaces with mild inflammation    Base of tongue:  The base of tongue was found to have no mass or lesion.    Epiglottis:  The epiglottis was found to have no mass or lesion.    Aryepiglottic fold:  The AE folds were found to have no mass or lesion.    False Vocal Fold:  The false cords were found to have no mass or lesion.    True Vocal Cord:  The true vocal cords were found to have no mass or lesion.  True vocal cord adduction abducts normally however the right true vocal cord is fixed in the paramedian position.    Arytenoid:   The arytenoids were found to have moderate inter-arytenoid edema.    Hypopharynx:  The hypopharynx was found to have no mass or lesion.    The patient tolerated procedure well.

## 2025-06-24 NOTE — PROGRESS NOTES
YOB: 1940  Location: [facility]  Location Address: [address] [place] [address], [facility], [address] [place] [address]  Location Phone: [phone]    Chief Complaint   Patient presents with    Cancer Surveillance       History of Present Illness  Lorena Valdes is a 84 y.o. female.  Lorena Valdes is here for follow up of ENT complaints. The patient has had problems with exophytic mass found in the larynx 2025 \  Patient had additional tumor found in the esophagus that was biopsied resulting as squamous cell carcinoma p16+  Patient was referred by gastroenterology as biopsy was unclear as to if esophageal tumor was primary site of tumor or extension from laryngeal mass     Patient states she began having difficulty swallowing 4 - 6 months ago   She states 2 - 3 months ago she started having pain with swallowing on the right side of the throat as well as feeling a mass to the right neck     Tissue Pathology Exam (2025 08:02)  reviewed and discussed   Dr. Canas notes reviewed and discussed   ENDOSCOPY - EGD (2025) - reviewed and discussed        Past Medical History:   Diagnosis Date    Bladder disorder     Chronic kidney disease, stage 3b     Diverticulosis     Emphysema lung     Fibrocystic disease of breast     CONNIE (generalized anxiety disorder)     GERD (gastroesophageal reflux disease)     Heart disease     History of bone density study     History of colon polyps     Hyperglycemia     Hyperlipidemia     Hypotension     Left ventricular diastolic dysfunction     Malignant neoplasm of central portion of left female breast 2016    Osteopenia 2017    Restrictive lung disease     Type 2 diabetes mellitus     Uterine prolapse        Past Surgical History:   Procedure Laterality Date    BREAST LUMPECTOMY  2008    CATARACT EXTRACTION Right     COLONOSCOPY  2010    Diverticulosis; Repeat 10 years    COLONOSCOPY N/A 2020    One 6mm inflamed hyperplastic polyp in  the cecum; Diverticulosis in the left colon; Non-bleeding internal hemorrhoids; Repeat 5 years    ENDOSCOPY N/A 06/12/2025    Exophytic mass found in the larynx, not obstructing the airway; Partially obstructing, rule out malignancy, esophageal tumor was found in the proximal esophagus-biopsied; Medium-sized HH; Normal stomach; Two non-bleeding angiodysplastic lesions in the duodenum-biopsied    MAMMO BILATERAL  07/17/2013    Dr. Sabillon    MASTECTOMY Left 08/28/2008    PAP SMEAR  2010    SKIN CANCER EXCISION         Outpatient Medications Marked as Taking for the 6/24/25 encounter (Office Visit) with Lee Haynes MD   Medication Sig Dispense Refill    albuterol sulfate  (90 Base) MCG/ACT inhaler Inhale 2 puffs As Needed.      amLODIPine (NORVASC) 2.5 MG tablet Take 1 tablet by mouth Daily.      aspirin 81 MG EC tablet Take 81 mg by mouth daily.        Calcium Carb-Cholecalciferol 600-200 MG-UNIT tablet Take 1 tablet by mouth 2 (Two) Times a Day.      Cholecalciferol (VITAMIN D3) 400 UNITS capsule Take 1 capsule by mouth Daily.      famotidine (PEPCID) 20 MG tablet Take 1 tablet by mouth Daily.      glipiZIDE (GLUCOTROL) 5 MG tablet Take 1 tablet by mouth Daily.      ibuprofen (ADVIL,MOTRIN) 400 MG tablet Take 1 tablet by mouth Every 6 (Six) Hours As Needed for Mild Pain.      losartan (COZAAR) 100 MG tablet Take 1 tablet by mouth Daily.  1    magnesium 30 MG tablet Take 1 tablet by mouth.      metFORMIN ER (GLUCOPHAGE-XR) 500 MG 24 hr tablet Take 1 tablet by mouth Every Night.      pravastatin (PRAVACHOL) 40 MG tablet Take 1 tablet by mouth Daily.      tiotropium bromide-olodaterol (STIOLTO RESPIMAT) 2.5-2.5 MCG/ACT aerosol solution inhaler Inhale 2 puffs As Needed.         Adhesive tape    Family History   Problem Relation Age of Onset    Cancer Mother     Heart disease Father     No Known Problems Daughter     No Known Problems Son     Hypertension Daughter     Hypertension Daughter     Other  Brother         polioi    Cancer Paternal Aunt     No Known Problems Maternal Grandmother     No Known Problems Maternal Grandfather     No Known Problems Paternal Grandmother     No Known Problems Paternal Grandfather     Drug abuse Brother     Colon cancer Neg Hx     Colon polyps Neg Hx     Esophageal cancer Neg Hx     Liver cancer Neg Hx     Liver disease Neg Hx     Rectal cancer Neg Hx     Stomach cancer Neg Hx        Social History     Socioeconomic History    Marital status: Single   Tobacco Use    Smoking status: Former     Current packs/day: 0.00     Types: Cigarettes     Quit date:      Years since quittin.5    Smokeless tobacco: Never   Vaping Use    Vaping status: Never Used   Substance and Sexual Activity    Alcohol use: Yes     Alcohol/week: 1.0 standard drink of alcohol     Types: 1 Cans of beer per week     Comment: Occasionally     Drug use: No    Sexual activity: Defer       Review of Systems   Constitutional:  Positive for fatigue.   HENT:  Positive for sore throat, trouble swallowing and voice change.        There were no vitals filed for this visit.    Body mass index is 26.39 kg/m².    Objective     Physical Exam  Vitals reviewed.   Constitutional:       Appearance: She is ill-appearing.   HENT:      Head: Normocephalic.      Right Ear: External ear normal.      Left Ear: External ear normal.      Nose: Nose normal.      Mouth/Throat:      Lips: Pink.      Mouth: Mucous membranes are dry.      Comments: Dental caries with several missing teeth     Neck:        Comments: Palpable right neck mass     Neurological:      Mental Status: She is alert.     Dr. Haynes has examined and assessed the patient and agrees with current treatment plan        Assessment & Plan   Diagnoses and all orders for this visit:    1. Mass of right side of neck (Primary)  -     Fine Needle Aspiration; Standing  -     US Fine Needle Aspiration BX 1st Lesion Right; Future    2. Dysphagia, unspecified type    3.  Weight loss    4. Squamous cell esophageal cancer    5. Mass of larynx      * Surgery not found *  Orders Placed This Encounter   Procedures    US Fine Needle Aspiration BX 1st Lesion Right     Standing Status:   Future     Expected Date:   6/25/2025     Expiration Date:   6/24/2026     Laterality?:   Right     Which Site?:   Other              right neck mass     Release to patient:   Routine Release [[id number]]     Reason for Exam::   right neck mass     No follow-ups on file.     Pet scan scheduled for Friday   Fine needle aspiration risks vs benefits discussed     There are no Patient Instructions on file for this visit.

## 2025-06-27 ENCOUNTER — HOSPITAL ENCOUNTER (OUTPATIENT)
Dept: CT IMAGING | Facility: HOSPITAL | Age: 85
Discharge: HOME | End: 2025-06-27
Payer: MEDICARE

## 2025-06-27 DIAGNOSIS — C50.112 MALIGNANT NEOPLASM OF CENTRAL PORTION OF LEFT BREAST IN FEMALE, ESTROGEN RECEPTOR NEGATIVE: ICD-10-CM

## 2025-06-27 DIAGNOSIS — Z17.1 MALIGNANT NEOPLASM OF CENTRAL PORTION OF LEFT BREAST IN FEMALE, ESTROGEN RECEPTOR NEGATIVE: ICD-10-CM

## 2025-06-27 PROCEDURE — A9552 F18 FDG: HCPCS | Performed by: INTERNAL MEDICINE

## 2025-06-27 PROCEDURE — 78815 PET IMAGE W/CT SKULL-THIGH: CPT

## 2025-06-27 PROCEDURE — 34310000005 FLUDEOXYGLUCOSE F18 SOLUTION: Performed by: INTERNAL MEDICINE

## 2025-06-27 RX ADMIN — FLUDEOXYGLUCOSE F18 1 DOSE: 300 INJECTION INTRAVENOUS at 12:25

## 2025-06-30 ENCOUNTER — RESULTS FOLLOW-UP (OUTPATIENT)
Dept: ONCOLOGY | Facility: CLINIC | Age: 85
End: 2025-06-30
Payer: MEDICARE

## 2025-06-30 DIAGNOSIS — C50.411 MALIGNANT NEOPLASM OF UPPER-OUTER QUADRANT OF RIGHT BREAST IN FEMALE, ESTROGEN RECEPTOR POSITIVE: Primary | ICD-10-CM

## 2025-06-30 DIAGNOSIS — Z17.0 MALIGNANT NEOPLASM OF UPPER-OUTER QUADRANT OF RIGHT BREAST IN FEMALE, ESTROGEN RECEPTOR POSITIVE: Primary | ICD-10-CM

## 2025-06-30 NOTE — TELEPHONE ENCOUNTER
Called and spoke with Lorena regarding ordering her MRI of the brain with and without contrast to rule out metastatic disease to the brain. She verbalized understanding and is ok with scheduling any time as soon as possible. Order loaded and sent to Dr PURI to sign.

## 2025-06-30 NOTE — TELEPHONE ENCOUNTER
MRI Brain with and without contrast has been scheduled for July 1 at 730 am at the [facility]. Lorena has been notified and no c/o were voiced at this time.

## 2025-06-30 NOTE — TELEPHONE ENCOUNTER
----- Message from Justin Canas sent at 6/27/2025  5:53 PM CDT -----  Schedule MRI brain as recommended  ----- Message -----  From: Osmel, Rad [facility] [place] [facility]  Sent: 6/27/2025   4:07 PM CDT  To: Justin Canas MD

## 2025-06-30 NOTE — TELEPHONE ENCOUNTER
----- Message from Justin Canas sent at 6/27/2025  5:53 PM CDT -----  Schedule MRI brain as recommended  ----- Message -----  From: [place]el, [facility] [place] [facility]  Sent: 6/27/2025   4:07 PM CDT  To: Justin Canas MD

## 2025-07-01 ENCOUNTER — HOSPITAL ENCOUNTER (OUTPATIENT)
Dept: MRI IMAGING | Facility: HOSPITAL | Age: 85
Discharge: HOME | End: 2025-07-01
Admitting: INTERNAL MEDICINE
Payer: MEDICARE

## 2025-07-01 DIAGNOSIS — Z17.0 MALIGNANT NEOPLASM OF UPPER-OUTER QUADRANT OF RIGHT BREAST IN FEMALE, ESTROGEN RECEPTOR POSITIVE: ICD-10-CM

## 2025-07-01 DIAGNOSIS — C50.411 MALIGNANT NEOPLASM OF UPPER-OUTER QUADRANT OF RIGHT BREAST IN FEMALE, ESTROGEN RECEPTOR POSITIVE: ICD-10-CM

## 2025-07-01 PROCEDURE — A9573 GADOPICLENOL 0.5 MMOL/ML SOLUTION: HCPCS | Performed by: INTERNAL MEDICINE

## 2025-07-01 PROCEDURE — 25510000001 GADOPICLENOL 0.5 MMOL/ML SOLUTION: Performed by: INTERNAL MEDICINE

## 2025-07-01 PROCEDURE — 70553 MRI BRAIN STEM W/O & W/DYE: CPT

## 2025-07-01 RX ADMIN — GADOPICLENOL 7 ML: 485.1 INJECTION INTRAVENOUS at 07:49

## 2025-07-03 DIAGNOSIS — Z00.00 MEDICARE ANNUAL WELLNESS VISIT, SUBSEQUENT: ICD-10-CM

## 2025-07-03 DIAGNOSIS — E11.40 TYPE 2 DIABETES MELLITUS WITH DIABETIC NEUROPATHY, WITHOUT LONG-TERM CURRENT USE OF INSULIN (HCC): ICD-10-CM

## 2025-07-03 DIAGNOSIS — N18.32 CHRONIC KIDNEY DISEASE, STAGE 3B (HCC): ICD-10-CM

## 2025-07-03 DIAGNOSIS — E55.9 VITAMIN D DEFICIENCY: ICD-10-CM

## 2025-07-03 DIAGNOSIS — I51.89 DIASTOLIC DYSFUNCTION: ICD-10-CM

## 2025-07-03 DIAGNOSIS — E03.8 SUBCLINICAL HYPOTHYROIDISM: ICD-10-CM

## 2025-07-03 DIAGNOSIS — J43.1 PANLOBULAR EMPHYSEMA (HCC): ICD-10-CM

## 2025-07-03 DIAGNOSIS — E78.00 PURE HYPERCHOLESTEROLEMIA: ICD-10-CM

## 2025-07-03 DIAGNOSIS — I10 ESSENTIAL HYPERTENSION: ICD-10-CM

## 2025-07-03 DIAGNOSIS — D63.8 CHRONIC DISEASE ANEMIA: ICD-10-CM

## 2025-07-03 DIAGNOSIS — M81.6 LOCALIZED OSTEOPOROSIS WITHOUT CURRENT PATHOLOGICAL FRACTURE: ICD-10-CM

## 2025-07-03 LAB
25(OH)D3 SERPL-MCNC: 42.8 NG/ML
ALBUMIN SERPL-MCNC: 3.2 G/DL (ref 3.5–5.2)
ALP SERPL-CCNC: 53 U/L (ref 35–104)
ALT SERPL-CCNC: 5 U/L (ref 10–35)
ANION GAP SERPL CALCULATED.3IONS-SCNC: 15 MMOL/L (ref 8–16)
AST SERPL-CCNC: 14 U/L (ref 10–35)
BASOPHILS # BLD: 0 K/UL (ref 0–0.2)
BASOPHILS NFR BLD: 0.2 % (ref 0–1)
BILIRUB SERPL-MCNC: 0.7 MG/DL (ref 0.2–1.2)
BUN SERPL-MCNC: 37 MG/DL (ref 8–23)
CALCIUM SERPL-MCNC: 9.9 MG/DL (ref 8.8–10.2)
CHLORIDE SERPL-SCNC: 98 MMOL/L (ref 98–107)
CHOLEST SERPL-MCNC: 138 MG/DL (ref 0–199)
CO2 SERPL-SCNC: 25 MMOL/L (ref 22–29)
CREAT SERPL-MCNC: 1.2 MG/DL (ref 0.5–0.9)
EOSINOPHIL # BLD: 0.1 K/UL (ref 0–0.6)
EOSINOPHIL NFR BLD: 0.5 % (ref 0–5)
ERYTHROCYTE [DISTWIDTH] IN BLOOD BY AUTOMATED COUNT: 13 % (ref 11.5–14.5)
GLUCOSE SERPL-MCNC: 130 MG/DL (ref 70–99)
HBA1C MFR BLD: 7 % (ref 4–5.6)
HCT VFR BLD AUTO: 31 % (ref 37–47)
HDLC SERPL-MCNC: 27 MG/DL (ref 40–60)
HGB BLD-MCNC: 10.1 G/DL (ref 12–16)
IMM GRANULOCYTES # BLD: 0.1 K/UL
LDLC SERPL CALC-MCNC: 90 MG/DL
LYMPHOCYTES # BLD: 3.2 K/UL (ref 1.1–4.5)
LYMPHOCYTES NFR BLD: 24.7 % (ref 20–40)
MCH RBC QN AUTO: 29.4 PG (ref 27–31)
MCHC RBC AUTO-ENTMCNC: 32.6 G/DL (ref 33–37)
MCV RBC AUTO: 90.1 FL (ref 81–99)
MONOCYTES # BLD: 1.5 K/UL (ref 0–0.9)
MONOCYTES NFR BLD: 11.6 % (ref 0–10)
NEUTROPHILS # BLD: 8.1 K/UL (ref 1.5–7.5)
NEUTS SEG NFR BLD: 62.4 % (ref 50–65)
PLATELET # BLD AUTO: 509 K/UL (ref 130–400)
PMV BLD AUTO: 9.3 FL (ref 9.4–12.3)
POTASSIUM SERPL-SCNC: 3.6 MMOL/L (ref 3.5–5.1)
PROT SERPL-MCNC: 8.1 G/DL (ref 6.4–8.3)
RBC # BLD AUTO: 3.44 M/UL (ref 4.2–5.4)
SODIUM SERPL-SCNC: 138 MMOL/L (ref 136–145)
TRIGL SERPL-MCNC: 107 MG/DL (ref 0–149)
TSH SERPL DL<=0.005 MIU/L-ACNC: 0.3 UIU/ML (ref 0.27–4.2)
WBC # BLD AUTO: 12.9 K/UL (ref 4.8–10.8)

## 2025-07-08 DIAGNOSIS — R06.2 WHEEZING: ICD-10-CM

## 2025-07-08 DIAGNOSIS — J43.1 PANLOBULAR EMPHYSEMA (HCC): ICD-10-CM

## 2025-07-08 DIAGNOSIS — R06.09 DOE (DYSPNEA ON EXERTION): Primary | ICD-10-CM

## 2025-07-09 ENCOUNTER — HOSPITAL ENCOUNTER (INPATIENT)
Facility: HOSPITAL | Age: 85
LOS: 7 days | Discharge: NURSING, REHAB OR LONG-TERM CARE | End: 2025-07-16
Attending: EMERGENCY MEDICINE | Admitting: STUDENT IN AN ORGANIZED HEALTH CARE EDUCATION/TRAINING PROGRAM
Payer: MEDICARE

## 2025-07-09 ENCOUNTER — TELEPHONE (OUTPATIENT)
Dept: ONCOLOGY | Facility: CLINIC | Age: 85
End: 2025-07-09

## 2025-07-09 ENCOUNTER — OFFICE VISIT (OUTPATIENT)
Dept: INTERNAL MEDICINE | Age: 85
End: 2025-07-09

## 2025-07-09 ENCOUNTER — APPOINTMENT (OUTPATIENT)
Dept: GENERAL RADIOLOGY | Facility: HOSPITAL | Age: 85
End: 2025-07-09
Payer: MEDICARE

## 2025-07-09 VITALS
SYSTOLIC BLOOD PRESSURE: 136 MMHG | BODY MASS INDEX: 22.27 KG/M2 | OXYGEN SATURATION: 97 % | WEIGHT: 138 LBS | DIASTOLIC BLOOD PRESSURE: 80 MMHG | HEART RATE: 78 BPM

## 2025-07-09 DIAGNOSIS — Z74.09 IMPAIRED MOBILITY: ICD-10-CM

## 2025-07-09 DIAGNOSIS — C15.9 SQUAMOUS CELL ESOPHAGEAL CANCER (HCC): Primary | ICD-10-CM

## 2025-07-09 DIAGNOSIS — R13.10 DYSPHAGIA, UNSPECIFIED TYPE: Primary | ICD-10-CM

## 2025-07-09 DIAGNOSIS — R62.7 FAILURE TO THRIVE IN ADULT: ICD-10-CM

## 2025-07-09 DIAGNOSIS — R13.19 ESOPHAGEAL DYSPHAGIA: ICD-10-CM

## 2025-07-09 DIAGNOSIS — C15.9 SQUAMOUS CELL ESOPHAGEAL CANCER: ICD-10-CM

## 2025-07-09 DIAGNOSIS — R13.14 PHARYNGOESOPHAGEAL DYSPHAGIA: ICD-10-CM

## 2025-07-09 DIAGNOSIS — E11.40 TYPE 2 DIABETES MELLITUS WITH DIABETIC NEUROPATHY, WITHOUT LONG-TERM CURRENT USE OF INSULIN (HCC): ICD-10-CM

## 2025-07-09 DIAGNOSIS — C15.9 SQUAMOUS CELL CARCINOMA OF ESOPHAGUS (HCC): ICD-10-CM

## 2025-07-09 DIAGNOSIS — E43 SEVERE MALNUTRITION: ICD-10-CM

## 2025-07-09 DIAGNOSIS — I10 ESSENTIAL HYPERTENSION: ICD-10-CM

## 2025-07-09 DIAGNOSIS — E43 SEVERE PROTEIN-CALORIE MALNUTRITION: ICD-10-CM

## 2025-07-09 LAB
ALBUMIN SERPL-MCNC: 2.9 G/DL (ref 3.5–5.2)
ALBUMIN/GLOB SERPL: 0.5 G/DL
ALP SERPL-CCNC: 45 U/L (ref 39–117)
ALT SERPL W P-5'-P-CCNC: <5 U/L (ref 1–33)
ANION GAP SERPL CALCULATED.3IONS-SCNC: 16 MMOL/L (ref 5–15)
ANION GAP SERPL CALCULATED.3IONS-SCNC: 17 MMOL/L (ref 5–15)
AST SERPL-CCNC: 10 U/L (ref 1–32)
BASOPHILS # BLD AUTO: 0.03 10*3/MM3 (ref 0–0.2)
BASOPHILS NFR BLD AUTO: 0.2 % (ref 0–1.5)
BILIRUB SERPL-MCNC: 0.7 MG/DL (ref 0–1.2)
BUN SERPL-MCNC: 46.8 MG/DL (ref 8–23)
BUN SERPL-MCNC: 52.5 MG/DL (ref 8–23)
BUN/CREAT SERPL: 42.7 (ref 7–25)
BUN/CREAT SERPL: 43.7 (ref 7–25)
CALCIUM SPEC-SCNC: 10 MG/DL (ref 8.6–10.5)
CALCIUM SPEC-SCNC: 9.7 MG/DL (ref 8.6–10.5)
CHLORIDE SERPL-SCNC: 95 MMOL/L (ref 98–107)
CHLORIDE SERPL-SCNC: 98 MMOL/L (ref 98–107)
CO2 SERPL-SCNC: 20 MMOL/L (ref 22–29)
CO2 SERPL-SCNC: 23 MMOL/L (ref 22–29)
CREAT SERPL-MCNC: 1.07 MG/DL (ref 0.57–1)
CREAT SERPL-MCNC: 1.23 MG/DL (ref 0.57–1)
D-LACTATE SERPL-SCNC: 1.4 MMOL/L (ref 0.5–2)
DEPRECATED RDW RBC AUTO: 42.3 FL (ref 37–54)
EGFRCR SERPLBLD CKD-EPI 2021: 43.7 ML/MIN/1.73
EGFRCR SERPLBLD CKD-EPI 2021: 51.6 ML/MIN/1.73
EOSINOPHIL # BLD AUTO: 0.03 10*3/MM3 (ref 0–0.4)
EOSINOPHIL NFR BLD AUTO: 0.2 % (ref 0.3–6.2)
ERYTHROCYTE [DISTWIDTH] IN BLOOD BY AUTOMATED COUNT: 13.1 % (ref 12.3–15.4)
GLOBULIN UR ELPH-MCNC: 5.3 GM/DL
GLUCOSE SERPL-MCNC: 119 MG/DL (ref 65–99)
GLUCOSE SERPL-MCNC: 146 MG/DL (ref 65–99)
HCT VFR BLD AUTO: 29.5 % (ref 34–46.6)
HGB BLD-MCNC: 9.8 G/DL (ref 12–15.9)
IMM GRANULOCYTES # BLD AUTO: 0.07 10*3/MM3 (ref 0–0.05)
IMM GRANULOCYTES NFR BLD AUTO: 0.5 % (ref 0–0.5)
INR PPP: 1.32 (ref 0.91–1.09)
LYMPHOCYTES # BLD AUTO: 3.07 10*3/MM3 (ref 0.7–3.1)
LYMPHOCYTES NFR BLD AUTO: 22.3 % (ref 19.6–45.3)
MAGNESIUM SERPL-MCNC: 1.5 MG/DL (ref 1.6–2.4)
MCH RBC QN AUTO: 29.4 PG (ref 26.6–33)
MCHC RBC AUTO-ENTMCNC: 33.2 G/DL (ref 31.5–35.7)
MCV RBC AUTO: 88.6 FL (ref 79–97)
MONOCYTES # BLD AUTO: 1.17 10*3/MM3 (ref 0.1–0.9)
MONOCYTES NFR BLD AUTO: 8.5 % (ref 5–12)
NEUTROPHILS NFR BLD AUTO: 68.3 % (ref 42.7–76)
NEUTROPHILS NFR BLD AUTO: 9.38 10*3/MM3 (ref 1.7–7)
NRBC BLD AUTO-RTO: 0 /100 WBC (ref 0–0.2)
PLATELET # BLD AUTO: 392 10*3/MM3 (ref 140–450)
PMV BLD AUTO: 9.6 FL (ref 6–12)
POTASSIUM SERPL-SCNC: 3.3 MMOL/L (ref 3.5–5.2)
POTASSIUM SERPL-SCNC: 3.9 MMOL/L (ref 3.5–5.2)
PROT SERPL-MCNC: 8.2 G/DL (ref 6–8.5)
PROTHROMBIN TIME: 17.1 SECONDS (ref 11.8–14.8)
RBC # BLD AUTO: 3.33 10*6/MM3 (ref 3.77–5.28)
SODIUM SERPL-SCNC: 134 MMOL/L (ref 136–145)
SODIUM SERPL-SCNC: 135 MMOL/L (ref 136–145)
WBC NRBC COR # BLD AUTO: 13.75 10*3/MM3 (ref 3.4–10.8)

## 2025-07-09 PROCEDURE — 83605 ASSAY OF LACTIC ACID: CPT | Performed by: EMERGENCY MEDICINE

## 2025-07-09 PROCEDURE — 83735 ASSAY OF MAGNESIUM: CPT | Performed by: EMERGENCY MEDICINE

## 2025-07-09 PROCEDURE — 71045 X-RAY EXAM CHEST 1 VIEW: CPT

## 2025-07-09 PROCEDURE — 87040 BLOOD CULTURE FOR BACTERIA: CPT | Performed by: EMERGENCY MEDICINE

## 2025-07-09 PROCEDURE — 36415 COLL VENOUS BLD VENIPUNCTURE: CPT

## 2025-07-09 PROCEDURE — 99285 EMERGENCY DEPT VISIT HI MDM: CPT | Performed by: EMERGENCY MEDICINE

## 2025-07-09 PROCEDURE — 25010000002 AMPICILLIN-SULBACTAM PER 1.5 G: Performed by: FAMILY MEDICINE

## 2025-07-09 PROCEDURE — 25810000003 LACTATED RINGERS SOLUTION: Performed by: EMERGENCY MEDICINE

## 2025-07-09 PROCEDURE — 80053 COMPREHEN METABOLIC PANEL: CPT | Performed by: EMERGENCY MEDICINE

## 2025-07-09 PROCEDURE — 85610 PROTHROMBIN TIME: CPT | Performed by: EMERGENCY MEDICINE

## 2025-07-09 PROCEDURE — 25010000002 MAGNESIUM SULFATE 2 GM/50ML SOLUTION: Performed by: FAMILY MEDICINE

## 2025-07-09 PROCEDURE — 85025 COMPLETE CBC W/AUTO DIFF WBC: CPT | Performed by: EMERGENCY MEDICINE

## 2025-07-09 PROCEDURE — 81001 URINALYSIS AUTO W/SCOPE: CPT | Performed by: EMERGENCY MEDICINE

## 2025-07-09 RX ORDER — SODIUM CHLORIDE 9 MG/ML
40 INJECTION, SOLUTION INTRAVENOUS AS NEEDED
Status: DISCONTINUED | OUTPATIENT
Start: 2025-07-09 | End: 2025-07-16 | Stop reason: HOSPADM

## 2025-07-09 RX ORDER — AMOXICILLIN 250 MG
2 CAPSULE ORAL 2 TIMES DAILY PRN
Status: DISCONTINUED | OUTPATIENT
Start: 2025-07-09 | End: 2025-07-16 | Stop reason: HOSPADM

## 2025-07-09 RX ORDER — POLYETHYLENE GLYCOL 3350 17 G/17G
17 POWDER, FOR SOLUTION ORAL DAILY PRN
Status: DISCONTINUED | OUTPATIENT
Start: 2025-07-09 | End: 2025-07-16 | Stop reason: HOSPADM

## 2025-07-09 RX ORDER — SODIUM CHLORIDE 0.9 % (FLUSH) 0.9 %
10 SYRINGE (ML) INJECTION EVERY 12 HOURS SCHEDULED
Status: DISCONTINUED | OUTPATIENT
Start: 2025-07-09 | End: 2025-07-16 | Stop reason: HOSPADM

## 2025-07-09 RX ORDER — ONDANSETRON 4 MG/1
4 TABLET, ORALLY DISINTEGRATING ORAL EVERY 6 HOURS PRN
Status: DISCONTINUED | OUTPATIENT
Start: 2025-07-09 | End: 2025-07-16 | Stop reason: HOSPADM

## 2025-07-09 RX ORDER — DEXTROSE MONOHYDRATE, SODIUM CHLORIDE, AND POTASSIUM CHLORIDE 50; 1.49; 4.5 G/1000ML; G/1000ML; G/1000ML
75 INJECTION, SOLUTION INTRAVENOUS CONTINUOUS
Status: DISPENSED | OUTPATIENT
Start: 2025-07-09 | End: 2025-07-10

## 2025-07-09 RX ORDER — MAGNESIUM SULFATE HEPTAHYDRATE 40 MG/ML
2 INJECTION, SOLUTION INTRAVENOUS ONCE
Status: COMPLETED | OUTPATIENT
Start: 2025-07-09 | End: 2025-07-09

## 2025-07-09 RX ORDER — POTASSIUM CHLORIDE 7.45 MG/ML
10 INJECTION INTRAVENOUS
Status: CANCELLED | OUTPATIENT
Start: 2025-07-09 | End: 2025-07-10

## 2025-07-09 RX ORDER — SODIUM CHLORIDE 0.9 % (FLUSH) 0.9 %
10 SYRINGE (ML) INJECTION AS NEEDED
Status: DISCONTINUED | OUTPATIENT
Start: 2025-07-09 | End: 2025-07-16 | Stop reason: HOSPADM

## 2025-07-09 RX ORDER — FAMOTIDINE 10 MG/ML
20 INJECTION, SOLUTION INTRAVENOUS DAILY
Status: DISCONTINUED | OUTPATIENT
Start: 2025-07-10 | End: 2025-07-11

## 2025-07-09 RX ORDER — BISACODYL 5 MG/1
5 TABLET, DELAYED RELEASE ORAL DAILY PRN
Status: DISCONTINUED | OUTPATIENT
Start: 2025-07-09 | End: 2025-07-16 | Stop reason: HOSPADM

## 2025-07-09 RX ORDER — MORPHINE SULFATE 2 MG/ML
1 INJECTION, SOLUTION INTRAMUSCULAR; INTRAVENOUS EVERY 4 HOURS PRN
Status: DISCONTINUED | OUTPATIENT
Start: 2025-07-09 | End: 2025-07-14

## 2025-07-09 RX ORDER — DEXTROSE MONOHYDRATE 25 G/50ML
25 INJECTION, SOLUTION INTRAVENOUS
Status: DISCONTINUED | OUTPATIENT
Start: 2025-07-09 | End: 2025-07-16 | Stop reason: HOSPADM

## 2025-07-09 RX ORDER — ONDANSETRON 2 MG/ML
4 INJECTION INTRAMUSCULAR; INTRAVENOUS EVERY 6 HOURS PRN
Status: DISCONTINUED | OUTPATIENT
Start: 2025-07-09 | End: 2025-07-16 | Stop reason: HOSPADM

## 2025-07-09 RX ORDER — SODIUM CHLORIDE 0.9 % (FLUSH) 0.9 %
10 SYRINGE (ML) INJECTION AS NEEDED
Status: DISCONTINUED | OUTPATIENT
Start: 2025-07-09 | End: 2025-07-12

## 2025-07-09 RX ORDER — IBUPROFEN 600 MG/1
1 TABLET ORAL
Status: DISCONTINUED | OUTPATIENT
Start: 2025-07-09 | End: 2025-07-16 | Stop reason: HOSPADM

## 2025-07-09 RX ORDER — NALOXONE HCL 0.4 MG/ML
0.4 VIAL (ML) INJECTION
Status: DISCONTINUED | OUTPATIENT
Start: 2025-07-09 | End: 2025-07-14

## 2025-07-09 RX ORDER — NICOTINE POLACRILEX 4 MG
15 LOZENGE BUCCAL
Status: DISCONTINUED | OUTPATIENT
Start: 2025-07-09 | End: 2025-07-16 | Stop reason: HOSPADM

## 2025-07-09 RX ORDER — BISACODYL 10 MG
10 SUPPOSITORY, RECTAL RECTAL DAILY PRN
Status: DISCONTINUED | OUTPATIENT
Start: 2025-07-09 | End: 2025-07-16 | Stop reason: HOSPADM

## 2025-07-09 RX ADMIN — Medication 10 ML: at 21:02

## 2025-07-09 RX ADMIN — MAGNESIUM SULFATE HEPTAHYDRATE 2 G: 2 INJECTION, SOLUTION INTRAVENOUS at 21:01

## 2025-07-09 RX ADMIN — AMPICILLIN SODIUM, SULBACTAM SODIUM 3 G: 2; 1 INJECTION, POWDER, FOR SOLUTION INTRAMUSCULAR; INTRAVENOUS at 20:06

## 2025-07-09 RX ADMIN — DEXTROSE MONOHYDRATE, SODIUM CHLORIDE, AND POTASSIUM CHLORIDE 75 ML/HR: 50; 1.49; 4.5 INJECTION, SOLUTION INTRAVENOUS at 21:02

## 2025-07-09 RX ADMIN — SODIUM CHLORIDE, POTASSIUM CHLORIDE, SODIUM LACTATE AND CALCIUM CHLORIDE 1000 ML: 600; 310; 30; 20 INJECTION, SOLUTION INTRAVENOUS at 17:53

## 2025-07-09 ASSESSMENT — ENCOUNTER SYMPTOMS
WHEEZING: 0
CONSTIPATION: 0
TROUBLE SWALLOWING: 1
ABDOMINAL PAIN: 0
SORE THROAT: 0
COUGH: 0
CHEST TIGHTNESS: 0

## 2025-07-09 NOTE — PROGRESS NOTES
Chief Complaint   Patient presents with    3 Month Follow-Up     History of presenting illness:  Xiomara Gonzalez is a84 y.o. female who presents today for follow up on her chronic medical conditions as noted below.    Patient Active Problem List    Diagnosis Date Noted    Wheezing 01/09/2023     Priority: Medium    Restrictive lung disease 06/02/2022     Priority: Medium    Hypertensive renal disease 05/11/2022     Priority: Medium    Age-related cataract 12/27/2019     Priority: Medium    Presence of intraocular lens 12/27/2019     Priority: Medium    Squamous cell esophageal cancer (HCC) 06/19/2025     Overview Note:     Biopsy done by [facility] found this 06/2025      Allergic sinusitis 04/01/2024    Chronic kidney disease, stage 3b (HCC) 03/09/2022    READ (dyspnea on exertion) 11/24/2021    Diastolic dysfunction 10/27/2021    Type 2 diabetes mellitus with diabetic neuropathy 07/26/2021    Essential hypertension 02/11/2019    Elevated TSH 08/07/2018    Vitamin D deficiency 09/10/2017    Pure hypercholesterolemia 09/10/2017    Type 2 diabetes mellitus without complication, without long-term current use of insulin (HCC) 09/10/2017    Localized osteoporosis without current pathological fracture 09/10/2017     Overview Note:     2017 hip neck -2.6; lspine -1/8  8/2020 hip neck -2.6/ L spine L1 -1.6  8/2023 hip neck -2.3/ L spin e-1.4      Generalized anxiety disorder 09/10/2017    Former smoker 09/10/2017    Numbness 04/07/2017    Imbalance 04/07/2017    Estrogen receptor negative tumor status 08/06/2008     Past Medical History:   Diagnosis Date    Arthritis     Back pain     Breast cancer (HCC)     left 2008    Chronic kidney disease     Concussion     History of therapeutic radiation     Hx antineoplastic chemo     Hyperlipidemia     Hypertension     Osteoporosis     Panlobular emphysema (HCC) 1/9/2023    Pneumonia     Type II or unspecified type diabetes mellitus without mention of complication, not

## 2025-07-09 NOTE — ED PROVIDER NOTES
"Subjective   History of Present Illness  84-year-old female presents to the ED with complaint of dysphagia, worsening generalized weakness fatigue, decreased oral intake due to dysphagia, failure to thrive.  She has history of hyperlipidemia, GERD, COPD, CKD, type 2 diabetes.  She has been dealing with difficulty swallowing for the past 3 to 4 months.  She followed up with Dr. 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  She was referred to ENT and heme-onc.  Dr. Samano on ordered PET scan which showed:    \"There is a mass within the prevertebral soft tissues inseparable from  the cervical esophagus with circumferential thickening noted along the  more inferior margin of this mass. I suspect it emanates from the  esophagus. There is soft tissue extension into the right paratracheal  soft tissues with metabolically active small right jugular chain node  adjacent to the lower margin of the mass as well as a level 2R higher  right paratracheal node demonstrating mild increased metabolic activity.\"    Has since had an MRI of the brain that was negative.    For the past week she has had progressive difficulty swallowing and decreased oral intake.  She is only been taking small sips of liquids.  Has not been able to tolerate any solids.  Talk to her primary care physician, Dr. Diez, who encouraged her to present to the ED at [facility] for evaluation and potential admission for dehydration, failure to thrive, and G-tube evaluation and placement if needed.  No fever or chills.  Has had some cough and congestion.  No abdominal pain, nausea or vomiting.  Has had worsening hoarseness/decreased volume in voice.      History provided by:  Patient      Review of Systems   All other systems reviewed and are negative.      Past Medical History:   Diagnosis Date    Bladder disorder     Chronic kidney disease, stage 3b     Diverticulosis     Emphysema lung     " Fibrocystic disease of breast     CONNIE (generalized anxiety disorder)     GERD (gastroesophageal reflux disease)     Heart disease     History of bone density study 2011    History of colon polyps     Hyperglycemia     Hyperlipidemia     Hypotension     Left ventricular diastolic dysfunction     Malignant neoplasm of central portion of left female breast 11/09/2016    Osteopenia 08/29/2017    Restrictive lung disease     Type 2 diabetes mellitus     Uterine prolapse        Allergies   Allergen Reactions    Adhesive Tape Hives     Latex Tape       Past Surgical History:   Procedure Laterality Date    BREAST LUMPECTOMY  2008    CATARACT EXTRACTION Right 2021    COLONOSCOPY  09/16/2010    Diverticulosis; Repeat 10 years    COLONOSCOPY N/A 11/11/2020    One 6mm inflamed hyperplastic polyp in the cecum; Diverticulosis in the left colon; Non-bleeding internal hemorrhoids; Repeat 5 years    ENDOSCOPY N/A 06/12/2025    Exophytic mass found in the larynx, not obstructing the airway; Partially obstructing, rule out malignancy, esophageal tumor was found in the proximal esophagus-biopsied; Medium-sized HH; Normal stomach; Two non-bleeding angiodysplastic lesions in the duodenum-biopsied    MAMMO BILATERAL  07/17/2013    Dr. Sabillon    MASTECTOMY Left 08/28/2008    PAP SMEAR  2010    SKIN CANCER EXCISION         Family History   Problem Relation Age of Onset    Cancer Mother     Heart disease Father     No Known Problems Daughter     No Known Problems Son     Hypertension Daughter     Hypertension Daughter     Other Brother         polioi    Cancer Paternal Aunt     No Known Problems Maternal Grandmother     No Known Problems Maternal Grandfather     No Known Problems Paternal Grandmother     No Known Problems Paternal Grandfather     Drug abuse Brother     Colon cancer Neg Hx     Colon polyps Neg Hx     Esophageal cancer Neg Hx     Liver cancer Neg Hx     Liver disease Neg Hx     Rectal cancer Neg Hx     Stomach cancer Neg Hx         Social History     Socioeconomic History    Marital status: Single   Tobacco Use    Smoking status: Former     Current packs/day: 0.00     Types: Cigarettes     Quit date: 1974     Years since quittin.5    Smokeless tobacco: Never   Vaping Use    Vaping status: Never Used   Substance and Sexual Activity    Alcohol use: Yes     Alcohol/week: 1.0 standard drink of alcohol     Types: 1 Cans of beer per week     Comment: Occasionally     Drug use: No    Sexual activity: Defer           Objective   Physical Exam  Vitals and nursing note reviewed.   Constitutional:       Comments: Generally weak appearing elderly female, hoarse voice, no distress   HENT:      Head: Normocephalic and atraumatic.      Nose: Nose normal.      Mouth/Throat:      Mouth: Mucous membranes are dry.   Eyes:      Pupils: Pupils are equal, round, and reactive to light.   Cardiovascular:      Rate and Rhythm: Normal rate and regular rhythm.      Heart sounds: Normal heart sounds. No murmur heard.  Pulmonary:      Effort: Pulmonary effort is normal.      Breath sounds: Normal breath sounds. No wheezing, rhonchi or rales.   Abdominal:      General: Abdomen is flat. Bowel sounds are normal.      Palpations: Abdomen is soft.   Skin:     General: Skin is warm and dry.      Capillary Refill: Capillary refill takes less than 2 seconds.      Comments: Poor skin turgor   Neurological:      General: No focal deficit present.      Mental Status: She is oriented to person, place, and time. Mental status is at baseline.         Procedures       Lab Results (last 24 hours)       Procedure Component Value Units Date/Time    CBC & Differential [[id number]]  (Abnormal) Collected: 25    Specimen: Blood Updated: 25    Narrative:      The following orders were created for panel order CBC & Differential.  Procedure                               Abnormality         Status                     ---------                                -----------         ------                     CBC Auto Differential[[id number]]        Abnormal            Final result                 Please view results for these tests on the individual orders.    Comprehensive Metabolic Panel [[id number]]  (Abnormal) Collected: 07/09/25 1419    Specimen: Blood Updated: 07/09/25 1446     Glucose 146 mg/dL      BUN 52.5 mg/dL      Creatinine 1.23 mg/dL      Sodium 134 mmol/L      Potassium 3.3 mmol/L      Comment: Slight hemolysis detected by analyzer. Result may be falsely elevated.        Chloride 95 mmol/L      CO2 23.0 mmol/L      Calcium 10.0 mg/dL      Total Protein 8.2 g/dL      Albumin 2.9 g/dL      ALT (SGPT) <5 U/L      AST (SGOT) 10 U/L      Alkaline Phosphatase 45 U/L      Total Bilirubin 0.7 mg/dL      Globulin 5.3 gm/dL      A/G Ratio 0.5 g/dL      BUN/Creatinine Ratio 42.7     Anion Gap 16.0 mmol/L      eGFR 43.7 mL/min/1.73     Narrative:      GFR Categories in Chronic Kidney Disease (CKD)              GFR Category          GFR (mL/min/1.73)    Interpretation  G1                    90 or greater        Normal or high (1)  G2                    60-89                Mild decrease (1)  G3a                   45-59                Mild to moderate decrease  G3b                   30-44                Moderate to severe decrease  G4                    15-29                Severe decrease  G5                    14 or less           Kidney failure    (1)In the absence of evidence of kidney disease, neither GFR category G1 or G2 fulfill the criteria for CKD.    eGFR calculation 2021 CKD-EPI creatinine equation, which does not include race as a factor    Protime-INR [[id number]]  (Abnormal) Collected: 07/09/25 1419    Specimen: Blood Updated: 07/09/25 1436     Protime 17.1 Seconds      INR 1.32    Magnesium [[id number]]  (Abnormal) Collected: 07/09/25 1419    Specimen: Blood Updated: 07/09/25 1446     Magnesium 1.5 mg/dL     CBC Auto Differential [[id number]]  (Abnormal)  Collected: 07/09/25 1419    Specimen: Blood Updated: 07/09/25 1429     WBC 13.75 10*3/mm3      RBC 3.33 10*6/mm3      Hemoglobin 9.8 g/dL      Hematocrit 29.5 %      MCV 88.6 fL      MCH 29.4 pg      MCHC 33.2 g/dL      RDW 13.1 %      RDW-SD 42.3 fl      MPV 9.6 fL      Platelets 392 10*3/mm3      Neutrophil % 68.3 %      Lymphocyte % 22.3 %      Monocyte % 8.5 %      Eosinophil % 0.2 %      Basophil % 0.2 %      Immature Grans % 0.5 %      Neutrophils, Absolute 9.38 10*3/mm3      Lymphocytes, Absolute 3.07 10*3/mm3      Monocytes, Absolute 1.17 10*3/mm3      Eosinophils, Absolute 0.03 10*3/mm3      Basophils, Absolute 0.03 10*3/mm3      Immature Grans, Absolute 0.07 10*3/mm3      nRBC 0.0 /100 WBC          XR Chest 1 View  Result Date: 7/9/2025  XR CHEST 1 VW-  HISTORY: generalized weakness; history of left breast cancer, esophageal cancer  COMPARISON: 10/2/2008  FINDINGS: Frontal view of the chest obtained.  Left subclavian port in place with tip overlying the SVC. Similar borderline cardiomegaly. No lobar consolidation. Basilar densities favoring atelectasis, right lower lobe pneumonia difficult to exclude. No pleural effusion or pneumothorax. No acute regional bony pathology      1. Right greater left basilar densities favoring atelectasis. Right lower lobe pneumonitis considered. Left subclavian port appropriate in position   This report was signed and finalized on 7/9/2025 3:54 PM by Dr. Nikky Santamaria MD.         ED Course  ED Course as of 07/09/25 1752 Wed Jul 09, 2025   1750 84-year-old female with history of COPD, GERD, CKD, type 2 diabetes who presents to the ED with several month history of worsening dysphagia, 1 week history of significantly decreased oral intake due to difficulty swallowing.  Has been evaluated on an outpatient basis for dysphagia to include  upper endoscopy with GI which revealed a laryngeal mass that infiltrated to the upper esophagus.  She has since seen ENT and heme-onc.  Has  not had a biopsy and has not began treatment.  She is here today due to decreased oral intake.  On exam she appears little dehydrated.  Has prerenal pattern for acute kidney injury, will give her some fluids in the ED.  She also has mild hypomagnesemia.  Chest x-ray reveals questionable pneumonitis versus atelectasis.  White count 13,000 and she is feeling generally weak.  Will go and cover for pneumonia, plan for admission to the hospitalist service for worsening generalized weakness, failure to thrive, dysphagia and inability to swallow, general surgery consultation for G-tube placement if indicated [AW]      ED Course User Index  [AW] Primo Morrow MD                                                       Medical Decision Making  Problems Addressed:  Dysphagia, unspecified type: complicated acute illness or injury  Failure to thrive in adult: complicated acute illness or injury    Amount and/or Complexity of Data Reviewed  Labs: ordered.  Radiology: ordered.    Risk  Prescription drug management.  Decision regarding hospitalization.        Final diagnoses:   Dysphagia, unspecified type   Failure to thrive in adult       ED Disposition  ED Disposition       ED Disposition   Decision to Admit    Condition   --    Comment   Level of Care: Med/Surg [1]   Diagnosis: Dysphagia [[id number]]   Admitting Physician: TANIYA WHITE [[id number]]   Attending Physician: TANIYA WHITE [[id number]]   Certification: I Certify That Inpatient Hospital Services Are Medically Necessary For Greater Than 2 Midnights                 No follow-up provider specified.       Medication List      No changes were made to your prescriptions during this visit.            Primo Morrow MD  07/09/25 [id number]

## 2025-07-09 NOTE — H&P
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  Patient with history of breast cancer in 2008.  Status post lumpectomy and adjuvant chemotherapy; history of diabetes mellitus type 2, GERD, anemia, chronic kidney disease stage 3 a and COPD; the patient comes to the hospital reporting progressive dysphagia for solids, and choking sensation when attempting to drink fluids.  No rectal bleeding, no melena, no hematemesis.  No abdominal pain.      Patient has been evaluated by oncology in the outpatient setting.  PET CT scan 6/27/2025 showed a mass in the paravertebral soft tissues inseparable from the cervical esophagus, soft tissue extension into the right paratracheal soft tissues, asymmetric metabolic activity in the high right posterior parietal centrum semiovale.  MRI of the brain showed no suspicious enhancing tumor.        Review of Systems   Otherwise complete ROS reviewed and negative except as mentioned in the HPI.    Past Medical History:   Past Medical History:   Diagnosis Date    Bladder disorder     Chronic kidney disease, stage 3b     Diverticulosis     Emphysema lung     Fibrocystic disease of breast     CONNIE (generalized anxiety disorder)     GERD (gastroesophageal reflux disease)     Heart disease     History of bone density study 2011    History of colon polyps     Hyperglycemia     Hyperlipidemia     Hypotension     Left ventricular diastolic dysfunction     Malignant neoplasm of central portion of left female breast  11/09/2016    Osteopenia 08/29/2017    Restrictive lung disease     Type 2 diabetes mellitus     Uterine prolapse      Past Surgical History:  Past Surgical History:   Procedure Laterality Date    BREAST LUMPECTOMY  2008    CATARACT EXTRACTION Right 2021    COLONOSCOPY  09/16/2010    Diverticulosis; Repeat 10 years    COLONOSCOPY N/A 11/11/2020    One 6mm inflamed hyperplastic polyp in the cecum; Diverticulosis in the left colon; Non-bleeding internal hemorrhoids; Repeat 5 years    ENDOSCOPY N/A 06/12/2025    Exophytic mass found in the larynx, not obstructing the airway; Partially obstructing, rule out malignancy, esophageal tumor was found in the proximal esophagus-biopsied; Medium-sized HH; Normal stomach; Two non-bleeding angiodysplastic lesions in the duodenum-biopsied    MAMMO BILATERAL  07/17/2013    Dr. Sabillon    MASTECTOMY Left 08/28/2008    PAP SMEAR  2010    SKIN CANCER EXCISION       Social History:  reports that she quit smoking about 51 years ago. Her smoking use included cigarettes. She has never used smokeless tobacco. She reports current alcohol use of about 1.0 standard drink of alcohol per week. She reports that she does not use drugs.    Family History: family history includes Cancer in her mother and paternal aunt; Drug abuse in her brother; Heart disease in her father; Hypertension in her daughter and daughter; No Known Problems in her daughter, maternal grandfather, maternal grandmother, paternal grandfather, paternal grandmother, and son; Other in her brother.   Reviewed    Allergies:  Allergies   Allergen Reactions    Adhesive Tape Hives     Latex Tape       Medications:  Prior to Admission medications    Medication Sig Start Date End Date Taking? Authorizing Provider   albuterol sulfate  (90 Base) MCG/ACT inhaler Inhale 2 puffs As Needed. 1/5/22   Tania Borrego MD   amLODIPine (NORVASC) 2.5 MG tablet Take 1 tablet by mouth Daily. 4/24/23   Tania Borrego MD   aspirin  "81 MG EC tablet Take 81 mg by mouth daily.      Tania Borrego MD   Calcium Carb-Cholecalciferol 600-200 MG-UNIT tablet Take 1 tablet by mouth 2 (Two) Times a Day.    Tania Borrego MD   carvedilol (COREG) 12.5 MG tablet Take 1 tablet by mouth 2 (Two) Times a Day. 4/11/23 6/18/25  Tania Borrego MD   Cholecalciferol (VITAMIN D3) 400 UNITS capsule Take 1 capsule by mouth Daily.    Tania Borrego MD   famotidine (PEPCID) 20 MG tablet Take 1 tablet by mouth Daily.    Tania Borrego MD   glipiZIDE (GLUCOTROL) 5 MG tablet Take 1 tablet by mouth Daily.    Tania Borrego MD   ibuprofen (ADVIL,MOTRIN) 400 MG tablet Take 1 tablet by mouth Every 6 (Six) Hours As Needed for Mild Pain.    Tania Borrego MD   losartan (COZAAR) 100 MG tablet Take 1 tablet by mouth Daily. 8/21/19   Tania Borrego MD   magnesium 30 MG tablet Take 1 tablet by mouth.    Tania Borrego MD   metFORMIN ER (GLUCOPHAGE-XR) 500 MG 24 hr tablet Take 1 tablet by mouth Every Night. 5/16/25   Tania Borrego MD   pravastatin (PRAVACHOL) 40 MG tablet Take 1 tablet by mouth Daily.    Tania Borrego MD   tiotropium bromide-olodaterol (STIOLTO RESPIMAT) 2.5-2.5 MCG/ACT aerosol solution inhaler Inhale 2 puffs As Needed. 1/5/22   Tania Borrego MD     I have utilized all available immediate resources to obtain, update, or review the patient's current medications (including all prescriptions, over-the-counter products, herbals, cannabis/cannabidiol products, and vitamin/mineral/dietary (nutritional) supplements).    Objective     Vital Signs: /73 (BP Location: Right arm, Patient Position: Sitting)   Pulse 50   Temp 97.5 °F (36.4 °C) (Oral)   Resp 18   Ht 160 cm (63\")   Wt 60 kg (132 lb 4.8 oz)   SpO2 95%   BMI 23.44 kg/m²   Physical Exam   Constitutional:       Appearance: Chronically ill-appearing, alert, oriented.  Dysphonic speech.  HENT:      Head: Normocephalic and " atraumatic.      Nose: Nose normal.      Mouth/Throat:      Mouth: Mucous membranes are moist.   Eyes:      Extraocular Movements: Extraocular movements intact.      Conjunctiva/sclera: Conjunctivae normal.      Pupils: Pupils are equal, round  Cardiovascular:      Rate and Rhythm: Normal rate and regular rhythm.      Pulses: Normal pulses.   Pulmonary:      Effort: No respiratory distress.      Breath sounds: Normal breath sounds.   Abdominal:      General: Abdomen is flat. Bowel sounds are normal.      Palpations: Abdomen is soft.   Extremities:  No lower extremity edema.  Skin:     Capillary Refill: Capillary refill takes less than 2 seconds.      Coloration: Skin is not jaundiced.      Findings: No rash.   Neurological:      General: No focal deficit present.      Mental Status: Patient is alert, oriented to place time and person.        Results Reviewed:  Lab Results (last 24 hours)       Procedure Component Value Units Date/Time    Comprehensive Metabolic Panel [[id number]]  (Abnormal) Collected: 07/09/25 1419    Specimen: Blood Updated: 07/09/25 1446     Glucose 146 mg/dL      BUN 52.5 mg/dL      Creatinine 1.23 mg/dL      Sodium 134 mmol/L      Potassium 3.3 mmol/L      Comment: Slight hemolysis detected by analyzer. Result may be falsely elevated.        Chloride 95 mmol/L      CO2 23.0 mmol/L      Calcium 10.0 mg/dL      Total Protein 8.2 g/dL      Albumin 2.9 g/dL      ALT (SGPT) <5 U/L      AST (SGOT) 10 U/L      Alkaline Phosphatase 45 U/L      Total Bilirubin 0.7 mg/dL      Globulin 5.3 gm/dL      A/G Ratio 0.5 g/dL      BUN/Creatinine Ratio 42.7     Anion Gap 16.0 mmol/L      eGFR 43.7 mL/min/1.73     Narrative:      GFR Categories in Chronic Kidney Disease (CKD)              GFR Category          GFR (mL/min/1.73)    Interpretation  G1                    90 or greater        Normal or high (1)  G2                    60-89                Mild decrease (1)  G3a                   45-59                 Mild to moderate decrease  G3b                   30-44                Moderate to severe decrease  G4                    15-29                Severe decrease  G5                    14 or less           Kidney failure    (1)In the absence of evidence of kidney disease, neither GFR category G1 or G2 fulfill the criteria for CKD.    eGFR calculation 2021 CKD-EPI creatinine equation, which does not include race as a factor    Magnesium [[id number]]  (Abnormal) Collected: 07/09/25 1419    Specimen: Blood Updated: 07/09/25 1446     Magnesium 1.5 mg/dL     Protime-INR [[id number]]  (Abnormal) Collected: 07/09/25 1419    Specimen: Blood Updated: 07/09/25 1436     Protime 17.1 Seconds      INR 1.32    CBC & Differential [[id number]]  (Abnormal) Collected: 07/09/25 1419    Specimen: Blood Updated: 07/09/25 1429    Narrative:      The following orders were created for panel order CBC & Differential.  Procedure                               Abnormality         Status                     ---------                               -----------         ------                     CBC Auto Differential[[id number]]        Abnormal            Final result                 Please view results for these tests on the individual orders.    CBC Auto Differential [[id number]]  (Abnormal) Collected: 07/09/25 1419    Specimen: Blood Updated: 07/09/25 1429     WBC 13.75 10*3/mm3      RBC 3.33 10*6/mm3      Hemoglobin 9.8 g/dL      Hematocrit 29.5 %      MCV 88.6 fL      MCH 29.4 pg      MCHC 33.2 g/dL      RDW 13.1 %      RDW-SD 42.3 fl      MPV 9.6 fL      Platelets 392 10*3/mm3      Neutrophil % 68.3 %      Lymphocyte % 22.3 %      Monocyte % 8.5 %      Eosinophil % 0.2 %      Basophil % 0.2 %      Immature Grans % 0.5 %      Neutrophils, Absolute 9.38 10*3/mm3      Lymphocytes, Absolute 3.07 10*3/mm3      Monocytes, Absolute 1.17 10*3/mm3      Eosinophils, Absolute 0.03 10*3/mm3      Basophils, Absolute 0.03 10*3/mm3      Immature Grans, Absolute  0.07 10*3/mm3      nRBC 0.0 /100 WBC           Imaging Results (Last 24 Hours)       Procedure Component Value Units Date/Time    XR Chest 1 View [[id number]] Collected: 07/09/25 1551     Updated: 07/09/25 1558    Narrative:      XR CHEST 1 VW-     HISTORY: generalized weakness; history of left breast cancer, esophageal  cancer     COMPARISON: 10/2/2008     FINDINGS: Frontal view of the chest obtained.     Left subclavian port in place with tip overlying the SVC. Similar  borderline cardiomegaly.  No lobar consolidation.  Basilar densities favoring atelectasis, right lower lobe pneumonia  difficult to exclude. No pleural effusion or pneumothorax. No acute  regional bony pathology       Impression:      1. Right greater left basilar densities favoring atelectasis. Right  lower lobe pneumonitis considered. Left subclavian port appropriate in  position        This report was signed and finalized on 7/9/2025 3:54 PM by Dr. Nikky Santamaria MD.             I have personally reviewed and interpreted the radiology studies and ECG obtained at time of admission.     Assessment / Plan   Assessment:   Active Hospital Problems    Diagnosis     **Dysphagia        Dysphagia  Mild hypokalemia  Hypomagnesemia  Possible aspiration pneumonitis  Squamous cell carcinoma-proximal esophagus  History of breast cancer in 2008.    Diabetes mellitus type 2  GERD  Chronic kidney disease stage 3a.  COPD        PET CT scan 6/27/2025: mass in the paravertebral soft tissues inseparable from the cervical esophagus, soft tissue extension into the right paratracheal soft tissues, asymmetric metabolic activity in the high right posterior parietal centrum semiovale.      MRI of the brain showed no suspicious enhancing tumor.    White blood cell count 13,000 hemoglobin 9 point hematocrit 29.5 normal platelet count.  Sodium 134, potassium 3.3.  BUN 52, creatinine 1.23.  Glucose 146.  Magnesium 1.5      Treatment Plan  The patient will be admitted to my  service here at [facility].    Patient will remain NPO.  Intravenous hydration.    Unasyn IV    Replace electrolytes.    Start D5 half-normal saline +20 mEq KCl at 75 mL/h.    Magnesium sulfate ordered.    Electrolyte replacement protocol.      Mild sliding scale of insulin for now.    Gastroenterology evaluation for possible alternative means of nutrition, PEG tube.    Palliative care evaluation in the morning.        Medical Decision Making  Number and Complexity of problems: 10, moderate to high complexity    Differential Diagnosis: see above    Conditions and Status        Condition is unchanged.     MDM Data  External documents reviewed: no  Cardiac tracing (EKG, telemetry) interpretation: sinus  Radiology interpretation: reports reviewed  Labs reviewed: yes  Any tests that were considered but not ordered: no     Decision rules/scores evaluated (example [id number], Wells, etc): none     Discussed with: patient and son     Care Planning  Shared decision making: patient  Code status and discussions: DNR    Disposition  Social Determinants of Health that impact treatment or disposition: none    Electronically signed by Olman Felton MD, 07/09/25, 17:40 CDT.              Detail Level: Zone Plan: patient was seen by his PCP and given a dosage of doxycyline to take

## 2025-07-10 ENCOUNTER — ANESTHESIA (OUTPATIENT)
Dept: GASTROENTEROLOGY | Facility: HOSPITAL | Age: 85
End: 2025-07-10
Payer: MEDICARE

## 2025-07-10 ENCOUNTER — ANESTHESIA (OUTPATIENT)
Dept: PERIOP | Facility: HOSPITAL | Age: 85
End: 2025-07-10
Payer: MEDICARE

## 2025-07-10 ENCOUNTER — ANESTHESIA EVENT (OUTPATIENT)
Dept: GASTROENTEROLOGY | Facility: HOSPITAL | Age: 85
End: 2025-07-10
Payer: MEDICARE

## 2025-07-10 ENCOUNTER — ANESTHESIA EVENT (OUTPATIENT)
Dept: PERIOP | Facility: HOSPITAL | Age: 85
End: 2025-07-10
Payer: MEDICARE

## 2025-07-10 PROBLEM — E43 SEVERE PROTEIN-CALORIE MALNUTRITION: Status: ACTIVE | Noted: 2025-07-10

## 2025-07-10 LAB
ALBUMIN SERPL-MCNC: 2.5 G/DL (ref 3.5–5.2)
ALBUMIN/GLOB SERPL: 0.5 G/DL
ALP SERPL-CCNC: 44 U/L (ref 39–117)
ALT SERPL W P-5'-P-CCNC: <5 U/L (ref 1–33)
ANION GAP SERPL CALCULATED.3IONS-SCNC: 17 MMOL/L (ref 5–15)
AST SERPL-CCNC: 14 U/L (ref 1–32)
BACTERIA UR QL AUTO: ABNORMAL /HPF
BASOPHILS # BLD AUTO: 0.03 10*3/MM3 (ref 0–0.2)
BASOPHILS NFR BLD AUTO: 0.2 % (ref 0–1.5)
BILIRUB SERPL-MCNC: 0.5 MG/DL (ref 0–1.2)
BILIRUB UR QL STRIP: NEGATIVE
BUN SERPL-MCNC: 39.3 MG/DL (ref 8–23)
BUN/CREAT SERPL: 44.7 (ref 7–25)
CALCIUM SPEC-SCNC: 9.7 MG/DL (ref 8.6–10.5)
CHLORIDE SERPL-SCNC: 97 MMOL/L (ref 98–107)
CLARITY UR: CLEAR
CO2 SERPL-SCNC: 22 MMOL/L (ref 22–29)
COLOR UR: YELLOW
CREAT SERPL-MCNC: 0.88 MG/DL (ref 0.57–1)
DEPRECATED RDW RBC AUTO: 39.6 FL (ref 37–54)
EGFRCR SERPLBLD CKD-EPI 2021: 65.3 ML/MIN/1.73
EOSINOPHIL # BLD AUTO: 0.07 10*3/MM3 (ref 0–0.4)
EOSINOPHIL NFR BLD AUTO: 0.5 % (ref 0.3–6.2)
ERYTHROCYTE [DISTWIDTH] IN BLOOD BY AUTOMATED COUNT: 12.9 % (ref 12.3–15.4)
GLOBULIN UR ELPH-MCNC: 5.1 GM/DL
GLUCOSE BLDC GLUCOMTR-MCNC: 137 MG/DL (ref 70–130)
GLUCOSE BLDC GLUCOMTR-MCNC: 143 MG/DL (ref 70–130)
GLUCOSE BLDC GLUCOMTR-MCNC: 149 MG/DL (ref 70–130)
GLUCOSE BLDC GLUCOMTR-MCNC: 164 MG/DL (ref 70–130)
GLUCOSE BLDC GLUCOMTR-MCNC: 199 MG/DL (ref 70–130)
GLUCOSE SERPL-MCNC: 132 MG/DL (ref 65–99)
GLUCOSE UR STRIP-MCNC: NEGATIVE MG/DL
HCT VFR BLD AUTO: 31.4 % (ref 34–46.6)
HGB BLD-MCNC: 10.5 G/DL (ref 12–15.9)
HGB UR QL STRIP.AUTO: NEGATIVE
HYALINE CASTS UR QL AUTO: ABNORMAL /LPF
IMM GRANULOCYTES # BLD AUTO: 0.04 10*3/MM3 (ref 0–0.05)
IMM GRANULOCYTES NFR BLD AUTO: 0.3 % (ref 0–0.5)
KETONES UR QL STRIP: ABNORMAL
LEUKOCYTE ESTERASE UR QL STRIP.AUTO: ABNORMAL
LYMPHOCYTES # BLD AUTO: 2.54 10*3/MM3 (ref 0.7–3.1)
LYMPHOCYTES NFR BLD AUTO: 19.6 % (ref 19.6–45.3)
MAGNESIUM SERPL-MCNC: 2 MG/DL (ref 1.6–2.4)
MCH RBC QN AUTO: 28.6 PG (ref 26.6–33)
MCHC RBC AUTO-ENTMCNC: 33.4 G/DL (ref 31.5–35.7)
MCV RBC AUTO: 85.6 FL (ref 79–97)
MONOCYTES # BLD AUTO: 1.3 10*3/MM3 (ref 0.1–0.9)
MONOCYTES NFR BLD AUTO: 10 % (ref 5–12)
NEUTROPHILS NFR BLD AUTO: 69.4 % (ref 42.7–76)
NEUTROPHILS NFR BLD AUTO: 9.01 10*3/MM3 (ref 1.7–7)
NITRITE UR QL STRIP: POSITIVE
NRBC BLD AUTO-RTO: 0 /100 WBC (ref 0–0.2)
PH UR STRIP.AUTO: 7.5 [PH] (ref 5–8)
PLATELET # BLD AUTO: 333 10*3/MM3 (ref 140–450)
PMV BLD AUTO: 10.7 FL (ref 6–12)
POTASSIUM SERPL-SCNC: 3.9 MMOL/L (ref 3.5–5.2)
PROT SERPL-MCNC: 7.6 G/DL (ref 6–8.5)
PROT UR QL STRIP: ABNORMAL
RBC # BLD AUTO: 3.67 10*6/MM3 (ref 3.77–5.28)
RBC # UR STRIP: ABNORMAL /HPF
REF LAB TEST METHOD: ABNORMAL
SODIUM SERPL-SCNC: 136 MMOL/L (ref 136–145)
SP GR UR STRIP: 1.02 (ref 1–1.03)
SQUAMOUS #/AREA URNS HPF: ABNORMAL /HPF
TRI-PHOS CRY URNS QL MICRO: ABNORMAL /HPF
UROBILINOGEN UR QL STRIP: ABNORMAL
WBC # UR STRIP: ABNORMAL /HPF
WBC NRBC COR # BLD AUTO: 12.99 10*3/MM3 (ref 3.4–10.8)

## 2025-07-10 PROCEDURE — 25010000002 FAMOTIDINE 10 MG/ML SOLUTION: Performed by: FAMILY MEDICINE

## 2025-07-10 PROCEDURE — 25010000002 SUGAMMADEX 200 MG/2ML SOLUTION: Performed by: NURSE ANESTHETIST, CERTIFIED REGISTERED

## 2025-07-10 PROCEDURE — 25010000002 BUPIVACAINE LIPOSOME 1.3 % SUSPENSION: Performed by: GENERAL PRACTICE

## 2025-07-10 PROCEDURE — 25010000002 LIDOCAINE PF 2% 2 % SOLUTION: Performed by: NURSE ANESTHETIST, CERTIFIED REGISTERED

## 2025-07-10 PROCEDURE — 82948 REAGENT STRIP/BLOOD GLUCOSE: CPT

## 2025-07-10 PROCEDURE — 99223 1ST HOSP IP/OBS HIGH 75: CPT | Performed by: INTERNAL MEDICINE

## 2025-07-10 PROCEDURE — 0DJ08ZZ INSPECTION OF UPPER INTESTINAL TRACT, VIA NATURAL OR ARTIFICIAL OPENING ENDOSCOPIC: ICD-10-PCS | Performed by: INTERNAL MEDICINE

## 2025-07-10 PROCEDURE — S2900 ROBOTIC SURGICAL SYSTEM: HCPCS | Performed by: GENERAL PRACTICE

## 2025-07-10 PROCEDURE — 99497 ADVNCD CARE PLAN 30 MIN: CPT | Performed by: CLINICAL NURSE SPECIALIST

## 2025-07-10 PROCEDURE — 99223 1ST HOSP IP/OBS HIGH 75: CPT | Performed by: CLINICAL NURSE SPECIALIST

## 2025-07-10 PROCEDURE — 25010000002 MORPHINE PER 10 MG: Performed by: GENERAL PRACTICE

## 2025-07-10 PROCEDURE — 25010000002 CEFAZOLIN PER 500 MG: Performed by: NURSE ANESTHETIST, CERTIFIED REGISTERED

## 2025-07-10 PROCEDURE — 25010000002 PROPOFOL 10 MG/ML EMULSION: Performed by: NURSE ANESTHETIST, CERTIFIED REGISTERED

## 2025-07-10 PROCEDURE — 99221 1ST HOSP IP/OBS SF/LOW 40: CPT | Performed by: INTERNAL MEDICINE

## 2025-07-10 PROCEDURE — 99221 1ST HOSP IP/OBS SF/LOW 40: CPT | Performed by: GENERAL PRACTICE

## 2025-07-10 PROCEDURE — 0DH64UZ INSERTION OF FEEDING DEVICE INTO STOMACH, PERCUTANEOUS ENDOSCOPIC APPROACH: ICD-10-PCS | Performed by: GENERAL PRACTICE

## 2025-07-10 PROCEDURE — 83735 ASSAY OF MAGNESIUM: CPT | Performed by: FAMILY MEDICINE

## 2025-07-10 PROCEDURE — 80053 COMPREHEN METABOLIC PANEL: CPT | Performed by: FAMILY MEDICINE

## 2025-07-10 PROCEDURE — 25010000002 ONDANSETRON PER 1 MG: Performed by: ANESTHESIOLOGY

## 2025-07-10 PROCEDURE — 43200 ESOPHAGOSCOPY FLEXIBLE BRUSH: CPT | Performed by: INTERNAL MEDICINE

## 2025-07-10 PROCEDURE — 25810000003 LACTATED RINGERS PER 1000 ML: Performed by: GENERAL PRACTICE

## 2025-07-10 PROCEDURE — 85025 COMPLETE CBC W/AUTO DIFF WBC: CPT | Performed by: FAMILY MEDICINE

## 2025-07-10 PROCEDURE — 25010000002 AMPICILLIN-SULBACTAM PER 1.5 G: Performed by: FAMILY MEDICINE

## 2025-07-10 PROCEDURE — 25010000002 PROPOFOL 10 MG/ML EMULSION

## 2025-07-10 PROCEDURE — 82948 REAGENT STRIP/BLOOD GLUCOSE: CPT | Performed by: ANESTHESIOLOGY

## 2025-07-10 PROCEDURE — 25010000002 AMPICILLIN-SULBACTAM PER 1.5 G: Performed by: GENERAL PRACTICE

## 2025-07-10 PROCEDURE — 36415 COLL VENOUS BLD VENIPUNCTURE: CPT | Performed by: FAMILY MEDICINE

## 2025-07-10 PROCEDURE — 63710000001 INSULIN REGULAR HUMAN PER 5 UNITS: Performed by: GENERAL PRACTICE

## 2025-07-10 PROCEDURE — 43653 LAPAROSCOPY GASTROSTOMY: CPT | Performed by: GENERAL PRACTICE

## 2025-07-10 PROCEDURE — 82948 REAGENT STRIP/BLOOD GLUCOSE: CPT | Performed by: GENERAL PRACTICE

## 2025-07-10 RX ORDER — SODIUM CHLORIDE 0.9 % (FLUSH) 0.9 %
3 SYRINGE (ML) INJECTION AS NEEDED
Status: DISCONTINUED | OUTPATIENT
Start: 2025-07-10 | End: 2025-07-10 | Stop reason: HOSPADM

## 2025-07-10 RX ORDER — CEFAZOLIN SODIUM 1 G/3ML
INJECTION, POWDER, FOR SOLUTION INTRAMUSCULAR; INTRAVENOUS AS NEEDED
Status: DISCONTINUED | OUTPATIENT
Start: 2025-07-10 | End: 2025-07-10 | Stop reason: SURG

## 2025-07-10 RX ORDER — NALOXONE HCL 0.4 MG/ML
0.04 VIAL (ML) INJECTION AS NEEDED
Status: DISCONTINUED | OUTPATIENT
Start: 2025-07-10 | End: 2025-07-10

## 2025-07-10 RX ORDER — PROPOFOL 10 MG/ML
VIAL (ML) INTRAVENOUS AS NEEDED
Status: DISCONTINUED | OUTPATIENT
Start: 2025-07-10 | End: 2025-07-10 | Stop reason: SURG

## 2025-07-10 RX ORDER — LIDOCAINE HYDROCHLORIDE 10 MG/ML
0.5 INJECTION, SOLUTION EPIDURAL; INFILTRATION; INTRACAUDAL; PERINEURAL ONCE AS NEEDED
Status: DISCONTINUED | OUTPATIENT
Start: 2025-07-10 | End: 2025-07-10 | Stop reason: HOSPADM

## 2025-07-10 RX ORDER — HYDROMORPHONE HYDROCHLORIDE 1 MG/ML
0.5 INJECTION, SOLUTION INTRAMUSCULAR; INTRAVENOUS; SUBCUTANEOUS
Status: DISCONTINUED | OUTPATIENT
Start: 2025-07-10 | End: 2025-07-10

## 2025-07-10 RX ORDER — DEXTROSE MONOHYDRATE, SODIUM CHLORIDE, AND POTASSIUM CHLORIDE 50; 1.49; 4.5 G/1000ML; G/1000ML; G/1000ML
75 INJECTION, SOLUTION INTRAVENOUS CONTINUOUS
Status: DISPENSED | OUTPATIENT
Start: 2025-07-10 | End: 2025-07-11

## 2025-07-10 RX ORDER — FLUMAZENIL 0.1 MG/ML
0.2 INJECTION INTRAVENOUS AS NEEDED
Status: DISCONTINUED | OUTPATIENT
Start: 2025-07-10 | End: 2025-07-10

## 2025-07-10 RX ORDER — ROCURONIUM BROMIDE 10 MG/ML
INJECTION, SOLUTION INTRAVENOUS AS NEEDED
Status: DISCONTINUED | OUTPATIENT
Start: 2025-07-10 | End: 2025-07-10 | Stop reason: SURG

## 2025-07-10 RX ORDER — LABETALOL HYDROCHLORIDE 5 MG/ML
5 INJECTION, SOLUTION INTRAVENOUS
Status: DISCONTINUED | OUTPATIENT
Start: 2025-07-10 | End: 2025-07-10

## 2025-07-10 RX ORDER — LIDOCAINE HYDROCHLORIDE 20 MG/ML
INJECTION, SOLUTION EPIDURAL; INFILTRATION; INTRACAUDAL; PERINEURAL AS NEEDED
Status: DISCONTINUED | OUTPATIENT
Start: 2025-07-10 | End: 2025-07-10 | Stop reason: SURG

## 2025-07-10 RX ORDER — CARVEDILOL 12.5 MG/1
12.5 TABLET ORAL 2 TIMES DAILY WITH MEALS
Status: ON HOLD | COMMUNITY
End: 2025-07-16

## 2025-07-10 RX ORDER — SODIUM CHLORIDE, SODIUM LACTATE, POTASSIUM CHLORIDE, CALCIUM CHLORIDE 600; 310; 30; 20 MG/100ML; MG/100ML; MG/100ML; MG/100ML
1000 INJECTION, SOLUTION INTRAVENOUS CONTINUOUS
Status: DISCONTINUED | OUTPATIENT
Start: 2025-07-10 | End: 2025-07-10 | Stop reason: HOSPADM

## 2025-07-10 RX ORDER — OXYCODONE AND ACETAMINOPHEN 10; 325 MG/1; MG/1
1 TABLET ORAL EVERY 4 HOURS PRN
Status: DISCONTINUED | OUTPATIENT
Start: 2025-07-10 | End: 2025-07-10

## 2025-07-10 RX ORDER — FENTANYL CITRATE 50 UG/ML
50 INJECTION, SOLUTION INTRAMUSCULAR; INTRAVENOUS
Status: DISCONTINUED | OUTPATIENT
Start: 2025-07-10 | End: 2025-07-10

## 2025-07-10 RX ORDER — ONDANSETRON 2 MG/ML
4 INJECTION INTRAMUSCULAR; INTRAVENOUS
Status: DISCONTINUED | OUTPATIENT
Start: 2025-07-10 | End: 2025-07-10

## 2025-07-10 RX ADMIN — DEXTROSE MONOHYDRATE, SODIUM CHLORIDE, AND POTASSIUM CHLORIDE 75 ML/HR: 50; 1.49; 4.5 INJECTION, SOLUTION INTRAVENOUS at 15:20

## 2025-07-10 RX ADMIN — AMPICILLIN SODIUM, SULBACTAM SODIUM 3 G: 2; 1 INJECTION, POWDER, FOR SOLUTION INTRAMUSCULAR; INTRAVENOUS at 23:46

## 2025-07-10 RX ADMIN — AMPICILLIN SODIUM, SULBACTAM SODIUM 3 G: 2; 1 INJECTION, POWDER, FOR SOLUTION INTRAMUSCULAR; INTRAVENOUS at 00:25

## 2025-07-10 RX ADMIN — HUMAN INSULIN 2 UNITS: 100 INJECTION, SOLUTION SUBCUTANEOUS at 23:46

## 2025-07-10 RX ADMIN — ROCURONIUM 50 MG: 50 INJECTION, SOLUTION INTRAVENOUS at 16:25

## 2025-07-10 RX ADMIN — AMPICILLIN SODIUM, SULBACTAM SODIUM 3 G: 2; 1 INJECTION, POWDER, FOR SOLUTION INTRAMUSCULAR; INTRAVENOUS at 18:55

## 2025-07-10 RX ADMIN — SUGAMMADEX 200 MG: 100 INJECTION, SOLUTION INTRAVENOUS at 18:00

## 2025-07-10 RX ADMIN — ONDANSETRON 4 MG: 2 INJECTION, SOLUTION INTRAMUSCULAR; INTRAVENOUS at 18:22

## 2025-07-10 RX ADMIN — Medication 10 ML: at 08:58

## 2025-07-10 RX ADMIN — LIDOCAINE HYDROCHLORIDE 100 MG: 20 INJECTION, SOLUTION EPIDURAL; INFILTRATION; INTRACAUDAL; PERINEURAL at 16:25

## 2025-07-10 RX ADMIN — PROPOFOL 100 MG: 10 INJECTION, EMULSION INTRAVENOUS at 11:48

## 2025-07-10 RX ADMIN — SODIUM CHLORIDE, POTASSIUM CHLORIDE, SODIUM LACTATE AND CALCIUM CHLORIDE 1000 ML: 600; 310; 30; 20 INJECTION, SOLUTION INTRAVENOUS at 16:16

## 2025-07-10 RX ADMIN — FAMOTIDINE 20 MG: 10 INJECTION INTRAVENOUS at 08:58

## 2025-07-10 RX ADMIN — PROPOFOL 150 MG: 10 INJECTION, EMULSION INTRAVENOUS at 16:25

## 2025-07-10 RX ADMIN — CEFAZOLIN 1 G: 1 INJECTION, POWDER, FOR SOLUTION INTRAMUSCULAR; INTRAVENOUS at 16:30

## 2025-07-10 RX ADMIN — AMPICILLIN SODIUM, SULBACTAM SODIUM 3 G: 2; 1 INJECTION, POWDER, FOR SOLUTION INTRAMUSCULAR; INTRAVENOUS at 13:06

## 2025-07-10 RX ADMIN — MORPHINE SULFATE 1 MG: 2 INJECTION, SOLUTION INTRAMUSCULAR; INTRAVENOUS at 21:57

## 2025-07-10 RX ADMIN — AMPICILLIN SODIUM, SULBACTAM SODIUM 3 G: 2; 1 INJECTION, POWDER, FOR SOLUTION INTRAMUSCULAR; INTRAVENOUS at 05:49

## 2025-07-10 NOTE — PLAN OF CARE
"Goal Outcome Evaluation:  Plan of Care Reviewed With: patient        Progress: no change  Outcome Evaluation: AOx4, speech is soft and quiet, VSS. IV fluids running as per order. NPO d/t trouble swallowing for a few months. Pt incont. of bladder, PW in use. Pt unknown of last BM, Pt stated \"can go for weeks without pooping\". Pt blood sugar monitored as per order. MASD groin. Calll light within reach, bed alarm on.                             "

## 2025-07-10 NOTE — PROGRESS NOTES
Nutrition Services    Patient Name:  Lorena Valdes  YOB: 1940  MRN: [id number]  Admit Date:  7/9/2025    Patient Name: Lorena Valdes  YOB: 1940  MRN: [id number]  Admission date: 7/9/2025  Reason for Encounter: MST 4-5    [facility] Clinical Nutrition Assessment     Subjective    Subjective Information     Pt reports difficulty swallowing at least since April. Pt reports recently trying to sip water from a spoon,difficulty with ice chips,difficulty with applesauce with milk added to this to attempt to take her medication with . Pt with new diagnosis of pharyngeal and esophageal cancer. PET scan 6/27/25. Plans for xrt treatment for cancer. Pt reports weakness. Plans for PEG tube today. Discussion with pt and family at bedside on enteral nutrition and bolus tube feeding.     Recommend Diabetisource AC bolus feeds of: initiate at 125 ml for first bolus,increase by 100 ml of enteral feeding as tolerated with each meals bolus until goal volume of 375 ml four times per day. Flush peg tube with 60 ml of water before and after each meal bolus. Additional water bolus of 150 ml for hydration daily. Diabetisource AC bolus provides: 1800 kcal,90 gm protein,22.8 gm fiber,1224 ml of free water,total water of 1854 ml/day.       Assessment    H&P and Current Problems      H&P  Past Medical History:   Diagnosis Date    Bladder disorder     Chronic kidney disease, stage 3b     Diverticulosis     Emphysema lung     Fibrocystic disease of breast     CONNIE (generalized anxiety disorder)     GERD (gastroesophageal reflux disease)     Heart disease     History of bone density study 2011    History of colon polyps     Hyperglycemia     Hyperlipidemia     Hypotension     Left ventricular diastolic dysfunction     Malignant neoplasm of central portion of left female breast 11/09/2016    Osteopenia 08/29/2017    Restrictive lung disease     Type 2 diabetes mellitus     Uterine prolapse       Past  "Surgical History:   Procedure Laterality Date    BREAST LUMPECTOMY  2008    CATARACT EXTRACTION Right 2021    COLONOSCOPY  09/16/2010    Diverticulosis; Repeat 10 years    COLONOSCOPY N/A 11/11/2020    One 6mm inflamed hyperplastic polyp in the cecum; Diverticulosis in the left colon; Non-bleeding internal hemorrhoids; Repeat 5 years    ENDOSCOPY N/A 06/12/2025    Exophytic mass found in the larynx, not obstructing the airway; Partially obstructing, rule out malignancy, esophageal tumor was found in the proximal esophagus-biopsied; Medium-sized HH; Normal stomach; Two non-bleeding angiodysplastic lesions in the duodenum-biopsied    MAMMO BILATERAL  07/17/2013    Dr. Sabillon    MASTECTOMY Left 08/28/2008    PAP SMEAR  2010    SKIN CANCER EXCISION        Current Problems   Admission Diagnosis:  Dysphagia [R13.10]    Problem List:    Dysphagia      Other Applicable Nutrition Information:   Progressive dysphagia,severe malnutrition in chronic illness.     Anthropometrics      BMI, Height, Weight Estimated body mass index is 23.39 kg/m² as calculated from the following:    Height as of this encounter: 160 cm (62.99\").    Weight as of this encounter: 59.9 kg (132 lb).    Weight Method: Estimated       Trending Weight Changes weight loss of 58 lbs (30%) over 1 year(s)    Significant?  Yes       Weight History  Wt Readings from Last 20 Encounters:   07/09/25 1832 59.9 kg (132 lb)   07/09/25 1419 60 kg (132 lb 4.8 oz)   06/24/25 1314 67.6 kg (149 lb)   06/23/25 1053 67.6 kg (149 lb 1.6 oz)   06/18/25 1535 70.3 kg (155 lb)   06/12/25 0729 70.3 kg (155 lb)   06/11/25 0945 69.9 kg (154 lb)   06/04/24 1125 86.2 kg (190 lb 1.6 oz)   06/06/23 1114 83.8 kg (184 lb 12.8 oz)   05/13/22 1124 87.3 kg (192 lb 6.4 oz)   09/01/21 1108 87.1 kg (192 lb 1.6 oz)   11/11/20 0930 85.7 kg (189 lb)   10/07/20 0955 88 kg (194 lb)   09/01/20 1056 88.6 kg (195 lb 4.8 oz)   09/04/19 1136 86.2 kg (190 lb)   09/04/18 0920 83.6 kg (184 lb 3.2 oz) "   08/29/17 1128 83.9 kg (185 lb)   03/14/17 1123 84.3 kg (185 lb 12.8 oz)            Labs      Comment: HgbA1c 7.0     Results from last 7 days   Lab Units 07/10/25  0332 07/09/25  2139 07/09/25  1748 07/09/25  1419 07/03/25  1125   SODIUM mmol/L 136 135*  --  134* 138   POTASSIUM mmol/L 3.9 3.9  --  3.3* 3.6   GLUCOSE mg/dL 132* 119*  --  146* 130*   BUN mg/dL 39.3* 46.8*  --  52.5* 37*   CREATININE mg/dL 0.88 1.07*  --  1.23* 1.2*   CALCIUM mg/dL 9.7 9.7  --  10.0 9.9   MAGNESIUM mg/dL 2.0  --   --  1.5*  --    ALBUMIN g/dL 2.5*  --   --  2.9* 3.2*   LACTATE mmol/L  --   --  1.4  --   --    BILIRUBIN mg/dL 0.5  --   --  0.7 0.7   ALK PHOS U/L 44  --   --  45 53   AST (SGOT) U/L 14  --   --  10 14   ALT (SGPT) U/L <5  --   --  <5 5*     Results from last 7 days   Lab Units 07/10/25  0332 07/09/25  1419 07/03/25  1125   PLATELETS 10*3/mm3 333 392 509*   HEMOGLOBIN g/dL 10.5* 9.8* 10.1*   HEMATOCRIT % 31.4* 29.5* 31*     Lab Results   Component Value Date    HGBA1C 7 (H) 07/03/2025          Medications       Scheduled Medications ampicillin-sulbactam, 3 g, Intravenous, Q6H  famotidine, 20 mg, Intravenous, Daily  insulin regular, 2-7 Units, Subcutaneous, Q6H  sodium chloride, 10 mL, Intravenous, Q12H        Infusions dextrose 5 % and sodium chloride 0.45 % with KCl 20 mEq/L, 75 mL/hr, Last Rate: 75 mL/hr (07/09/25 2102)        PRN Medications   senna-docusate sodium **AND** polyethylene glycol **AND** bisacodyl **AND** bisacodyl    Calcium Replacement - Follow Nurse / BPA Driven Protocol    dextrose    dextrose    glucagon (human recombinant)    Magnesium Low Dose Replacement - Follow Nurse / BPA Driven Protocol    Morphine **AND** naloxone    ondansetron ODT **OR** ondansetron    Phosphorus Replacement - Follow Nurse / BPA Driven Protocol    Potassium Replacement - Follow Nurse / BPA Driven Protocol    [COMPLETED] Insert Peripheral IV **AND** sodium chloride    sodium chloride    sodium chloride     Physical Findings       Chewing/Swallowing  Teeth Status: Mouth/Teeth WDL: .WDL except, teeth Teeth Symptoms: tooth/teeth missing  Chewing/Swallowing Issues: SLP eval pending and Dysphagia   Edema                            Bowel Function  Stool Output  Perineal Care: absorbent brief/pad changed, catheter care provided, diaper changed, perineum cleansed (07/09/25 2136)  Last Bowel Movement:  (unknown, can go without BM for wks)   I/Os  Intake & Output (last 3 days)         07/07 0701 07/08 0700 07/08 0701 07/09 0700 07/09 0701  07/10 0700 07/10 0701 07/11 0700    Urine (mL/kg/hr)   700     Total Output   700     Net   -700             Urine Unmeasured Occurrence   1 x            Lines, Drains, Airways, & Wounds       Active LDAs       Name Placement date Placement time Site Days Last dressing change    Peripheral IV 07/09/25 1420 20 G Right Antecubital 07/09/25  1420  Antecubital  less than 1     External Urinary Catheter 07/09/25 2136  --  less than 1     Single Lumen Implantable Port Left Chest --  --  Chest  --                       Nutrition Focused Physical Exam     Trending NFPE      Malnutrition Severity Assessment      Patient meets criteria for : Severe Malnutrition (Secondary signs: generalized weakness)  Malnutrition Type (Last 8 Hours)       Malnutrition Severity Assessment       Row Name 07/10/25 1106       Malnutrition Severity Assessment    Malnutrition Type Chronic Disease - Related Malnutrition      Row Name 07/10/25 1106       Insufficient Energy Intake     Insufficient Energy Intake Findings Severe    Insufficient Energy Intake  <75% of est. energy requirement for > or equal to 1 month      Row Name 07/10/25 1106       Unintentional Weight Loss     Unintentional Weight Loss Findings Severe    Unintentional Weight Loss  Weight loss greater than 20% in one year  wt loss of 58 lbs (30%) over 1 yr.      Row Name 07/10/25 1106       Muscle Loss    Loss of Muscle Mass Findings Severe    [place] Region Severe - deep  hollowing/scooping, lack of muscle to touch, facial bones well defined    Clavicle Bone Region Severe - protruding prominent bone    Acromion Bone Region Severe - squared shoulders, bones, and acromion process protrusion prominent    Scapular Bone Region Severe - prominent bones, depressions easily visible between ribs, scapula, spine, shoulders    Dorsal Hand Region Moderate - slight depression    Patellar Region Moderate - patella more prominent, less muscle definition around patella    Anterior Thigh Region Moderate - mild depression on inner thigh    Posterior Calf Region Moderate - some roundness, slight firmness      Row Name 07/10/25 1106       Fat Loss    Subcutaneous Fat Loss Findings Severe    Orbital Region  Severe - pronounced hollowness/depression, dark circles, loose saggy skin    Upper Arm Region Moderate - some fat tissue, not ample      Row Name 07/10/25 1106       Criteria Met (Must meet criteria for severity in at least 2 of these categories: M Wasting, Fat Loss, Fluid, Secondary Signs, Wt. Status, Intake)    Patient meets criteria for  Severe Malnutrition  Secondary signs: generalized weakness                     Estimated Needs      Energy Requirements    Weight for Calculation 132 lbs   Method for Estimation  30 kcals/kg   Daily Needs (kcal/day) 1797   Protein Requirements    Weight for Calculation 132 lbs   Method for Estimation 1.5-2.0 gm/kg   Daily Needs (g/day)  gm/day   Fluid Requirements     Method for Estimation 1 mL/kcal    Daily Needs (mL/day) 1797     Current Nutrition Orders & Evaluation of Intake      Oral Nutrition     Food Allergies  and Intolerances NKFA   Current PO Diet NPO Diet NPO Type: Strict NPO   Oral Nutrition Supplement None     Trending % PO Intake NPO     Enteral Nutrition     Current EN Order Patient isn't on Tube Feeding  Patient doesn't have any tube feeding modular orders     EN Route      EN Tolerance     EN Observation/Intake         Parenteral Nutrition      Current TPN Order    TPN Route    Lipids (mL/%/frequency)     Total # Days on TPN    TPN Observation/Intake       Assessment & Plan   Nutrition Diagnosis and Goals       Nutrition Diagnosis 1 Swallowing Difficulty related to inability to consume regular consistency food and/or fluids as evidenced pt reports difficulty with eating and taking in food and unintentional wt loss of  58 lbs (30%) over 1 yr. Order for PEG tube placement for nutrition.      Nutrition Diagnosis 2 Severe chronic disease or condition related malnutrition related to inability to consume regular consistency of food/fluids,new diagnosis of pharyngeal and esophageal cancer as evidence by severe muscle wasting and severe fat loss per NFPE and unintentional weight loss of 58 lbs (30%) over one year.         Goal(s) Initiate EN , Establish EN Tolerance , Tolerates EN , Tolerates EN at Goal , and No Significant Weight Loss      Nutrition Intervention and Prescription       Intervention  Start EN      Diet Prescription     Supplement Prescription     Education Provided       Enteral Prescription Recommend Diabetisource AC bolus feeds of: initiate at 125 ml for first bolus,increase by 100 ml of enteral feeding as tolerated with each meals bolus until goal volume of 375 ml four times per day. Flush peg tube with 60 ml of water before and after each meal bolus. Additional water bolus of 150 ml for hydration daily. Diabetisource AC bolus provides: 1800 kcal,90 gm protein,22.8 gm fiber,1224 ml of free water,total water of 1854 ml/day.       TPN Prescription      Monitoring/Evaluation       Monitor/Evaluation Per Protocol     RD Follow-Up Encounter 3-5 days     Electronically signed by:  Yojana Cage RDN, JEREMIAS  07/10/25 11:13 CDT

## 2025-07-10 NOTE — PROGRESS NOTES
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  Patient with history of breast cancer in 2008.  Status post lumpectomy and adjuvant chemotherapy; history of diabetes mellitus type 2, GERD, anemia, chronic kidney disease stage 3 a and COPD; the patient came  to the hospital reporting progressive dysphagia for solids, and choking sensation when attempting to drink fluids.  No rectal bleeding, no melena, no hematemesis.  No abdominal pain.      Today, patient still n.p.o., will have gastroenterology evaluation for possible PEG tube placement.  Patient has no other symptomatology reported.      Review of Systems   All pertinent negatives and positives are as above. All other systems have been reviewed and are negative unless otherwise stated.     Objective    Temp:  [97.5 °F (36.4 °C)-98.7 °F (37.1 °C)] 98.4 °F (36.9 °C)  Heart Rate:  [50-74] 72  Resp:  [14-18] 16  BP: (135-152)/(58-79) 137/62  Physical Exam  Constitutional:       Appearance: Chronically ill-appearing, alert, oriented.  Dysphonic speech.  HENT:      Head: Normocephalic and atraumatic.      Nose: Nose normal.      Mouth/Throat:      Mouth: Mucous membranes are moist.   Eyes:      Extraocular Movements: Extraocular movements intact.      Conjunctiva/sclera: Conjunctivae normal.      Pupils: Pupils are equal, round  Cardiovascular:      Rate and Rhythm: Normal rate and regular rhythm.      Pulses: Normal pulses.   Pulmonary:      Effort: No respiratory  "distress.      Breath sounds: Normal breath sounds.   Abdominal:      General: Abdomen is flat. Bowel sounds are normal.      Palpations: Abdomen is soft.   Extremities:  No lower extremity edema.  Skin:     Capillary Refill: Capillary refill takes less than 2 seconds.      Coloration: Skin is not jaundiced.      Findings: No rash.   Neurological:      General: No focal deficit present.      Mental Status: Patient is alert, oriented to place time and person.      Results Review:  I have reviewed the labs, radiology results, and diagnostic studies.    Laboratory Data:   Results from last 7 days   Lab Units 07/10/25  0332 07/09/25  1419 07/03/25  1125   WBC 10*3/mm3 12.99* 13.75* 12.9*   HEMOGLOBIN g/dL 10.5* 9.8* 10.1*   HEMATOCRIT % 31.4* 29.5* 31*   PLATELETS 10*3/mm3 333 392 509*        Results from last 7 days   Lab Units 07/10/25  0332 07/09/25 2139 07/09/25 1419 07/03/25  1125   SODIUM mmol/L 136 135* 134* 138   POTASSIUM mmol/L 3.9 3.9 3.3* 3.6   CHLORIDE mmol/L 97* 98 95* 98   TOTAL CO2 mmol/L  --   --   --  25   CO2 mmol/L 22.0 20.0* 23.0  --    BUN mg/dL 39.3* 46.8* 52.5* 37*   CREATININE mg/dL 0.88 1.07* 1.23* 1.2*   CALCIUM mg/dL 9.7 9.7 10.0 9.9   BILIRUBIN mg/dL 0.5  --  0.7 0.7   ALK PHOS U/L 44  --  45 53   ALT (SGPT) U/L <5  --  <5 5*   AST (SGOT) U/L 14  --  10 14   GLUCOSE mg/dL 132* 119* 146* 130*       Culture Data:   No results found for: \"BLOODCX\", \"URINECX\", \"WOUNDCX\", \"MRSACX\", \"RESPCX\", \"STOOLCX\"    Radiology Data:   Imaging Results (Last 24 Hours)       Procedure Component Value Units Date/Time    XR Chest 1 View [[id number]] Collected: 07/09/25 1551     Updated: 07/09/25 1558    Narrative:      XR CHEST 1 VW-     HISTORY: generalized weakness; history of left breast cancer, esophageal  cancer     COMPARISON: 10/2/2008     FINDINGS: Frontal view of the chest obtained.     Left subclavian port in place with tip overlying the SVC. Similar  borderline cardiomegaly.  No lobar " consolidation.  Basilar densities favoring atelectasis, right lower lobe pneumonia  difficult to exclude. No pleural effusion or pneumothorax. No acute  regional bony pathology       Impression:      1. Right greater left basilar densities favoring atelectasis. Right  lower lobe pneumonitis considered. Left subclavian port appropriate in  position        This report was signed and finalized on 7/9/2025 3:54 PM by Dr. Nikky Santamaria MD.               I have reviewed the patient's current medications.     Assessment/Plan   Assessment  Active Hospital Problems    Diagnosis     **Dysphagia        Dysphagia  Mild hypokalemia, resolved  Hypomagnesemia, resolved  Possible aspiration pneumonitis  Squamous cell carcinoma-proximal esophagus  History of breast cancer in 2008.    Diabetes mellitus type 2  GERD  Chronic kidney disease stage 3a.  COPD           PET CT scan 6/27/2025: mass in the paravertebral soft tissues inseparable from the cervical esophagus, soft tissue extension into the right paratracheal soft tissues, asymmetric metabolic activity in the high right posterior parietal centrum semiovale.       MRI of the brain showed no suspicious enhancing tumor.      Initial potassium was 3.3, today 3.9  Initial magnesium 1.5, today 2.0  Creatinine 0.88, glucose 132     White blood cell count 13,000 hemoglobin 10.5   Normal platelet count.   Treatment Plan  NPO.    Unasyn IV, day #2     Replace electrolytes as needed  Continue D5 half-normal saline +20 mEq KCl at 75 mL/h.      Mild sliding scale of insulin for now.     Gastroenterology consulted for PEG tube.     Palliative care evaluation.        Medical Decision Making  Number and Complexity of problems: 9, moderate complexity      Differential Diagnosis: see above    Conditions and Status        Condition is unchanged.     MDM Data  External documents reviewed: no  Cardiac tracing (EKG, telemetry) interpretation: no new  Radiology interpretation: no new  Labs  reviewed: yes  Any tests that were considered but not ordered: no     Decision rules/scores evaluated (example [id number], Wells, etc): none     Discussed with: patient     Care Planning  Shared decision making: patient  Code status and discussions: FC    Disposition  Social Determinants of Health that impact treatment or disposition: none  I expect the patient to be discharged to home with home health versus SNF once approved.      Electronically signed by Olman Felton MD, 07/10/25, 10:41 CDT.

## 2025-07-10 NOTE — ANESTHESIA POSTPROCEDURE EVALUATION
Patient: Lorena Valdes    Procedure Summary       Date: 07/10/25 Room / Location: [facility] ENDOSCOPY 2 / [facility] ENDOSCOPY    Anesthesia Start: 1144 Anesthesia Stop: 1154    Procedure: ESOPHAGOGASTRODUODENOSCOPY WITH PERCUTANEOUS ENDOSCOPIC GASTROSTOMY TUBE INSERTION WITH ANESTHESIA Diagnosis:     Surgeons: Christine Valdovinos MD Provider: Isra Akhtar CRNA    Anesthesia Type: MAC ASA Status: 3            Anesthesia Type: MAC    Vitals  No vitals data found for the desired time range.          Post Anesthesia Care and Evaluation    Patient location during evaluation: PHASE II  Patient participation: complete - patient participated  Level of consciousness: awake  Pain management: adequate    Airway patency: patent  Anesthetic complications: No anesthetic complications  Respiratory status: acceptable  Hydration status: acceptable

## 2025-07-10 NOTE — CONSULTS
[facility]    Palliative Care Initial Consult   Attending Physician: Olman Felton MD  Referring Provider: Olman Felton MD     Reason for Referral: assistance with clarification of goals of care  Family/Support: Children    Code Status and Medical Interventions: No CPR (Do Not Attempt to Resuscitate); Limited Support; No intubation (DNI)   Ordered at: 07/09/25 1759     Code Status (Patient has no pulse and is not breathing):    No CPR (Do Not Attempt to Resuscitate)     Medical Interventions (Patient has pulse or is breathing):    Limited Support     Medical Intervention Limits:    No intubation (DNI)     Goals of Care: TBD.    HPI:   84 y.o. female has a past medical history of squamous cell carcinoma of esophagus-recent diagnosis, left breast cancer s/p lumpectomy and chemotherapy-Adriamycin (developed cardiomyopathy and discontinued), Cytoxan, and Taxol-completed 3/2009 followed by adjuvant radiation (2008).  Followed by Dr. Samano last seen 6/23/2025 with recommendations for further clarification from ENT, PET scan scheduled, results as below, plan follow-up in 6 weeks.  Seen by Dr. Lee Haynes, ENT 6/24/2025, underwent flexible fiberoptic laryngoscopy without findings of mass or lesion.  Plan for FNA.    Nuclear PET scan at a 6/27/2025  1. There is a mass within the prevertebral soft tissues inseparable from  the cervical esophagus with circumferential thickening noted along the  more inferior margin of this mass. I suspect it emanates from the  esophagus. There is soft tissue extension into the right paratracheal  soft tissues with metabolically active small right jugular chain node  adjacent to the lower margin of the mass as well as a level 2R higher  right paratracheal node demonstrating mild increased metabolic activity.  There is an additional nodule in the anterior right lung base which  demonstrates relatively low SUVs but only measures 4  mm in size and  would warrant follow-up with a metastasis not entirely excluded.  2. On the uppermost cut of today's exam there is a focus of asymmetric  metabolic activity within the high right posterior parietal centrum  semiovale. No convincing evidence of mass effect on the nonenhanced CT  but I would suggest follow-up with an MRI with and without contrast more  entirely exclude metastatic disease to the brain.  3. No evidence of metabolically active metastasis within the abdomen or  pelvis.  4. Post lumpectomy changes left breast    MRI brain 7/1  1.  No suspicious enhancing tumor. No evidence of intracranial  metastatic disease.  2.  Mild volume loss, likely age-appropriate. There is some evidence of  chronic microvascular change.    Additional health history positive for restrictive lung disease, Chronic kidney disease-stage 3b, type 2 diabetes mellitus, diverticulosis, Emphysema lung, CONNIE, GERD, Heart disease, History of colon polyps, Hyperlipidemia, diastolic dysfunction, Osteopenia, impaired mobility-uses cane, and Uterine prolapse.   Patient presented to [facility] on 7/9/2025 related to dysphagia.  ED workup shows creatinine 1.23, hyponatremia, hypokalemia, hypomagnesemia, leukocytosis, anemia.  Chest imaging shows right greater than left basal densities favoring atelectasis.  Right lower lobe pneumonitis considered.hypoalbuminemia.  Made n.p.o., started on IV fluids and empiric antibiotics, in addition to replacement electrolytes.  GI consulted for possible alternative means of nutrition with PEG tube.  Sitting up in bed without apparent distress.  Her 2 daughters are currently at bedside.  Without complaint other than ongoing dysphagia and hoarseness.  Palliative Care Spoke With: patient and family reports significant decline in quality of life and functional status due to progressive weakness and inability to maintain adequate oral hydration and nutrition secondary to dysphagia.  She lives  independently and has good family support.  States symptoms started in April of this year with choking easily.  She is awaiting further diagnostic workup as recommended per ENT with FNA soon.  Plans to follow-up with Dr. Samano outpatient. Due to the Palliative Care Topics Discussed: palliative care, goals of care, care options, resuscitation status, [facility], and discharge options we will establish an advance care plan.   Advance Care Planning   Advance Care Planning Discussion: Spoke with patient and 2 daughters in room with regard to events leading to current hospitalization and overall poor to guarded long-term prognosis squamous cell carcinoma of esophagus, left breast cancer, possible aspiration pneumonitis, dysphagia, unintended weight loss, multiple comorbidities, and advanced age.  Discussed plans for PEG tube placement today for supplemental/artificial means of nutrition.  Potential side effects to include tube dislodgment and aspiration despite PEG tube placement discussed.  Discussed potential oncologic treatment options and states she has taken both radiation and chemotherapy in the past.  She is open to radiation treatments but leaning against chemotherapy given her adverse reactions in the past.  Given goals of care we discussed potential side effects of radiation therapy and possible need for complete nutrition requirements via PEG given high risk for recurrent aspiration/pneumonia.  We further assessed medical priorities including CODE STATUS and medical interventions.  Patient has elected no CPR/limited support interventions, no intubation, no dialysis, and no vasopressors.  She wishes to avoid overly aggressive care/heroic measures.  Based upon goals we will plan to complete a MOST document.  She is aware CODE STATUS will be rescinded during planned procedure this afternoon.  Given progressive weakness we explored discharge options and anticipating SNF for rehab with goal of improvement in  functional mobility.  Her daughter Sylvia works at [employer] and we discussed options of care in the event further rehospitalization's and aggressive care interventions no longer desired such as palliative versus hospice.  Questions encouraged and support given.  Family appreciative for information provided.     Review of Systems   Constitutional: Positive for malaise/fatigue and weight loss.   HENT:  Positive for hoarse voice.    Cardiovascular:  Negative for leg swelling.   Respiratory:  Negative for shortness of breath.    Gastrointestinal:  Positive for dysphagia.   Genitourinary:  Positive for bladder incontinence.   Neurological:  Positive for weakness.   Psychiatric/Behavioral:  The patient is not nervous/anxious.      1- Pain Assessment  Preferred Pain Scale: number (Numeric Rating Pain Scale)    Past Medical History:   Diagnosis Date    Bladder disorder     Chronic kidney disease, stage 3b     Diverticulosis     Emphysema lung     Fibrocystic disease of breast     CONNIE (generalized anxiety disorder)     GERD (gastroesophageal reflux disease)     Heart disease     History of bone density study 2011    History of colon polyps     Hyperglycemia     Hyperlipidemia     Hypotension     Left ventricular diastolic dysfunction     Malignant neoplasm of central portion of left female breast 11/09/2016    Osteopenia 08/29/2017    Restrictive lung disease     Type 2 diabetes mellitus     Uterine prolapse      Past Surgical History:   Procedure Laterality Date    BREAST LUMPECTOMY  2008    CATARACT EXTRACTION Right 2021    COLONOSCOPY  09/16/2010    Diverticulosis; Repeat 10 years    COLONOSCOPY N/A 11/11/2020    One 6mm inflamed hyperplastic polyp in the cecum; Diverticulosis in the left colon; Non-bleeding internal hemorrhoids; Repeat 5 years    ENDOSCOPY N/A 06/12/2025    Exophytic mass found in the larynx, not obstructing the airway; Partially obstructing, rule out malignancy, esophageal tumor was  found in the proximal esophagus-biopsied; Medium-sized HH; Normal stomach; Two non-bleeding angiodysplastic lesions in the duodenum-biopsied    MAMMO BILATERAL  2013    Dr. Sabillon    MASTECTOMY Left 2008    PAP SMEAR  2010    SKIN CANCER EXCISION       Social History     Socioeconomic History    Marital status: Single   Tobacco Use    Smoking status: Former     Current packs/day: 0.00     Types: Cigarettes     Quit date: 1974     Years since quittin.5    Smokeless tobacco: Never   Vaping Use    Vaping status: Never Used   Substance and Sexual Activity    Alcohol use: Yes     Alcohol/week: 1.0 standard drink of alcohol     Types: 1 Cans of beer per week     Comment: Occasionally     Drug use: No    Sexual activity: Defer         Current Facility-Administered Medications:     ampicillin-sulbactam (UNASYN) 3 g in sodium chloride 0.9 % 100 mL MBP, 3 g, Intravenous, Q6H, Olman Felton MD, 3 g at 07/10/25 0549    sennosides-docusate (PERICOLACE) 8.6-50 MG per tablet 2 tablet, 2 tablet, Oral, BID PRN **AND** polyethylene glycol (MIRALAX) packet 17 g, 17 g, Oral, Daily PRN **AND** bisacodyl (DULCOLAX) EC tablet 5 mg, 5 mg, Oral, Daily PRN **AND** bisacodyl (DULCOLAX) suppository 10 mg, 10 mg, Rectal, Daily PRN, Olman Felton MD    Calcium Replacement - Follow Nurse / BPA Driven Protocol, , Not Applicable, PRN, Olman Felton MD    dextrose (D50W) (25 g/50 mL) IV injection 25 g, 25 g, Intravenous, Q15 Min PRN, Olman Felton MD    dextrose (GLUTOSE) oral gel 15 g, 15 g, Oral, Q15 Min PRN, Olman Felton MD    dextrose 5 % and sodium chloride 0.45 % with KCl 20 mEq/L infusion, 75 mL/hr, Intravenous, Continuous, Olman Felton MD, Last Rate: 75 mL/hr at 25, 75 mL/hr at 25    famotidine (PEPCID) injection 20 mg, 20 mg, Intravenous, Daily, Olman Felton MD, 20 mg at 07/10/25 0858    glucagon (GLUCAGEN) injection 1 mg, 1 mg, Intramuscular, Q15 Min PRN, Jose Francisco,  Olman HERNANDEZ MD    insulin regular (humuLIN R,novoLIN R) injection 2-7 Units, 2-7 Units, Subcutaneous, Q6H, Olman Felton MD    Magnesium Low Dose Replacement - Follow Nurse / BPA Driven Protocol, , Not Applicable, Jose Francisco CORTEZ Carlos F, MD    Morphine sulfate (PF) injection 1 mg, 1 mg, Intravenous, Q4H PRN **AND** naloxone (NARCAN) injection 0.4 mg, 0.4 mg, Intravenous, Q5 Min PRN, Olman eFlton MD    ondansetron ODT (ZOFRAN-ODT) disintegrating tablet 4 mg, 4 mg, Oral, Q6H PRN **OR** ondansetron (ZOFRAN) injection 4 mg, 4 mg, Intravenous, Q6H PRN, Olman Felton MD    Phosphorus Replacement - Follow Nurse / BPA Driven Protocol, , Not Applicable, PRJose Francisco EDWARDS Carlos F, MD    Potassium Replacement - Follow Nurse / BPA Driven Protocol, , Not Applicable, PRJose Francisco EDWARDS Carlos F, MD    [COMPLETED] Insert Peripheral IV, , , Once **AND** sodium chloride 0.9 % flush 10 mL, 10 mL, Intravenous, PRN, Primo Morrow MD    sodium chloride 0.9 % flush 10 mL, 10 mL, Intravenous, Q12H, Olman Felton MD, 10 mL at 07/10/25 0858    sodium chloride 0.9 % flush 10 mL, 10 mL, Intravenous, PRJose Francisco EDWARDS Carlos F, MD    sodium chloride 0.9 % infusion 40 mL, 40 mL, Intravenous, PRN, Olman Felton MD    Allergies   Allergen Reactions    Adhesive Tape Hives     Latex Tape     I have utilized all available immediate resources to obtain, update, or review the patient's current medications (including all prescriptions, over-the-counter products, herbals, cannabis/cannabidiol products, and vitamin/mineral/dietary (nutritional) supplements) for name, route of administration, type, dose and frequency.      Intake/Output Summary (Last 24 hours) at 7/10/2025 0917  Last data filed at 7/10/2025 0553  Gross per 24 hour   Intake --   Output 700 ml   Net -700 ml       Physical Exam:    Diagnostics: Reviewed  /62 (BP Location: Right arm, Patient Position: Lying)   Pulse 72   Temp 98.4 °F (36.9 °C) (Oral)   Resp 16    "Ht 160 cm (62.99\")   Wt 59.9 kg (132 lb)   SpO2 95%   BMI 23.39 kg/m²     Vitals and nursing note reviewed.   Constitutional:       Appearance: Not in distress. Frail.   Eyes:      Pupils: Pupils are equal, round, and reactive to light.   Pulmonary:      Effort: Pulmonary effort is normal.   Cardiovascular:      Normal rate.   Edema:     Peripheral edema absent.   Musculoskeletal: Normal range of motion.      Cervical back: Neck supple. Skin:     General: Skin is warm.   Genitourinary:     Comments: Urinary incontinence, external urinary catheter in place  Neurological:      Mental Status: Alert and oriented to person, place and time.      Comments: Hoarseness and soft voice quality   Psychiatric:         Mood and Affect: Mood normal.         Cognition and Memory: Cognition normal.     Patient status: Disease state: Controlled with current treatments.  Current Functional status: Palliative Performance Scale Score: Performance 10% based on the following measures: Ambulation: Totally bed bound, Activity and Evidence of Disease: Unable to do any work, extensive evidence of disease, Self-Care: Total care required,  Intake: Mouth care only, LOC: Drowsy or comatose   Baseline Functional status: Palliative Performance Scale Score: Performance 60% based on the following measures: Ambulation: Reduced, Activity and Evidence of Disease: Unable to do hobby or some work, significant evidence of disease, Self-Care: Occasional assist necessary,  Intake: Normal or reduced, LOC: Full or confusion   Baseline ECOG Status(3) Capable of limited self-care, confined to bed or chair > 50% of waking hours.  Nutritional status: Albumin 2.9 Body mass index is 23.39 kg/m².         Hospital Problem List      Dysphagia    Impression/Problem List:    Squamous cell carcinoma of esophagus-recent diagnosis 6/12/2025  Left breast cancer s/p lumpectomy, chemotherapy, adjuvant radiation  Possible aspiration pneumonitis  Dysphagia      Weight loss, " currently 132#, 6/12/#, 6/4/2024-190#  Restrictive lung disease  Chronic kidney disease-stage 3b  Type 2 diabetes mellitus  Emphysema lung  Hyponatremia  Hypokalemia  Hypomagnesemia  Leukocytosis  Anemia  CONNIE  GERD  Heart disease  History of colon polyps  Hyperlipidemia  Diastolic dysfunction  Osteopenia  Impaired mobility-uses cane  Uterine prolapse  Advanced age      Recommendations/Plan:  1. plan: Goals of care include CODE STATUS no CPR/limited support interventions.    Family support: The patient receives support from her children..  Advance Directives: Advance Directive Status: Patient does not have advance directive   POA/Healthcare surrogate-children-next of kin.    2.  Palliative care encounter  - Prognosis is poor to guarded long-term secondary to due to squamous cell carcinoma of esophagus, left breast cancer, possible aspiration pneumonitis, dysphagia, unintended weight loss, multiple comorbidities, and advanced age.  -Patient and family appears to have good prognostic awareness.     -squamous cell carcinoma of esophagus-recent diagnosis, left breast cancer s/p lumpectomy and chemotherapy-Adriamycin (developed cardiomyopathy and discontinued), Cytoxan, and Taxol-completed 3/2009 followed by adjuvant radiation (2008).  Followed by Dr. Samano last seen 6/23/2025 with recommendations for further clarification from ENT, PET scan scheduled, results as below, plan follow-up in 6 weeks.  Seen by Dr. Lee Haynes, ENT 6/24/2025, underwent flexible fiberoptic laryngoscopy without findings of mass or lesion.  Plan for FNA    -Made n.p.o., started on IV fluids and empiric antibiotics, in addition to replacement electrolytes.    -GI consulted for possible alternative means of nutrition with PEG tube.    7/10-CODE STATUS changes no CPR/limited support interventions, no intubation, no dialysis, no vasopressors  - Will plan to complete a MOST document  - High risk for rehospitalization's and decline given  multiple factors  - Anticipate SNF at discharge for rehab  - Explored best supportive care directed by hospice when rehospitalization's and aggressive care interventions no longer desired  - Anticipate follow-up oncology outpatient.  Open to radiation therapy, leaning against systemic therapy given previous adverse effects in treatment  - Anticipate FNA, this has not been scheduled as of yet per family, as directed per ENT  - GI following plan for PEG tube placement today.  Given dysphagia and suspected pneumonitis discussed associated risks with artificial means of nutrition/radiation adverse effects and potential need for complete nutrition rather than supplemental needs.      Thank you for this consult and allowing us to participate in patient's plan of care. Palliative Care Team will continue to follow patient.     20 minutes spent on advance care planning-discussing with patient and/or family, answering questions, providing some guidance about a plan and documentation of care, and coordinating care face to face.    Anh Polk, ANNMARIE  7/10/2025  09:17 CDT

## 2025-07-10 NOTE — CONSULTS
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, called for evaluation to place PEG tube for feeding during therapy  Patient stated that she had not had solid for almost a week with weight loss, I reviewed the previous endoscopy imaging, and it seems that it is technically feasible endoscopically  As in history of present illness    Past Medical History:  Past Medical History:   Diagnosis Date    Bladder disorder     Chronic kidney disease, stage 3b     Diverticulosis     Emphysema lung     Fibrocystic disease of breast     CONNIE (generalized anxiety disorder)     GERD (gastroesophageal reflux disease)     Heart disease     History of bone density study 2011    History of colon polyps     Hyperglycemia     Hyperlipidemia     Hypotension     Left ventricular diastolic dysfunction     Malignant neoplasm of central portion of left female breast 11/09/2016    Osteopenia 08/29/2017    Restrictive lung disease     Type 2 diabetes mellitus     Uterine prolapse        Past Surgical History:  Past Surgical History:   Procedure Laterality Date    BREAST LUMPECTOMY  2008    CATARACT EXTRACTION Right 2021    COLONOSCOPY  09/16/2010    Diverticulosis; Repeat 10 years    COLONOSCOPY N/A 11/11/2020    One 6mm inflamed hyperplastic polyp in the cecum; Diverticulosis in the left colon; Non-bleeding internal hemorrhoids; Repeat 5 years    ENDOSCOPY N/A 06/12/2025    Exophytic mass found in the larynx, not obstructing the airway; Partially obstructing, rule out malignancy, esophageal tumor was found in the proximal esophagus-biopsied; Medium-sized HH; Normal stomach; Two non-bleeding angiodysplastic lesions in the  duodenum-biopsied    MAMMO BILATERAL  2013    Dr. Sabillon    MASTECTOMY Left 2008    PAP SMEAR  2010    SKIN CANCER EXCISION          Social History:   Social History     Tobacco Use    Smoking status: Former     Current packs/day: 0.00     Types: Cigarettes     Quit date:      Years since quittin.5    Smokeless tobacco: Never   Substance Use Topics    Alcohol use: Yes     Alcohol/week: 1.0 standard drink of alcohol     Types: 1 Cans of beer per week     Comment: Occasionally         Family History:  Family History   Problem Relation Age of Onset    Cancer Mother     Heart disease Father     No Known Problems Daughter     No Known Problems Son     Hypertension Daughter     Hypertension Daughter     Other Brother         polioi    Cancer Paternal Aunt     No Known Problems Maternal Grandmother     No Known Problems Maternal Grandfather     No Known Problems Paternal Grandmother     No Known Problems Paternal Grandfather     Drug abuse Brother     Colon cancer Neg Hx     Colon polyps Neg Hx     Esophageal cancer Neg Hx     Liver cancer Neg Hx     Liver disease Neg Hx     Rectal cancer Neg Hx     Stomach cancer Neg Hx        Home Meds:  Medications Prior to Admission   Medication Sig Dispense Refill Last Dose/Taking    amLODIPine (NORVASC) 2.5 MG tablet Take 1 tablet by mouth Daily.   Past Month    aspirin 81 MG EC tablet Take 81 mg by mouth daily.     Past Month    Calcium Carb-Cholecalciferol 600-200 MG-UNIT tablet Take 1 tablet by mouth 2 (Two) Times a Day.   Past Month    carvedilol (COREG) 12.5 MG tablet Take 1 tablet by mouth 2 (Two) Times a Day With Meals.   Past Month    Cholecalciferol (VITAMIN D3) 400 UNITS capsule Take 1 capsule by mouth Daily.   Past Month    famotidine (PEPCID) 40 MG tablet Take 1 tablet by mouth Daily.   Past Month    glipiZIDE (GLUCOTROL) 5 MG tablet Take 1 tablet by mouth Daily.   Past Month    losartan (COZAAR) 25 MG tablet Take 1 tablet by mouth Daily.  1 Past  Month    magnesium 30 MG tablet Take 1 tablet by mouth Daily.   Past Month    metFORMIN ER (GLUCOPHAGE-XR) 500 MG 24 hr tablet Take 1 tablet by mouth Every Night.   Past Month    pravastatin (PRAVACHOL) 40 MG tablet Take 1 tablet by mouth Daily.   Past Month       Current Meds:     Current Facility-Administered Medications:     ampicillin-sulbactam (UNASYN) 3 g in sodium chloride 0.9 % 100 mL MBP, 3 g, Intravenous, Q6H, Olman Felton MD, 3 g at 07/10/25 0549    sennosides-docusate (PERICOLACE) 8.6-50 MG per tablet 2 tablet, 2 tablet, Oral, BID PRN **AND** polyethylene glycol (MIRALAX) packet 17 g, 17 g, Oral, Daily PRN **AND** bisacodyl (DULCOLAX) EC tablet 5 mg, 5 mg, Oral, Daily PRN **AND** bisacodyl (DULCOLAX) suppository 10 mg, 10 mg, Rectal, Daily PRN, Olman Felton MD    Calcium Replacement - Follow Nurse / BPA Driven Protocol, , Not Applicable, PRN, Olman Felton MD    dextrose (D50W) (25 g/50 mL) IV injection 25 g, 25 g, Intravenous, Q15 Min PRN, Olman Felton MD    dextrose (GLUTOSE) oral gel 15 g, 15 g, Oral, Q15 Min PRN, Olman Felton MD    dextrose 5 % and sodium chloride 0.45 % with KCl 20 mEq/L infusion, 75 mL/hr, Intravenous, Continuous, Omlan Felton MD, Last Rate: 75 mL/hr at 07/09/25 2102, 75 mL/hr at 07/09/25 2102    famotidine (PEPCID) injection 20 mg, 20 mg, Intravenous, Daily, Olman Felton MD, 20 mg at 07/10/25 0858    glucagon (GLUCAGEN) injection 1 mg, 1 mg, Intramuscular, Q15 Min PRN, Olman Felton MD    insulin regular (humuLIN R,novoLIN R) injection 2-7 Units, 2-7 Units, Subcutaneous, Q6H, Olman Felton MD    Magnesium Low Dose Replacement - Follow Nurse / BPA Driven Protocol, , Not Applicable, PRN, Olman Felton MD    Morphine sulfate (PF) injection 1 mg, 1 mg, Intravenous, Q4H PRN **AND** naloxone (NARCAN) injection 0.4 mg, 0.4 mg, Intravenous, Q5 Min PRN, Olman Felton MD    ondansetron ODT (ZOFRAN-ODT) disintegrating tablet 4 mg,  4 mg, Oral, Q6H PRN **OR** ondansetron (ZOFRAN) injection 4 mg, 4 mg, Intravenous, Q6H PRN, Olman Felton MD    Phosphorus Replacement - Follow Nurse / BPA Driven Protocol, , Not Applicable, Jose Francisco CORTEZ Carlos F, MD    Potassium Replacement - Follow Nurse / BPA Driven Protocol, , Not Applicable, Jose Francisco CORTEZ Carlos F, MD    [COMPLETED] Insert Peripheral IV, , , Once **AND** sodium chloride 0.9 % flush 10 mL, 10 mL, Intravenous, PRN, Primo Morrow MD    sodium chloride 0.9 % flush 10 mL, 10 mL, Intravenous, Q12H, Olman Felton MD, 10 mL at 07/10/25 0858    sodium chloride 0.9 % flush 10 mL, 10 mL, Intravenous, PRN, Olman Felton MD    sodium chloride 0.9 % infusion 40 mL, 40 mL, Intravenous, PRN, Olman Felton MD    Allergies:  Allergies   Allergen Reactions    Adhesive Tape Hives     Latex Tape       Vital Signs  Temp:  [97.5 °F (36.4 °C)-98.7 °F (37.1 °C)] 98.4 °F (36.9 °C)  Heart Rate:  [50-74] 72  Resp:  [14-18] 16  BP: (135-152)/(58-79) 137/62  Body mass index is 23.39 kg/m².    Intake/Output Summary (Last 24 hours) at 7/10/2025 1008  Last data filed at 7/10/2025 0553  Gross per 24 hour   Intake --   Output 700 ml   Net -700 ml     No intake/output data recorded.    Review of Systems     As in history of present illness      Objective     Physical Exam:  General :    Alert, cooperative, in no acute distress   Head:    Normocephalic, without obvious abnormality, atraumatic   Eyes:            Lids and lashes normal, conjunctivae and sclerae normal, no   Icterus, conjunctival pallor   Throat:   No oral lesions, no thrush, oral mucosa moist, posterior oropharynx clear   Neck:   No adenopathy, supple, trachea midline, no thyromegaly, no   carotid bruit, no JVD   Lungs:     Clear to auscultation, respirations regular, even and                   unlabored    Heart:    Regular rhythm and normal rate, normal S1 and S2, no            murmur   Chest Wall:    No abnormalities observed  "  Abdomen:     Normal bowel sounds, no masses, no organomegaly, soft        nontender, nondistended, no guarding, no rebound                 tenderness   Rectal:     Deferred   Extremities:   No edema, no cyanosis   Skin:   No open lesions, bruising or rash   Lymph nodes:   No palpable cervical adenopathy   Psychiatric:  Judgement and insight: normal   Orientation to person place and time: normal   Mood and affect: normal        Results Review:    I have reviewed all of the patients current test results  Results from last 7 days   Lab Units 07/10/25  0332 07/09/25  1419 07/03/25  1125   WBC 10*3/mm3 12.99* 13.75* 12.9*   HEMOGLOBIN g/dL 10.5* 9.8* 10.1*   HEMATOCRIT % 31.4* 29.5* 31*   PLATELETS 10*3/mm3 333 392 509*       Results from last 7 days   Lab Units 07/10/25  0332 07/09/25  2139 07/09/25  1419 07/03/25  1125   SODIUM mmol/L 136 135* 134* 138   POTASSIUM mmol/L 3.9 3.9 3.3* 3.6   CHLORIDE mmol/L 97* 98 95* 98   TOTAL CO2 mmol/L  --   --   --  25   CO2 mmol/L 22.0 20.0* 23.0  --    BUN mg/dL 39.3* 46.8* 52.5* 37*   CREATININE mg/dL 0.88 1.07* 1.23* 1.2*   CALCIUM mg/dL 9.7 9.7 10.0 9.9   BILIRUBIN mg/dL 0.5  --  0.7 0.7   ALK PHOS U/L 44  --  45 53   ALT (SGPT) U/L <5  --  <5 5*   AST (SGOT) U/L 14  --  10 14   GLUCOSE mg/dL 132* 119* 146* 130*       Results from last 7 days   Lab Units 07/09/25  1419   INR  1.32*       No results found for: \"LIPASE\"    Radiology:    Imaging Results (Last 24 Hours)       Procedure Component Value Units Date/Time    XR Chest 1 View [[id number]] Collected: 07/09/25 1551     Updated: 07/09/25 1558    Narrative:      XR CHEST 1 VW-     HISTORY: generalized weakness; history of left breast cancer, esophageal  cancer     COMPARISON: 10/2/2008     FINDINGS: Frontal view of the chest obtained.     Left subclavian port in place with tip overlying the SVC. Similar  borderline cardiomegaly.  No lobar consolidation.  Basilar densities favoring atelectasis, right lower lobe " pneumonia  difficult to exclude. No pleural effusion or pneumothorax. No acute  regional bony pathology       Impression:      1. Right greater left basilar densities favoring atelectasis. Right  lower lobe pneumonitis considered. Left subclavian port appropriate in  position        This report was signed and finalized on 7/9/2025 3:54 PM by Dr. Nikky Santamaria MD.                 Assessment & Plan       Dysphagia      Impression/Plan    Esophageal cancer  Associated with decreased oral intake with weight loss  Associated with dysphagia  PEG placement today  Procedure explained to the patient and her 2 daughters at bedside      Addendum at 12 PM  Gastroscopy with PEG placement attempted, the scope could not pass to the esophagus due to narrowing  Consider IR or surgical gastrostomy tube placement  Will sign off  Please call for reevaluation if needed    Electronically signed by Christine Valdovinos MD, 07/10/25, 10:08 AM CDT.         Christine Valdovinos MD  07/10/25  10:08 CDT      PEG placement

## 2025-07-10 NOTE — ANESTHESIA PREPROCEDURE EVALUATION
Anesthesia Evaluation     Patient summary reviewed and Nursing notes reviewed   no history of anesthetic complications:   NPO Solid Status: > 8 hours  NPO Liquid Status: > 8 hours           Airway   Mallampati: I  TM distance: >3 FB  Neck ROM: full  Dental    (+) poor dentition        Pulmonary    (-) not a smoker  Cardiovascular   Exercise tolerance: good (4-7 METS)    (+) dysrhythmias Tachycardia, CHF Diastolic >=55%, hyperlipidemia      Neuro/Psych  (+) psychiatric history  (-) seizures, TIA, CVA  GI/Hepatic/Renal/Endo    (+) obesity, renal disease- CRI, diabetes mellitus  (-) liver disease    ROS Comment: ecently diagnosed squamous cell carcinoma-proximal esophagus, had an upper endoscopy performed 6/12/2025 with findings of an exophytic mass in the larynx, not obstructing the airway, and esophageal tumor in the proximal esophagus.      Pt admitted with dysphagia  Recent fiberopatic exam by Dr Haynes with no noted abnormalities    PET scan performed which showed;  IMPRESSION:  1. There is a mass within the prevertebral soft tissues inseparable from  the cervical esophagus with circumferential thickening noted along the  more inferior margin of this mass. I suspect it emanates from the  esophagus. There is soft tissue extension into the right paratracheal  soft tissues with metabolically active small right jugular chain node  adjacent to the lower margin of the mass as well as a level 2R higher  right paratracheal node demonstrating mild increased metabolic activity.      Pt denies any dyspnea    Musculoskeletal     Abdominal    Substance History      OB/GYN          Other   blood dyscrasia anemia,   history of cancer (breast cancer) remission                  Anesthesia Plan    ASA 3     general     intravenous induction     Anesthetic plan, risks, benefits, and alternatives have been provided, discussed and informed consent has been obtained with: patient.

## 2025-07-10 NOTE — ANESTHESIA PREPROCEDURE EVALUATION
Anesthesia Evaluation     Patient summary reviewed and Nursing notes reviewed   no history of anesthetic complications:   NPO Solid Status: > 8 hours  NPO Liquid Status: > 8 hours           Airway   Mallampati: I  TM distance: >3 FB  Neck ROM: full  Dental    (+) poor dentition        Pulmonary    (-) not a smoker  Cardiovascular   Exercise tolerance: good (4-7 METS)    (+) dysrhythmias Tachycardia, CHF Diastolic >=55%, hyperlipidemia      Neuro/Psych  (+) psychiatric history  (-) seizures, TIA, CVA  GI/Hepatic/Renal/Endo    (+) obesity, renal disease- CRI, diabetes mellitus  (-) liver disease    ROS Comment: ecently diagnosed squamous cell carcinoma-proximal esophagus, had an upper endoscopy performed 6/12/2025 with findings of an exophytic mass in the larynx, not obstructing the airway, and esophageal tumor in the proximal esophagus.      Pt admitted with dysphagia  Recent fiberopatic exam by Dr Haynes with no noted abnormalities    PET scan performed which showed;  IMPRESSION:  1. There is a mass within the prevertebral soft tissues inseparable from  the cervical esophagus with circumferential thickening noted along the  more inferior margin of this mass. I suspect it emanates from the  esophagus. There is soft tissue extension into the right paratracheal  soft tissues with metabolically active small right jugular chain node  adjacent to the lower margin of the mass as well as a level 2R higher  right paratracheal node demonstrating mild increased metabolic activity.      Pt denies any dyspnea    Musculoskeletal     Abdominal    Substance History      OB/GYN          Other   blood dyscrasia anemia,   history of cancer (breast cancer) remission                      Anesthesia Plan    ASA 3     MAC     intravenous induction     Anesthetic plan, risks, benefits, and alternatives have been provided, discussed and informed consent has been obtained with: patient.

## 2025-07-10 NOTE — CONSULTS
[facility] Oncology/Hematology Services  CONSULT NOTE    PATIENT NAME:  Lorena Valdes  YOB: 1940  PATIENT MRN:  [id number]    Date of Admission:  7/9/2025  Consultation Date:  7/10/2025  Referring Provider: Olman Felton MD      Subjective     Reason for Consultation: Continuity of care    History of present illness: Lorena Valdes 84 y.o. female with a history of breast cancer that was diagnosed in 2008.  She had a stage IIA left breast cancer that was hormone receptor negative and Her 2 positive by FISH.  She underwent lumpectomy then adjuvant Adriamycin Cytoxan.  She only had one dose of Adriamycin and developed cardiomyopathy and therefore the adriamycin was discontinued.  She had then weekly Taxol with all chemotherapy being completed in March 2009.  She then had adjuvant radiation therapy. 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 Bee of GI who performed an EGD on 6/12/2025 that showed a mass in the laryngeal area on the right side prior to entering the esophagus.  The mass was seen extending down into the proximal esophagus.  Biopsies confirmed squamous cell carcinoma.  Pathology unable to determine primary site.    -6/12/2025-EGD: - Exophytic mass found in the larynx, not obstructing the airway. - Partially obstructing, rule out malignancy, esophageal tumor was found in the proximal esophagus. Biopsied. - Medium-sized hiatal hernia. - Normal stomach. - Two non-bleeding angiodysplastic lesions in the duodenum. Biopsied.  Final diagnosis: Small bowel biopsy: Fragments of duodenal mucosa with no significant pathologic abnormalities.  Proximal esophageal mass, biopsy: Involved by moderately  differentiated squamous cell carcinoma.  Comment: Esophagus is involved by invasive squamous cell carcinoma.  From this small biopsy alone, it is unclear whether or not this represents an esophageal primary, or if this is extension from the described laryngeal mass noted in the electronic medical record.  From this sample, the overlying squamous epithelium appears to be relatively uninvolved, suggesting that this is a mass that is a growing into the wall of the esophagus rather than primarily arising from the esophageal mucosa; however, sampling error is always possible, and clinical correlation is imperative.  As with most squamous cell carcinomas, there are no stains to further differentiate the site of origin; this requires correlation with endoscopy/laryngoscopy or other testing modalities.The tumor cells are positive for p40, supporting squamous subtype.  They are also positive for p16, whereas the overlying non-dysplastic squamous epithelium is negative.    -6/24/2025-ENT consult by Dr. Haynes.  Fiberoptic laryngoscopy showed normal mucosal surfaces.  Base of tongue, epiglottis, aryepiglottic fold, false vocal fold and hypopharynx showed no mass or lesion.  True vocal cords were found to have no mass or lesion.  True vocal cord adduction abduction normally however the right true vocal cord is fixed in the paramedian position.    - 6/27/2025-PET scan-There is a mass within the prevertebral soft tissues inseparable from the cervical esophagus with circumferential thickening noted along the more inferior margin of this mass. I suspect it emanates from the esophagus. There is soft tissue extension into the right paratracheal soft tissues with metabolically active small right jugular chain node adjacent to the lower margin of the mass as well as a level 2R higher right paratracheal node demonstrating mild increased metabolic activity. There is an additional nodule in the anterior right lung base which demonstrates  relatively low SUVs but only measures 4 mm in size and would warrant follow-up with a metastasis not entirely excluded. On the uppermost cut of today's exam there is a focus of asymmetric metabolic activity within the high right posterior parietal centrum semiovale. No convincing evidence of mass effect on the nonenhanced CT but I would suggest follow-up with an MRI with and without contrast more  entirely exclude metastatic disease to the brain. No evidence of metabolically active metastasis within the abdomen or pelvis. Post lumpectomy changes left breast.    - 7/1/2025-MRI brain-no suspicious enhancing tumor.  No evidence of metastatic disease.    -7/9/2025-presented to the hospital with progressive dysphagia for solids and choking sensation and attempting to drink fluids.  Labs showed WBC of 13,000, Hgb 9, hematocrit 29.5, normal platelets, sodium 134, potassium 3.3, BUN 52, creatinine 1.23, glucose 146.  Patient admitted for IV hydration, electrolyte replacement, and has been seen by GI for PEG tube placement.    -7/10/2025-EGD revealed medium size nonobstructing mucosal lesion diffusely throughout the larynx.  Scope could not be passed.  Recommend IR or surgical placement of feeding tube.    -7/10/2025-seen by palliative care.  Prognosis poor to guarded long-term secondary to squamous cell carcinoma of esophagus, left breast cancer, possible aspiration pneumonitis, unintended weight loss, multiple comorbidities and advanced age.  Patient and family appear to have good prognostic awareness.  CODE STATUS clarified.  Anticipate SNF at discharge for rehab.  Explored best supportive care directed by hospice.  Open to radiation therapy, leaning against systemic therapy given previous adverse effects of treatment.    - 7/10/2025-PEG tube placement by general surgery, Dr. Weber    - 7/11/2025 (I attempted to see the patient yesterday, but she was in the OR).  Today the patient is seen in care continuity for the  history of breast cancer and management of newly diagnosed esophageal squamous cell carcinoma.    Past Medical History:  Past Medical History:   Diagnosis Date    Bladder disorder     Chronic kidney disease, stage 3b     Diverticulosis     Emphysema lung     Fibrocystic disease of breast     CONNIE (generalized anxiety disorder)     GERD (gastroesophageal reflux disease)     Heart disease     History of bone density study 2011    History of colon polyps     Hyperglycemia     Hyperlipidemia     Hypotension     Left ventricular diastolic dysfunction     Malignant neoplasm of central portion of left female breast 11/09/2016    Osteopenia 08/29/2017    Restrictive lung disease     Type 2 diabetes mellitus     Uterine prolapse      Prior Surgeries:  Past Surgical History:   Procedure Laterality Date    BREAST LUMPECTOMY  2008    CATARACT EXTRACTION Right 2021    COLONOSCOPY  09/16/2010    Diverticulosis; Repeat 10 years    COLONOSCOPY N/A 11/11/2020    One 6mm inflamed hyperplastic polyp in the cecum; Diverticulosis in the left colon; Non-bleeding internal hemorrhoids; Repeat 5 years    ENDOSCOPY N/A 06/12/2025    Exophytic mass found in the larynx, not obstructing the airway; Partially obstructing, rule out malignancy, esophageal tumor was found in the proximal esophagus-biopsied; Medium-sized HH; Normal stomach; Two non-bleeding angiodysplastic lesions in the duodenum-biopsied    MAMMO BILATERAL  07/17/2013    Dr. Sabiloln    MASTECTOMY Left 08/28/2008    PAP SMEAR  2010    SKIN CANCER EXCISION          Allergies:Adhesive tape  PTA Meds:  Medications Prior to Admission   Medication Sig Dispense Refill Last Dose/Taking    amLODIPine (NORVASC) 2.5 MG tablet Take 1 tablet by mouth Daily.   Past Month    aspirin 81 MG EC tablet Take 81 mg by mouth daily.     Past Month    Calcium Carb-Cholecalciferol 600-200 MG-UNIT tablet Take 1 tablet by mouth 2 (Two) Times a Day.   Past Month    carvedilol (COREG) 12.5 MG tablet Take 1  tablet by mouth 2 (Two) Times a Day With Meals.   Past Month    Cholecalciferol (VITAMIN D3) 400 UNITS capsule Take 1 capsule by mouth Daily.   Past Month    famotidine (PEPCID) 40 MG tablet Take 1 tablet by mouth Daily.   Past Month    glipiZIDE (GLUCOTROL) 5 MG tablet Take 1 tablet by mouth Daily.   Past Month    losartan (COZAAR) 25 MG tablet Take 1 tablet by mouth Daily.  1 Past Month    magnesium 30 MG tablet Take 1 tablet by mouth Daily.   Past Month    metFORMIN ER (GLUCOPHAGE-XR) 500 MG 24 hr tablet Take 1 tablet by mouth Every Night.   Past Month    pravastatin (PRAVACHOL) 40 MG tablet Take 1 tablet by mouth Daily.   Past Month      Current Meds:   Current Facility-Administered Medications   Medication Dose Route Frequency Provider Last Rate Last Admin    ampicillin-sulbactam (UNASYN) 3 g in sodium chloride 0.9 % 100 mL MBP  3 g Intravenous Q6H Olman Felton MD   3 g at 07/10/25 1306    sennosides-docusate (PERICOLACE) 8.6-50 MG per tablet 2 tablet  2 tablet Oral BID PRN Olman Felton MD        And    polyethylene glycol (MIRALAX) packet 17 g  17 g Oral Daily PRN Olman Felton MD        And    bisacodyl (DULCOLAX) EC tablet 5 mg  5 mg Oral Daily PRN Olman Felton MD        And    bisacodyl (DULCOLAX) suppository 10 mg  10 mg Rectal Daily PRN Olman Felton MD        Calcium Replacement - Follow Nurse / BPA Driven Protocol   Not Applicable PRN Olman Felton MD        dextrose (D50W) (25 g/50 mL) IV injection 25 g  25 g Intravenous Q15 Min PRN Olman Felton MD        dextrose (GLUTOSE) oral gel 15 g  15 g Oral Q15 Min PRN Olman Felton MD        dextrose 5 % and sodium chloride 0.45 % with KCl 20 mEq/L infusion  75 mL/hr Intravenous Continuous Olman Felton MD        famotidine (PEPCID) injection 20 mg  20 mg Intravenous Daily Olman Felton MD   20 mg at 07/10/25 0858    glucagon (GLUCAGEN) injection 1 mg  1 mg Intramuscular Q15 Min PRN Mik Feltons F, MD         insulin regular (humuLIN R,novoLIN R) injection 2-7 Units  2-7 Units Subcutaneous Q6H Olman Felton MD        Magnesium Low Dose Replacement - Follow Nurse / BPA Driven Protocol   Not Applicable PRN Olman Felton MD        Morphine sulfate (PF) injection 1 mg  1 mg Intravenous Q4H PRN Olman Felton MD        And    naloxone (NARCAN) injection 0.4 mg  0.4 mg Intravenous Q5 Min PRN Olman Felton MD        ondansetron ODT (ZOFRAN-ODT) disintegrating tablet 4 mg  4 mg Oral Q6H PRN Olman Felton MD        Or    ondansetron (ZOFRAN) injection 4 mg  4 mg Intravenous Q6H PRN Olman Felton MD        Phosphorus Replacement - Follow Nurse / BPA Driven Protocol   Not Applicable PRN Olman Felton MD        Potassium Replacement - Follow Nurse / BPA Driven Protocol   Not Applicable PROlman Mariscal MD        sodium chloride 0.9 % flush 10 mL  10 mL Intravenous PRN Primo Morrow MD        sodium chloride 0.9 % flush 10 mL  10 mL Intravenous Q12H Olman Felton MD   10 mL at 07/10/25 0858    sodium chloride 0.9 % flush 10 mL  10 mL Intravenous PRN Olman Felton MD        sodium chloride 0.9 % infusion 40 mL  40 mL Intravenous PRN Olman Felton MD           Family History:family history includes Cancer in her mother and paternal aunt; Drug abuse in her brother; Heart disease in her father; Hypertension in her daughter and daughter; No Known Problems in her daughter, maternal grandfather, maternal grandmother, paternal grandfather, paternal grandmother, and son; Other in her brother.   Social History: reports that she quit smoking about 51 years ago. Her smoking use included cigarettes. She has never used smokeless tobacco. She reports current alcohol use of about 1.0 standard drink of alcohol per week. She reports that she does not use drugs.    Review of Systems  Review of Systems:   Constitutional: Fatigue.  Weakness.  Poor appetite.  Per daughters has had a  "significant decline in functional status overall.  Previously lived alone.  2 children live nearby.  No fever / No chills / No sweats  HEENT:  + sore throat / No vision changes  CV:  No chest pain / No palpitations / No orthopnea  Respiratory:  + Worsening of chronic hoarseness for the past 4 months or so.  No cough / No shortness of breath / No sputum / No hemoptysis  GI: Difficulty swallowing for the past 4 months or so.  No nausea / No vomiting / No abdominal pain / No diarrhea / No constipation  :  No dysuria / No hesitancy / No urgency / No hematuria  Neuro:  No muscle weakness / No dysphagia / No headache / No paresthesias  Musculoskeletal:  No edema / No cyanosis / + chronic arthralgias (knees)      Objective      Vitals: /61 (BP Location: Right arm, Patient Position: Lying)   Pulse 65   Temp 98.2 °F (36.8 °C) (Oral)   Resp 18   Ht 160 cm (62.99\")   Wt 59.9 kg (132 lb)   SpO2 97%   BMI 23.39 kg/m²     Physical Exam  Physical Exam:  General appearance: Chronically ill-appearing.  Visibly slimmer elderly female who is alert, appears stated age and cooperative.  ECOG 3 (prior: 1).  Has lost nearly 20 pounds since she was last seen in the office on 6/23/2025.  Appears comfortable while lying in bed.  Her daughter Verona is at bedside  Skin:  Skin color somewhat pale.  No rashes or lesions  HEENT: Head: Atraumatic.  Bitemporal wasting is noted  Neck:  + adenopathy right neck.  No supraclavicular adenopathy bilaterally.  Lungs:  clear to auscultation bilaterally with distant breath sounds  Heart:  regular rate and rhythm  Abdomen:  soft, PEG tube is wrapped in a binder which is not undone today.    Extremities: Symmetrical.  No edema.  Neurologic:  Mental status: Alert, oriented, thought content appropriate    Results from last 7 days   Lab Units 07/10/25  0332 07/09/25  1419   WBC 10*3/mm3 12.99* 13.75*   HEMOGLOBIN g/dL 10.5* 9.8*   HEMATOCRIT % 31.4* 29.5*   PLATELETS 10*3/mm3 333 392      " "  Results from last 7 days   Lab Units 07/10/25  0332 07/09/25  2139 07/09/25  1419   SODIUM mmol/L 136 135* 134*   POTASSIUM mmol/L 3.9 3.9 3.3*   CHLORIDE mmol/L 97* 98 95*   CO2 mmol/L 22.0 20.0* 23.0   BUN mg/dL 39.3* 46.8* 52.5*   CREATININE mg/dL 0.88 1.07* 1.23*   CALCIUM mg/dL 9.7 9.7 10.0   BILIRUBIN mg/dL 0.5  --  0.7   ALK PHOS U/L 44  --  45   ALT (SGPT) U/L <5  --  <5   AST (SGOT) U/L 14  --  10   GLUCOSE mg/dL 132* 119* 146*       ABG:  No results found for: \"PHART\", \"PO2ART\", \"ERC9JJD\"    Culture Data:   No results found for: \"BLOODCX\", \"URINECX\", \"WOUNDCX\", \"MRSACX\", \"RESPCX\", \"STOOLCX\"    No results found for: \"PATHINTERP\"    Radiology:   Imaging Results (Last 7 Days)       Procedure Component Value Units Date/Time    XR Chest 1 View [[id number]] Collected: 07/09/25 1551     Updated: 07/09/25 1558    Narrative:      XR CHEST 1 VW-     HISTORY: generalized weakness; history of left breast cancer, esophageal  cancer     COMPARISON: 10/2/2008     FINDINGS: Frontal view of the chest obtained.     Left subclavian port in place with tip overlying the SVC. Similar  borderline cardiomegaly.  No lobar consolidation.  Basilar densities favoring atelectasis, right lower lobe pneumonia  difficult to exclude. No pleural effusion or pneumothorax. No acute  regional bony pathology       Impression:      1. Right greater left basilar densities favoring atelectasis. Right  lower lobe pneumonitis considered. Left subclavian port appropriate in  position        This report was signed and finalized on 7/9/2025 3:54 PM by Dr. Nikky Santamaria MD.                  Assessment/Plan      ASSESSMENT  1.   Squamous cell carcinoma-proximal esophagus p16+  --Original tumor stage:  JING ([id number])  --Original tumor burden:  -6/12/2025-EGD: Exophytic mass found in the larynx, not obstructing the airway. - Partially obstructing, rule out malignancy, esophageal tumor was found in the proximal esophagus. Biopsied. - Medium-sized " hiatal hernia. - Normal stomach. - Two non-bleeding angiodysplastic lesions in the duodenum. Biopsied.  Final diagnosis: Small bowel biopsy: Fragments of duodenal mucosa with no significant pathologic abnormalities.  Proximal esophageal mass, biopsy: Involved by moderately differentiated squamous cell carcinoma.  Comment: Esophagus is involved by invasive squamous cell carcinoma.  From this small biopsy alone, it is unclear whether or not this represents an esophageal primary, or if this is extension from the described laryngeal mass noted in the electronic medical record.  From this sample, the overlying squamous epithelium appears to be relatively uninvolved, suggesting that this is a mass that is a growing into the wall of the esophagus rather than primarily arising from the esophageal mucosa; however, sampling error is always possible, and clinical correlation is imperative.  As with most squamous cell carcinomas, there are no stains to further differentiate the site of origin; this requires correlation with endoscopy/laryngoscopy or other testing modalities.The tumor cells are positive for p40, supporting squamous subtype.  They are also positive for p16, whereas the overlying non-dysplastic squamous epithelium is negative.   -6/24/2025-ENT consult by Dr. Haynes.  Fiberoptic laryngoscopy showed normal mucosal surfaces.  Base of tongue, epiglottis, aryepiglottic fold, false vocal fold and hypopharynx showed no mass or lesion.  True vocal cords were found to have no mass or lesion.  True vocal cord adduction abduction normally however the right true vocal cord is fixed in the paramedian position.  - 6/27/2025-PET scan-There is a mass within the prevertebral soft tissues inseparable from the cervical esophagus with circumferential thickening noted along the more inferior margin of this mass. I suspect it emanates from the esophagus. There is soft tissue extension into the right paratracheal soft tissues with  metabolically active small right jugular chain node adjacent to the lower margin of the mass as well as a level 2R higher right paratracheal node demonstrating mild increased metabolic activity. There is an additional nodule in the anterior right lung base which demonstrates relatively low SUVs but only measures 4 mm in size and would warrant follow-up with a metastasis not entirely excluded. On the uppermost cut of today's exam there is a focus of asymmetric metabolic activity within the high right posterior parietal centrum semiovale. No convincing evidence of mass effect on the nonenhanced CT but I would suggest follow-up with an MRI with and without contrast more  entirely exclude metastatic disease to the brain. No evidence of metabolically active metastasis within the abdomen or pelvis. Post lumpectomy changes left breast.  - 7/1/2025-MRI brain-no suspicious enhancing tumor.  No evidence of metastatic disease.    --Complications of tumor:  -7/9/2025-presented to the hospital with progressive dysphagia for solids and choking sensation and attempting to drink fluids.  Labs showed WBC of 13,000, Hgb 9, hematocrit 29.5, normal platelets, sodium 134, potassium 3.3, BUN 52, creatinine 1.23, glucose 146.  Patient admitted for IV hydration, electrolyte replacement, and has been seen by GI for PEG tube placement.    --Tumor status:  -7/10/2025-EGD revealed medium size nonobstructing mucosal lesion diffusely throughout the larynx.  Scope could not be passed.  Recommend IR or surgical placement of feeding tube.  -7/10/2025-seen by palliative care.  Prognosis poor to guarded long-term secondary to squamous cell carcinoma of esophagus, left breast cancer, possible aspiration pneumonitis, unintended weight loss, multiple comorbidities and advanced age.  Patient and family appear to have good prognostic awareness.  CODE STATUS clarified.  Anticipate SNF at discharge for rehab.  Explored best supportive care directed by  hospice.  Open to radiation therapy, leaning against systemic therapy given previous adverse effects of treatment.  - 7/10/2025-surgical placement of PEG tube.  Dr. Weber    2.   History of left Breast Cancer diagnosed in 2008  Initial stage: AJCC TNM [id number], Stage IIA, ER - [place] -  , Her 2 kofi 2+, Fish positive.  TRIPLE NEGATIVE DISEASE  Treatment : Left Lumpectomy, adjuvant chemotherpay with Adriamycin Cytoxan x 1 cycle due to cardiomyopathy, Taxol weekly for 12 weeks and then adjuvant radiation therapy.   Disease status:   10/23/23-mammogram: BI-RADS Category 2: Benign.     3. Osteoporosis. Followed by pcp  - 8/2020, She continues on calcium. She will discuss plan with pcp  - 8/22/23- DEXA scan- Osteopenia.  10 year risk for major osteoporotic fracture is 17% and for hip fracture is 5.4%.   4.  Type 2 DM: on oral medications per pcp.    5.  GERD - on prilosec and controlled  6.  Anemia likely secondary to CKD + anemia of chronic disease, substrates previously normal.    -- Hgb 10.5; MCV 85.6, 7/10/2025 (prior range;  Hgb 10-12.2; MCV 93.8- 97.4)   --Would be a candidate for GALLITO therapy if and when her hemoglobin < 10 and hematocrit < 30.  7.  Neutrophilic leukocytosis.  Likely reactive to #1  8.  Chronic kidney disease, stage III  --GFR 69 mL/min, 7/11/2025 (prior:  39-53)  9.   COPD. Followed by pulmonary- Dr. Salazar  -2/28/24- Follow-up pulmonary.  Plan: Follow-up 3 mo for repeat CT  -1/10/24- CT chest- Interval development of a 6 mL ground-glass nodule of the left lower lobe superior segment and several scattered 5 mm smaller ground-glass nodules of the right upper lobe, probably infectious or inflammatory nature.  However, follow-up CT chest in 3 months is recommended.   -5/29/24- CT chest-Stable pulmonary fibrosis. No acute cardiopulmonary findings. Interval resolution of the previously identified right upper lobe and left lower lobe ground-glass nodules. Atherosclerosis including coronary  arteries. Cardiomegaly.   10.  Self directed     PLAN  1.  Apprised the patient and her daughter (Verona) of diagnostic information as outlined above.  2.  EGD and biopsies noted above.  Appears to be esophageal primary p16 positive squamous cell carcinoma with extension into the proximal esophagus.  Discussed the options of neoadjuvant chemotherapy +RT then surgery (given advanced age and multiple comorbidities, I doubt that she would be a surgical candidate) vs palliative/definitive chemotherapy+ RT vs palliative RT alone.  The patient is leaning towards either comfort care or radiation only.     3.  If the patient opts for chemotherapy, we will arrange for more definitive biopsy/FNA  4..  NCCN guidelines 3.2025 esophageal and EGJ cancers.  Histology: Squamous cell carcinoma-tumor classification: hR0b-nV4, N+ or cT3-cT4, any N-primary treatment options for patients were medically fit: Preoperative chemoradiation (preferred) or definitive chemoradiation.  Preferred regimens: Paclitaxel and carboplatin; fluorouracil and oxaliplatin (category 1).  5.  Mediport placement if patient agrees to chemotherapy  6.  Consult radiation oncology  7.  PEG tube feeding as directed by dietary service and surgery  8.  Palliative care following  9.  Continue other general medical management/support per primary team and other consulting services  10.  Outpatient follow-up upon discharge       I discussed the patients findings and my recommendations with the patient and her daughter Verona at the bedside    Justin Canas MD  07/10/25  15:05 CDT    Time: I spent ~75 minutes caring for Lorena on this date of service. This time includes time spent by me in the following activities: preparing for the visit, reviewing tests, performing a medically appropriate examination and/or evaluation, counseling and educating the patient/family/caregiver, ordering medications, tests, or procedures and documenting information in the medical  record.       Thank you for allowing me to participate in the care of Ms. Lorena Valdes

## 2025-07-10 NOTE — CONSULTS
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  A full past medical history is listed below.  General Surgery consulted due to need for PEG tube placement. Patient has not had solid food in a week and is experiencing weight loss. GI was consulted and attempted to place PEG tube. Unfortunately, the scope was only able to be advanced to the cricopharyngeal esophagus before procedure was aborted due to narrowing. Patient is currently resting in bed with her family at the bedside.  She states she would like to move forward with surgical placement of feeding tube. She denies history of abdominal surgeries.       Visit Dx:    ICD-10-CM ICD-9-CM   1. Dysphagia, unspecified type  R13.10 787.20   2. Failure to thrive in adult  R62.7 783.7       Hospital Problem List:     Dysphagia    Severe protein-calorie malnutrition      History:   Past Medical History:   Diagnosis Date    Bladder disorder     Chronic kidney disease, stage 3b     Diverticulosis     Emphysema lung     Fibrocystic disease of breast     CONNIE (generalized anxiety disorder)     GERD (gastroesophageal reflux disease)     Heart disease     History of bone density study 2011    History of colon polyps     Hyperglycemia     Hyperlipidemia     Hypotension     Left ventricular diastolic dysfunction     Malignant neoplasm of central portion of left female breast 11/09/2016    Osteopenia 08/29/2017    Restrictive lung disease     Type 2 diabetes mellitus     Uterine prolapse      Past Surgical  History:   Procedure Laterality Date    BREAST LUMPECTOMY      CATARACT EXTRACTION Right     COLONOSCOPY  2010    Diverticulosis; Repeat 10 years    COLONOSCOPY N/A 2020    One 6mm inflamed hyperplastic polyp in the cecum; Diverticulosis in the left colon; Non-bleeding internal hemorrhoids; Repeat 5 years    ENDOSCOPY N/A 2025    Exophytic mass found in the larynx, not obstructing the airway; Partially obstructing, rule out malignancy, esophageal tumor was found in the proximal esophagus-biopsied; Medium-sized HH; Normal stomach; Two non-bleeding angiodysplastic lesions in the duodenum-biopsied    MAMMO BILATERAL  2013    Dr. Sabillon    MASTECTOMY Left 2008    PAP SMEAR  2010    SKIN CANCER EXCISION       Social History     Socioeconomic History    Marital status: Single   Tobacco Use    Smoking status: Former     Current packs/day: 0.00     Types: Cigarettes     Quit date:      Years since quittin.5    Smokeless tobacco: Never   Vaping Use    Vaping status: Never Used   Substance and Sexual Activity    Alcohol use: Yes     Alcohol/week: 1.0 standard drink of alcohol     Types: 1 Cans of beer per week     Comment: Occasionally     Drug use: No    Sexual activity: Defer     Family History   Problem Relation Age of Onset    Cancer Mother     Heart disease Father     No Known Problems Daughter     No Known Problems Son     Hypertension Daughter     Hypertension Daughter     Other Brother         polioi    Cancer Paternal Aunt     No Known Problems Maternal Grandmother     No Known Problems Maternal Grandfather     No Known Problems Paternal Grandmother     No Known Problems Paternal Grandfather     Drug abuse Brother     Colon cancer Neg Hx     Colon polyps Neg Hx     Esophageal cancer Neg Hx     Liver cancer Neg Hx     Liver disease Neg Hx     Rectal cancer Neg Hx     Stomach cancer Neg Hx        Allergies:  Allergies   Allergen Reactions    Adhesive Tape Hives     Latex  Tape       Medications:    Current Facility-Administered Medications:     ampicillin-sulbactam (UNASYN) 3 g in sodium chloride 0.9 % 100 mL MBP, 3 g, Intravenous, Q6H, Olman Felton MD, 3 g at 07/10/25 1306    sennosides-docusate (PERICOLACE) 8.6-50 MG per tablet 2 tablet, 2 tablet, Oral, BID PRN **AND** polyethylene glycol (MIRALAX) packet 17 g, 17 g, Oral, Daily PRN **AND** bisacodyl (DULCOLAX) EC tablet 5 mg, 5 mg, Oral, Daily PRN **AND** bisacodyl (DULCOLAX) suppository 10 mg, 10 mg, Rectal, Daily PRN, Olman Felton MD    Calcium Replacement - Follow Nurse / BPA Driven Protocol, , Not Applicable, PRN, Olman Felton MD    dextrose (D50W) (25 g/50 mL) IV injection 25 g, 25 g, Intravenous, Q15 Min PRN, Olman Felton MD    dextrose (GLUTOSE) oral gel 15 g, 15 g, Oral, Q15 Min PRN, Olman Felton MD    dextrose 5 % and sodium chloride 0.45 % with KCl 20 mEq/L infusion, 75 mL/hr, Intravenous, Continuous, Olman Felton MD    famotidine (PEPCID) injection 20 mg, 20 mg, Intravenous, Daily, Olman Felton MD, 20 mg at 07/10/25 0858    glucagon (GLUCAGEN) injection 1 mg, 1 mg, Intramuscular, Q15 Min PRN, Olman Felton MD    insulin regular (humuLIN R,novoLIN R) injection 2-7 Units, 2-7 Units, Subcutaneous, Q6H, Olman Felton MD    Magnesium Low Dose Replacement - Follow Nurse / BPA Driven Protocol, , Not Applicable, PRN, Olman Felton MD    Morphine sulfate (PF) injection 1 mg, 1 mg, Intravenous, Q4H PRN **AND** naloxone (NARCAN) injection 0.4 mg, 0.4 mg, Intravenous, Q5 Min PRN, Olman Felton MD    ondansetron ODT (ZOFRAN-ODT) disintegrating tablet 4 mg, 4 mg, Oral, Q6H PRN **OR** ondansetron (ZOFRAN) injection 4 mg, 4 mg, Intravenous, Q6H PRN, Olman Felton MD    Phosphorus Replacement - Follow Nurse / BPA Driven Protocol, , Not Applicable, PRJose Francisco EDWARDS Carlos F, MD    Potassium Replacement - Follow Nurse / BPA Driven Protocol, , Not Applicable, PRN, Sorzano,  Olman HERNANDEZ MD    [COMPLETED] Insert Peripheral IV, , , Once **AND** sodium chloride 0.9 % flush 10 mL, 10 mL, Intravenous, PRN, Primo Morrow MD    sodium chloride 0.9 % flush 10 mL, 10 mL, Intravenous, Q12H, Olman Felton MD, 10 mL at 07/10/25 0858    sodium chloride 0.9 % flush 10 mL, 10 mL, Intravenous, PRN, Olman Felton MD    sodium chloride 0.9 % infusion 40 mL, 40 mL, Intravenous, PRN, Olman Felton MD    OBJECTIVE     Vitals:    07/10/25 1258   BP: 147/61   Pulse: 65   Resp: 18   Temp: 98.2 °F (36.8 °C)   SpO2: 97%       PHYSICAL EXAM:   Physical Exam  Vitals and nursing note reviewed.   Constitutional:       General: She is awake.      Appearance: Normal appearance.      Comments: Body mass index is 23.39 kg/m².    HENT:      Head: Normocephalic and atraumatic.   Eyes:      General: Lids are normal. Gaze aligned appropriately.   Cardiovascular:      Rate and Rhythm: Normal rate and regular rhythm.   Pulmonary:      Effort: Pulmonary effort is normal. No respiratory distress.   Abdominal:      General: There is no distension.      Palpations: Abdomen is soft.      Tenderness: There is no abdominal tenderness.   Musculoskeletal:      Cervical back: Normal range of motion and neck supple.   Skin:     General: Skin is warm and dry.   Neurological:      Mental Status: She is alert and oriented to person, place, and time.   Psychiatric:         Attention and Perception: Attention normal.         Mood and Affect: Mood normal.         Speech: Speech normal.         Behavior: Behavior is cooperative.            Results Review:  Lab Results (last 48 hours)       Procedure Component Value Units Date/Time    POC Glucose Once [[id number]]  (Abnormal) Collected: 07/10/25 0820    Specimen: Blood Updated: 07/10/25 0823     Glucose 149 mg/dL      Comment: Serial Number: [id number]:  [id number]       POC Glucose Once [[id number]]  (Abnormal) Collected: 07/10/25 0520    Specimen: Blood Updated:  07/10/25 0529     Glucose 143 mg/dL      Comment: Serial Number: [id number]:  [id number]       Magnesium [[id number]]  (Normal) Collected: 07/10/25 0332    Specimen: Blood Updated: 07/10/25 0522     Magnesium 2.0 mg/dL     Comprehensive Metabolic Panel [[id number]]  (Abnormal) Collected: 07/10/25 0332    Specimen: Blood Updated: 07/10/25 0521     Glucose 132 mg/dL      BUN 39.3 mg/dL      Creatinine 0.88 mg/dL      Sodium 136 mmol/L      Potassium 3.9 mmol/L      Comment: Specimen hemolyzed.  Result may be falsely elevated.        Chloride 97 mmol/L      CO2 22.0 mmol/L      Calcium 9.7 mg/dL      Total Protein 7.6 g/dL      Albumin 2.5 g/dL      ALT (SGPT) <5 U/L      Comment: Specimen hemolyzed.  Result may  be falsely elevated.        AST (SGOT) 14 U/L      Comment: Specimen hemolyzed.  Result may be falsely elevated.        Alkaline Phosphatase 44 U/L      Total Bilirubin 0.5 mg/dL      Globulin 5.1 gm/dL      A/G Ratio 0.5 g/dL      BUN/Creatinine Ratio 44.7     Anion Gap 17.0 mmol/L      eGFR 65.3 mL/min/1.73     Narrative:      GFR Categories in Chronic Kidney Disease (CKD)              GFR Category          GFR (mL/min/1.73)    Interpretation  G1                    90 or greater        Normal or high (1)  G2                    60-89                Mild decrease (1)  G3a                   45-59                Mild to moderate decrease  G3b                   30-44                Moderate to severe decrease  G4                    15-29                Severe decrease  G5                    14 or less           Kidney failure    (1)In the absence of evidence of kidney disease, neither GFR category G1 or G2 fulfill the criteria for CKD.    eGFR calculation 2021 CKD-EPI creatinine equation, which does not include race as a factor    POC Glucose Once [[id number]]  (Abnormal) Collected: 07/10/25 0005    Specimen: Blood Updated: 07/10/25 0515     Glucose 137 mg/dL      Comment: Serial Number: [id number]:   [id number]       CBC Auto Differential [[id number]]  (Abnormal) Collected: 07/10/25 0332    Specimen: Blood Updated: 07/10/25 0420     WBC 12.99 10*3/mm3      RBC 3.67 10*6/mm3      Hemoglobin 10.5 g/dL      Hematocrit 31.4 %      MCV 85.6 fL      MCH 28.6 pg      MCHC 33.4 g/dL      RDW 12.9 %      RDW-SD 39.6 fl      MPV 10.7 fL      Platelets 333 10*3/mm3      Neutrophil % 69.4 %      Lymphocyte % 19.6 %      Monocyte % 10.0 %      Eosinophil % 0.5 %      Basophil % 0.2 %      Immature Grans % 0.3 %      Neutrophils, Absolute 9.01 10*3/mm3      Lymphocytes, Absolute 2.54 10*3/mm3      Monocytes, Absolute 1.30 10*3/mm3      Eosinophils, Absolute 0.07 10*3/mm3      Basophils, Absolute 0.03 10*3/mm3      Immature Grans, Absolute 0.04 10*3/mm3      nRBC 0.0 /100 WBC     Urinalysis, Microscopic Only - Urine, Clean Catch [[id number]]  (Abnormal) Collected: 07/09/25 2230    Specimen: Urine, Clean Catch Updated: 07/10/25 0054     RBC, UA 0-2 /HPF      WBC, UA 3-5 /HPF      Comment: Urine culture not indicated.        Bacteria, UA 4+ /HPF      Squamous Epithelial Cells, UA 0-2 /HPF      Hyaline Casts, UA 0-2 /LPF      Triple Phosphate Crystals, UA Small/1+ /HPF      Methodology Manual Light Microscopy    Urinalysis With Culture If Indicated - Urine, Clean Catch [[id number]]  (Abnormal) Collected: 07/09/25 2230    Specimen: Urine, Clean Catch Updated: 07/10/25 0054     Color, UA Yellow     Appearance, UA Clear     pH, UA 7.5     Specific Gravity, UA 1.018     Glucose, UA Negative     Ketones, UA Trace     Bilirubin, UA Negative     Blood, UA Negative     Protein, UA Trace     Leuk Esterase, UA Moderate (2+)     Nitrite, UA Positive     Urobilinogen, UA 1.0 E.U./dL    Narrative:      In absence of clinical symptoms, the presence of pyuria, bacteria, and/or nitrites on the urinalysis result does not correlate with infection.    Basic Metabolic Panel [[id number]]  (Abnormal) Collected: 07/09/25 2139    Specimen: Blood Updated:  07/09/25 2255     Glucose 119 mg/dL      BUN 46.8 mg/dL      Creatinine 1.07 mg/dL      Sodium 135 mmol/L      Potassium 3.9 mmol/L      Comment: Slight hemolysis detected by analyzer. Result may be falsely elevated.        Chloride 98 mmol/L      CO2 20.0 mmol/L      Calcium 9.7 mg/dL      BUN/Creatinine Ratio 43.7     Anion Gap 17.0 mmol/L      eGFR 51.6 mL/min/1.73     Narrative:      GFR Categories in Chronic Kidney Disease (CKD)              GFR Category          GFR (mL/min/1.73)    Interpretation  G1                    90 or greater        Normal or high (1)  G2                    60-89                Mild decrease (1)  G3a                   45-59                Mild to moderate decrease  G3b                   30-44                Moderate to severe decrease  G4                    15-29                Severe decrease  G5                    14 or less           Kidney failure    (1)In the absence of evidence of kidney disease, neither GFR category G1 or G2 fulfill the criteria for CKD.    eGFR calculation 2021 CKD-EPI creatinine equation, which does not include race as a factor    Lactic Acid, Plasma [[id number]]  (Normal) Collected: 07/09/25 1748    Specimen: Blood Updated: 07/09/25 1832     Lactate 1.4 mmol/L     Blood Culture - Blood, Arm, Right [[id number]] Collected: 07/09/25 1748    Specimen: Blood from Arm, Right Updated: 07/09/25 1815    Blood Culture - Blood, Arm, Left [[id number]] Collected: 07/09/25 1748    Specimen: Blood from Arm, Left Updated: 07/09/25 1815    Comprehensive Metabolic Panel [[id number]]  (Abnormal) Collected: 07/09/25 1419    Specimen: Blood Updated: 07/09/25 1446     Glucose 146 mg/dL      BUN 52.5 mg/dL      Creatinine 1.23 mg/dL      Sodium 134 mmol/L      Potassium 3.3 mmol/L      Comment: Slight hemolysis detected by analyzer. Result may be falsely elevated.        Chloride 95 mmol/L      CO2 23.0 mmol/L      Calcium 10.0 mg/dL      Total Protein 8.2 g/dL      Albumin 2.9 g/dL       ALT (SGPT) <5 U/L      AST (SGOT) 10 U/L      Alkaline Phosphatase 45 U/L      Total Bilirubin 0.7 mg/dL      Globulin 5.3 gm/dL      A/G Ratio 0.5 g/dL      BUN/Creatinine Ratio 42.7     Anion Gap 16.0 mmol/L      eGFR 43.7 mL/min/1.73     Narrative:      GFR Categories in Chronic Kidney Disease (CKD)              GFR Category          GFR (mL/min/1.73)    Interpretation  G1                    90 or greater        Normal or high (1)  G2                    60-89                Mild decrease (1)  G3a                   45-59                Mild to moderate decrease  G3b                   30-44                Moderate to severe decrease  G4                    15-29                Severe decrease  G5                    14 or less           Kidney failure    (1)In the absence of evidence of kidney disease, neither GFR category G1 or G2 fulfill the criteria for CKD.    eGFR calculation 2021 CKD-EPI creatinine equation, which does not include race as a factor    Magnesium [[id number]]  (Abnormal) Collected: 07/09/25 1419    Specimen: Blood Updated: 07/09/25 1446     Magnesium 1.5 mg/dL     Protime-INR [[id number]]  (Abnormal) Collected: 07/09/25 1419    Specimen: Blood Updated: 07/09/25 1436     Protime 17.1 Seconds      INR 1.32    CBC & Differential [[id number]]  (Abnormal) Collected: 07/09/25 1419    Specimen: Blood Updated: 07/09/25 1429    Narrative:      The following orders were created for panel order CBC & Differential.  Procedure                               Abnormality         Status                     ---------                               -----------         ------                     CBC Auto Differential[[id number]]        Abnormal            Final result                 Please view results for these tests on the individual orders.    CBC Auto Differential [[id number]]  (Abnormal) Collected: 07/09/25 1419    Specimen: Blood Updated: 07/09/25 1429     WBC 13.75 10*3/mm3      RBC 3.33 10*6/mm3       Hemoglobin 9.8 g/dL      Hematocrit 29.5 %      MCV 88.6 fL      MCH 29.4 pg      MCHC 33.2 g/dL      RDW 13.1 %      RDW-SD 42.3 fl      MPV 9.6 fL      Platelets 392 10*3/mm3      Neutrophil % 68.3 %      Lymphocyte % 22.3 %      Monocyte % 8.5 %      Eosinophil % 0.2 %      Basophil % 0.2 %      Immature Grans % 0.5 %      Neutrophils, Absolute 9.38 10*3/mm3      Lymphocytes, Absolute 3.07 10*3/mm3      Monocytes, Absolute 1.17 10*3/mm3      Eosinophils, Absolute 0.03 10*3/mm3      Basophils, Absolute 0.03 10*3/mm3      Immature Grans, Absolute 0.07 10*3/mm3      nRBC 0.0 /100 WBC           Imaging Results (Last 72 Hours)       Procedure Component Value Units Date/Time    XR Chest 1 View [[id number]] Collected: 07/09/25 1551     Updated: 07/09/25 1558    Narrative:      XR CHEST 1 VW-     HISTORY: generalized weakness; history of left breast cancer, esophageal  cancer     COMPARISON: 10/2/2008     FINDINGS: Frontal view of the chest obtained.     Left subclavian port in place with tip overlying the SVC. Similar  borderline cardiomegaly.  No lobar consolidation.  Basilar densities favoring atelectasis, right lower lobe pneumonia  difficult to exclude. No pleural effusion or pneumothorax. No acute  regional bony pathology       Impression:      1. Right greater left basilar densities favoring atelectasis. Right  lower lobe pneumonitis considered. Left subclavian port appropriate in  position        This report was signed and finalized on 7/9/2025 3:54 PM by Dr. Nikky Santamaria MD.                  ASSESSMENT/PLAN       DIAGNOSIS:     Dysphagia    Severe protein-calorie malnutrition      PLAN:   Ms. 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 She is experiencing dysphagia with weight loss. PEG tube placement was unable to be completed by GI due to esophageal narrowing. She would like to move forward with surgical placement of feeding tube.      Dr. Weber is on-call for General Surgery and will plan to place tube tomorrow afternoon. Discussed treatment plan including risks, benefits, and treatment options with patient and family in detail. Patient agreed with treatment plan.      This document has been electronically signed by ANNMARIE Condon on 7/10/2025 13:40 CDT

## 2025-07-10 NOTE — NURSING NOTE
Pt arrived from ED. A&Ox4. Room air. IVF infusing from ED. Blanchable redness to coccyx and bilateral gluteals. MASD to groin area. Uses cane baseline at home. Family at bedside. Call light within reach.

## 2025-07-10 NOTE — ANESTHESIA PROCEDURE NOTES
Airway  Reason: elective    Date/Time: 7/10/2025 4:26 PM  Airway not difficult    General Information and Staff    Patient location during procedure: OR  CRNA/CAA: Isra Akhtar CRNA    Indications and Patient Condition  Indications for airway management: airway protection    Preoxygenated: yes  MILS maintained throughout    Mask difficulty assessment: 1 - vent by mask    Final Airway Details    Final airway type: endotracheal airway      Successful airway: ETT  Cuffed: yes   Successful intubation technique: direct laryngoscopy and video laryngoscopy  Endotracheal tube insertion site: oral  Blade: Wiggins  Blade size: 2  ETT size (mm): 7.5  Cormack-Lehane Classification: grade I - full view of glottis  Placement verified by: chest auscultation and capnometry   Cuff volume (mL): 5  Measured from: lips  ETT/EBT  to lips (cm): 22  Number of attempts at approach: 1  Assessment: lips, teeth, and gum same as pre-op and atraumatic intubation

## 2025-07-10 NOTE — PLAN OF CARE
Goal Outcome Evaluation:   A&Ox4, on 3L n/c, resting in bed s/p G-tube placement. VSS. Hourly rounding. IV antibx given. IV fluids running. NPO. Per order start tube feeds in AM- ok to put medication via G-tube in 4 hours. Family at bedside. Safety maintained.

## 2025-07-11 ENCOUNTER — APPOINTMENT (OUTPATIENT)
Dept: GENERAL RADIOLOGY | Facility: HOSPITAL | Age: 85
End: 2025-07-11
Payer: MEDICARE

## 2025-07-11 LAB
ANION GAP SERPL CALCULATED.3IONS-SCNC: 10 MMOL/L (ref 5–15)
BUN SERPL-MCNC: 27.6 MG/DL (ref 8–23)
BUN/CREAT SERPL: 32.9 (ref 7–25)
CALCIUM SPEC-SCNC: 8.4 MG/DL (ref 8.6–10.5)
CHLORIDE SERPL-SCNC: 103 MMOL/L (ref 98–107)
CO2 SERPL-SCNC: 26 MMOL/L (ref 22–29)
CREAT SERPL-MCNC: 0.84 MG/DL (ref 0.57–1)
EGFRCR SERPLBLD CKD-EPI 2021: 69 ML/MIN/1.73
GLUCOSE BLDC GLUCOMTR-MCNC: 124 MG/DL (ref 70–130)
GLUCOSE BLDC GLUCOMTR-MCNC: 136 MG/DL (ref 70–130)
GLUCOSE BLDC GLUCOMTR-MCNC: 139 MG/DL (ref 70–130)
GLUCOSE BLDC GLUCOMTR-MCNC: 144 MG/DL (ref 70–130)
GLUCOSE BLDC GLUCOMTR-MCNC: 182 MG/DL (ref 70–130)
GLUCOSE SERPL-MCNC: 211 MG/DL (ref 65–99)
MAGNESIUM SERPL-MCNC: 1.6 MG/DL (ref 1.6–2.4)
POTASSIUM SERPL-SCNC: 3.1 MMOL/L (ref 3.5–5.2)
POTASSIUM SERPL-SCNC: 4.1 MMOL/L (ref 3.5–5.2)
SODIUM SERPL-SCNC: 139 MMOL/L (ref 136–145)

## 2025-07-11 PROCEDURE — 80048 BASIC METABOLIC PNL TOTAL CA: CPT | Performed by: GENERAL PRACTICE

## 2025-07-11 PROCEDURE — 25010000002 AMPICILLIN-SULBACTAM PER 1.5 G: Performed by: GENERAL PRACTICE

## 2025-07-11 PROCEDURE — 92610 EVALUATE SWALLOWING FUNCTION: CPT | Performed by: SPEECH-LANGUAGE PATHOLOGIST

## 2025-07-11 PROCEDURE — 99024 POSTOP FOLLOW-UP VISIT: CPT | Performed by: GENERAL PRACTICE

## 2025-07-11 PROCEDURE — 25010000002 FAMOTIDINE 10 MG/ML SOLUTION: Performed by: GENERAL PRACTICE

## 2025-07-11 PROCEDURE — 99232 SBSQ HOSP IP/OBS MODERATE 35: CPT | Performed by: CLINICAL NURSE SPECIALIST

## 2025-07-11 PROCEDURE — 82948 REAGENT STRIP/BLOOD GLUCOSE: CPT

## 2025-07-11 PROCEDURE — 92611 MOTION FLUOROSCOPY/SWALLOW: CPT | Performed by: SPEECH-LANGUAGE PATHOLOGIST

## 2025-07-11 PROCEDURE — 99497 ADVNCD CARE PLAN 30 MIN: CPT | Performed by: CLINICAL NURSE SPECIALIST

## 2025-07-11 PROCEDURE — 97162 PT EVAL MOD COMPLEX 30 MIN: CPT | Performed by: PHYSICAL THERAPIST

## 2025-07-11 PROCEDURE — 84132 ASSAY OF SERUM POTASSIUM: CPT | Performed by: FAMILY MEDICINE

## 2025-07-11 PROCEDURE — 25010000002 MAGNESIUM SULFATE 2 GM/50ML SOLUTION: Performed by: FAMILY MEDICINE

## 2025-07-11 PROCEDURE — 25010000002 MORPHINE PER 10 MG: Performed by: GENERAL PRACTICE

## 2025-07-11 PROCEDURE — 63710000001 INSULIN REGULAR HUMAN PER 5 UNITS: Performed by: GENERAL PRACTICE

## 2025-07-11 PROCEDURE — 83735 ASSAY OF MAGNESIUM: CPT | Performed by: GENERAL PRACTICE

## 2025-07-11 PROCEDURE — 74230 X-RAY XM SWLNG FUNCJ C+: CPT

## 2025-07-11 PROCEDURE — 99222 1ST HOSP IP/OBS MODERATE 55: CPT | Performed by: RADIOLOGY

## 2025-07-11 RX ORDER — AMOXICILLIN AND CLAVULANATE POTASSIUM 400; 57 MG/5ML; MG/5ML
250 POWDER, FOR SUSPENSION ORAL EVERY 8 HOURS SCHEDULED
Status: COMPLETED | OUTPATIENT
Start: 2025-07-11 | End: 2025-07-13

## 2025-07-11 RX ORDER — XYLITOL/YERBA SANTA
1 AEROSOL, SPRAY WITH PUMP (ML) MUCOUS MEMBRANE 4 TIMES DAILY
Status: DISCONTINUED | OUTPATIENT
Start: 2025-07-11 | End: 2025-07-16 | Stop reason: HOSPADM

## 2025-07-11 RX ORDER — POTASSIUM CHLORIDE 1.5 G/1.58G
40 POWDER, FOR SOLUTION ORAL EVERY 4 HOURS
Status: DISPENSED | OUTPATIENT
Start: 2025-07-11 | End: 2025-07-11

## 2025-07-11 RX ORDER — MAGNESIUM SULFATE HEPTAHYDRATE 40 MG/ML
2 INJECTION, SOLUTION INTRAVENOUS ONCE
Status: COMPLETED | OUTPATIENT
Start: 2025-07-11 | End: 2025-07-11

## 2025-07-11 RX ORDER — SCOPOLAMINE 1 MG/3D
1 PATCH, EXTENDED RELEASE TRANSDERMAL
Status: DISCONTINUED | OUTPATIENT
Start: 2025-07-11 | End: 2025-07-16 | Stop reason: HOSPADM

## 2025-07-11 RX ADMIN — POTASSIUM CHLORIDE 40 MEQ: 1.5 POWDER, FOR SOLUTION ORAL at 14:31

## 2025-07-11 RX ADMIN — Medication 1 ML: at 20:51

## 2025-07-11 RX ADMIN — AMPICILLIN SODIUM, SULBACTAM SODIUM 3 G: 2; 1 INJECTION, POWDER, FOR SOLUTION INTRAMUSCULAR; INTRAVENOUS at 06:09

## 2025-07-11 RX ADMIN — SCOPOLAMINE 1 PATCH: 1.5 PATCH, EXTENDED RELEASE TRANSDERMAL at 01:43

## 2025-07-11 RX ADMIN — AMOXICILLIN AND CLAVULANATE POTASSIUM 250 MG: 400; 57 POWDER, FOR SUSPENSION ORAL at 15:19

## 2025-07-11 RX ADMIN — HUMAN INSULIN 2 UNITS: 100 INJECTION, SOLUTION SUBCUTANEOUS at 06:15

## 2025-07-11 RX ADMIN — BARIUM SULFATE 5 ML: 400 PASTE ORAL at 10:06

## 2025-07-11 RX ADMIN — AMOXICILLIN AND CLAVULANATE POTASSIUM 250 MG: 400; 57 POWDER, FOR SUSPENSION ORAL at 20:50

## 2025-07-11 RX ADMIN — BARIUM SULFATE 5 ML: 400 SUSPENSION ORAL at 10:06

## 2025-07-11 RX ADMIN — MAGNESIUM SULFATE HEPTAHYDRATE 2 G: 2 INJECTION, SOLUTION INTRAVENOUS at 08:49

## 2025-07-11 RX ADMIN — POTASSIUM CHLORIDE 40 MEQ: 1.5 POWDER, FOR SOLUTION ORAL at 08:48

## 2025-07-11 RX ADMIN — FAMOTIDINE 20 MG: 10 INJECTION INTRAVENOUS at 08:49

## 2025-07-11 RX ADMIN — Medication 10 ML: at 20:51

## 2025-07-11 RX ADMIN — Medication 1 ML: at 18:30

## 2025-07-11 RX ADMIN — MORPHINE SULFATE 1 MG: 2 INJECTION, SOLUTION INTRAMUSCULAR; INTRAVENOUS at 20:50

## 2025-07-11 RX ADMIN — Medication 10 ML: at 08:49

## 2025-07-11 NOTE — PROGRESS NOTES
[facility] Palliative Care Services    Palliative Care Daily Progress Note   Chief complaint-follow up goals of care/advanced care planning, support for patient/family, and hospice referral/discussion    Code Status:   Code Status and Medical Interventions: No CPR (Do Not Attempt to Resuscitate); Limited Support; No intubation (DNI), No dialysis, No vasopressors   Ordered at: 07/10/25 1037     Code Status (Patient has no pulse and is not breathing):    No CPR (Do Not Attempt to Resuscitate)     Medical Interventions (Patient has pulse or is breathing):    Limited Support     Medical Intervention Limits:    No intubation (DNI)       No dialysis       No vasopressors     Level Of Support Discussed With:    Patient      Advanced Directives: Advance Directive Status: Patient does not have advance directive   Goals of Care: Ongoing.     S: Medical record reviewed. Events noted.  GI unable to place PEG tube on 7/10, per report mucosal lesion was found diffusely, throughout the pharynx.  The lesion is malignant.  The scope did not pass due to narrowing. Surgery consulted, like patient underwent robotic PEG tube placement 7/10 by Dr. Weber, I do not see a op report currently.  Evaluated by oncology and notes patient leaning towards either comfort care or radiation only.  Plans for Mediport placement if patient agrees to chemotherapy.  Radiation oncology consulted.  Evaluated by speech therapy with findings of dysphagia, n.p.o. Hypokalemia this morning, receiving electrolyte replacement.  Currently sitting up in bed without apparent distress, hoarse voice quality but her demeanor remains positive.  Son-Sergey and daughter-Sylvia currently at bedside.  We discussed recommendations per speech therapy after video swallow study this morning, verbalized understanding.  Reports she met with Dr. Ernandez earlier and plans for 13 palliative radiation treatments to esophagus, states she is optimistic for  improvement in swallow quality.  Complains of dry mouth improved with glycerin swabs.  Due to the Palliative Care Topics Discussed: goals of care, care options, resuscitation status, [facility], and discharge options we will establish an advance care plan.   Advance Care Planning   Advance Care Planning Discussion: Spoke with patient's 2 daughters and grandson, Melony Kailey, and Nori earlier this morning when patient was off unit for video swallow study we explored treatment options, discharge options, and best supportive care directed by hospice.  Family understandably having difficulty with patient's new diagnosis and decline in functional mobility and nutritional status.  They worry about potential wrong decision for adding nutritional support given diagnosis and poor performance status.  Later spoke with patient, son-Sergey, and daughter-Sylvia with plan for palliative radiation, holding on chemotherapy at this juncture, anticipating follow-up imaging 4 weeks after treatments completed.  Anticipating discharge to nursing facility and daughter plans to provide transportation to radiation treatments.  Questions encouraged and support given.     Review of Systems   Constitutional: Positive for weight loss.   HENT:  Positive for hoarse voice.    Respiratory:  Negative for shortness of breath.    Gastrointestinal:  Positive for dysphagia.   Neurological:  Positive for weakness.   Psychiatric/Behavioral:  The patient is not nervous/anxious.      Pain Assessment  Preferred Pain Scale: number (Numeric Rating Pain Scale)  CPOT Facial Expression: 0-->relaxed, neutral  CPOT Body Movements: 0-->absence of movements  CPOT Muscle Tension: 0-->relaxed  Ventilator Compliance/Vocalization: 0-->talking in normal tone or no sound  CPOT Score: 0  Pain Location: abdomen    O:     Intake/Output Summary (Last 24 hours) at 7/11/2025 0954  Last data filed at 7/10/2025 5270  Gross per 24 hour   Intake --   Output 300 ml   Net -300 ml        Diagnostics and current medications: Reviewed.      Current Facility-Administered Medications:     ampicillin-sulbactam (UNASYN) 3 g in sodium chloride 0.9 % 100 mL MBP, 3 g, Intravenous, Q6H, Willie Weber MD, 3 g at 07/11/25 0609    sennosides-docusate (PERICOLACE) 8.6-50 MG per tablet 2 tablet, 2 tablet, Oral, BID PRN **AND** polyethylene glycol (MIRALAX) packet 17 g, 17 g, Oral, Daily PRN **AND** bisacodyl (DULCOLAX) EC tablet 5 mg, 5 mg, Oral, Daily PRN **AND** bisacodyl (DULCOLAX) suppository 10 mg, 10 mg, Rectal, Daily PRN, Willie Weber MD    Calcium Replacement - Follow Nurse / BPA Driven Protocol, , Not Applicable, PRN, Willie Weber MD    dextrose (D50W) (25 g/50 mL) IV injection 25 g, 25 g, Intravenous, Q15 Min PRN, Willie Weber MD    dextrose (GLUTOSE) oral gel 15 g, 15 g, Oral, Q15 Min PRN, Willie Weber MD    dextrose 5 % and sodium chloride 0.45 % with KCl 20 mEq/L infusion, 75 mL/hr, Intravenous, Continuous, Willie Weber MD, Last Rate: 75 mL/hr at 07/10/25 1520, 75 mL/hr at 07/10/25 1520    famotidine (PEPCID) injection 20 mg, 20 mg, Intravenous, Daily, Willie Weber MD, 20 mg at 07/11/25 0849    glucagon (GLUCAGEN) injection 1 mg, 1 mg, Intramuscular, Q15 Min PRN, Willie Weber MD    insulin regular (humuLIN R,novoLIN R) injection 2-7 Units, 2-7 Units, Subcutaneous, Q6H, Willie Weber MD, 2 Units at 07/11/25 0615    Magnesium Low Dose Replacement - Follow Nurse / BPA Driven Protocol, , Not Applicable, PRN, Willie Weber MD    Morphine sulfate (PF) injection 1 mg, 1 mg, Intravenous, Q4H PRN, 1 mg at 07/10/25 2157 **AND** naloxone (NARCAN) injection 0.4 mg, 0.4 mg, Intravenous, Q5 Min PRN, Willie Weber MD    ondansetron ODT (ZOFRAN-ODT) disintegrating tablet 4 mg, 4 mg, Oral, Q6H PRN **OR** ondansetron (ZOFRAN) injection 4 mg, 4 mg, Intravenous, Q6H PRN, Willie Weber MD    Phosphorus Replacement - Follow Nurse / BPA Driven Protocol, , Not Applicable, PRN, Willie Weber MD     "potassium chloride (KLOR-CON) packet 40 mEq, 40 mEq, Oral, Q4H, Olman Felton MD, 40 mEq at 07/11/25 0848    Potassium Replacement - Follow Nurse / BPA Driven Protocol, , Not Applicable, PRJERRY, Willie Weber MD    scopolamine patch 1 mg/72 hr, 1 patch, Transdermal, Q72H, Laci Moyer MD, 1 patch at 07/11/25 0143    [COMPLETED] Insert Peripheral IV, , , Once **AND** sodium chloride 0.9 % flush 10 mL, 10 mL, Intravenous, PRN, Willie Weber MD    sodium chloride 0.9 % flush 10 mL, 10 mL, Intravenous, Q12H, Willie Weber MD, 10 mL at 07/11/25 0849    sodium chloride 0.9 % flush 10 mL, 10 mL, Intravenous, PRJERRY, Willie Weber MD    sodium chloride 0.9 % infusion 40 mL, 40 mL, Intravenous, PRJERRY, Willie Weber MD    Allergies   Allergen Reactions    Adhesive Tape Hives     Latex Tape     I have utilized all available immediate resources to obtain, update, or review the patient's current medications (including all prescriptions, over-the-counter products, herbals, cannabis/cannabidiol products, and vitamin/mineral/dietary (nutritional) supplements) for name, route of administration, type, dose and frequency.    A:    /62 (BP Location: Right arm, Patient Position: Lying)   Pulse 77   Temp 97.7 °F (36.5 °C) (Oral)   Resp 18   Ht 160 cm (62.99\")   Wt 59.9 kg (132 lb)   SpO2 98%   BMI 23.39 kg/m²     Vitals and nursing note reviewed.   Constitutional:       Appearance: Not in distress. Frail.  PEG tube  Eyes:      Pupils: Pupils are equal, round, and reactive to light.   Pulmonary:      Effort: Pulmonary effort is normal.   Cardiovascular:      Normal rate.   Edema:     Peripheral edema absent.   Musculoskeletal: Normal range of motion.      Cervical back: Neck supple.   Skin:     General: Skin is warm.   Genitourinary:     Comments: Urinary incontinence, external urinary catheter in place  Neurological:      Mental Status: Alert and oriented to person, place and time.      Comments: Hoarseness and soft " voice quality   Psychiatric:         Mood and Affect: Mood normal.         Cognition and Memory: Cognition normal.      Patient status: Disease state: Controlled with current treatments.  Current Functional status: Palliative Performance Scale Score: Performance 10% based on the following measures: Ambulation: Totally bed bound, Activity and Evidence of Disease: Unable to do any work, extensive evidence of disease, Self-Care: Total care required,  Intake: Mouth care only, LOC: Drowsy or comatose   Baseline Functional status: Palliative Performance Scale Score: Performance 60% based on the following measures: Ambulation: Reduced, Activity and Evidence of Disease: Unable to do hobby or some work, significant evidence of disease, Self-Care: Occasional assist necessary,  Intake: Normal or reduced, LOC: Full or confusion   Baseline ECOG Status(3) Capable of limited self-care, confined to bed or chair > 50% of waking hours.  Nutritional status: Albumin 2.9 Body mass index is 23.39 kg/m².      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 plan: Goals of care include CODE STATUS no CPR/limited support interventions.     Family support: The patient receives support from her children..  Advance Directives: Advance Directive Status: Patient does not have advance directive   POA/Healthcare surrogate-children-next of  kin.     2.  Palliative care encounter  - Prognosis is poor to guarded long-term secondary to due to squamous cell carcinoma of esophagus, left breast cancer, possible aspiration pneumonitis, dysphagia, unintended weight loss, multiple comorbidities, and advanced age.  -Patient and family appears to have good prognostic awareness.      -squamous cell carcinoma of esophagus-recent diagnosis, left breast cancer s/p lumpectomy and chemotherapy-Adriamycin (developed cardiomyopathy and discontinued), Cytoxan, and Taxol-completed 3/2009 followed by adjuvant radiation (2008).  Followed by Dr. Samano last seen 6/23/2025 with recommendations for further clarification from ENT, PET scan scheduled, results as below, plan follow-up in 6 weeks.  Seen by Dr. Lee Haynes, ENT 6/24/2025, underwent flexible fiberoptic laryngoscopy without findings of mass or lesion.  Plan for FNA     -Made n.p.o., started on IV fluids and empiric antibiotics, in addition to replacement electrolytes.    -GI consulted for possible alternative means of nutrition with PEG tube.     7/10-CODE STATUS changes no CPR/limited support interventions, no intubation, no dialysis, no vasopressors    7/11-continue supportive measures  - Will plan to complete a MOST document  - High risk for rehospitalization's and decline given multiple factors  - Anticipate SNF at discharge for rehab  - Explored best supportive care directed by hospice 7/10 and 7/11 when rehospitalization's and aggressive care interventions no longer desired  - Anticipate follow-up oncology outpatient.  Open to radiation therapy, leaning against systemic therapy given previous adverse effects in treatment  - Anticipate FNA, this has not been scheduled as of yet per family, as directed per ENT  - GI unable to place PEG tube 7/10  -General Surgery following, placed PEG tube placement 7/10, receiving bolus feeds.  Given dysphagia and suspected pneumonitis discussed associated risks with  artificial means of nutrition/radiation adverse effects and potential need for complete nutrition rather than supplemental needs.  -Radiation oncology following, plans for palliative radiation x 13 treatments per family to esophagus and follow-up scans in 4 weeks.  Daughter to provide transport to appointments from SNF.  - Anticipating SNF at discharge    3.  Xerostomia  -Mouth kote      Thank you for allowing us to participate in patient's plan of care. Palliative Care Team will continue to follow patient.     25 minutes spent on advance care planning-discussing with patient and/or family, answering questions, providing some guidance about a plan and documentation of care, and coordinating care face to face.    Anh Polk, APRN  7/11/2025  09:26 CDT

## 2025-07-11 NOTE — PROGRESS NOTES
[facility] Medicine Services  INPATIENT PROGRESS NOTE    Patient Name: Lorena Valeds  Date of Admission: 7/9/2025  Today's Date: 07/11/25  Length of Stay: 2  Primary Care Physician: Luly Diez MD    Subjective   Chief Complaint: Dysphagia  Failure To Thrive     84-year-old female with history of 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  Patient with history of breast cancer in 2008.  Status post lumpectomy and adjuvant chemotherapy; history of diabetes mellitus type 2, GERD, anemia, chronic kidney disease stage 3 a and COPD; the patient came  to the hospital reporting progressive dysphagia for solids, and choking sensation when attempting to drink fluids.  No rectal bleeding, no melena, no hematemesis.  No abdominal pain.      Robotic Gastrostomy tube placed 7/10/25 by general Surgery  Having fluoroscopy video swallow evaluation today.      Review of Systems   All pertinent negatives and positives are as above. All other systems have been reviewed and are negative unless otherwise stated.     Objective    Temp:  [97 °F (36.1 °C)-98.3 °F (36.8 °C)] 97.7 °F (36.5 °C)  Heart Rate:  [63-84] 77  Resp:  [16-22] 18  BP: (126-153)/(56-76) 134/62  Physical Exam  Constitutional:       Appearance: Chronically ill-appearing, alert, oriented.  Dysphonic speech.  HENT:      Head: Normocephalic and atraumatic.      Nose: Nose normal.      Mouth/Throat:      Mouth: Mucous membranes are moist.   Eyes:      Extraocular Movements: Extraocular movements intact.      Conjunctiva/sclera: Conjunctivae normal.      Pupils: Pupils are equal, round  Cardiovascular:      Rate and Rhythm: Normal rate and regular rhythm.      Pulses: Normal pulses.   Pulmonary:      Effort: No respiratory distress.      Breath  "sounds: Normal breath sounds.   Abdominal:      General: Abdomen is flat. Bowel sounds are normal.      Palpations: Abdomen is soft.   Extremities:  No lower extremity edema.  Skin:     Capillary Refill: Capillary refill takes less than 2 seconds.      Coloration: Skin is not jaundiced.       Results Review:  I have reviewed the labs, radiology results, and diagnostic studies.    Laboratory Data:   Results from last 7 days   Lab Units 07/10/25  0332 07/09/25  1419   WBC 10*3/mm3 12.99* 13.75*   HEMOGLOBIN g/dL 10.5* 9.8*   HEMATOCRIT % 31.4* 29.5*   PLATELETS 10*3/mm3 333 392        Results from last 7 days   Lab Units 07/11/25  0354 07/10/25  0332 07/09/25  2139 07/09/25  1419   SODIUM mmol/L 139 136 135* 134*   POTASSIUM mmol/L 3.1* 3.9 3.9 3.3*   CHLORIDE mmol/L 103 97* 98 95*   CO2 mmol/L 26.0 22.0 20.0* 23.0   BUN mg/dL 27.6* 39.3* 46.8* 52.5*   CREATININE mg/dL 0.84 0.88 1.07* 1.23*   CALCIUM mg/dL 8.4* 9.7 9.7 10.0   BILIRUBIN mg/dL  --  0.5  --  0.7   ALK PHOS U/L  --  44  --  45   ALT (SGPT) U/L  --  <5  --  <5   AST (SGOT) U/L  --  14  --  10   GLUCOSE mg/dL 211* 132* 119* 146*       Culture Data:   No results found for: \"BLOODCX\", \"URINECX\", \"WOUNDCX\", \"MRSACX\", \"RESPCX\", \"STOOLCX\"    Radiology Data:   Imaging Results (Last 24 Hours)       Procedure Component Value Units Date/Time    FL Video Swallow With Speech Single Contrast - In process [[id number]] Resulted: 07/11/25 0958     Updated: 07/11/25 1007    This result has not been signed. Information might be incomplete.              I have reviewed the patient's current medications.     Assessment/Plan   Assessment  Active Hospital Problems    Diagnosis     **Dysphagia     Severe protein-calorie malnutrition        Dysphagia  Status post Robotic gastrostomy tube placement 7/10/25  Hypokalemia  Hypomagnesemia  Possible aspiration pneumonitis  Squamous cell carcinoma-proximal esophagus  History of breast cancer in 2008.    Diabetes mellitus type " 2  GERD  Chronic kidney disease stage 3a.  COPD           PET CT scan 6/27/2025: mass in the paravertebral soft tissues inseparable from the cervical esophagus, soft tissue extension into the right paratracheal soft tissues, asymmetric metabolic activity in the high right posterior parietal centrum semiovale.       MRI of the brain showed no suspicious enhancing tumor.      Sodium 139, potassium 3.1.  Magnesium 1.6.  Creatinine 0.84.  Glucose 211    Treatment Plan    Robotic gastrostomy tube placed 7/10  Start enteral feedings per nutritional recommendations:  Initiate at 125 ml for first bolus,increase by 100 ml of enteral feeding as tolerated with each meals bolus until goal volume of 375 ml four times per day. Flush peg tube with 60 ml of water before and after each meal bolus. Additional water bolus of 150 ml for hydration daily.     Replace potassium and magnesium  End IV fluids once enteral feedings started    Unasyn IV, day #3; will change to Augmentin for 2 more days.     Replace electrolytes as needed    Mild sliding scale of insulin for now.     Palliative care follow up  Oncology evaluation took place; radiation oncology evaluation requested.    Patient may be discharged with enteral feedings once radiation plan defined with specialists.        Medical Decision Making  Number and Complexity of problems: 9, moderate complexity      Differential Diagnosis: see above    Conditions and Status        Condition is unchanged.     MDM Data  External documents reviewed: no  Cardiac tracing (EKG, telemetry) interpretation: no new  Radiology interpretation: no new  Labs reviewed: yes  Any tests that were considered but not ordered: no     Decision rules/scores evaluated (example [id number], Wells, etc): none     Discussed with: patient     Care Planning  Shared decision making: patient  Code status and discussions: FC    Disposition  Social Determinants of Health that impact treatment or disposition: none  I  expect the patient to be discharged to home with home health     Electronically signed by Olman Felton MD, 07/11/25, 10:16 CDT.

## 2025-07-11 NOTE — OP NOTE
LAPAROSCOPIC GASTROSTOMY FEEDING TUBE WITH DAVINCI ROBOT  Procedure Note    Lorena Valdes  7/10/2025    Pre-op Diagnosis:   Esophageal mass, dysphagia    Post-op Diagnosis:     Esophageal mass, dysphagia    Procedure/CPT® Codes:  DC LAPS SURG GASTROSTOMY W/O CONSTJ GSTR TUBE SPX [55511]    Procedure(s):  LAPAROSCOPIC GASTROSTOMY FEEDING TUBE WITH DAVINCI ROBOT    Surgeon(s):  Willie Weber MD    Anesthesia: General    Staff:   None    Estimated Blood Loss: minimal    Specimens:                None      Drains:   Gastrostomy/Enterostomy Gastrostomy 1 20 Fr. LUQ (Active)   Surrounding Skin Dry;Intact 07/10/25 1945   Drain Status Clamped 07/10/25 1945   Gastrostomy/Enterostomy Site Interventions Site assessed 07/10/25 1945   Dressing Status Clean;Dry;Intact 07/10/25 1945       External Urinary Catheter (Active)   Daily Indications Daily output 07/10/25 1945   Site Assessment Clean;Skin intact 07/10/25 1945   Application/Removal external catheter applied 07/10/25 1945   Collection Container Wall suction 07/10/25 1945   Wall suction (mmHG) 120 mmHG 07/10/25 1945   Securement Method Securing device 07/09/25 2136   Catheter care complete Yes 07/10/25 1945   Output (mL) 300 mL 07/10/25 2354       Findings: Robotic David gastrostomy with 20 Estonian G-tube placed.  G-tube with top of bumper at 4 cm    Complications: None    Procedure: Prior to the procedure the patient was met in the preoperative holding area where she was again explained risk benefits and alternatives of the procedure.  Patient stated her understanding and consent was signed.  Patient was met by the anesthesia care team and taken to the operating room where she was placed supine on the operating table with proper padding of all extremities.  She underwent general endotracheal intubation and was prepped and draped in usual sterile fashion.  A timeout was performed to confirm correct patient, procedure, operating site.    A Veress needle was inserted in the  right upper quadrant however opening pressures were greater than 12.  A second attempt was tried with similar outcome.  At this time the abdomen was entered via Priest technique just inferior to the umbilicus.  Once in the abdomen a 12 mm trocar site was placed and the Veress needle site was inspected.  There was no evidence of injury from the Veress needle.  2 additional 8 mm trocar sites were placed in a line across the mid abdomen.  These were placed under direct visualization after injection of local anesthetic.    The stomach was grasped and pulled up to the abdominal wall.  Location on the abdominal wall in the left upper quadrant was then chosen in an area that the stomach would easily reach.  A 5 mm trocar was then placed under direct visualization.  This was removed and a 20 Romansh G-tube was placed through this incision.    Three 3-0 silk sutures were then placed to tack the stomach up to the anterior abdominal wall surrounding this G-tube site.  Once the stomach was opposed to the anterior abdominal wall to pursestring 3-0 PDS sutures were then placed.  A gastrotomy was made and the G-tube was threaded into the stomach.  The pursestring PDS sutures were then tied in sequential fashion with the inner layer first.  2 additional 3-0 silk tacking sutures were then used to oppose the stomach to the anterior abdominal wall circumferentially around the pursestring sutures.  The G-tube balloon was then inflated.  The G-tube was pulled back until it loosely opposed to the anterior abdominal wall.  The G-tube was noted to be 4 cm at the top of the bumper.  Hemostasis was confirmed.  The abdomen was allowed to desufflate.  The Priest trocar site was closed using an 0 Vicryl figure-of-eight suture.  The remaining trocar sites were removed.  All skin incisions were closed using a 4-0 Monocryl suture in a cosmetic fashion.  Dermabond was placed over top.  An abdominal binder was placed.  The patient awoke from general  anesthesia in stable condition was transferred to PACU.  Sponge and instrument counts were correct at the conclusion of the case.        Willie Weber MD     Date: 7/11/2025  Time: 10:43 CDT    Part of this note may be an electronic transcription/translation of spoken language to printed text using the Dragon Dictation System.

## 2025-07-11 NOTE — PLAN OF CARE
Goal Outcome Evaluation:  Plan of Care Reviewed With: patient, child        Progress: improving  Outcome Evaluation: The patient presents alert and oriented x4 lying in bed with multiple family members in the room. She was recently admitted but went home, but was unable to care for herself so she came back. She demonstrates generalized weakness and slow movements. She was able to sit EOB, but is lightheaded with positional changes. She required assist to stand. She was able to take a few steps to move from the bed to the chair with use of a RW. She will benefit from continued PT to work on strengthening, transfers, and gait. Recommend discharge to SNF.    Anticipated Discharge Disposition (PT): skilled nursing facility

## 2025-07-11 NOTE — THERAPY EVALUATION
Patient Name: Lorena Valdes  : 1940    MRN: [id number]                              Today's Date: 2025       Admit Date: 2025    Visit Dx:     ICD-10-CM ICD-9-CM   1. Dysphagia, unspecified type  R13.10 787.20   2. Failure to thrive in adult  R62.7 783.7   3. Pharyngoesophageal dysphagia [R13.14]  R13.14 787.24   4. Impaired mobility [Z74.09]  Z74.09 799.89     Patient Active Problem List   Diagnosis    Malignant neoplasm of central portion of left female breast    Malignant neoplasm of upper-outer quadrant of right female breast    Osteopenia    Encounter for care related to vascular access port    Colon cancer screening    Chronic kidney disease, stage 3b    Diabetic renal disease    Hypertensive renal disease    Estrogen receptor negative tumor status    Squamous cell esophageal cancer    Weight loss    Dysphagia    Severe protein-calorie malnutrition     Past Medical History:   Diagnosis Date    Bladder disorder     Chronic kidney disease, stage 3b     Diverticulosis     Emphysema lung     Fibrocystic disease of breast     CONNIE (generalized anxiety disorder)     GERD (gastroesophageal reflux disease)     Heart disease     History of bone density study     History of colon polyps     Hyperglycemia     Hyperlipidemia     Hypotension     Left ventricular diastolic dysfunction     Malignant neoplasm of central portion of left female breast 2016    Osteopenia 2017    Restrictive lung disease     Type 2 diabetes mellitus     Uterine prolapse      Past Surgical History:   Procedure Laterality Date    BREAST LUMPECTOMY      CATARACT EXTRACTION Right     COLONOSCOPY  2010    Diverticulosis; Repeat 10 years    COLONOSCOPY N/A 2020    One 6mm inflamed hyperplastic polyp in the cecum; Diverticulosis in the left colon; Non-bleeding internal hemorrhoids; Repeat 5 years    ENDOSCOPY N/A 2025    Exophytic mass found in the larynx, not obstructing the airway;  Partially obstructing, rule out malignancy, esophageal tumor was found in the proximal esophagus-biopsied; Medium-sized HH; Normal stomach; Two non-bleeding angiodysplastic lesions in the duodenum-biopsied    ENDOSCOPY W/ PEG TUBE PLACEMENT N/A 7/10/2025    Procedure: ESOPHAGOGASTRODUODENOSCOPY WITH PERCUTANEOUS ENDOSCOPIC GASTROSTOMY TUBE INSERTION WITH ANESTHESIA;  Surgeon: Christine Valdovinos MD;  Location: [facility] ENDOSCOPY;  Service: Gastroenterology;  Laterality: N/A;  pre op:peg placement  post op: esophageal mass  PCP:Luly Diez MD    GTUBE INSERTION N/A 7/10/2025    Procedure: LAPAROSCOPIC GASTROSTOMY FEEDING TUBE WITH DAVINCI ROBOT;  Surgeon: Willie Weber MD;  Location: [facility] OR;  Service: Robotics - DaVinci;  Laterality: N/A;    MAMMO BILATERAL  07/17/2013    Dr. Sabillon    MASTECTOMY Left 08/28/2008    PAP SMEAR  2010    SKIN CANCER EXCISION        General Information       Row Name 07/11/25 1143          Physical Therapy Time and Intention    Document Type evaluation  s/p PEG placement, esphagus ca and dysphagia  -MS     Mode of Treatment physical therapy;individual therapy  -MS       Row Name 07/11/25 1143          General Information    Patient Profile Reviewed yes  -MS     Prior Level of Function independent:;all household mobility;ADL's  using cane  -MS     Existing Precautions/Restrictions fall  -MS     Barriers to Rehab medically complex;previous functional deficit;physical barrier  -MS       Row Name 07/11/25 1143          Living Environment    Current Living Arrangements home  -MS     People in Home alone  -MS       Row Name 07/11/25 1143          Cognition    Orientation Status (Cognition) oriented x 4  -MS       Row Name 07/11/25 1143          Safety Issues/Impairments Affecting Functional Mobility    Impairments Affecting Function (Mobility) endurance/activity tolerance;strength  -MS               User Key  (r) = Recorded By, (t) = Taken By, (c) = Cosigned By      Initials Name Provider  Type    Carmelita Quintana, PT, DPT, NCS Physical Therapist                   Mobility       Row Name 07/11/25 1143          Bed Mobility    Bed Mobility supine-sit  -MS     Supine-Sit [place] (Bed Mobility) modified independence  -MS     Assistive Device (Bed Mobility) head of bed elevated;bed rails  -MS     Comment, (Bed Mobility) extended time needed  -MS       Row Name 07/11/25 1143          Sit-Stand Transfer    Sit-Stand [place] (Transfers) minimum assist (75% patient effort);verbal cues;nonverbal cues (demo/gesture)  -MS     Assistive Device (Sit-Stand Transfers) walker, front-wheeled  -MS       Row Name 07/11/25 1143          Gait/Stairs (Locomotion)    [place] Level (Gait) minimum assist (75% patient effort);verbal cues;nonverbal cues (demo/gesture)  -MS     Assistive Device (Gait) walker, front-wheeled  -MS     Distance in Feet (Gait) 2  -MS               User Key  (r) = Recorded By, (t) = Taken By, (c) = Cosigned By      Initials Name Provider Type    Carmelita Quintana, PT, DPT, NCS Physical Therapist                   Obj/Interventions       Row Name 07/11/25 1143          Range of Motion Comprehensive    General Range of Motion bilateral upper extremity ROM WFL;bilateral lower extremity ROM WFL  -MS       Row Name 07/11/25 1143          Strength Comprehensive (MMT)    Comment, General Manual Muscle Testing (MMT) Assessment grossly 4/5  -MS       Row Name 07/11/25 1143          Balance    Balance Assessment sitting static balance;sitting dynamic balance;standing static balance;standing dynamic balance  -MS     Static Sitting Balance standby assist  -MS     Dynamic Sitting Balance standby assist  -MS     Position, Sitting Balance sitting edge of bed;supported  -MS     Static Standing Balance contact guard  -MS     Dynamic Standing Balance minimal assist  -MS     Position/Device Used, Standing Balance walker, front-wheeled  -MS       Row Name 07/11/25 1143          Sensory Assessment  (Somatosensory)    Sensory Assessment (Somatosensory) sensation intact  -MS               User Key  (r) = Recorded By, (t) = Taken By, (c) = Cosigned By      Initials Name Provider Type    MS Carmelita Pratt, PT, DPT, NCS Physical Therapist                   Goals/Plan       Row Name 07/11/25 1143          Bed Mobility Goal 1 (PT)    Activity/Assistive Device (Bed Mobility Goal 1, PT) bed mobility activities, all  -MS     [place] Level/Cues Needed (Bed Mobility Goal 1, PT) independent  -MS     Time Frame (Bed Mobility Goal 1, PT) by discharge;long term goal (LTG)  -MS     Progress/Outcomes (Bed Mobility Goal 1, PT) new goal  -MS       Row Name 07/11/25 1143          Transfer Goal 1 (PT)    Activity/Assistive Device (Transfer Goal 1, PT) sit-to-stand/stand-to-sit;bed-to-chair/chair-to-bed;walker, rolling  -MS     [place] Level/Cues Needed (Transfer Goal 1, PT) modified independence  -MS     Time Frame (Transfer Goal 1, PT) long term goal (LTG);by discharge  -MS     Progress/Outcome (Transfer Goal 1, PT) new goal  -MS       Row Name 07/11/25 1143          Gait Training Goal 1 (PT)    Activity/Assistive Device (Gait Training Goal 1, PT) gait (walking locomotion);assistive device use;decrease fall risk;improve balance and speed;increase endurance/gait distance  -MS     [place] Level (Gait Training Goal 1, PT) standby assist  -MS     Distance (Gait Training Goal 1, PT) 50ft  -MS     Time Frame (Gait Training Goal 1, PT) long term goal (LTG);by discharge  -MS     Progress/Outcome (Gait Training Goal 1, PT) new goal  -MS       Row Name 07/11/25 1143          Therapy Assessment/Plan (PT)    Planned Therapy Interventions (PT) balance training;bed mobility training;gait training;patient/family education;strengthening;transfer training  -MS               User Key  (r) = Recorded By, (t) = Taken By, (c) = Cosigned By      Initials Name Provider Type    MS Carmelita Pratt, PT, DPT, NCS Physical Therapist                    Clinical Impression       Row Name 07/11/25 1145          Pain    Pretreatment Pain Rating 0/10 - no pain  -MS     Posttreatment Pain Rating 0/10 - no pain  -MS       Row Name 07/11/25 1145          Plan of Care Review    Plan of Care Reviewed With patient;child  -MS     Progress improving  -MS     Outcome Evaluation The patient presents alert and oriented x4 lying in bed with multiple family members in the room. She was recently admitted but went home, but was unable to care for herself so she came back. She demonstrates generalized weakness and slow movements. She was able to sit EOB, but is lightheaded with positional changes. She required assist to stand. She was able to take a few steps to move from the bed to the chair with use of a RW. She will benefit from continued PT to work on strengthening, transfers, and gait. Recommend discharge to SNF.  -MS       Row Name 07/11/25 1145          Therapy Assessment/Plan (PT)    Patient/Family Therapy Goals Statement (PT) get stronger  -MS     Rehab Potential (PT) good  -MS     Criteria for Skilled Interventions Met (PT) yes;meets criteria;skilled treatment is necessary  -MS     Therapy Frequency (PT) 2 times/day  -MS     Predicted Duration of Therapy Intervention (PT) until discharge  -MS       Row Name 07/11/25 1145          Vital Signs    Pre SpO2 (%) 100  -MS     O2 Delivery Pre Treatment supplemental O2  2  -MS       Row Name 07/11/25 1145          Positioning and Restraints    Post Treatment Position chair  -MS     In Chair sitting;call light within reach;encouraged to call for assist;with family/caregiver  -MS               User Key  (r) = Recorded By, (t) = Taken By, (c) = Cosigned By      Initials Name Provider Type    MS Terence Carmelita R, PT, DPT, NCS Physical Therapist                   Outcome Measures       Row Name 07/11/25 1146          How much help from another person do you currently need...    Turning from your back to your side while in flat  bed without using bedrails? 3  -MS     Moving from lying on back to sitting on the side of a flat bed without bedrails? 3  -MS     Moving to and from a bed to a chair (including a wheelchair)? 3  -MS     Standing up from a chair using your arms (e.g., wheelchair, bedside chair)? 3  -MS     Climbing 3-5 steps with a railing? 1  -MS     To walk in hospital room? 1  -MS     AM-PAC 6 Clicks Score (PT) 14  -MS     Highest Level of Mobility Goal Move to Chair/Commode-4  -MS       Row Name 07/11/25 1143          Functional Assessment    Outcome Measure Options AM-PAC 6 Clicks Basic Mobility (PT)  -MS               User Key  (r) = Recorded By, (t) = Taken By, (c) = Cosigned By      Initials Name Provider Type    MS Carmelita Pratt, PT, DPT, NCS Physical Therapist                                 Physical Therapy Education       Title: PT OT SLP Therapies (In Progress)       Topic: Physical Therapy (In Progress)       Point: Mobility training (Done)       Learning Progress Summary            Patient Acceptance, E, VU by MS at 7/11/2025 1307    Comment: role of PT in her care                      Point: Home exercise program (Not Started)       Learner Progress:  Not documented in this visit.              Point: Body mechanics (Not Started)       Learner Progress:  Not documented in this visit.              Point: Precautions (Not Started)       Learner Progress:  Not documented in this visit.                              User Key       Initials Effective Dates Name Provider Type Discipline    MS 07/11/23 -  Carmelita Pratt, PT, DPT, NCS Physical Therapist PT                  PT Recommendation and Plan  Planned Therapy Interventions (PT): balance training, bed mobility training, gait training, patient/family education, strengthening, transfer training  Progress: improving  Outcome Evaluation: The patient presents alert and oriented x4 lying in bed with multiple family members in the room. She was recently admitted but went  home, but was unable to care for herself so she came back. She demonstrates generalized weakness and slow movements. She was able to sit EOB, but is lightheaded with positional changes. She required assist to stand. She was able to take a few steps to move from the bed to the chair with use of a RW. She will benefit from continued PT to work on strengthening, transfers, and gait. Recommend discharge to SNF.     Time Calculation:         PT Charges       Row Name 07/11/25 1143             Time Calculation    Start Time 1130  -MS      Stop Time 1215  -MS      Time Calculation (min) 45 min  -MS      PT Received On 07/11/25  -MS      PT Goal Re-Cert Due Date 07/21/25  -MS         Untimed Charges    PT Eval/Re-eval Minutes 45  -MS         Total Minutes    Untimed Charges Total Minutes 45  -MS       Total Minutes 45  -MS                User Key  (r) = Recorded By, (t) = Taken By, (c) = Cosigned By      Initials Name Provider Type    Carmelita Quintana PT, DPT, NCS Physical Therapist                      PT G-Codes  Outcome Measure Options: AM-PAC 6 Clicks Basic Mobility (PT)  AM-PAC 6 Clicks Score (PT): 14  PT Discharge Summary  Anticipated Discharge Disposition (PT): skilled nursing facility    Carmelita Pratt, PT, DPT, NCS  7/11/2025

## 2025-07-11 NOTE — PLAN OF CARE
Goal Outcome Evaluation:  Plan of Care Reviewed With: patient        Progress: no change       Anticipated Discharge Disposition (SLP): unknown          SLP Swallowing Diagnosis: mild, oral dysphagia, profound, pharyngeal dysphagia, esophageal dysphagia (07/11/25 0959)             Swallow study in radiology completed. See note for detail.     Carmelita Pizarro MS CCC-SLP 7/11/2025 11:01 CDT

## 2025-07-11 NOTE — CONSULTS
[facility]  Radiation Oncology Clinic   MD Clay Tran MD, FACR  Davidmatt Smith, ANNMARIE  _______________________________________________  [facility]  Department of Radiation Oncology  [address] [place] [id number]  Office: [phone]  Fax: [phone]    RADIATION ONCOLOGY IN-PATIENT CONSULT    1. Dysphagia, unspecified type    2. Failure to thrive in adult    3. Pharyngoesophageal dysphagia [R13.14]    4. 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    She notes the onset of dysphagia approximately December with progressive weight loss of up to 50 pounds over the last 6 months.  This has markedly increased in the last 6 weeks.  Her dysphagia has developed to the point that she was barely able to take small sips of water to maintain hydration.    She is had a feeding tube placed by Dr. Weber for hydration and nutritional support.    Endoscopy reveals essentially near obstructing tumor involving the cervical esophagus.  This is positive for squamous cell carcinoma.      At this time we are asked to see the patient to provide an opinion on any further workup or treatment                             REQUESTING PHYSICIAN: Olman Felton MD    RECORDS OBTAINED:  Records of the patients history including those obtained from the referring provider were reviewed and summarized in detail.    HISTORY OF PRESENT ILLNESS:  The patient is a 84 y.o. year old female who is here for an opinion about the above issue.        Past Medical History:   Diagnosis Date    Bladder disorder     Chronic kidney disease, stage 3b     Diverticulosis     Emphysema lung     Fibrocystic disease of breast     CONNIE (generalized anxiety disorder)     GERD (gastroesophageal reflux disease)     Heart disease      History of bone density study 2011    History of colon polyps     Hyperglycemia     Hyperlipidemia     Hypotension     Left ventricular diastolic dysfunction     Malignant neoplasm of central portion of left female breast 11/09/2016    Osteopenia 08/29/2017    Restrictive lung disease     Type 2 diabetes mellitus     Uterine prolapse         Past Surgical History:   Procedure Laterality Date    BREAST LUMPECTOMY  2008    CATARACT EXTRACTION Right 2021    COLONOSCOPY  09/16/2010    Diverticulosis; Repeat 10 years    COLONOSCOPY N/A 11/11/2020    One 6mm inflamed hyperplastic polyp in the cecum; Diverticulosis in the left colon; Non-bleeding internal hemorrhoids; Repeat 5 years    ENDOSCOPY N/A 06/12/2025    Exophytic mass found in the larynx, not obstructing the airway; Partially obstructing, rule out malignancy, esophageal tumor was found in the proximal esophagus-biopsied; Medium-sized HH; Normal stomach; Two non-bleeding angiodysplastic lesions in the duodenum-biopsied    ENDOSCOPY W/ PEG TUBE PLACEMENT N/A 7/10/2025    Procedure: ESOPHAGOGASTRODUODENOSCOPY WITH PERCUTANEOUS ENDOSCOPIC GASTROSTOMY TUBE INSERTION WITH ANESTHESIA;  Surgeon: Christine Valdovinos MD;  Location: [facility] ENDOSCOPY;  Service: Gastroenterology;  Laterality: N/A;  pre op:peg placement  post op: esophageal mass  PCP:Luly Diez MD    GTUBE INSERTION N/A 7/10/2025    Procedure: LAPAROSCOPIC GASTROSTOMY FEEDING TUBE WITH DAVINCI ROBOT;  Surgeon: Willie Weber MD;  Location: [facility] OR;  Service: Robotics - DaVinci;  Laterality: N/A;    MAMMO BILATERAL  07/17/2013    Dr. Sabillon    MASTECTOMY Left 08/28/2008    PAP SMEAR  2010    SKIN CANCER EXCISION          No current facility-administered medications on file prior to encounter.     Current Outpatient Medications on File Prior to Encounter   Medication Sig Dispense Refill    amLODIPine (NORVASC) 2.5 MG tablet Take 1 tablet by mouth Daily.      aspirin 81 MG EC tablet Take 81 mg by mouth  daily.        Calcium Carb-Cholecalciferol 600-200 MG-UNIT tablet Take 1 tablet by mouth 2 (Two) Times a Day.      carvedilol (COREG) 12.5 MG tablet Take 1 tablet by mouth 2 (Two) Times a Day With Meals.      Cholecalciferol (VITAMIN D3) 400 UNITS capsule Take 1 capsule by mouth Daily.      famotidine (PEPCID) 40 MG tablet Take 1 tablet by mouth Daily.      glipiZIDE (GLUCOTROL) 5 MG tablet Take 1 tablet by mouth Daily.      losartan (COZAAR) 25 MG tablet Take 1 tablet by mouth Daily.  1    magnesium 30 MG tablet Take 1 tablet by mouth Daily.      metFORMIN ER (GLUCOPHAGE-XR) 500 MG 24 hr tablet Take 1 tablet by mouth Every Night.      pravastatin (PRAVACHOL) 40 MG tablet Take 1 tablet by mouth Daily.          ALLERGIES:    Allergies   Allergen Reactions    Adhesive Tape Hives     Latex Tape        Social History     Socioeconomic History    Marital status: Single   Tobacco Use    Smoking status: Former     Current packs/day: 0.00     Types: Cigarettes     Quit date:      Years since quittin.5    Smokeless tobacco: Never   Vaping Use    Vaping status: Never Used   Substance and Sexual Activity    Alcohol use: Yes     Alcohol/week: 1.0 standard drink of alcohol     Types: 1 Cans of beer per week     Comment: Occasionally     Drug use: No    Sexual activity: Defer        Family History   Problem Relation Age of Onset    Cancer Mother     Heart disease Father     No Known Problems Daughter     No Known Problems Son     Hypertension Daughter     Hypertension Daughter     Other Brother         polioi    Cancer Paternal Aunt     No Known Problems Maternal Grandmother     No Known Problems Maternal Grandfather     No Known Problems Paternal Grandmother     No Known Problems Paternal Grandfather     Drug abuse Brother     Colon cancer Neg Hx     Colon polyps Neg Hx     Esophageal cancer Neg Hx     Liver cancer Neg Hx     Liver disease Neg Hx     Rectal cancer Neg Hx     Stomach cancer Neg Hx         Review of  Systems   Constitutional:  Negative for appetite change, chills, fever and unexpected weight change.        This is a very slender somewhat cachectic female in a hospital bed.  She is weak but has no focal motor neurologic deficit.   HENT:  Negative for dental problem, ear pain, facial swelling, sore throat and voice change.    Eyes:  Negative for pain and visual disturbance.   Respiratory:  Negative for cough, shortness of breath and wheezing.    Cardiovascular:  Negative for chest pain.   Gastrointestinal:  Negative for abdominal pain, blood in stool, diarrhea, nausea and vomiting.   Endocrine: Negative.    Genitourinary:  Negative for difficulty urinating, dysuria, frequency, hematuria, vaginal bleeding and vaginal discharge.   Musculoskeletal:  Negative for arthralgias, gait problem and myalgias.   Skin: Negative.    Allergic/Immunologic: Negative.    Neurological:  Negative for dizziness, tremors, seizures, syncope and headaches.   Hematological: Negative.    Psychiatric/Behavioral: Negative.          Objective     Vitals:    07/10/25 2339 07/11/25 0451 07/11/25 0608 07/11/25 1100   BP: 134/60 126/59 134/62 130/64   BP Location: Right arm Right arm Right arm Right arm   Patient Position: Lying Lying Lying Lying   Pulse: 70 72 77 73   Resp: 16 16 18 16   Temp: 98.3 °F (36.8 °C) 97.8 °F (36.6 °C) 97.7 °F (36.5 °C) 98.3 °F (36.8 °C)   TempSrc: Axillary Axillary Oral Oral   SpO2: 95% 96% 98% 97%   Weight:       Height:             6/23/2025    10:54 AM   Current Status   ECOG score 2       Physical Exam  Vitals reviewed.   Constitutional:       Appearance: Normal appearance.   HENT:      Head: Normocephalic.      Nose: Nose normal.   Eyes:      Pupils: Pupils are equal, round, and reactive to light.   Cardiovascular:      Rate and Rhythm: Normal rate and regular rhythm.      Pulses: Normal pulses.      Heart sounds: Normal heart sounds.   Pulmonary:      Effort: Pulmonary effort is normal. No respiratory distress.       Breath sounds: Normal breath sounds. No wheezing.   Abdominal:      General: Bowel sounds are normal.      Palpations: There is no mass.   Musculoskeletal:         General: Normal range of motion.      Cervical back: Normal range of motion and neck supple. No tenderness.   Lymphadenopathy:      Cervical: No cervical adenopathy.   Skin:     General: Skin is warm and dry.      Capillary Refill: Capillary refill takes less than 2 seconds.   Neurological:      General: No focal deficit present.      Mental Status: She is alert and oriented to person, place, and time.      Motor: No weakness.   Psychiatric:         Mood and Affect: Mood normal.         Behavior: Behavior normal.             Radiology:   Imaging Results (Last 7 Days)       Procedure Component Value Units Date/Time    FL Video Swallow With Speech Single Contrast [[id number]] Collected: 07/11/25 1057     Updated: 07/11/25 1104    Narrative:      EXAMINATION: FL VIDEO SWALLOW W SPEECH SINGLE-CONTRAST-        HISTORY: Dysphagia; R13.10-Dysphagia, unspecified; R62.7-Adult failure  to thrive; R13.14-Dysphagia, pharyngoesophageal phase     No fluoroscopy was performed during ingestion of nectar consistency and  honey consistency contrast bolus.     There is no previous study for comparison.     Patient had no difficulty in initiating the swallowing.     There is significant residue in the vallecula and piriform sinus and a  very minimal propagation into the esophagus. There is evidence of reflux  from the esophagus into the vallecula and subsequent aspiration.     There is evidence of significant laryngeal penetration and aspiration.  No cough.     The study was performed under supervision of speech therapist.       Impression:      1. Abnormal swallow function study.  2. Fluoroscopy time: 1 minute 27 seconds.  3. Radiation dose (cumulative air kerma): 3.37mGy..  4. Number of images: 1              This report was signed and finalized on 7/11/2025 11:00 AM  by Dr. Jordan Turner MD.       XR Chest 1 View [[id number]] Collected: 07/09/25 1551     Updated: 07/09/25 1558    Narrative:      XR CHEST 1 VW-     HISTORY: generalized weakness; history of left breast cancer, esophageal  cancer     COMPARISON: 10/2/2008     FINDINGS: Frontal view of the chest obtained.     Left subclavian port in place with tip overlying the SVC. Similar  borderline cardiomegaly.  No lobar consolidation.  Basilar densities favoring atelectasis, right lower lobe pneumonia  difficult to exclude. No pleural effusion or pneumothorax. No acute  regional bony pathology       Impression:      1. Right greater left basilar densities favoring atelectasis. Right  lower lobe pneumonitis considered. Left subclavian port appropriate in  position        This report was signed and finalized on 7/9/2025 3:54 PM by Dr. Nikky Santamaria MD.               Pathology Results:   Proximal esophageal mass, biopsy:               - Involved by moderately differentiated squamous cell carcinoma.     Labs: WBC   Date Value Ref Range Status   07/10/2025 12.99 (H) 3.40 - 10.80 10*3/mm3 Final     RBC   Date Value Ref Range Status   07/10/2025 3.67 (L) 3.77 - 5.28 10*6/mm3 Final     Hemoglobin   Date Value Ref Range Status   07/10/2025 10.5 (L) 12.0 - 15.9 g/dL Final     Hematocrit   Date Value Ref Range Status   07/10/2025 31.4 (L) 34.0 - 46.6 % Final     Platelets   Date Value Ref Range Status   07/10/2025 333 140 - 450 10*3/mm3 Final              Assessment & Plan   1. Dysphagia, unspecified type    2. Failure to thrive in adult    3. Pharyngoesophageal dysphagia [R13.14]    4. Impaired mobility [Z74.09]          RECOMMENDATIONS:   Today I discussed with the patient and her daughter consideration of palliative radiotherapy.  I would anticipate a dose of 3250 cGy in 13 treatment fractions for relief of her dysphagia.    We also discussed supportive care only.  She does have a feeding tube in place so she can receive  hydration and nutritional support via that route.    They will consider these treatment options and notify us if they desire to proceed with a course of palliative radiotherapy.    If they do elect palliative radiotherapy due to the p16 positive squamous cell carcinoma I am cautiously optimistic that she would get a good local response to radiation therapy.    Radiation will be delivered as an outpatient and the logistics of presenting for treatment were likewise reviewed.    At this time the patient and her daughter verbalized understanding above discussion, voiced no questions, and will consider further treatment options.    Thank you for allowing me to assist in this patients care.      Time Spent: I spent 65 minutes caring for Lorena on this date of service. This time includes time spent by me in the following activities: preparing for the visit, reviewing tests, obtaining and/or reviewing a separately obtained history, performing a medically appropriate examination and/or evaluation, counseling and educating the patient/family/caregiver, and documenting information in the medical record.   Gabriel Ernandez III, MD   07/09/2025

## 2025-07-11 NOTE — PROGRESS NOTES
LOS: 2 days   Patient Care Team:  Luly Diez MD as PCP - General  Luly Diez MD as PCP - Family Medicine  Justin Canas MD as Consulting Physician (Hematology and Oncology)  Lee Haynes MD as Consulting Physician (Otolaryngology)  Darius Russell APRN as Nurse Practitioner (Otolaryngology)  Harriet Nunn APRN as Nurse Practitioner (Otolaryngology)    Subjective  No acute events overnight.  Postop day 1 status post robotic G-tube insertion.  Pain controlled.  No additional complaints.  Evaluated by speech therapy this morning.    Objective:   Vital Signs  Temp:  [97 °F (36.1 °C)-98.3 °F (36.8 °C)] 97.7 °F (36.5 °C)  Heart Rate:  [63-84] 77  Resp:  [16-22] 18  BP: (126-153)/(56-76) 134/62    Intake & Output (last 3 days)         07/08 0701 07/09 0700 07/09 0701  07/10 0700 07/10 0701 07/11 0700 07/11 0701 07/12 0700    Urine (mL/kg/hr)  700 300 (0.2)     Total Output  700 300     Net  -700 -300             Urine Unmeasured Occurrence  1 x 2 x             Physical Exam:     General Appearance:    Alert, cooperative, in no acute distress, elderly, chronically ill-appearing   HEENT:   Normocephalic, atraumatic, EOMI, MMM   Lungs:     Equal chest rise bilaterally.  No increased work of breathing    Heart:    Regular rhythm and normal rate   Abdomen:  Soft, nondistended, appropriate tenderness to palpation around G-tube site.  Top of bumper at 4 cm   Extremities:     Moves all extremities spontaneously, no peripheral edema   Results Review:     I reviewed the patient's new clinical results. Significant labs:   I reviewed the patient's new imaging results and agree with the interpretation.    Results from last 7 days   Lab Units 07/10/25  0332 07/09/25  1419   WBC 10*3/mm3 12.99* 13.75*   HEMOGLOBIN g/dL 10.5* 9.8*   HEMATOCRIT % 31.4* 29.5*   PLATELETS 10*3/mm3 333 392        Results from last 7 days   Lab Units 07/11/25  0354 07/10/25  0332 07/09/25  2139 07/09/25  1419   SODIUM mmol/L 139  136 135* 134*   POTASSIUM mmol/L 3.1* 3.9 3.9 3.3*   CHLORIDE mmol/L 103 97* 98 95*   CO2 mmol/L 26.0 22.0 20.0* 23.0   BUN mg/dL 27.6* 39.3* 46.8* 52.5*   CREATININE mg/dL 0.84 0.88 1.07* 1.23*   CALCIUM mg/dL 8.4* 9.7 9.7 10.0   BILIRUBIN mg/dL  --  0.5  --  0.7   ALK PHOS U/L  --  44  --  45   ALT (SGPT) U/L  --  <5  --  <5   AST (SGOT) U/L  --  14  --  10   GLUCOSE mg/dL 211* 132* 119* 146*       Assessment and plan:    Assessment & Plan       Dysphagia    Severe protein-calorie malnutrition      84 y.o.female, presents with a past medical history of CKD, T2DM, COPD, breast cancer 2008 and recently diagnosed squamous cell carcinoma-proximal esophagus causing dysphagia and proximal esophageal obstruction.     Patient underwent robotic G-tube placement on 7/10.  Recovering well.  Okay for meds via G-tube  Okay to begin tube feeds.  Please see nutrition note for tube feeding recommendations.      General Surgery will sign off at this time.  Please call/page with any additional concerns.    Willie Weber MD  07/11/25  10:37 CDT    Part of this note may be an electronic transcription/translation of spoken language to printed text using the Dragon Dictation System.

## 2025-07-11 NOTE — THERAPY EVALUATION
Acute Care - Speech Language Pathology   Swallow Initial Evaluation [facility]     Patient Name: Lorena Valdes  : 1940  MRN: [id number]  Today's Date: 2025               Admit Date: 2025    SPEECH-LANGUAGE PATHOLOGY EVALUATION - SWALLOW  Subjective: The patient was seen on this date for a Clinical Swallow evaluation.  Patient was alert and cooperative. Daughter present.   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 Presented to the hospital due to dysphagia. S/p G-tube placement on 7/10.   Has only been able to take in liquids and some purees over the last little bit. She would like to return to this upon discharge as well as Gtube feedings.   Objective: Oral motor examination results: generalized weakness. Missing teeth and teeth in poor condition. Textures given during assessment of swallow function included ice and puree consistency.  Assessment: Difficulties were noted with ice and puree consistency.  Observations: Wet vocal quality with ice chips noted with consistent throat clearing. Cued cough was strong with thick clear mucus expectorated to mouth and suctioned with oral suction. Puree trials presented with no overt s/s of aspiration but did have multiple swallows likely indicative of pharyngeal residue.   Patient would like to still attempt PO once she returns home. Discussed likely high risk for aspiration given current mass and symptoms at the bedside but could not fully rule it out or in without instrumental assessment. She would like to pursue this while admitted.   SLP Findings:  Patient presents with suspected oropharyngeal dysphagia, with esophageal component.   Recommendations: Diet Textures: NPO  Medications should be taken  by alternate means. May have ice chips after oral care, under staff or family supervision and with the recommended strategies for safe swallowing.   Recommended Strategies: upright for  PO, small bites and sips, and volitional cough and oral suction as needed. Oral care at least 3x per day.  Other Recommended Evaluations: VFSS    Dysphagia therapy is recommended.      Carmelita Pizarro MS CCC-SLP 7/11/2025 08:27 CDT    Visit Dx:     ICD-10-CM ICD-9-CM   1. Dysphagia, unspecified type  R13.10 787.20   2. Failure to thrive in adult  R62.7 783.7   3. 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    Malignant neoplasm of upper-outer quadrant of right female breast    Osteopenia    Encounter for care related to vascular access port    Colon cancer screening    Chronic kidney disease, stage 3b    Diabetic renal disease    Hypertensive renal disease    Estrogen receptor negative tumor status    Squamous cell esophageal cancer    Weight loss    Dysphagia    Severe protein-calorie malnutrition     Past Medical History:   Diagnosis Date    Bladder disorder     Chronic kidney disease, stage 3b     Diverticulosis     Emphysema lung     Fibrocystic disease of breast     CONNIE (generalized anxiety disorder)     GERD (gastroesophageal reflux disease)     Heart disease     History of bone density study 2011    History of colon polyps     Hyperglycemia     Hyperlipidemia     Hypotension     Left ventricular diastolic dysfunction     Malignant neoplasm of central portion of left female breast 11/09/2016    Osteopenia 08/29/2017    Restrictive lung disease     Type 2 diabetes mellitus     Uterine prolapse      Past Surgical History:   Procedure Laterality Date    BREAST LUMPECTOMY  2008    CATARACT EXTRACTION Right 2021    COLONOSCOPY  09/16/2010    Diverticulosis; Repeat 10 years    COLONOSCOPY N/A 11/11/2020    One 6mm inflamed hyperplastic polyp in the cecum; Diverticulosis in the left colon; Non-bleeding internal hemorrhoids; Repeat 5 years    ENDOSCOPY N/A 06/12/2025    Exophytic mass found in the  larynx, not obstructing the airway; Partially obstructing, rule out malignancy, esophageal tumor was found in the proximal esophagus-biopsied; Medium-sized HH; Normal stomach; Two non-bleeding angiodysplastic lesions in the duodenum-biopsied    ENDOSCOPY W/ PEG TUBE PLACEMENT N/A 7/10/2025    Procedure: ESOPHAGOGASTRODUODENOSCOPY WITH PERCUTANEOUS ENDOSCOPIC GASTROSTOMY TUBE INSERTION WITH ANESTHESIA;  Surgeon: Christine Valdovinos MD;  Location: [facility] ENDOSCOPY;  Service: Gastroenterology;  Laterality: N/A;  pre op:peg placement  post op: esophageal mass  PCP:Luly Diez MD    GTUBE INSERTION N/A 7/10/2025    Procedure: LAPAROSCOPIC GASTROSTOMY FEEDING TUBE WITH DAVINCI ROBOT;  Surgeon: Willie Weber MD;  Location: [facility] OR;  Service: Robotics - DaVinci;  Laterality: N/A;    MAMMO BILATERAL  07/17/2013    Dr. Sabillon    MASTECTOMY Left 08/28/2008    PAP SMEAR  2010    SKIN CANCER EXCISION         SLP Recommendation and Plan  SLP Swallowing Diagnosis: functional oral phase, suspected pharyngeal dysphagia, suspected esophageal dysphagia (07/11/25 0700)  SLP Diet Recommendation: NPO, ice chips between meals after oral care, with supervision, long term alternate methods of nutrition/hydration (07/11/25 0700)  Recommended Precautions and Strategies: upright posture during/after eating, small bites of food and sips of liquid, general aspiration precautions, fatigue precautions, volitional throat clear (07/11/25 0700)  SLP Rec. for Method of Medication Administration: meds via alternate route (07/11/25 0700)     Monitor for Signs of Aspiration: yes, notify SLP if any concerns (07/11/25 0700)  Recommended Diagnostics: VFSS (MBS) (07/11/25 0700)  Swallow Criteria for Skilled Therapeutic Interventions Met: demonstrates skilled criteria (07/11/25 0700)  Anticipated Discharge Disposition (SLP): unknown (07/11/25 0700)  Rehab Potential/Prognosis, Swallowing: adequate, monitor progress closely (07/11/25 0700)  Therapy Frequency  (Swallow): PRN (07/11/25 0700)  Predicted Duration Therapy Intervention (Days): until discharge (07/11/25 0700)  Oral Care Recommendations: Oral Care BID/PRN, Suction toothbrush (07/11/25 0700)                                        Progress: no change (Initial Evaluation)      SWALLOW EVALUATION (Last 72 Hours)       SLP Adult Swallow Evaluation       Row Name 07/11/25 0700 07/10/25 0850                Rehab Evaluation    Document Type evaluation  -MG --       Subjective Information no complaints  -MG --       Patient Observations alert;cooperative;agree to therapy  -MG --       Patient/Family/Caregiver Comments/Observations Daughter present  -MG --       Session Not Performed -- patient unavailable for evaluation  -MG       Patient Effort good  -MG --       Comment -- NPO for possible procedure  -MG       Symptoms Noted During/After Treatment none  -MG --          General Information    Patient Profile Reviewed yes  -MG --       Pertinent History Of Current Problem 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 Presented to the hospital due to dysphagia. S/p G-tube placement on 7/10.  -MG --       Current Method of Nutrition NPO  -MG --       Precautions/Limitations, Vision WFL;for purposes of eval  -MG --       Precautions/Limitations, Hearing WFL;for purposes of eval  -MG --       Prior Level of Function-Communication WFL  -MG --       Prior Level of Function-Swallowing no diet consistency restrictions  -MG --       Plans/Goals Discussed with patient and family;agreed upon  -MG --       Barriers to Rehab medically complex  -MG --       Patient's Goals for Discharge return to PO diet  -MG --       Family Goals for Discharge family did not state  -MG --          Pain    Additional Documentation Pain Scale: FACES Pre/Post-Treatment (Group)  -MG --          Pain Scale: FACES Pre/Post-Treatment    Pain: FACES Scale, Pretreatment 0-->no hurt  -MG --       Posttreatment  Pain Rating 0-->no hurt  -MG --          Oral Motor Structure and Function    Dentition Assessment missing teeth;teeth are in poor condition  -MG --       Secretion Management problems swallowing secretions;wet vocal quality;requires suctioning to control secretions  -MG --       Mucosal Quality dry  -MG --       Volitional Swallow delayed;weak  -MG --       Volitional Cough WFL  -MG --          Oral Musculature and Cranial Nerve Assessment    Oral Motor General Assessment generalized oral motor weakness  -MG --          General Eating/Swallowing Observations    Respiratory Support Currently in Use nasal cannula  -MG --       O2 Liters 2L  -MG --       Eating/Swallowing Skills fed by SLP  -MG --       Positioning During Eating upright in bed  -MG --       Utensils Used spoon  -MG --       Consistencies Trialed ice chips;pureed  -MG --          Clinical Swallow Eval    Oral Prep Phase WFL  -MG --       Oral Transit WFL  -MG --       Oral Residue WFL  -MG --       Pharyngeal Phase suspected pharyngeal impairment  -MG --       Esophageal Phase suspected esophageal impairment  -MG --       Clinical Swallow Evaluation Summary See note  -MG --          Pharyngeal Phase Concerns    Pharyngeal Phase Concerns multiple swallows;throat clear;wet vocal quality  -MG --       Wet Vocal Quality thin  -MG --       Multiple Swallows thin;pudding  -MG --       Throat Clear thin  -MG --          Esophageal Phase Concerns    Esophageal Phase Concerns sensation of material sticking  -MG --       Sensation of Material Sticking pudding  -MG --          SLP Evaluation Clinical Impression    SLP Swallowing Diagnosis functional oral phase;suspected pharyngeal dysphagia;suspected esophageal dysphagia  -MG --       Functional Impact risk of aspiration/pneumonia  -MG --       Rehab Potential/Prognosis, Swallowing adequate, monitor progress closely  -MG --       Swallow Criteria for Skilled Therapeutic Interventions Met demonstrates skilled  criteria  -MG --          Recommendations    Therapy Frequency (Swallow) PRN  -MG --       Predicted Duration Therapy Intervention (Days) until discharge  -MG --       SLP Diet Recommendation NPO;ice chips between meals after oral care, with supervision;long term alternate methods of nutrition/hydration  -MG --       Recommended Diagnostics VFSS (MBS)  -MG --       Recommended Precautions and Strategies upright posture during/after eating;small bites of food and sips of liquid;general aspiration precautions;fatigue precautions;volitional throat clear  -MG --       Oral Care Recommendations Oral Care BID/PRN;Suction toothbrush  -MG --       SLP Rec. for Method of Medication Administration meds via alternate route  -MG --       Monitor for Signs of Aspiration yes;notify SLP if any concerns  -MG --       Anticipated Discharge Disposition (SLP) unknown  -MG --          Swallow Goals (SLP)    Swallow LTGs Swallow Long Term Goal (free text)  -MG --       Swallow STGs diet tolerance goal selection (SLP)  -MG --       Diet Tolerance Goal Selection (SLP) Patient will tolerate trials of  -MG --          (LTG) Swallow    (LTG) Swallow Patient will tolerate least restrictive diet for pleasure PO without adverse effects.  -MG --       [place] (Swallow Long Term Goal) independently (over 90% accuracy)  -MG --       Time Frame (Swallow Long Term Goal) by discharge  -MG --       Barriers (Swallow Long Term Goal) medically complex  -MG --       Progress/Outcomes (Swallow Long Term Goal) new goal  -MG --          (STG) Patient will tolerate trials of    Consistencies Trialed (Tolerate trials) pureed textures;thin liquids  -MG --       Desired Outcome (Tolerate trials) without signs/symptoms of aspiration;without signs of distress;with adequate oral prep/transit/clearance  -MG --       [place] (Tolerate trials) independently (over 90% accuracy)  -MG --       Time Frame (Tolerate trials) by discharge  -MG --        Progress/Outcomes (Tolerate trials) new goal  -MG --                 User Key  (r) = Recorded By, (t) = Taken By, (c) = Cosigned By      Initials Name Effective Dates    MG Carmelita Pizarro MS [facility]-SLP 07/11/23 -                     EDUCATION  The patient has been educated in the following areas:   Dysphagia (Swallowing Impairment) Oral Care/Hydration NPO rationale.        SLP GOALS       Row Name 07/11/25 0700             (LTG) Swallow    (LTG) Swallow Patient will tolerate least restrictive diet for pleasure PO without adverse effects.  -MG      [place] (Swallow Long Term Goal) independently (over 90% accuracy)  -MG      Time Frame (Swallow Long Term Goal) by discharge  -MG      Barriers (Swallow Long Term Goal) medically complex  -MG      Progress/Outcomes (Swallow Long Term Goal) new goal  -MG         (STG) Patient will tolerate trials of    Consistencies Trialed (Tolerate trials) pureed textures;thin liquids  -MG      Desired Outcome (Tolerate trials) without signs/symptoms of aspiration;without signs of distress;with adequate oral prep/transit/clearance  -MG      [place] (Tolerate trials) independently (over 90% accuracy)  -MG      Time Frame (Tolerate trials) by discharge  -MG      Progress/Outcomes (Tolerate trials) new goal  -MG                User Key  (r) = Recorded By, (t) = Taken By, (c) = Cosigned By      Initials Name Provider Type    Carmelita Roca MS CCC-SLP Speech and Language Pathologist                         Time Calculation:    Time Calculation- SLP       Row Name 07/11/25 0826             Time Calculation- SLP    SLP Start Time 0700  -MG      SLP Stop Time 0826  -MG      SLP Time Calculation (min) 86 min  -MG      SLP Received On 07/11/25  -MG      SLP Goal Re-Cert Due Date 07/21/25  -MG         Untimed Charges    SLP Eval/Re-eval  ST Eval Oral Pharyng Swallow - 72785  -MG      66724-[id number] Eval Oral Pharyng Swallow Minutes 86  -MG         Total Minutes    Untimed Charges  Total Minutes 86  -MG       Total Minutes 86  -MG                User Key  (r) = Recorded By, (t) = Taken By, (c) = Cosigned By      Initials Name Provider Type    Carmelita Roca MS CCC-SLP Speech and Language Pathologist                    Therapy Charges for Today       Code Description Service Date Service Provider Modifiers Qty    [id number]  ST EVAL ORAL PHARYNG SWALLOW 6 7/11/2025 Carmelita Pizarro MS CCC-SLP GN 1                 Carmelita Pizarro MS CCC-SLP  7/11/2025

## 2025-07-11 NOTE — PLAN OF CARE
Goal Outcome Evaluation:  Plan of Care Reviewed With: patient, caregiver, child (ZAHIDA Trinidad)        Progress: no change (Initial Evaluation)       Anticipated Discharge Disposition (SLP): unknown          SLP Swallowing Diagnosis: functional oral phase, suspected pharyngeal dysphagia, suspected esophageal dysphagia (07/11/25 0700)             SPEECH-LANGUAGE PATHOLOGY EVALUATION - SWALLOW  Subjective: The patient was seen on this date for a Clinical Swallow evaluation.  Patient was alert and cooperative. Daughter present.   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 Presented to the hospital due to dysphagia. S/p G-tube placement on 7/10.   Has only been able to take in liquids and some purees over the last little bit. She would like to return to this upon discharge as well as Gtube feedings.   Objective: Oral motor examination results: generalized weakness. Missing teeth and teeth in poor condition. Textures given during assessment of swallow function included ice and puree consistency.  Assessment: Difficulties were noted with ice and puree consistency.  Observations: Wet vocal quality with ice chips noted with consistent throat clearing. Cued cough was strong with thick clear mucus expectorated to mouth and suctioned with oral suction. Puree trials presented with no overt s/s of aspiration but did have multiple swallows likely indicative of pharyngeal residue.   Patient would like to still attempt PO once she returns home. Discussed likely high risk for aspiration given current mass and symptoms at the bedside but could not fully rule it out or in without instrumental assessment. She would like to pursue this while admitted.   SLP Findings:  Patient presents with suspected oropharyngeal dysphagia, with esophageal component.   Recommendations: Diet Textures: NPO  Medications should be taken  by alternate means. May have ice chips after oral  care, under staff or family supervision and with the recommended strategies for safe swallowing.   Recommended Strategies: upright for PO, small bites and sips, and volitional cough and oral suction as needed. Oral care at least 3x per day.  Other Recommended Evaluations: VFSS    Dysphagia therapy is recommended.

## 2025-07-11 NOTE — PLAN OF CARE
Goal Outcome Evaluation:  Plan of Care Reviewed With: patient        Progress: no change  Outcome Evaluation: AOx4, 2L NC, VSS. Dressing CDI, PEG tube feedings start in AM, abd binder on. C/O pain, PRN given. IV antibx given, IV fluids running. NPO, oral care. Pt has increased secretions, MD notified see order. Ana María at bedside. Call light within reach, family at bedside, bed alarm on, q2 turns.

## 2025-07-11 NOTE — CASE MANAGEMENT/SOCIAL WORK
Discharge Planning Assessment  [facility]     Patient Name: Lorena Valdes  MRN: [id number]  Today's Date: 7/11/2025    Admit Date: 7/9/2025        Discharge Needs Assessment       Row Name 07/11/25 1339       Living Environment    People in Home alone    Current Living Arrangements home    Primary Care Provided by self    Provides Primary Care For no one, unable/limited ability to care for self    Quality of Family Relationships disruptive;supportive       Resource/Environmental Concerns    Resource/Environmental Concerns none    Transportation Concerns none       Transition Planning    Patient/Family Anticipates Transition to long-term care facility    Patient/Family Anticipated Services at Transition skilled nursing    Transportation Anticipated family or friend will provide;health plan transportation       Discharge Needs Assessment    Readmission Within the Last 30 Days no previous admission in last 30 days    Current Outpatient/Agency/Support Group skilled nursing facility    Equipment Currently Used at Home cane, straight    Concerns to be Addressed discharge planning;adjustment to diagnosis/illness    Do you want help finding or keeping work or a job? I do not need or want help    Do you want help with school or training? For example, starting or completing job training or getting a high school diploma, GED or equivalent No    Anticipated Changes Related to Illness inability to care for self    Outpatient/Agency/Support Group Needs skilled nursing facility    Discharge Facility/Level of Care Needs nursing facility, skilled    Current Discharge Risk chronically ill;lives alone    Discharge Coordination/Progress SW attempted to meet with patient and currently family at bedside.  SW attempted to discuss dc plan.  Patient stated she had not made any decisions regarding dc plan.  Patient's family member at bedside stated patient would not be going home.  Family basically stated they were overwhelmed and not  ready to make any decisions in regard to discharge plan.  Family informed SW that patient would be discharging on Monday.  They were not willing to discuss levels of care, coverage for placement etc until a discussion with patient's other two children was had and that would not occur until this evening when they were off work.  Physician updated and informed family to reach out to case management staff when they were ready to discuss dc planning as to not upset them.                   Discharge Plan       Row Name 07/11/25 1035       Plan    Plan Comments Attempted to screen for DC planning. Patient working with therapy. Will follow up at a later time.                  Continued Care and Services - Admitted Since 7/9/2025    No active coordination exists.          Demographic Summary    No documentation.                  Functional Status    No documentation.                  Psychosocial    No documentation.                  Abuse/Neglect    No documentation.                  Legal    No documentation.                  Substance Abuse    No documentation.                  Patient Forms    No documentation.                     ZEINAB Rodríguez

## 2025-07-11 NOTE — PLAN OF CARE
Problem: Palliative Care  Goal: Enhanced Quality of Life  Outcome: Progressing  Intervention: Optimize Psychosocial Wellbeing  Flowsheets (Taken 7/11/2025 0813)  Supportive Measures:   active listening utilized   decision-making supported    Pt is A&Ox4. 2L O2. VSS. Daughter at bedside. Pt states she feels better today. She is sitting up in bed. Discussed current code status in detail and answered all questions. She was agreeable to completing MOST document to reflect current code status. She is hopeful to improve and return home. Plans noted for possible SNF vs home w/HH.    Palliative will continue to follow and provide support.    Secure message sent to attending for completion of MOST document.    Floresita Li, CAMMIE  7/11/2025

## 2025-07-11 NOTE — ANESTHESIA POSTPROCEDURE EVALUATION
"Patient: Lorena Valdes    Procedure Summary       Date: 07/10/25 Room / Location:  [facility] OR  /  [facility] OR    Anesthesia Start: 1620 Anesthesia Stop: 1828    Procedure: LAPAROSCOPIC GASTROSTOMY FEEDING TUBE WITH DAVINCI ROBOT Diagnosis:     Surgeons: Willie Weber MD Provider: Isra Akhtar CRNA    Anesthesia Type: general ASA Status: 3            Anesthesia Type: general    Vitals  Vitals Value Taken Time   /69 07/10/25 18:34   Temp 97.5 °F (36.4 °C) 07/10/25 18:23   Pulse 69 07/10/25 18:34   Resp 22 07/10/25 18:23   SpO2 96 % 07/10/25 18:34   Vitals shown include unfiled device data.        Post Anesthesia Care and Evaluation    Patient location during evaluation: PACU  Patient participation: complete - patient participated  Level of consciousness: awake and awake and alert  Pain score: 0  Pain management: adequate    Airway patency: patent  Anesthetic complications: No anesthetic complications  PONV Status: none  Cardiovascular status: acceptable  Respiratory status: acceptable  Hydration status: acceptable    Comments: Patient discharged according to acceptable Eren score per RN assessment. See nursing records for further information.     Blood pressure 134/62, pulse 77, temperature 97.7 °F (36.5 °C), temperature source Oral, resp. rate 18, height 160 cm (62.99\"), weight 59.9 kg (132 lb), SpO2 98%, not currently breastfeeding.      "

## 2025-07-12 LAB
GLUCOSE BLDC GLUCOMTR-MCNC: 126 MG/DL (ref 70–130)
GLUCOSE BLDC GLUCOMTR-MCNC: 128 MG/DL (ref 70–130)
GLUCOSE BLDC GLUCOMTR-MCNC: 165 MG/DL (ref 70–130)
GLUCOSE BLDC GLUCOMTR-MCNC: 197 MG/DL (ref 70–130)

## 2025-07-12 PROCEDURE — 82948 REAGENT STRIP/BLOOD GLUCOSE: CPT

## 2025-07-12 PROCEDURE — 82948 REAGENT STRIP/BLOOD GLUCOSE: CPT | Performed by: GENERAL PRACTICE

## 2025-07-12 PROCEDURE — 63710000001 INSULIN REGULAR HUMAN PER 5 UNITS: Performed by: GENERAL PRACTICE

## 2025-07-12 RX ORDER — PRAVASTATIN SODIUM 20 MG
40 TABLET ORAL NIGHTLY
Status: DISCONTINUED | OUTPATIENT
Start: 2025-07-12 | End: 2025-07-16 | Stop reason: HOSPADM

## 2025-07-12 RX ORDER — AMLODIPINE BESYLATE 5 MG/1
2.5 TABLET ORAL DAILY
Status: DISCONTINUED | OUTPATIENT
Start: 2025-07-12 | End: 2025-07-14

## 2025-07-12 RX ORDER — CALCIUM CARBONATE/VITAMIN D3 600 MG-10
1 TABLET ORAL DAILY
Status: DISCONTINUED | OUTPATIENT
Start: 2025-07-12 | End: 2025-07-16 | Stop reason: HOSPADM

## 2025-07-12 RX ORDER — CARVEDILOL 6.25 MG/1
12.5 TABLET ORAL 2 TIMES DAILY WITH MEALS
Status: DISCONTINUED | OUTPATIENT
Start: 2025-07-12 | End: 2025-07-14

## 2025-07-12 RX ORDER — LOSARTAN POTASSIUM 25 MG/1
25 TABLET ORAL DAILY
Status: DISCONTINUED | OUTPATIENT
Start: 2025-07-12 | End: 2025-07-16 | Stop reason: HOSPADM

## 2025-07-12 RX ORDER — GLIPIZIDE 5 MG/1
5 TABLET ORAL DAILY
Status: DISCONTINUED | OUTPATIENT
Start: 2025-07-12 | End: 2025-07-16 | Stop reason: HOSPADM

## 2025-07-12 RX ORDER — FAMOTIDINE 20 MG/1
40 TABLET, FILM COATED ORAL DAILY
Status: DISCONTINUED | OUTPATIENT
Start: 2025-07-12 | End: 2025-07-15

## 2025-07-12 RX ADMIN — CALCIUM CARBONATE 600 MG (1,500 MG)-VITAMIN D3 400 UNIT TABLET 1 TABLET: at 12:46

## 2025-07-12 RX ADMIN — AMOXICILLIN AND CLAVULANATE POTASSIUM 250 MG: 400; 57 POWDER, FOR SUSPENSION ORAL at 05:30

## 2025-07-12 RX ADMIN — HYDROCODONE BITARTRATE AND CHLORPHENIRAMINE MALEATE 400 UNITS: 10; 8 SUSPENSION, EXTENDED RELEASE ORAL at 12:47

## 2025-07-12 RX ADMIN — AMOXICILLIN AND CLAVULANATE POTASSIUM 250 MG: 400; 57 POWDER, FOR SUSPENSION ORAL at 14:09

## 2025-07-12 RX ADMIN — FAMOTIDINE 40 MG: 20 TABLET, FILM COATED ORAL at 12:45

## 2025-07-12 RX ADMIN — Medication 1 ML: at 21:01

## 2025-07-12 RX ADMIN — Medication 10 ML: at 08:03

## 2025-07-12 RX ADMIN — PRAVASTATIN SODIUM 40 MG: 20 TABLET ORAL at 21:02

## 2025-07-12 RX ADMIN — Medication 1 ML: at 17:04

## 2025-07-12 RX ADMIN — HUMAN INSULIN 2 UNITS: 100 INJECTION, SOLUTION SUBCUTANEOUS at 12:46

## 2025-07-12 RX ADMIN — GLIPIZIDE 5 MG: 5 TABLET ORAL at 12:46

## 2025-07-12 RX ADMIN — Medication 10 ML: at 21:02

## 2025-07-12 RX ADMIN — AMOXICILLIN AND CLAVULANATE POTASSIUM 250 MG: 400; 57 POWDER, FOR SUSPENSION ORAL at 21:01

## 2025-07-12 RX ADMIN — Medication 1 ML: at 07:58

## 2025-07-12 RX ADMIN — Medication 1 ML: at 12:47

## 2025-07-12 NOTE — PLAN OF CARE
Problem: Adult Inpatient Plan of Care  Goal: Plan of Care Review  Outcome: Progressing  Goal: Patient-Specific Goal (Individualized)  Outcome: Progressing  Goal: Absence of Hospital-Acquired Illness or Injury  Outcome: Progressing  Intervention: Identify and Manage Fall Risk  Recent Flowsheet Documentation  Taken 7/12/2025 1400 by oGdwin Fields RN  Safety Promotion/Fall Prevention: safety round/check completed  Taken 7/12/2025 1300 by Godwin Fields RN  Safety Promotion/Fall Prevention: safety round/check completed  Taken 7/12/2025 1200 by Godwin Fields RN  Safety Promotion/Fall Prevention: safety round/check completed  Taken 7/12/2025 1100 by Godwin Fields RN  Safety Promotion/Fall Prevention: safety round/check completed  Taken 7/12/2025 1000 by Godwin Fields RN  Safety Promotion/Fall Prevention: safety round/check completed  Taken 7/12/2025 0900 by Godwin Fields RN  Safety Promotion/Fall Prevention: safety round/check completed  Taken 7/12/2025 0800 by Godwin Fields RN  Safety Promotion/Fall Prevention: safety round/check completed  Taken 7/12/2025 0700 by Godwin Fields RN  Safety Promotion/Fall Prevention: safety round/check completed  Intervention: Prevent Skin Injury  Recent Flowsheet Documentation  Taken 7/12/2025 1400 by Godwin Fields RN  Body Position: patient/family refused  Taken 7/12/2025 1200 by Godwin Fields RN  Body Position: patient/family refused  Taken 7/12/2025 1000 by Godwin Fields RN  Body Position: patient/family refused  Taken 7/12/2025 0800 by Godwin Fields RN  Body Position: patient/family refused  Skin Protection:   incontinence pads utilized   transparent dressing maintained   skin sealant/moisture barrier applied   pulse oximeter probe site changed  Taken 7/12/2025 0700 by Godwin Fields RN  Body Position: patient/family refused  Intervention: Prevent and Manage VTE (Venous Thromboembolism) Risk  Recent Flowsheet  Documentation  Taken 7/12/2025 0800 by Godwin Fields, RN  VTE Prevention/Management:   bilateral   SCDs (sequential compression devices) on  Goal: Optimal Comfort and Wellbeing  Outcome: Progressing  Goal: Readiness for Transition of Care  Outcome: Progressing   Goal Outcome Evaluation:

## 2025-07-12 NOTE — CASE MANAGEMENT/SOCIAL WORK
Continued Stay Note  PAUL Hermsoillo     Patient Name: Lorena Valdes  MRN: [id number]  Today's Date: 7/12/2025    Admit Date: 7/9/2025    Plan: Home w/ Feeding Tube   Discharge Plan       Row Name 07/12/25 1153       Plan    Plan Home w/ Feeding Tube    Plan Comments Feeding Tube order has been put in, SW has been notified that order is unable to be completed over the weekend. [facility] can meet with pt and family on Monday.                   Discharge Codes    No documentation.                       Angel Russo

## 2025-07-12 NOTE — PLAN OF CARE
Goal Outcome Evaluation:  Plan of Care Reviewed With: patient, family        Progress: improving   Pt A/O x4. VSS on 2L. Voiding/incontinent. Purewick. SLP swallow study performed this shift. Pt allowed to have ice chips and 4oz pudding thick liquids once a day. Up x1 to chair per PT. IV to R AC, IID. Accu check. No c/o pain. Dr. Weber cleared PEG tube for use. Bolus feedings started this shift. 125mL given twice. Pt tolerated well. To be increased by 100mL Q8 hours. Drsg applied to PEG tube. Daughter yelled at nursing staff today. Family to remain at bedside.Safety maintaine. Call light in reach.

## 2025-07-12 NOTE — PLAN OF CARE
Goal Outcome Evaluation:  Plan of Care Reviewed With: patient        Progress: improving  Outcome Evaluation: AOx4, VSS 2L NC. C/O pain, PRN given w/ good results. Bolus feedings cont as ordered, increased by 100 mL q8, Pt tolerated well. Pt will receive another bolus feeding in AM. Drsg around PEG tube CDI, abd binder on. Voiding PW.  Pt educated about importance of turning to prevent skin break down. Family at bedside, call light within reach, bed alarm on, suction at bedside.

## 2025-07-12 NOTE — PROGRESS NOTES
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  Patient with history of breast cancer in 2008.  Status post lumpectomy and adjuvant chemotherapy; history of diabetes mellitus type 2, GERD, anemia, chronic kidney disease stage 3 a and COPD; the patient came  to the hospital reporting progressive dysphagia for solids, and choking sensation when attempting to drink fluids.  No rectal bleeding, no melena, no hematemesis.  No abdominal pain.      Robotic Gastrostomy tube placed 7/10/25 by general Surgery  Patient has been tolerating feedings started.  No abdominal pain.  No vomiting.  No other events reported.  Evaluated with family members present and nurse present      Review of Systems   All pertinent negatives and positives are as above. All other systems have been reviewed and are negative unless otherwise stated.     Objective    Temp:  [97.5 °F (36.4 °C)-99.1 °F (37.3 °C)] 97.9 °F (36.6 °C)  Heart Rate:  [73-84] 84  Resp:  [16] 16  BP: (124-135)/(59-67) 124/63  Physical Exam  Constitutional:       Appearance: Chronically ill-appearing, alert, oriented.  Dysphonic speech.  HENT:      Head: Normocephalic and atraumatic.      Nose: Nose normal.      Mouth/Throat:      Mouth: Mucous membranes are moist.   Eyes:      Extraocular Movements: Extraocular movements intact.      Conjunctiva/sclera: Conjunctivae normal.      Pupils: Pupils are equal, round  Cardiovascular:      Rate and Rhythm: Normal rate and  "regular rhythm.      Pulses: Normal pulses.   Pulmonary:      Effort: No respiratory distress.      Breath sounds: Normal breath sounds.   Abdominal:      General: Abdomen is flat. Bowel sounds are normal.      Palpations: Abdomen is soft.   Extremities:  No lower extremity edema.  Skin:     Capillary Refill: Capillary refill takes less than 2 seconds.      Coloration: Skin is not jaundiced.       Results Review:  I have reviewed the labs, radiology results, and diagnostic studies.    Laboratory Data:   Results from last 7 days   Lab Units 07/10/25  0332 07/09/25  1419   WBC 10*3/mm3 12.99* 13.75*   HEMOGLOBIN g/dL 10.5* 9.8*   HEMATOCRIT % 31.4* 29.5*   PLATELETS 10*3/mm3 333 392        Results from last 7 days   Lab Units 07/11/25  2045 07/11/25  0354 07/10/25  0332 07/09/25  2139 07/09/25  1419   SODIUM mmol/L  --  139 136 135* 134*   POTASSIUM mmol/L 4.1 3.1* 3.9 3.9 3.3*   CHLORIDE mmol/L  --  103 97* 98 95*   CO2 mmol/L  --  26.0 22.0 20.0* 23.0   BUN mg/dL  --  27.6* 39.3* 46.8* 52.5*   CREATININE mg/dL  --  0.84 0.88 1.07* 1.23*   CALCIUM mg/dL  --  8.4* 9.7 9.7 10.0   BILIRUBIN mg/dL  --   --  0.5  --  0.7   ALK PHOS U/L  --   --  44  --  45   ALT (SGPT) U/L  --   --  <5  --  <5   AST (SGOT) U/L  --   --  14  --  10   GLUCOSE mg/dL  --  211* 132* 119* 146*       Culture Data:   No results found for: \"BLOODCX\", \"URINECX\", \"WOUNDCX\", \"MRSACX\", \"RESPCX\", \"STOOLCX\"    Radiology Data:   Imaging Results (Last 24 Hours)       Procedure Component Value Units Date/Time    FL Video Swallow With Speech Single Contrast [[id number]] Collected: 07/11/25 1057     Updated: 07/11/25 1104    Narrative:      EXAMINATION: FL VIDEO SWALLOW W SPEECH SINGLE-CONTRAST-        HISTORY: Dysphagia; R13.10-Dysphagia, unspecified; R62.7-Adult failure  to thrive; R13.14-Dysphagia, pharyngoesophageal phase     No fluoroscopy was performed during ingestion of nectar consistency and  honey consistency contrast bolus.     There is no previous " study for comparison.     Patient had no difficulty in initiating the swallowing.     There is significant residue in the vallecula and piriform sinus and a  very minimal propagation into the esophagus. There is evidence of reflux  from the esophagus into the vallecula and subsequent aspiration.     There is evidence of significant laryngeal penetration and aspiration.  No cough.     The study was performed under supervision of speech therapist.       Impression:      1. Abnormal swallow function study.  2. Fluoroscopy time: 1 minute 27 seconds.  3. Radiation dose (cumulative air kerma): 3.37mGy..  4. Number of images: 1              This report was signed and finalized on 7/11/2025 11:00 AM by Dr. Jordan Turner MD.               I have reviewed the patient's current medications.     Assessment/Plan   Assessment  Active Hospital Problems    Diagnosis     **Dysphagia     Severe protein-calorie malnutrition        Dysphagia  Status post Robotic gastrostomy tube placement 7/10/25  Hypokalemia  Hypomagnesemia  Squamous cell carcinoma-proximal esophagus  History of breast cancer in 2008.    Diabetes mellitus type 2  GERD  Chronic kidney disease stage 3a.  COPD           PET CT scan 6/27/2025: mass in the paravertebral soft tissues inseparable from the cervical esophagus, soft tissue extension into the right paratracheal soft tissues, asymmetric metabolic activity in the high right posterior parietal centrum semiovale.       MRI of the brain showed no suspicious enhancing tumor.    Laboratory tests reviewed      Treatment Plan    Robotic gastrostomy tube placed 7/10  Continue enteral feedings per nutritional recommendations:  Goal volume of 375 ml four times per day. Flush peg tube with 60 ml of water before and after each meal bolus. Additional water bolus of 150 ml for hydration daily.     Unasyn IV, 3 days; changed to Augmentin.     Replace electrolytes as needed  Labs in Am if patient stays in hospital    Mild  sliding scale of insulin for now.     Oncology evaluation took place; radiation oncology evaluation took place; planning outpatient radiation.    Patient may be discharged once enteral feedings and home health arranged.  Informed patient and family members of plan.   Social work to arrange.        Medical Decision Making  Number and Complexity of problems: 9, moderate complexity      Differential Diagnosis: see above    Conditions and Status        Condition is unchanged.     [facility] Data  External documents reviewed: no  Cardiac tracing (EKG, telemetry) interpretation: no new  Radiology interpretation: no new  Labs reviewed: yes  Any tests that were considered but not ordered: no     Decision rules/scores evaluated (example [id number], Wells, etc): none     Discussed with: patient     Care Planning  Shared decision making: patient  Code status and discussions: FC    Disposition  Social Determinants of Health that impact treatment or disposition: none  I expect the patient to be discharged to home with home health     Electronically signed by Olman Felton MD, 07/12/25, 10:47 CDT.

## 2025-07-13 LAB
ANION GAP SERPL CALCULATED.3IONS-SCNC: 8 MMOL/L (ref 5–15)
BUN SERPL-MCNC: 26.6 MG/DL (ref 8–23)
BUN/CREAT SERPL: 26.9 (ref 7–25)
CALCIUM SPEC-SCNC: 7.7 MG/DL (ref 8.6–10.5)
CHLORIDE SERPL-SCNC: 101 MMOL/L (ref 98–107)
CO2 SERPL-SCNC: 26 MMOL/L (ref 22–29)
CREAT SERPL-MCNC: 0.99 MG/DL (ref 0.57–1)
EGFRCR SERPLBLD CKD-EPI 2021: 56.7 ML/MIN/1.73
GLUCOSE BLDC GLUCOMTR-MCNC: 110 MG/DL (ref 70–130)
GLUCOSE BLDC GLUCOMTR-MCNC: 136 MG/DL (ref 70–130)
GLUCOSE BLDC GLUCOMTR-MCNC: 160 MG/DL (ref 70–130)
GLUCOSE BLDC GLUCOMTR-MCNC: 174 MG/DL (ref 70–130)
GLUCOSE SERPL-MCNC: 131 MG/DL (ref 65–99)
MAGNESIUM SERPL-MCNC: 1.5 MG/DL (ref 1.6–2.4)
POTASSIUM SERPL-SCNC: 3.6 MMOL/L (ref 3.5–5.2)
POTASSIUM SERPL-SCNC: 4.7 MMOL/L (ref 3.5–5.2)
SODIUM SERPL-SCNC: 135 MMOL/L (ref 136–145)

## 2025-07-13 PROCEDURE — 82948 REAGENT STRIP/BLOOD GLUCOSE: CPT | Performed by: FAMILY MEDICINE

## 2025-07-13 PROCEDURE — 83735 ASSAY OF MAGNESIUM: CPT | Performed by: FAMILY MEDICINE

## 2025-07-13 PROCEDURE — 84132 ASSAY OF SERUM POTASSIUM: CPT | Performed by: FAMILY MEDICINE

## 2025-07-13 PROCEDURE — 80048 BASIC METABOLIC PNL TOTAL CA: CPT | Performed by: FAMILY MEDICINE

## 2025-07-13 PROCEDURE — 97110 THERAPEUTIC EXERCISES: CPT

## 2025-07-13 PROCEDURE — 97530 THERAPEUTIC ACTIVITIES: CPT

## 2025-07-13 PROCEDURE — 25010000002 MAGNESIUM SULFATE IN D5W 1G/100ML (PREMIX) 1-5 GM/100ML-% SOLUTION: Performed by: FAMILY MEDICINE

## 2025-07-13 PROCEDURE — 63710000001 INSULIN REGULAR HUMAN PER 5 UNITS: Performed by: GENERAL PRACTICE

## 2025-07-13 PROCEDURE — 82948 REAGENT STRIP/BLOOD GLUCOSE: CPT

## 2025-07-13 RX ORDER — MAGNESIUM SULFATE 1 G/100ML
1 INJECTION INTRAVENOUS
Status: COMPLETED | OUTPATIENT
Start: 2025-07-13 | End: 2025-07-13

## 2025-07-13 RX ORDER — POTASSIUM CHLORIDE 1.5 G/1.58G
40 POWDER, FOR SOLUTION ORAL EVERY 4 HOURS
Status: COMPLETED | OUTPATIENT
Start: 2025-07-13 | End: 2025-07-13

## 2025-07-13 RX ADMIN — MAGNESIUM SULFATE IN DEXTROSE 1 G: 10 INJECTION, SOLUTION INTRAVENOUS at 08:00

## 2025-07-13 RX ADMIN — HYDROCODONE BITARTRATE AND CHLORPHENIRAMINE MALEATE 400 UNITS: 10; 8 SUSPENSION, EXTENDED RELEASE ORAL at 08:00

## 2025-07-13 RX ADMIN — MAGNESIUM SULFATE IN DEXTROSE 1 G: 10 INJECTION, SOLUTION INTRAVENOUS at 09:57

## 2025-07-13 RX ADMIN — Medication 1 ML: at 20:31

## 2025-07-13 RX ADMIN — FAMOTIDINE 40 MG: 20 TABLET, FILM COATED ORAL at 08:00

## 2025-07-13 RX ADMIN — PRAVASTATIN SODIUM 40 MG: 20 TABLET ORAL at 20:31

## 2025-07-13 RX ADMIN — AMOXICILLIN AND CLAVULANATE POTASSIUM 250 MG: 400; 57 POWDER, FOR SUSPENSION ORAL at 05:54

## 2025-07-13 RX ADMIN — Medication 1 ML: at 18:48

## 2025-07-13 RX ADMIN — HUMAN INSULIN 2 UNITS: 100 INJECTION, SOLUTION SUBCUTANEOUS at 12:05

## 2025-07-13 RX ADMIN — MAGNESIUM SULFATE IN DEXTROSE 1 G: 10 INJECTION, SOLUTION INTRAVENOUS at 11:05

## 2025-07-13 RX ADMIN — Medication 10 ML: at 20:31

## 2025-07-13 RX ADMIN — POTASSIUM CHLORIDE 40 MEQ: 1.5 POWDER, FOR SOLUTION ORAL at 12:04

## 2025-07-13 RX ADMIN — Medication 10 ML: at 09:57

## 2025-07-13 RX ADMIN — Medication 1 ML: at 08:00

## 2025-07-13 RX ADMIN — POTASSIUM CHLORIDE 40 MEQ: 1.5 POWDER, FOR SOLUTION ORAL at 08:00

## 2025-07-13 RX ADMIN — GLIPIZIDE 5 MG: 5 TABLET ORAL at 08:00

## 2025-07-13 RX ADMIN — CALCIUM CARBONATE 600 MG (1,500 MG)-VITAMIN D3 400 UNIT TABLET 1 TABLET: at 08:00

## 2025-07-13 RX ADMIN — Medication 1 ML: at 12:05

## 2025-07-13 NOTE — THERAPY TREATMENT NOTE
Acute Care - Physical Therapy Treatment Note  Psychiatric     Patient Name: Lorena Valdes  : 1940  MRN: [id number]  Today's Date: 2025      Visit Dx:     ICD-10-CM ICD-9-CM   1. Dysphagia, unspecified type  R13.10 787.20   2. Failure to thrive in adult  R62.7 783.7   3. Pharyngoesophageal dysphagia [R13.14]  R13.14 787.24   4. Impaired mobility [Z74.09]  Z74.09 799.89   5. Esophageal dysphagia  R13.19 787.29   6. Severe protein-calorie malnutrition  E43 262   7. 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    Malignant neoplasm of upper-outer quadrant of right female breast    Osteopenia    Encounter for care related to vascular access port    Colon cancer screening    Chronic kidney disease, stage 3b    Diabetic renal disease    Hypertensive renal disease    Estrogen receptor negative tumor status    Squamous cell esophageal cancer    Weight loss    Dysphagia    Severe protein-calorie malnutrition     Past Medical History:   Diagnosis Date    Bladder disorder     Chronic kidney disease, stage 3b     Diverticulosis     Emphysema lung     Fibrocystic disease of breast     CONNIE (generalized anxiety disorder)     GERD (gastroesophageal reflux disease)     Heart disease     History of bone density study     History of colon polyps     Hyperglycemia     Hyperlipidemia     Hypotension     Left ventricular diastolic dysfunction     Malignant neoplasm of central portion of left female breast 2016    Osteopenia 2017    Restrictive lung disease     Type 2 diabetes mellitus     Uterine prolapse      Past Surgical History:   Procedure Laterality Date    BREAST LUMPECTOMY  2008    CATARACT EXTRACTION Right     COLONOSCOPY  2010    Diverticulosis; Repeat 10 years    COLONOSCOPY N/A 2020    One 6mm inflamed hyperplastic polyp in the cecum; Diverticulosis in the left colon;  Non-bleeding internal hemorrhoids; Repeat 5 years    ENDOSCOPY N/A 06/12/2025    Exophytic mass found in the larynx, not obstructing the airway; Partially obstructing, rule out malignancy, esophageal tumor was found in the proximal esophagus-biopsied; Medium-sized HH; Normal stomach; Two non-bleeding angiodysplastic lesions in the duodenum-biopsied    ENDOSCOPY W/ PEG TUBE PLACEMENT N/A 7/10/2025    Procedure: ESOPHAGOGASTRODUODENOSCOPY WITH PERCUTANEOUS ENDOSCOPIC GASTROSTOMY TUBE INSERTION WITH ANESTHESIA;  Surgeon: Christine Valdovinos MD;  Location: [facility] ENDOSCOPY;  Service: Gastroenterology;  Laterality: N/A;  pre op:peg placement  post op: esophageal mass  PCP:Luly Diez MD    GTUBE INSERTION N/A 7/10/2025    Procedure: LAPAROSCOPIC GASTROSTOMY FEEDING TUBE WITH Pandora.TVINCI ROBOT;  Surgeon: Willie Weber MD;  Location: [facility] OR;  Service: Robotics - DaVinci;  Laterality: N/A;    MAMMO BILATERAL  07/17/2013    Dr. Sabillon    MASTECTOMY Left 08/28/2008    PAP SMEAR  2010    SKIN CANCER EXCISION       PT Assessment (Last 12 Hours)       PT Evaluation and Treatment       Row Name 07/13/25 1100          Physical Therapy Time and Intention    Subjective Information complains of;weakness;fatigue  -LY     Document Type therapy note (daily note)  -LY     Mode of Treatment physical therapy  -LY       Row Name 07/13/25 1100          General Information    Existing Precautions/Restrictions fall;other (see comments)  PEG  -LY       Row Name 07/13/25 1100          Pain    Pretreatment Pain Rating 0/10 - no pain  -LY       Row Name 07/13/25 1100          Bed Mobility    Supine-Sit [place] (Bed Mobility) modified independence  -LY     Assistive Device (Bed Mobility) head of bed elevated;bed rails  -LY       Row Name 07/13/25 1100          Transfers    Transfers sit-stand transfer;stand-sit transfer  -LY       Row Name 07/13/25 1100          Sit-Stand Transfer    Sit-Stand [place] (Transfers) verbal cues;minimum  assist (75% patient effort)  -LY     Assistive Device (Sit-Stand Transfers) walker, front-wheeled  -LY       Row Name 07/13/25 1100          Stand-Sit Transfer    Stand-Sit [place] (Transfers) verbal cues;minimum assist (75% patient effort)  -LY     Assistive Device (Stand-Sit Transfers) walker, front-wheeled  -LY       Row Name 07/13/25 1100          Gait/Stairs (Locomotion)    [place] Level (Gait) minimum assist (75% patient effort);verbal cues;nonverbal cues (demo/gesture)  -LY     Assistive Device (Gait) walker, front-wheeled  -LY     Comment, (Gait/Stairs) 5' to chair  -LY       Row Name 07/13/25 1100          Motor Skills    Comments, Therapeutic Exercise BLE AROM seated x15 reps  -LY     Additional Documentation Comments, Therapeutic Exercise (Row)  -LY       Row Name             Wound 07/10/25 1642 abdomen Surgical Laparoscopic    Wound - Properties Group Placement Date: 07/10/25  - Placement Time: 1642 -HW Location: abdomen  -HW Primary Wound Type: Surgical  -HW Secondary Wound Type - Surgical: Laparoscopic  -HW    Retired Wound - Properties Group Placement Date: 07/10/25  -HW Placement Time: 1642 -HW Location: abdomen  -HW    Retired Wound - Properties Group Placement Date: 07/10/25  -HW Placement Time: 1642 -HW Location: abdomen  -HW    Retired Wound - Properties Group Date first assessed: 07/10/25  - Time first assessed: 1642 -HW Location: abdomen  -HW      Row Name 07/13/25 1100          Plan of Care Review    Plan of Care Reviewed With patient;family  -LY     Progress improving  -LY       Row Name 07/13/25 1100          Positioning and Restraints    Pre-Treatment Position in bed  -LY     Post Treatment Position chair  -LY     In Chair sitting;call light within reach;encouraged to call for assist;with family/caregiver;waffle cushion  -LY               User Key  (r) = Recorded By, (t) = Taken By, (c) = Cosigned By      Initials Name Provider Type    Shelley Huang, PTA Physical  Therapist Assistant    Brett Mckeon, RN Registered Nurse                    Physical Therapy Education       Title: PT OT SLP Therapies (In Progress)       Topic: Physical Therapy (In Progress)       Point: Mobility training (Done)       Learning Progress Summary            Patient Charan, TYREL, VU by MS at 7/11/2025 1307    Comment: role of PT in her care                      Point: Home exercise program (Not Started)       Learner Progress:  Not documented in this visit.              Point: Body mechanics (Not Started)       Learner Progress:  Not documented in this visit.              Point: Precautions (Not Started)       Learner Progress:  Not documented in this visit.                              User Key       Initials Effective Dates Name Provider Type Discipline    MS 07/11/23 -  Carmelita Pratt, PT, DPT, NCS Physical Therapist PT                  PT Recommendation and Plan  Anticipated Discharge Disposition (PT): skilled nursing facility  Plan of Care Reviewed With: patient, family  Progress: improving       Time Calculation:    PT Charges       Row Name 07/13/25 1359             Time Calculation    Start Time 1100  -LY      Stop Time 1127  -LY      Time Calculation (min) 27 min  -LY      PT Received On 07/13/25  -LY         Time Calculation- PT    Total Timed Code Minutes- PT 27 minute(s)  -LY         Timed Charges    10945 - PT Therapeutic Exercise Minutes 12  -LY      00818 - PT Therapeutic Activity Minutes 15  -LY         Total Minutes    Timed Charges Total Minutes 27  -LY       Total Minutes 27  -LY                User Key  (r) = Recorded By, (t) = Taken By, (c) = Cosigned By      Initials Name Provider Type    LY Shelley Alexandra PTA Physical Therapist Assistant                  Therapy Charges for Today       Code Description Service Date Service Provider Modifiers Qty    38670973383 HC PT THER PROC EA 15 MIN 7/13/2025 Shelley Alexandra PTA GP 1    01596955393 HC PT THERAPEUTIC ACT EA 15 MIN  7/13/2025 Shelley Alexandra, PTA GP 1            PT G-Codes  Outcome Measure Options: AM-PAC 6 Clicks Basic Mobility (PT)  AM-PAC 6 Clicks Score (PT): 12    Shelley Alexandra, BUTCH  7/13/2025

## 2025-07-13 NOTE — PROGRESS NOTES
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  Patient with history of breast cancer in 2008.  Status post lumpectomy and adjuvant chemotherapy; history of diabetes mellitus type 2, GERD, anemia, chronic kidney disease stage 3 a and COPD; the patient came  to the hospital reporting progressive dysphagia for solids, and choking sensation when attempting to drink fluids.  No rectal bleeding, no melena, no hematemesis.  No abdominal pain.      Robotic Gastrostomy tube placed 7/10/25 by general Surgery  Tolerating enteral feedings.  No abdominal pain.  No vomiting.  No other events reported.  Evaluated with family members present and nurse present      Review of Systems   All pertinent negatives and positives are as above. All other systems have been reviewed and are negative unless otherwise stated.     Objective    Temp:  [97.5 °F (36.4 °C)-98 °F (36.7 °C)] 97.6 °F (36.4 °C)  Heart Rate:  [75-91] 79  Resp:  [14-16] 14  BP: (122-135)/(55-69) 122/55  Physical Exam  Constitutional:       Appearance: Chronically ill-appearing, alert, oriented.  Dysphonic speech.  HENT:      Head: Normocephalic and atraumatic.      Nose: Nose normal.      Mouth/Throat:      Mouth: Mucous membranes are moist.   Eyes:      Extraocular Movements: Extraocular movements intact.      Conjunctiva/sclera: Conjunctivae normal.      Pupils: Pupils are equal, round  Cardiovascular:      Rate and Rhythm: Normal rate and regular rhythm.  "     Pulses: Normal pulses.   Pulmonary:      Effort: No respiratory distress.      Breath sounds: Normal breath sounds.   Abdominal:      General: Abdomen is flat. Bowel sounds are normal.      Palpations: Abdomen is soft.   Extremities:  No lower extremity edema.  Skin:     Capillary Refill: Capillary refill takes less than 2 seconds.      Coloration: Skin is not jaundiced.       Results Review:  I have reviewed the labs, radiology results, and diagnostic studies.    Laboratory Data:   Results from last 7 days   Lab Units 07/10/25  0332 07/09/25  1419   WBC 10*3/mm3 12.99* 13.75*   HEMOGLOBIN g/dL 10.5* 9.8*   HEMATOCRIT % 31.4* 29.5*   PLATELETS 10*3/mm3 333 392        Results from last 7 days   Lab Units 07/13/25  0512 07/11/25  2045 07/11/25  0354 07/10/25  0332 07/09/25  2139 07/09/25  1419   SODIUM mmol/L 135*  --  139 136   < > 134*   POTASSIUM mmol/L 3.6 4.1 3.1* 3.9   < > 3.3*   CHLORIDE mmol/L 101  --  103 97*   < > 95*   CO2 mmol/L 26.0  --  26.0 22.0   < > 23.0   BUN mg/dL 26.6*  --  27.6* 39.3*   < > 52.5*   CREATININE mg/dL 0.99  --  0.84 0.88   < > 1.23*   CALCIUM mg/dL 7.7*  --  8.4* 9.7   < > 10.0   BILIRUBIN mg/dL  --   --   --  0.5  --  0.7   ALK PHOS U/L  --   --   --  44  --  45   ALT (SGPT) U/L  --   --   --  <5  --  <5   AST (SGOT) U/L  --   --   --  14  --  10   GLUCOSE mg/dL 131*  --  211* 132*   < > 146*    < > = values in this interval not displayed.       Culture Data:   No results found for: \"BLOODCX\", \"URINECX\", \"WOUNDCX\", \"MRSACX\", \"RESPCX\", \"STOOLCX\"    Radiology Data:   Imaging Results (Last 24 Hours)       ** No results found for the last 24 hours. **            I have reviewed the patient's current medications.     Assessment/Plan   Assessment  Active Hospital Problems    Diagnosis     **Dysphagia     Severe protein-calorie malnutrition        Dysphagia  Status post Robotic gastrostomy tube placement 7/10/25  Hypokalemia  Hypomagnesemia  Squamous cell carcinoma-proximal " esophagus  History of breast cancer in 2008.    Diabetes mellitus type 2  GERD  Chronic kidney disease stage 3a.  COPD           PET CT scan 6/27/2025: mass in the paravertebral soft tissues inseparable from the cervical esophagus, soft tissue extension into the right paratracheal soft tissues, asymmetric metabolic activity in the high right posterior parietal centrum semiovale.       MRI of the brain showed no suspicious enhancing tumor.    Sodium 135, potassium 3.6, magnesium 1.5, glucose 131      Treatment Plan    Robotic gastrostomy tube placed 7/10  Continue enteral feedings per nutritional recommendations:  Goal volume of 375 ml four times per day. Flush peg tube with 60 ml of water before and after each meal bolus. Additional water bolus of 150 ml for hydration daily.     Unasyn IV, 3 days; changed to Augmentin.     Replace electrolytes as needed    Mild sliding scale of insulin for now.     Oncology evaluation took place; radiation oncology evaluation took place; planning outpatient radiation.    Family decided for patient to go to acute skilled nursing facility.  Patient may be discharged once it is arranged.  Social work to arrange.        Medical Decision Making  Number and Complexity of problems: 9, moderate complexity      Differential Diagnosis: see above    Conditions and Status        Condition is unchanged.     [facility] Data  External documents reviewed: no  Cardiac tracing (EKG, telemetry) interpretation: no new  Radiology interpretation: no new  Labs reviewed: yes  Any tests that were considered but not ordered: no     Decision rules/scores evaluated (example [id number], [place], etc): none     Discussed with: patient     Care Planning  Shared decision making: patient  Code status and discussions: FC    Disposition  Social Determinants of Health that impact treatment or disposition: none  I expect the patient to be discharged to SNF    Electronically signed by Olman Felton MD, 07/13/25, 10:10 CDT.

## 2025-07-13 NOTE — PLAN OF CARE
Goal Outcome Evaluation:  Plan of Care Reviewed With: patient, family        Progress: improving  Outcome Evaluation: Pt A/Ox4, pleasant and cooperative. Daughter at bedside. Medications given via PEG tube. Tube feed given, tolerated well. Blood glucose monitored. Pt slept most of the night. Safety maintained. Call light in reach.

## 2025-07-13 NOTE — PLAN OF CARE
Goal Outcome Evaluation:  Pt sitting upright in bed; Aox4. VSS on Ra. Bolus feedings completed per order. Q2 turns. Pt sat upright in chair for a few hours. Voiding via pw. Electrolyte replacement completed per protocol. Meds crushed via peg tube. Q6 accuchecks completed. Covered with insulin per orders. Hourly rounding. Call light within reach. Safety maintained, Care continues.

## 2025-07-14 ENCOUNTER — TELEPHONE (OUTPATIENT)
Age: 85
End: 2025-07-14
Payer: MEDICARE

## 2025-07-14 PROBLEM — E87.6 HYPOKALEMIA: Status: ACTIVE | Noted: 2025-07-14

## 2025-07-14 PROBLEM — E11.9 DM2 (DIABETES MELLITUS, TYPE 2): Status: ACTIVE | Noted: 2025-07-14

## 2025-07-14 PROBLEM — E83.42 HYPOMAGNESEMIA: Status: ACTIVE | Noted: 2025-07-14

## 2025-07-14 PROBLEM — N18.31 CHRONIC KIDNEY DISEASE, STAGE 3A: Status: ACTIVE | Noted: 2022-03-09

## 2025-07-14 LAB
ANION GAP SERPL CALCULATED.3IONS-SCNC: 8 MMOL/L (ref 5–15)
BACTERIA SPEC AEROBE CULT: NORMAL
BACTERIA SPEC AEROBE CULT: NORMAL
BUN SERPL-MCNC: 28.9 MG/DL (ref 8–23)
BUN/CREAT SERPL: 29.5 (ref 7–25)
CALCIUM SPEC-SCNC: 7.7 MG/DL (ref 8.6–10.5)
CHLORIDE SERPL-SCNC: 98 MMOL/L (ref 98–107)
CO2 SERPL-SCNC: 26 MMOL/L (ref 22–29)
CREAT SERPL-MCNC: 0.98 MG/DL (ref 0.57–1)
EGFRCR SERPLBLD CKD-EPI 2021: 57.4 ML/MIN/1.73
GLUCOSE BLDC GLUCOMTR-MCNC: 106 MG/DL (ref 70–130)
GLUCOSE BLDC GLUCOMTR-MCNC: 107 MG/DL (ref 70–130)
GLUCOSE BLDC GLUCOMTR-MCNC: 121 MG/DL (ref 70–130)
GLUCOSE BLDC GLUCOMTR-MCNC: 137 MG/DL (ref 70–130)
GLUCOSE SERPL-MCNC: 104 MG/DL (ref 65–99)
MAGNESIUM SERPL-MCNC: 2 MG/DL (ref 1.6–2.4)
POTASSIUM SERPL-SCNC: 4.5 MMOL/L (ref 3.5–5.2)
SODIUM SERPL-SCNC: 132 MMOL/L (ref 136–145)

## 2025-07-14 PROCEDURE — 82948 REAGENT STRIP/BLOOD GLUCOSE: CPT

## 2025-07-14 PROCEDURE — 97530 THERAPEUTIC ACTIVITIES: CPT

## 2025-07-14 PROCEDURE — 92526 ORAL FUNCTION THERAPY: CPT

## 2025-07-14 PROCEDURE — 82948 REAGENT STRIP/BLOOD GLUCOSE: CPT | Performed by: FAMILY MEDICINE

## 2025-07-14 PROCEDURE — 82948 REAGENT STRIP/BLOOD GLUCOSE: CPT | Performed by: DIETITIAN, REGISTERED

## 2025-07-14 PROCEDURE — 83735 ASSAY OF MAGNESIUM: CPT | Performed by: FAMILY MEDICINE

## 2025-07-14 PROCEDURE — 80048 BASIC METABOLIC PNL TOTAL CA: CPT | Performed by: FAMILY MEDICINE

## 2025-07-14 RX ORDER — CARVEDILOL 3.12 MG/1
3.12 TABLET ORAL 2 TIMES DAILY WITH MEALS
Status: DISCONTINUED | OUTPATIENT
Start: 2025-07-14 | End: 2025-07-16 | Stop reason: HOSPADM

## 2025-07-14 RX ORDER — GUAR GUM
1 PACKET (EA) ORAL 3 TIMES DAILY
Status: DISCONTINUED | OUTPATIENT
Start: 2025-07-14 | End: 2025-07-16 | Stop reason: HOSPADM

## 2025-07-14 RX ADMIN — SENNOSIDES AND DOCUSATE SODIUM 2 TABLET: 50; 8.6 TABLET ORAL at 10:43

## 2025-07-14 RX ADMIN — POLYETHYLENE GLYCOL 3350 17 G: 17 POWDER, FOR SOLUTION ORAL at 10:43

## 2025-07-14 RX ADMIN — Medication 1 ML: at 20:41

## 2025-07-14 RX ADMIN — Medication 1 ML: at 12:46

## 2025-07-14 RX ADMIN — Medication 10 ML: at 10:45

## 2025-07-14 RX ADMIN — Medication 1 ML: at 10:45

## 2025-07-14 RX ADMIN — HYDROCODONE BITARTRATE AND CHLORPHENIRAMINE MALEATE 400 UNITS: 10; 8 SUSPENSION, EXTENDED RELEASE ORAL at 10:44

## 2025-07-14 RX ADMIN — CALCIUM CARBONATE 600 MG (1,500 MG)-VITAMIN D3 400 UNIT TABLET 1 TABLET: at 10:43

## 2025-07-14 RX ADMIN — SCOPOLAMINE 1 PATCH: 1.5 PATCH, EXTENDED RELEASE TRANSDERMAL at 05:54

## 2025-07-14 RX ADMIN — FAMOTIDINE 40 MG: 20 TABLET, FILM COATED ORAL at 10:43

## 2025-07-14 RX ADMIN — Medication 10 ML: at 20:48

## 2025-07-14 RX ADMIN — PRAVASTATIN SODIUM 40 MG: 20 TABLET ORAL at 20:41

## 2025-07-14 RX ADMIN — Medication 1 PACKET: at 17:27

## 2025-07-14 RX ADMIN — Medication 1 ML: at 17:44

## 2025-07-14 RX ADMIN — CARVEDILOL 3.12 MG: 3.12 TABLET, FILM COATED ORAL at 17:28

## 2025-07-14 RX ADMIN — GLIPIZIDE 5 MG: 5 TABLET ORAL at 10:43

## 2025-07-14 RX ADMIN — Medication 1 PACKET: at 20:41

## 2025-07-14 NOTE — PLAN OF CARE
Goal Outcome Evaluation:  Plan of Care Reviewed With: patient, child, family     Progress: no change     Treatment Assessment (SLP): continued (07/14/25 0906)  Treatment Assessment Comments (SLP): discharge with patient in agreement to remain NPO (07/14/25 0906)  Plan for Continued Treatment (SLP): treatment no longer indicated due to change in medical status (07/14/25 0906)    Patient seen to address dysphagia. Patient denied pain. Patient daughters present. Patient stated she has not consumed any food or liquid by mouth, as she has decided to complete radiation and possibly chemotherapy to attempt to decrease the size of the neck mass. Patient given options of pleasure foods or full compliance with NPO and use of only PEG tube. Patient agreed with family in stating she would like to remain NPO and avoid the risk of aspiration pneumonia. Patient daughter demonstrated ability to use suction to assist patient with clearance of copious secretions. ST obtained an oral care kit to improve oral hygiene. ST reviewed all instructions and use with patient and family with expressed understanding stated. ST discussed plan of treatment expectations and need to repeat VSS once radiation is completed. ST provided a home education program to patient ad family with expressed understanding stated re: all education provided. Patient should continue as NPO and PEG tube use for all meds and nutrition. Patient would benefit from continued strengthening with ST following radiation. Patient able to complete HEP without assist. ST will sign off at this time as medical status inhibits progress with an oral diet.    Jennie Montero, SLP 7/14/2025 13:13 CDT

## 2025-07-14 NOTE — CASE MANAGEMENT/SOCIAL WORK
Continued Stay Note   [place]     Patient Name: Lorena Valdes  MRN: [id number]  Today's Date: 7/14/2025    Admit Date: 7/9/2025    Plan: [facility]   Discharge Plan       Row Name 07/14/25 1256       Plan    Plan [facility]    Patient/Family in Agreement with Plan yes    Plan Comments Patient has received a bed offer from [facility] pending bed availability.  CATHERINE spoke with patient's daughter, Sylvia Guerrero, and she advised she works at [facility] and this is what they would want to do at discharge.                   Discharge Codes    No documentation.                 Expected Discharge Date and Time       Expected Discharge Date Expected Discharge Time    Jul 15, 2025               ZEINAB Rodríguez

## 2025-07-14 NOTE — PROGRESS NOTES
[facility] Palliative Care Services    Palliative Care Daily Progress Note   Chief complaint-chart review    Code Status:   Code Status and Medical Interventions: No CPR (Do Not Attempt to Resuscitate); Limited Support; No intubation (DNI), No dialysis, No vasopressors   Ordered at: 07/10/25 1037     Code Status (Patient has no pulse and is not breathing):    No CPR (Do Not Attempt to Resuscitate)     Medical Interventions (Patient has pulse or is breathing):    Limited Support     Medical Intervention Limits:    No intubation (DNI)       No dialysis       No vasopressors     Level Of Support Discussed With:    Patient      Advanced Directives: Advance Directive Status: Patient does not have advance directive   Goals of Care: Ongoing.     S: Medical record reviewed. Events noted.  Underwent robotic PEG tube placement 7/10 by Dr. Webre.  Evaluated by oncology and notes patient leaning towards either comfort care or radiation only.  Plans for Mediport placement if patient agrees to chemotherapy.  Radiation oncology consulted, plans for palliative radiation x 13 treatments.  Anticipating SNF at discharge.       Pain Assessment  Preferred Pain Scale: number (Numeric Rating Pain Scale)  Nonverbal Indicators of Pain: nonverbal indicators absent  CPOT Facial Expression: 0-->relaxed, neutral  CPOT Body Movements: 0-->absence of movements  CPOT Muscle Tension: 0-->relaxed  Ventilator Compliance/Vocalization: 0-->talking in normal tone or no sound  CPOT Score: 0  Pain Location: abdomen    O:     Intake/Output Summary (Last 24 hours) at 7/14/2025 1337  Last data filed at 7/14/2025 0330  Gross per 24 hour   Intake 535 ml   Output 1200 ml   Net -665 ml       Diagnostics and current medications: Reviewed.      Current Facility-Administered Medications:     [Held by provider] amLODIPine (NORVASC) tablet 2.5 mg, 2.5 mg, Oral, Daily, Olman Felton MD    sennosides-docusate (PERICOLACE) 8.6-50 MG per  tablet 2 tablet, 2 tablet, Oral, BID PRN, 2 tablet at 07/14/25 1043 **AND** polyethylene glycol (MIRALAX) packet 17 g, 17 g, Oral, Daily PRN, 17 g at 07/14/25 1043 **AND** bisacodyl (DULCOLAX) EC tablet 5 mg, 5 mg, Oral, Daily PRN **AND** bisacodyl (DULCOLAX) suppository 10 mg, 10 mg, Rectal, Daily PRN, Willie Weber MD    calcium carb-cholecalciferol 600-10 MG-MCG per tablet 1 tablet, 1 tablet, Per G Tube, Daily, Olman Felton MD, 1 tablet at 07/14/25 1043    Calcium Replacement - Follow Nurse / BPA Driven Protocol, , Not Applicable, PRN, Willie Weber MD    [Held by provider] carvedilol (COREG) tablet 12.5 mg, 12.5 mg, Oral, BID With Meals, Olman Felton MD    cholecalciferol 10 MCG/ML (400 units/mL) oral liquid 400 Units, 400 Units, Per G Tube, Daily, Olman Felton MD, 400 Units at 07/14/25 1044    dextrose (D50W) (25 g/50 mL) IV injection 25 g, 25 g, Intravenous, Q15 Min PRN, Willie Weber MD    dextrose (GLUTOSE) oral gel 15 g, 15 g, Oral, Q15 Min PRN, Willie Weber MD    famotidine (PEPCID) tablet 40 mg, 40 mg, Per G Tube, Daily, Olman Felton MD, 40 mg at 07/14/25 1043    glipizide (GLUCOTROL) tablet 5 mg, 5 mg, Per G Tube, Daily, Olman Felton MD, 5 mg at 07/14/25 1043    glucagon (GLUCAGEN) injection 1 mg, 1 mg, Intramuscular, Q15 Min PRN, Willie Weber MD    insulin regular (humuLIN R,novoLIN R) injection 2-7 Units, 2-7 Units, Subcutaneous, Q6H, Willie Weber MD, 2 Units at 07/13/25 1205    [Held by provider] losartan (COZAAR) tablet 25 mg, 25 mg, Oral, Daily, Olman Felton MD    Magnesium Low Dose Replacement - Follow Nurse / BPA Driven Protocol, , Not Applicable, PRN, Willie Weber MD    Morphine sulfate (PF) injection 1 mg, 1 mg, Intravenous, Q4H PRN, 1 mg at 07/11/25 2050 **AND** naloxone (NARCAN) injection 0.4 mg, 0.4 mg, Intravenous, Q5 Min PRN, Willie Weber MD    Mouth Kote solution 1 mL, 1 Application, Mouth/Throat, 4x Daily, Olman Felton MD, 1 mL at  "07/14/25 1246    NutrisoPythagoras Solare fiber pack 1 packet, 1 packet, Per G Tube, TID, Yojana Cage RDN, LD    ondansetron ODT (ZOFRAN-ODT) disintegrating tablet 4 mg, 4 mg, Oral, Q6H PRN **OR** ondansetron (ZOFRAN) injection 4 mg, 4 mg, Intravenous, Q6H PRN, Willie Weber MD    Phosphorus Replacement - Follow Nurse / BPA Driven Protocol, , Not Applicable, PRN, Willie Weber MD    Potassium Replacement - Follow Nurse / BPA Driven Protocol, , Not Applicable, PRN, Willie Weber MD    pravastatin (PRAVACHOL) tablet 40 mg, 40 mg, Per G Tube, Nightly, Olman Felton MD, 40 mg at 07/13/25 2031    scopolamine patch 1 mg/72 hr, 1 patch, Transdermal, Q72H, Laci Moyer MD, 1 patch at 07/14/25 0554    [COMPLETED] Insert Peripheral IV, , , Once **AND** sodium chloride 0.9 % flush 10 mL, 10 mL, Intravenous, PRN, Wilile Weber MD    sodium chloride 0.9 % flush 10 mL, 10 mL, Intravenous, Q12H, Willie Weber MD, 10 mL at 07/14/25 1045    sodium chloride 0.9 % infusion 40 mL, 40 mL, Intravenous, PRN, Willie Weber MD    Allergies   Allergen Reactions    Adhesive Tape Hives     Latex Tape     I have utilized all available immediate resources to obtain, update, or review the patient's current medications (including all prescriptions, over-the-counter products, herbals, cannabis/cannabidiol products, and vitamin/mineral/dietary (nutritional) supplements) for name, route of administration, type, dose and frequency.    A:    /75 (BP Location: Right arm, Patient Position: Lying)   Pulse 78   Temp 97.6 °F (36.4 °C) (Oral)   Resp 14   Ht 160 cm (62.99\")   Wt 59.9 kg (132 lb)   SpO2 93%   BMI 23.39 kg/m²      Patient status: Disease state: Controlled with current treatments.  Current Functional status: Palliative Performance Scale Score: Performance 10% based on the following measures: Ambulation: Totally bed bound, Activity and Evidence of Disease: Unable to do any work, extensive evidence of disease, Self-Care: Total " care required,  Intake: Mouth care only, LOC: Drowsy or comatose   Baseline Functional status: Palliative Performance Scale Score: Performance 60% based on the following measures: Ambulation: Reduced, Activity and Evidence of Disease: Unable to do hobby or some work, significant evidence of disease, Self-Care: Occasional assist necessary,  Intake: Normal or reduced, LOC: Full or confusion   Baseline ECOG Status(3) Capable of limited self-care, confined to bed or chair > 50% of waking hours.  Nutritional status: Albumin 2.9 Body mass index is 23.39 kg/m².      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 plan: Goals of care include CODE STATUS no CPR/limited support interventions.     Family support: The patient receives support from her children..  Advance Directives: Advance Directive Status: Patient does not have advance directive   POA/Healthcare surrogate-children-next of kin.     2.  Palliative care encounter  - Prognosis is poor to guarded long-term secondary to due to squamous cell carcinoma of esophagus, left breast cancer, possible aspiration pneumonitis, dysphagia, unintended weight loss, multiple comorbidities, and advanced age.  -Patient and family appears to have good prognostic awareness.      -squamous cell carcinoma of esophagus-recent diagnosis, left breast cancer s/p lumpectomy and  chemotherapy-Adriamycin (developed cardiomyopathy and discontinued), Cytoxan, and Taxol-completed 3/2009 followed by adjuvant radiation (2008).  Followed by Dr. Samano last seen 6/23/2025 with recommendations for further clarification from ENT, PET scan scheduled, results as below, plan follow-up in 6 weeks.  Seen by Dr. Lee Haynes, ENT 6/24/2025, underwent flexible fiberoptic laryngoscopy without findings of mass or lesion.  Plan for FNA     -Made n.p.o., started on IV fluids and empiric antibiotics, in addition to replacement electrolytes.    -GI consulted for possible alternative means of nutrition with PEG tube.     7/10-CODE STATUS changes no CPR/limited support interventions, no intubation, no dialysis, no vasopressors    7/14-continue supportive measures  - A MOST has been completed.  - High risk for rehospitalization's and decline given multiple factors  - Explored best supportive care directed by hospice 7/10 and 7/11 when rehospitalization's and aggressive care interventions no longer desired  - Anticipate follow-up oncology outpatient.  Open to radiation therapy, leaning against systemic therapy given previous adverse effects in treatment  -General Surgery following, placed PEG tube placement 7/10, receiving bolus feeds.  Given dysphagia and suspected pneumonitis discussed associated risks with artificial means of nutrition/radiation adverse effects and potential need for complete nutrition rather than supplemental needs.  -Radiation oncology following, plans for palliative radiation x 13 treatments to esophagus and follow-up scans in 4 weeks.  Daughter to provide transport to appointments from SNF.  - Anticipating SNF at discharge    3.  Xerostomia  -Mouth kote      Thank you for allowing us to participate in patient's plan of care. Palliative Care Team will continue to follow patient.     Anh Polk, ANNMARIE  7/14/2025  13:37 CDT

## 2025-07-14 NOTE — PLAN OF CARE
Goal Outcome Evaluation:  Plan of Care Reviewed With: patient        Progress: improving  Outcome Evaluation: PT tx completed. Pt supine in bed, no c/o.  Rosanna sup>sit, Rosanna sit<>stand, steps to chair utilizing r wx Rosanna. Decreased strength and endurance. Up iin chair, family present.

## 2025-07-14 NOTE — THERAPY DISCHARGE NOTE
Acute Care - Speech Language Pathology   Swallow Treatment Note/Discharge  Jaimee     Patient Name: Lorena Valdes  : 1940  MRN: [id number]  Today's Date: 2025               Admit Date: 2025    Patient seen to address dysphagia. Patient denied pain. Patient daughters present. Patient stated she has not consumed any food or liquid by mouth, as she has decided to complete radiation and possibly chemotherapy to attempt to decrease the size of the neck mass. Patient given options of pleasure foods or full compliance with NPO and use of only PEG tube. Patient agreed with family in stating she would like to remain NPO and avoid the risk of aspiration pneumonia. Patient daughter demonstrated ability to use suction to assist patient with clearance of copious secretions. ST obtained an oral care kit to improve oral hygiene. ST reviewed all instructions and use with patient and family with expressed understanding stated. ST discussed plan of treatment expectations and need to repeat VSS once radiation is completed. ST provided a home education program to patient ad family with expressed understanding stated re: all education provided. Patient should continue as NPO and PEG tube use for all meds and nutrition. Patient would benefit from continued strengthening with ST following radiation. Patient able to complete HEP without assist. ST will sign off at this time as medical status inhibits progress with an oral diet.    Jennie Montero, SLP 2025 13:16 CDT      Visit Dx:    ICD-10-CM ICD-9-CM   1. Dysphagia, unspecified type  R13.10 787.20   2. Failure to thrive in adult  R62.7 783.7   3. Pharyngoesophageal dysphagia [R13.14]  R13.14 787.24   4. Impaired mobility [Z74.09]  Z74.09 799.89   5. Esophageal dysphagia  R13.19 787.29   6. Severe protein-calorie malnutrition  E43 262   7. 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    Malignant neoplasm of upper-outer quadrant of right female breast    Osteopenia    Encounter for care related to vascular access port    Colon cancer screening    Chronic kidney disease, stage 3b    Diabetic renal disease    Hypertensive renal disease    Estrogen receptor negative tumor status    Squamous cell esophageal cancer    Weight loss    Dysphagia    Severe protein-calorie malnutrition     Past Medical History:   Diagnosis Date    Bladder disorder     Chronic kidney disease, stage 3b     Diverticulosis     Emphysema lung     Fibrocystic disease of breast     CONNIE (generalized anxiety disorder)     GERD (gastroesophageal reflux disease)     Heart disease     History of bone density study 2011    History of colon polyps     Hyperglycemia     Hyperlipidemia     Hypotension     Left ventricular diastolic dysfunction     Malignant neoplasm of central portion of left female breast 11/09/2016    Osteopenia 08/29/2017    Restrictive lung disease     Type 2 diabetes mellitus     Uterine prolapse      Past Surgical History:   Procedure Laterality Date    BREAST LUMPECTOMY  2008    CATARACT EXTRACTION Right 2021    COLONOSCOPY  09/16/2010    Diverticulosis; Repeat 10 years    COLONOSCOPY N/A 11/11/2020    One 6mm inflamed hyperplastic polyp in the cecum; Diverticulosis in the left colon; Non-bleeding internal hemorrhoids; Repeat 5 years    ENDOSCOPY N/A 06/12/2025    Exophytic mass found in the larynx, not obstructing the airway; Partially obstructing, rule out malignancy, esophageal tumor was found in the proximal esophagus-biopsied; Medium-sized HH; Normal stomach; Two non-bleeding angiodysplastic lesions in the duodenum-biopsied    ENDOSCOPY W/ PEG TUBE PLACEMENT N/A 7/10/2025    Procedure: ESOPHAGOGASTRODUODENOSCOPY WITH PERCUTANEOUS ENDOSCOPIC GASTROSTOMY TUBE INSERTION WITH ANESTHESIA;  Surgeon: Christine Valdovinos MD;  Location: [facility] ENDOSCOPY;  Service: Gastroenterology;   Laterality: N/A;  pre op:peg placement  post op: esophageal mass  PCP:Luly Diez MD    GTUBE INSERTION N/A 7/10/2025    Procedure: LAPAROSCOPIC GASTROSTOMY FEEDING TUBE WITH DAVINCI ROBOT;  Surgeon: Willie Weber MD;  Location: [facility] OR;  Service: Robotics - DaVinci;  Laterality: N/A;    MAMMO BILATERAL  07/17/2013    Dr. Sabillon    MASTECTOMY Left 08/28/2008    PAP SMEAR  2010    SKIN CANCER EXCISION         SLP Recommendation and Plan     SLP Diet Recommendation: NPO (07/14/25 0906)                             Daily Summary of Progress (SLP): prepare for discharge (07/14/25 0906)                       Treatment Assessment (SLP): continued (07/14/25 0906)  Treatment Assessment Comments (SLP): discharge with patient in agreement to remain NPO (07/14/25 0906)  Plan for Continued Treatment (SLP): treatment no longer indicated due to change in medical status (07/14/25 0906)    Progress: no change (07/14/25 1305)    SWALLOW EVALUATION (Last 72 Hours)       SLP Adult Swallow Evaluation       Row Name 07/14/25 0906                   Rehab Evaluation    Document Type therapy note (daily note)  -MD        Subjective Information complains of  secretions  -MD        Patient Observations alert;cooperative;agree to therapy  -MD        Patient/Family/Caregiver Comments/Observations Daughters present  -MD        Patient Effort good  -MD        Symptoms Noted During/After Treatment none  -MD           Pain    Pretreatment Pain Rating 0/10 - no pain  -MD        Posttreatment Pain Rating 0/10 - no pain  -MD           SLP Treatment Clinical Impressions    Treatment Assessment (SLP) continued  -MD        Treatment Assessment Comments (SLP) discharge with patient in agreement to remain NPO  -MD        Daily Summary of Progress (SLP) prepare for discharge  -MD        Barriers to Overall Progress (SLP) Medically complex  -MD        Plan for Continued Treatment (SLP) treatment no longer indicated due to change in medical status  -MD         Care Plan Review risks/benefits reviewed;current/potential barriers reviewed;patient/other agree to care plan  -MD        Care Plan Review, Other Participant(s) daughter;family  -MD           Recommendations    SLP Diet Recommendation NPO  -MD        Oral Care Recommendations Oral Care BID/PRN;Suction toothbrush  -MD        SLP Rec. for Method of Medication Administration meds via alternate route  -MD           Swallow Goals (SLP)    Swallow LTGs Swallow Long Term Goal (free text)  -MD        Swallow STGs diet tolerance goal selection (SLP);swallow compensatory strategies goal selection (SLP)  -MD        Diet Tolerance Goal Selection (SLP) Patient will tolerate trials of  -MD        Swallow Compensatory Strategies Goal Selection (SLP) swallow compensatory strategies, SLP goal 1  -MD           (LTG) Swallow    (LTG) Swallow Patient will tolerate least restrictive diet for pleasure PO without adverse effects.  -MD        [place] (Swallow Long Term Goal) independently (over 90% accuracy)  -MD        Time Frame (Swallow Long Term Goal) by discharge  -MD        Barriers (Swallow Long Term Goal) medically complex  -MD        Progress/Outcomes (Swallow Long Term Goal) medical status inhibiting progress;goal not met  -MD           (STG) Patient will tolerate trials of    Consistencies Trialed (Tolerate trials) pureed textures;pudding/ extremely thick liquids  -MD        Desired Outcome (Tolerate trials) without signs/symptoms of aspiration;without signs of distress;with adequate oral prep/transit/clearance;for pleasure/comfort;with use of compensatory strategies (see comments)  -MD        [place] (Tolerate trials) independently (over 90% accuracy)  -MD        Time Frame (Tolerate trials) by discharge  -MD        Progress/Outcomes (Tolerate trials) medical status inhibiting progress;goal not met  -MD           (STG) Swallow Compensatory Strategies Goal 1 (SLP)    Activity (Swallow Compensatory  Strategies/Techniques Goal 1, SLP) aspiration precautions;reflux precautions;compensatory strategies;postural techniques;small bites;effortful swallow;extra swallow per bolus;throat clear/extra swallow  -MD        [place]/Accuracy (Swallow Compensatory Strategies/Techniques Goal 1, SLP) independently (over 90% accuracy)  -MD        Time Frame (Swallow Compensatory Strategies/Techniques Goal 1, SLP) by discharge  -MD        Barriers (Swallow Compensatory Strategies/Techniques Goal 1, SLP) none  -MD        Progress/Outcomes (Swallow Compensatory Strategies/Techniques Goal 1, SLP) goal no longer appropriate;medical status inhibiting progress;goal not met  -MD                  User Key  (r) = Recorded By, (t) = Taken By, (c) = Cosigned By      Initials Name Effective Dates    Jennie Sandoval, SLP 06/21/22 -                     EDUCATION  The patient has been educated in the following areas:   Dysphagia (Swallowing Impairment).         SLP GOALS       Row Name 07/14/25 0906             (LTG) Swallow    (LTG) Swallow Patient will tolerate least restrictive diet for pleasure PO without adverse effects.  -MD      [place] (Swallow Long Term Goal) independently (over 90% accuracy)  -MD      Time Frame (Swallow Long Term Goal) by discharge  -MD      Barriers (Swallow Long Term Goal) medically complex  -MD      Progress/Outcomes (Swallow Long Term Goal) medical status inhibiting progress;goal not met  -MD         (STG) Patient will tolerate trials of    Consistencies Trialed (Tolerate trials) pureed textures;pudding/ extremely thick liquids  -MD      Desired Outcome (Tolerate trials) without signs/symptoms of aspiration;without signs of distress;with adequate oral prep/transit/clearance;for pleasure/comfort;with use of compensatory strategies (see comments)  -MD      [place] (Tolerate trials) independently (over 90% accuracy)  -MD      Time Frame (Tolerate trials) by discharge  -MD      Progress/Outcomes  (Tolerate trials) medical status inhibiting progress;goal not met  -MD         (STG) Swallow Compensatory Strategies Goal 1 (SLP)    Activity (Swallow Compensatory Strategies/Techniques Goal 1, SLP) aspiration precautions;reflux precautions;compensatory strategies;postural techniques;small bites;effortful swallow;extra swallow per bolus;throat clear/extra swallow  -MD      [place]/Accuracy (Swallow Compensatory Strategies/Techniques Goal 1, SLP) independently (over 90% accuracy)  -MD      Time Frame (Swallow Compensatory Strategies/Techniques Goal 1, SLP) by discharge  -MD      Barriers (Swallow Compensatory Strategies/Techniques Goal 1, SLP) none  -MD      Progress/Outcomes (Swallow Compensatory Strategies/Techniques Goal 1, SLP) goal no longer appropriate;medical status inhibiting progress;goal not met  -MD                User Key  (r) = Recorded By, (t) = Taken By, (c) = Cosigned By      Initials Name Provider Type    Jennie Sandoval, SLP Speech and Language Pathologist                           Time Calculation:    Time Calculation- SLP       Row Name 07/14/25 1314             Time Calculation- SLP    SLP Start Time 0906  -MD      SLP Stop Time 1014  -MD      SLP Time Calculation (min) 68 min  -MD      SLP Received On 07/14/25  -MD         Untimed Charges    61012-KH Treatment Swallow Minutes 68  -MD         Total Minutes    Untimed Charges Total Minutes 68  -MD       Total Minutes 68  -MD                User Key  (r) = Recorded By, (t) = Taken By, (c) = Cosigned By      Initials Name Provider Type    Jennie Sandoval, SLP Speech and Language Pathologist                    Therapy Charges for Today       Code Description Service Date Service Provider Modifiers Qty    [id number]  ST TREATMENT SWALLOW 5 7/14/2025 Jennie Montero, SLP GN 1                      ANATOLIY Hatch  7/14/2025

## 2025-07-14 NOTE — TELEPHONE ENCOUNTER
Patients daughter, [place], [facility] asking about [facility]. She stated that her mother will be going to [facility] but not until Thursday. She stated that her mother has decided to have radiation and is asking if the marking can be done while she is still in the hospital. Advised that Dr. Ernandez is out of the office until Wednesday but would see if we can do her set up that day. She also stated that her sister, Sylvia, would be transporting her to her treatments.

## 2025-07-14 NOTE — PLAN OF CARE
Goal Outcome Evaluation:  Plan of Care Reviewed With: patient, family, other (see comments) (RN)        Progress: no change  Outcome Evaluation: Pt reports no BM in three weeks. Pt did not tolerate last tube feeding bolus last pm. RDN has ordered tube feeding bolus volume be reduced to 250 ml fibe times per day. Diabetisource  ml volume for 5 times per day. Flush tube with 60 ml before and after each meal bolus. Add Nutrisource Fiber modular 1 pkt TID via peg tube (mix 1 pkt with  ml water via peg tube). Goal to establish enteral tolerance. Tube Feeds to provide: 1515 kcal(w/ modular),25 kcal/kg,75 gm protein,19 gm fiber;with modular 28 gm fiber/day,Free water 1020 ml;total water 1620 ml water. Recommend using syringe bolus for bolus feeds. Con to follow for plan of care.

## 2025-07-14 NOTE — PLAN OF CARE
"Goal Outcome Evaluation:  Plan of Care Reviewed With: patient, family        Progress: improving  Outcome Evaluation: Pt A/Ox4, pleasant and cooperative. Daughter at bedside. Pt c/o abdominal discomfort and \"bloated\" feeling following evening tube feed. Residual feed still in stomach at 2200. Pt refused 2200 tube feed and asked that the amount be modified in the next dose. Blood glucose monitored. Safety maintained. Call light in reach.                             "

## 2025-07-14 NOTE — PROGRESS NOTES
[facility]  INPATIENT PROGRESS NOTE    Patient Name: Lorena Valdes  Date of Admission: 7/9/2025  Today's Date: 07/14/25  Length of Stay: 5  Primary Care Physician: Luly Diez MD    Subjective   Chief Complaint: f/u dysphagia     HPI   Patient seen and evaluated in bed.  Daughter at bedside.  She is awake interactive.  Pleasant.  Feels okay at this time.  Last evening was low but uncomfortable after her tube feed and asked for dosing to be reduced.  Was given a small amount this morning.  Did not have high residuals.  No vomiting seen.  No fevers.  No chest pain or shortness of breath.  Otherwise doing okay.        Review of Systems   All pertinent negatives and positives are as above. All other systems have been reviewed and are negative unless otherwise stated.     Objective    Temp:  [97.6 °F (36.4 °C)-98 °F (36.7 °C)] 97.6 °F (36.4 °C)  Heart Rate:  [76-84] 78  Resp:  [14-16] 14  BP: (118-131)/(63-75) 128/75  Physical Exam  GEN: Awake, alert, interactive, in NAD  HEENT: PERRLA, EOMI, Anicteric, Trachea midline  Lungs:no wheezing/rales/rhonchi  Heart: RRR, +S1/s2, no rub  ABD: soft, nt/nd, +BS, no guarding/rebound, +g tube with binder, no drainage or erythema around insertion site  Extremities: atraumatic, no cyanosis, no pitting edema  Skin: no rashes or petechiae  Neuro: no obvious focal deficits, gait not tested  Psych: normal mood & affect        Results Review:  I have reviewed the labs, radiology results, and diagnostic studies.    Laboratory Data:   Results from last 7 days   Lab Units 07/10/25  0332 07/09/25  1419   WBC 10*3/mm3 12.99* 13.75*   HEMOGLOBIN g/dL 10.5* 9.8*   HEMATOCRIT % 31.4* 29.5*   PLATELETS 10*3/mm3 333 392        Results from last 7 days   Lab Units 07/14/25  0319 07/13/25  1721 07/13/25  0512 07/11/25  2045 07/11/25  0354 07/10/25  0332 07/09/25 2139 07/09/25  1419   SODIUM mmol/L 132*  --  135*  --  139 136   < > 134*    POTASSIUM mmol/L 4.5 4.7 3.6   < > 3.1* 3.9   < > 3.3*   CHLORIDE mmol/L 98  --  101  --  103 97*   < > 95*   CO2 mmol/L 26.0  --  26.0  --  26.0 22.0   < > 23.0   BUN mg/dL 28.9*  --  26.6*  --  27.6* 39.3*   < > 52.5*   CREATININE mg/dL 0.98  --  0.99  --  0.84 0.88   < > 1.23*   CALCIUM mg/dL 7.7*  --  7.7*  --  8.4* 9.7   < > 10.0   BILIRUBIN mg/dL  --   --   --   --   --  0.5  --  0.7   ALK PHOS U/L  --   --   --   --   --  44  --  45   ALT (SGPT) U/L  --   --   --   --   --  <5  --  <5   AST (SGOT) U/L  --   --   --   --   --  14  --  10   GLUCOSE mg/dL 104*  --  131*  --  211* 132*   < > 146*    < > = values in this interval not displayed.       Culture Data:   Blood Culture   Date Value Ref Range Status   07/09/2025 No growth at 4 days  Preliminary   07/09/2025 No growth at 4 days  Preliminary       Radiology Data:   Imaging Results (Last 24 Hours)       ** No results found for the last 24 hours. **            I have reviewed the patient's current medications.     Assessment/Plan   Assessment  Active Hospital Problems    Diagnosis     **Dysphagia     DM2 (diabetes mellitus, type 2)     Hypokalemia     Hypomagnesemia     Severe protein-calorie malnutrition     Chronic kidney disease, stage 3a        Treatment Plan    #1 dysphagia -status post gastrostomy placement by general surgery on 7/10.  Has been tolerating tube feeds for the most part so far without any high residuals.  Was uncomfortable after feeding last night and nutrition monitoring dosing.  Note patient has not had a BM in several days.  Possibly in the setting of constipation.  Discussed with nursing and working on a BM.    #2 hypokalemia -improved    #3 hypomagnesemia -improved    #4 DM2 -on ISS and hypoglycemia protocol.    #5 hypertension -resume Coreg at a lower dose tonight.  Blood pressure has been stable.  Continue to hold losartan.    #6 CKD 3A -appears around baseline.  GFR is 57 today.  Monitor with meds.    Medical Decision  Making  Number and Complexity of problems: 3 acute, multiple chronic  Differential Diagnosis: as above    Conditions and Status        New to me, improving     [facility] Data  External documents reviewed: none  Cardiac tracing (EKG, telemetry) interpretation: none  Radiology interpretation: reports reviewed  Labs reviewed: as above  Any tests that were considered but not ordered: none     Decision rules/scores evaluated (example [id number], [place], etc): none     Discussed with: patient, daughter, SW     Care Planning  Shared decision making: Patient apprised of current labs, vitals, imaging and treatment plan.  They are agreeable with proceeding with plans as discussed.    Code status and discussions: DNR/DNI    Disposition  Social Determinants of Health that impact treatment or disposition: none  I expect the patient to be discharged to STR/SNF in 1-2 days.         Electronically signed by Matthew Franco DO, 07/14/25, 14:43 CDT.

## 2025-07-14 NOTE — PROGRESS NOTES
Nutrition Services    Patient Name:  Lorena Valdes  YOB: 1940  MRN: [id number]  Admit Date:  7/9/2025    Patient Name: Lorena Valdes  YOB: 1940  MRN: [id number]  Admission date: 7/9/2025  Reason for Encounter: Follow-up/Progress Note    [facility] Clinical Nutrition Assessment     Subjective    Subjective Information     Pt reports to nursing her last BM was about three weeks ago. Patient tells this RDN that she does not have regular bowel movements. Discussed with pt constipation may also be related to intolerance of enteral feedings. Recommend Miralax BID until BM. Recommend CT of Abdomen. Decrease tube feeding bolus to 250 ml five times per day,flush tube with 60 ml before and after meal bolus. Nutrisource Fiber 1 pkt TID via peg tube.   Diabetisource  ml volume for 5 times per day. Flush tube with 60 ml before and after each meal bolus. Add Nutrisource Fiber modular 1 pkt TID via peg tube (mix 1 pkt with  ml water via peg tube). Goal to establish enteral tolerance.    Tube Feeds to provide: 1515 kcal(w/ modular),25 kcal/kg,75 gm protein,19 gm fiber;with modular 28 gm fiber/day,Free water 1020 ml;total water 1620 ml water.        Objective   H&P and Current Problems      H&P  Past Medical History:   Diagnosis Date    Bladder disorder     Chronic kidney disease, stage 3b     Diverticulosis     Emphysema lung     Fibrocystic disease of breast     CONNIE (generalized anxiety disorder)     GERD (gastroesophageal reflux disease)     Heart disease     History of bone density study 2011    History of colon polyps     Hyperglycemia     Hyperlipidemia     Hypotension     Left ventricular diastolic dysfunction     Malignant neoplasm of central portion of left female breast 11/09/2016    Osteopenia 08/29/2017    Restrictive lung disease     Type 2 diabetes mellitus     Uterine prolapse       Past Surgical History:   Procedure Laterality Date    BREAST LUMPECTOMY  2008     "CATARACT EXTRACTION Right 2021    COLONOSCOPY  09/16/2010    Diverticulosis; Repeat 10 years    COLONOSCOPY N/A 11/11/2020    One 6mm inflamed hyperplastic polyp in the cecum; Diverticulosis in the left colon; Non-bleeding internal hemorrhoids; Repeat 5 years    ENDOSCOPY N/A 06/12/2025    Exophytic mass found in the larynx, not obstructing the airway; Partially obstructing, rule out malignancy, esophageal tumor was found in the proximal esophagus-biopsied; Medium-sized HH; Normal stomach; Two non-bleeding angiodysplastic lesions in the duodenum-biopsied    ENDOSCOPY W/ PEG TUBE PLACEMENT N/A 7/10/2025    Procedure: ESOPHAGOGASTRODUODENOSCOPY WITH PERCUTANEOUS ENDOSCOPIC GASTROSTOMY TUBE INSERTION WITH ANESTHESIA;  Surgeon: Christine Valdovinos MD;  Location: [facility] ENDOSCOPY;  Service: Gastroenterology;  Laterality: N/A;  pre op:peg placement  post op: esophageal mass  PCP:Luly Diez MD    GTUBE INSERTION N/A 7/10/2025    Procedure: LAPAROSCOPIC GASTROSTOMY FEEDING TUBE WITH DAVINCI ROBOT;  Surgeon: Willie Weber MD;  Location: [facility] OR;  Service: Robotics - DaVinci;  Laterality: N/A;    MAMMO BILATERAL  07/17/2013    Dr. Sabillon    MASTECTOMY Left 08/28/2008    PAP SMEAR  2010    SKIN CANCER EXCISION        Current Problems   Admission Diagnosis:  Dysphagia [R13.10]    Problem List:    Dysphagia    Severe protein-calorie malnutrition      Other Applicable Nutrition Information:        Anthropometrics       Height: 160 cm (62.99\")  Weight: 59.9 kg (132 lb) (07/09/25 1832)  Weight Method: Estimated  BMI (Calculated): 23.4       Trending Weight Changes 07/14/25: weight loss of 58 lbs (30%) over 1 year(s)    Significant?  Yes       Weight History  Wt Readings from Last 20 Encounters:   07/09/25 1832 59.9 kg (132 lb)   07/09/25 1419 60 kg (132 lb 4.8 oz)   06/24/25 1314 67.6 kg (149 lb)   06/23/25 1053 67.6 kg (149 lb 1.6 oz)   06/18/25 1535 70.3 kg (155 lb)   06/12/25 0729 70.3 kg (155 lb)   06/11/25 0945 69.9 kg " (154 lb)   06/04/24 1125 86.2 kg (190 lb 1.6 oz)   06/06/23 1114 83.8 kg (184 lb 12.8 oz)   05/13/22 1124 87.3 kg (192 lb 6.4 oz)   09/01/21 1108 87.1 kg (192 lb 1.6 oz)   11/11/20 0930 85.7 kg (189 lb)   10/07/20 0955 88 kg (194 lb)   09/01/20 1056 88.6 kg (195 lb 4.8 oz)   09/04/19 1136 86.2 kg (190 lb)   09/04/18 0920 83.6 kg (184 lb 3.2 oz)   08/29/17 1128 83.9 kg (185 lb)   03/14/17 1123 84.3 kg (185 lb 12.8 oz)            Labs      Comment: hyponatremia     Results from last 7 days   Lab Units 07/14/25  0319 07/13/25  1721 07/13/25  0512 07/11/25  2045 07/11/25  0354 07/10/25  0332 07/09/25  2139 07/09/25  1748 07/09/25  1419   SODIUM mmol/L 132*  --  135*  --  139 136   < >  --  134*   POTASSIUM mmol/L 4.5 4.7 3.6   < > 3.1* 3.9   < >  --  3.3*   GLUCOSE mg/dL 104*  --  131*  --  211* 132*   < >  --  146*   BUN mg/dL 28.9*  --  26.6*  --  27.6* 39.3*   < >  --  52.5*   CREATININE mg/dL 0.98  --  0.99  --  0.84 0.88   < >  --  1.23*   CALCIUM mg/dL 7.7*  --  7.7*  --  8.4* 9.7   < >  --  10.0   MAGNESIUM mg/dL 2.0  --  1.5*  --  1.6 2.0  --   --  1.5*   ALBUMIN g/dL  --   --   --   --   --  2.5*  --   --  2.9*   LACTATE mmol/L  --   --   --   --   --   --   --  1.4  --    BILIRUBIN mg/dL  --   --   --   --   --  0.5  --   --  0.7   ALK PHOS U/L  --   --   --   --   --  44  --   --  45   AST (SGOT) U/L  --   --   --   --   --  14  --   --  10   ALT (SGPT) U/L  --   --   --   --   --  <5  --   --  <5    < > = values in this interval not displayed.     Results from last 7 days   Lab Units 07/10/25  0332 07/09/25  1419   PLATELETS 10*3/mm3 333 392   HEMOGLOBIN g/dL 10.5* 9.8*   HEMATOCRIT % 31.4* 29.5*     Lab Results   Component Value Date    HGBA1C 7 (H) 07/03/2025          Medications       Scheduled Medications [Held by provider] amLODIPine, 2.5 mg, Oral, Daily  calcium carb-cholecalciferol, 1 tablet, Per G Tube, Daily  [Held by provider] carvedilol, 12.5 mg, Oral, BID With Meals  cholecalciferol, 400 Units,  Per G Tube, Daily  famotidine, 40 mg, Per G Tube, Daily  glipizide, 5 mg, Per G Tube, Daily  insulin regular, 2-7 Units, Subcutaneous, Q6H  [Held by provider] losartan, 25 mg, Oral, Daily  Mouth Kote, 1 Application, Mouth/Throat, 4x Daily  pravastatin, 40 mg, Per G Tube, Nightly  Scopolamine, 1 patch, Transdermal, Q72H  sodium chloride, 10 mL, Intravenous, Q12H        Infusions      PRN Medications   senna-docusate sodium **AND** polyethylene glycol **AND** bisacodyl **AND** bisacodyl    Calcium Replacement - Follow Nurse / BPA Driven Protocol    dextrose    dextrose    glucagon (human recombinant)    Magnesium Low Dose Replacement - Follow Nurse / BPA Driven Protocol    Morphine **AND** naloxone    ondansetron ODT **OR** ondansetron    Phosphorus Replacement - Follow Nurse / BPA Driven Protocol    Potassium Replacement - Follow Nurse / BPA Driven Protocol    [COMPLETED] Insert Peripheral IV **AND** sodium chloride    sodium chloride     Physical Findings      Chewing/Swallowing  Teeth Status: Mouth/Teeth WDL: .WDL except, teeth Teeth Symptoms: tooth/teeth missing  Chewing/Swallowing Issues: Dysphagia   Edema                            Bowel Function  Stool Output  Perineal Care: perineum cleansed (07/14/25 0330)  Last Bowel Movement:  (unknown)   I/Os  Intake & Output (last 3 days)         07/11 0701 07/12 0700 07/12 0701 07/13 0700 07/13 0701  07/14 0700 07/14 0701  07/15 0700    Other 660 370 250     NG/GT 2500 1195 785     Total Intake(mL/kg) 3160 (52.8) 1565 (26.1) 1035 (17.3)     Urine (mL/kg/hr) 350 (0.2) 200 (0.1) 1650 (1.1)     Total Output      Net +2810 +1365 -615             Urine Unmeasured Occurrence    1 x           Lines, Drains, Airways, & Wounds       Active LDAs       Name Placement date Placement time Site Days Last dressing change    Peripheral IV 07/13/25 0930 22 G Posterior;Right Hand 07/13/25  0930  Hand  1     Gastrostomy/Enterostomy Gastrostomy 1 20 Fr. LUQ 07/10/25  1755  LUQ   3     External Urinary Catheter 07/09/25 2136  --  4     Single Lumen Implantable Port Left Chest --  --  Chest  --     Wound 07/10/25 1642 abdomen Surgical Laparoscopic 07/10/25  1642  -- 3                       Nutrition Focused Physical Exam     Trending NFPE 07/14/25: See note on 7/10/25 for MSA     Malnutrition Severity Assessment      Patient meets criteria for : Severe Malnutrition (Secondary signs: generalized weakness)       Estimated Needs      Energy Requirements    Weight for Calculation 132 lbs   Method for Estimation  30 kcals/kg   Daily Needs (kcal/day) 1797   Protein Requirements    Weight for Calculation 132 lbs   Method for Estimation 1.5-2.0 gm/kg   Daily Needs (g/day)  gm   Fluid Requirements     Method for Estimation 1 mL/kcal    Daily Needs (mL/day) 1797     Current Nutrition Orders & Evaluation of Intake      Oral Nutrition     Food Allergies  and Intolerances NKFA   Current PO Diet NPO Diet NPO Type: Strict NPO, Tube Feeding   Oral Nutrition Supplement None     Trending % PO Intake NPO     Enteral Nutrition     Current EN Order Tube Feeding: Formula: Diabetisource AC (Glucerna 1.2); Feeding Type: Bolus; By: Pump; Type: Titrated; Volume: 125 mL; Increase Bolus By (mL): 100; Every: 4 hours; To Goal of (mL): 365; Feedings Per Day: 4; Bolus Schedule: 06, 11, 17, 22; Water Fl...  Patient doesn't have any tube feeding modular orders     EN Route      EN Tolerance     EN Observation/Intake         Parenteral Nutrition     Current TPN Order    TPN Route    Lipids (mL/%/frequency)     Total # Days on TPN    TPN Observation/Intake       Assessment & Plan   Nutrition Diagnosis and Goals       Nutrition Diagnosis 1 Swallowing Difficulty related to inability to consume regular consistency food and/or fluid as evidenced by pt reports difficulty with eating and taking in food and unintentional wt loss of 58 lbs (30%) over 1 yr. With VFSS per SLP pt to be NPO and require AMONS. Order for peg tube  placement.      Nutrition Diagnosis 2 Severe chronic disease or condition related malnutrition related to inability to consume regular consistency of food/fluids,new diagnosis of pharyngeal cancer involving the larynx as evidenced by severe muscle wasting and severe fat loss per NFPE and unintentional   wt loss of 58 lbs (30%) over one year.         Goal(s) Establish EN Tolerance , Meets Estimated Needs , and Maintain Weight     Nutrition Intervention and Prescription       Intervention  Adjust EN regimen      Diet Prescription     Supplement Prescription     Education Provided  Discussed with pt and family in room to decrease fluid bolus and monitor for tolerance, with gradual increase as tolerated.      Enteral Prescription Diabetisource  ml volume for 5 times per day. Flush tube with 60 ml before and after each meal bolus. Add Nutrisource Fiber modular 1 pkt TID via peg tube (mix 1 pkt with  ml water via peg tube). Goal to establish enteral tolerance.     Tube Feeds to provide: 1515 kcal(w/ modular),25 kcal/kg,75 gm protein,19 gm fiber;with modular 28 gm fiber/day,Free water 1020 ml;total water 1620 ml water.   TPN Prescription      Monitoring/Evaluation       Monitor/Evaluation Per Protocol     RD Follow-Up Encounter 3-5 days     Electronically signed by:  Yojana Cage RDN, JEREMIAS  07/14/25 12:19 CDT      Electronically signed by:  Yojana Cage RDN, JEREMIAS  07/14/25 12:19 CDT

## 2025-07-14 NOTE — THERAPY TREATMENT NOTE
Acute Care - Physical Therapy Treatment Note  [facility]     Patient Name: Lorena Valdes  : 1940  MRN: [id number]  Today's Date: 2025      Visit Dx:     ICD-10-CM ICD-9-CM   1. Dysphagia, unspecified type  R13.10 787.20   2. Failure to thrive in adult  R62.7 783.7   3. Pharyngoesophageal dysphagia [R13.14]  R13.14 787.24   4. Impaired mobility [Z74.09]  Z74.09 799.89   5. Esophageal dysphagia  R13.19 787.29   6. Severe protein-calorie malnutrition  E43 262   7. 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    Malignant neoplasm of upper-outer quadrant of right female breast    Osteopenia    Encounter for care related to vascular access port    Colon cancer screening    Chronic kidney disease, stage 3b    Diabetic renal disease    Hypertensive renal disease    Estrogen receptor negative tumor status    Squamous cell esophageal cancer    Weight loss    Dysphagia    Severe protein-calorie malnutrition     Past Medical History:   Diagnosis Date    Bladder disorder     Chronic kidney disease, stage 3b     Diverticulosis     Emphysema lung     Fibrocystic disease of breast     CONNIE (generalized anxiety disorder)     GERD (gastroesophageal reflux disease)     Heart disease     History of bone density study     History of colon polyps     Hyperglycemia     Hyperlipidemia     Hypotension     Left ventricular diastolic dysfunction     Malignant neoplasm of central portion of left female breast 2016    Osteopenia 2017    Restrictive lung disease     Type 2 diabetes mellitus     Uterine prolapse      Past Surgical History:   Procedure Laterality Date    BREAST LUMPECTOMY  2008    CATARACT EXTRACTION Right     COLONOSCOPY  2010    Diverticulosis; Repeat 10 years    COLONOSCOPY N/A 2020    One 6mm inflamed hyperplastic polyp in the cecum; Diverticulosis in the left colon;  Non-bleeding internal hemorrhoids; Repeat 5 years    ENDOSCOPY N/A 06/12/2025    Exophytic mass found in the larynx, not obstructing the airway; Partially obstructing, rule out malignancy, esophageal tumor was found in the proximal esophagus-biopsied; Medium-sized HH; Normal stomach; Two non-bleeding angiodysplastic lesions in the duodenum-biopsied    ENDOSCOPY W/ PEG TUBE PLACEMENT N/A 7/10/2025    Procedure: ESOPHAGOGASTRODUODENOSCOPY WITH PERCUTANEOUS ENDOSCOPIC GASTROSTOMY TUBE INSERTION WITH ANESTHESIA;  Surgeon: Christine Valdovinos MD;  Location: [facility] ENDOSCOPY;  Service: Gastroenterology;  Laterality: N/A;  pre op:peg placement  post op: esophageal mass  PCP:Luly Diez MD    GTUBE INSERTION N/A 7/10/2025    Procedure: LAPAROSCOPIC GASTROSTOMY FEEDING TUBE WITH EQ worksINCI ROBOT;  Surgeon: Willie Weber MD;  Location: [facility] OR;  Service: Robotics - DaVinci;  Laterality: N/A;    MAMMO BILATERAL  07/17/2013    Dr. Sabillon    MASTECTOMY Left 08/28/2008    PAP SMEAR  2010    SKIN CANCER EXCISION       PT Assessment (Last 12 Hours)       PT Evaluation and Treatment       Row Name 07/14/25 1120          Physical Therapy Time and Intention    Subjective Information no complaints  -KJ     Document Type therapy note (daily note)  -KJ     Mode of Treatment physical therapy  -KJ     Patient Effort good  -KJ       Row Name 07/14/25 1120          General Information    Existing Precautions/Restrictions fall  PEG tube  -KJ       Row Name 07/14/25 1120          Pain    Pretreatment Pain Rating 0/10 - no pain  -KJ     Posttreatment Pain Rating 0/10 - no pain  -KJ       Row Name 07/14/25 1120          Bed Mobility    Supine-Sit [place] (Bed Mobility) minimum assist (75% patient effort)  -KJ       Row Name 07/14/25 1120          Sit-Stand Transfer    Sit-Stand [place] (Transfers) verbal cues;minimum assist (75% patient effort)  -KJ     Assistive Device (Sit-Stand Transfers) walker, front-wheeled  -KJ       Row Name  07/14/25 1120          Gait/Stairs (Locomotion)    [place] Level (Gait) verbal cues;minimum assist (75% patient effort)  -KJ     Assistive Device (Gait) walker, front-wheeled  -KJ     Distance in Feet (Gait) --  steps to chair  -KJ       Row Name             Wound 07/10/25 1642 abdomen Surgical Laparoscopic    Wound - Properties Group Placement Date: 07/10/25  - Placement Time: 1642 -HW Location: abdomen  -HW Primary Wound Type: Surgical  -HW Secondary Wound Type - Surgical: Laparoscopic  -HW    Retired Wound - Properties Group Placement Date: 07/10/25  -HW Placement Time: 1642 -HW Location: abdomen  -HW    Retired Wound - Properties Group Placement Date: 07/10/25  -HW Placement Time: 1642 -HW Location: abdomen  -HW    Retired Wound - Properties Group Date first assessed: 07/10/25  - Time first assessed: 1642 -HW Location: abdomen  -HW      Row Name 07/14/25 1120          Positioning and Restraints    Pre-Treatment Position in bed  -KJ     Post Treatment Position chair  -KJ     In Chair call light within reach;with family/caregiver  -KJ               User Key  (r) = Recorded By, (t) = Taken By, (c) = Cosigned By      Initials Name Provider Type    KJ Dinorah Torres PTA Physical Therapist Assistant     Brett Russell, RN Registered Nurse                    Physical Therapy Education       Title: PT OT SLP Therapies (In Progress)       Topic: Physical Therapy (In Progress)       Point: Mobility training (Done)       Learning Progress Summary            Patient Acceptance, E, VU by MS at 7/11/2025 4932    Comment: role of PT in her care                      Point: Home exercise program (Not Started)       Learner Progress:  Not documented in this visit.              Point: Body mechanics (Not Started)       Learner Progress:  Not documented in this visit.              Point: Precautions (Not Started)       Learner Progress:  Not documented in this visit.                              User Key        Initials Effective Dates Name Provider Type Discipline    MS 07/11/23 -  Carmelita Pratt R, PT, DPT, NCS Physical Therapist PT                  PT Recommendation and Plan     Progress: improving  Outcome Evaluation: PT tx completed. Pt supine in bed, no c/o.  Rosanna sup>sit, Rosanna sit<>stand, steps to chair utilizing r wx Rosanna. Decreased strength and endurance. Up iin chair, family present.       Time Calculation:    PT Charges       Row Name 07/14/25 1312             Time Calculation    Start Time 1120  -KJ      Stop Time 1143  -KJ      Time Calculation (min) 23 min  -KJ      PT Received On 07/14/25  -KJ      PT Goal Re-Cert Due Date 07/21/25  -KJ         Time Calculation- PT    Total Timed Code Minutes- PT 23 minute(s)  -KJ                User Key  (r) = Recorded By, (t) = Taken By, (c) = Cosigned By      Initials Name Provider Type    Dinorah Nascimento, BUTCH Physical Therapist Assistant                  Therapy Charges for Today       Code Description Service Date Service Provider Modifiers Qty    12859453159 HC PT THERAPEUTIC ACT EA 15 MIN 7/14/2025 Dinorah Torres PTA GP 2            PT G-Codes  Outcome Measure Options: AM-PAC 6 Clicks Basic Mobility (PT)  AM-PAC 6 Clicks Score (PT): 12    Dinorah Torres PTA  7/14/2025

## 2025-07-15 LAB
ANION GAP SERPL CALCULATED.3IONS-SCNC: 7 MMOL/L (ref 5–15)
BUN SERPL-MCNC: 25.5 MG/DL (ref 8–23)
BUN/CREAT SERPL: 25.2 (ref 7–25)
CALCIUM SPEC-SCNC: 7.9 MG/DL (ref 8.6–10.5)
CHLORIDE SERPL-SCNC: 96 MMOL/L (ref 98–107)
CO2 SERPL-SCNC: 27 MMOL/L (ref 22–29)
CREAT SERPL-MCNC: 1.01 MG/DL (ref 0.57–1)
EGFRCR SERPLBLD CKD-EPI 2021: 55.3 ML/MIN/1.73
GLUCOSE BLDC GLUCOMTR-MCNC: 120 MG/DL (ref 70–130)
GLUCOSE BLDC GLUCOMTR-MCNC: 74 MG/DL (ref 70–130)
GLUCOSE BLDC GLUCOMTR-MCNC: 90 MG/DL (ref 70–130)
GLUCOSE SERPL-MCNC: 99 MG/DL (ref 65–99)
MAGNESIUM SERPL-MCNC: 1.8 MG/DL (ref 1.6–2.4)
PHOSPHATE SERPL-MCNC: 1.3 MG/DL (ref 2.5–4.5)
PHOSPHATE SERPL-MCNC: 4.6 MG/DL (ref 2.5–4.5)
POTASSIUM SERPL-SCNC: 4.1 MMOL/L (ref 3.5–5.2)
SODIUM SERPL-SCNC: 130 MMOL/L (ref 136–145)

## 2025-07-15 PROCEDURE — 80048 BASIC METABOLIC PNL TOTAL CA: CPT | Performed by: STUDENT IN AN ORGANIZED HEALTH CARE EDUCATION/TRAINING PROGRAM

## 2025-07-15 PROCEDURE — 80048 BASIC METABOLIC PNL TOTAL CA: CPT | Performed by: INTERNAL MEDICINE

## 2025-07-15 PROCEDURE — 97110 THERAPEUTIC EXERCISES: CPT

## 2025-07-15 PROCEDURE — 84100 ASSAY OF PHOSPHORUS: CPT | Performed by: INTERNAL MEDICINE

## 2025-07-15 PROCEDURE — 83735 ASSAY OF MAGNESIUM: CPT | Performed by: INTERNAL MEDICINE

## 2025-07-15 PROCEDURE — 25810000003 SODIUM CHLORIDE 0.9 % SOLUTION

## 2025-07-15 PROCEDURE — 84100 ASSAY OF PHOSPHORUS: CPT

## 2025-07-15 PROCEDURE — 82948 REAGENT STRIP/BLOOD GLUCOSE: CPT

## 2025-07-15 PROCEDURE — 82948 REAGENT STRIP/BLOOD GLUCOSE: CPT | Performed by: DIETITIAN, REGISTERED

## 2025-07-15 PROCEDURE — 97530 THERAPEUTIC ACTIVITIES: CPT

## 2025-07-15 RX ORDER — FAMOTIDINE 20 MG/1
20 TABLET, FILM COATED ORAL DAILY
Status: DISCONTINUED | OUTPATIENT
Start: 2025-07-16 | End: 2025-07-16 | Stop reason: HOSPADM

## 2025-07-15 RX ADMIN — Medication 10 ML: at 20:12

## 2025-07-15 RX ADMIN — Medication 1 ML: at 20:12

## 2025-07-15 RX ADMIN — Medication 1 PACKET: at 20:36

## 2025-07-15 RX ADMIN — CALCIUM CARBONATE 600 MG (1,500 MG)-VITAMIN D3 400 UNIT TABLET 1 TABLET: at 09:06

## 2025-07-15 RX ADMIN — Medication 1 PACKET: at 09:07

## 2025-07-15 RX ADMIN — CARVEDILOL 3.12 MG: 3.12 TABLET, FILM COATED ORAL at 18:42

## 2025-07-15 RX ADMIN — PRAVASTATIN SODIUM 40 MG: 20 TABLET ORAL at 20:11

## 2025-07-15 RX ADMIN — SENNOSIDES AND DOCUSATE SODIUM 2 TABLET: 50; 8.6 TABLET ORAL at 09:06

## 2025-07-15 RX ADMIN — POLYETHYLENE GLYCOL 3350 17 G: 17 POWDER, FOR SOLUTION ORAL at 09:07

## 2025-07-15 RX ADMIN — Medication 10 ML: at 09:07

## 2025-07-15 RX ADMIN — SODIUM CHLORIDE 15 MMOL: 9 INJECTION, SOLUTION INTRAVENOUS at 12:25

## 2025-07-15 RX ADMIN — GLIPIZIDE 5 MG: 5 TABLET ORAL at 09:06

## 2025-07-15 RX ADMIN — BISACODYL 5 MG: 5 TABLET, COATED ORAL at 09:06

## 2025-07-15 RX ADMIN — FAMOTIDINE 40 MG: 20 TABLET, FILM COATED ORAL at 09:06

## 2025-07-15 RX ADMIN — SODIUM CHLORIDE 15 MMOL: 9 INJECTION, SOLUTION INTRAVENOUS at 09:05

## 2025-07-15 RX ADMIN — CARVEDILOL 3.12 MG: 3.12 TABLET, FILM COATED ORAL at 09:07

## 2025-07-15 RX ADMIN — HYDROCODONE BITARTRATE AND CHLORPHENIRAMINE MALEATE 400 UNITS: 10; 8 SUSPENSION, EXTENDED RELEASE ORAL at 09:06

## 2025-07-15 NOTE — THERAPY TREATMENT NOTE
Acute Care - Physical Therapy Treatment Note  [facility]     Patient Name: Lorena Valdes  : 1940  MRN: [id number]  Today's Date: 7/15/2025      Visit Dx:     ICD-10-CM ICD-9-CM   1. Dysphagia, unspecified type  R13.10 787.20   2. Failure to thrive in adult  R62.7 783.7   3. Pharyngoesophageal dysphagia [R13.14]  R13.14 787.24   4. Impaired mobility [Z74.09]  Z74.09 799.89   5. Esophageal dysphagia  R13.19 787.29   6. Severe protein-calorie malnutrition  E43 262   7. 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    Malignant neoplasm of upper-outer quadrant of right female breast    Osteopenia    Encounter for care related to vascular access port    Colon cancer screening    Chronic kidney disease, stage 3a    Diabetic renal disease    Hypertensive renal disease    Estrogen receptor negative tumor status    Squamous cell esophageal cancer    Weight loss    Dysphagia    Severe protein-calorie malnutrition    DM2 (diabetes mellitus, type 2)    Hypokalemia    Hypomagnesemia     Past Medical History:   Diagnosis Date    Bladder disorder     Chronic kidney disease, stage 3b     Diverticulosis     Emphysema lung     Fibrocystic disease of breast     CONNIE (generalized anxiety disorder)     GERD (gastroesophageal reflux disease)     Heart disease     History of bone density study     History of colon polyps     Hyperglycemia     Hyperlipidemia     Hypotension     Left ventricular diastolic dysfunction     Malignant neoplasm of central portion of left female breast 2016    Osteopenia 2017    Restrictive lung disease     Type 2 diabetes mellitus     Uterine prolapse      Past Surgical History:   Procedure Laterality Date    BREAST LUMPECTOMY  2008    CATARACT EXTRACTION Right     COLONOSCOPY  2010    Diverticulosis; Repeat 10 years    COLONOSCOPY N/A 2020    One 6mm inflamed  hyperplastic polyp in the cecum; Diverticulosis in the left colon; Non-bleeding internal hemorrhoids; Repeat 5 years    ENDOSCOPY N/A 06/12/2025    Exophytic mass found in the larynx, not obstructing the airway; Partially obstructing, rule out malignancy, esophageal tumor was found in the proximal esophagus-biopsied; Medium-sized HH; Normal stomach; Two non-bleeding angiodysplastic lesions in the duodenum-biopsied    ENDOSCOPY W/ PEG TUBE PLACEMENT N/A 7/10/2025    Procedure: ESOPHAGOGASTRODUODENOSCOPY WITH PERCUTANEOUS ENDOSCOPIC GASTROSTOMY TUBE INSERTION WITH ANESTHESIA;  Surgeon: Christine Valdovinos MD;  Location: [facility] ENDOSCOPY;  Service: Gastroenterology;  Laterality: N/A;  pre op:peg placement  post op: esophageal mass  PCP:Luly Diez MD    GTUBE INSERTION N/A 7/10/2025    Procedure: LAPAROSCOPIC GASTROSTOMY FEEDING TUBE WITH DAVINCI ROBOT;  Surgeon: Willie Weber MD;  Location: [facility] OR;  Service: Robotics - DaVinci;  Laterality: N/A;    MAMMO BILATERAL  07/17/2013    Dr. Sabillon    MASTECTOMY Left 08/28/2008    PAP SMEAR  2010    SKIN CANCER EXCISION       PT Assessment (Last 12 Hours)       PT Evaluation and Treatment       Row Name 07/15/25 09          Physical Therapy Time and Intention    Subjective Information no complaints  -KJ     Document Type therapy note (daily note)  -KJ     Mode of Treatment physical therapy  -KJ     Patient Effort good  -KJ       Row Name 07/15/25 0940          General Information    Existing Precautions/Restrictions fall  PEG tube  -KJ       Row Name 07/15/25 0940          Pain    Pretreatment Pain Rating 0/10 - no pain  -KJ     Posttreatment Pain Rating 0/10 - no pain  -KJ       Row Name 07/15/25 0940          Bed Mobility    Supine-Sit [place] (Bed Mobility) minimum assist (75% patient effort);verbal cues  -KJ       Row Name 07/15/25 0940          Stand-Sit Transfer    Stand-Sit [place] (Transfers) verbal cues;minimum assist (75% patient effort)  -KJ      Assistive Device (Stand-Sit Transfers) walker, front-wheeled  -KJ       Row Name 07/15/25 0940          Gait/Stairs (Locomotion)    [place] Level (Gait) verbal cues;contact guard;minimum assist (75% patient effort)  -KJ     Assistive Device (Gait) walker, front-wheeled  -KJ     Distance in Feet (Gait) --  steps to chair  -KJ     Deviations/Abnormal Patterns (Gait) stride length decreased  -KJ     Bilateral Gait Deviations forward flexed posture  -KJ       Row Name 07/15/25 0940          Balance    Balance Assessment sitting static balance  -KJ     Static Sitting Balance independent  -KJ     Dynamic Sitting Balance standby assist  -KJ     Position, Sitting Balance sitting edge of bed;unsupported  -KJ     Static Standing Balance contact guard  -KJ     Dynamic Standing Balance minimal assist;moderate assist  -KJ     Position/Device Used, Standing Balance walker, front-wheeled  -KJ       Row Name 07/15/25 0940          Motor Skills    Comments, Therapeutic Exercise BLE AROM seated  -KJ       Row Name             Wound 07/10/25 1642 abdomen Surgical Laparoscopic    Wound - Properties Group Placement Date: 07/10/25  -HW Placement Time: 1642 -HW Location: abdomen  -HW Primary Wound Type: Surgical  -HW Secondary Wound Type - Surgical: Laparoscopic  -HW    Retired Wound - Properties Group Placement Date: 07/10/25  -HW Placement Time: 1642 -HW Location: abdomen  -HW    Retired Wound - Properties Group Placement Date: 07/10/25  -HW Placement Time: 1642 -HW Location: abdomen  -HW    Retired Wound - Properties Group Date first assessed: 07/10/25  -HW Time first assessed: 1642 -HW Location: abdomen  -HW      Row Name 07/15/25 0940          Positioning and Restraints    Pre-Treatment Position in bed  -KJ     Post Treatment Position chair  -KJ     In Chair call light within reach;reclined  -KJ               User Key  (r) = Recorded By, (t) = Taken By, (c) = Cosigned By      Initials Name Provider Type    Dinorah Nascimento  BUTCH GONSALEZ Physical Therapist Assistant    Brett Mckeon, RN Registered Nurse                    Physical Therapy Education       Title: PT OT SLP Therapies (In Progress)       Topic: Physical Therapy (In Progress)       Point: Mobility training (Done)       Learning Progress Summary            Patient Acceptance, TYREL, VU by MS at 7/11/2025 1307    Comment: role of PT in her care                      Point: Home exercise program (Not Started)       Learner Progress:  Not documented in this visit.              Point: Body mechanics (Not Started)       Learner Progress:  Not documented in this visit.              Point: Precautions (Not Started)       Learner Progress:  Not documented in this visit.                              User Key       Initials Effective Dates Name Provider Type Discipline    MS 07/11/23 -  Carmelita Pratt R, PT, DPT, NCS Physical Therapist PT                  PT Recommendation and Plan     Progress: improving  Outcome Evaluation: PT tx completed. Pt A & O x 4. Agreeable to sitting in chair. Rosanna bed mobility and scooting. Rosanna sit<>stand, utilized r wx to transfer to chair. Able to take few steps with no LOB. Ex's performed seated. Daughters present.       Time Calculation:    PT Charges       Row Name 07/15/25 1034             Time Calculation    Start Time 0940  -KJ      Stop Time 1004  -KJ      Time Calculation (min) 24 min  -KJ      PT Received On 07/15/25  -KJ      PT Goal Re-Cert Due Date 07/21/25  -KJ         Time Calculation- PT    Total Timed Code Minutes- PT 24 minute(s)  -KJ                User Key  (r) = Recorded By, (t) = Taken By, (c) = Cosigned By      Initials Name Provider Type    Dinorah Nascimento PTA Physical Therapist Assistant                  Therapy Charges for Today       Code Description Service Date Service Provider Modifiers Qty    53491467925 HC PT THERAPEUTIC ACT EA 15 MIN 7/14/2025 Dinorah Torres PTA GP 2    60549282184 HC PT THER PROC EA 15 MIN 7/15/2025  Dinorah Torres, PTA GP 1    77813202763  PT THERAPEUTIC ACT EA 15 MIN 7/15/2025 Dinorah Torres, PTA GP 1            PT G-Codes  Outcome Measure Options: AM-PAC 6 Clicks Basic Mobility (PT)  AM-PAC 6 Clicks Score (PT): 12    Dinorah Torres PTA  7/15/2025

## 2025-07-15 NOTE — CASE MANAGEMENT/SOCIAL WORK
Continued Stay Note   [facility]     Patient Name: Lorena Valdes  MRN: [id number]  Today's Date: 7/15/2025    Admit Date: 7/9/2025    Plan: [facility]   Discharge Plan       Row Name 07/15/25 1047       Plan    Plan [facility]    Plan Comments Patient has received a bed offer from [facility] pending bed availability.  Patient will also need to have a bowel movement prior to discharge to SNF.                   Discharge Codes    No documentation.                 Expected Discharge Date and Time       Expected Discharge Date Expected Discharge Time    Jul 15, 2025               ZEINAB Rodríguez

## 2025-07-15 NOTE — PLAN OF CARE
Problem: Adult Inpatient Plan of Care  Goal: Plan of Care Review  Outcome: Progressing  Flowsheets (Taken 7/15/2025 0219)  Progress: no change  Outcome Evaluation: Pt is A&Ox4, VSS,  no c/o pain. Rested well during the shift. Meds given through peg tube. Call light within reach. Family at bedside. Safety maintained.

## 2025-07-15 NOTE — PLAN OF CARE
Goal Outcome Evaluation:  Plan of Care Reviewed With: patient        Progress: improving  Outcome Evaluation: PT tx completed. Pt A & O x 4. Agreeable to sitting in chair. Rosanna bed mobility and scooting. Rosanna sit<>stand, utilized r wx to transfer to chair. Able to take few steps with no LOB. Ex's performed seated. Daughters present.

## 2025-07-15 NOTE — PROGRESS NOTES
[facility] Clinical Pharmacy Services: Renal Dose Adjustment    Relevant clinical data and objective history reviewed:  Estimated Creatinine Clearance: 39.9 mL/min (A) (by C-G formula based on SCr of 1.01 mg/dL (H)).     Results from last 7 days   Lab Units 07/15/25  0407 07/14/25  0319 07/13/25  0512   CREATININE mg/dL 1.01* 0.98 0.99   BUN / CREAT RATIO  25.2* 29.5* 26.9*   EGFR mL/min/1.73 55.3* 57.4* 56.7*   BUN mg/dL 25.5* 28.9* 26.6*       Assessment/Plan:  Famotidine 40 mg PO daily has been appropriately renally dose adjusted to Famotidine 20mg PO daily based on our [facility] P&T approved Adult Renal Dosing of Medication policy  Pharmacy will continue to monitor renal function and clinical status and adjust the dose and/or frequency as needed.    Lisa Cope, PharmMATTHEW  7/15/2025  16:08 CDT

## 2025-07-15 NOTE — PLAN OF CARE
Goal Outcome Evaluation:  Plan of Care Reviewed With: patient, family        Progress: improving  Outcome Evaluation: Pt A&Ox4. BM this shift. Purwic. PEG w/ bolus tube feeds. IV phos given per orders. Sugars monitored. Family at BS. Call light in reach. Safety maintained.

## 2025-07-15 NOTE — PROGRESS NOTES
[facility] Medicine Services  INPATIENT PROGRESS NOTE    Patient Name: Lorena Valdes  Date of Admission: 7/9/2025  Today's Date: 07/15/25  Length of Stay: 6  Primary Care Physician: Luly Diez MD    Subjective   Chief Complaint: None offered today  HPI   No acute events overnight.  Patient seen with daughter present at bedside.  She does not have any complaints today.  According to her daughter she had a large bowel movement today.  She still feels full after tube feeds.      Review of Systems   All pertinent negatives and positives are as above. All other systems have been reviewed and are negative unless otherwise stated.     Objective    Temp:  [97.6 °F (36.4 °C)-97.9 °F (36.6 °C)] 97.8 °F (36.6 °C)  Heart Rate:  [74-78] 78  Resp:  [14-16] 16  BP: (115-140)/(61-75) 126/61  Physical Exam  GEN: Awake, alert, interactive, in NAD on room air  HEENT: Atraumatic, EOMI, Anicteric  Lungs: no wheezing/rales/rhonchi  Heart: RRR, +S1/s2, no rub  ABD: soft, nt/nd, +BS, G-tube   Extremities: atraumatic, no cyanosis, no edema  Skin: no rashes or lesions  Neuro: AAOx3, no focal deficits  Psych: normal mood & affect      Results Review:  I have reviewed the labs, radiology results, and diagnostic studies.    Laboratory Data:   Results from last 7 days   Lab Units 07/10/25  0332 07/09/25  1419   WBC 10*3/mm3 12.99* 13.75*   HEMOGLOBIN g/dL 10.5* 9.8*   HEMATOCRIT % 31.4* 29.5*   PLATELETS 10*3/mm3 333 392        Results from last 7 days   Lab Units 07/15/25  0407 07/14/25  0319 07/13/25  1721 07/13/25  0512 07/11/25  0354 07/10/25  0332 07/09/25  2139 07/09/25  1419   SODIUM mmol/L 130* 132*  --  135*   < > 136   < > 134*   POTASSIUM mmol/L 4.1 4.5 4.7 3.6   < > 3.9   < > 3.3*   CHLORIDE mmol/L 96* 98  --  101   < > 97*   < > 95*   CO2 mmol/L 27.0 26.0  --  26.0   < > 22.0   < > 23.0   BUN mg/dL 25.5* 28.9*  --  26.6*   < > 39.3*   < > 52.5*   CREATININE mg/dL 1.01* 0.98  --  0.99   < >  0.88   < > 1.23*   CALCIUM mg/dL 7.9* 7.7*  --  7.7*   < > 9.7   < > 10.0   BILIRUBIN mg/dL  --   --   --   --   --  0.5  --  0.7   ALK PHOS U/L  --   --   --   --   --  44  --  45   ALT (SGPT) U/L  --   --   --   --   --  <5  --  <5   AST (SGOT) U/L  --   --   --   --   --  14  --  10   GLUCOSE mg/dL 99 104*  --  131*   < > 132*   < > 146*    < > = values in this interval not displayed.       Culture Data:   [unfilled]    Radiology Data:   Imaging Results (Last 24 Hours)       ** No results found for the last 24 hours. **            I have reviewed the patient's current medications.     Assessment/Plan   Assessment  Active Hospital Problems    Diagnosis     **Dysphagia     DM2 (diabetes mellitus, type 2)     Hypokalemia     Hypomagnesemia     Severe protein-calorie malnutrition     Chronic kidney disease, stage 3a        Treatment Plan    # Dysphagia  - S/p g-tube placement by general surgery on 7/10  History of laryngeal cancer  - Had large BM today, was previously constipated  - Will monitor for improved tolerance of tube feeds  - Pending SNF placement    # Hypophosphatemia   - correcting  - Recheck tomorrow     # Hypokalemia -resolved     # Hypomagnesemia - improved  -Recheck tomorrow     # Type 2 DM   - on SSI and hypoglycemia protocol     # Hypertension   Previously soft BP and Coreg and losartan were held.  Coreg restarted 7/14  - Will restart losartan tomorrow     # CKD 3a   - At baseline.  GFR is 55 today  - Daily BMP        Medical Decision Making  Number and Complexity of problems: 1 acute on chronic moderate complexity, 3 acute moderate complexity, others chronic and stable  Differential Diagnosis: As above    Conditions and Status        New to me, appears stable.     MDM Data  External documents reviewed: Reviewed  Cardiac tracing (EKG, telemetry) interpretation: Reviewed  Radiology interpretation: Reviewed  Labs reviewed: Reviewed  Any tests that were considered but not ordered: None     Decision  rules/scores evaluated (example [id number], Wells, etc): None     Discussed with: Patient and her daughter at bedside     Care Planning  Shared decision making: Patient apprised of current labs, vitals, imaging and treatment plan.  They are agreeable with proceeding with plans as discussed.   Code status and discussions: No CPR    Disposition  Social Determinants of Health that impact treatment or disposition: None  I expect the patient to be discharged to SNF?  In TBD days.         Electronically signed by Marco Mckeon MD, 07/15/25, 08:26 CDT.

## 2025-07-15 NOTE — PLAN OF CARE
Goal Outcome Evaluation:   Pt is A&Ox4. Up x1 assit. VSS. RA. Accuchecks q6. Per pt she hasn't had a BM in a week or longer. Bowel regimen started. No c/o pain today. Tube feeding orders changed to help with ABD discomfort. Un accessed port to L side of chest. IV in R hand IID. Meds given through peg tube. Call light within reach. Safety maintained.

## 2025-07-16 ENCOUNTER — CARE COORDINATION (OUTPATIENT)
Dept: CARE COORDINATION | Age: 85
End: 2025-07-16

## 2025-07-16 VITALS
DIASTOLIC BLOOD PRESSURE: 51 MMHG | HEART RATE: 81 BPM | HEIGHT: 63 IN | BODY MASS INDEX: 23.39 KG/M2 | RESPIRATION RATE: 16 BRPM | SYSTOLIC BLOOD PRESSURE: 133 MMHG | OXYGEN SATURATION: 96 % | TEMPERATURE: 97.6 F | WEIGHT: 132 LBS

## 2025-07-16 LAB
ANION GAP SERPL CALCULATED.3IONS-SCNC: 11 MMOL/L (ref 5–15)
ANION GAP SERPL CALCULATED.3IONS-SCNC: 13 MMOL/L (ref 5–15)
BUN SERPL-MCNC: 20.7 MG/DL (ref 8–23)
BUN SERPL-MCNC: 21.8 MG/DL (ref 8–23)
BUN/CREAT SERPL: 21.8 (ref 7–25)
BUN/CREAT SERPL: 22.5 (ref 7–25)
CALCIUM SPEC-SCNC: 7.7 MG/DL (ref 8.6–10.5)
CALCIUM SPEC-SCNC: 7.7 MG/DL (ref 8.6–10.5)
CHLORIDE SERPL-SCNC: 96 MMOL/L (ref 98–107)
CHLORIDE SERPL-SCNC: 96 MMOL/L (ref 98–107)
CO2 SERPL-SCNC: 22 MMOL/L (ref 22–29)
CO2 SERPL-SCNC: 23 MMOL/L (ref 22–29)
CREAT SERPL-MCNC: 0.95 MG/DL (ref 0.57–1)
CREAT SERPL-MCNC: 0.97 MG/DL (ref 0.57–1)
EGFRCR SERPLBLD CKD-EPI 2021: 58.1 ML/MIN/1.73
EGFRCR SERPLBLD CKD-EPI 2021: 59.6 ML/MIN/1.73
GLUCOSE BLDC GLUCOMTR-MCNC: 104 MG/DL (ref 70–130)
GLUCOSE BLDC GLUCOMTR-MCNC: 142 MG/DL (ref 70–130)
GLUCOSE SERPL-MCNC: 141 MG/DL (ref 65–99)
GLUCOSE SERPL-MCNC: 79 MG/DL (ref 65–99)
MAGNESIUM SERPL-MCNC: 1.7 MG/DL (ref 1.6–2.4)
POTASSIUM SERPL-SCNC: 3.8 MMOL/L (ref 3.5–5.2)
POTASSIUM SERPL-SCNC: 3.8 MMOL/L (ref 3.5–5.2)
SODIUM SERPL-SCNC: 129 MMOL/L (ref 136–145)
SODIUM SERPL-SCNC: 132 MMOL/L (ref 136–145)

## 2025-07-16 PROCEDURE — 82948 REAGENT STRIP/BLOOD GLUCOSE: CPT

## 2025-07-16 PROCEDURE — 82948 REAGENT STRIP/BLOOD GLUCOSE: CPT | Performed by: FAMILY MEDICINE

## 2025-07-16 PROCEDURE — 77470 SPECIAL RADIATION TREATMENT: CPT | Performed by: RADIOLOGY

## 2025-07-16 PROCEDURE — 77334 RADIATION TREATMENT AID(S): CPT | Performed by: RADIOLOGY

## 2025-07-16 PROCEDURE — 83735 ASSAY OF MAGNESIUM: CPT | Performed by: STUDENT IN AN ORGANIZED HEALTH CARE EDUCATION/TRAINING PROGRAM

## 2025-07-16 PROCEDURE — 80048 BASIC METABOLIC PNL TOTAL CA: CPT | Performed by: STUDENT IN AN ORGANIZED HEALTH CARE EDUCATION/TRAINING PROGRAM

## 2025-07-16 RX ORDER — GUAR GUM
1 PACKET (EA) ORAL 3 TIMES DAILY
Start: 2025-07-16

## 2025-07-16 RX ORDER — ONDANSETRON 4 MG/1
4 TABLET, ORALLY DISINTEGRATING ORAL EVERY 6 HOURS PRN
Start: 2025-07-16

## 2025-07-16 RX ORDER — CARVEDILOL 3.12 MG/1
3.12 TABLET ORAL 2 TIMES DAILY WITH MEALS
Status: ON HOLD
Start: 2025-07-16 | End: 2025-07-25

## 2025-07-16 RX ORDER — ASPIRIN 81 MG/1
81 TABLET ORAL DAILY
Status: ON HOLD
Start: 2025-07-21 | End: 2025-07-25

## 2025-07-16 RX ADMIN — CARVEDILOL 3.12 MG: 3.12 TABLET, FILM COATED ORAL at 08:35

## 2025-07-16 RX ADMIN — FAMOTIDINE 20 MG: 20 TABLET, FILM COATED ORAL at 08:35

## 2025-07-16 RX ADMIN — Medication 1 PACKET: at 08:37

## 2025-07-16 RX ADMIN — Medication 1 ML: at 08:56

## 2025-07-16 RX ADMIN — Medication 10 ML: at 08:55

## 2025-07-16 RX ADMIN — GLIPIZIDE 5 MG: 5 TABLET ORAL at 08:35

## 2025-07-16 RX ADMIN — HYDROCODONE BITARTRATE AND CHLORPHENIRAMINE MALEATE 400 UNITS: 10; 8 SUSPENSION, EXTENDED RELEASE ORAL at 08:38

## 2025-07-16 RX ADMIN — CALCIUM CARBONATE 600 MG (1,500 MG)-VITAMIN D3 400 UNIT TABLET 1 TABLET: at 08:33

## 2025-07-16 RX ADMIN — Medication 1 ML: at 12:55

## 2025-07-16 RX ADMIN — LOSARTAN POTASSIUM 25 MG: 25 TABLET, FILM COATED ORAL at 08:56

## 2025-07-16 NOTE — PROGRESS NOTES
Nutrition Services    Patient Name:  Lorena Valdes  YOB: 1940  MRN: [id number]  Admit Date:  7/9/2025    Patient Name: Lorena Valdes  YOB: 1940  MRN: [id number]  Admission date: 7/9/2025  Reason for Encounter: Follow-up/Progress Note    [facility] Clinical Nutrition Assessment     Subjective    Subjective Information     Nutrition follow up. Pt has received a full bolus volume per documentation in flowsheet on 7/14 x two bolus of 250 ml,7/15 pt refused bolus at 0905,received 125 ml bolus at 1549,250 ml at 2200,7/16 250 ml at 0700. Pt hesitant about having a full bolus today. Encouraged to try to increase to goal volume bolus of 250 ml. Tube Feeding orders are Diabetisource AC of 250 ml per syringe method five times per day. Water flush of 60 ml before and after each meal bolus. Nutrisource Fiber packets 1 pkt (mixed with  ml ) via peg tube, flush with at least 60 ml after administration (on MAR). Last BM 7/15.Pt received 1287.6 kcal (71% of est. Kcal needs),64 gm protein (71% of est. Protein needs),877 ml free water,total water 1220 ml water,16 gm fiber w/ modular 25.6 gm fiber/day.     Objective   H&P and Current Problems      H&P  Past Medical History:   Diagnosis Date    Bladder disorder     Chronic kidney disease, stage 3b     Diverticulosis     Emphysema lung     Fibrocystic disease of breast     CONNIE (generalized anxiety disorder)     GERD (gastroesophageal reflux disease)     Heart disease     History of bone density study 2011    History of colon polyps     Hyperglycemia     Hyperlipidemia     Hypotension     Left ventricular diastolic dysfunction     Malignant neoplasm of central portion of left female breast 11/09/2016    Osteopenia 08/29/2017    Restrictive lung disease     Type 2 diabetes mellitus     Uterine prolapse       Past Surgical History:   Procedure Laterality Date    BREAST LUMPECTOMY  2008    CATARACT EXTRACTION Right 2021    COLONOSCOPY   "09/16/2010    Diverticulosis; Repeat 10 years    COLONOSCOPY N/A 11/11/2020    One 6mm inflamed hyperplastic polyp in the cecum; Diverticulosis in the left colon; Non-bleeding internal hemorrhoids; Repeat 5 years    ENDOSCOPY N/A 06/12/2025    Exophytic mass found in the larynx, not obstructing the airway; Partially obstructing, rule out malignancy, esophageal tumor was found in the proximal esophagus-biopsied; Medium-sized HH; Normal stomach; Two non-bleeding angiodysplastic lesions in the duodenum-biopsied    ENDOSCOPY W/ PEG TUBE PLACEMENT N/A 7/10/2025    Procedure: ESOPHAGOGASTRODUODENOSCOPY WITH PERCUTANEOUS ENDOSCOPIC GASTROSTOMY TUBE INSERTION WITH ANESTHESIA;  Surgeon: Christine Valdovinos MD;  Location: [facility] ENDOSCOPY;  Service: Gastroenterology;  Laterality: N/A;  pre op:peg placement  post op: esophageal mass  PCP:Luly Diez MD    GTUBE INSERTION N/A 7/10/2025    Procedure: LAPAROSCOPIC GASTROSTOMY FEEDING TUBE WITH DAVINCI ROBOT;  Surgeon: Willie Weber MD;  Location: [facility] OR;  Service: Robotics - DaVinci;  Laterality: N/A;    MAMMO BILATERAL  07/17/2013    Dr. Sabillon    MASTECTOMY Left 08/28/2008    PAP SMEAR  2010    SKIN CANCER EXCISION        Current Problems   Admission Diagnosis:  Dysphagia [R13.10]    Problem List:    Dysphagia    Chronic kidney disease, stage 3a    Severe protein-calorie malnutrition    DM2 (diabetes mellitus, type 2)    Hypokalemia    Hypomagnesemia      Other Applicable Nutrition Information:        Anthropometrics       Height: 160 cm (62.99\")  Weight: 59.9 kg (132 lb) (07/09/25 1832)  Weight Method: Estimated  BMI (Calculated): 23.4       Trending Weight Changes 07/16/25:weight loss of 58 lbs (30%) over 1 year(s)    Significant?  Yes8       Weight History  Wt Readings from Last 20 Encounters:   07/09/25 1832 59.9 kg (132 lb)   07/09/25 1419 60 kg (132 lb 4.8 oz)   06/24/25 1314 67.6 kg (149 lb)   06/23/25 1053 67.6 kg (149 lb 1.6 oz)   06/18/25 1535 70.3 kg (155 lb) "   06/12/25 0729 70.3 kg (155 lb)   06/11/25 0945 69.9 kg (154 lb)   06/04/24 1125 86.2 kg (190 lb 1.6 oz)   06/06/23 1114 83.8 kg (184 lb 12.8 oz)   05/13/22 1124 87.3 kg (192 lb 6.4 oz)   09/01/21 1108 87.1 kg (192 lb 1.6 oz)   11/11/20 0930 85.7 kg (189 lb)   10/07/20 0955 88 kg (194 lb)   09/01/20 1056 88.6 kg (195 lb 4.8 oz)   09/04/19 1136 86.2 kg (190 lb)   09/04/18 0920 83.6 kg (184 lb 3.2 oz)   08/29/17 1128 83.9 kg (185 lb)   03/14/17 1123 84.3 kg (185 lb 12.8 oz)        Labs      Comment: Hyponatremia     Results from last 7 days   Lab Units 07/16/25  0854 07/15/25  1947 07/15/25  0407 07/14/25  0319 07/11/25  0354 07/10/25  0332 07/09/25  2139 07/09/25  1748 07/09/25  1419   SODIUM mmol/L 129* 132* 130* 132*   < > 136   < >  --  134*   POTASSIUM mmol/L 3.8 3.8 4.1 4.5   < > 3.9   < >  --  3.3*   GLUCOSE mg/dL 141* 79 99 104*   < > 132*   < >  --  146*   BUN mg/dL 20.7 21.8 25.5* 28.9*   < > 39.3*   < >  --  52.5*   CREATININE mg/dL 0.95 0.97 1.01* 0.98   < > 0.88   < >  --  1.23*   CALCIUM mg/dL 7.7* 7.7* 7.9* 7.7*   < > 9.7   < >  --  10.0   PHOSPHORUS mg/dL  --  4.6* 1.3*  --   --   --   --   --   --    MAGNESIUM mg/dL 1.7  --  1.8 2.0   < > 2.0  --   --  1.5*   ALBUMIN g/dL  --   --   --   --   --  2.5*  --   --  2.9*   LACTATE mmol/L  --   --   --   --   --   --   --  1.4  --    BILIRUBIN mg/dL  --   --   --   --   --  0.5  --   --  0.7   ALK PHOS U/L  --   --   --   --   --  44  --   --  45   AST (SGOT) U/L  --   --   --   --   --  14  --   --  10   ALT (SGPT) U/L  --   --   --   --   --  <5  --   --  <5    < > = values in this interval not displayed.     Results from last 7 days   Lab Units 07/10/25  0332 07/09/25  1419   PLATELETS 10*3/mm3 333 392   HEMOGLOBIN g/dL 10.5* 9.8*   HEMATOCRIT % 31.4* 29.5*     Lab Results   Component Value Date    HGBA1C 7 (H) 07/03/2025          Medications       Scheduled Medications calcium carb-cholecalciferol, 1 tablet, Per G Tube, Daily  carvedilol, 3.125 mg, Per  G Tube, BID With Meals  cholecalciferol, 400 Units, Per G Tube, Daily  famotidine, 20 mg, Per G Tube, Daily  glipizide, 5 mg, Per G Tube, Daily  insulin regular, 2-7 Units, Subcutaneous, Q6H  losartan, 25 mg, Oral, Daily  Mouth Kote, 1 Application, Mouth/Throat, 4x Daily  Nutrisource fiber, 1 packet, Per G Tube, TID  pravastatin, 40 mg, Per G Tube, Nightly  Scopolamine, 1 patch, Transdermal, Q72H  sodium chloride, 10 mL, Intravenous, Q12H        Infusions      PRN Medications   senna-docusate sodium **AND** polyethylene glycol **AND** bisacodyl **AND** bisacodyl    Calcium Replacement - Follow Nurse / BPA Driven Protocol    dextrose    dextrose    glucagon (human recombinant)    Magnesium Low Dose Replacement - Follow Nurse / BPA Driven Protocol    ondansetron ODT **OR** ondansetron    Phosphorus Replacement - Follow Nurse / BPA Driven Protocol    Potassium Replacement - Follow Nurse / BPA Driven Protocol    [COMPLETED] Insert Peripheral IV **AND** sodium chloride    sodium chloride     Physical Findings      Chewing/Swallowing  Teeth Status: Mouth/Teeth WDL: .WDL except, teeth Teeth Symptoms: tooth/teeth missing  Chewing/Swallowing Issues: Dysphagia   Edema                            Bowel Function  Stool Output  Stool Unmeasured Occurrence: 1 (07/15/25 1152)  Bowel Incontinence: No (07/15/25 1152)  Stool Amount: Large (07/15/25 1152)  Perineal Care: perineum cleansed, protective cream/ointment applied, absorbent brief/pad changed (07/15/25 1152)  Last Bowel Movement: 07/15/25   I/Os  Intake & Output (last 3 days)         07/13 0701 07/14 0700 07/14 0701  07/15 0700 07/15 0701  07/16 0700 07/16 0701  07/17 0700    Other 250 300 480     NG/ 1800 625     Total Intake(mL/kg) 1035 (17.3) 2100 (35.1) 1105 (18.4)     Urine (mL/kg/hr) 1650 (1.1) 1600 (1.1) 1100 (0.8)     Stool   0     Total Output 1650 1600 1100     Net -615 +500 +5             Urine Unmeasured Occurrence  2 x 1 x     Stool Unmeasured Occurrence    1 x            Lines, Drains, Airways, & Wounds       Active LDAs       Name Placement date Placement time Site Days Last dressing change    Peripheral IV 07/13/25 0930 22 G Posterior;Right Hand 07/13/25  0930  Hand  3     Gastrostomy/Enterostomy Gastrostomy 1 20 Fr. LUQ 07/10/25  1755  LUQ  5     External Urinary Catheter 07/09/25 2136  --  6     Single Lumen Implantable Port Left Chest --  --  Chest  --     Wound 07/10/25 1642 abdomen Surgical Laparoscopic 07/10/25  1642  -- 5                       Nutrition Focused Physical Exam     Trending NFPE 07/16/25:MSA documented on 7/10     Malnutrition Severity Assessment      Patient meets criteria for : Severe Malnutrition (Secondary signs: generalized weakness)     (1)   Current Nutrition Orders & Evaluation of Intake      Oral Nutrition     Food Allergies  and Intolerances NKFA   Current PO Diet NPO Diet NPO Type: Tube Feeding   Oral Nutrition Supplement None     Trending % PO Intake 07/16/25:NPO   (2)  Assessment & Plan   Nutrition Diagnosis and Goals       Nutrition Diagnosis 1 Swallowing Difficulty related to inability to consume regular consistency food and/or fluids as evidenced by pt reports difficulty with eating and taking in food and unintentional wt loss of 58 lbs (30%) over 1 yr.      Nutrition Diagnosis 2 Severe chronic disease or condition related malnutrition related to malnutrition related to inability to consume regular consistency of food/fluids,new diagnosis of pharyngeal and esophageal cancer as evidence by severe muscle wasting and severe fat loss per NFPE and unintentional wt loss of 58 lbs (30%) over one year.        Goal(s) Tolerates EN , Tolerates EN at Goal , and No Significant Weight Loss      Nutrition Intervention and Prescription         Diet Prescription Diabetisource  ml volume for 5 times per day. Flush tube with 60 ml before and after each meal bolus. Add Nutrisource Fiber modular 1 pkt TID via peg tube (mix 1 pkt with   ml water via peg tube). Goal to establish enteral tolerance.       Tube Feeds to provide: 1515 kcal(w/ modular),25 kcal/kg,75 gm protein,19 gm fiber;with modular 28 gm fiber/day,Free water 1020 ml;total water 1620 ml water.                   Supplement Prescription     Education Provided     (3)  Monitoring/Evaluation       Monitor/Evaluation Per Protocol     RD Follow-Up Encounter 7 days      Electronically signed by:  Yojana Cage RDN, JEREMIAS  07/16/25 09:34 CDT      Electronically signed by:  Yojana Cage RDN, JEREMIAS  07/16/25 09:34 CDT

## 2025-07-16 NOTE — DISCHARGE SUMMARY
[facility] Medicine Services  DISCHARGE SUMMARY       Date of Admission: 7/9/2025  Date of Discharge:  7/16/2025  Primary Care Physician: Luly Diez MD    Presenting Problem/History of Present Illness:  Presented with dysphagia    Final Discharge Diagnoses:  Active Hospital Problems    Diagnosis     **Dysphagia     DM2 (diabetes mellitus, type 2)     Hypokalemia     Hypomagnesemia     Severe protein-calorie malnutrition     Chronic kidney disease, stage 3a        Consults: Palliative care, gastroenterology, surgery, oncology, radiation oncology    Procedures Performed: EGD on 7/10, laparoscopic gastrostomy feeding tube placement on 7/10    Pertinent Test Results:   No results found for this or any previous visit.      Imaging Results (All)       Procedure Component Value Units Date/Time    FL Video Swallow With Speech Single Contrast [[id number]] Collected: 07/11/25 1057     Updated: 07/11/25 1104    Narrative:      EXAMINATION: FL VIDEO SWALLOW W SPEECH SINGLE-CONTRAST-        HISTORY: Dysphagia; R13.10-Dysphagia, unspecified; R62.7-Adult failure  to thrive; R13.14-Dysphagia, pharyngoesophageal phase     No fluoroscopy was performed during ingestion of nectar consistency and  honey consistency contrast bolus.     There is no previous study for comparison.     Patient had no difficulty in initiating the swallowing.     There is significant residue in the vallecula and piriform sinus and a  very minimal propagation into the esophagus. There is evidence of reflux  from the esophagus into the vallecula and subsequent aspiration.     There is evidence of significant laryngeal penetration and aspiration.  No cough.     The study was performed under supervision of speech therapist.       Impression:      1. Abnormal swallow function study.  2. Fluoroscopy time: 1 minute 27 seconds.  3. Radiation dose (cumulative air kerma): 3.37mGy..  4. Number of images: 1              This report  was signed and finalized on 7/11/2025 11:00 AM by Dr. Jordan Turner MD.       XR Chest 1 View [[id number]] Collected: 07/09/25 1551     Updated: 07/09/25 1558    Narrative:      XR CHEST 1 VW-     HISTORY: generalized weakness; history of left breast cancer, esophageal  cancer     COMPARISON: 10/2/2008     FINDINGS: Frontal view of the chest obtained.     Left subclavian port in place with tip overlying the SVC. Similar  borderline cardiomegaly.  No lobar consolidation.  Basilar densities favoring atelectasis, right lower lobe pneumonia  difficult to exclude. No pleural effusion or pneumothorax. No acute  regional bony pathology       Impression:      1. Right greater left basilar densities favoring atelectasis. Right  lower lobe pneumonitis considered. Left subclavian port appropriate in  position        This report was signed and finalized on 7/9/2025 3:54 PM by Dr. Nikky Santamaria MD.             LAB RESULTS:      Lab 07/10/25  0332 07/09/25  1748 07/09/25  1419   WBC 12.99*  --  13.75*   HEMOGLOBIN 10.5*  --  9.8*   HEMATOCRIT 31.4*  --  29.5*   PLATELETS 333  --  392   NEUTROS ABS 9.01*  --  9.38*   IMMATURE GRANS (ABS) 0.04  --  0.07*   LYMPHS ABS 2.54  --  3.07   MONOS ABS 1.30*  --  1.17*   EOS ABS 0.07  --  0.03   MCV 85.6  --  88.6   LACTATE  --  1.4  --    PROTIME  --   --  17.1*         Lab 07/16/25  0854 07/15/25  1947 07/15/25  0407 07/14/25  0319 07/13/25  1721 07/13/25  0512 07/11/25  2045 07/11/25  0354   SODIUM 129* 132* 130* 132*  --  135*  --  139   POTASSIUM 3.8 3.8 4.1 4.5 4.7 3.6   < > 3.1*   CHLORIDE 96* 96* 96* 98  --  101  --  103   CO2 22.0 23.0 27.0 26.0  --  26.0  --  26.0   ANION GAP 11.0 13.0 7.0 8.0  --  8.0  --  10.0   BUN 20.7 21.8 25.5* 28.9*  --  26.6*  --  27.6*   CREATININE 0.95 0.97 1.01* 0.98  --  0.99  --  0.84   EGFR 59.6* 58.1* 55.3* 57.4*  --  56.7*  --  69.0   GLUCOSE 141* 79 99 104*  --  131*  --  211*   CALCIUM 7.7* 7.7* 7.9* 7.7*  --  7.7*  --  8.4*   MAGNESIUM  1.7  --  1.8 2.0  --  1.5*  --  1.6   PHOSPHORUS  --  4.6* 1.3*  --   --   --   --   --     < > = values in this interval not displayed.         Lab 07/10/25  0332 07/09/25  1419   TOTAL PROTEIN 7.6 8.2   ALBUMIN 2.5* 2.9*   GLOBULIN 5.1 5.3   ALT (SGPT) <5 <5   AST (SGOT) 14 10   BILIRUBIN 0.5 0.7   ALK PHOS 44 45         Lab 07/09/25  1419   PROTIME 17.1*   INR 1.32*                 Brief Urine Lab Results  (Last result in the past 365 days)        Color   Clarity   Blood   Leuk Est   Nitrite   Protein   CREAT   Urine HCG        07/09/25 2230 Yellow   Clear   Negative   Moderate (2+)   Positive   Trace                 Microbiology Results (last 10 days)       Procedure Component Value - Date/Time    Blood Culture - Blood, Arm, Left [[id number]]  (Normal) Collected: 07/09/25 1748    Lab Status: Final result Specimen: Blood from Arm, Left Updated: 07/14/25 1831     Blood Culture No growth at 5 days    Blood Culture - Blood, Arm, Right [[id number]]  (Normal) Collected: 07/09/25 1748    Lab Status: Final result Specimen: Blood from Arm, Right Updated: 07/14/25 1831     Blood Culture No growth at 5 days            Hospital Course:     84-year-old female with history of 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  Patient with history of breast cancer in 2008.  Status post lumpectomy and adjuvant chemotherapy; history of diabetes mellitus type 2, GERD, anemia, chronic kidney disease stage 3 a and COPD; the patient comes to the hospital reporting progressive dysphagia for solids, and choking sensation when attempting to drink fluids.  No rectal bleeding, no melena, no hematemesis.  No abdominal pain.      Patient has been evaluated by oncology in the outpatient setting. PET CT scan 6/27/2025 showed a mass in the paravertebral soft tissues  "inseparable from the cervical esophagus, soft tissue extension into the right paratracheal soft tissues, asymmetric metabolic activity in the high right posterior parietal centrum semiovale. MRI of the brain showed no suspicious enhancing tumor.     # Dysphagia  - S/p g-tube placement by general surgery on 7/10  Recent diagnosis of laryngeal cancer  - Had large BM 7/15, was previously constipated. Nausea improved   - continue tube feeds  - will follow up with Dr. Ernandez of radiation oncology      # Hypophosphatemia - corrected      # Hypokalemia - resolved     # Hypomagnesemia - improved  - received total of 5g IV magnesium sulfate  - today mag 1.7, continue home oral magnesium supplementation     # Type 2 DM   - continue home meds     # Hypertension   - continue home coreg and losartan  - previously held this admission due to soft BP     # CKD 3a   - At baseline.  GFR is 59 today  - Daily BMP    Patient has reached the maximum benefit of hospitalization and is stable for discharge.    Physical Exam on Discharge:  /69 (BP Location: Right arm, Patient Position: Lying)   Pulse 82   Temp 97.9 °F (36.6 °C) (Oral)   Resp 16   Ht 160 cm (62.99\")   Wt 59.9 kg (132 lb)   SpO2 92%   BMI 23.39 kg/m²   Physical Exam  GEN: Awake, alert, interactive, in NAD on room air  HEENT: Atraumatic, EOMI, Anicteric  Lungs: no wheezing/rales/rhonchi  Heart: RRR, +S1/s2, no rub  ABD: soft, nt/nd, +BS, G-tube   Extremities: atraumatic, no cyanosis, no edema  Skin: no rashes or lesions  Neuro: AAOx3, no focal deficits  Psych: normal mood & affect    Condition on Discharge: Stable    Discharge Disposition:  Skilled Nursing Facility (DC - External)    Discharge Medications:     Discharge Medications        New Medications        Instructions Start Date   Nutrisource fiber pack pack   1 packet, Per G Tube, 3 Times Daily      ondansetron ODT 4 MG disintegrating tablet  Commonly known as: ZOFRAN-ODT   4 mg, Oral, Every 6 Hours PRN    "          Changes to Medications        Instructions Start Date   aspirin 81 MG EC tablet  What changed: These instructions start on July 21, 2025. If you are unsure what to do until then, ask your doctor or other care provider.   81 mg, Oral, Daily   Start Date: July 21, 2025     carvedilol 3.125 MG tablet  Commonly known as: COREG  What changed:   medication strength  how much to take   3.125 mg, Oral, 2 Times Daily With Meals             Continue These Medications        Instructions Start Date   amLODIPine 2.5 MG tablet  Commonly known as: NORVASC   1 tablet, Oral, Daily      Calcium Carb-Cholecalciferol 600-200 MG-UNIT tablet   1 tablet, Oral, 2 Times Daily      famotidine 40 MG tablet  Commonly known as: PEPCID   40 mg, Oral, Daily      glipizide 5 MG tablet  Commonly known as: GLUCOTROL   5 mg, Oral, Daily      losartan 25 MG tablet  Commonly known as: COZAAR   25 mg, Oral, Daily      magnesium 30 MG tablet   30 mg, Oral, Daily      metFORMIN  MG 24 hr tablet  Commonly known as: GLUCOPHAGE-XR   500 mg, Oral, Nightly      pravastatin 40 MG tablet  Commonly known as: PRAVACHOL   40 mg, Oral, Daily      Vitamin D3 10 MCG (400 UNIT) capsule   1 capsule, Oral, Daily               Discharge Diet:   Diet Instructions       Diet: Tube Feeding; Bolus; Diabetisource AC bolus feeds of: 375 ml four times per day. Flush tube with 60 ml of water before and after each meal bolus. Additional water bolus of 150 ml for hydration daily.      Discharge Diet: Tube Feeding    Feeding Type: Bolus    Formula, Amount & Frequency: Diabetisource AC bolus feeds of: 375 ml four times per day. Flush tube with 60 ml of water before and after each meal bolus. Additional water bolus of 150 ml for hydration daily.            Activity at Discharge: as tolerated    Follow-up Appointments:   Future Appointments   Date Time Provider Department Center   7/16/2025 11:30 AM [facility] P[facility] SIMULATOR BH PAD RO PAD   7/25/2025  9:15 AM Slime Wayne,  ANNMARIE HOLLOWAY [facility] PAD PAD   8/5/2025 10:45 AM  [facility] LAB  PAD CCLAB PAD   8/5/2025 11:15 AM Justin Canas MD [facility] PAD PAD   8/27/2025 10:15 AM Verona Herrera APRN [facility] PAD PAD       Test Results Pending at Discharge: none     Electronically signed by Marco Mckeon MD, 07/16/25, 11:24 CDT.    Time: 37 minutes.

## 2025-07-16 NOTE — PLAN OF CARE
Goal Outcome Evaluation:              Outcome Evaluation: Patient d/c via private vehicle to [facility]. Report called to CHICHO Andrade. Reviewed AVS with patient and family. IV removed.

## 2025-07-16 NOTE — CASE MANAGEMENT/SOCIAL WORK
Continued Stay Note  [facility]     Patient Name: Lorena Valdes  MRN: [id number]  Today's Date: 7/16/2025    Admit Date: 7/9/2025    Plan: [facility]   Discharge Plan       Row Name 07/16/25 0835       Plan    Plan [facility]    Plan Comments [facility] has a bed available for patient today, Wednesday, July 16th if ready for dc.  Provider informed of bed availability via [facility] chat.  Pharmacy correct in [place] ([facility] [place], [place]).  Family is present at hospital and aware of dc to [facility].  Patient will transport by private vehicle via family member.    Final Discharge Disposition Code 03 - skilled nursing facility (SNF)                   Discharge Codes    No documentation.                 Expected Discharge Date and Time       Expected Discharge Date Expected Discharge Time    Jul 15, 2025               ZEINAB Rodríguez

## 2025-07-16 NOTE — PLAN OF CARE
Goal Outcome Evaluation:  Plan of Care Reviewed With: patient, family, other (see comments) (Nursing)        Progress: improving  Outcome Evaluation: Nutrition follow up. Pt has received full goal volume bolus of 250 ml this am and on 7/15,pt seems a little hesitant to have full goal bolus volume today. Encouraged to increase to goal bolus volume as tolerated to meet est. kcal,protein needs. Pt received 1070 ml tube feeds,343 ml avg of water flush over the past three days. Will continue to follow for plan of care.

## 2025-07-16 NOTE — PLAN OF CARE
Problem: Adult Inpatient Plan of Care  Goal: Plan of Care Review  Outcome: Progressing  Flowsheets (Taken 7/16/2025 0106)  Progress: improving  Outcome Evaluation: Pt is A&Ox4, VSS,  no c/o pain. Medications and bolus feeds given through PEG. Call light within reach. Family at bedside. Safety maintained.  Plan of Care Reviewed With: patient

## 2025-07-17 NOTE — CARE COORDINATION
SECURE email notification sent to identified IDT members at   [facility] [place]

## 2025-07-17 NOTE — THERAPY DISCHARGE NOTE
Acute Care - Physical Therapy Discharge Summary  [facility]       Patient Name: Lorena Valdes  : 1940  MRN: [id number]    Today's Date: 2025                 Admit Date: 2025      PT Recommendation and Plan    Visit Dx:    ICD-10-CM ICD-9-CM   1. Dysphagia, unspecified type  R13.10 787.20   2. Failure to thrive in adult  R62.7 783.7   3. Pharyngoesophageal dysphagia [R13.14]  R13.14 787.24   4. Impaired mobility [Z74.09]  Z74.09 799.89   5. Esophageal dysphagia  R13.19 787.29   6. Severe protein-calorie malnutrition  E43 262   7. Squamous cell esophageal cancer  C15.9 150.9                PT Rehab Goals       Row Name 25 1000             Bed Mobility Goal 1 (PT)    Activity/Assistive Device (Bed Mobility Goal 1, PT) bed mobility activities, all  -AB      [place] Level/Cues Needed (Bed Mobility Goal 1, PT) independent  -AB      Time Frame (Bed Mobility Goal 1, PT) by discharge;long term goal (LTG)  -AB      Progress/Outcomes (Bed Mobility Goal 1, PT) goal not met  -AB         Transfer Goal 1 (PT)    Activity/Assistive Device (Transfer Goal 1, PT) sit-to-stand/stand-to-sit;bed-to-chair/chair-to-bed;walker, rolling  -AB      [place] Level/Cues Needed (Transfer Goal 1, PT) modified independence  -AB      Time Frame (Transfer Goal 1, PT) long term goal (LTG);by discharge  -AB      Progress/Outcome (Transfer Goal 1, PT) goal not met  -AB         Gait Training Goal 1 (PT)    Activity/Assistive Device (Gait Training Goal 1, PT) gait (walking locomotion);assistive device use;decrease fall risk;improve balance and speed;increase endurance/gait distance  -AB      [place] Level (Gait Training Goal 1, PT) standby assist  -AB      Distance (Gait Training Goal 1, PT) 50ft  -AB      Time Frame (Gait Training Goal 1, PT) long term goal (LTG);by discharge  -AB      Progress/Outcome (Gait Training Goal 1, PT) goal not met  -AB                User Key  (r) = Recorded By, (t) = Taken By, (c) =  Cosigned By      Initials Name Provider Type Discipline    AB Grecia Manuel, PTA Physical Therapist Assistant PT                        PT Discharge Summary  Anticipated Discharge Disposition (PT): skilled nursing facility  Reason for Discharge: Discharge from facility  Outcomes Achieved: Refer to plan of care for updates on goals achieved  Discharge Destination: SNF      Grecia Manuel PTA   7/17/2025

## 2025-07-18 LAB
RAD ONC ARIA COURSE END DATE: NORMAL
RAD ONC ARIA COURSE ID: NORMAL
RAD ONC ARIA COURSE INTENT: NORMAL
RAD ONC ARIA COURSE LAST TREATMENT DATE: NORMAL
RAD ONC ARIA COURSE START DATE: NORMAL
RAD ONC ARIA COURSE TREATMENT ELAPSED DAYS: 46
RAD ONC ARIA FIRST TREATMENT DATE: NORMAL
RAD ONC ARIA PLAN FRACTIONS TREATED TO DATE: 28
RAD ONC ARIA PLAN FRACTIONS TREATED TO DATE: 5
RAD ONC ARIA PLAN ID: NORMAL
RAD ONC ARIA PLAN NAME: NORMAL
RAD ONC ARIA PLAN PRESCRIBED DOSE PER FRACTION: 0.53 GY
RAD ONC ARIA PLAN PRESCRIBED DOSE PER FRACTION: 1.8 GY
RAD ONC ARIA PLAN PRESCRIBED DOSE PER FRACTION: 1.8 GY
RAD ONC ARIA PLAN PRESCRIBED DOSE PER FRACTION: 2 GY
RAD ONC ARIA PLAN PRIMARY REFERENCE POINT: NORMAL
RAD ONC ARIA PLAN TOTAL FRACTIONS PRESCRIBED: 28
RAD ONC ARIA PLAN TOTAL FRACTIONS PRESCRIBED: 5
RAD ONC ARIA PLAN TOTAL PRESCRIBED DOSE: 1000 CGY
RAD ONC ARIA PLAN TOTAL PRESCRIBED DOSE: 1491.9 CGY
RAD ONC ARIA PLAN TOTAL PRESCRIBED DOSE: 5040 CGY
RAD ONC ARIA PLAN TOTAL PRESCRIBED DOSE: 5040 CGY
RAD ONC ARIA REFERENCE POINT DOSAGE GIVEN TO DATE: 10 GY
RAD ONC ARIA REFERENCE POINT DOSAGE GIVEN TO DATE: 11.11 GY
RAD ONC ARIA REFERENCE POINT DOSAGE GIVEN TO DATE: 50.4 GY
RAD ONC ARIA REFERENCE POINT DOSAGE GIVEN TO DATE: 53.05 GY
RAD ONC ARIA REFERENCE POINT ID: NORMAL

## 2025-07-18 PROCEDURE — 77338 DESIGN MLC DEVICE FOR IMRT: CPT | Performed by: RADIOLOGY

## 2025-07-18 PROCEDURE — 77301 RADIOTHERAPY DOSE PLAN IMRT: CPT | Performed by: RADIOLOGY

## 2025-07-18 PROCEDURE — 77300 RADIATION THERAPY DOSE PLAN: CPT | Performed by: RADIOLOGY

## 2025-07-21 ENCOUNTER — HOSPITAL ENCOUNTER (OUTPATIENT)
Dept: RADIATION ONCOLOGY | Facility: HOSPITAL | Age: 85
Setting detail: RADIATION/ONCOLOGY SERIES
End: 2025-07-21
Payer: MEDICARE

## 2025-07-25 ENCOUNTER — HOSPITAL ENCOUNTER (INPATIENT)
Facility: HOSPITAL | Age: 85
LOS: 3 days | Discharge: NURSING, REHAB OR LONG-TERM CARE | DRG: 177 | End: 2025-07-28
Attending: FAMILY MEDICINE | Admitting: FAMILY MEDICINE
Payer: MEDICARE

## 2025-07-25 ENCOUNTER — APPOINTMENT (OUTPATIENT)
Dept: GENERAL RADIOLOGY | Facility: HOSPITAL | Age: 85
DRG: 177 | End: 2025-07-25
Payer: MEDICARE

## 2025-07-25 DIAGNOSIS — R13.10 DYSPHAGIA, UNSPECIFIED TYPE: ICD-10-CM

## 2025-07-25 DIAGNOSIS — R53.1 WEAKNESS: Primary | ICD-10-CM

## 2025-07-25 DIAGNOSIS — E87.1 HYPONATREMIA: ICD-10-CM

## 2025-07-25 DIAGNOSIS — R05.1 ACUTE COUGH: ICD-10-CM

## 2025-07-25 DIAGNOSIS — J18.9 PNEUMONIA DUE TO INFECTIOUS ORGANISM, UNSPECIFIED LATERALITY, UNSPECIFIED PART OF LUNG: ICD-10-CM

## 2025-07-25 DIAGNOSIS — Z74.09 IMPAIRED MOBILITY: ICD-10-CM

## 2025-07-25 LAB
ALBUMIN SERPL-MCNC: 2.7 G/DL (ref 3.5–5.2)
ALBUMIN/GLOB SERPL: 0.6 G/DL
ALP SERPL-CCNC: 71 U/L (ref 39–117)
ALT SERPL W P-5'-P-CCNC: 13 U/L (ref 1–33)
ANION GAP SERPL CALCULATED.3IONS-SCNC: 10 MMOL/L (ref 5–15)
ANION GAP SERPL CALCULATED.3IONS-SCNC: 12 MMOL/L (ref 5–15)
AST SERPL-CCNC: 23 U/L (ref 1–32)
B PARAPERT DNA SPEC QL NAA+PROBE: NOT DETECTED
B PERT DNA SPEC QL NAA+PROBE: NOT DETECTED
BACTERIA UR QL AUTO: ABNORMAL /HPF
BASOPHILS # BLD AUTO: 0.06 10*3/MM3 (ref 0–0.2)
BASOPHILS NFR BLD AUTO: 0.3 % (ref 0–1.5)
BILIRUB SERPL-MCNC: 0.3 MG/DL (ref 0–1.2)
BILIRUB UR QL STRIP: NEGATIVE
BUN SERPL-MCNC: 34.7 MG/DL (ref 8–23)
BUN SERPL-MCNC: 39.5 MG/DL (ref 8–23)
BUN/CREAT SERPL: 35.9 (ref 7–25)
BUN/CREAT SERPL: 39.9 (ref 7–25)
C PNEUM DNA NPH QL NAA+NON-PROBE: NOT DETECTED
CALCIUM SPEC-SCNC: 8.4 MG/DL (ref 8.6–10.5)
CALCIUM SPEC-SCNC: 8.8 MG/DL (ref 8.6–10.5)
CHLORIDE SERPL-SCNC: 90 MMOL/L (ref 98–107)
CHLORIDE SERPL-SCNC: 94 MMOL/L (ref 98–107)
CLARITY UR: ABNORMAL
CO2 SERPL-SCNC: 25 MMOL/L (ref 22–29)
CO2 SERPL-SCNC: 27 MMOL/L (ref 22–29)
COLOR UR: YELLOW
CREAT SERPL-MCNC: 0.87 MG/DL (ref 0.57–1)
CREAT SERPL-MCNC: 1.1 MG/DL (ref 0.57–1)
D-LACTATE SERPL-SCNC: 1.5 MMOL/L (ref 0.5–2)
DEPRECATED RDW RBC AUTO: 45.5 FL (ref 37–54)
EGFRCR SERPLBLD CKD-EPI 2021: 50 ML/MIN/1.73
EGFRCR SERPLBLD CKD-EPI 2021: 66.2 ML/MIN/1.73
EOSINOPHIL # BLD AUTO: 0.07 10*3/MM3 (ref 0–0.4)
EOSINOPHIL NFR BLD AUTO: 0.3 % (ref 0.3–6.2)
ERYTHROCYTE [DISTWIDTH] IN BLOOD BY AUTOMATED COUNT: 13.8 % (ref 12.3–15.4)
FLUAV SUBTYP SPEC NAA+PROBE: NOT DETECTED
FLUBV RNA NPH QL NAA+NON-PROBE: NOT DETECTED
GEN 5 1HR TROPONIN T REFLEX: 25 NG/L
GLOBULIN UR ELPH-MCNC: 4.9 GM/DL
GLUCOSE BLDC GLUCOMTR-MCNC: 113 MG/DL (ref 70–130)
GLUCOSE BLDC GLUCOMTR-MCNC: 133 MG/DL (ref 70–130)
GLUCOSE SERPL-MCNC: 131 MG/DL (ref 65–99)
GLUCOSE SERPL-MCNC: 165 MG/DL (ref 65–99)
GLUCOSE UR STRIP-MCNC: NEGATIVE MG/DL
HADV DNA SPEC NAA+PROBE: NOT DETECTED
HCOV 229E RNA SPEC QL NAA+PROBE: NOT DETECTED
HCOV HKU1 RNA SPEC QL NAA+PROBE: NOT DETECTED
HCOV NL63 RNA SPEC QL NAA+PROBE: NOT DETECTED
HCOV OC43 RNA SPEC QL NAA+PROBE: NOT DETECTED
HCT VFR BLD AUTO: 28.3 % (ref 34–46.6)
HGB BLD-MCNC: 9.1 G/DL (ref 12–15.9)
HGB UR QL STRIP.AUTO: ABNORMAL
HMPV RNA NPH QL NAA+NON-PROBE: NOT DETECTED
HPIV1 RNA ISLT QL NAA+PROBE: NOT DETECTED
HPIV2 RNA SPEC QL NAA+PROBE: NOT DETECTED
HPIV3 RNA NPH QL NAA+PROBE: NOT DETECTED
HPIV4 P GENE NPH QL NAA+PROBE: NOT DETECTED
HYALINE CASTS UR QL AUTO: ABNORMAL /LPF
IMM GRANULOCYTES # BLD AUTO: 0.26 10*3/MM3 (ref 0–0.05)
IMM GRANULOCYTES NFR BLD AUTO: 1.1 % (ref 0–0.5)
KETONES UR QL STRIP: NEGATIVE
L PNEUMO1 AG UR QL IA: NEGATIVE
LEUKOCYTE ESTERASE UR QL STRIP.AUTO: ABNORMAL
LYMPHOCYTES # BLD AUTO: 3.64 10*3/MM3 (ref 0.7–3.1)
LYMPHOCYTES NFR BLD AUTO: 15.5 % (ref 19.6–45.3)
M PNEUMO IGG SER IA-ACNC: NOT DETECTED
MAGNESIUM SERPL-MCNC: 2 MG/DL (ref 1.6–2.4)
MCH RBC QN AUTO: 28.8 PG (ref 26.6–33)
MCHC RBC AUTO-ENTMCNC: 32.2 G/DL (ref 31.5–35.7)
MCV RBC AUTO: 89.6 FL (ref 79–97)
MONOCYTES # BLD AUTO: 2.05 10*3/MM3 (ref 0.1–0.9)
MONOCYTES NFR BLD AUTO: 8.7 % (ref 5–12)
MRSA DNA SPEC QL NAA+PROBE: NORMAL
NEUTROPHILS NFR BLD AUTO: 17.35 10*3/MM3 (ref 1.7–7)
NEUTROPHILS NFR BLD AUTO: 74.1 % (ref 42.7–76)
NITRITE UR QL STRIP: NEGATIVE
NRBC BLD AUTO-RTO: 0 /100 WBC (ref 0–0.2)
PH UR STRIP.AUTO: 7.5 [PH] (ref 5–8)
PLATELET # BLD AUTO: 438 10*3/MM3 (ref 140–450)
PMV BLD AUTO: 9.6 FL (ref 6–12)
POTASSIUM SERPL-SCNC: 3.7 MMOL/L (ref 3.5–5.2)
POTASSIUM SERPL-SCNC: 4.2 MMOL/L (ref 3.5–5.2)
PROCALCITONIN SERPL-MCNC: 0.38 NG/ML (ref 0–0.25)
PROT SERPL-MCNC: 7.6 G/DL (ref 6–8.5)
PROT UR QL STRIP: ABNORMAL
RBC # BLD AUTO: 3.16 10*6/MM3 (ref 3.77–5.28)
RBC # UR STRIP: ABNORMAL /HPF
REF LAB TEST METHOD: ABNORMAL
RHINOVIRUS RNA SPEC NAA+PROBE: NOT DETECTED
RSV RNA NPH QL NAA+NON-PROBE: NOT DETECTED
S PNEUM AG SPEC QL LA: NEGATIVE
SARS-COV-2 RNA RESP QL NAA+PROBE: NOT DETECTED
SODIUM SERPL-SCNC: 129 MMOL/L (ref 136–145)
SODIUM SERPL-SCNC: 129 MMOL/L (ref 136–145)
SP GR UR STRIP: 1.02 (ref 1–1.03)
SQUAMOUS #/AREA URNS HPF: ABNORMAL /HPF
TROPONIN T % DELTA: -7
TROPONIN T NUMERIC DELTA: -2 NG/L
TROPONIN T SERPL HS-MCNC: 27 NG/L
UROBILINOGEN UR QL STRIP: ABNORMAL
WBC # UR STRIP: ABNORMAL /HPF
WBC NRBC COR # BLD AUTO: 23.43 10*3/MM3 (ref 3.4–10.8)

## 2025-07-25 PROCEDURE — 85025 COMPLETE CBC W/AUTO DIFF WBC: CPT | Performed by: FAMILY MEDICINE

## 2025-07-25 PROCEDURE — 81001 URINALYSIS AUTO W/SCOPE: CPT | Performed by: FAMILY MEDICINE

## 2025-07-25 PROCEDURE — 82948 REAGENT STRIP/BLOOD GLUCOSE: CPT | Performed by: NURSE PRACTITIONER

## 2025-07-25 PROCEDURE — 84145 PROCALCITONIN (PCT): CPT | Performed by: FAMILY MEDICINE

## 2025-07-25 PROCEDURE — 87186 SC STD MICRODIL/AGAR DIL: CPT | Performed by: FAMILY MEDICINE

## 2025-07-25 PROCEDURE — 25010000002 PIPERACILLIN SOD-TAZOBACTAM PER 1 G: Performed by: NURSE PRACTITIONER

## 2025-07-25 PROCEDURE — 82948 REAGENT STRIP/BLOOD GLUCOSE: CPT

## 2025-07-25 PROCEDURE — 25810000003 SODIUM CHLORIDE 0.9 % SOLUTION: Performed by: NURSE PRACTITIONER

## 2025-07-25 PROCEDURE — 87086 URINE CULTURE/COLONY COUNT: CPT | Performed by: FAMILY MEDICINE

## 2025-07-25 PROCEDURE — 94799 UNLISTED PULMONARY SVC/PX: CPT

## 2025-07-25 PROCEDURE — 93010 ELECTROCARDIOGRAM REPORT: CPT | Performed by: EMERGENCY MEDICINE

## 2025-07-25 PROCEDURE — 0202U NFCT DS 22 TRGT SARS-COV-2: CPT | Performed by: FAMILY MEDICINE

## 2025-07-25 PROCEDURE — 87641 MR-STAPH DNA AMP PROBE: CPT | Performed by: NURSE PRACTITIONER

## 2025-07-25 PROCEDURE — 87205 SMEAR GRAM STAIN: CPT | Performed by: NURSE PRACTITIONER

## 2025-07-25 PROCEDURE — 94640 AIRWAY INHALATION TREATMENT: CPT

## 2025-07-25 PROCEDURE — 36415 COLL VENOUS BLD VENIPUNCTURE: CPT | Performed by: FAMILY MEDICINE

## 2025-07-25 PROCEDURE — 87449 NOS EACH ORGANISM AG IA: CPT | Performed by: NURSE PRACTITIONER

## 2025-07-25 PROCEDURE — P9612 CATHETERIZE FOR URINE SPEC: HCPCS

## 2025-07-25 PROCEDURE — 83605 ASSAY OF LACTIC ACID: CPT | Performed by: FAMILY MEDICINE

## 2025-07-25 PROCEDURE — 80053 COMPREHEN METABOLIC PANEL: CPT | Performed by: FAMILY MEDICINE

## 2025-07-25 PROCEDURE — 87077 CULTURE AEROBIC IDENTIFY: CPT | Performed by: FAMILY MEDICINE

## 2025-07-25 PROCEDURE — 83735 ASSAY OF MAGNESIUM: CPT | Performed by: FAMILY MEDICINE

## 2025-07-25 PROCEDURE — 99285 EMERGENCY DEPT VISIT HI MDM: CPT | Performed by: FAMILY MEDICINE

## 2025-07-25 PROCEDURE — 93005 ELECTROCARDIOGRAM TRACING: CPT | Performed by: FAMILY MEDICINE

## 2025-07-25 PROCEDURE — 71045 X-RAY EXAM CHEST 1 VIEW: CPT

## 2025-07-25 PROCEDURE — 87040 BLOOD CULTURE FOR BACTERIA: CPT | Performed by: FAMILY MEDICINE

## 2025-07-25 PROCEDURE — 84484 ASSAY OF TROPONIN QUANT: CPT | Performed by: FAMILY MEDICINE

## 2025-07-25 RX ORDER — BISACODYL 5 MG/1
5 TABLET, DELAYED RELEASE ORAL DAILY PRN
Status: DISCONTINUED | OUTPATIENT
Start: 2025-07-25 | End: 2025-07-25

## 2025-07-25 RX ORDER — CARVEDILOL 3.12 MG/1
3.12 TABLET ORAL 2 TIMES DAILY WITH MEALS
Status: DISCONTINUED | OUTPATIENT
Start: 2025-07-25 | End: 2025-07-28 | Stop reason: HOSPADM

## 2025-07-25 RX ORDER — AMOXICILLIN 250 MG
2 CAPSULE ORAL 2 TIMES DAILY PRN
Status: DISCONTINUED | OUTPATIENT
Start: 2025-07-25 | End: 2025-07-25

## 2025-07-25 RX ORDER — CARVEDILOL 3.12 MG/1
3.12 TABLET ORAL 2 TIMES DAILY WITH MEALS
COMMUNITY

## 2025-07-25 RX ORDER — PANTOPRAZOLE SODIUM 40 MG/1
40 FOR SUSPENSION ORAL
COMMUNITY

## 2025-07-25 RX ORDER — ONDANSETRON 4 MG/1
4 TABLET, ORALLY DISINTEGRATING ORAL EVERY 6 HOURS PRN
Status: DISCONTINUED | OUTPATIENT
Start: 2025-07-25 | End: 2025-07-28 | Stop reason: HOSPADM

## 2025-07-25 RX ORDER — LOSARTAN POTASSIUM 25 MG/1
25 TABLET ORAL DAILY
Status: DISCONTINUED | OUTPATIENT
Start: 2025-07-26 | End: 2025-07-28 | Stop reason: HOSPADM

## 2025-07-25 RX ORDER — DEXTROSE MONOHYDRATE 25 G/50ML
25 INJECTION, SOLUTION INTRAVENOUS
Status: DISCONTINUED | OUTPATIENT
Start: 2025-07-25 | End: 2025-07-28 | Stop reason: HOSPADM

## 2025-07-25 RX ORDER — CALCIUM CARBONATE/VITAMIN D3 600 MG-10
1 TABLET ORAL 2 TIMES DAILY WITH MEALS
Status: DISCONTINUED | OUTPATIENT
Start: 2025-07-25 | End: 2025-07-28 | Stop reason: HOSPADM

## 2025-07-25 RX ORDER — GLIPIZIDE 5 MG/1
5 TABLET ORAL DAILY
Status: DISCONTINUED | OUTPATIENT
Start: 2025-07-26 | End: 2025-07-28 | Stop reason: HOSPADM

## 2025-07-25 RX ORDER — SODIUM CHLORIDE 9 MG/ML
50 INJECTION, SOLUTION INTRAVENOUS CONTINUOUS
Status: DISCONTINUED | OUTPATIENT
Start: 2025-07-25 | End: 2025-07-26

## 2025-07-25 RX ORDER — PRAVASTATIN SODIUM 20 MG
40 TABLET ORAL DAILY
Status: DISCONTINUED | OUTPATIENT
Start: 2025-07-25 | End: 2025-07-28 | Stop reason: HOSPADM

## 2025-07-25 RX ORDER — ASPIRIN 81 MG/1
81 TABLET, CHEWABLE ORAL DAILY
Status: DISCONTINUED | OUTPATIENT
Start: 2025-07-25 | End: 2025-07-28 | Stop reason: HOSPADM

## 2025-07-25 RX ORDER — SODIUM CHLORIDE 0.9 % (FLUSH) 0.9 %
10 SYRINGE (ML) INJECTION AS NEEDED
Status: DISCONTINUED | OUTPATIENT
Start: 2025-07-25 | End: 2025-07-28 | Stop reason: HOSPADM

## 2025-07-25 RX ORDER — POLYETHYLENE GLYCOL 3350 17 G/17G
17 POWDER, FOR SOLUTION ORAL DAILY PRN
Status: DISCONTINUED | OUTPATIENT
Start: 2025-07-25 | End: 2025-07-28 | Stop reason: HOSPADM

## 2025-07-25 RX ORDER — BISACODYL 10 MG
10 SUPPOSITORY, RECTAL RECTAL DAILY PRN
Status: DISCONTINUED | OUTPATIENT
Start: 2025-07-25 | End: 2025-07-28 | Stop reason: HOSPADM

## 2025-07-25 RX ORDER — FAMOTIDINE 20 MG/1
40 TABLET, FILM COATED ORAL DAILY
Status: DISCONTINUED | OUTPATIENT
Start: 2025-07-26 | End: 2025-07-28 | Stop reason: HOSPADM

## 2025-07-25 RX ORDER — ONDANSETRON 2 MG/ML
4 INJECTION INTRAMUSCULAR; INTRAVENOUS EVERY 6 HOURS PRN
Status: DISCONTINUED | OUTPATIENT
Start: 2025-07-25 | End: 2025-07-28 | Stop reason: HOSPADM

## 2025-07-25 RX ORDER — GUAIFENESIN AND DEXTROMETHORPHAN HYDROBROMIDE 10; 100 MG/5ML; MG/5ML
10 SYRUP ORAL EVERY 6 HOURS PRN
COMMUNITY

## 2025-07-25 RX ORDER — INSULIN LISPRO 100 [IU]/ML
2-7 INJECTION, SOLUTION INTRAVENOUS; SUBCUTANEOUS
Status: DISCONTINUED | OUTPATIENT
Start: 2025-07-25 | End: 2025-07-28 | Stop reason: HOSPADM

## 2025-07-25 RX ORDER — ASPIRIN 81 MG/1
81 TABLET, CHEWABLE ORAL DAILY
COMMUNITY

## 2025-07-25 RX ORDER — ACETAMINOPHEN 160 MG/5ML
650 SOLUTION ORAL EVERY 4 HOURS PRN
Status: DISCONTINUED | OUTPATIENT
Start: 2025-07-25 | End: 2025-07-26

## 2025-07-25 RX ORDER — IPRATROPIUM BROMIDE AND ALBUTEROL SULFATE 2.5; .5 MG/3ML; MG/3ML
3 SOLUTION RESPIRATORY (INHALATION)
Status: DISCONTINUED | OUTPATIENT
Start: 2025-07-25 | End: 2025-07-27

## 2025-07-25 RX ORDER — IBUPROFEN 600 MG/1
1 TABLET ORAL
Status: DISCONTINUED | OUTPATIENT
Start: 2025-07-25 | End: 2025-07-28 | Stop reason: HOSPADM

## 2025-07-25 RX ORDER — NICOTINE POLACRILEX 4 MG
15 LOZENGE BUCCAL
Status: DISCONTINUED | OUTPATIENT
Start: 2025-07-25 | End: 2025-07-28 | Stop reason: HOSPADM

## 2025-07-25 RX ADMIN — PRAVASTATIN SODIUM 40 MG: 20 TABLET ORAL at 17:35

## 2025-07-25 RX ADMIN — PIPERACILLIN AND TAZOBACTAM 3.38 G: 3; .375 INJECTION, POWDER, FOR SOLUTION INTRAVENOUS at 20:24

## 2025-07-25 RX ADMIN — CALCIUM CARBONATE 600 MG (1,500 MG)-VITAMIN D3 400 UNIT TABLET 1 TABLET: at 17:35

## 2025-07-25 RX ADMIN — IPRATROPIUM BROMIDE AND ALBUTEROL SULFATE 3 ML: .5; 3 SOLUTION RESPIRATORY (INHALATION) at 18:47

## 2025-07-25 RX ADMIN — SODIUM CHLORIDE 50 ML/HR: 9 INJECTION, SOLUTION INTRAVENOUS at 15:51

## 2025-07-25 RX ADMIN — IPRATROPIUM BROMIDE AND ALBUTEROL SULFATE 3 ML: .5; 3 SOLUTION RESPIRATORY (INHALATION) at 22:54

## 2025-07-25 NOTE — ED PROVIDER NOTES
Subjective   History of Present Illness  Daughter present bedside as a primary historian.  Patient has had increased weakness today.  She was at the surgeons office getting a evaluation on her feeding tube.  And family states that the surgical site looks good no issues.  She is primary source of nutrition is a feeding tube.  She replaced couple weeks ago.  They found that her blood pressure was low in the office and they sent her down for evaluation.  Patient also has had a cough for the last couple days.  Patient has no other symptoms at this time.  Patient has a history of throat cancer.  She is to start radiation soon.        Review of Systems   All other systems reviewed and are negative.      Past Medical History:   Diagnosis Date    Bladder disorder     Chronic kidney disease, stage 3b     Diverticulosis     Emphysema lung     Fibrocystic disease of breast     CONNIE (generalized anxiety disorder)     GERD (gastroesophageal reflux disease)     Heart disease     History of bone density study 2011    History of colon polyps     Hyperglycemia     Hyperlipidemia     Hypotension     Left ventricular diastolic dysfunction     Malignant neoplasm of central portion of left female breast 11/09/2016    Osteopenia 08/29/2017    Restrictive lung disease     Type 2 diabetes mellitus     Uterine prolapse        Allergies   Allergen Reactions    Adhesive Tape Hives     Latex Tape       Past Surgical History:   Procedure Laterality Date    BREAST LUMPECTOMY  2008    CATARACT EXTRACTION Right 2021    COLONOSCOPY  09/16/2010    Diverticulosis; Repeat 10 years    COLONOSCOPY N/A 11/11/2020    One 6mm inflamed hyperplastic polyp in the cecum; Diverticulosis in the left colon; Non-bleeding internal hemorrhoids; Repeat 5 years    ENDOSCOPY N/A 06/12/2025    Exophytic mass found in the larynx, not obstructing the airway; Partially obstructing, rule out malignancy, esophageal tumor was found in the proximal esophagus-biopsied;  Medium-sized HH; Normal stomach; Two non-bleeding angiodysplastic lesions in the duodenum-biopsied    ENDOSCOPY W/ PEG TUBE PLACEMENT N/A 7/10/2025    Procedure: ESOPHAGOGASTRODUODENOSCOPY WITH PERCUTANEOUS ENDOSCOPIC GASTROSTOMY TUBE INSERTION WITH ANESTHESIA;  Surgeon: Christine Valdovinos MD;  Location: [facility] ENDOSCOPY;  Service: Gastroenterology;  Laterality: N/A;  pre op:peg placement  post op: esophageal mass  PCP:Luly Diez MD    GTUBE INSERTION N/A 7/10/2025    Procedure: LAPAROSCOPIC GASTROSTOMY FEEDING TUBE WITH DAVINCI ROBOT;  Surgeon: Willie Weber MD;  Location: [facility] OR;  Service: Robotics - DaVinci;  Laterality: N/A;    MAMMO BILATERAL  2013    Dr. Sabillon    MASTECTOMY Left 2008    PAP SMEAR      SKIN CANCER EXCISION         Family History   Problem Relation Age of Onset    Cancer Mother     Heart disease Father     No Known Problems Daughter     No Known Problems Son     Hypertension Daughter     Hypertension Daughter     Other Brother         polioi    Cancer Paternal Aunt     No Known Problems Maternal Grandmother     No Known Problems Maternal Grandfather     No Known Problems Paternal Grandmother     No Known Problems Paternal Grandfather     Drug abuse Brother     Colon cancer Neg Hx     Colon polyps Neg Hx     Esophageal cancer Neg Hx     Liver cancer Neg Hx     Liver disease Neg Hx     Rectal cancer Neg Hx     Stomach cancer Neg Hx        Social History     Socioeconomic History    Marital status: Single   Tobacco Use    Smoking status: Former     Current packs/day: 0.00     Types: Cigarettes     Quit date:      Years since quittin.5    Smokeless tobacco: Never   Vaping Use    Vaping status: Never Used   Substance and Sexual Activity    Alcohol use: Yes     Alcohol/week: 1.0 standard drink of alcohol     Types: 1 Cans of beer per week     Comment: Occasionally     Drug use: No    Sexual activity: Defer           Objective   Physical Exam  Vitals and nursing note  reviewed.   Constitutional:       Appearance: Normal appearance.   HENT:      Head: Normocephalic and atraumatic.      Mouth/Throat:      Mouth: Mucous membranes are moist.   Eyes:      Extraocular Movements: Extraocular movements intact.      Pupils: Pupils are equal, round, and reactive to light.   Cardiovascular:      Rate and Rhythm: Normal rate and regular rhythm.      Heart sounds: Normal heart sounds.   Pulmonary:      Effort: Pulmonary effort is normal.      Breath sounds: Normal breath sounds.   Abdominal:      General: Bowel sounds are normal.      Palpations: Abdomen is soft.      Tenderness: There is no abdominal tenderness.   Skin:     General: Skin is warm and dry.   Neurological:      General: No focal deficit present.      Mental Status: She is alert and oriented to person, place, and time.         Procedures           ED Course  ED Course as of 07/25/25 1327   Fri Jul 25, 2025   1044 EKG rate 89  Normal sinus rhythm  No STEMI [RP]      ED Course User Index  [RP] Fabrizio Coronel MD                                                     Lab Results (last 24 hours)       Procedure Component Value Units Date/Time    CBC & Differential [[id number]]  (Abnormal) Collected: 07/25/25 1112    Specimen: Blood Updated: 07/25/25 1121    Narrative:      The following orders were created for panel order CBC & Differential.  Procedure                               Abnormality         Status                     ---------                               -----------         ------                     CBC Auto Differential[[id number]]        Abnormal            Final result                 Please view results for these tests on the individual orders.    Comprehensive Metabolic Panel [[id number]]  (Abnormal) Collected: 07/25/25 1112    Specimen: Blood Updated: 07/25/25 1145     Glucose 165 mg/dL      BUN 39.5 mg/dL      Creatinine 1.10 mg/dL      Sodium 129 mmol/L      Potassium 4.2 mmol/L      Chloride 90 mmol/L      CO2 27.0  mmol/L      Calcium 8.8 mg/dL      Total Protein 7.6 g/dL      Albumin 2.7 g/dL      ALT (SGPT) 13 U/L      AST (SGOT) 23 U/L      Alkaline Phosphatase 71 U/L      Total Bilirubin 0.3 mg/dL      Globulin 4.9 gm/dL      A/G Ratio 0.6 g/dL      BUN/Creatinine Ratio 35.9     Anion Gap 12.0 mmol/L      eGFR 50.0 mL/min/1.73     Narrative:      GFR Categories in Chronic Kidney Disease (CKD)              GFR Category          GFR (mL/min/1.73)    Interpretation  G1                    90 or greater        Normal or high (1)  G2                    60-89                Mild decrease (1)  G3a                   45-59                Mild to moderate decrease  G3b                   30-44                Moderate to severe decrease  G4                    15-29                Severe decrease  G5                    14 or less           Kidney failure    (1)In the absence of evidence of kidney disease, neither GFR category G1 or G2 fulfill the criteria for CKD.    eGFR calculation 2021 CKD-EPI creatinine equation, which does not include race as a factor    High Sensitivity Troponin T [[id number]]  (Abnormal) Collected: 07/25/25 1112    Specimen: Blood Updated: 07/25/25 1141     HS Troponin T 27 ng/L     Narrative:      High Sensitive Troponin T Reference Range:  <14.0 ng/L- Negative Female for AMI  <22.0 ng/L- Negative Male for AMI  >=14 - Abnormal Female indicating possible myocardial injury.  >=22 - Abnormal Male indicating possible myocardial injury.   Clinicians would have to utilize clinical acumen, EKG, Troponin, and serial changes to determine if it is an Acute Myocardial Infarction or myocardial injury due to an underlying chronic condition.         Magnesium [[id number]]  (Normal) Collected: 07/25/25 1112    Specimen: Blood Updated: 07/25/25 1145     Magnesium 2.0 mg/dL     Lactic Acid, Plasma [[id number]]  (Normal) Collected: 07/25/25 1112    Specimen: Blood Updated: 07/25/25 1144     Lactate 1.5 mmol/L     CBC Auto  Differential [[id number]]  (Abnormal) Collected: 07/25/25 1112    Specimen: Blood Updated: 07/25/25 1121     WBC 23.43 10*3/mm3      RBC 3.16 10*6/mm3      Hemoglobin 9.1 g/dL      Hematocrit 28.3 %      MCV 89.6 fL      MCH 28.8 pg      MCHC 32.2 g/dL      RDW 13.8 %      RDW-SD 45.5 fl      MPV 9.6 fL      Platelets 438 10*3/mm3      Neutrophil % 74.1 %      Lymphocyte % 15.5 %      Monocyte % 8.7 %      Eosinophil % 0.3 %      Basophil % 0.3 %      Immature Grans % 1.1 %      Neutrophils, Absolute 17.35 10*3/mm3      Lymphocytes, Absolute 3.64 10*3/mm3      Monocytes, Absolute 2.05 10*3/mm3      Eosinophils, Absolute 0.07 10*3/mm3      Basophils, Absolute 0.06 10*3/mm3      Immature Grans, Absolute 0.26 10*3/mm3      nRBC 0.0 /100 WBC     High Sensitivity Troponin T 1Hr [[id number]]  (Abnormal) Collected: 07/25/25 1223    Specimen: Blood Updated: 07/25/25 1258     HS Troponin T 25 ng/L      Troponin T Numeric Delta -2 ng/L      Troponin T % Delta -7    Narrative:      High Sensitive Troponin T Reference Range:  <14.0 ng/L- Negative Female for AMI  <22.0 ng/L- Negative Male for AMI  >=14 - Abnormal Female indicating possible myocardial injury.  >=22 - Abnormal Male indicating possible myocardial injury.   Clinicians would have to utilize clinical acumen, EKG, Troponin, and serial changes to determine if it is an Acute Myocardial Infarction or myocardial injury due to an underlying chronic condition.         Procalcitonin [[id number]] Collected: 07/25/25 1223    Specimen: Blood Updated: 07/25/25 1321    Urinalysis With Culture If Indicated - Urine, Catheter [[id number]]  (Abnormal) Collected: 07/25/25 1242    Specimen: Urine, Catheter Updated: 07/25/25 1323     Color, UA Yellow     Appearance, UA Cloudy     pH, UA 7.5     Specific Gravity, UA 1.019     Glucose, UA Negative     Ketones, UA Negative     Bilirubin, UA Negative     Blood, UA Trace     Protein, UA 30 mg/dL (1+)     Leuk Esterase, UA Large (3+)     Nitrite,  UA Negative     Urobilinogen, UA 1.0 E.U./dL    Narrative:      In absence of clinical symptoms, the presence of pyuria, bacteria, and/or nitrites on the urinalysis result does not correlate with infection.    Urinalysis, Microscopic Only - Urine, Catheter [[id number]] Collected: 07/25/25 1242    Specimen: Urine, Catheter Updated: 07/25/25 1318          XR Chest 1 View   Final Result   1. A suspected acute/evolving inflammatory/infectious process in the   left lung. Further follow-up suggested.           This report was signed and finalized on 7/25/2025 11:05 AM by Dr. Jordan Turner MD.            Medications   sodium chloride 0.9 % flush 10 mL (has no administration in time range)   sodium chloride 0.9 % bolus 1,000 mL ( Intravenous Restarted 7/25/25 1225)   cefepime 2000 mg IVPB in 100 mL NS (MBP) (has no administration in time range)       Medical Decision Making  84-year-old female comes in the emergency room with weakness.  She was found to be hypotensive at the surgical office and was sent here for evaluation.  She was found to have a pneumonia.  She was given cefepime.  Case was discussed with ALINA Sood with the hospitalist group.  She has accepted the patient under their group service.    Problems Addressed:  Pneumonia due to infectious organism, unspecified laterality, unspecified part of lung: complicated acute illness or injury  Weakness: complicated acute illness or injury    Amount and/or Complexity of Data Reviewed  Labs: ordered. Decision-making details documented in ED Course.  Radiology: ordered. Decision-making details documented in ED Course.  ECG/medicine tests: ordered. Decision-making details documented in ED Course.    Risk  Prescription drug management.  Decision regarding hospitalization.        Final diagnoses:   Weakness   Pneumonia due to infectious organism, unspecified laterality, unspecified part of lung   Hyponatremia       ED Disposition  ED Disposition       ED  Disposition   Decision to Admit    Condition   --    Comment   Level of Care: Remote Telemetry [26]   Diagnosis: Pneumonia [[id number]]   Admitting Physician: TANIYA WHITE [[id number]]   Attending Physician: TANIYA WHITE [[id number]]   Certification: I Certify That Inpatient Hospital Services Are Medically Necessary For Greater Than 2 Midnights                 No follow-up provider specified.       Medication List      No changes were made to your prescriptions during this visit.            Fabrizio Coronel MD  07/25/25 [id number]

## 2025-07-25 NOTE — PLAN OF CARE
Goal Outcome Evaluation:              Outcome Evaluation: Pt admitted this shift. Maintaining sats on room air. Tele NSR. Patient with frequent congested and productive cough. Turning q2. Tube feed initiated as ordered. SCD's bilaterally. Aox4, VSS. family at bedside. Call light within reach.

## 2025-07-25 NOTE — H&P
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 Patient admitted 7/9 - 7/16, 2025 dysphagia.  She will underwent EGD on 7/10.  7/11 laparoscopic gastrostomy feeding tube placed per Dr. Willie Weber.  She was also seen by Dr. Samano on oncology and Dr. Gabriel Ernandez radiation oncology.      Patient was seen today at surgeon's office routine follow-up after feeding tube placement.  Due to weakness \" low blood pressure\" she was sent to [facility] ED for evaluation.  She has had a soft blood pressure since admission but no overt hypotension.  Patient is awake and alert.  She is able to answer all questions.  She resides at [facility], awaiting fax medication list to review home medications.  She has no pain.  She is supposed to start radiation next Tuesday.  She does have a wet cough.  Patient states she is a DNR/DNI.     ED workup revealed sodium 129, chloride 90, creatinine 1.10, GFR 50, glucose 165, albumin 2.7, remainder of CMP and magnesium within normal limits, WBC 23.46-no left shift or bandemia, lactic acid 1.5, hemoglobin 9.1, urinalysis reveals WBCs 21-50 with 4+ bacteria, urine culture pending, cultures have been ordered.    Other health history breast cancer 2008 status post lumpectomy with adjuvant chemotherapy, diabetes type 2, GERD, anemia, chronic kidney disease stage IIIa, COPD    6/6/2025 intracardiac " echocardiogram  Narrative      Left Ventricle: Low normal left ventricular systolic function with a  visually estimated EF of 50 - 55%. EF by visual approximation is 53%. Left ventricle size is normal. Normal wall thickness. Normal wall motion. Grade I diastolic dysfunction with normal LAP.    Right Ventricle: Right ventricle is mildly dilated. Normal systolic function. TAPSE is normal.    Aortic Valve: Trileaflet valve. Mild sclerosis of the aortic valve cusps. No regurgitation. No stenosis.    Mitral Valve: There is annular calcification noted. Thickened leaflets. Mild regurgitation.    Tricuspid Valve: Mild regurgitation. Mildly elevated RVSP, consistent with mild pulmonary hypertension.    Pericardium: Trivial pericardial effusion present.    Image quality is adequate. Contrast used: Lumason.      Review of Systems   Otherwise complete ROS reviewed and negative except as mentioned in the HPI.    Past Medical History:   Past Medical History:   Diagnosis Date    Bladder disorder     Chronic kidney disease, stage 3b     Diverticulosis     Emphysema lung     Fibrocystic disease of breast     CONNIE (generalized anxiety disorder)     GERD (gastroesophageal reflux disease)     Heart disease     History of bone density study 2011    History of colon polyps     Hyperglycemia     Hyperlipidemia     Hypotension     Left ventricular diastolic dysfunction     Malignant neoplasm of central portion of left female breast 11/09/2016    Osteopenia 08/29/2017    Restrictive lung disease     Type 2 diabetes mellitus     Uterine prolapse      Past Surgical History:  Past Surgical History:   Procedure Laterality Date    BREAST LUMPECTOMY  2008    CATARACT EXTRACTION Right 2021    COLONOSCOPY  09/16/2010    Diverticulosis; Repeat 10 years    COLONOSCOPY N/A 11/11/2020    One 6mm inflamed hyperplastic polyp in the cecum; Diverticulosis in the left colon; Non-bleeding internal hemorrhoids; Repeat 5 years    ENDOSCOPY N/A 06/12/2025     Exophytic mass found in the larynx, not obstructing the airway; Partially obstructing, rule out malignancy, esophageal tumor was found in the proximal esophagus-biopsied; Medium-sized HH; Normal stomach; Two non-bleeding angiodysplastic lesions in the duodenum-biopsied    ENDOSCOPY W/ PEG TUBE PLACEMENT N/A 7/10/2025    Procedure: ESOPHAGOGASTRODUODENOSCOPY WITH PERCUTANEOUS ENDOSCOPIC GASTROSTOMY TUBE INSERTION WITH ANESTHESIA;  Surgeon: Christine Valdovinos MD;  Location: [facility] ENDOSCOPY;  Service: Gastroenterology;  Laterality: N/A;  pre op:peg placement  post op: esophageal mass  PCP:Luly Diez MD    GTUBE INSERTION N/A 7/10/2025    Procedure: LAPAROSCOPIC GASTROSTOMY FEEDING TUBE WITH DAVINCI ROBOT;  Surgeon: Willie Weber MD;  Location: [facility] OR;  Service: Robotics - DaVinci;  Laterality: N/A;    MAMMO BILATERAL  07/17/2013    Dr. Sabillon    MASTECTOMY Left 08/28/2008    PAP SMEAR  2010    SKIN CANCER EXCISION       Social History:  reports that she quit smoking about 51 years ago. Her smoking use included cigarettes. She has never used smokeless tobacco. She reports current alcohol use of about 1.0 standard drink of alcohol per week. She reports that she does not use drugs.    Family History: family history includes Cancer in her mother and paternal aunt; Drug abuse in her brother; Heart disease in her father; Hypertension in her daughter and daughter; No Known Problems in her daughter, maternal grandfather, maternal grandmother, paternal grandfather, paternal grandmother, and son; Other in her brother.       Allergies:  Allergies   Allergen Reactions    Adhesive Tape Hives     Latex Tape       Medications:  Prior to Admission medications    Medication Sig Start Date End Date Taking? Authorizing Provider   amLODIPine (NORVASC) 2.5 MG tablet Take 1 tablet by mouth Daily. 4/24/23   Provider, MD Tania   aspirin 81 MG EC tablet Take 1 tablet by mouth Daily. 7/21/25   Marco Mckeon MD   Calcium  "Carb-Cholecalciferol 600-200 MG-UNIT tablet Take 1 tablet by mouth 2 (Two) Times a Day.    Tania Borrgeo MD   carvedilol (COREG) 3.125 MG tablet Take 1 tablet by mouth 2 (Two) Times a Day With Meals. 7/16/25   Marco Mckeon MD   Cholecalciferol (VITAMIN D3) 400 UNITS capsule Take 1 capsule by mouth Daily.    Tania Borrego MD   famotidine (PEPCID) 40 MG tablet Take 1 tablet by mouth Daily.    Tania Borrego MD   glipiZIDE (GLUCOTROL) 5 MG tablet Take 1 tablet by mouth Daily.    Tania Borrgeo MD   Guar Gum (Nutrisource fiber) pack pack Administer 1 packet per G tube 3 (Three) Times a Day. 7/16/25   Marco Mckeon MD   losartan (COZAAR) 25 MG tablet Take 1 tablet by mouth Daily. 8/21/19   Tania Borrego MD   magnesium 30 MG tablet Take 1 tablet by mouth Daily.    Tania Borrego MD   metFORMIN ER (GLUCOPHAGE-XR) 500 MG 24 hr tablet Take 1 tablet by mouth Every Night. 5/16/25   Tania Borrego MD   ondansetron ODT (ZOFRAN-ODT) 4 MG disintegrating tablet Take 1 tablet by mouth Every 6 (Six) Hours As Needed for Nausea or Vomiting. 7/16/25   Marco Mckeon MD   pravastatin (PRAVACHOL) 40 MG tablet Take 1 tablet by mouth Daily.    Tania Borrego MD     I have utilized all available immediate resources to obtain, update, or review the patient's current medications (including all prescriptions, over-the-counter products, herbals, cannabis/cannabidiol products, and vitamin/mineral/dietary (nutritional) supplements).    Objective     Vital Signs: /55   Pulse 86   Temp 97.8 °F (36.6 °C) (Oral)   Resp 14   Ht 160 cm (62.99\")   Wt 60 kg (132 lb 4.8 oz)   SpO2 97%   BMI 23.44 kg/m²   Physical Exam  Vitals reviewed.   Constitutional:       Appearance: She is ill-appearing.   HENT:      Head: Normocephalic and atraumatic.      Mouth/Throat:      Mouth: Mucous membranes are dry.      Pharynx: Oropharynx is clear.   Eyes:      Extraocular Movements: " Extraocular movements intact.      Conjunctiva/sclera: Conjunctivae normal.   Neck:      Comments: Moves neck spontaneously without difficulty  Cardiovascular:      Rate and Rhythm: Normal rate and regular rhythm.   Pulmonary:      Effort: Pulmonary effort is normal.      Comments: Left-sided coarseness throughout, mild coarseness noted on right base  Abdominal:      General: Bowel sounds are normal. There is no distension.      Palpations: Abdomen is soft.      Comments: Feeding tube in place   Musculoskeletal:      Cervical back: No tenderness.      Right lower leg: No edema.      Left lower leg: No edema.      Comments: Generalized weakness and debility   Skin:     General: Skin is warm and dry.      Coloration: Skin is pale.   Neurological:      General: No focal deficit present.      Mental Status: She is alert and oriented to person, place, and time.   Psychiatric:         Mood and Affect: Mood normal.         Behavior: Behavior normal.          Results Reviewed:  Lab Results (last 24 hours)       Procedure Component Value Units Date/Time    Respiratory Panel PCR w/COVID-19(SARS-CoV-2) ZE/FAVIAN/ELISABETH/PAD/COR/BRIDGER In-House, NP Swab in UTM/VTM, 2 HR TAT - Swab, Nasopharynx [[id number]]  (Normal) Collected: 07/25/25 1421    Specimen: Swab from Nasopharynx Updated: 07/25/25 1526     ADENOVIRUS, PCR Not Detected     Coronavirus 229E Not Detected     Coronavirus HKU1 Not Detected     Coronavirus NL63 Not Detected     Coronavirus OC43 Not Detected     COVID19 Not Detected     Human Metapneumovirus Not Detected     Human Rhinovirus/Enterovirus Not Detected     Influenza A PCR Not Detected     Influenza B PCR Not Detected     Parainfluenza Virus 1 Not Detected     Parainfluenza Virus 2 Not Detected     Parainfluenza Virus 3 Not Detected     Parainfluenza Virus 4 Not Detected     RSV, PCR Not Detected     Bordetella pertussis pcr Not Detected     Bordetella parapertussis PCR Not Detected     Chlamydophila pneumoniae PCR Not  "Detected     Mycoplasma pneumo by PCR Not Detected    Narrative:      In the setting of a positive respiratory panel with a viral infection PLUS a negative procalcitonin without other underlying concern for bacterial infection, consider observing off antibiotics or discontinuation of antibiotics and continue supportive care. If the respiratory panel is positive for atypical bacterial infection (Bordetella pertussis, Chlamydophila pneumoniae, or Mycoplasma pneumoniae), consider antibiotic de-escalation to target atypical bacterial infection.    Legionella Antigen, Urine - Urine, Urine, Catheter [[id number]] Collected: 07/25/25 1242    Specimen: Urine, Catheter Updated: 07/25/25 1525    S. Pneumo Ag Urine or CSF - Urine, Urine, Catheter [[id number]] Collected: 07/25/25 1242    Specimen: Urine, Catheter Updated: 07/25/25 1525    Blood Culture - Blood, Arm, Right [[id number]] Collected: 07/25/25 1427    Specimen: Blood from Arm, Right Updated: 07/25/25 1450    Blood Culture - Blood, Arm, Right [[id number]] Collected: 07/25/25 1436    Specimen: Blood from Arm, Right Updated: 07/25/25 1449    Procalcitonin [[id number]]  (Abnormal) Collected: 07/25/25 1223    Specimen: Blood Updated: 07/25/25 1344     Procalcitonin 0.38 ng/mL     Narrative:      As a Marker for Sepsis (Non-Neonates):    1. <0.5 ng/mL represents a low risk of severe sepsis and/or septic shock.  2. >2 ng/mL represents a high risk of severe sepsis and/or septic shock.    As a Marker for Lower Respiratory Tract Infections that require antibiotic therapy:    PCT on Admission    Antibiotic Therapy       6-12 Hrs later    >0.5                Strongly Recommended  >0.25 - <0.5        Recommended   0.1 - 0.25          Discouraged              Remeasure/reassess PCT  <0.1                Strongly Discouraged     Remeasure/reassess PCT    As 28 day mortality risk marker: \"Change in Procalcitonin Result\" (>80% or <=80%) if Day 0 (or Day 1) and Day 4 values are available. " Refer to http://www.[facility]-pct-calculator.com    Change in PCT <=80%  A decrease of PCT levels below or equal to 80% defines a positive change in PCT test result representing a higher risk for 28-day all-cause mortality of patients diagnosed with severe sepsis for septic shock.    Change in PCT >80%  A decrease of PCT levels of more than 80% defines a negative change in PCT result representing a lower risk for 28-day all-cause mortality of patients diagnosed with severe sepsis or septic shock.       Urinalysis, Microscopic Only - Urine, Catheter [[id number]]  (Abnormal) Collected: 07/25/25 1242    Specimen: Urine, Catheter Updated: 07/25/25 1334     RBC, UA 0-2 /HPF      WBC, UA 21-50 /HPF      Bacteria, UA 4+ /HPF      Squamous Epithelial Cells, UA 0-2 /HPF      Hyaline Casts, UA None Seen /LPF      Methodology Manual Light Microscopy    Urine Culture - Urine, Urine, Catheter [[id number]] Collected: 07/25/25 1242    Specimen: Urine, Catheter Updated: 07/25/25 1333    Urinalysis With Culture If Indicated - Urine, Catheter [[id number]]  (Abnormal) Collected: 07/25/25 1242    Specimen: Urine, Catheter Updated: 07/25/25 1323     Color, UA Yellow     Appearance, UA Cloudy     pH, UA 7.5     Specific Gravity, UA 1.019     Glucose, UA Negative     Ketones, UA Negative     Bilirubin, UA Negative     Blood, UA Trace     Protein, UA 30 mg/dL (1+)     Leuk Esterase, UA Large (3+)     Nitrite, UA Negative     Urobilinogen, UA 1.0 E.U./dL    Narrative:      In absence of clinical symptoms, the presence of pyuria, bacteria, and/or nitrites on the urinalysis result does not correlate with infection.    High Sensitivity Troponin T 1Hr [[id number]]  (Abnormal) Collected: 07/25/25 1223    Specimen: Blood Updated: 07/25/25 1258     HS Troponin T 25 ng/L      Troponin T Numeric Delta -2 ng/L      Troponin T % Delta -7    Narrative:      High Sensitive Troponin T Reference Range:  <14.0 ng/L- Negative Female for AMI  <22.0 ng/L- Negative  Male for AMI  >=14 - Abnormal Female indicating possible myocardial injury.  >=22 - Abnormal Male indicating possible myocardial injury.   Clinicians would have to utilize clinical acumen, EKG, Troponin, and serial changes to determine if it is an Acute Myocardial Infarction or myocardial injury due to an underlying chronic condition.         Comprehensive Metabolic Panel [[id number]]  (Abnormal) Collected: 07/25/25 1112    Specimen: Blood Updated: 07/25/25 1145     Glucose 165 mg/dL      BUN 39.5 mg/dL      Creatinine 1.10 mg/dL      Sodium 129 mmol/L      Potassium 4.2 mmol/L      Chloride 90 mmol/L      CO2 27.0 mmol/L      Calcium 8.8 mg/dL      Total Protein 7.6 g/dL      Albumin 2.7 g/dL      ALT (SGPT) 13 U/L      AST (SGOT) 23 U/L      Alkaline Phosphatase 71 U/L      Total Bilirubin 0.3 mg/dL      Globulin 4.9 gm/dL      A/G Ratio 0.6 g/dL      BUN/Creatinine Ratio 35.9     Anion Gap 12.0 mmol/L      eGFR 50.0 mL/min/1.73     Narrative:      GFR Categories in Chronic Kidney Disease (CKD)              GFR Category          GFR (mL/min/1.73)    Interpretation  G1                    90 or greater        Normal or high (1)  G2                    60-89                Mild decrease (1)  G3a                   45-59                Mild to moderate decrease  G3b                   30-44                Moderate to severe decrease  G4                    15-29                Severe decrease  G5                    14 or less           Kidney failure    (1)In the absence of evidence of kidney disease, neither GFR category G1 or G2 fulfill the criteria for CKD.    eGFR calculation 2021 CKD-EPI creatinine equation, which does not include race as a factor    Magnesium [[id number]]  (Normal) Collected: 07/25/25 1112    Specimen: Blood Updated: 07/25/25 1145     Magnesium 2.0 mg/dL     Lactic Acid, Plasma [[id number]]  (Normal) Collected: 07/25/25 1112    Specimen: Blood Updated: 07/25/25 1144     Lactate 1.5 mmol/L     High  Sensitivity Troponin T [[id number]]  (Abnormal) Collected: 07/25/25 1112    Specimen: Blood Updated: 07/25/25 1141     HS Troponin T 27 ng/L     Narrative:      High Sensitive Troponin T Reference Range:  <14.0 ng/L- Negative Female for AMI  <22.0 ng/L- Negative Male for AMI  >=14 - Abnormal Female indicating possible myocardial injury.  >=22 - Abnormal Male indicating possible myocardial injury.   Clinicians would have to utilize clinical acumen, EKG, Troponin, and serial changes to determine if it is an Acute Myocardial Infarction or myocardial injury due to an underlying chronic condition.         CBC & Differential [[id number]]  (Abnormal) Collected: 07/25/25 1112    Specimen: Blood Updated: 07/25/25 1121    Narrative:      The following orders were created for panel order CBC & Differential.  Procedure                               Abnormality         Status                     ---------                               -----------         ------                     CBC Auto Differential[[id number]]        Abnormal            Final result                 Please view results for these tests on the individual orders.    CBC Auto Differential [[id number]]  (Abnormal) Collected: 07/25/25 1112    Specimen: Blood Updated: 07/25/25 1121     WBC 23.43 10*3/mm3      RBC 3.16 10*6/mm3      Hemoglobin 9.1 g/dL      Hematocrit 28.3 %      MCV 89.6 fL      MCH 28.8 pg      MCHC 32.2 g/dL      RDW 13.8 %      RDW-SD 45.5 fl      MPV 9.6 fL      Platelets 438 10*3/mm3      Neutrophil % 74.1 %      Lymphocyte % 15.5 %      Monocyte % 8.7 %      Eosinophil % 0.3 %      Basophil % 0.3 %      Immature Grans % 1.1 %      Neutrophils, Absolute 17.35 10*3/mm3      Lymphocytes, Absolute 3.64 10*3/mm3      Monocytes, Absolute 2.05 10*3/mm3      Eosinophils, Absolute 0.07 10*3/mm3      Basophils, Absolute 0.06 10*3/mm3      Immature Grans, Absolute 0.26 10*3/mm3      nRBC 0.0 /100 WBC           Imaging Results (Last 24 Hours)       Procedure  Component Value Units Date/Time    XR Chest 1 View [[id number]] Collected: 07/25/25 1103     Updated: 07/25/25 1108    Narrative:      EXAMINATION: XR CHEST 1 VW-        HISTORY: Weakness/cough     A frontal projection of the chest is compared with the previous study  dated 7/9/2025.     The lungs are poorly expanded.     There is an ill-defined opacity in the left mid to left lower lung which  was not noted in the previous study and may represent an evolving  inflammatory/infectious process.     Coarse linear changes in the remaining lungs are present chronic  process/fibrosis.     There is no pleural effusion. No pneumothorax.     There is moderate cardiomegaly. Atheromatous change of thoracic aorta.     Left subclavian approach Mediport system is in place.     No acute bony abnormality.       Impression:      1. A suspected acute/evolving inflammatory/infectious process in the  left lung. Further follow-up suggested.        This report was signed and finalized on 7/25/2025 11:05 AM by Dr. Jordan Turner MD.             Assessment / Plan   Assessment:   Active Hospital Problems    Diagnosis     **Pneumonia of left sided due to infectious organism, suspect aspiration     Hyponatremia     DM2 (diabetes mellitus, type 2)     Dysphagia with feeding tube     Squamous cell esophageal cancer        Treatment Plan  The patient will be admitted to Dr. Felton's service here at [facility].     Left-sided pneumonia  - Zosyn  - Follow pneumonia protocol; DuoNebs, supplemental oxygen as needed, incentive spirometry  - Blood cultures, respiratory culture, MRSA screen, respiratory panel, urine studies for Legionella and strep pneumoniae  -Labs in a.m.    Hyponatremia  -Patient received 1 L normal saline bolus in the ED  -Normal saline 50 mL/hour x 20 hours  -Serial BMP every 6 hours    Type 2 diabetes  -Monitor glucose AC and at bedtime with regular insulin sliding scale coverage (patient receives tube  feeding    Squamous cell esophageal cancer/dysphagia with feeding tube  -Consult dietitian to order tube feeding  - Oral care every 4 hours  -N.p.o.    Awaiting medication list from [facility]  DVT prophylaxis with SCDs    Medical Decision Making  Number and Complexity of problems: 5  2 problems, acute, high complexity, unchanged  Differential Diagnosis: No    Conditions and Status        Condition is unchanged.     [facility] Data  External documents reviewed: No awaiting [facility] records  Cardiac tracing (EKG, telemetry) interpretation: Reviewed  Radiology interpretation: Reviewed  Labs reviewed: Yes  Any tests that were considered but not ordered: No     Decision rules/scores evaluated (example [id number], Wells, etc): No     Discussed with: Patient and Dr. Felton     Care Planning  Shared decision making: Patient Dr. Felton  Code status and discussions: DNR/DNI    Disposition  Social Determinants of Health that impact treatment or disposition: No  Estimated length of stay is 2+ days.     I confirmed that the patient's advanced care plan is present, code status is documented, and a surrogate decision maker is listed in the patient's medical record.     The patient's surrogate decision maker is daughters.     The patient was seen and examined by me on 7/25/2025 at 1:25 PM.    Electronically signed by ANNMARIE Leyva, 07/25/25, 15:28 CDT.

## 2025-07-25 NOTE — CASE MANAGEMENT/SOCIAL WORK
Continued Stay Note  PAUL Hermosillo     Patient Name: Lorena Valdes  MRN: [id number]  Today's Date: 7/25/2025    Admit Date: 7/25/2025        Discharge Plan       Row Name 07/25/25 1522       Plan    Plan Comments Pt does not have a bed hold with [facility] but will take pt back. SW will follow up with pt and family tomorrow.                   Discharge Codes    No documentation.                       Angel Russo

## 2025-07-26 LAB
ANION GAP SERPL CALCULATED.3IONS-SCNC: 13 MMOL/L (ref 5–15)
ANION GAP SERPL CALCULATED.3IONS-SCNC: 13 MMOL/L (ref 5–15)
BACTERIA SPEC RESP CULT: NORMAL
BASOPHILS # BLD MANUAL: 0 10*3/MM3 (ref 0–0.2)
BASOPHILS NFR BLD MANUAL: 0 % (ref 0–1.5)
BUN SERPL-MCNC: 33.9 MG/DL (ref 8–23)
BUN SERPL-MCNC: 35.3 MG/DL (ref 8–23)
BUN/CREAT SERPL: 36.4 (ref 7–25)
BUN/CREAT SERPL: 38.1 (ref 7–25)
CALCIUM SPEC-SCNC: 8.3 MG/DL (ref 8.6–10.5)
CALCIUM SPEC-SCNC: 8.4 MG/DL (ref 8.6–10.5)
CHLORIDE SERPL-SCNC: 92 MMOL/L (ref 98–107)
CHLORIDE SERPL-SCNC: 93 MMOL/L (ref 98–107)
CO2 SERPL-SCNC: 23 MMOL/L (ref 22–29)
CO2 SERPL-SCNC: 25 MMOL/L (ref 22–29)
CREAT SERPL-MCNC: 0.89 MG/DL (ref 0.57–1)
CREAT SERPL-MCNC: 0.97 MG/DL (ref 0.57–1)
DEPRECATED RDW RBC AUTO: 45.8 FL (ref 37–54)
EGFRCR SERPLBLD CKD-EPI 2021: 58.1 ML/MIN/1.73
EGFRCR SERPLBLD CKD-EPI 2021: 64.4 ML/MIN/1.73
EOSINOPHIL # BLD MANUAL: 0.49 10*3/MM3 (ref 0–0.4)
EOSINOPHIL NFR BLD MANUAL: 3 % (ref 0.3–6.2)
ERYTHROCYTE [DISTWIDTH] IN BLOOD BY AUTOMATED COUNT: 13.8 % (ref 12.3–15.4)
FERRITIN SERPL-MCNC: 728 NG/ML (ref 13–150)
GLUCOSE BLDC GLUCOMTR-MCNC: 152 MG/DL (ref 70–130)
GLUCOSE BLDC GLUCOMTR-MCNC: 155 MG/DL (ref 70–130)
GLUCOSE BLDC GLUCOMTR-MCNC: 160 MG/DL (ref 70–130)
GLUCOSE BLDC GLUCOMTR-MCNC: 173 MG/DL (ref 70–130)
GLUCOSE SERPL-MCNC: 154 MG/DL (ref 65–99)
GLUCOSE SERPL-MCNC: 91 MG/DL (ref 65–99)
GRAM STN SPEC: NORMAL
HCT VFR BLD AUTO: 24.9 % (ref 34–46.6)
HGB BLD-MCNC: 7.9 G/DL (ref 12–15.9)
IRON 24H UR-MRATE: 17 MCG/DL (ref 37–145)
IRON SATN MFR SERPL: 8 % (ref 20–50)
LYMPHOCYTES # BLD MANUAL: 1.8 10*3/MM3 (ref 0.7–3.1)
LYMPHOCYTES NFR BLD MANUAL: 6 % (ref 5–12)
MCH RBC QN AUTO: 28.5 PG (ref 26.6–33)
MCHC RBC AUTO-ENTMCNC: 31.7 G/DL (ref 31.5–35.7)
MCV RBC AUTO: 89.9 FL (ref 79–97)
MONOCYTES # BLD: 0.98 10*3/MM3 (ref 0.1–0.9)
NEUTROPHILS # BLD AUTO: 13.11 10*3/MM3 (ref 1.7–7)
NEUTROPHILS NFR BLD MANUAL: 67 % (ref 42.7–76)
NEUTS BAND NFR BLD MANUAL: 13 % (ref 0–5)
NRBC SPEC MANUAL: 1 /100 WBC (ref 0–0.2)
PLAT MORPH BLD: NORMAL
PLATELET # BLD AUTO: 388 10*3/MM3 (ref 140–450)
PMV BLD AUTO: 9.4 FL (ref 6–12)
POLYCHROMASIA BLD QL SMEAR: ABNORMAL
POTASSIUM SERPL-SCNC: 3.6 MMOL/L (ref 3.5–5.2)
POTASSIUM SERPL-SCNC: 3.6 MMOL/L (ref 3.5–5.2)
RBC # BLD AUTO: 2.77 10*6/MM3 (ref 3.77–5.28)
SODIUM SERPL-SCNC: 128 MMOL/L (ref 136–145)
SODIUM SERPL-SCNC: 131 MMOL/L (ref 136–145)
TIBC SERPL-MCNC: 201 MCG/DL (ref 298–536)
TRANSFERRIN SERPL-MCNC: 135 MG/DL (ref 200–360)
VARIANT LYMPHS NFR BLD MANUAL: 11 % (ref 19.6–45.3)
WBC MORPH BLD: NORMAL
WBC NRBC COR # BLD AUTO: 16.39 10*3/MM3 (ref 3.4–10.8)

## 2025-07-26 PROCEDURE — 97161 PT EVAL LOW COMPLEX 20 MIN: CPT

## 2025-07-26 PROCEDURE — 97166 OT EVAL MOD COMPLEX 45 MIN: CPT

## 2025-07-26 PROCEDURE — 82728 ASSAY OF FERRITIN: CPT | Performed by: NURSE PRACTITIONER

## 2025-07-26 PROCEDURE — 82948 REAGENT STRIP/BLOOD GLUCOSE: CPT | Performed by: FAMILY MEDICINE

## 2025-07-26 PROCEDURE — 94799 UNLISTED PULMONARY SVC/PX: CPT

## 2025-07-26 PROCEDURE — 94664 DEMO&/EVAL PT USE INHALER: CPT

## 2025-07-26 PROCEDURE — 83540 ASSAY OF IRON: CPT | Performed by: NURSE PRACTITIONER

## 2025-07-26 PROCEDURE — 84466 ASSAY OF TRANSFERRIN: CPT | Performed by: NURSE PRACTITIONER

## 2025-07-26 PROCEDURE — 63710000001 INSULIN LISPRO (HUMAN) PER 5 UNITS: Performed by: NURSE PRACTITIONER

## 2025-07-26 PROCEDURE — 85025 COMPLETE CBC W/AUTO DIFF WBC: CPT | Performed by: NURSE PRACTITIONER

## 2025-07-26 PROCEDURE — 80048 BASIC METABOLIC PNL TOTAL CA: CPT | Performed by: NURSE PRACTITIONER

## 2025-07-26 PROCEDURE — 92610 EVALUATE SWALLOWING FUNCTION: CPT | Performed by: SPEECH-LANGUAGE PATHOLOGIST

## 2025-07-26 PROCEDURE — 25010000002 PIPERACILLIN SOD-TAZOBACTAM PER 1 G: Performed by: NURSE PRACTITIONER

## 2025-07-26 PROCEDURE — 82948 REAGENT STRIP/BLOOD GLUCOSE: CPT

## 2025-07-26 PROCEDURE — 85007 BL SMEAR W/DIFF WBC COUNT: CPT | Performed by: NURSE PRACTITIONER

## 2025-07-26 PROCEDURE — 82948 REAGENT STRIP/BLOOD GLUCOSE: CPT | Performed by: NURSE PRACTITIONER

## 2025-07-26 RX ORDER — ACETAMINOPHEN 160 MG/5ML
650 SOLUTION ORAL EVERY 4 HOURS PRN
Status: DISCONTINUED | OUTPATIENT
Start: 2025-07-26 | End: 2025-07-28 | Stop reason: HOSPADM

## 2025-07-26 RX ADMIN — ASPIRIN 81 MG CHEWABLE TABLET 81 MG: 81 TABLET CHEWABLE at 09:09

## 2025-07-26 RX ADMIN — CALCIUM CARBONATE 600 MG (1,500 MG)-VITAMIN D3 400 UNIT TABLET 1 TABLET: at 09:09

## 2025-07-26 RX ADMIN — INSULIN LISPRO 2 UNITS: 100 INJECTION, SOLUTION INTRAVENOUS; SUBCUTANEOUS at 09:10

## 2025-07-26 RX ADMIN — IPRATROPIUM BROMIDE AND ALBUTEROL SULFATE 3 ML: .5; 3 SOLUTION RESPIRATORY (INHALATION) at 23:02

## 2025-07-26 RX ADMIN — ACETAMINOPHEN 650 MG: 650 SOLUTION ORAL at 05:07

## 2025-07-26 RX ADMIN — PIPERACILLIN AND TAZOBACTAM 3.38 G: 3; .375 INJECTION, POWDER, FOR SOLUTION INTRAVENOUS at 20:45

## 2025-07-26 RX ADMIN — INSULIN LISPRO 2 UNITS: 100 INJECTION, SOLUTION INTRAVENOUS; SUBCUTANEOUS at 18:01

## 2025-07-26 RX ADMIN — INSULIN LISPRO 2 UNITS: 100 INJECTION, SOLUTION INTRAVENOUS; SUBCUTANEOUS at 20:44

## 2025-07-26 RX ADMIN — CALCIUM CARBONATE 600 MG (1,500 MG)-VITAMIN D3 400 UNIT TABLET 1 TABLET: at 17:46

## 2025-07-26 RX ADMIN — IPRATROPIUM BROMIDE AND ALBUTEROL SULFATE 3 ML: .5; 3 SOLUTION RESPIRATORY (INHALATION) at 18:27

## 2025-07-26 RX ADMIN — FAMOTIDINE 40 MG: 20 TABLET, FILM COATED ORAL at 09:09

## 2025-07-26 RX ADMIN — INSULIN LISPRO 2 UNITS: 100 INJECTION, SOLUTION INTRAVENOUS; SUBCUTANEOUS at 12:11

## 2025-07-26 RX ADMIN — IPRATROPIUM BROMIDE AND ALBUTEROL SULFATE 3 ML: .5; 3 SOLUTION RESPIRATORY (INHALATION) at 14:22

## 2025-07-26 RX ADMIN — IPRATROPIUM BROMIDE AND ALBUTEROL SULFATE 3 ML: .5; 3 SOLUTION RESPIRATORY (INHALATION) at 03:17

## 2025-07-26 RX ADMIN — PIPERACILLIN AND TAZOBACTAM 3.38 G: 3; .375 INJECTION, POWDER, FOR SOLUTION INTRAVENOUS at 04:16

## 2025-07-26 RX ADMIN — PRAVASTATIN SODIUM 40 MG: 20 TABLET ORAL at 09:09

## 2025-07-26 RX ADMIN — IPRATROPIUM BROMIDE AND ALBUTEROL SULFATE 3 ML: .5; 3 SOLUTION RESPIRATORY (INHALATION) at 07:02

## 2025-07-26 RX ADMIN — HYDROCODONE BITARTRATE AND CHLORPHENIRAMINE MALEATE 400 UNITS: 10; 8 SUSPENSION, EXTENDED RELEASE ORAL at 09:10

## 2025-07-26 RX ADMIN — PIPERACILLIN AND TAZOBACTAM 3.38 G: 3; .375 INJECTION, POWDER, FOR SOLUTION INTRAVENOUS at 12:20

## 2025-07-26 RX ADMIN — IPRATROPIUM BROMIDE AND ALBUTEROL SULFATE 3 ML: .5; 3 SOLUTION RESPIRATORY (INHALATION) at 10:36

## 2025-07-26 NOTE — PLAN OF CARE
Problem: Adult Inpatient Plan of Care  Goal: Plan of Care Review  Outcome: Progressing  Flowsheets (Taken 7/26/2025 4699)  Progress: no change  Outcome Evaluation: Patient oriented x4, drowsy during the shift. Family stated she has not slept well in a few days. Frequent congested cough. Tele, purwick. Q2 turns. Tube feed running at 50 tolerating well. HOB 30 degrees or greater. SCD's. VSS. Family at bedside, call light in reach, safety maintained.  Plan of Care Reviewed With: patient

## 2025-07-26 NOTE — PROGRESS NOTES
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 Patient admitted 7/9 - 7/16, 2025 dysphagia.  She will underwent EGD on 7/10.  7/11 laparoscopic gastrostomy feeding tube placed per Dr. Willie Weber.  She was also seen by Dr. Samano on oncology and Dr. Gabriel Ernandez radiation oncology.       Patient was seen today at surgeon's office routine follow-up after feeding tube placement.  Due to weakness \" low blood pressure\" she was sent to [facility] ED for evaluation.  She has had a soft blood pressure since admission but no overt hypotension.  Patient is awake and alert.  She is able to answer all questions.  She resides at [facility], awaiting fax medication list to review home medications.  She has no pain.  She is supposed to start radiation next Tuesday.  She does have a wet cough.  Patient states she is a DNR/DNI.      ED workup revealed sodium 129, chloride 90, creatinine 1.10, GFR 50, glucose 165, albumin 2.7, remainder of CMP and magnesium within normal limits, WBC 23.46-no left shift or bandemia, lactic acid 1.5, hemoglobin 9.1, urinalysis reveals WBCs 21-50 with 4+ bacteria, urine culture pending, cultures have been ordered.     Other health history breast cancer 2008 status post lumpectomy with adjuvant chemotherapy, diabetes type 2, GERD, anemia, chronic kidney disease stage IIIa, COPD       Today 7/26 : Sitting up in chair.  Daughter at bedside.  Patient looks improved today.  Less pale.  She " is alert and oriented.  Answers all questions appropriately.  She denies pain.  She continues to have a wet cough.  Tube feeding infusing.  Anticipate initiation of radiation therapy .  Reviewed laboratory studies with patient and daughter, both verbalized understanding.  Will continue to follow.    Documented weights    25 1004   Weight: 60 kg (132 lb 4.8 oz)          Intake/Output Summary (Last 24 hours) at 2025 1331  Last data filed at 2025 1138  Gross per 24 hour   Intake 2857 ml   Output 700 ml   Net 2157 ml       Results for orders placed during the hospital encounter of 03/30/15    Echocardiogram stress test 2015  7:19 AM    Narrative  Stress Echocardiogram    Patient:     KAMILAH BRICENO  [facility] Rec#:    [id number]                    Date:     2015  MRN:         [id number]                    Pt. Type: Outpatient  Accession#:  [id number]                    Height:   165.1 cm/64.4 i  :         1940                 Weight:   81.65 kg/179.6  Age:         74y                        BSA:      1.89    Sex:         F  Room#:       OP    Referring:      Luly Diez MD  Reading:        Teto Mayes MD  Sonographer:    Adelita Shankar  Ordering:       Victorino Arredondo MD  Nurse:          Horace Wilson RN  Nurse:          Steffany Dill RN  ______________________________________________________________________    ICD Codes: Chest pain (786.50)  Indication:    Rhythm:  Stage        HR      BP  Rest         76      149/82  Peak         157     192/97  Recovery     97      136/69    Conclusions:  LOW RISK FOR ISCHEMIA BASED ON THIS TEST    Predicted Values:  The patient achieved a maximum heart rate of 157 which is 107% of the  maximum predicted heart rate (147 beats/min).  The target heart rate was  achieved.    Findings  Rest  Stage Comments:  Patient EKG is normal.  Left Ventricle:  Global left ventricular wall motion and contractility are within  normal limits. There is normal left  ventricular systolic function.  Contrast:  Intravenous contrast was used to enhance endocardial border  definition.  Peak  Stage Comments:  Patient followed a Isiah protocol. The patient exercised into stage 2.  The total exercise duration was 4 minutes and 30 seconds. Test was  stopped after submaximum target HR was achieved. The patient did not  express feelings of chest discomfort. The blood pressure response is  hypertensive. Exercise capacity is fair. The patient achieved a level of  7 METS. There were no arrythmias.rare dropped beat. There is no ST  segment depression. This is a negative electrocardiographic stress test.  This was a negative echocardiographic stress test. Echocardiographic  findings are consistent with normal left ventricular function. Patient  EKG is normal.  Contrast:  Intravenous contrast was used to enhance endocardial border  definition.          Electronically signed by Teto Mayes MD on 03/31/2015 08:43:07  Thank you for the courtesy of this referral.       Review of Systems   All pertinent negatives and positives are as above. All other systems have been reviewed and are negative unless otherwise stated.     Objective    Temp:  [97.7 °F (36.5 °C)-101.3 °F (38.5 °C)] 97.9 °F (36.6 °C)  Heart Rate:  [72-94] 77  Resp:  [16-18] 16  BP: (103-141)/(40-62) 104/51  Physical Exam  Vitals reviewed.   Constitutional:       Appearance: She is ill-appearing.   HENT:      Head: Normocephalic and atraumatic.      Mouth/Throat:      Mouth: Mucous membranes are moist.      Pharynx: Oropharynx is clear.   Eyes:      Extraocular Movements: Extraocular movements intact.      Conjunctiva/sclera: Conjunctivae normal.   Cardiovascular:      Rate and Rhythm: Normal rate and regular rhythm.   Pulmonary:      Effort: Pulmonary effort is normal.      Comments: Left-sided coarseness throughout  Abdominal:      General: There is no distension.      Palpations: Abdomen is soft.      Comments: Feeding tube left  upper quadrant   Musculoskeletal:      Cervical back: No tenderness.      Right lower leg: No edema.      Left lower leg: No edema.      Comments: Generalized weakness and debility   Skin:     General: Skin is warm and dry.      Coloration: Skin is pale.   Neurological:      General: No focal deficit present.      Mental Status: She is alert and oriented to person, place, and time.   Psychiatric:         Mood and Affect: Mood normal.         Behavior: Behavior normal.       Results Review:  I have reviewed the labs, radiology results, and diagnostic studies.    Laboratory Data:   Results from last 7 days   Lab Units 07/26/25  0506 07/25/25  1112   WBC 10*3/mm3 16.39* 23.43*   HEMOGLOBIN g/dL 7.9* 9.1*   HEMATOCRIT % 24.9* 28.3*   PLATELETS 10*3/mm3 388 438        Results from last 7 days   Lab Units 07/26/25  0506 07/25/25  2328 07/25/25  1742 07/25/25  1112   SODIUM mmol/L 128* 131* 129* 129*   POTASSIUM mmol/L 3.6 3.6 3.7 4.2   CHLORIDE mmol/L 92* 93* 94* 90*   CO2 mmol/L 23.0 25.0 25.0 27.0   BUN mg/dL 35.3* 33.9* 34.7* 39.5*   CREATININE mg/dL 0.97 0.89 0.87 1.10*   CALCIUM mg/dL 8.4* 8.3* 8.4* 8.8   BILIRUBIN mg/dL  --   --   --  0.3   ALK PHOS U/L  --   --   --  71   ALT (SGPT) U/L  --   --   --  13   AST (SGOT) U/L  --   --   --  23   GLUCOSE mg/dL 154* 91 131* 165*       Culture Data:     Microbiology Results (last 10 days)       Procedure Component Value - Date/Time    Respiratory Culture - Sputum, Cough [[id number]] Collected: 07/25/25 1609    Lab Status: Final result Specimen: Sputum from Cough Updated: 07/26/25 0859     Respiratory Culture Rejected     Gram Stain Many (4+) Gram negative bacilli      Few (2+) Gram negative cocci      Rare (1+) Epithelial cells seen      Rare (1+) WBCs per low power field    Narrative:      Specimen rejected due to oropharyngeal contamination. Please reorder and recollect specimen if clinically necessary.    MRSA Screen, PCR (Inpatient) - Swab, Nares [[id number]]  (Normal)  Collected: 07/25/25 1536    Lab Status: Final result Specimen: Swab from Nares Updated: 07/25/25 1700     MRSA PCR No MRSA Detected    Narrative:      The negative predictive value of this diagnostic test is high and should only be used to consider de-escalating anti-MRSA therapy. A positive result may indicate colonization with MRSA and must be correlated clinically.    Respiratory Panel PCR w/COVID-19(SARS-CoV-2) ZE/FAVIAN/ELISABETH/PAD/COR/BRIDGER In-House, NP Swab in UTM/VTM, 2 HR TAT - Swab, Nasopharynx [[id number]]  (Normal) Collected: 07/25/25 1421    Lab Status: Final result Specimen: Swab from Nasopharynx Updated: 07/25/25 1526     ADENOVIRUS, PCR Not Detected     Coronavirus 229E Not Detected     Coronavirus HKU1 Not Detected     Coronavirus NL63 Not Detected     Coronavirus OC43 Not Detected     COVID19 Not Detected     Human Metapneumovirus Not Detected     Human Rhinovirus/Enterovirus Not Detected     Influenza A PCR Not Detected     Influenza B PCR Not Detected     Parainfluenza Virus 1 Not Detected     Parainfluenza Virus 2 Not Detected     Parainfluenza Virus 3 Not Detected     Parainfluenza Virus 4 Not Detected     RSV, PCR Not Detected     Bordetella pertussis pcr Not Detected     Bordetella parapertussis PCR Not Detected     Chlamydophila pneumoniae PCR Not Detected     Mycoplasma pneumo by PCR Not Detected    Narrative:      In the setting of a positive respiratory panel with a viral infection PLUS a negative procalcitonin without other underlying concern for bacterial infection, consider observing off antibiotics or discontinuation of antibiotics and continue supportive care. If the respiratory panel is positive for atypical bacterial infection (Bordetella pertussis, Chlamydophila pneumoniae, or Mycoplasma pneumoniae), consider antibiotic de-escalation to target atypical bacterial infection.    Urine Culture - Urine, Urine, Catheter [[id number]]  (Abnormal) Collected: 07/25/25 1242    Lab Status: Preliminary  result Specimen: Urine, Catheter Updated: 07/26/25 1252     Urine Culture >100,000 CFU/mL Gram Negative Bacilli    Narrative:      Colonization of the urinary tract without infection is common. Treatment is discouraged unless the patient is symptomatic, pregnant, or undergoing an invasive urologic procedure.    S. Pneumo Ag Urine or CSF - Urine, Urine, Catheter [[id number]]  (Normal) Collected: 07/25/25 1242    Lab Status: Final result Specimen: Urine, Catheter Updated: 07/25/25 1545     Strep Pneumo Ag Negative    Legionella Antigen, Urine - Urine, Urine, Catheter [[id number]]  (Normal) Collected: 07/25/25 1242    Lab Status: Final result Specimen: Urine, Catheter Updated: 07/25/25 1544     LEGIONELLA ANTIGEN, URINE Negative             Radiology Data:   Imaging Results (Last 7 Days)       Procedure Component Value Units Date/Time    XR Chest 1 View [[id number]] Collected: 07/25/25 1103     Updated: 07/25/25 1108    Narrative:      EXAMINATION: XR CHEST 1 VW-        HISTORY: Weakness/cough     A frontal projection of the chest is compared with the previous study  dated 7/9/2025.     The lungs are poorly expanded.     There is an ill-defined opacity in the left mid to left lower lung which  was not noted in the previous study and may represent an evolving  inflammatory/infectious process.     Coarse linear changes in the remaining lungs are present chronic  process/fibrosis.     There is no pleural effusion. No pneumothorax.     There is moderate cardiomegaly. Atheromatous change of thoracic aorta.     Left subclavian approach Mediport system is in place.     No acute bony abnormality.       Impression:      1. A suspected acute/evolving inflammatory/infectious process in the  left lung. Further follow-up suggested.        This report was signed and finalized on 7/25/2025 11:05 AM by Dr. Jordan Turner MD.                I have reviewed the patient's current medications.     aspirin, 81 mg, Per PEG Tube,  Daily  calcium carb-cholecalciferol, 1 tablet, Per PEG Tube, BID With Meals  carvedilol, 3.125 mg, Per G Tube, BID With Meals  cholecalciferol, 400 Units, Per PEG Tube, Daily  famotidine, 40 mg, Per G Tube, Daily  [Held by provider] glipizide, 5 mg, Per G Tube, Daily  insulin lispro, 2-7 Units, Subcutaneous, 4x Daily AC & at Bedtime  ipratropium-albuterol, 3 mL, Nebulization, Q4H - RT  [Held by provider] losartan, 25 mg, Per G Tube, Daily  [Held by provider] metFORMIN, 500 mg, Per G Tube, Nightly  piperacillin-tazobactam, 3.375 g, Intravenous, Q8H  pravastatin, 40 mg, Per G Tube, Daily  sodium chloride, 1,000 mL, Intravenous, Once            Assessment/Plan   Assessment  Active Hospital Problems    Diagnosis     **Pneumonia of left sided due to infectious organism, suspect aspiration     Hyponatremia     DM2 (diabetes mellitus, type 2)     Dysphagia with feeding tube     Squamous cell esophageal cancer        Treatment Plan  Left-sided pneumonia  - Zosyn  - Follow pneumonia protocol; DuoNebs, supplemental oxygen as needed, incentive spirometry  - Blood cultures, respiratory culture, MRSA screen, respiratory panel, urine studies for Legionella and strep pneumoniae  -Labs in a.m.     Hyponatremia  -Patient received 1 L normal saline bolus in the ED  -Normal saline 50 mL/hour x 20 hours  -Serial BMP every 6 hours     Type 2 diabetes  -Monitor glucose AC and at bedtime with regular insulin sliding scale coverage (patient receives tube feeding     Squamous cell esophageal cancer/dysphagia with feeding tube  -Consult dietitian to order tube feeding  - Oral care every 4 hours  -N.p.o.     Awaiting medication list from [facility]  DVT prophylaxis with SCDs     Medical Decision Making  Number and Complexity of problems: 5  Left-sided pneumonia, acute, high complexity, unchanged  Hyponatremia, acute, high complexity, unchanged  Differential Diagnosis: No     Conditions and Status        Condition is improving.      [facility] Data  External documents reviewed: No awaiting [facility] records  Cardiac tracing (EKG, telemetry) interpretation: Reviewed  Radiology interpretation: Reviewed  Labs reviewed: Yes  Any tests that were considered but not ordered: No     Decision rules/scores evaluated (example [id number], Wells, etc): No     Discussed with: Patient and Dr. Felton     Care Planning  Shared decision making: Patient Dr. Felton  Code status and discussions: DNR/DNI  Surrogate Decision Maker daughters    Disposition  Social Determinants of Health that impact treatment or disposition: No, return to [facility] at discharge  I expect the patient to be discharged to SNF in 2+ days.     Electronically signed by AMANDA Leyva, 07/26/25, 13:31 CDT.

## 2025-07-26 NOTE — THERAPY DISCHARGE NOTE
Acute Care - Speech Language Pathology   Swallow Initial Evaluation/Discharge  Jaimee     Patient Name: Lorena Valdes  : 1940  MRN: [id number]  Today's Date: 2025               Admit Date: 2025  Swallow evaluation completed. Patient was alert and cooperative. Sitting in chair when I entered the room with her daughter at the bedside. Patient is well known to me from previous admission. Patient with SCC of the proximal esophagus. During previous admission full workup of her swallow function was completed with completion of a swallow study in radiology revealing severe pharyngeal and esophageal dysphagia secondary to the mass. At that time patient decided to remain fully NPO to reduce risk of complications from aspiration given her want to pursue palliative radiation to the mass in hopes of improved function. Patient reports she has indeed been NPO and has been only doing continuous tube feedings per gtube. Daughter reports palliative radiation is planned for next week, 13 treatments. We again discussed continuation of NPO status, importance of oral care, positioning during tube feedings, allowance for occasional ice chips following oral care,  and plan for repeat VFSS once radiation is completed. Patient and her daughter are agreeable with this plan.     SLP will sign off for now but plans to follow with patient for OP swallow study once radiation is completed. Discussed with family to request this order be placed by Dr. Ernandez once treatment is completed.     Carmelita Pizarro MS CCC-SLP 2025 09:31 CDT    Visit Dx:    ICD-10-CM ICD-9-CM   1. Weakness  R53.1 780.79   2. Pneumonia due to infectious organism, unspecified laterality, unspecified part of lung  J18.9 486   3. Hyponatremia  E87.1 276.1   4. Dysphagia, unspecified type  R13.10 787.20     Patient Active Problem List   Diagnosis    Malignant neoplasm of central portion of left female breast    Malignant neoplasm of upper-outer  quadrant of right female breast    Osteopenia    Encounter for care related to vascular access port    Colon cancer screening    Chronic kidney disease, stage 3a    Diabetic renal disease    Hypertensive renal disease    Estrogen receptor negative tumor status    Squamous cell esophageal cancer    Weight loss    Dysphagia with feeding tube    Severe protein-calorie malnutrition    DM2 (diabetes mellitus, type 2)    Hypokalemia    Hypomagnesemia    Pneumonia of left sided due to infectious organism, suspect aspiration    Hyponatremia     Past Medical History:   Diagnosis Date    Bladder disorder     Chronic kidney disease, stage 3b     Diverticulosis     Emphysema lung     Fibrocystic disease of breast     CONNIE (generalized anxiety disorder)     GERD (gastroesophageal reflux disease)     Heart disease     History of bone density study 2011    History of colon polyps     Hyperglycemia     Hyperlipidemia     Hypotension     Left ventricular diastolic dysfunction     Malignant neoplasm of central portion of left female breast 11/09/2016    Osteopenia 08/29/2017    Restrictive lung disease     Type 2 diabetes mellitus     Uterine prolapse      Past Surgical History:   Procedure Laterality Date    BREAST LUMPECTOMY  2008    CATARACT EXTRACTION Right 2021    COLONOSCOPY  09/16/2010    Diverticulosis; Repeat 10 years    COLONOSCOPY N/A 11/11/2020    One 6mm inflamed hyperplastic polyp in the cecum; Diverticulosis in the left colon; Non-bleeding internal hemorrhoids; Repeat 5 years    ENDOSCOPY N/A 06/12/2025    Exophytic mass found in the larynx, not obstructing the airway; Partially obstructing, rule out malignancy, esophageal tumor was found in the proximal esophagus-biopsied; Medium-sized HH; Normal stomach; Two non-bleeding angiodysplastic lesions in the duodenum-biopsied    ENDOSCOPY W/ PEG TUBE PLACEMENT N/A 7/10/2025    Procedure: ESOPHAGOGASTRODUODENOSCOPY WITH PERCUTANEOUS ENDOSCOPIC GASTROSTOMY TUBE INSERTION WITH  ANESTHESIA;  Surgeon: Christine Valdovinos MD;  Location: [facility] ENDOSCOPY;  Service: Gastroenterology;  Laterality: N/A;  pre op:peg placement  post op: esophageal mass  PCP:Luly Diez MD    GTUBE INSERTION N/A 7/10/2025    Procedure: LAPAROSCOPIC GASTROSTOMY FEEDING TUBE WITH DAVINCI ROBOT;  Surgeon: Willie Weber MD;  Location: [facility] OR;  Service: Robotics - DaVinci;  Laterality: N/A;    MAMMO BILATERAL  07/17/2013    Dr. Sabillon    MASTECTOMY Left 08/28/2008    PAP SMEAR  2010    SKIN CANCER EXCISION         SLP Recommendation and Plan  SLP Swallowing Diagnosis: severe, oral dysphagia, pharyngeal dysphagia, esophageal dysphagia (07/26/25 0814)  SLP Diet Recommendation: NPO, long term alternate methods of nutrition/hydration, ice chips between meals after oral care, with supervision (07/26/25 0814)     Monitor for Signs of Aspiration: yes, notify SLP if any concerns (07/26/25 0814)  Recommended Diagnostics: VFSS (MBS) (once XRT is completed) (07/26/25 0814)  Swallow Criteria for Skilled Therapeutic Interventions Met: current level of function same as previous level of function (07/26/25 0814)  Anticipated Discharge Disposition (SLP): skilled nursing facility (07/26/25 0929)     Therapy Frequency (Swallow): evaluation only (07/26/25 0814)  Predicted Duration Therapy Intervention (Days): until discharge (07/26/25 0814)           Anticipated Discharge Disposition (SLP): skilled nursing facility (07/26/25 0929)           Reason for Discharge: all goals and outcomes met, no further needs identified (07/26/25 0929)                Progress: no change (Initial Evaluation) (07/26/25 0921)    SWALLOW EVALUATION (Last 72 Hours)       SLP Adult Swallow Evaluation       Row Name 07/26/25 0814                   Rehab Evaluation    Document Type evaluation  -MG        Subjective Information no complaints  -MG        Patient Observations alert;agree to therapy;cooperative  -MG        Patient/Family/Caregiver  Comments/Observations Daughter present  -MG        Patient Effort good  -MG        Symptoms Noted During/After Treatment none  -MG           General Information    Patient Profile Reviewed yes  -MG        Pertinent History Of Current Problem Presented due to weakness, low BP. Dx: Pneumonia of left sided due to infectious organism. SCC of the proximal esophagus awaiting palliative XRT to the mass. Feeding tube placed last visit for sole source nutrition, continuous feeds at SNF. SLP consulted due to failed RN pneumonia screening.  -MG        Current Method of Nutrition NPO;gastrostomy feedings  -MG        Precautions/Limitations, Vision WFL;for purposes of eval  -MG        Precautions/Limitations, Hearing WFL;for purposes of eval  -MG        Prior Level of Function-Communication WFL  -MG        Prior Level of Function-Swallowing no diet consistency restrictions  prior to SCC of the esophagus. Recent VFSS with recommendations for NPO status  -MG        Plans/Goals Discussed with patient and family;agreed upon  -MG        Barriers to Rehab medically complex  -MG        Patient's Goals for Discharge return to all previous roles/activities  -MG        Family Goals for Discharge patient able to return to all previous activities/roles  -MG           Pain    Additional Documentation Pain Scale: FACES Pre/Post-Treatment (Group)  -MG           Pain Scale: FACES Pre/Post-Treatment    Pain: FACES Scale, Pretreatment 0-->no hurt  -MG        Posttreatment Pain Rating 0-->no hurt  -MG           Oral Motor Structure and Function    Secretion Management WNL/WFL  -MG        Mucosal Quality moist, healthy  -MG           Oral Musculature and Cranial Nerve Assessment    Oral Motor General Assessment generalized oral motor weakness  -MG           General Eating/Swallowing Observations    Respiratory Support Currently in Use room air  -MG        Positioning During Eating upright in chair  -MG           Clinical Swallow Eval    Clinical  Swallow Evaluation Summary See note  -MG           SLP Evaluation Clinical Impression    SLP Swallowing Diagnosis severe;oral dysphagia;pharyngeal dysphagia;esophageal dysphagia  -MG        Functional Impact risk of aspiration/pneumonia  -MG        Swallow Criteria for Skilled Therapeutic Interventions Met current level of function same as previous level of function  -MG           Recommendations    Therapy Frequency (Swallow) evaluation only  -MG        Predicted Duration Therapy Intervention (Days) until discharge  -MG        SLP Diet Recommendation NPO;long term alternate methods of nutrition/hydration;ice chips between meals after oral care, with supervision  -MG        Recommended Diagnostics VFSS (MBS)  once XRT is completed  -MG        Recommended Precautions and Strategies upright posture during/after eating  -MG        Oral Care Recommendations Oral Care BID/PRN;Toothbrush  -MG        SLP Rec. for Method of Medication Administration meds via alternate route  -MG        Monitor for Signs of Aspiration yes;notify SLP if any concerns  -MG        Anticipated Discharge Disposition (SLP) skilled nursing facility  -MG                  User Key  (r) = Recorded By, (t) = Taken By, (c) = Cosigned By      Initials Name Effective Dates    MG Carmelita Pizarro MS [facility]-SLP 07/11/23 -                     EDUCATION  The patient has been educated in the following areas:   Dysphagia (Swallowing Impairment) Oral Care/Hydration NPO rationale.                   Time Calculation:    Time Calculation- SLP       Row Name 07/26/25 0930             Time Calculation- SLP    SLP Start Time 0814  -MG      SLP Stop Time 0930  -MG      SLP Time Calculation (min) 76 min  -MG      SLP Received On 07/26/25  -MG         Untimed Charges    33694-KC Eval Oral Pharyng Swallow Minutes 76  -MG         Total Minutes    Untimed Charges Total Minutes 76  -MG       Total Minutes 76  -MG                User Key  (r) = Recorded By, (t) = Taken By, (c)  = Cosigned By      Initials Name Provider Type    Carmelita Roca, MS CCC-SLP Speech and Language Pathologist                    Therapy Charges for Today       Code Description Service Date Service Provider Modifiers Qty    21346900154 HC ST EVAL ORAL PHARYNG SWALLOW 5 7/26/2025 Carmelita Pizarro, MS CCC-SLP GN 1                 SLP Discharge Summary  Anticipated Discharge Disposition (SLP): skilled nursing facility  Reason for Discharge: all goals and outcomes met, no further needs identified  Progress Toward Achieving Short/long Term Goals: all goals met within established timelines, discharge on same date as initial evaluation  Discharge Destination: other (see comments) (Remains in acute care)    Carmelita Pizarro MS CCC-SLP  7/26/2025

## 2025-07-26 NOTE — THERAPY EVALUATION
Patient Name: Lorena Valdes  : 1940    MRN: [id number]                              Today's Date: 2025       Admit Date: 2025    Visit Dx:     ICD-10-CM ICD-9-CM   1. Weakness  R53.1 780.79   2. Pneumonia due to infectious organism, unspecified laterality, unspecified part of lung  J18.9 486   3. Hyponatremia  E87.1 276.1   4. Dysphagia, unspecified type  R13.10 787.20   5. Impaired mobility [Z74.09]  Z74.09 799.89     Patient Active Problem List   Diagnosis    Malignant neoplasm of central portion of left female breast    Malignant neoplasm of upper-outer quadrant of right female breast    Osteopenia    Encounter for care related to vascular access port    Colon cancer screening    Chronic kidney disease, stage 3a    Diabetic renal disease    Hypertensive renal disease    Estrogen receptor negative tumor status    Squamous cell esophageal cancer    Weight loss    Dysphagia with feeding tube    Severe protein-calorie malnutrition    DM2 (diabetes mellitus, type 2)    Hypokalemia    Hypomagnesemia    Pneumonia of left sided due to infectious organism, suspect aspiration    Hyponatremia     Past Medical History:   Diagnosis Date    Bladder disorder     Chronic kidney disease, stage 3b     Diverticulosis     Emphysema lung     Fibrocystic disease of breast     CONNIE (generalized anxiety disorder)     GERD (gastroesophageal reflux disease)     Heart disease     History of bone density study     History of colon polyps     Hyperglycemia     Hyperlipidemia     Hypotension     Left ventricular diastolic dysfunction     Malignant neoplasm of central portion of left female breast 2016    Osteopenia 2017    Restrictive lung disease     Type 2 diabetes mellitus     Uterine prolapse      Past Surgical History:   Procedure Laterality Date    BREAST LUMPECTOMY  2008    CATARACT EXTRACTION Right     COLONOSCOPY  2010    Diverticulosis; Repeat 10 years    COLONOSCOPY N/A 2020     One 6mm inflamed hyperplastic polyp in the cecum; Diverticulosis in the left colon; Non-bleeding internal hemorrhoids; Repeat 5 years    ENDOSCOPY N/A 06/12/2025    Exophytic mass found in the larynx, not obstructing the airway; Partially obstructing, rule out malignancy, esophageal tumor was found in the proximal esophagus-biopsied; Medium-sized HH; Normal stomach; Two non-bleeding angiodysplastic lesions in the duodenum-biopsied    ENDOSCOPY W/ PEG TUBE PLACEMENT N/A 7/10/2025    Procedure: ESOPHAGOGASTRODUODENOSCOPY WITH PERCUTANEOUS ENDOSCOPIC GASTROSTOMY TUBE INSERTION WITH ANESTHESIA;  Surgeon: Christine Valdovinos MD;  Location: [facility] ENDOSCOPY;  Service: Gastroenterology;  Laterality: N/A;  pre op:peg placement  post op: esophageal mass  PCP:Luly Diez MD    GTUBE INSERTION N/A 7/10/2025    Procedure: LAPAROSCOPIC GASTROSTOMY FEEDING TUBE WITH DAVINCI ROBOT;  Surgeon: Willie Weber MD;  Location: [facility] OR;  Service: Robotics - DaVinci;  Laterality: N/A;    MAMMO BILATERAL  07/17/2013    Dr. Sabillon    MASTECTOMY Left 08/28/2008    PAP SMEAR  2010    SKIN CANCER EXCISION        General Information       Row Name 07/26/25 0732          Physical Therapy Time and Intention    Document Type evaluation  weakness, low BP, recent feeding tube placed 7.11.25.  Dx:  pneumonia  -     Mode of Treatment physical therapy;co-treatment  -       Row Name 07/26/25 0732          General Information    Patient Profile Reviewed yes  -     Prior Level of Function independent:;ADL's;all household mobility  independent prior to admission to [facility].  used a cane at home, but has been using a RW at [facility]  -     Existing Precautions/Restrictions fall  -     Barriers to Rehab medically complex;previous functional deficit  -       Row Name 07/26/25 0732          Living Environment    Current Living Arrangements extended care facility  -     People in Home facility resident  -       Row Name 07/26/25 0732           Home Main Entrance    Number of Stairs, Main Entrance none  -[facility] Name 07/26/25 0732          Stairs Within Home, Primary    Number of Stairs, Within Home, Primary none  -[facility] Name 07/26/25 0732          Cognition    Orientation Status (Cognition) oriented x 4  -[facility] Name 07/26/25 0732          Safety Issues/Impairments Affecting Functional Mobility    Safety Issues Affecting Function (Mobility) ability to follow commands  -     Impairments Affecting Function (Mobility) balance;endurance/activity tolerance;postural/trunk control;range of motion (ROM);strength;shortness of breath  -               User Key  (r) = Recorded By, (t) = Taken By, (c) = Cosigned By      Initials Name Provider Type     Matias Gandhi, PT Physical Therapist                   Mobility       Row Name 07/26/25 0732          Bed Mobility    Bed Mobility supine-sit  -     Supine-Sit Howell (Bed Mobility) minimum assist (75% patient effort)  -     Assistive Device (Bed Mobility) bed rails;head of bed elevated  -[facility] Name 07/26/25 0732          Sit-Stand Transfer    Sit-Stand Howell (Transfers) contact guard  -[facility] Name 07/26/25 07          Gait/Stairs (Locomotion)    Howell Level (Gait) verbal cues;contact guard  -     Assistive Device (Gait) walker, 4-wheeled  -     Distance in Feet (Gait) 18  -LH     Deviations/Abnormal Patterns (Gait) seamus decreased;gait speed decreased  -     Bilateral Gait Deviations forward flexed posture  -               User Key  (r) = Recorded By, (t) = Taken By, (c) = Cosigned By      Initials Name Provider Type     Matias Gandhi, PT Physical Therapist                   Obj/Interventions       Row Name 07/26/25 0732          Range of Motion Comprehensive    General Range of Motion bilateral upper extremity ROM WFL;bilateral lower extremity ROM WFL  -[facility] Name 07/26/25 0732          Strength Comprehensive (MMT)    General Manual  Muscle Testing (MMT) Assessment upper extremity strength deficits identified;lower extremity strength deficits identified  -[facility] Name 07/26/25 0732          Sensory Assessment (Somatosensory)    Sensory Assessment (Somatosensory) sensation intact  -               User Key  (r) = Recorded By, (t) = Taken By, (c) = Cosigned By      Initials Name Provider Type     Matias Gandhi, PT Physical Therapist                   Goals/Plan       [facility] Name 07/26/25 0732          Bed Mobility Goal 1 (PT)    Activity/Assistive Device (Bed Mobility Goal 1, PT) bed mobility activities, all  -     Summers Level/Cues Needed (Bed Mobility Goal 1, PT) standby assist  -     Time Frame (Bed Mobility Goal 1, PT) 10 days  -     Progress/Outcomes (Bed Mobility Goal 1, PT) new goal  -[facility] Name 07/26/25 0732          Transfer Goal 1 (PT)    Activity/Assistive Device (Transfer Goal 1, PT) sit-to-stand/stand-to-sit  -     Summers Level/Cues Needed (Transfer Goal 1, PT) standby assist  -     Time Frame (Transfer Goal 1, PT) 10 days  -     Progress/Outcome (Transfer Goal 1, PT) new goal  -[facility] Name 07/26/25 0732          Gait Training Goal 1 (PT)    Activity/Assistive Device (Gait Training Goal 1, PT) gait (walking locomotion);assistive device use  -     Summers Level (Gait Training Goal 1, PT) contact guard required  -     Distance (Gait Training Goal 1, PT) 40ft  -     Time Frame (Gait Training Goal 1, PT) 10 days  -     Progress/Outcome (Gait Training Goal 1, PT) new goal  -[facility] Name 07/26/25 0732          Therapy Assessment/Plan (PT)    Planned Therapy Interventions (PT) balance training;bed mobility training;gait training;home exercise program;stretching;strengthening;ROM (range of motion);postural re-education;patient/family education;transfer training  -               User Key  (r) = Recorded By, (t) = Taken By, (c) = Cosigned By      Initials Name Provider Type      Matias Gandhi, PT Physical Therapist                   Clinical Impression       Row Name 07/26/25 0732          Pain    Pretreatment Pain Rating 0/10 - no pain  -     Posttreatment Pain Rating 0/10 - no pain  -       Row Name 07/26/25 0732          Plan of Care Review    Plan of Care Reviewed With patient;child  -     Outcome Evaluation PT IE complete.  A&x4.  No c/o pain.  VSS during position changes.  Pt reports she was independent prior to admission to [facility].  Today she was min A for bed mobility.  CGA sit<<>stand.  Ambulated 18ft CGA x1 w/ RW.  C/o of fatigue and weakness.  PT to see for gait safety, balance, and strengthening.  Recommend return to SNF at [place].  Thank you for referral.  -       Row Name 07/26/25 0732          Therapy Assessment/Plan (PT)    Patient/Family Therapy Goals Statement (PT) return home  -     Rehab Potential (PT) good  -     Criteria for Skilled Interventions Met (PT) yes;skilled treatment is necessary  -     Therapy Frequency (PT) 2 times/day  -     Predicted Duration of Therapy Intervention (PT) until TX  -       Row Name 07/26/25 0732          Positioning and Restraints    Pre-Treatment Position in bed  -     Post Treatment Position chair  -     In Chair reclined;call light within reach;with family/caregiver;with OT;legs elevated  -               User Key  (r) = Recorded By, (t) = Taken By, (c) = Cosigned By      Initials Name Provider Type     Matias Gandhi, PT Physical Therapist                   Outcome Measures       Row Name 07/26/25 0732          How much help from another person do you currently need...    Turning from your back to your side while in flat bed without using bedrails? 3  -LH     Moving from lying on back to sitting on the side of a flat bed without bedrails? 3  -LH     Moving to and from a bed to a chair (including a wheelchair)? 3  -LH     Standing up from a chair using your arms (e.g., wheelchair, bedside chair)? 3  -LH      Climbing 3-5 steps with a railing? 2  -     To walk in hospital room? 3  -     AM-PAC 6 Clicks Score (PT) 17  -     Highest Level of Mobility Goal Stand (1 or More Minutes)-5  -       Row Name 07/26/25 0744 07/26/25 0732       Functional Assessment    Outcome Measure Options AM-PAC 6 Clicks Daily Activity (OT)  - AM-PAC 6 Clicks Basic Mobility (PT)  -              User Key  (r) = Recorded By, (t) = Taken By, (c) = Cosigned By      Initials Name Provider Type     Matias Gandhi, PT Physical Therapist    LS Shelley Ordonez, OTR/L Occupational Therapist                                 Physical Therapy Education       Title: PT OT SLP Therapies (In Progress)       Topic: Physical Therapy (Done)       Point: Mobility training (Done)       Learning Progress Summary            Patient Acceptance, E,D, DU,VU by  at 7/26/2025 0954    Comment: benefits of PT and POC, call for A OOB                      Point: Precautions (Done)       Learning Progress Summary            Patient Acceptance, E,D, DU,VU by  at 7/26/2025 0954    Comment: benefits of PT and POC, call for A OOB                                      User Key       Initials Effective Dates Name Provider Type Discipline     02/03/23 -  Matias Gandhi, PT Physical Therapist PT                  PT Recommendation and Plan  Planned Therapy Interventions (PT): balance training, bed mobility training, gait training, home exercise program, stretching, strengthening, ROM (range of motion), postural re-education, patient/family education, transfer training  Outcome Evaluation: PT IE complete.  A&x4.  No c/o pain.  VSS during position changes.  Pt reports she was independent prior to admission to [facility].  Today she was min A for bed mobility.  CGA sit<<>stand.  Ambulated 18ft CGA x1 w/ RW.  C/o of fatigue and weakness.  PT to see for gait safety, balance, and strengthening.  Recommend return to SNF at [place].  Thank you for referral.     Time Calculation:          PT Charges       Row Name 07/26/25 0955             Time Calculation    Start Time 0732  -      Stop Time 0810  -      Time Calculation (min) 38 min  -      PT Received On 07/26/25  -      PT Goal Re-Cert Due Date 08/05/25  -         Untimed Charges    PT Eval/Re-eval Minutes 38  -         Total Minutes    Untimed Charges Total Minutes 38  -       Total Minutes 38  -                User Key  (r) = Recorded By, (t) = Taken By, (c) = Cosigned By      Initials Name Provider Type     Matias Gandhi, PT Physical Therapist                  Therapy Charges for Today       Code Description Service Date Service Provider Modifiers Qty    82741679552 HC PT EVAL LOW COMPLEXITY 3 7/26/2025 Matias Gandhi, PT GP 1            PT G-Codes  Outcome Measure Options: AM-PAC 6 Clicks Daily Activity (OT)  AM-PAC 6 Clicks Score (PT): 17  AM-PAC 6 Clicks Score (OT): 20  PT Discharge Summary  Anticipated Discharge Disposition (PT): skilled nursing facility    Matias Gandhi PT  7/26/2025

## 2025-07-26 NOTE — PLAN OF CARE
Goal Outcome Evaluation:  Plan of Care Reviewed With: patient, daughter        Progress: no change  Outcome Evaluation: OT eval completed. Pt in fowlers upon therapist arrival; A&Ox4; No c/o pain; Pt's daughter also present. Per Pt's daughter, the Pt was living at home I prior to recent hospital admission and transition to [facility]. Today, Pt performed supine>sit utilizing bedrail with HOB elevated with Min A and verbal cues for sequencing and positioning. Pt donned B socks while seated EOB with CGA. Pt performed all sit<>stand transfers to/from bed, toilet, and chair utilizing rwx and/or grab bar with CGA and verbal cues for safety awareness. Pt ambulated from bed>BR>chair utilizing rwx with CGA and verbal cues for safety awareness and positioning of AD. Pt performed toileting task requiring Min A for clothing management and thorough posterior imani hygiene. Pt's BP monitored throughout session with negative orthostatics observed. Skilled OT intervention indicated in order to address deficits in fxl mobility, fxl activity tolerance, balance, strength, and use of adaptive techniques/equipment during performance of BADLs. Recommend SNF at discharge.    Anticipated Discharge Disposition (OT): skilled nursing facility

## 2025-07-26 NOTE — PLAN OF CARE
Goal Outcome Evaluation:  Plan of Care Reviewed With: patient, family        Progress: no change  Outcome Evaluation: A&Ox4. Up this morning in chair a few hours. TF advanced to 70 ml/hr, tolerated. RA. BG monioring with SSI. Purewick. Turns as tolerated. Family at bs. IV abx. Tele, NSR. SCD. Up x 1 with walker.

## 2025-07-26 NOTE — PLAN OF CARE
Goal Outcome Evaluation:  Plan of Care Reviewed With: patient, child        Progress: no change (Initial Evaluation)       Anticipated Discharge Disposition (SLP): skilled nursing facility          SLP Swallowing Diagnosis: severe, oral dysphagia, pharyngeal dysphagia, esophageal dysphagia (07/26/25 0814)             Swallow evaluation completed. Patient was alert and cooperative. Sitting in chair when I entered the room with her daughter at the bedside. Patient is well known to me from previous admission. Patient with SCC of the proximal esophagus. During previous admission full workup of her swallow function was completed with completion of a swallow study in radiology revealing severe pharyngeal and esophageal dysphagia secondary to the mass. At that time patient decided to remain fully NPO to reduce risk of complications from aspiration given her want to pursue palliative radiation to the mass in hopes of improved function. Patient reports she has indeed been NPO and has been only doing continuous tube feedings per gtube. Daughter reports palliative radiation is planned for next week, 13 treatments. We again discussed continuation of NPO status, importance of oral care, positioning during tube feedings, allowance for occasional ice chips following oral care,  and plan for repeat VFSS once radiation is completed. Patient and her daughter are agreeable with this plan.     SLP will sign off for now but plans to follow with patient for OP swallow study once radiation is completed. Discussed with family to request this order be placed by Dr. Ernandez once treatment is completed.     Carmelita Pizarro MS CCC-SLP 7/26/2025 09:29 CDT

## 2025-07-26 NOTE — PLAN OF CARE
Problem: Adult Inpatient Plan of Care  Goal: Plan of Care Review  Recent Flowsheet Documentation  Taken 7/26/2025 0732 by Matias Gandhi, PT  Outcome Evaluation: PT IE complete.  A&x4.  No c/o pain.  VSS during position changes.  Pt reports she was independent prior to admission to [facility].  Today she was min A for bed mobility.  CGA sit<<>stand.  Ambulated 18ft CGA x1 w/ RW.  C/o of fatigue and weakness.  PT to see for gait safety, balance, and strengthening.  Recommend return to SNF at [place].  Thank you for referral.  Plan of Care Reviewed With:   patient   child   Goal Outcome Evaluation:  Plan of Care Reviewed With: patient, child           Outcome Evaluation: PT IE complete.  A&x4.  No c/o pain.  VSS during position changes.  Pt reports she was independent prior to admission to [facility].  Today she was min A for bed mobility.  CGA sit<<>stand.  Ambulated 18ft CGA x1 w/ RW.  C/o of fatigue and weakness.  PT to see for gait safety, balance, and strengthening.  Recommend return to SNF at [place].  Thank you for referral.    Anticipated Discharge Disposition (PT): skilled nursing facility

## 2025-07-26 NOTE — THERAPY EVALUATION
Patient Name: Lorena Valdes  : 1940    MRN: [id number]                              Today's Date: 2025       Admit Date: 2025    Visit Dx:     ICD-10-CM ICD-9-CM   1. Weakness  R53.1 780.79   2. Pneumonia due to infectious organism, unspecified laterality, unspecified part of lung  J18.9 486   3. 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    Malignant neoplasm of upper-outer quadrant of right female breast    Osteopenia    Encounter for care related to vascular access port    Colon cancer screening    Chronic kidney disease, stage 3a    Diabetic renal disease    Hypertensive renal disease    Estrogen receptor negative tumor status    Squamous cell esophageal cancer    Weight loss    Dysphagia with feeding tube    Severe protein-calorie malnutrition    DM2 (diabetes mellitus, type 2)    Hypokalemia    Hypomagnesemia    Pneumonia of left sided due to infectious organism, suspect aspiration    Hyponatremia     Past Medical History:   Diagnosis Date    Bladder disorder     Chronic kidney disease, stage 3b     Diverticulosis     Emphysema lung     Fibrocystic disease of breast     CONNIE (generalized anxiety disorder)     GERD (gastroesophageal reflux disease)     Heart disease     History of bone density study     History of colon polyps     Hyperglycemia     Hyperlipidemia     Hypotension     Left ventricular diastolic dysfunction     Malignant neoplasm of central portion of left female breast 2016    Osteopenia 2017    Restrictive lung disease     Type 2 diabetes mellitus     Uterine prolapse      Past Surgical History:   Procedure Laterality Date    BREAST LUMPECTOMY      CATARACT EXTRACTION Right     COLONOSCOPY  2010    Diverticulosis; Repeat 10 years    COLONOSCOPY N/A 2020    One 6mm inflamed hyperplastic polyp in the cecum; Diverticulosis in the left colon; Non-bleeding  internal hemorrhoids; Repeat 5 years    ENDOSCOPY N/A 06/12/2025    Exophytic mass found in the larynx, not obstructing the airway; Partially obstructing, rule out malignancy, esophageal tumor was found in the proximal esophagus-biopsied; Medium-sized HH; Normal stomach; Two non-bleeding angiodysplastic lesions in the duodenum-biopsied    ENDOSCOPY W/ PEG TUBE PLACEMENT N/A 7/10/2025    Procedure: ESOPHAGOGASTRODUODENOSCOPY WITH PERCUTANEOUS ENDOSCOPIC GASTROSTOMY TUBE INSERTION WITH ANESTHESIA;  Surgeon: Christine Valdovinos MD;  Location: [facility] ENDOSCOPY;  Service: Gastroenterology;  Laterality: N/A;  pre op:peg placement  post op: esophageal mass  PCP:Luly Diez MD    GTUBE INSERTION N/A 7/10/2025    Procedure: LAPAROSCOPIC GASTROSTOMY FEEDING TUBE WITH DAVINCI ROBOT;  Surgeon: Willie Weber MD;  Location: [facility] OR;  Service: Robotics - DaVinci;  Laterality: N/A;    MAMMO BILATERAL  07/17/2013    Dr. Sabillon    MASTECTOMY Left 08/28/2008    PAP SMEAR  2010    SKIN CANCER EXCISION        General Information       Row Name 07/26/25 0744          OT Time and Intention    Document Type evaluation  cc: weakness, low BP. Dx: Pneumonia of left sided due to infectious organism, suspect aspiration, Hyponatremia, DM2, Dysphagia with feeding tube, Squamous cell esophageal cancer  -LS     Mode of Treatment occupational therapy  -LS     Patient Effort good  -LS       Row Name 07/26/25 0744          General Information    Patient Profile Reviewed yes  -LS     Prior Level of Function independent:;ADL's;all household mobility  Prior to recent hospital admission and transfer to [facility]  -     Existing Precautions/Restrictions fall;other (see comments)  PEG, L chest port  -     Barriers to Rehab medically complex;previous functional deficit  -LS       Row Name 07/26/25 0744          Living Environment    Current Living Arrangements extended care facility  home alone prior to recent hospital admission  -LS     People in Home  facility resident  -       Row Name 07/26/25 0744          Home Main Entrance    Number of Stairs, Main Entrance none  -LS       Row Name 07/26/25 0744          Stairs Within Home, Primary    Number of Stairs, Within Home, Primary none  -LS       Row Name 07/26/25 0744          Cognition    Orientation Status (Cognition) oriented x 4  -LS       Row Name 07/26/25 0744          Safety Issues/Impairments Affecting Functional Mobility    Safety Issues Affecting Function (Mobility) safety precaution awareness;safety precautions follow-through/compliance  -     Impairments Affecting Function (Mobility) balance;endurance/activity tolerance;strength  -               User Key  (r) = Recorded By, (t) = Taken By, (c) = Cosigned By      Initials Name Provider Type    Shelley Leone OTR/L Occupational Therapist                     Mobility/ADL's       Row Name 07/26/25 0744          Bed Mobility    Bed Mobility supine-sit  -LS     Supine-Sit [place] (Bed Mobility) minimum assist (75% patient effort);verbal cues  -     Assistive Device (Bed Mobility) bed rails;head of bed elevated  -       Row Name 07/26/25 0744          Transfers    Transfers sit-stand transfer;stand-sit transfer  -LS       Row Name 07/26/25 0744          Sit-Stand Transfer    Sit-Stand [place] (Transfers) contact guard  -LS     Assistive Device (Sit-Stand Transfers) walker, front-wheeled  -LS       Row Name 07/26/25 0744          Stand-Sit Transfer    Stand-Sit [place] (Transfers) contact guard  -LS     Assistive Device (Stand-Sit Transfers) walker, front-wheeled  -LS       Row Name 07/26/25 0744          Functional Mobility    Functional Mobility- Ind. Level contact guard assist  -LS     Functional Mobility- Device walker, front-wheeled  -LS       Row Name 07/26/25 0744          Activities of Daily Living    BADL Assessment/Intervention toileting;lower body dressing  -       Row Name 07/26/25 0744          Toileting  Assessment/Training    Nye Level (Toileting) toileting skills;adjust/manage clothing;perform perineal hygiene;minimum assist (75% patient effort)  -LS     Position (Toileting) unsupported sitting;supported standing  -       Row Name 07/26/25 44          Lower Body Dressing Assessment/Training    Nye Level (Lower Body Dressing) don;socks;contact guard assist  -LS     Position (Lower Body Dressing) edge of bed sitting  -               User Key  (r) = Recorded By, (t) = Taken By, (c) = Cosigned By      Initials Name Provider Type     Shelley Ordonez OTR/L Occupational Therapist                   Obj/Interventions       Row Name 07/26/25 0744          Vision Assessment/Intervention    Visual Impairment/Limitations WFL  -       Row Name 07/26/25 0744          Range of Motion Comprehensive    General Range of Motion bilateral upper extremity ROM WFL  -[facility] Name 07/26/25 0744          Strength Comprehensive (MMT)    General Manual Muscle Testing (MMT) Assessment upper extremity strength deficits identified  -       Row Name 07/26/25 0744          Balance    Balance Assessment sitting static balance;sitting dynamic balance;standing static balance;standing dynamic balance  -LS     Static Sitting Balance standby assist  -LS     Dynamic Sitting Balance contact guard  -LS     Position, Sitting Balance sitting edge of bed;unsupported  -LS     Static Standing Balance contact guard  -LS     Dynamic Standing Balance contact guard  -LS     Position/Device Used, Standing Balance supported;walker, front-wheeled  -               User Key  (r) = Recorded By, (t) = Taken By, (c) = Cosigned By      Initials Name Provider Type     Shelley Ordonez OTR/L Occupational Therapist                   Goals/Plan       Row Name 07/26/25 0744          Transfer Goal 1 (OT)    Activity/Assistive Device (Transfer Goal 1, OT) toilet;shower chair  -     Nye Level/Cues Needed (Transfer Goal 1, OT) standby  assist  -LS     Time Frame (Transfer Goal 1, OT) long term goal (LTG);10 days  -LS     Progress/Outcome (Transfer Goal 1, OT) new goal  -LS       Row Name 07/26/25 0744          Dressing Goal 1 (OT)    Activity/Device (Dressing Goal 1, OT) dressing skills, all  -LS     Bernice/Cues Needed (Dressing Goal 1, OT) standby assist  -LS     Time Frame (Dressing Goal 1, OT) long term goal (LTG);10 days  -LS     Progress/Outcome (Dressing Goal 1, OT) new goal  -LS       Row Name 07/26/25 0744          Toileting Goal 1 (OT)    Activity/Device (Toileting Goal 1, OT) toileting skills, all  -LS     Bernice Level/Cues Needed (Toileting Goal 1, OT) standby assist  -LS     Time Frame (Toileting Goal 1, OT) long term goal (LTG);10 days  -LS     Progress/Outcome (Toileting Goal 1, OT) new goal  -LS       Row Name 07/26/25 0744          Therapy Assessment/Plan (OT)    Planned Therapy Interventions (OT) activity tolerance training;functional balance retraining;occupation/activity based interventions;ROM/therapeutic exercise;strengthening exercise;transfer/mobility retraining;patient/caregiver education/training;adaptive equipment training;BADL retraining  -LS               User Key  (r) = Recorded By, (t) = Taken By, (c) = Cosigned By      Initials Name Provider Type    Shelley Leone OTR/L Occupational Therapist                   Clinical Impression       Row Name 07/26/25 0744          Pain Assessment    Pretreatment Pain Rating 0/10 - no pain  -LS     Posttreatment Pain Rating 0/10 - no pain  -LS       Row Name 07/26/25 0744          Plan of Care Review    Plan of Care Reviewed With patient;daughter  -LS     Progress no change  -LS     Outcome Evaluation OT eval completed. Pt in fowlers upon therapist arrival; A&Ox4; No c/o pain; Pt's daughter also present. Per Pt's daughter, the Pt was living at home I prior to recent hospital admission and transition to [facility]. Today, Pt performed supine>sit utilizing bedrail with  HOB elevated with Min A and verbal cues for sequencing and positioning. Pt donned B socks while seated EOB with CGA. Pt performed all sit<>stand transfers to/from bed, toilet, and chair utilizing rwx and/or grab bar with CGA and verbal cues for safety awareness. Pt ambulated from bed>BR>chair utilizing rwx with CGA and verbal cues for safety awareness and positioning of AD. Pt performed toileting task requiring Min A for clothing management and thorough posterior imani hygiene. Pt's BP monitored throughout session with negative orthostatics observed. Skilled OT intervention indicated in order to address deficits in fxl mobility, fxl activity tolerance, balance, strength, and use of adaptive techniques/equipment during performance of BADLs. Recommend SNF at discharge.  -       Row Name 07/26/25 0744          Therapy Assessment/Plan (OT)    Rehab Potential (OT) good  -     Criteria for Skilled Therapeutic Interventions Met (OT) yes;skilled treatment is necessary  -     Therapy Frequency (OT) 5 times/wk  -       Row Name 07/26/25 0744          Therapy Plan Review/Discharge Plan (OT)    Anticipated Discharge Disposition (OT) skilled nursing facility  -       Row Name 07/26/25 0744          Positioning and Restraints    Pre-Treatment Position in bed  -LS     Post Treatment Position chair  -LS     In Chair reclined;call light within reach;encouraged to call for assist;legs elevated;with family/caregiver  -               User Key  (r) = Recorded By, (t) = Taken By, (c) = Cosigned By      Initials Name Provider Type    LS Shelley Ordonez, OTR/L Occupational Therapist                   Outcome Measures       Row Name 07/26/25 0744          How much help from another is currently needed...    Putting on and taking off regular lower body clothing? 3  -LS     Bathing (including washing, rinsing, and drying) 3  -LS     Toileting (which includes using toilet bed pan or urinal) 3  -LS     Putting on and taking off regular  upper body clothing 4  -LS     Taking care of personal grooming (such as brushing teeth) 3  -LS     Eating meals 4  -LS     AM-PAC 6 Clicks Score (OT) 20  -LS       Row Name 07/26/25 0744          Functional Assessment    Outcome Measure Options AM-PAC 6 Clicks Daily Activity (OT)  -               User Key  (r) = Recorded By, (t) = Taken By, (c) = Cosigned By      Initials Name Provider Type    LS Shelley Ordonez OTR/L Occupational Therapist                    Occupational Therapy Education       Title: PT OT SLP Therapies (In Progress)       Topic: Occupational Therapy (In Progress)       Point: ADL training (Done)       Learning Progress Summary            Patient Acceptance, E, VU,NR by  at 7/26/2025 0826                      Point: Precautions (Done)       Learning Progress Summary            Patient Acceptance, E, VU,NR by  at 7/26/2025 0826                                      User Key       Initials Effective Dates Name Provider Type Discipline     06/20/22 -  Shelley Ordonez OTR/L Occupational Therapist OT                  OT Recommendation and Plan  Planned Therapy Interventions (OT): activity tolerance training, functional balance retraining, occupation/activity based interventions, ROM/therapeutic exercise, strengthening exercise, transfer/mobility retraining, patient/caregiver education/training, adaptive equipment training, BADL retraining  Therapy Frequency (OT): 5 times/wk  Plan of Care Review  Plan of Care Reviewed With: patient, daughter  Progress: no change  Outcome Evaluation: OT eval completed. Pt in fowlers upon therapist arrival; A&Ox4; No c/o pain; Pt's daughter also present. Per Pt's daughter, the Pt was living at home I prior to recent hospital admission and transition to [facility]. Today, Pt performed supine>sit utilizing bedrail with HOB elevated with Min A and verbal cues for sequencing and positioning. Pt donned B socks while seated EOB with CGA. Pt performed all sit<>stand  transfers to/from bed, toilet, and chair utilizing rwx and/or grab bar with CGA and verbal cues for safety awareness. Pt ambulated from bed>BR>chair utilizing rwx with CGA and verbal cues for safety awareness and positioning of AD. Pt performed toileting task requiring Min A for clothing management and thorough posterior imani hygiene. Pt's BP monitored throughout session with negative orthostatics observed. Skilled OT intervention indicated in order to address deficits in fxl mobility, fxl activity tolerance, balance, strength, and use of adaptive techniques/equipment during performance of BADLs. Recommend SNF at discharge.     Time Calculation:         Time Calculation- OT       Row Name 07/26/25 0744             Time Calculation- OT    OT Start Time 0744  +8 minutes chart review  -      OT Stop Time 0814  -      OT Time Calculation (min) 30 min  -      OT Non-Billable Time (min) 38 min  -      OT Received On 07/26/25  -      OT Goal Re-Cert Due Date 08/05/25  -                User Key  (r) = Recorded By, (t) = Taken By, (c) = Cosigned By      Initials Name Provider Type     Shelley Ordonez OTR/L Occupational Therapist                  Therapy Charges for Today       Code Description Service Date Service Provider Modifiers Qty    17904657002 HC OT EVAL MOD COMPLEXITY 3 7/26/2025 Shelley Ordonez OTR/L GO 1                 JESSICA Levine/WALLACE  7/26/2025

## 2025-07-26 NOTE — CASE MANAGEMENT/SOCIAL WORK
Continued Stay Note   Jaimee     Patient Name: Lorena Valdes  MRN: [id number]  Today's Date: 7/26/2025    Admit Date: 7/25/2025    Plan: Fel[facility]   Discharge Plan       Row Name 07/26/25 1303       Plan    Plan Felipe    Patient/Family in Agreement with Plan yes    Plan Comments Sw spoke with pts daughter who stated the pt will be going back to [facility] when she is ready. No other needs stated at this time. Sw will continue to follow.    Final Discharge Disposition Code 03 - skilled nursing facility (SNF)                   Discharge Codes    No documentation.                       Angel Russo

## 2025-07-27 PROBLEM — N30.00 ACUTE CYSTITIS WITHOUT HEMATURIA: Status: ACTIVE | Noted: 2025-07-27

## 2025-07-27 LAB
ANION GAP SERPL CALCULATED.3IONS-SCNC: 11 MMOL/L (ref 5–15)
BASOPHILS # BLD AUTO: 0.04 10*3/MM3 (ref 0–0.2)
BASOPHILS NFR BLD AUTO: 0.3 % (ref 0–1.5)
BUN SERPL-MCNC: 32.2 MG/DL (ref 8–23)
BUN/CREAT SERPL: 33.9 (ref 7–25)
CALCIUM SPEC-SCNC: 8 MG/DL (ref 8.6–10.5)
CHLORIDE SERPL-SCNC: 93 MMOL/L (ref 98–107)
CO2 SERPL-SCNC: 25 MMOL/L (ref 22–29)
CREAT SERPL-MCNC: 0.95 MG/DL (ref 0.57–1)
DEPRECATED RDW RBC AUTO: 45.9 FL (ref 37–54)
EGFRCR SERPLBLD CKD-EPI 2021: 59.6 ML/MIN/1.73
EOSINOPHIL # BLD AUTO: 0.25 10*3/MM3 (ref 0–0.4)
EOSINOPHIL NFR BLD AUTO: 1.7 % (ref 0.3–6.2)
ERYTHROCYTE [DISTWIDTH] IN BLOOD BY AUTOMATED COUNT: 13.9 % (ref 12.3–15.4)
GLUCOSE BLDC GLUCOMTR-MCNC: 146 MG/DL (ref 70–130)
GLUCOSE BLDC GLUCOMTR-MCNC: 159 MG/DL (ref 70–130)
GLUCOSE BLDC GLUCOMTR-MCNC: 170 MG/DL (ref 70–130)
GLUCOSE BLDC GLUCOMTR-MCNC: 176 MG/DL (ref 70–130)
GLUCOSE BLDC GLUCOMTR-MCNC: 202 MG/DL (ref 70–130)
GLUCOSE SERPL-MCNC: 165 MG/DL (ref 65–99)
HCT VFR BLD AUTO: 24.3 % (ref 34–46.6)
HGB BLD-MCNC: 7.6 G/DL (ref 12–15.9)
IMM GRANULOCYTES # BLD AUTO: 0.07 10*3/MM3 (ref 0–0.05)
IMM GRANULOCYTES NFR BLD AUTO: 0.5 % (ref 0–0.5)
LYMPHOCYTES # BLD AUTO: 4.5 10*3/MM3 (ref 0.7–3.1)
LYMPHOCYTES NFR BLD AUTO: 31.3 % (ref 19.6–45.3)
MCH RBC QN AUTO: 28.3 PG (ref 26.6–33)
MCHC RBC AUTO-ENTMCNC: 31.3 G/DL (ref 31.5–35.7)
MCV RBC AUTO: 90.3 FL (ref 79–97)
MONOCYTES # BLD AUTO: 1.72 10*3/MM3 (ref 0.1–0.9)
MONOCYTES NFR BLD AUTO: 12 % (ref 5–12)
NEUTROPHILS NFR BLD AUTO: 54.2 % (ref 42.7–76)
NEUTROPHILS NFR BLD AUTO: 7.78 10*3/MM3 (ref 1.7–7)
NRBC BLD AUTO-RTO: 0 /100 WBC (ref 0–0.2)
PLATELET # BLD AUTO: 421 10*3/MM3 (ref 140–450)
PMV BLD AUTO: 9.4 FL (ref 6–12)
POTASSIUM SERPL-SCNC: 3.6 MMOL/L (ref 3.5–5.2)
RBC # BLD AUTO: 2.69 10*6/MM3 (ref 3.77–5.28)
SODIUM SERPL-SCNC: 129 MMOL/L (ref 136–145)
WBC NRBC COR # BLD AUTO: 14.36 10*3/MM3 (ref 3.4–10.8)

## 2025-07-27 PROCEDURE — 94664 DEMO&/EVAL PT USE INHALER: CPT

## 2025-07-27 PROCEDURE — 85025 COMPLETE CBC W/AUTO DIFF WBC: CPT | Performed by: FAMILY MEDICINE

## 2025-07-27 PROCEDURE — 82948 REAGENT STRIP/BLOOD GLUCOSE: CPT | Performed by: FAMILY MEDICINE

## 2025-07-27 PROCEDURE — 80048 BASIC METABOLIC PNL TOTAL CA: CPT | Performed by: FAMILY MEDICINE

## 2025-07-27 PROCEDURE — 63710000001 INSULIN LISPRO (HUMAN) PER 5 UNITS: Performed by: NURSE PRACTITIONER

## 2025-07-27 PROCEDURE — 25010000002 PIPERACILLIN SOD-TAZOBACTAM PER 1 G: Performed by: NURSE PRACTITIONER

## 2025-07-27 PROCEDURE — 94799 UNLISTED PULMONARY SVC/PX: CPT

## 2025-07-27 PROCEDURE — 94760 N-INVAS EAR/PLS OXIMETRY 1: CPT

## 2025-07-27 PROCEDURE — 82948 REAGENT STRIP/BLOOD GLUCOSE: CPT

## 2025-07-27 RX ORDER — ALBUTEROL SULFATE 0.83 MG/ML
2.5 SOLUTION RESPIRATORY (INHALATION) EVERY 6 HOURS PRN
Status: DISCONTINUED | OUTPATIENT
Start: 2025-07-27 | End: 2025-07-28 | Stop reason: HOSPADM

## 2025-07-27 RX ORDER — CODEINE PHOSPHATE AND GUAIFENESIN 10; 100 MG/5ML; MG/5ML
5 SOLUTION ORAL EVERY 6 HOURS PRN
Status: DISCONTINUED | OUTPATIENT
Start: 2025-07-27 | End: 2025-07-28 | Stop reason: HOSPADM

## 2025-07-27 RX ORDER — IPRATROPIUM BROMIDE AND ALBUTEROL SULFATE 2.5; .5 MG/3ML; MG/3ML
3 SOLUTION RESPIRATORY (INHALATION)
Status: DISCONTINUED | OUTPATIENT
Start: 2025-07-28 | End: 2025-07-28 | Stop reason: HOSPADM

## 2025-07-27 RX ADMIN — FAMOTIDINE 40 MG: 20 TABLET, FILM COATED ORAL at 09:18

## 2025-07-27 RX ADMIN — INSULIN LISPRO 3 UNITS: 100 INJECTION, SOLUTION INTRAVENOUS; SUBCUTANEOUS at 09:21

## 2025-07-27 RX ADMIN — PRAVASTATIN SODIUM 40 MG: 20 TABLET ORAL at 09:18

## 2025-07-27 RX ADMIN — CARVEDILOL 3.12 MG: 3.12 TABLET, FILM COATED ORAL at 18:29

## 2025-07-27 RX ADMIN — CALCIUM CARBONATE 600 MG (1,500 MG)-VITAMIN D3 400 UNIT TABLET 1 TABLET: at 09:18

## 2025-07-27 RX ADMIN — CALCIUM CARBONATE 600 MG (1,500 MG)-VITAMIN D3 400 UNIT TABLET 1 TABLET: at 18:29

## 2025-07-27 RX ADMIN — IPRATROPIUM BROMIDE AND ALBUTEROL SULFATE 3 ML: .5; 3 SOLUTION RESPIRATORY (INHALATION) at 06:04

## 2025-07-27 RX ADMIN — CARVEDILOL 3.12 MG: 3.12 TABLET, FILM COATED ORAL at 09:18

## 2025-07-27 RX ADMIN — HYDROCODONE BITARTRATE AND CHLORPHENIRAMINE MALEATE 400 UNITS: 10; 8 SUSPENSION, EXTENDED RELEASE ORAL at 09:18

## 2025-07-27 RX ADMIN — ASPIRIN 81 MG CHEWABLE TABLET 81 MG: 81 TABLET CHEWABLE at 09:18

## 2025-07-27 RX ADMIN — INSULIN LISPRO 2 UNITS: 100 INJECTION, SOLUTION INTRAVENOUS; SUBCUTANEOUS at 20:33

## 2025-07-27 RX ADMIN — PIPERACILLIN AND TAZOBACTAM 3.38 G: 3; .375 INJECTION, POWDER, FOR SOLUTION INTRAVENOUS at 20:32

## 2025-07-27 RX ADMIN — IPRATROPIUM BROMIDE AND ALBUTEROL SULFATE 3 ML: .5; 3 SOLUTION RESPIRATORY (INHALATION) at 03:25

## 2025-07-27 RX ADMIN — PIPERACILLIN AND TAZOBACTAM 3.38 G: 3; .375 INJECTION, POWDER, FOR SOLUTION INTRAVENOUS at 04:19

## 2025-07-27 RX ADMIN — INSULIN LISPRO 2 UNITS: 100 INJECTION, SOLUTION INTRAVENOUS; SUBCUTANEOUS at 18:29

## 2025-07-27 RX ADMIN — IPRATROPIUM BROMIDE AND ALBUTEROL SULFATE 3 ML: .5; 3 SOLUTION RESPIRATORY (INHALATION) at 14:06

## 2025-07-27 RX ADMIN — IPRATROPIUM BROMIDE AND ALBUTEROL SULFATE 3 ML: .5; 3 SOLUTION RESPIRATORY (INHALATION) at 19:05

## 2025-07-27 RX ADMIN — INSULIN LISPRO 2 UNITS: 100 INJECTION, SOLUTION INTRAVENOUS; SUBCUTANEOUS at 12:09

## 2025-07-27 RX ADMIN — IPRATROPIUM BROMIDE AND ALBUTEROL SULFATE 3 ML: .5; 3 SOLUTION RESPIRATORY (INHALATION) at 10:20

## 2025-07-27 RX ADMIN — PIPERACILLIN AND TAZOBACTAM 3.38 G: 3; .375 INJECTION, POWDER, FOR SOLUTION INTRAVENOUS at 13:24

## 2025-07-27 NOTE — PLAN OF CARE
Goal Outcome Evaluation:      Patient is alert and oriented X4. Vital signs stable on RA. Up X1 with walker. TF running @70 mL/hr. Hung new bag of feed, minimal residual. Oral suctioning provided throughout night. Tele. NPO diet. Voiding via PW. Safety maintained.

## 2025-07-27 NOTE — PLAN OF CARE
Goal Outcome Evaluation: No complaints of pain this shift. Diabetasource infusing as ordered. Accuchecks ac and hs with sliding scale coverage given as ordered. Family at bedside this shift. Patient safety to be maintained this shift, continue to monitor and report abnormal to provider.

## 2025-07-27 NOTE — PROGRESS NOTES
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 Patient admitted 7/9 - 7/16, 2025 dysphagia.  She will underwent EGD on 7/10.  7/11 laparoscopic gastrostomy feeding tube placed per Dr. Willie Weber.  She was also seen by Dr. Samano on oncology and Dr. Gabriel Ernandez radiation oncology.       Patient was seen today at surgeon's office routine follow-up after feeding tube placement.  Due to weakness \" low blood pressure\" she was sent to [facility] ED for evaluation.  She has had a soft blood pressure since admission but no overt hypotension.  Patient is awake and alert.  She is able to answer all questions.  She resides at [facility], awaiting fax medication list to review home medications.  She has no pain.  She is supposed to start radiation next Tuesday.  She does have a wet cough.  Patient states she is a DNR/DNI.      ED workup revealed sodium 129, chloride 90, creatinine 1.10, GFR 50, glucose 165, albumin 2.7, remainder of CMP and magnesium within normal limits, WBC 23.46-no left shift or bandemia, lactic acid 1.5, hemoglobin 9.1, urinalysis reveals WBCs 21-50 with 4+ bacteria, urine culture pending, cultures have been ordered.     Other health history breast cancer 2008 status post lumpectomy with adjuvant chemotherapy, diabetes type 2, GERD, anemia, chronic kidney disease stage IIIa, COPD       Today 7/27: Resting quietly in bed.  Daughters at bedside.  She has no pain however she continues to " "complain of cough that does, \"makes my chest sore\".  Reviewed lab studies with patient and family.  Tube feeding infusing.  Anticipate discharge back to [facility] tomorrow if not we will initiate radiation therapy on  as previously scheduled.  Condition remains guarded.     : Sitting up in chair.  Daughter at bedside.  Patient looks improved today.  Less pale.  She is alert and oriented.  Answers all questions appropriately.  She denies pain.  She continues to have a wet cough.  Tube feeding infusing.  Anticipate initiation of radiation therapy .  Reviewed laboratory studies with patient and daughter, both verbalized understanding.  Will continue to follow.    Documented weights    25 1004 25 0557   Weight: 60 kg (132 lb 4.8 oz) 66 kg (145 lb 8 oz)          Intake/Output Summary (Last 24 hours) at 2025 1318  Last data filed at 2025 1129  Gross per 24 hour   Intake 1954 ml   Output 1450 ml   Net 504 ml       Results for orders placed during the hospital encounter of 03/30/15    Echocardiogram stress test 2015  7:19 AM    Narrative  Stress Echocardiogram    Patient:     KAMILAH BRICENO  [facility] Rec#:    [id number]                    Date:     2015  MRN:         [id number]                    Pt. Type: Outpatient  Accession#:  [id number]                    Height:   165.1 cm/64.4 i  :         1940                 Weight:   81.65 kg/179.6  Age:         74y                        BSA:      1.89    Sex:         F  Room#:       OP    Referring:      Luly Diez MD  Reading:        Teto Mayes MD  Sonographer:    Adelita Shankar  Ordering:       Victorino Arredondo MD  Nurse:          Horace Wilson RN  Nurse:          Steffany Dill RN  ______________________________________________________________________    ICD Codes: Chest pain (786.50)  Indication:    Rhythm:  Stage        HR      BP  Rest         76      149/82  Peak         157     192/97  Recovery     97      " 136/69    Conclusions:  LOW RISK FOR ISCHEMIA BASED ON THIS TEST    Predicted Values:  The patient achieved a maximum heart rate of 157 which is 107% of the  maximum predicted heart rate (147 beats/min).  The target heart rate was  achieved.    Findings  Rest  Stage Comments:  Patient EKG is normal.  Left Ventricle:  Global left ventricular wall motion and contractility are within  normal limits. There is normal left ventricular systolic function.  Contrast:  Intravenous contrast was used to enhance endocardial border  definition.  Peak  Stage Comments:  Patient followed a Isiah protocol. The patient exercised into stage 2.  The total exercise duration was 4 minutes and 30 seconds. Test was  stopped after submaximum target HR was achieved. The patient did not  express feelings of chest discomfort. The blood pressure response is  hypertensive. Exercise capacity is fair. The patient achieved a level of  7 METS. There were no arrythmias.rare dropped beat. There is no ST  segment depression. This is a negative electrocardiographic stress test.  This was a negative echocardiographic stress test. Echocardiographic  findings are consistent with normal left ventricular function. Patient  EKG is normal.  Contrast:  Intravenous contrast was used to enhance endocardial border  definition.          Electronically signed by Teto Mayes MD on 03/31/2015 08:43:07  Thank you for the courtesy of this referral.       Review of Systems   All pertinent negatives and positives are as above. All other systems have been reviewed and are negative unless otherwise stated.     Objective    Temp:  [97.7 °F (36.5 °C)-98.2 °F (36.8 °C)] 98.2 °F (36.8 °C)  Heart Rate:  [83-97] 83  Resp:  [16-18] 16  BP: (115-139)/(52-70) 123/70  Physical Exam  Vitals reviewed.   Constitutional:       Appearance: She is ill-appearing.   HENT:      Head: Normocephalic and atraumatic.      Mouth/Throat:      Mouth: Mucous membranes are moist.      Pharynx:  Oropharynx is clear.   Eyes:      Extraocular Movements: Extraocular movements intact.      Conjunctiva/sclera: Conjunctivae normal.   Cardiovascular:      Rate and Rhythm: Normal rate and regular rhythm.   Pulmonary:      Effort: Pulmonary effort is normal.      Comments: Left-sided coarseness throughout  Abdominal:      General: There is no distension.      Palpations: Abdomen is soft.      Comments: Feeding tube left upper quadrant   Musculoskeletal:      Cervical back: No tenderness.      Right lower leg: No edema.      Left lower leg: No edema.      Comments: Generalized weakness and debility   Skin:     General: Skin is warm and dry.      Coloration: Skin is pale.   Neurological:      General: No focal deficit present.      Mental Status: She is alert and oriented to person, place, and time.   Psychiatric:         Mood and Affect: Mood normal.         Behavior: Behavior normal.       Results Review:  I have reviewed the labs, radiology results, and diagnostic studies.    Laboratory Data:   Results from last 7 days   Lab Units 07/27/25  0357 07/26/25  0506 07/25/25  1112   WBC 10*3/mm3 14.36* 16.39* 23.43*   HEMOGLOBIN g/dL 7.6* 7.9* 9.1*   HEMATOCRIT % 24.3* 24.9* 28.3*   PLATELETS 10*3/mm3 421 388 438        Results from last 7 days   Lab Units 07/27/25  0357 07/26/25  0506 07/25/25  2328 07/25/25  1742 07/25/25  1112   SODIUM mmol/L 129* 128* 131*   < > 129*   POTASSIUM mmol/L 3.6 3.6 3.6   < > 4.2   CHLORIDE mmol/L 93* 92* 93*   < > 90*   CO2 mmol/L 25.0 23.0 25.0   < > 27.0   BUN mg/dL 32.2* 35.3* 33.9*   < > 39.5*   CREATININE mg/dL 0.95 0.97 0.89   < > 1.10*   CALCIUM mg/dL 8.0* 8.4* 8.3*   < > 8.8   BILIRUBIN mg/dL  --   --   --   --  0.3   ALK PHOS U/L  --   --   --   --  71   ALT (SGPT) U/L  --   --   --   --  13   AST (SGOT) U/L  --   --   --   --  23   GLUCOSE mg/dL 165* 154* 91   < > 165*    < > = values in this interval not displayed.       Culture Data:     Microbiology Results (last 10 days)        Procedure Component Value - Date/Time    Respiratory Culture - Sputum, Cough [[id number]] Collected: 07/25/25 1609    Lab Status: Final result Specimen: Sputum from Cough Updated: 07/26/25 0859     Respiratory Culture Rejected     Gram Stain Many (4+) Gram negative bacilli      Few (2+) Gram negative cocci      Rare (1+) Epithelial cells seen      Rare (1+) WBCs per low power field    Narrative:      Specimen rejected due to oropharyngeal contamination. Please reorder and recollect specimen if clinically necessary.    MRSA Screen, PCR (Inpatient) - Swab, Nares [[id number]]  (Normal) Collected: 07/25/25 1536    Lab Status: Final result Specimen: Swab from Nares Updated: 07/25/25 1700     MRSA PCR No MRSA Detected    Narrative:      The negative predictive value of this diagnostic test is high and should only be used to consider de-escalating anti-MRSA therapy. A positive result may indicate colonization with MRSA and must be correlated clinically.    Blood Culture - Blood, Arm, Right [[id number]]  (Normal) Collected: 07/25/25 1436    Lab Status: Preliminary result Specimen: Blood from Arm, Right Updated: 07/26/25 1501     Blood Culture No growth at 24 hours    Blood Culture - Blood, Arm, Right [[id number]]  (Normal) Collected: 07/25/25 1427    Lab Status: Preliminary result Specimen: Blood from Arm, Right Updated: 07/26/25 1501     Blood Culture No growth at 24 hours    Respiratory Panel PCR w/COVID-19(SARS-CoV-2) ZE/FAVIAN/ELISABETH/PAD/COR/BRIDGER In-House, NP Swab in UTM/VTM, 2 HR TAT - Swab, Nasopharynx [[id number]]  (Normal) Collected: 07/25/25 1421    Lab Status: Final result Specimen: Swab from Nasopharynx Updated: 07/25/25 1526     ADENOVIRUS, PCR Not Detected     Coronavirus 229E Not Detected     Coronavirus HKU1 Not Detected     Coronavirus NL63 Not Detected     Coronavirus OC43 Not Detected     COVID19 Not Detected     Human Metapneumovirus Not Detected     Human Rhinovirus/Enterovirus Not Detected     Influenza A PCR  Not Detected     Influenza B PCR Not Detected     Parainfluenza Virus 1 Not Detected     Parainfluenza Virus 2 Not Detected     Parainfluenza Virus 3 Not Detected     Parainfluenza Virus 4 Not Detected     RSV, PCR Not Detected     Bordetella pertussis pcr Not Detected     Bordetella parapertussis PCR Not Detected     Chlamydophila pneumoniae PCR Not Detected     Mycoplasma pneumo by PCR Not Detected    Narrative:      In the setting of a positive respiratory panel with a viral infection PLUS a negative procalcitonin without other underlying concern for bacterial infection, consider observing off antibiotics or discontinuation of antibiotics and continue supportive care. If the respiratory panel is positive for atypical bacterial infection (Bordetella pertussis, Chlamydophila pneumoniae, or Mycoplasma pneumoniae), consider antibiotic de-escalation to target atypical bacterial infection.    Urine Culture - Urine, Urine, Catheter [[id number]]  (Abnormal) Collected: 07/25/25 1242    Lab Status: Preliminary result Specimen: Urine, Catheter Updated: 07/27/25 1236     Urine Culture >100,000 CFU/mL Gram Negative Bacilli     Comment: ID and MICs to follow.       Narrative:      Colonization of the urinary tract without infection is common. Treatment is discouraged unless the patient is symptomatic, pregnant, or undergoing an invasive urologic procedure.    S. Pneumo Ag Urine or CSF - Urine, Urine, Catheter [[id number]]  (Normal) Collected: 07/25/25 1242    Lab Status: Final result Specimen: Urine, Catheter Updated: 07/25/25 1545     Strep Pneumo Ag Negative    Legionella Antigen, Urine - Urine, Urine, Catheter [[id number]]  (Normal) Collected: 07/25/25 1242    Lab Status: Final result Specimen: Urine, Catheter Updated: 07/25/25 1544     LEGIONELLA ANTIGEN, URINE Negative             Radiology Data:   Imaging Results (Last 7 Days)       Procedure Component Value Units Date/Time    XR Chest 1 View [[id number]] Collected: 07/25/25  1103     Updated: 07/25/25 1108    Narrative:      EXAMINATION: XR CHEST 1 VW-        HISTORY: Weakness/cough     A frontal projection of the chest is compared with the previous study  dated 7/9/2025.     The lungs are poorly expanded.     There is an ill-defined opacity in the left mid to left lower lung which  was not noted in the previous study and may represent an evolving  inflammatory/infectious process.     Coarse linear changes in the remaining lungs are present chronic  process/fibrosis.     There is no pleural effusion. No pneumothorax.     There is moderate cardiomegaly. Atheromatous change of thoracic aorta.     Left subclavian approach Mediport system is in place.     No acute bony abnormality.       Impression:      1. A suspected acute/evolving inflammatory/infectious process in the  left lung. Further follow-up suggested.        This report was signed and finalized on 7/25/2025 11:05 AM by Dr. Jordan Turner MD.                I have reviewed the patient's current medications.     aspirin, 81 mg, Per PEG Tube, Daily  calcium carb-cholecalciferol, 1 tablet, Per PEG Tube, BID With Meals  carvedilol, 3.125 mg, Per G Tube, BID With Meals  cholecalciferol, 400 Units, Per PEG Tube, Daily  famotidine, 40 mg, Per G Tube, Daily  [Held by provider] glipizide, 5 mg, Per G Tube, Daily  insulin lispro, 2-7 Units, Subcutaneous, 4x Daily AC & at Bedtime  ipratropium-albuterol, 3 mL, Nebulization, Q4H - RT  [Held by provider] losartan, 25 mg, Per G Tube, Daily  [Held by provider] metFORMIN, 500 mg, Per G Tube, Nightly  piperacillin-tazobactam, 3.375 g, Intravenous, Q8H  pravastatin, 40 mg, Per G Tube, Daily  sodium chloride, 1,000 mL, Intravenous, Once            Assessment/Plan   Assessment  Active Hospital Problems    Diagnosis     **Pneumonia of left sided due to infectious organism, suspect aspiration     Acute cystitis without hematuria     Hyponatremia     DM2 (diabetes mellitus, type 2)     Dysphagia with  feeding tube     Squamous cell esophageal cancer        Treatment Plan  Left-sided pneumonia  - Zosyn day 2/5  - Follow pneumonia protocol; DuoNebs, supplemental oxygen as needed, incentive spirometry  - Blood cultures pending, respiratory culture - neg, MRSA screen- neg, urine studies for Legionella and strep pneumoniae both neg  -guaifenesin-codeine 5ml every 6 hours as needed for cough  -Labs in a.m.     Acute cystitis without hematuria   - Zosyn day 2/5  -Urine culture pending    Hyponatremia chronic, secondary to cancer  -Patient received 1 L normal saline bolus in the ED  -Normal saline 50 mL/hour x 20 hours, completed no further hydration for now  - BMP in am     Type 2 diabetes  -Monitor glucose every 6 hours with regular insulin sliding scale coverage (patient receives tube feeding     Squamous cell esophageal cancer/dysphagia with feeding tube  -Consult dietitian to order tube feeding  - Oral care every 4 hours  -N.p.o.     Awaiting medication list from [facility]  DVT prophylaxis with SCDs     Medical Decision Making  Number and Complexity of problems: 6  Acute cystitis without hematuria, acute, high complexity, improving  Left-sided pneumonia, acute, high complexity, improving  Hyponatremia, acute, high complexity, unchanged  Differential Diagnosis: No     Conditions and Status        Condition is improving.     [facility] Data  External documents reviewed: No awaiting [facility] records  Cardiac tracing (EKG, telemetry) interpretation: Reviewed  Radiology interpretation: Reviewed  Labs reviewed: Yes  Any tests that were considered but not ordered: No     Decision rules/scores evaluated (example [id number], Wells, etc): No     Discussed with: Patient and Dr. Felton     Care Planning  Shared decision making: Patient, daughters and Dr. Felton  Code status and discussions: DNR/DNI  Surrogate Decision Maker daughters    Disposition  Social Determinants of Health that impact treatment or disposition:  No, return to [facility] at discharge  I expect the patient to be discharged to SNF in 1-2+ days.     Electronically signed by AMANDA Leyva, 07/27/25, 13:18 CDT.

## 2025-07-28 ENCOUNTER — CARE COORDINATION (OUTPATIENT)
Dept: CARE COORDINATION | Age: 85
End: 2025-07-28

## 2025-07-28 VITALS
SYSTOLIC BLOOD PRESSURE: 134 MMHG | HEIGHT: 63 IN | DIASTOLIC BLOOD PRESSURE: 68 MMHG | RESPIRATION RATE: 18 BRPM | TEMPERATURE: 97.8 F | OXYGEN SATURATION: 95 % | WEIGHT: 143.9 LBS | HEART RATE: 86 BPM | BODY MASS INDEX: 25.5 KG/M2

## 2025-07-28 LAB
ANION GAP SERPL CALCULATED.3IONS-SCNC: 11 MMOL/L (ref 5–15)
BACTERIA SPEC AEROBE CULT: ABNORMAL
BASOPHILS # BLD AUTO: 0.04 10*3/MM3 (ref 0–0.2)
BASOPHILS NFR BLD AUTO: 0.3 % (ref 0–1.5)
BUN SERPL-MCNC: 29.7 MG/DL (ref 8–23)
BUN/CREAT SERPL: 34.5 (ref 7–25)
CALCIUM SPEC-SCNC: 8 MG/DL (ref 8.6–10.5)
CHLORIDE SERPL-SCNC: 93 MMOL/L (ref 98–107)
CO2 SERPL-SCNC: 25 MMOL/L (ref 22–29)
CREAT SERPL-MCNC: 0.86 MG/DL (ref 0.57–1)
DEPRECATED RDW RBC AUTO: 45.1 FL (ref 37–54)
EGFRCR SERPLBLD CKD-EPI 2021: 67.1 ML/MIN/1.73
EOSINOPHIL # BLD AUTO: 0.39 10*3/MM3 (ref 0–0.4)
EOSINOPHIL NFR BLD AUTO: 2.8 % (ref 0.3–6.2)
ERYTHROCYTE [DISTWIDTH] IN BLOOD BY AUTOMATED COUNT: 13.8 % (ref 12.3–15.4)
GLUCOSE BLDC GLUCOMTR-MCNC: 169 MG/DL (ref 70–130)
GLUCOSE BLDC GLUCOMTR-MCNC: 188 MG/DL (ref 70–130)
GLUCOSE SERPL-MCNC: 192 MG/DL (ref 65–99)
HCT VFR BLD AUTO: 24.4 % (ref 34–46.6)
HGB BLD-MCNC: 7.7 G/DL (ref 12–15.9)
IMM GRANULOCYTES # BLD AUTO: 0.09 10*3/MM3 (ref 0–0.05)
IMM GRANULOCYTES NFR BLD AUTO: 0.6 % (ref 0–0.5)
LYMPHOCYTES # BLD AUTO: 4.39 10*3/MM3 (ref 0.7–3.1)
LYMPHOCYTES NFR BLD AUTO: 31.6 % (ref 19.6–45.3)
MCH RBC QN AUTO: 28.2 PG (ref 26.6–33)
MCHC RBC AUTO-ENTMCNC: 31.6 G/DL (ref 31.5–35.7)
MCV RBC AUTO: 89.4 FL (ref 79–97)
MONOCYTES # BLD AUTO: 1.78 10*3/MM3 (ref 0.1–0.9)
MONOCYTES NFR BLD AUTO: 12.8 % (ref 5–12)
NEUTROPHILS NFR BLD AUTO: 51.9 % (ref 42.7–76)
NEUTROPHILS NFR BLD AUTO: 7.2 10*3/MM3 (ref 1.7–7)
NRBC BLD AUTO-RTO: 0 /100 WBC (ref 0–0.2)
PLATELET # BLD AUTO: 435 10*3/MM3 (ref 140–450)
PMV BLD AUTO: 9.3 FL (ref 6–12)
POTASSIUM SERPL-SCNC: 3.9 MMOL/L (ref 3.5–5.2)
QT INTERVAL: 362 MS
QTC INTERVAL: 440 MS
RBC # BLD AUTO: 2.73 10*6/MM3 (ref 3.77–5.28)
SODIUM SERPL-SCNC: 129 MMOL/L (ref 136–145)
WBC NRBC COR # BLD AUTO: 13.89 10*3/MM3 (ref 3.4–10.8)

## 2025-07-28 PROCEDURE — 97110 THERAPEUTIC EXERCISES: CPT | Performed by: OCCUPATIONAL THERAPIST

## 2025-07-28 PROCEDURE — 80048 BASIC METABOLIC PNL TOTAL CA: CPT | Performed by: NURSE PRACTITIONER

## 2025-07-28 PROCEDURE — 99497 ADVNCD CARE PLAN 30 MIN: CPT | Performed by: CLINICAL NURSE SPECIALIST

## 2025-07-28 PROCEDURE — 97535 SELF CARE MNGMENT TRAINING: CPT | Performed by: OCCUPATIONAL THERAPIST

## 2025-07-28 PROCEDURE — 82948 REAGENT STRIP/BLOOD GLUCOSE: CPT

## 2025-07-28 PROCEDURE — 94799 UNLISTED PULMONARY SVC/PX: CPT

## 2025-07-28 PROCEDURE — 82948 REAGENT STRIP/BLOOD GLUCOSE: CPT | Performed by: FAMILY MEDICINE

## 2025-07-28 PROCEDURE — 99223 1ST HOSP IP/OBS HIGH 75: CPT | Performed by: CLINICAL NURSE SPECIALIST

## 2025-07-28 PROCEDURE — 25010000002 PIPERACILLIN SOD-TAZOBACTAM PER 1 G: Performed by: NURSE PRACTITIONER

## 2025-07-28 PROCEDURE — 63710000001 INSULIN LISPRO (HUMAN) PER 5 UNITS: Performed by: NURSE PRACTITIONER

## 2025-07-28 PROCEDURE — 85025 COMPLETE CBC W/AUTO DIFF WBC: CPT | Performed by: NURSE PRACTITIONER

## 2025-07-28 RX ORDER — IPRATROPIUM BROMIDE AND ALBUTEROL SULFATE 2.5; .5 MG/3ML; MG/3ML
3 SOLUTION RESPIRATORY (INHALATION)
Start: 2025-07-28

## 2025-07-28 RX ORDER — ALBUTEROL SULFATE 0.83 MG/ML
2.5 SOLUTION RESPIRATORY (INHALATION) EVERY 6 HOURS PRN
Start: 2025-07-28

## 2025-07-28 RX ORDER — CODEINE PHOSPHATE AND GUAIFENESIN 10; 100 MG/5ML; MG/5ML
5 SOLUTION ORAL EVERY 6 HOURS PRN
Qty: 118 ML | Refills: 0 | Status: SHIPPED | OUTPATIENT
Start: 2025-07-28

## 2025-07-28 RX ORDER — AMOXICILLIN AND CLAVULANATE POTASSIUM 600; 42.9 MG/5ML; MG/5ML
7.5 POWDER, FOR SUSPENSION ORAL 2 TIMES DAILY
Start: 2025-07-28

## 2025-07-28 RX ADMIN — PRAVASTATIN SODIUM 40 MG: 20 TABLET ORAL at 08:09

## 2025-07-28 RX ADMIN — CALCIUM CARBONATE 600 MG (1,500 MG)-VITAMIN D3 400 UNIT TABLET 1 TABLET: at 08:09

## 2025-07-28 RX ADMIN — IPRATROPIUM BROMIDE AND ALBUTEROL SULFATE 3 ML: .5; 3 SOLUTION RESPIRATORY (INHALATION) at 05:52

## 2025-07-28 RX ADMIN — FAMOTIDINE 40 MG: 20 TABLET, FILM COATED ORAL at 08:09

## 2025-07-28 RX ADMIN — IPRATROPIUM BROMIDE AND ALBUTEROL SULFATE 3 ML: .5; 3 SOLUTION RESPIRATORY (INHALATION) at 11:05

## 2025-07-28 RX ADMIN — ASPIRIN 81 MG CHEWABLE TABLET 81 MG: 81 TABLET CHEWABLE at 08:09

## 2025-07-28 RX ADMIN — INSULIN LISPRO 2 UNITS: 100 INJECTION, SOLUTION INTRAVENOUS; SUBCUTANEOUS at 11:52

## 2025-07-28 RX ADMIN — HYDROCODONE BITARTRATE AND CHLORPHENIRAMINE MALEATE 400 UNITS: 10; 8 SUSPENSION, EXTENDED RELEASE ORAL at 08:09

## 2025-07-28 RX ADMIN — PIPERACILLIN AND TAZOBACTAM 3.38 G: 3; .375 INJECTION, POWDER, FOR SOLUTION INTRAVENOUS at 04:19

## 2025-07-28 RX ADMIN — AMOXICILLIN AND CLAVULANATE POTASSIUM 1 TABLET: 875; 125 TABLET, COATED ORAL at 12:46

## 2025-07-28 NOTE — THERAPY TREATMENT NOTE
Patient Name: Lorena Valdes  : 1940    MRN: [id number]                              Today's Date: 2025       Admit Date: 2025    Visit Dx:     ICD-10-CM ICD-9-CM   1. Weakness  R53.1 780.79   2. Pneumonia due to infectious organism, unspecified laterality, unspecified part of lung  J18.9 486   3. Hyponatremia  E87.1 276.1   4. Dysphagia, unspecified type  R13.10 787.20   5. Impaired mobility [Z74.09]  Z74.09 799.89   6. Acute cough  R05.1 786.2     Patient Active Problem List   Diagnosis    Malignant neoplasm of central portion of left female breast    Malignant neoplasm of upper-outer quadrant of right female breast    Osteopenia    Encounter for care related to vascular access port    Colon cancer screening    Chronic kidney disease, stage 3a    Diabetic renal disease    Hypertensive renal disease    Estrogen receptor negative tumor status    Squamous cell esophageal cancer    Weight loss    Dysphagia with feeding tube    Severe protein-calorie malnutrition    DM2 (diabetes mellitus, type 2)    Hypokalemia    Hypomagnesemia    Pneumonia of left sided due to infectious organism, suspect aspiration    Hyponatremia    Acute cystitis without hematuria     Past Medical History:   Diagnosis Date    Bladder disorder     Chronic kidney disease, stage 3b     Diverticulosis     Emphysema lung     Fibrocystic disease of breast     CONNIE (generalized anxiety disorder)     GERD (gastroesophageal reflux disease)     Heart disease     History of bone density study     History of colon polyps     Hyperglycemia     Hyperlipidemia     Hypotension     Left ventricular diastolic dysfunction     Malignant neoplasm of central portion of left female breast 2016    Osteopenia 2017    Restrictive lung disease     Type 2 diabetes mellitus     Uterine prolapse      Past Surgical History:   Procedure Laterality Date    BREAST LUMPECTOMY  2008    CATARACT EXTRACTION Right     COLONOSCOPY  2010     Diverticulosis; Repeat 10 years    COLONOSCOPY N/A 11/11/2020    One 6mm inflamed hyperplastic polyp in the cecum; Diverticulosis in the left colon; Non-bleeding internal hemorrhoids; Repeat 5 years    ENDOSCOPY N/A 06/12/2025    Exophytic mass found in the larynx, not obstructing the airway; Partially obstructing, rule out malignancy, esophageal tumor was found in the proximal esophagus-biopsied; Medium-sized HH; Normal stomach; Two non-bleeding angiodysplastic lesions in the duodenum-biopsied    ENDOSCOPY W/ PEG TUBE PLACEMENT N/A 7/10/2025    Procedure: ESOPHAGOGASTRODUODENOSCOPY WITH PERCUTANEOUS ENDOSCOPIC GASTROSTOMY TUBE INSERTION WITH ANESTHESIA;  Surgeon: Christine Valdovinos MD;  Location: [facility] ENDOSCOPY;  Service: Gastroenterology;  Laterality: N/A;  pre op:peg placement  post op: esophageal mass  PCP:Luly Diez MD    GTUBE INSERTION N/A 7/10/2025    Procedure: LAPAROSCOPIC GASTROSTOMY FEEDING TUBE WITH DAVINCI ROBOT;  Surgeon: Willie Weber MD;  Location: [facility] OR;  Service: Robotics - DaVinci;  Laterality: N/A;    MAMMO BILATERAL  07/17/2013    Dr. Sabillon    MASTECTOMY Left 08/28/2008    PAP SMEAR  2010    SKIN CANCER EXCISION        General Information       Row Name 07/28/25 1051          OT Time and Intention    Subjective Information no complaints  -LD (r) ANTOINETTE (t) LD (c)     Document Type therapy note (daily note)  -LD (r) ANTOINETTE (t) LD (c)     Mode of Treatment occupational therapy  -LD (r) ANTOINETTE (t) LD (c)     Patient Effort adequate  -LD (r) ANTOINETTE (t) LD (c)     Symptoms Noted During/After Treatment fatigue  -LD (r) ANTOINETTE (t) LD (c)       Row Name 07/28/25 1051          General Information    Existing Precautions/Restrictions fall;other (see comments)  PEG  -LD (r) ANTOINETTE (t) LD (c)     Barriers to Rehab medically complex;previous functional deficit  -DL (r) ANTOINETTE (t) LD (c)       Row Name 07/28/25 1051          Cognition    Orientation Status (Cognition) oriented x 4  -LD (r) ANTOINETTE (t) LD (c)       Row Name  07/28/25 1051          Safety Issues/Impairments Affecting Functional Mobility    Safety Issues Affecting Function (Mobility) awareness of need for assistance;friction/shear risk;insight into deficits/self-awareness;safety precaution awareness;safety precautions follow-through/compliance  -JW (r) BS (t) JW (c)     Impairments Affecting Function (Mobility) balance;endurance/activity tolerance;strength  -JW (r) BS (t) JW (c)               User Key  (r) = Recorded By, (t) = Taken By, (c) = Cosigned By      Initials Name Provider Type    Nikky Cuenca, OTR/L, CSRS Occupational Therapist    Anuradha Monterroso, OT Student OT Student                     Mobility/ADL's       Row Name 07/28/25 1051          Bed Mobility    Bed Mobility rolling left;rolling right  -JW (r) BS (t) JW (c)     Rolling Left [place] (Bed Mobility) supervision;verbal cues  -JW (r) BS (t) JW (c)     Rolling Right [place] (Bed Mobility) supervision;verbal cues  -JW (r) BS (t) JW (c)       Row Name 07/28/25 1051          Activities of Daily Living    BADL Assessment/Intervention upper body dressing;grooming;toileting  -JW (r) BS (t) JW (c)       Row Name 07/28/25 1051          Toileting Assessment/Training    [place] Level (Toileting) perform perineal hygiene;maximum assist (25% patient effort)  -JW (r) BS (t) JW (c)     Position (Toileting) supine  -JW (r) BS (t) JW (c)       Row Name 07/28/25 1051          Upper Body Dressing Assessment/Training    [place] Level (Upper Body Dressing) doff;don;moderate assist (50% patient effort)  [facility]  -JW (r) BS (t) JW (c)     Position (Upper Body Dressing) sitting up in bed  -JW (r) BS (t) JW (c)       Row Name 07/28/25 1051          Grooming Assessment/Training    [place] Level (Grooming) wash face, hands;set up;independent  -JW (r) BS (t) JW (c)     Position (Grooming) sitting up in bed  -JW (r) BS (t) JW (c)               User Key  (r) = Recorded By, (t) = Taken By, (c)  = Cosigned By      Initials Name Provider Type    [facility]L, [facility]    Anuradha Monterroso, OT Student OT Student                   Obj/Interventions       Row Name 07/28/25 1051          Balance    Balance Assessment sitting static balance;sitting dynamic balance  -LD (r) BS (t) LD (c)     Static Sitting Balance independent  -LD (r) ANTOINETTE (t) LD (c)     Dynamic Sitting Balance independent  -JW (r) BS (t) LD (c)     Position, Sitting Balance supported;long sitting  sitting up in bed  -JW (r) BS (t) LD (c)     Balance Interventions sitting;supported;static;dynamic;occupation based/functional task  -LD (r) BS (t) LD (c)               User Key  (r) = Recorded By, (t) = Taken By, (c) = Cosigned By      Initials Name Provider Type    Nikky Cuenca OTR/L, KIERRA Occupational Therapist    Anuradha Monterroso, OT Student OT Student                   Goals/Plan    No documentation.                  Clinical Impression       Row Name 07/28/25 1051          Pain Assessment    Additional Documentation Pain Scale: FACES Pre/Post-Treatment (Group)  -LD (r) ANTOINETTE (t) LD (c)       Row Name 07/28/25 1051          Pain Scale: FACES Pre/Post-Treatment    Pain: FACES Scale, Pretreatment 0-->no hurt  -LD (r) ANTOINETTE (t) LD (c)     Posttreatment Pain Rating 0-->no hurt  -LD (r) ANTOINETTE (t) LD (c)       Row Name 07/28/25 1051          Plan of Care Review    Plan of Care Reviewed With patient  -LD (r) ANTOINETTE (t) LD (c)     Outcome Evaluation OT tx completed. Pt presented fowlers in bed upon entering. Pt reported that she did not feel like working with therapy today, however, after encouragement she agreed to participate at bed level. She was A&O x4. Pt was found to be dirty of bowel and urine. Pt required Max A to clean up including performing perineal hygiene. She was able to roll L and R with SBA for safety. Pt donned new hospital gown with Mod A. She completed 2 sets and 10 repetitions of UE exercises  bilaterally. Pt demonstrated increased fatigue during session this date. Ms. Valdes would benefit from continued skilled OT per POC. Recommend d/c to SNF.  -JW (r) BS (t) JW (c)       Row Name 07/28/25 1051          Therapy Assessment/Plan (OT)    Rehab Potential (OT) good  -JW (r) BS (t) JW (c)       Row Name 07/28/25 1051          Therapy Plan Review/Discharge Plan (OT)    Anticipated Discharge Disposition (OT) skilled nursing facility  -JW (r) BS (t) JW (c)       Row Name 07/28/25 1051          Vital Signs    O2 Delivery Pre Treatment room air  -JW (r) BS (t) JW (c)     O2 Delivery Intra Treatment room air  -JW (r) BS (t) JW (c)     O2 Delivery Post Treatment room air  -JW (r) BS (t) JW (c)     Pre Patient Position Supine  -JW (r) BS (t) JW (c)     Intra Patient Position Supine  -JW (r) BS (t) JW (c)     Post Patient Position Supine  -JW (r) BS (t) JW (c)       Row Name 07/28/25 1051          Positioning and Restraints    Pre-Treatment Position in bed  -JW (r) BS (t) JW (c)     Post Treatment Position bed  -JW (r) BS (t) JW (c)     In Bed notified nsg;fowlers;call light within reach;encouraged to call for assist  -JW (r) BS (t) JW (c)               User Key  (r) = Recorded By, (t) = Taken By, (c) = Cosigned By      Initials Name Provider Type    Nikky Cuenca, JOER/L, CSRS Occupational Therapist    Anuradha Monterroso, OT Student OT Student                   Outcome Measures       Row Name 07/28/25 1051          How much help from another is currently needed...    Putting on and taking off regular lower body clothing? 2  -JW (r) BS (t) JW (c)     Bathing (including washing, rinsing, and drying) 2  -JW (r) BS (t) JW (c)     Toileting (which includes using toilet bed pan or urinal) 2  -JW (r) BS (t) JW (c)     Putting on and taking off regular upper body clothing 3  -JW (r) BS (t) JW (c)     Taking care of personal grooming (such as brushing teeth) 3  -JW (r) ANTOINETTE (t) JW (c)     Eating meals 3  -JW (r) ANTOINETTE (t)  JW (c)     AM-PAC 6 Clicks Score (OT) 15  -JW (r) BS (t)       Row Name 07/28/25 0800          How much help from another person do you currently need...    Turning from your back to your side while in flat bed without using bedrails? 3  -TS     Moving from lying on back to sitting on the side of a flat bed without bedrails? 3  -TS     Moving to and from a bed to a chair (including a wheelchair)? 3  -TS     Standing up from a chair using your arms (e.g., wheelchair, bedside chair)? 3  -TS     Climbing 3-5 steps with a railing? 2  -TS     To walk in hospital room? 3  -TS     AM-PAC 6 Clicks Score (PT) 17  -TS     Highest Level of Mobility Goal Stand (1 or More Minutes)-5  -TS       Row Name 07/28/25 1051          Functional Assessment    Outcome Measure Options AM-PAC 6 Clicks Daily Activity (OT)  -JW (r) BS (t) JW (c)               User Key  (r) = Recorded By, (t) = Taken By, (c) = Cosigned By      Initials Name Provider Type    JW Nikky Ahumada OTR/L, CSRS Occupational Therapist    TS Marybel Ellis LPN Licensed Nurse    BS Anuradha Magallon, OT Student OT Student                    Occupational Therapy Education       Title: PT OT SLP Therapies (In Progress)       Topic: Occupational Therapy (In Progress)       Point: ADL training (Done)       Learning Progress Summary            Patient Acceptance, E, VU,NR by BS at 7/28/2025 1051    Acceptance, E, VU,NR by  at 7/26/2025 0826                      Point: Precautions (Done)       Learning Progress Summary            Patient Acceptance, E, VU,NR by  at 7/26/2025 0826                                      User Key       Initials Effective Dates Name Provider Type Discipline     06/20/22 -  Shelley Ordonez OTR/L Occupational Therapist OT    BS 05/12/25 -  Anuradha Magallon, OT Student OT Student OT                  OT Recommendation and Plan     Plan of Care Review  Plan of Care Reviewed With: patient  Outcome Evaluation: OT tx completed. Pt presented sally  in bed upon entering. Pt reported that she did not feel like working with therapy today, however, after encouragement she agreed to participate at bed level. She was A&O x4. Pt was found to be dirty of bowel and urine. Pt required Max A to clean up including performing perineal hygiene. She was able to roll L and R with SBA for safety. Pt donned new hospital gown with Mod A. She completed 2 sets and 10 repetitions of UE exercises bilaterally. Pt demonstrated increased fatigue during session this date. Ms. Valdes would benefit from continued skilled OT per POC. Recommend d/c to SNF.     Time Calculation:         Time Calculation- OT       Row Name 07/28/25 1051             Time Calculation- OT    OT Start Time 1051  -JW (r) BS (t) JW (c)      OT Stop Time 1119  -JW (r) BS (t) JW (c)      OT Time Calculation (min) 28 min  -JW (r) BS (t)      Total Timed Code Minutes- OT 28 minute(s)  -JW (r) BS (t) JW (c)      OT Received On 07/28/25  -JW (r) BS (t) JW (c)         Timed Charges    59411 - OT Therapeutic Exercise Minutes 10  -JW (r) BS (t) JW (c)      61456 - OT Self Care/Mgmt Minutes 18  -JW (r) BS (t) JW (c)         Total Minutes    Timed Charges Total Minutes 28  -JW (r) BS (t)       Total Minutes 28  -JW (r) BS (t)                User Key  (r) = Recorded By, (t) = Taken By, (c) = Cosigned By      Initials Name Provider Type    Nikky Cuenca OTR/L, CSRS Occupational Therapist    Anuradha Monterroso OT Student OT Student                           Anuradha Magallon OT Student  7/28/2025

## 2025-07-28 NOTE — THERAPY DISCHARGE NOTE
Acute Care - Occupational Therapy Discharge Summary  [facility]     Patient Name: Lorena Valdes  : 1940  MRN: [id number]    Today's Date: 2025                 Admit Date: 2025        OT Recommendation and Plan    Visit Dx:    ICD-10-CM ICD-9-CM   1. Weakness  R53.1 780.79   2. Pneumonia due to infectious organism, unspecified laterality, unspecified part of lung  J18.9 486   3. Hyponatremia  E87.1 276.1   4. Dysphagia, unspecified type  R13.10 787.20   5. Impaired mobility [Z74.09]  Z74.09 799.89   6. Acute cough  R05.1 786.2          Time Calculation- OT       Row Name 25 1051             Time Calculation- OT    OT Start Time 1051  -JW (r) BS (t) JW (c)      OT Stop Time 1119  -JW (r) BS (t) JW (c)      OT Time Calculation (min) 28 min  -JW (r) BS (t)      Total Timed Code Minutes- OT 28 minute(s)  -JW (r) BS (t) JW (c)      OT Received On 25  -JW (r) BS (t) JW (c)         Timed Charges    51963 - OT Therapeutic Exercise Minutes 10  -JW (r) BS (t) JW (c)      65519 - OT Self Care/Mgmt Minutes 18  -JW (r) BS (t) JW (c)         Total Minutes    Timed Charges Total Minutes 28  -JW (r) BS (t)       Total Minutes 28  -JW (r) BS (t)                User Key  (r) = Recorded By, (t) = Taken By, (c) = Cosigned By      Initials Name Provider Type    JW iNkky Ahumada, OTR/L, CSRS Occupational Therapist    Anuradha Monterroso, OT Student OT Student                       OT Rehab Goals       Row Name 25 1100             Transfer Goal 1 (OT)    Activity/Assistive Device (Transfer Goal 1, OT) toilet;shower chair  -      [place] Level/Cues Needed (Transfer Goal 1, OT) standby assist  -JW      Time Frame (Transfer Goal 1, OT) long term goal (LTG);10 days  -      Progress/Outcome (Transfer Goal 1, OT) goal not met  -JW         Dressing Goal 1 (OT)    Activity/Device (Dressing Goal 1, OT) dressing skills, all  -JW      [place]/Cues Needed (Dressing Goal 1, OT) standby assist   -JW      Time Frame (Dressing Goal 1, OT) long term goal (LTG);10 days  -JW      Progress/Outcome (Dressing Goal 1, OT) goal not met  -JW         Toileting Goal 1 (OT)    Activity/Device (Toileting Goal 1, OT) toileting skills, all  -JW      [place] Level/Cues Needed (Toileting Goal 1, OT) standby assist  -JW      Time Frame (Toileting Goal 1, OT) long term goal (LTG);10 days  -JW      Progress/Outcome (Toileting Goal 1, OT) goal not met  -JW                User Key  (r) = Recorded By, (t) = Taken By, (c) = Cosigned By      Initials Name Provider Type Discipline     Nikky Ahumada OTR/L, CSRS Occupational Therapist OT                        Timed Therapy Charges  Total Units: 2      Suggested Charges  Total Units: 2      Procedure Name Documented Minutes Units Code    HC OT SELF CARE/MGMT/TRAIN EA 15 MIN 18 1   15309 (CPT®)      HC OT THER PROC EA 15 MIN 10 1   04562 (CPT®)                 Documented Minutes  Total Minutes: 28      Therapy Provided Minutes    57136 - OT Self Care/Mgmt Minutes 18    88916 - OT Therapeutic Exercise Minutes 10                    Therapy Charges for Today       Code Description Service Date Service Provider Modifiers Qty    89166869981 HC OT THER PROC EA 15 MIN 7/28/2025 Nikky Ahumada OTR/L, KIERRA GO 1    07809872071 HC OT SELF CARE/MGMT/TRAIN EA 15 MIN 7/28/2025 Nikky Ahumada OTR/L, KIERRA GO 1            OT Discharge Summary  Anticipated Discharge Disposition (OT): skilled nursing facility  Reason for Discharge: Discharge from facility  Outcomes Achieved: Refer to plan of care for updates on goals achieved  Discharge Destination: SNF      Nikky Evans, OTR/L, CSRS  7/28/2025

## 2025-07-28 NOTE — PLAN OF CARE
Goal Outcome Evaluation:      Patient is alert and oriented X4. Vital signs stable on RA. Up X1 with walker. TF running @70 mL/hr. Oral suctioning provided throughout night. Tele. NPO diet. Voiding via PW. Daughter at bedside. Safety maintained.

## 2025-07-28 NOTE — CONSULTS
[facility] Palliative Care Services    Palliative Care Initial Consult   Attending Physician: Olman Felton MD  Referring Provider: Olman Felton MD     Reason for Referral: support for patient/family, transfer to comfort bed, hospice referral/discussion  Family/Support: children    Code Status and Medical Interventions: No CPR (Do Not Attempt to Resuscitate); Limited Support; No cardioversion, No intubation (DNI)   Ordered at: 07/25/25 1440     Code Status (Patient has no pulse and is not breathing):    No CPR (Do Not Attempt to Resuscitate)     Medical Interventions (Patient has pulse or is breathing):    Limited Support     Medical Intervention Limits:    No cardioversion       No intubation (DNI)     Level Of Support Discussed With:    Patient     Goals of Care: TBD.    HPI:   84 y.o. female has a past medical history of squamous cell carcinoma of esophagus-recent diagnosis, left breast cancer s/p lumpectomy and chemotherapy-Adriamycin (developed cardiomyopathy and discontinued), Cytoxan, and Taxol-completed 3/2009 followed by adjuvant radiation (2008).  Followed by Dr. Samano last seen 6/23/2025 with recommendations for further clarification from ENT, PET scan scheduled, results as below, plan follow-up in 6 weeks.  Seen by Dr. Lee Haynes, ENT 6/24/2025, underwent flexible fiberoptic laryngoscopy without findings of mass or lesion.  Plan for FNA.     Nuclear PET scan at a 6/27/2025  1. There is a mass within the prevertebral soft tissues inseparable from  the cervical esophagus with circumferential thickening noted along the  more inferior margin of this mass. I suspect it emanates from the  esophagus. There is soft tissue extension into the right paratracheal  soft tissues with metabolically active small right jugular chain node  adjacent to the lower margin of the mass as well as a level 2R higher  right paratracheal node demonstrating mild increased metabolic  activity.  There is an additional nodule in the anterior right lung base which  demonstrates relatively low SUVs but only measures 4 mm in size and  would warrant follow-up with a metastasis not entirely excluded.  2. On the uppermost cut of today's exam there is a focus of asymmetric  metabolic activity within the high right posterior parietal centrum  semiovale. No convincing evidence of mass effect on the nonenhanced CT  but I would suggest follow-up with an MRI with and without contrast more  entirely exclude metastatic disease to the brain.  3. No evidence of metabolically active metastasis within the abdomen or  pelvis.  4. Post lumpectomy changes left breast     MRI brain 7/1  1.  No suspicious enhancing tumor. No evidence of intracranial  metastatic disease.  2.  Mild volume loss, likely age-appropriate. There is some evidence of  chronic microvascular change.     Additional health history positive for restrictive lung disease, Chronic kidney disease-stage 3b, type 2 diabetes mellitus, diverticulosis, Emphysema lung, CONNIE, GERD, Heart disease, History of colon polyps, Hyperlipidemia, diastolic dysfunction-EF 50-55% 6/6/25, Osteopenia, impaired mobility-uses cane, and Uterine prolapse. Hospitalization 7/9-7/16 due to dysphagia-underwent PEG tube placement 7/10.  Discharged to [facility].  Last seen by this provider during hospitalization, explored best supportive care directed by hospice on 7/10 and 7/11.  Note per conversation open radiation therapy-plan 13 treatments to esophagus and follow-up scan in 4 weeks, leaning against systemic therapy given previous adverse effects and treatment. A MOST document completed. Patient presented to [facility] on 7/25/2025 related to and hypotension, sent by general surgery office.  ED workup shows elevated procalcitonin, creatinine 1.10, hyponatremia, hypoalbuminemia, elevated troponin, leukocytosis, anemia.  Chest imaging shows suspected  acute/evolving inflammatory/infectious process in the left lung.  Urinalysis shows 4+ bacteria/3+ leuk esterase/nitrite negative. Treated with antibiotics, nebs, supplemental oxygen, IV fluid bolus.  Dietitian consult for tube feeds.  Evaluated by therapy who recommends back to SNF as prior.  Evaluated by speech therapy with plans to continue n.p.o. status and repeat video swallow study once radiation is completed.  Sitting up in bed without apparent distress.  States she is feeling somewhat better.  Cough improved with adjuvant.  Daughter at bedside. Palliative Care Spoke With: patient and family most recently a resident of [facility] after last hospitalization.  Due to the Palliative Care Topics Discussed: palliative care, goals of care, care options, resuscitation status, [facility], and discharge options we will establish an advance care plan.   Advance Care Planning   Advance Care Planning Discussion: Spoke with patient and daughter in room with regard to events leading to current hospitalization.  Discussed overall poor to guarded long-term prognosis secondary to due to squamous cell carcinoma of esophagus, left breast cancer, pneumonia due to suspected aspiration, UTI, dysphagia requiring PEG, multiple comorbidities, and advanced age.  We discussed high risk for pneumonia secondary to dysphagia and need for tube feeds.  States plans at this juncture to continue rehospitalization's as needed and plan to initiate radiation to the esophagus starting tomorrow.  We clarified CODE STATUS as no CPR/limited support interventions, no intubation, no dialysis, no vasopressors, no cardioversion.  A MOST document is on file and up-to-date.  Aware of best supportive care directed by hospice when aggressive care interventions no longer desired.  Anticipating back to SNF's prior for continued rehab efforts.  Questions encouraged and support given.     Review of Systems   Constitutional: Positive for  malaise/fatigue.   HENT:  Positive for hoarse voice.    Respiratory:  Positive for cough.    Gastrointestinal:  Positive for dysphagia.        S/p PEG 7/10/2025   Genitourinary:  Negative for dysuria.   Neurological:  Positive for weakness.   Psychiatric/Behavioral:  The patient is not nervous/anxious.      1- Pain Assessment  Preferred Pain Scale: number (Numeric Rating Pain Scale)    Past Medical History:   Diagnosis Date    Bladder disorder     Chronic kidney disease, stage 3b     Diverticulosis     Emphysema lung     Fibrocystic disease of breast     CONNIE (generalized anxiety disorder)     GERD (gastroesophageal reflux disease)     Heart disease     History of bone density study 2011    History of colon polyps     Hyperglycemia     Hyperlipidemia     Hypotension     Left ventricular diastolic dysfunction     Malignant neoplasm of central portion of left female breast 11/09/2016    Osteopenia 08/29/2017    Restrictive lung disease     Type 2 diabetes mellitus     Uterine prolapse      Past Surgical History:   Procedure Laterality Date    BREAST LUMPECTOMY  2008    CATARACT EXTRACTION Right 2021    COLONOSCOPY  09/16/2010    Diverticulosis; Repeat 10 years    COLONOSCOPY N/A 11/11/2020    One 6mm inflamed hyperplastic polyp in the cecum; Diverticulosis in the left colon; Non-bleeding internal hemorrhoids; Repeat 5 years    ENDOSCOPY N/A 06/12/2025    Exophytic mass found in the larynx, not obstructing the airway; Partially obstructing, rule out malignancy, esophageal tumor was found in the proximal esophagus-biopsied; Medium-sized HH; Normal stomach; Two non-bleeding angiodysplastic lesions in the duodenum-biopsied    ENDOSCOPY W/ PEG TUBE PLACEMENT N/A 7/10/2025    Procedure: ESOPHAGOGASTRODUODENOSCOPY WITH PERCUTANEOUS ENDOSCOPIC GASTROSTOMY TUBE INSERTION WITH ANESTHESIA;  Surgeon: Christine Valdovinos MD;  Location: [facility] ENDOSCOPY;  Service: Gastroenterology;  Laterality: N/A;  pre op:peg placement  post op:  esophageal mass  PCP:Luly Diez MD    GTUBE INSERTION N/A 7/10/2025    Procedure: LAPAROSCOPIC GASTROSTOMY FEEDING TUBE WITH TR [employer];  Surgeon: Willie Weber MD;  Location: [facility] OR;  Service: Robotics - [employer];  Laterality: N/A;    MAMMO BILATERAL  2013    Dr. Sabillon    MASTECTOMY Left 2008    PAP SMEAR  2010    SKIN CANCER EXCISION       Social History     Socioeconomic History    Marital status: Single   Tobacco Use    Smoking status: Former     Current packs/day: 0.00     Types: Cigarettes     Quit date:      Years since quittin.6    Smokeless tobacco: Never   Vaping Use    Vaping status: Never Used   Substance and Sexual Activity    Alcohol use: Yes     Alcohol/week: 1.0 standard drink of alcohol     Types: 1 Cans of beer per week     Comment: Occasionally     Drug use: No    Sexual activity: Defer         Current Facility-Administered Medications:     acetaminophen (TYLENOL) 160 MG/5ML oral solution 650 mg, 650 mg, Oral, Q4H PRN, Laci Moyer MD, 650 mg at 25 0507    albuterol (PROVENTIL) nebulizer solution 0.083% 2.5 mg/3mL, 2.5 mg, Nebulization, Q6H PRN, Olman Felton MD    aspirin chewable tablet 81 mg, 81 mg, Per PEG Tube, Daily, Olman Felton MD, 81 mg at 25 0918    [DISCONTINUED] sennosides-docusate (PERICOLACE) 8.6-50 MG per tablet 2 tablet, 2 tablet, Oral, BID PRN **AND** polyethylene glycol (MIRALAX) packet 17 g, 17 g, Oral, Daily PRN **AND** [DISCONTINUED] bisacodyl (DULCOLAX) EC tablet 5 mg, 5 mg, Oral, Daily PRN **AND** bisacodyl (DULCOLAX) suppository 10 mg, 10 mg, Rectal, Daily PRN, Rin Rosa, APRN    calcium carb-cholecalciferol 600-10 MG-MCG per tablet 1 tablet, 1 tablet, Per PEG Tube, BID With Meals, Olman Felton MD, 1 tablet at 25 1829    carvedilol (COREG) tablet 3.125 mg, 3.125 mg, Per G Tube, BID With Meals, Olman Felton MD, 3.125 mg at 25 1829    cholecalciferol 10 MCG/ML (400 units/mL) oral liquid  400 Units, 400 Units, Per PEG Tube, Daily, Olman Felton MD, 400 Units at 07/27/25 0918    dextrose (D50W) (25 g/50 mL) IV injection 25 g, 25 g, Intravenous, Q15 Min PRN, Rin Rosa APRN    dextrose (GLUTOSE) oral gel 15 g, 15 g, Oral, Q15 Min PRN, Rin Rosa APRN    famotidine (PEPCID) tablet 40 mg, 40 mg, Per G Tube, Daily, Olman Felton MD, 40 mg at 07/27/25 0918    [Held by provider] glipizide (GLUCOTROL) tablet 5 mg, 5 mg, Per G Tube, Daily, Olman Felton MD    glucagon (GLUCAGEN) injection 1 mg, 1 mg, Intramuscular, Q15 Min PRN, Rin Rosa APRN    guaiFENesin-codeine (ROMILAR-AC) solution 5 mL, 5 mL, Oral, Q6H PRN, Rin Rosa APRN    Insulin Lispro (humaLOG) injection 2-7 Units, 2-7 Units, Subcutaneous, 4x Daily AC & at Bedtime, Rin Rosa APRN, 2 Units at 07/27/25 2033    ipratropium-albuterol (DUO-NEB) nebulizer solution 3 mL, 3 mL, Nebulization, TID - RT, Olman Felton MD, 3 mL at 07/28/25 0552    [Held by provider] losartan (COZAAR) tablet 25 mg, 25 mg, Per G Tube, Daily, Olman Felton MD    [Held by provider] metFORMIN (GLUCOPHAGE) tablet 500 mg, 500 mg, Per G Tube, Nightly, Olman Felton MD    ondansetron ODT (ZOFRAN-ODT) disintegrating tablet 4 mg, 4 mg, Oral, Q6H PRN **OR** ondansetron (ZOFRAN) injection 4 mg, 4 mg, Intravenous, Q6H PRN, iRn Rosa APRN    piperacillin-tazobactam (ZOSYN) 3.375 g IVPB in 100 mL NS MBP (CD), 3.375 g, Intravenous, Q8H, Rin Rosa, APRN, 3.375 g at 07/28/25 0419    pravastatin (PRAVACHOL) tablet 40 mg, 40 mg, Per G Tube, Daily, Olman Felton MD, 40 mg at 07/27/25 0918    sodium chloride 0.9 % bolus 1,000 mL, 1,000 mL, Intravenous, Once, Fabrizio Coronel MD, Stopped at 07/25/25 1326    [COMPLETED] Insert Peripheral IV, , , Once **AND** sodium chloride 0.9 % flush 10 mL, 10 mL, Intravenous, PRN, Fabrizio Coronel MD    Allergies   Allergen Reactions    Adhesive Tape Hives     Latex Tape     I  "have utilized all available immediate resources to obtain, update, or review the patient's current medications (including all prescriptions, over-the-counter products, herbals, cannabis/cannabidiol products, and vitamin/mineral/dietary (nutritional) supplements) for name, route of administration, type, dose and frequency.      Intake/Output Summary (Last 24 hours) at 7/28/2025 0725  Last data filed at 7/28/2025 0419  Gross per 24 hour   Intake --   Output 1950 ml   Net -1950 ml       Physical Exam:    Diagnostics: Reviewed  /49 (BP Location: Right arm, Patient Position: Lying)   Pulse 92   Temp 97.7 °F (36.5 °C) (Oral)   Resp 16   Ht 160 cm (62.99\")   Wt 65.3 kg (143 lb 14.4 oz)   SpO2 96%   BMI 25.50 kg/m²     Vitals and nursing note reviewed.   Constitutional:       Appearance: Not in distress. Frail. Ill-appearing and chronically ill-appearing.   Eyes:      Pupils: Pupils are equal, round, and reactive to light.   Pulmonary:      Effort: Pulmonary effort is normal.      Breath sounds: Examination of the left-upper field reveals decreased breath sounds and rales. Examination of the left-lower field reveals decreased breath sounds and rales. Decreased breath sounds present. Rales present.   Cardiovascular:      Normal rate.   Edema:     Peripheral edema absent.   Abdominal:      Palpations: Abdomen is soft.   Musculoskeletal:      Cervical back: Neck supple. Skin:     General: Skin is warm.      Coloration: Skin is pale.   Genitourinary:     Comments: External urinary catheter  Neurological:      Mental Status: Alert and oriented to person, place and time.      Comments: Hoarseness   Psychiatric:         Mood and Affect: Mood normal.         Cognition and Memory: Cognition normal.     Patient status: Disease state: Controlled with current treatments.  Current Functional status: Palliative Performance Scale Score: Performance 40% based on the following measures: Ambulation: Mainly in bed, Activity and " Evidence of Disease: Unable to do any work, extensive evidence of disease, Self-Care: Mainly assistance required,  Intake: Normal or reduced, LOC: Full, drowsy or confusion   Baseline Functional status: Palliative Performance Scale Score: Performance 40% based on the following measures: Ambulation: Mainly in bed, Activity and Evidence of Disease: Unable to do any work, extensive evidence of disease, Self-Care: Mainly assistance required,  Intake: Normal or reduced, LOC: Full, drowsy or confusion   Baseline ECOG Status(3) Capable of limited self-care, confined to bed or chair > 50% of waking hours.  Nutritional status: Albumin 2.7 Body mass index is 25.5 kg/m².         Hospital Problem List      Pneumonia of left sided due to infectious organism, suspect aspiration    Squamous cell esophageal cancer    Dysphagia with feeding tube    DM2 (diabetes mellitus, type 2)    Hyponatremia    Acute cystitis without hematuria    Impression/Problem List:    Squamous cell carcinoma of esophagus-recent diagnosis 6/12/2025  Left breast cancer s/p lumpectomy, chemotherapy, adjuvant radiation  Pneumonia, left side, suspect aspiration  Acute cystitis without hematuria  Dysphagia s/p PEG 7/10     Severe malnutrition  Restrictive lung disease  Chronic kidney disease-stage 3b  Type 2 diabetes mellitus  Emphysema lung  Hyponatremia  Anemia  CONNIE  GERD  Heart disease  History of colon polyps  Hyperlipidemia  Diastolic dysfunction  Osteopenia  Impaired mobility-uses cane  Uterine prolapse  Advanced age      Recommendations/Plan:  1. plan: Goals of care include CODE STATUS no CPR/limited support interventions.    Family support: The patient receives support from her children..  Advance Directives: Advance Directive Status: Patient has advance directive, copy in chart   POA/Healthcare surrogate-children-next of kin.    2.  Palliative care encounter  -Prognosis is poor to guarded long-term secondary to due to squamous cell carcinoma of  esophagus, left breast cancer, pneumonia due to suspected aspiration, UTI, dysphagia requiring PEG, multiple comorbidities, and advanced age.  -Patient and family appears to have good prognostic awareness.      -squamous cell carcinoma of esophagus-recent diagnosis, left breast cancer s/p lumpectomy and chemotherapy-Adriamycin (developed cardiomyopathy and discontinued), Cytoxan, and Taxol-completed 3/2009 followed by adjuvant radiation (2008).  Followed by Dr. Samano last seen 6/23/2025 with recommendations for further clarification from ENT, PET scan scheduled, results as below, plan follow-up in 6 weeks.  Seen by Dr. Lee Haynes, ENT 6/24/2025, underwent flexible fiberoptic laryngoscopy without findings of mass or lesion.  Plan for FNA    -Hospitalization 7/9-7/16 due to dysphagia-underwent PEG tube placement 7/10.  Discharged to [facility].    - Last seen by this provider during most recent hospitalization, explored by supportive care directed by hospice on 7/10 and 7/11.  Note per conversation open radiation therapy-plan 13 treatments (anticipated to start 7/29) to esophagus and follow-up scan in 4 weeks, leaning against systemic therapy given previous adverse effects and treatment.  A MOST document completed.    -Treated with antibiotics, nebs, supplemental oxygen, IV fluid bolus.    -Dietitian consult for tube feeds.    -Evaluated by therapy who recommends back to SNF as prior.    -Evaluated by speech therapy with plans to continue n.p.o. status and repeat video swallow study once radiation is completed.    7/28-clarified CODE STATUS no CPR/limited support interventions, no intubation, no dialysis, no vasopressors, no cardioversion.  - A MOST document is on file and up-to-date  - Anticipating palliative radiation to esophagus starting tomorrow  - Anticipating back to SNF's prior  - High risk for rehospitalization's and decline given multiple comorbid factors  - Case discussed with   Jose Francisco      Thank you for this consult and allowing us to participate in patient's plan of care. Palliative Care Team will follow patient as needed.     18 minutes spent on advance care planning-discussing with patient and/or family, answering questions, providing some guidance about a plan and documentation of care, and coordinating care face to face.    Anh Polk, APRN  7/28/2025  07:40 CDT

## 2025-07-28 NOTE — PLAN OF CARE
Goal Outcome Evaluation:  Plan of Care Reviewed With: patient           Outcome Evaluation: OT tx completed. Pt presented fowlers in bed upon entering. Pt reported that she did not feel like working with therapy today, however, after encouragement she agreed to participate at bed level. She was A&O x4. Pt was found to be dirty of bowel and urine. Pt required Max A to clean up including performing perineal hygiene. She was able to roll L and R with SBA for safety. Pt donned new hospital gown with Mod A. She completed 2 sets and 10 repetitions of UE exercises bilaterally. Pt demonstrated increased fatigue during session this date. Ms. Valdes would benefit from continued skilled OT per POC. Recommend d/c to SNF.    Anticipated Discharge Disposition (OT): skilled nursing facility

## 2025-07-28 NOTE — PROGRESS NOTES
Assessment tool to be used for patients with existing breathing treatments ordered by hospitalist                               Respiratory Therapist Driven Protocol - RT to Assess and Treat Algorithm    Item 0 Points 1 Point 2 Points 3 Points 4 Points Subtotal   Mental Status Alert, orientated, cooperative Lethargic, follows commands Confused, not following commands Obtunded or Somnolent Comatose 0   Respiratory Pattern Regular RR  8-16 breaths/minute Increased RR  18-25 breaths/minute Dyspnea on exertion, irregular RR  26-30/minute Shortness of breath,  RR 31-35 breaths/minute Accessory muscle use, severe SOB  RR > 35 breath/minute 0   Breath Sounds Clear Decreased unilaterally Decreased bilaterally Basilar crackles Wheezing and/or rhonchi 2   Cough Strong, spontaneous non-productive Strong productive Weak, non-productive Weak productive or weak with rhonchi Absent or may require suctioning 1   Pulmonary Status Nonsmoker or no previous history > 1 year quit < 1 PPD  < 1 year quit >  or = 1 PPD Diagnosed pulmonary disease (severe or chronic) Severe or chronic pulmonary disease with exacerbation 1   Surgical Status None General surgery (non-abdominal or non-thoracic) Lower abdominal Thoracic or upper abdominal Thoracic with pulmonary disease 0   Chest X-ray Clear Chronic changes Infiltrates, atelectasis or pleural effusion Infiltrates > 1 lobe Diffuse infiltrates and atelectasis and/or effusions 1   Activity level Ambulatory Ambulatory with assistance Non-ambulatory Paraplegic Quadriplegic 1                     Total Score 6   Score    Drug Therapy Frequency  20 or >    Q4 Duoneb & Q3 Albuterol PRN 15 - 19     Q6 Duoneb & Q4 Albuterol PRN 10 - 14    QID Duoneb & Q4 Albuterol PRN 5 - 9    TID Duoneb & Q6 Albuterol PRN 0 - 4    Q4 PRN Duoneb or Q4 PRN Albuterol    Incentive Spirometry - Initial RT instruct    Lung Expansion Therapy (PEP) Bronchopulmonary Hygiene (CPT)   Q4 & PRN - Severe atelectasis,  poor oxygenation Q4 - copious secretions, dyspnea, unable to sleep, mucus plugging   QID - High risk for persistent atelectasis, existence of atelectasis QID & Q4 PRN - Moderate secretion production   TID - At risk for developing atelectasis TID - small amounts of secretions with poor cough   BID - prevention of atelectasis BID - unable to breathe deeply and cough spontaneously   *RT Protocol patients will be re-assessed/re-evaluated every 48 hours.    *Patients who are home nebulizer treatments will be protocoled to no less than their home regimen and will remain     on their home regimen with re-evaluations as needed with changes in patient condition.    RT Comments/Recommendations: TID Duoneb & Q6 Albuterol PRN

## 2025-07-28 NOTE — PLAN OF CARE
Goal Outcome Evaluation:                 Pt a/ox4, vss on RA. Pt has no c/o pain. Has a productive cough, sputum suctioned out. Emptied first time and it was 700 mL, second time was 900 mL. Pt to be d/c to [facility] today. Call light in reach, safety maintained.

## 2025-07-28 NOTE — CASE MANAGEMENT/SOCIAL WORK
Continued Stay Note  [facility]     Patient Name: Lorena Valdes  MRN: [id number]  Today's Date: 7/28/2025    Admit Date: 7/25/2025    Plan: [facility]   Discharge Plan       Row Name 07/28/25 1020       Plan    Final Discharge Disposition Code 03 - skilled nursing facility (SNF)    Final Note Bed is ready at [facility] today. Pharmacy updated in [facility] for [facility] ([place], [place]). Call report number is [phone]. Fax number is [phone]                   Discharge Codes    No documentation.                 Expected Discharge Date and Time       Expected Discharge Date Expected Discharge Time    Jul 29, 2025               RAI Ndiaye

## 2025-07-28 NOTE — DISCHARGE SUMMARY
[facility]  DISCHARGE SUMMARY       Date of Admission: 7/25/2025  Date of Discharge:  7/28/2025  Primary Care Physician: Luly Diez MD    Presenting Problem/History of Present Illness:  Sent by general surgery office due to weakness and low blood pressure     Final Discharge Diagnoses:  Active Hospital Problems    Diagnosis     **Pneumonia of left sided due to infectious organism, suspect aspiration     Acute cystitis without hematuria     Hyponatremia     DM2 (diabetes mellitus, type 2)     Dysphagia with feeding tube     Squamous cell esophageal cancer        Consults: None    Procedures Performed: None    Pertinent Test Results:   No results found for this or any previous visit.      Imaging Results (All)       Procedure Component Value Units Date/Time    XR Chest 1 View [[id number]] Collected: 07/25/25 1103     Updated: 07/25/25 1108    Narrative:      EXAMINATION: XR CHEST 1 VW-        HISTORY: Weakness/cough     A frontal projection of the chest is compared with the previous study  dated 7/9/2025.     The lungs are poorly expanded.     There is an ill-defined opacity in the left mid to left lower lung which  was not noted in the previous study and may represent an evolving  inflammatory/infectious process.     Coarse linear changes in the remaining lungs are present chronic  process/fibrosis.     There is no pleural effusion. No pneumothorax.     There is moderate cardiomegaly. Atheromatous change of thoracic aorta.     Left subclavian approach Mediport system is in place.     No acute bony abnormality.       Impression:      1. A suspected acute/evolving inflammatory/infectious process in the  left lung. Further follow-up suggested.        This report was signed and finalized on 7/25/2025 11:05 AM by Dr. Jordan Turner MD.             LAB RESULTS:      Lab 07/28/25  0259 07/27/25  0357 07/26/25  0506 07/25/25  1223 07/25/25  1112   WBC 13.89*  14.36* 16.39*  --  23.43*   HEMOGLOBIN 7.7* 7.6* 7.9*  --  9.1*   HEMATOCRIT 24.4* 24.3* 24.9*  --  28.3*   PLATELETS 435 421 388  --  438   NEUTROS ABS 7.20* 7.78* 13.11*  --  17.35*   IMMATURE GRANS (ABS) 0.09* 0.07*  --   --  0.26*   LYMPHS ABS 4.39* 4.50*  --   --  3.64*   MONOS ABS 1.78* 1.72*  --   --  2.05*   EOS ABS 0.39 0.25 0.49*  --  0.07   MCV 89.4 90.3 89.9  --  89.6   PROCALCITONIN  --   --   --  0.38*  --    LACTATE  --   --   --   --  1.5         Lab 07/28/25  0259 07/27/25  0357 07/26/25  0506 07/25/25  2328 07/25/25  1742 07/25/25  1112   SODIUM 129* 129* 128* 131* 129* 129*   POTASSIUM 3.9 3.6 3.6 3.6 3.7 4.2   CHLORIDE 93* 93* 92* 93* 94* 90*   CO2 25.0 25.0 23.0 25.0 25.0 27.0   ANION GAP 11.0 11.0 13.0 13.0 10.0 12.0   BUN 29.7* 32.2* 35.3* 33.9* 34.7* 39.5*   CREATININE 0.86 0.95 0.97 0.89 0.87 1.10*   EGFR 67.1 59.6* 58.1* 64.4 66.2 50.0*   GLUCOSE 192* 165* 154* 91 131* 165*   CALCIUM 8.0* 8.0* 8.4* 8.3* 8.4* 8.8   MAGNESIUM  --   --   --   --   --  2.0         Lab 07/25/25  1112   TOTAL PROTEIN 7.6   ALBUMIN 2.7*   GLOBULIN 4.9   ALT (SGPT) 13   AST (SGOT) 23   BILIRUBIN 0.3   ALK PHOS 71         Lab 07/25/25  1223 07/25/25  1112   HSTROP T 25* 27*             Lab 07/26/25  0506   IRON 17*   IRON SATURATION (TSAT) 8*   TIBC 201*   TRANSFERRIN 135*   FERRITIN 728.00*         Brief Urine Lab Results  (Last result in the past 365 days)        Color   Clarity   Blood   Leuk Est   Nitrite   Protein   CREAT   Urine HCG        07/25/25 1242 Yellow   Cloudy   Trace   Large (3+)   Negative   30 mg/dL (1+)                 Microbiology Results (last 10 days)       Procedure Component Value - Date/Time    Respiratory Culture - Sputum, Cough [[id number]] Collected: 07/25/25 1609    Lab Status: Final result Specimen: Sputum from Cough Updated: 07/26/25 0859     Respiratory Culture Rejected     Gram Stain Many (4+) Gram negative bacilli      Few (2+) Gram negative cocci      Rare (1+) Epithelial cells seen       Rare (1+) WBCs per low power field    Narrative:      Specimen rejected due to oropharyngeal contamination. Please reorder and recollect specimen if clinically necessary.    MRSA Screen, PCR (Inpatient) - Swab, Nares [[id number]]  (Normal) Collected: 07/25/25 1536    Lab Status: Final result Specimen: Swab from Nares Updated: 07/25/25 1700     MRSA PCR No MRSA Detected    Narrative:      The negative predictive value of this diagnostic test is high and should only be used to consider de-escalating anti-MRSA therapy. A positive result may indicate colonization with MRSA and must be correlated clinically.    Blood Culture - Blood, Arm, Right [[id number]]  (Normal) Collected: 07/25/25 1436    Lab Status: Preliminary result Specimen: Blood from Arm, Right Updated: 07/27/25 1500     Blood Culture No growth at 2 days    Blood Culture - Blood, Arm, Right [[id number]]  (Normal) Collected: 07/25/25 1427    Lab Status: Preliminary result Specimen: Blood from Arm, Right Updated: 07/27/25 1500     Blood Culture No growth at 2 days    Respiratory Panel PCR w/COVID-19(SARS-CoV-2) ZE/FAVIAN/ELISABETH/[facility] In-House, NP Swab in UTM/VTM, 2 HR TAT - Swab, Nasopharynx [[id number]]  (Normal) Collected: 07/25/25 1421    Lab Status: Final result Specimen: Swab from Nasopharynx Updated: 07/25/25 1526     ADENOVIRUS, PCR Not Detected     Coronavirus 229E Not Detected     Coronavirus HKU1 Not Detected     Coronavirus NL63 Not Detected     Coronavirus OC43 Not Detected     COVID19 Not Detected     Human Metapneumovirus Not Detected     Human Rhinovirus/Enterovirus Not Detected     Influenza A PCR Not Detected     Influenza B PCR Not Detected     Parainfluenza Virus 1 Not Detected     Parainfluenza Virus 2 Not Detected     Parainfluenza Virus 3 Not Detected     Parainfluenza Virus 4 Not Detected     RSV, PCR Not Detected     Bordetella pertussis pcr Not Detected     Bordetella parapertussis PCR Not Detected     Chlamydophila pneumoniae PCR Not  Detected     Mycoplasma pneumo by PCR Not Detected    Narrative:      In the setting of a positive respiratory panel with a viral infection PLUS a negative procalcitonin without other underlying concern for bacterial infection, consider observing off antibiotics or discontinuation of antibiotics and continue supportive care. If the respiratory panel is positive for atypical bacterial infection (Bordetella pertussis, Chlamydophila pneumoniae, or Mycoplasma pneumoniae), consider antibiotic de-escalation to target atypical bacterial infection.    Urine Culture - Urine, Urine, Catheter [[id number]]  (Abnormal) Collected: 07/25/25 1242    Lab Status: Preliminary result Specimen: Urine, Catheter Updated: 07/27/25 1236     Urine Culture >100,000 CFU/mL Gram Negative Bacilli     Comment: ID and MICs to follow.       Narrative:      Colonization of the urinary tract without infection is common. Treatment is discouraged unless the patient is symptomatic, pregnant, or undergoing an invasive urologic procedure.    S. Pneumo Ag Urine or CSF - Urine, Urine, Catheter [[id number]]  (Normal) Collected: 07/25/25 1242    Lab Status: Final result Specimen: Urine, Catheter Updated: 07/25/25 1545     Strep Pneumo Ag Negative    Legionella Antigen, Urine - Urine, Urine, Catheter [[id number]]  (Normal) Collected: 07/25/25 1242    Lab Status: Final result Specimen: Urine, Catheter Updated: 07/25/25 1544     LEGIONELLA ANTIGEN, URINE Negative          Microbiology Results (last 10 days)       Procedure Component Value - Date/Time    Respiratory Culture - Sputum, Cough [[id number]] Collected: 07/25/25 1609    Lab Status: Final result Specimen: Sputum from Cough Updated: 07/26/25 0859     Respiratory Culture Rejected     Gram Stain Many (4+) Gram negative bacilli      Few (2+) Gram negative cocci      Rare (1+) Epithelial cells seen      Rare (1+) WBCs per low power field    Narrative:      Specimen rejected due to oropharyngeal contamination.  Please reorder and recollect specimen if clinically necessary.    MRSA Screen, PCR (Inpatient) - Swab, Nares [[id number]]  (Normal) Collected: 07/25/25 1536    Lab Status: Final result Specimen: Swab from Nares Updated: 07/25/25 1700     MRSA PCR No MRSA Detected    Narrative:      The negative predictive value of this diagnostic test is high and should only be used to consider de-escalating anti-MRSA therapy. A positive result may indicate colonization with MRSA and must be correlated clinically.    Blood Culture - Blood, Arm, Right [[id number]]  (Normal) Collected: 07/25/25 1436    Lab Status: Preliminary result Specimen: Blood from Arm, Right Updated: 07/27/25 1500     Blood Culture No growth at 2 days    Blood Culture - Blood, Arm, Right [[id number]]  (Normal) Collected: 07/25/25 1427    Lab Status: Preliminary result Specimen: Blood from Arm, Right Updated: 07/27/25 1500     Blood Culture No growth at 2 days    Respiratory Panel PCR w/COVID-19(SARS-CoV-2) [facility]E/FAVI[facility]/ELISABETH/[facility] In-House, NP Swab in UTM/VTM, 2 HR TAT - Swab, Nasopharynx [[id number]]  (Normal) Collected: 07/25/25 1421    Lab Status: Final result Specimen: Swab from Nasopharynx Updated: 07/25/25 1526     ADENOVIRUS, PCR Not Detected     Coronavirus 229E Not Detected     Coronavirus HKU1 Not Detected     Coronavirus NL63 Not Detected     Coronavirus OC43 Not Detected     COVID19 Not Detected     Human Metapneumovirus Not Detected     Human Rhinovirus/Enterovirus Not Detected     Influenza A PCR Not Detected     Influenza B PCR Not Detected     Parainfluenza Virus 1 Not Detected     Parainfluenza Virus 2 Not Detected     Parainfluenza Virus 3 Not Detected     Parainfluenza Virus 4 Not Detected     RSV, PCR Not Detected     Bordetella pertussis pcr Not Detected     Bordetella parapertussis PCR Not Detected     Chlamydophila pneumoniae PCR Not Detected     Mycoplasma pneumo by PCR Not Detected    Narrative:      In the setting of a positive respiratory  panel with a viral infection PLUS a negative procalcitonin without other underlying concern for bacterial infection, consider observing off antibiotics or discontinuation of antibiotics and continue supportive care. If the respiratory panel is positive for atypical bacterial infection (Bordetella pertussis, Chlamydophila pneumoniae, or Mycoplasma pneumoniae), consider antibiotic de-escalation to target atypical bacterial infection.    Urine Culture - Urine, Urine, Catheter [[id number]]  (Abnormal) Collected: 07/25/25 1242    Lab Status: Preliminary result Specimen: Urine, Catheter Updated: 07/27/25 1236     Urine Culture >100,000 CFU/mL Gram Negative Bacilli     Comment: ID and MICs to follow.       Narrative:      Colonization of the urinary tract without infection is common. Treatment is discouraged unless the patient is symptomatic, pregnant, or undergoing an invasive urologic procedure.    S. Pneumo Ag Urine or CSF - Urine, Urine, Catheter [[id number]]  (Normal) Collected: 07/25/25 1242    Lab Status: Final result Specimen: Urine, Catheter Updated: 07/25/25 1545     Strep Pneumo Ag Negative    Legionella Antigen, Urine - Urine, Urine, Catheter [[id number]]  (Normal) Collected: 07/25/25 1242    Lab Status: Final result Specimen: Urine, Catheter Updated: 07/25/25 1544     LEGIONELLA ANTIGEN, URINE Negative             Documented weights    07/25/25 1004 07/27/25 0557 07/28/25 0525   Weight: 60 kg (132 lb 4.8 oz) 66 kg (145 lb 8 oz) 65.3 kg (143 lb 14.4 oz)        Hospital Course: Lorena Valdes is an 84-year-old female with a past medical history of 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 Patient admitted 7/9 - 7/16, 2025 dysphagia.  She will underwent EGD on 7/10.  7/11 laparoscopic gastrostomy feeding tube placed per Dr. Tapia  "Tia.  She was also seen by Dr. Samano on oncology and Dr. Gabriel Ernandez radiation oncology.       Patient was seen today at surgeon's office routine follow-up after feeding tube placement.  Due to weakness \" low blood pressure\" she was sent to [facility] ED for evaluation.  She has had a soft blood pressure since admission but no overt hypotension.  Patient is awake and alert.  She is able to answer all questions.  She resides at [facility], awaiting fax medication list to review home medications.  She has no pain.  She is scheduled to start radiation next Tuesday.  She does have a wet cough.  Patient states she is a DNR/DNI.      ED workup revealed sodium 129, chloride 90, creatinine 1.10, GFR 50, glucose 165, albumin 2.7, remainder of CMP and magnesium within normal limits, WBC 23.46-no left shift or bandemia, lactic acid 1.5, hemoglobin 9.1, urinalysis reveals WBCs 21-50 with 4+ bacteria, urine culture pending, cultures have been ordered. Chest xray reveals A suspected acute/evolving inflammatory/infectious process in the left lung     Other health history breast cancer 2008 status post lumpectomy with adjuvant chemotherapy, diabetes type 2, GERD, anemia, chronic kidney disease stage IIIa, COPD        Today 7/28: Sitting up in bed, daughters at bedside.  Codeine cough suspension has improved cough somewhat.  Soreness has improved that was secondary to cough.  Patient had an uneventful night.  She is stable for discharge.  7/28-Per ANNMARIE Vaughn palliative care.  Clarified CODE STATUS no CPR/limited support interventions, no intubation, no dialysis, no vasopressors, no cardioversion.  - A MOST document is on file and up-to-date  - Anticipating palliative radiation to esophagus starting tomorrow  - Anticipating back to SNF's prior  - High risk for rehospitalization's and decline given multiple comorbid factors  - Case discussed with Dr. Felton       7/27: Resting quietly in bed.  Daughters " "at bedside.  She has no pain however she continues to complain of cough that does, \"makes my chest sore\".  Reviewed lab studies with patient and family.  Tube feeding infusing.  Anticipate discharge back to [facility] tomorrow if not we will initiate radiation therapy on 7/29 as previously scheduled.  Condition remains guarded.     7/26 : Sitting up in chair.  Daughter at bedside.  Patient looks improved today.  Less pale.  She is alert and oriented.  Answers all questions appropriately.  She denies pain.  She continues to have a wet cough.  Tube feeding infusing.  Anticipate initiation of radiation therapy 7/29.  Reviewed laboratory studies with patient and daughter, both verbalized understanding.  Will continue to follow.      Patient has reached the maximum benefit of hospitalization and is stable for discharge  Patient has been evaluated today 07/28/25 and is stable for discharge.   Physical Exam on Discharge:  /58 (BP Location: Right arm, Patient Position: Lying)   Pulse 86   Temp 98 °F (36.7 °C) (Oral)   Resp 18   Ht 160 cm (62.99\")   Wt 65.3 kg (143 lb 14.4 oz)   SpO2 96%   BMI 25.50 kg/m²   Physical Exam  Vitals reviewed.   Constitutional:       Appearance: She is ill-appearing.   HENT:      Head: Normocephalic and atraumatic.      Mouth/Throat:      Mouth: Mucous membranes are moist.      Pharynx: Oropharynx is clear.      Comments: Edentulous  Eyes:      Extraocular Movements: Extraocular movements intact.      Conjunctiva/sclera: Conjunctivae normal.   Neck:      Comments: No difficulty turning head, no swelling appreciated  Cardiovascular:      Rate and Rhythm: Normal rate and regular rhythm.   Pulmonary:      Breath sounds: Rhonchi (Throughout anteriorly) present.      Comments: Wet cough with chronic sputum production, suspect having difficulty clearing secretions due to tumor obstruction  Abdominal:      Palpations: Abdomen is soft.      Comments: Feeding tube in place left upper quadrant, " continuous tube feeding   Musculoskeletal:      Right lower leg: No edema.      Left lower leg: No edema.      Comments: Generalized weakness and debility   Skin:     General: Skin is warm and dry.      Coloration: Skin is pale.   Neurological:      General: No focal deficit present.      Mental Status: She is alert and oriented to person, place, and time.   Psychiatric:         Mood and Affect: Mood normal.         Behavior: Behavior normal.       Condition on Discharge: Guarded but stable    Discharge Disposition:  Skilled Nursing Facility (DC - External)    Discharge Medications:     Discharge Medications        New Medications        Instructions Start Date   albuterol (2.5 MG/3ML) 0.083% nebulizer solution  Commonly known as: PROVENTIL   2.5 mg, Nebulization, Every 6 Hours PRN      amoxicillin-clavulanate 600-42.9 MG/5ML suspension  Commonly known as: Augmentin ES-600   7.5 mL, Per G Tube, 2 Times Daily      guaiFENesin-codeine 100-10 MG/5ML solution/syrup  Commonly known as: ROMILAR-AC   5 mL, Oral, Every 6 Hours PRN      ipratropium-albuterol 0.5-2.5 mg/3 ml nebulizer  Commonly known as: DUO-NEB   3 mL, Nebulization, 3 Times Daily - RT             Continue These Medications        Instructions Start Date   aspirin 81 MG chewable tablet   81 mg, Daily      Calcium Carb-Cholecalciferol 600-20 MG-MCG tablet   1 tablet, Per G Tube, 2 Times Daily      carvedilol 3.125 MG tablet  Commonly known as: COREG   3.125 mg, Per G Tube, 2 Times Daily With Meals      dextromethorphan-guaifenesin  MG/5ML syrup  Commonly known as: ROBITUSSIN-DM   10 mL, Every 6 Hours PRN      famotidine 40 MG tablet  Commonly known as: PEPCID   40 mg, Per G Tube, Daily      glipizide 5 MG tablet  Commonly known as: GLUCOTROL   5 mg, Per G Tube, Daily      losartan 25 MG tablet  Commonly known as: COZAAR   25 mg, Per G Tube, Daily      magnesium 30 MG tablet   30 mg, Per G Tube, Daily      metFORMIN 500 MG tablet  Commonly known as:  GLUCOPHAGE   500 mg, Per G Tube, Nightly      Nutrisource fiber pack pack   1 packet, Per G Tube, 3 Times Daily      ondansetron ODT 4 MG disintegrating tablet  Commonly known as: ZOFRAN-ODT   4 mg, Oral, Every 6 Hours PRN      pravastatin 40 MG tablet  Commonly known as: PRAVACHOL   40 mg, Per G Tube, Daily      Protonix 40 MG pack packet  Generic drug: pantoprazole   40 mg, Per G Tube, Every Morning Before Breakfast      Vitamin D3 10 MCG (400 UNIT) capsule   1 capsule, Per G Tube, Daily             Discharge Diet: NPO  Current tube feeding formula DiaBeta source AC (Glucerna 1.2): Continuous start at 40 mL/hour then advance by 10 mL/hour every 6 hours to goal of 70 mL/hour; total daily feeding volume 1500 mL.  Flush with 20 mL water every hour    Activity at Discharge:   Activity Instructions       Activity as Tolerated      Other Activity Instructions      Activity Instructions: Set up at at least 35 degree angle for tube feeding            Discharge Instructions:   1.  New medications: Amoxicillin-clavulanic (Augmentin)  suspension 7.5 mLtwice a day x 7 days; guaifenesin-codeine (Romilar-AC) 5 mL every 6 hours as needed for cough; DuoNebs every 6 hours and albuterol nebs every 4 hours as needed for shortness of breathing/wheezing  2.  Transfer back to [facility] rehab and nursing   3.  Follow-up with primary care provider per discretion facility prescriber  4.  As needed oxygen  - have available as needed for patient comfort  5.  Continue tube feeding regimen as prior to admission  6.  Return to ED for intractable pain, shortness of breathing not relieved with oxygen, intractable pain    Follow-up Appointments:   Future Appointments   Date Time Provider Department Center   8/5/2025 10:45 AM  [facility]  [facility]   8/5/2025 11:15 AM Justin Canas MD [facility]   8/27/2025 10:15 AM Verona Herrera APRN [facility]       Test Results Pending at Discharge: Urine culture  results    Electronically signed by AMANDA Leyva, 07/28/25, 10:49 CDT.    Time: 60 minutes.

## 2025-07-29 ENCOUNTER — HOSPITAL ENCOUNTER (OUTPATIENT)
Dept: RADIATION ONCOLOGY | Facility: HOSPITAL | Age: 85
Discharge: HOME | End: 2025-07-29

## 2025-07-29 ENCOUNTER — TELEPHONE (OUTPATIENT)
Dept: SURGERY | Facility: CLINIC | Age: 85
End: 2025-07-29
Payer: MEDICARE

## 2025-07-29 LAB
RAD ONC ARIA COURSE ID: NORMAL
RAD ONC ARIA COURSE INTENT: NORMAL
RAD ONC ARIA COURSE LAST TREATMENT DATE: NORMAL
RAD ONC ARIA COURSE START DATE: NORMAL
RAD ONC ARIA COURSE TREATMENT ELAPSED DAYS: 0
RAD ONC ARIA FIRST TREATMENT DATE: NORMAL
RAD ONC ARIA PLAN FRACTIONS TREATED TO DATE: 1
RAD ONC ARIA PLAN ID: NORMAL
RAD ONC ARIA PLAN PRESCRIBED DOSE PER FRACTION: 2.5 GY
RAD ONC ARIA PLAN PRIMARY REFERENCE POINT: NORMAL
RAD ONC ARIA PLAN TOTAL FRACTIONS PRESCRIBED: 13
RAD ONC ARIA PLAN TOTAL PRESCRIBED DOSE: 3250 CGY
RAD ONC ARIA REFERENCE POINT DOSAGE GIVEN TO DATE: 2.5 GY
RAD ONC ARIA REFERENCE POINT ID: NORMAL
RAD ONC ARIA REFERENCE POINT SESSION DOSAGE GIVEN: 2.5 GY

## 2025-07-29 PROCEDURE — 77386: CPT | Performed by: RADIOLOGY

## 2025-07-29 PROCEDURE — 77014 CHG CT GUIDANCE RADIATION THERAPY FLDS PLACEMENT: CPT | Performed by: RADIOLOGY

## 2025-07-29 NOTE — TELEPHONE ENCOUNTER
Attempted to contact patient due to a missed appointment today.Pt's phone number just kept ringing with no vm.     [facility]  7/29/2025

## 2025-07-30 ENCOUNTER — HOSPITAL ENCOUNTER (OUTPATIENT)
Dept: RADIATION ONCOLOGY | Facility: HOSPITAL | Age: 85
Discharge: HOME | End: 2025-07-30

## 2025-07-30 LAB
BACTERIA SPEC AEROBE CULT: NORMAL
BACTERIA SPEC AEROBE CULT: NORMAL
RAD ONC ARIA COURSE ID: NORMAL
RAD ONC ARIA COURSE INTENT: NORMAL
RAD ONC ARIA COURSE LAST TREATMENT DATE: NORMAL
RAD ONC ARIA COURSE START DATE: NORMAL
RAD ONC ARIA COURSE TREATMENT ELAPSED DAYS: 1
RAD ONC ARIA FIRST TREATMENT DATE: NORMAL
RAD ONC ARIA PLAN FRACTIONS TREATED TO DATE: 2
RAD ONC ARIA PLAN ID: NORMAL
RAD ONC ARIA PLAN PRESCRIBED DOSE PER FRACTION: 2.5 GY
RAD ONC ARIA PLAN PRIMARY REFERENCE POINT: NORMAL
RAD ONC ARIA PLAN TOTAL FRACTIONS PRESCRIBED: 13
RAD ONC ARIA PLAN TOTAL PRESCRIBED DOSE: 3250 CGY
RAD ONC ARIA REFERENCE POINT DOSAGE GIVEN TO DATE: 5 GY
RAD ONC ARIA REFERENCE POINT ID: NORMAL
RAD ONC ARIA REFERENCE POINT SESSION DOSAGE GIVEN: 2.5 GY

## 2025-07-30 PROCEDURE — 77386: CPT | Performed by: RADIOLOGY

## 2025-07-30 PROCEDURE — 77014 CHG CT GUIDANCE RADIATION THERAPY FLDS PLACEMENT: CPT | Performed by: RADIOLOGY

## 2025-07-31 ENCOUNTER — HOSPITAL ENCOUNTER (OUTPATIENT)
Dept: RADIATION ONCOLOGY | Facility: HOSPITAL | Age: 85
Discharge: HOME | End: 2025-07-31

## 2025-07-31 LAB
RAD ONC ARIA COURSE ID: NORMAL
RAD ONC ARIA COURSE INTENT: NORMAL
RAD ONC ARIA COURSE LAST TREATMENT DATE: NORMAL
RAD ONC ARIA COURSE START DATE: NORMAL
RAD ONC ARIA COURSE TREATMENT ELAPSED DAYS: 2
RAD ONC ARIA FIRST TREATMENT DATE: NORMAL
RAD ONC ARIA PLAN FRACTIONS TREATED TO DATE: 3
RAD ONC ARIA PLAN ID: NORMAL
RAD ONC ARIA PLAN PRESCRIBED DOSE PER FRACTION: 2.5 GY
RAD ONC ARIA PLAN PRIMARY REFERENCE POINT: NORMAL
RAD ONC ARIA PLAN TOTAL FRACTIONS PRESCRIBED: 13
RAD ONC ARIA PLAN TOTAL PRESCRIBED DOSE: 3250 CGY
RAD ONC ARIA REFERENCE POINT DOSAGE GIVEN TO DATE: 7.5 GY
RAD ONC ARIA REFERENCE POINT ID: NORMAL
RAD ONC ARIA REFERENCE POINT SESSION DOSAGE GIVEN: 2.5 GY

## 2025-07-31 PROCEDURE — 77386: CPT | Performed by: RADIOLOGY

## 2025-07-31 PROCEDURE — 77014 CHG CT GUIDANCE RADIATION THERAPY FLDS PLACEMENT: CPT | Performed by: RADIOLOGY

## 2025-08-01 ENCOUNTER — HOSPITAL ENCOUNTER (OUTPATIENT)
Dept: RADIATION ONCOLOGY | Facility: HOSPITAL | Age: 85
Discharge: HOME | End: 2025-08-01

## 2025-08-01 LAB
RAD ONC ARIA COURSE ID: NORMAL
RAD ONC ARIA COURSE INTENT: NORMAL
RAD ONC ARIA COURSE LAST TREATMENT DATE: NORMAL
RAD ONC ARIA COURSE START DATE: NORMAL
RAD ONC ARIA COURSE TREATMENT ELAPSED DAYS: 3
RAD ONC ARIA FIRST TREATMENT DATE: NORMAL
RAD ONC ARIA PLAN FRACTIONS TREATED TO DATE: 4
RAD ONC ARIA PLAN ID: NORMAL
RAD ONC ARIA PLAN PRESCRIBED DOSE PER FRACTION: 2.5 GY
RAD ONC ARIA PLAN PRIMARY REFERENCE POINT: NORMAL
RAD ONC ARIA PLAN TOTAL FRACTIONS PRESCRIBED: 13
RAD ONC ARIA PLAN TOTAL PRESCRIBED DOSE: 3250 CGY
RAD ONC ARIA REFERENCE POINT DOSAGE GIVEN TO DATE: 10 GY
RAD ONC ARIA REFERENCE POINT ID: NORMAL
RAD ONC ARIA REFERENCE POINT SESSION DOSAGE GIVEN: 2.5 GY

## 2025-08-04 ENCOUNTER — TELEPHONE (OUTPATIENT)
Dept: ONCOLOGY | Facility: CLINIC | Age: 85
End: 2025-08-04
Payer: MEDICARE

## 2025-08-05 ENCOUNTER — HOSPITAL ENCOUNTER (OUTPATIENT)
Dept: RADIATION ONCOLOGY | Facility: HOSPITAL | Age: 85
Discharge: HOME | End: 2025-08-05

## 2025-08-05 LAB
RAD ONC ARIA COURSE ID: NORMAL
RAD ONC ARIA COURSE INTENT: NORMAL
RAD ONC ARIA COURSE LAST TREATMENT DATE: NORMAL
RAD ONC ARIA COURSE START DATE: NORMAL
RAD ONC ARIA COURSE TREATMENT ELAPSED DAYS: 7
RAD ONC ARIA FIRST TREATMENT DATE: NORMAL
RAD ONC ARIA PLAN FRACTIONS TREATED TO DATE: 5
RAD ONC ARIA PLAN ID: NORMAL
RAD ONC ARIA PLAN PRESCRIBED DOSE PER FRACTION: 2.5 GY
RAD ONC ARIA PLAN PRIMARY REFERENCE POINT: NORMAL
RAD ONC ARIA PLAN TOTAL FRACTIONS PRESCRIBED: 13
RAD ONC ARIA PLAN TOTAL PRESCRIBED DOSE: 3250 CGY
RAD ONC ARIA REFERENCE POINT DOSAGE GIVEN TO DATE: 12.5 GY
RAD ONC ARIA REFERENCE POINT ID: NORMAL
RAD ONC ARIA REFERENCE POINT SESSION DOSAGE GIVEN: 2.5 GY

## 2025-08-06 ENCOUNTER — HOSPITAL ENCOUNTER (OUTPATIENT)
Dept: RADIATION ONCOLOGY | Facility: HOSPITAL | Age: 85
Discharge: HOME | End: 2025-08-06

## 2025-08-06 LAB
RAD ONC ARIA COURSE ID: NORMAL
RAD ONC ARIA COURSE INTENT: NORMAL
RAD ONC ARIA COURSE LAST TREATMENT DATE: NORMAL
RAD ONC ARIA COURSE START DATE: NORMAL
RAD ONC ARIA COURSE TREATMENT ELAPSED DAYS: 8
RAD ONC ARIA FIRST TREATMENT DATE: NORMAL
RAD ONC ARIA PLAN FRACTIONS TREATED TO DATE: 6
RAD ONC ARIA PLAN ID: NORMAL
RAD ONC ARIA PLAN PRESCRIBED DOSE PER FRACTION: 2.5 GY
RAD ONC ARIA PLAN PRIMARY REFERENCE POINT: NORMAL
RAD ONC ARIA PLAN TOTAL FRACTIONS PRESCRIBED: 13
RAD ONC ARIA PLAN TOTAL PRESCRIBED DOSE: 3250 CGY
RAD ONC ARIA REFERENCE POINT DOSAGE GIVEN TO DATE: 15 GY
RAD ONC ARIA REFERENCE POINT ID: NORMAL
RAD ONC ARIA REFERENCE POINT SESSION DOSAGE GIVEN: 2.5 GY

## 2025-08-07 ENCOUNTER — HOSPITAL ENCOUNTER (OUTPATIENT)
Dept: RADIATION ONCOLOGY | Facility: HOSPITAL | Age: 85
Discharge: HOME | End: 2025-08-07

## 2025-08-07 LAB
RAD ONC ARIA COURSE ID: NORMAL
RAD ONC ARIA COURSE INTENT: NORMAL
RAD ONC ARIA COURSE LAST TREATMENT DATE: NORMAL
RAD ONC ARIA COURSE START DATE: NORMAL
RAD ONC ARIA COURSE TREATMENT ELAPSED DAYS: 9
RAD ONC ARIA FIRST TREATMENT DATE: NORMAL
RAD ONC ARIA PLAN FRACTIONS TREATED TO DATE: 7
RAD ONC ARIA PLAN ID: NORMAL
RAD ONC ARIA PLAN PRESCRIBED DOSE PER FRACTION: 2.5 GY
RAD ONC ARIA PLAN PRIMARY REFERENCE POINT: NORMAL
RAD ONC ARIA PLAN TOTAL FRACTIONS PRESCRIBED: 13
RAD ONC ARIA PLAN TOTAL PRESCRIBED DOSE: 3250 CGY
RAD ONC ARIA REFERENCE POINT DOSAGE GIVEN TO DATE: 17.5 GY
RAD ONC ARIA REFERENCE POINT ID: NORMAL
RAD ONC ARIA REFERENCE POINT SESSION DOSAGE GIVEN: 2.5 GY

## 2025-08-08 ENCOUNTER — HOSPITAL ENCOUNTER (OUTPATIENT)
Dept: RADIATION ONCOLOGY | Facility: HOSPITAL | Age: 85
Discharge: HOME | End: 2025-08-08

## 2025-08-08 LAB
RAD ONC ARIA COURSE ID: NORMAL
RAD ONC ARIA COURSE INTENT: NORMAL
RAD ONC ARIA COURSE LAST TREATMENT DATE: NORMAL
RAD ONC ARIA COURSE START DATE: NORMAL
RAD ONC ARIA COURSE TREATMENT ELAPSED DAYS: 10
RAD ONC ARIA FIRST TREATMENT DATE: NORMAL
RAD ONC ARIA PLAN FRACTIONS TREATED TO DATE: 8
RAD ONC ARIA PLAN ID: NORMAL
RAD ONC ARIA PLAN PRESCRIBED DOSE PER FRACTION: 2.5 GY
RAD ONC ARIA PLAN PRIMARY REFERENCE POINT: NORMAL
RAD ONC ARIA PLAN TOTAL FRACTIONS PRESCRIBED: 13
RAD ONC ARIA PLAN TOTAL PRESCRIBED DOSE: 3250 CGY
RAD ONC ARIA REFERENCE POINT DOSAGE GIVEN TO DATE: 20 GY
RAD ONC ARIA REFERENCE POINT ID: NORMAL
RAD ONC ARIA REFERENCE POINT SESSION DOSAGE GIVEN: 2.5 GY

## 2025-08-09 ENCOUNTER — HOSPITAL ENCOUNTER (EMERGENCY)
Facility: HOSPITAL | Age: 85
Discharge: HOME | End: 2025-08-09
Attending: EMERGENCY MEDICINE
Payer: MEDICARE

## 2025-08-09 ENCOUNTER — APPOINTMENT (OUTPATIENT)
Dept: GENERAL RADIOLOGY | Facility: HOSPITAL | Age: 85
End: 2025-08-09
Payer: MEDICARE

## 2025-08-09 VITALS
BODY MASS INDEX: 23.86 KG/M2 | OXYGEN SATURATION: 93 % | TEMPERATURE: 98.2 F | SYSTOLIC BLOOD PRESSURE: 120 MMHG | RESPIRATION RATE: 16 BRPM | HEART RATE: 75 BPM | DIASTOLIC BLOOD PRESSURE: 64 MMHG | HEIGHT: 65 IN | WEIGHT: 143.2 LBS

## 2025-08-09 DIAGNOSIS — Z46.59 ENCOUNTER FOR FEEDING TUBE PLACEMENT: Primary | ICD-10-CM

## 2025-08-09 PROCEDURE — 99283 EMERGENCY DEPT VISIT LOW MDM: CPT | Performed by: EMERGENCY MEDICINE

## 2025-08-09 PROCEDURE — 74018 RADEX ABDOMEN 1 VIEW: CPT

## 2025-08-09 RX ORDER — LIDOCAINE HYDROCHLORIDE 20 MG/ML
JELLY TOPICAL ONCE
Status: COMPLETED | OUTPATIENT
Start: 2025-08-09 | End: 2025-08-09

## 2025-08-09 RX ADMIN — LIDOCAINE HYDROCHLORIDE: 20 JELLY TOPICAL at 14:59

## 2025-08-11 ENCOUNTER — LAB (OUTPATIENT)
Dept: LAB | Facility: HOSPITAL | Age: 85
End: 2025-08-11
Payer: MEDICARE

## 2025-08-11 ENCOUNTER — HOSPITAL ENCOUNTER (OUTPATIENT)
Dept: RADIATION ONCOLOGY | Facility: HOSPITAL | Age: 85
Discharge: HOME | End: 2025-08-11
Payer: MEDICARE

## 2025-08-11 ENCOUNTER — OFFICE VISIT (OUTPATIENT)
Dept: ONCOLOGY | Facility: CLINIC | Age: 85
End: 2025-08-11
Payer: MEDICARE

## 2025-08-11 VITALS
TEMPERATURE: 96.5 F | RESPIRATION RATE: 18 BRPM | WEIGHT: 142.7 LBS | SYSTOLIC BLOOD PRESSURE: 106 MMHG | BODY MASS INDEX: 23.78 KG/M2 | HEIGHT: 65 IN | HEART RATE: 88 BPM | DIASTOLIC BLOOD PRESSURE: 58 MMHG | OXYGEN SATURATION: 96 %

## 2025-08-11 DIAGNOSIS — C50.112 MALIGNANT NEOPLASM OF CENTRAL PORTION OF LEFT BREAST IN FEMALE, ESTROGEN RECEPTOR NEGATIVE: ICD-10-CM

## 2025-08-11 DIAGNOSIS — C15.9 SQUAMOUS CELL ESOPHAGEAL CANCER: Primary | ICD-10-CM

## 2025-08-11 DIAGNOSIS — Z17.1 MALIGNANT NEOPLASM OF CENTRAL PORTION OF LEFT BREAST IN FEMALE, ESTROGEN RECEPTOR NEGATIVE: ICD-10-CM

## 2025-08-11 LAB
ALBUMIN SERPL-MCNC: 3.2 G/DL (ref 3.5–5.2)
ALBUMIN/GLOB SERPL: 0.6 G/DL
ALP SERPL-CCNC: 83 U/L (ref 39–117)
ALT SERPL W P-5'-P-CCNC: 9 U/L (ref 1–33)
ANION GAP SERPL CALCULATED.3IONS-SCNC: 14 MMOL/L (ref 5–15)
ANISOCYTOSIS BLD QL: ABNORMAL
AST SERPL-CCNC: 15 U/L (ref 1–32)
BASOPHILS # BLD MANUAL: 0 10*3/MM3 (ref 0–0.2)
BASOPHILS NFR BLD MANUAL: 0 % (ref 0–1.5)
BILIRUB SERPL-MCNC: 0.2 MG/DL (ref 0–1.2)
BUN SERPL-MCNC: 39.9 MG/DL (ref 8–23)
BUN/CREAT SERPL: 44.3 (ref 7–25)
CALCIUM SPEC-SCNC: 10 MG/DL (ref 8.6–10.5)
CHLORIDE SERPL-SCNC: 88 MMOL/L (ref 98–107)
CO2 SERPL-SCNC: 31 MMOL/L (ref 22–29)
CREAT SERPL-MCNC: 0.9 MG/DL (ref 0.57–1)
DEPRECATED RDW RBC AUTO: 45.2 FL (ref 37–54)
EGFRCR SERPLBLD CKD-EPI 2021: 63.6 ML/MIN/1.73
EOSINOPHIL # BLD MANUAL: 0.13 10*3/MM3 (ref 0–0.4)
EOSINOPHIL NFR BLD MANUAL: 1 % (ref 0.3–6.2)
ERYTHROCYTE [DISTWIDTH] IN BLOOD BY AUTOMATED COUNT: 13.8 % (ref 12.3–15.4)
GLOBULIN UR ELPH-MCNC: 5.7 GM/DL
GLUCOSE SERPL-MCNC: 95 MG/DL (ref 65–99)
HCT VFR BLD AUTO: 30.3 % (ref 34–46.6)
HGB BLD-MCNC: 9.7 G/DL (ref 12–15.9)
LYMPHOCYTES # BLD MANUAL: 4.89 10*3/MM3 (ref 0.7–3.1)
LYMPHOCYTES NFR BLD MANUAL: 9.1 % (ref 5–12)
MCH RBC QN AUTO: 29 PG (ref 26.6–33)
MCHC RBC AUTO-ENTMCNC: 32 G/DL (ref 31.5–35.7)
MCV RBC AUTO: 90.7 FL (ref 79–97)
MONOCYTES # BLD: 1.16 10*3/MM3 (ref 0.1–0.9)
NEUTROPHILS # BLD AUTO: 6.56 10*3/MM3 (ref 1.7–7)
NEUTROPHILS NFR BLD MANUAL: 50.5 % (ref 42.7–76)
NEUTS BAND NFR BLD MANUAL: 1 % (ref 0–5)
PLATELET # BLD AUTO: 734 10*3/MM3 (ref 140–450)
PMV BLD AUTO: 9 FL (ref 6–12)
POLYCHROMASIA BLD QL SMEAR: ABNORMAL
POTASSIUM SERPL-SCNC: 4.3 MMOL/L (ref 3.5–5.2)
PROT SERPL-MCNC: 8.9 G/DL (ref 6–8.5)
RBC # BLD AUTO: 3.34 10*6/MM3 (ref 3.77–5.28)
SMALL PLATELETS BLD QL SMEAR: ABNORMAL
SODIUM SERPL-SCNC: 133 MMOL/L (ref 136–145)
VARIANT LYMPHS NFR BLD MANUAL: 29.3 % (ref 19.6–45.3)
VARIANT LYMPHS NFR BLD MANUAL: 9.1 % (ref 0–5)
WBC MORPH BLD: NORMAL
WBC NRBC COR # BLD AUTO: 12.73 10*3/MM3 (ref 3.4–10.8)

## 2025-08-11 PROCEDURE — 82378 CARCINOEMBRYONIC ANTIGEN: CPT

## 2025-08-11 PROCEDURE — 80053 COMPREHEN METABOLIC PANEL: CPT

## 2025-08-11 PROCEDURE — 86300 IMMUNOASSAY TUMOR CA 15-3: CPT

## 2025-08-11 PROCEDURE — 85007 BL SMEAR W/DIFF WBC COUNT: CPT

## 2025-08-11 PROCEDURE — 85025 COMPLETE CBC W/AUTO DIFF WBC: CPT

## 2025-08-11 PROCEDURE — 36415 COLL VENOUS BLD VENIPUNCTURE: CPT

## 2025-08-12 LAB
CANCER AG27-29 SERPL-ACNC: 43.3 U/ML (ref 0–38.6)
CEA SERPL-MCNC: 2.8 NG/ML

## 2025-08-15 ENCOUNTER — TELEPHONE (OUTPATIENT)
Age: 85
End: 2025-08-15
Payer: MEDICARE

## 2025-08-19 ENCOUNTER — HOSPITAL ENCOUNTER (OUTPATIENT)
Dept: RADIATION ONCOLOGY | Facility: HOSPITAL | Age: 85
Discharge: HOME | End: 2025-08-19

## 2025-08-19 LAB
RAD ONC ARIA COURSE ID: NORMAL
RAD ONC ARIA COURSE INTENT: NORMAL
RAD ONC ARIA COURSE LAST TREATMENT DATE: NORMAL
RAD ONC ARIA COURSE START DATE: NORMAL
RAD ONC ARIA COURSE TREATMENT ELAPSED DAYS: 21
RAD ONC ARIA FIRST TREATMENT DATE: NORMAL
RAD ONC ARIA PLAN FRACTIONS TREATED TO DATE: 9
RAD ONC ARIA PLAN ID: NORMAL
RAD ONC ARIA PLAN PRESCRIBED DOSE PER FRACTION: 2.5 GY
RAD ONC ARIA PLAN PRIMARY REFERENCE POINT: NORMAL
RAD ONC ARIA PLAN TOTAL FRACTIONS PRESCRIBED: 13
RAD ONC ARIA PLAN TOTAL PRESCRIBED DOSE: 3250 CGY
RAD ONC ARIA REFERENCE POINT DOSAGE GIVEN TO DATE: 22.5 GY
RAD ONC ARIA REFERENCE POINT ID: NORMAL
RAD ONC ARIA REFERENCE POINT SESSION DOSAGE GIVEN: 2.5 GY

## 2025-08-20 ENCOUNTER — HOSPITAL ENCOUNTER (OUTPATIENT)
Dept: RADIATION ONCOLOGY | Facility: HOSPITAL | Age: 85
Discharge: HOME | End: 2025-08-20

## 2025-08-20 DIAGNOSIS — C15.9 SQUAMOUS CELL ESOPHAGEAL CANCER: Primary | ICD-10-CM

## 2025-08-20 LAB
RAD ONC ARIA COURSE ID: NORMAL
RAD ONC ARIA COURSE INTENT: NORMAL
RAD ONC ARIA COURSE LAST TREATMENT DATE: NORMAL
RAD ONC ARIA COURSE START DATE: NORMAL
RAD ONC ARIA COURSE TREATMENT ELAPSED DAYS: 22
RAD ONC ARIA FIRST TREATMENT DATE: NORMAL
RAD ONC ARIA PLAN FRACTIONS TREATED TO DATE: 10
RAD ONC ARIA PLAN ID: NORMAL
RAD ONC ARIA PLAN PRESCRIBED DOSE PER FRACTION: 2.5 GY
RAD ONC ARIA PLAN PRIMARY REFERENCE POINT: NORMAL
RAD ONC ARIA PLAN TOTAL FRACTIONS PRESCRIBED: 13
RAD ONC ARIA PLAN TOTAL PRESCRIBED DOSE: 3250 CGY
RAD ONC ARIA REFERENCE POINT DOSAGE GIVEN TO DATE: 25 GY
RAD ONC ARIA REFERENCE POINT ID: NORMAL
RAD ONC ARIA REFERENCE POINT SESSION DOSAGE GIVEN: 2.5 GY

## 2025-08-21 ENCOUNTER — HOSPITAL ENCOUNTER (OUTPATIENT)
Dept: RADIATION ONCOLOGY | Facility: HOSPITAL | Age: 85
Discharge: HOME | End: 2025-08-21

## 2025-08-21 LAB
RAD ONC ARIA COURSE ID: NORMAL
RAD ONC ARIA COURSE INTENT: NORMAL
RAD ONC ARIA COURSE LAST TREATMENT DATE: NORMAL
RAD ONC ARIA COURSE START DATE: NORMAL
RAD ONC ARIA COURSE TREATMENT ELAPSED DAYS: 23
RAD ONC ARIA FIRST TREATMENT DATE: NORMAL
RAD ONC ARIA PLAN FRACTIONS TREATED TO DATE: 11
RAD ONC ARIA PLAN ID: NORMAL
RAD ONC ARIA PLAN PRESCRIBED DOSE PER FRACTION: 2.5 GY
RAD ONC ARIA PLAN PRIMARY REFERENCE POINT: NORMAL
RAD ONC ARIA PLAN TOTAL FRACTIONS PRESCRIBED: 13
RAD ONC ARIA PLAN TOTAL PRESCRIBED DOSE: 3250 CGY
RAD ONC ARIA REFERENCE POINT DOSAGE GIVEN TO DATE: 27.5 GY
RAD ONC ARIA REFERENCE POINT ID: NORMAL
RAD ONC ARIA REFERENCE POINT SESSION DOSAGE GIVEN: 2.5 GY

## 2025-08-22 ENCOUNTER — CLINICAL DOCUMENTATION (OUTPATIENT)
Dept: HEMATOLOGY | Age: 85
End: 2025-08-22

## 2025-08-22 DIAGNOSIS — C15.9 SQUAMOUS CELL ESOPHAGEAL CANCER (HCC): Primary | ICD-10-CM

## 2025-08-22 LAB
RAD ONC ARIA COURSE ID: NORMAL
RAD ONC ARIA COURSE INTENT: NORMAL
RAD ONC ARIA COURSE LAST TREATMENT DATE: NORMAL
RAD ONC ARIA COURSE START DATE: NORMAL
RAD ONC ARIA COURSE TREATMENT ELAPSED DAYS: 24
RAD ONC ARIA FIRST TREATMENT DATE: NORMAL
RAD ONC ARIA PLAN FRACTIONS TREATED TO DATE: 12
RAD ONC ARIA PLAN ID: NORMAL
RAD ONC ARIA PLAN PRESCRIBED DOSE PER FRACTION: 2.5 GY
RAD ONC ARIA PLAN PRIMARY REFERENCE POINT: NORMAL
RAD ONC ARIA PLAN TOTAL FRACTIONS PRESCRIBED: 13
RAD ONC ARIA PLAN TOTAL PRESCRIBED DOSE: 3250 CGY
RAD ONC ARIA REFERENCE POINT DOSAGE GIVEN TO DATE: 30 GY
RAD ONC ARIA REFERENCE POINT ID: NORMAL
RAD ONC ARIA REFERENCE POINT SESSION DOSAGE GIVEN: 2.5 GY

## 2025-08-25 ENCOUNTER — HOSPITAL ENCOUNTER (OUTPATIENT)
Dept: INFUSION THERAPY | Age: 85
Discharge: HOME | End: 2025-08-25
Payer: MEDICARE

## 2025-08-25 ENCOUNTER — OFFICE VISIT (OUTPATIENT)
Dept: HEMATOLOGY | Age: 85
End: 2025-08-25
Payer: MEDICARE

## 2025-08-25 ENCOUNTER — HOSPITAL ENCOUNTER (OUTPATIENT)
Dept: RADIATION ONCOLOGY | Facility: HOSPITAL | Age: 85
Discharge: HOME | End: 2025-08-25
Payer: MEDICARE

## 2025-08-25 ENCOUNTER — TRANSCRIBE ORDERS (OUTPATIENT)
Dept: ADMINISTRATIVE | Facility: HOSPITAL | Age: 85
End: 2025-08-25
Payer: MEDICARE

## 2025-08-25 VITALS
WEIGHT: 147.3 LBS | BODY MASS INDEX: 26.1 KG/M2 | OXYGEN SATURATION: 96 % | TEMPERATURE: 97.5 F | DIASTOLIC BLOOD PRESSURE: 60 MMHG | HEART RATE: 82 BPM | SYSTOLIC BLOOD PRESSURE: 106 MMHG | HEIGHT: 63 IN

## 2025-08-25 DIAGNOSIS — C15.9 SQUAMOUS CELL ESOPHAGEAL CANCER: Primary | ICD-10-CM

## 2025-08-25 DIAGNOSIS — R97.8 OTHER ABNORMAL TUMOR MARKERS: ICD-10-CM

## 2025-08-25 DIAGNOSIS — Z45.2 ENCOUNTER FOR CENTRAL LINE CARE: Primary | ICD-10-CM

## 2025-08-25 DIAGNOSIS — R91.8 LUNG NODULES: ICD-10-CM

## 2025-08-25 DIAGNOSIS — C15.9 SQUAMOUS CELL ESOPHAGEAL CANCER (HCC): ICD-10-CM

## 2025-08-25 DIAGNOSIS — Z45.2 ENCOUNTER FOR CENTRAL LINE CARE: ICD-10-CM

## 2025-08-25 DIAGNOSIS — C15.9 SQUAMOUS CELL ESOPHAGEAL CANCER (HCC): Primary | ICD-10-CM

## 2025-08-25 LAB
ALBUMIN SERPL-MCNC: 3.2 G/DL (ref 3.5–5.2)
ALP SERPL-CCNC: 78 U/L (ref 35–104)
ALT SERPL-CCNC: 10 U/L (ref 5–33)
ANION GAP SERPL CALCULATED.3IONS-SCNC: 13 MMOL/L (ref 7–19)
AST SERPL-CCNC: 21 U/L (ref 5–32)
BASOPHILS # BLD: 0.04 K/UL (ref 0–0.2)
BASOPHILS NFR BLD: 0.4 % (ref 0–1)
BILIRUB SERPL-MCNC: 0.3 MG/DL (ref 0–1.2)
BUN SERPL-MCNC: 32 MG/DL (ref 8–23)
CALCIUM SERPL-MCNC: 9.1 MG/DL (ref 8.8–10.2)
CANCER AG19-9 SERPL-ACNC: 31 U/ML (ref 0–35)
CEA SERPL-MCNC: 3.3 NG/ML (ref 0–4.7)
CHLORIDE SERPL-SCNC: 90 MMOL/L (ref 98–107)
CO2 SERPL-SCNC: 28 MMOL/L (ref 22–29)
CREAT SERPL-MCNC: 0.9 MG/DL (ref 0.5–0.9)
EOSINOPHIL # BLD: 0.23 K/UL (ref 0–0.6)
EOSINOPHIL NFR BLD: 2.1 % (ref 0–5)
ERYTHROCYTE [DISTWIDTH] IN BLOOD BY AUTOMATED COUNT: 15.5 % (ref 11.5–14.5)
GLUCOSE SERPL-MCNC: 114 MG/DL (ref 70–99)
HCT VFR BLD AUTO: 26.5 % (ref 37–47)
HGB BLD-MCNC: 8.7 G/DL (ref 12–16)
LYMPHOCYTES # BLD: 2.64 K/UL (ref 1.1–4.5)
LYMPHOCYTES NFR BLD: 24.6 % (ref 20–40)
MCH RBC QN AUTO: 30.4 PG (ref 27–31)
MCHC RBC AUTO-ENTMCNC: 32.8 G/DL (ref 33–37)
MCV RBC AUTO: 92.7 FL (ref 81–99)
MONOCYTES # BLD: 1.25 K/UL (ref 0–0.9)
MONOCYTES NFR BLD: 11.7 % (ref 1–10)
NEUTROPHILS # BLD: 6.52 K/UL (ref 1.5–7.5)
NEUTS SEG NFR BLD: 60.8 % (ref 50–65)
PLATELET # BLD AUTO: 404 K/UL (ref 130–400)
PMV BLD AUTO: 9.5 FL (ref 9.4–12.3)
POTASSIUM SERPL-SCNC: 4.3 MMOL/L (ref 3.5–5.1)
PROT SERPL-MCNC: 8.1 G/DL (ref 6.4–8.3)
RAD ONC ARIA COURSE ID: NORMAL
RAD ONC ARIA COURSE INTENT: NORMAL
RAD ONC ARIA COURSE LAST TREATMENT DATE: NORMAL
RAD ONC ARIA COURSE START DATE: NORMAL
RAD ONC ARIA COURSE TREATMENT ELAPSED DAYS: 27
RAD ONC ARIA FIRST TREATMENT DATE: NORMAL
RAD ONC ARIA PLAN FRACTIONS TREATED TO DATE: 13
RAD ONC ARIA PLAN ID: NORMAL
RAD ONC ARIA PLAN PRESCRIBED DOSE PER FRACTION: 2.5 GY
RAD ONC ARIA PLAN PRIMARY REFERENCE POINT: NORMAL
RAD ONC ARIA PLAN TOTAL FRACTIONS PRESCRIBED: 13
RAD ONC ARIA PLAN TOTAL PRESCRIBED DOSE: 3250 CGY
RAD ONC ARIA REFERENCE POINT DOSAGE GIVEN TO DATE: 32.5 GY
RAD ONC ARIA REFERENCE POINT ID: NORMAL
RAD ONC ARIA REFERENCE POINT SESSION DOSAGE GIVEN: 2.5 GY
RBC # BLD AUTO: 2.86 M/UL (ref 4.2–5.4)
SODIUM SERPL-SCNC: 131 MMOL/L (ref 136–145)
WBC # BLD AUTO: 10.72 K/UL (ref 4.8–10.8)

## 2025-08-25 PROCEDURE — 1123F ACP DISCUSS/DSCN MKR DOCD: CPT | Performed by: INTERNAL MEDICINE

## 2025-08-25 PROCEDURE — G8427 DOCREV CUR MEDS BY ELIG CLIN: HCPCS | Performed by: INTERNAL MEDICINE

## 2025-08-25 PROCEDURE — 36591 DRAW BLOOD OFF VENOUS DEVICE: CPT

## 2025-08-25 PROCEDURE — 2500000003 HC RX 250 WO HCPCS: Performed by: INTERNAL MEDICINE

## 2025-08-25 PROCEDURE — 3074F SYST BP LT 130 MM HG: CPT | Performed by: INTERNAL MEDICINE

## 2025-08-25 PROCEDURE — 99205 OFFICE O/P NEW HI 60 MIN: CPT | Performed by: INTERNAL MEDICINE

## 2025-08-25 PROCEDURE — 1126F AMNT PAIN NOTED NONE PRSNT: CPT | Performed by: INTERNAL MEDICINE

## 2025-08-25 PROCEDURE — 3078F DIAST BP <80 MM HG: CPT | Performed by: INTERNAL MEDICINE

## 2025-08-25 PROCEDURE — G2211 COMPLEX E/M VISIT ADD ON: HCPCS | Performed by: INTERNAL MEDICINE

## 2025-08-25 PROCEDURE — 36415 COLL VENOUS BLD VENIPUNCTURE: CPT

## 2025-08-25 PROCEDURE — G8399 PT W/DXA RESULTS DOCUMENT: HCPCS | Performed by: INTERNAL MEDICINE

## 2025-08-25 PROCEDURE — 86301 IMMUNOASSAY TUMOR CA 19-9: CPT

## 2025-08-25 PROCEDURE — 1090F PRES/ABSN URINE INCON ASSESS: CPT | Performed by: INTERNAL MEDICINE

## 2025-08-25 PROCEDURE — 99213 OFFICE O/P EST LOW 20 MIN: CPT

## 2025-08-25 PROCEDURE — 1159F MED LIST DOCD IN RCRD: CPT | Performed by: INTERNAL MEDICINE

## 2025-08-25 PROCEDURE — G8417 CALC BMI ABV UP PARAM F/U: HCPCS | Performed by: INTERNAL MEDICINE

## 2025-08-25 PROCEDURE — 6360000002 HC RX W HCPCS: Performed by: INTERNAL MEDICINE

## 2025-08-25 PROCEDURE — 1036F TOBACCO NON-USER: CPT | Performed by: INTERNAL MEDICINE

## 2025-08-25 PROCEDURE — 82378 CARCINOEMBRYONIC ANTIGEN: CPT

## 2025-08-25 PROCEDURE — 80053 COMPREHEN METABOLIC PANEL: CPT

## 2025-08-25 PROCEDURE — 85025 COMPLETE CBC W/AUTO DIFF WBC: CPT

## 2025-08-25 RX ORDER — FAMOTIDINE 10 MG/ML
20 INJECTION, SOLUTION INTRAVENOUS
OUTPATIENT
Start: 2025-08-25

## 2025-08-25 RX ORDER — SODIUM CHLORIDE 9 MG/ML
25 INJECTION, SOLUTION INTRAVENOUS PRN
OUTPATIENT
Start: 2025-08-25

## 2025-08-25 RX ORDER — SODIUM CHLORIDE 0.9 % (FLUSH) 0.9 %
5-40 SYRINGE (ML) INJECTION PRN
OUTPATIENT
Start: 2025-08-25

## 2025-08-25 RX ORDER — CARVEDILOL 3.12 MG/1
3.12 TABLET ORAL 2 TIMES DAILY WITH MEALS
COMMUNITY

## 2025-08-25 RX ORDER — SODIUM CHLORIDE 9 MG/ML
INJECTION, SOLUTION INTRAVENOUS PRN
OUTPATIENT
Start: 2025-08-25

## 2025-08-25 RX ORDER — DIPHENHYDRAMINE HYDROCHLORIDE 50 MG/ML
50 INJECTION, SOLUTION INTRAMUSCULAR; INTRAVENOUS
OUTPATIENT
Start: 2025-08-25

## 2025-08-25 RX ORDER — ALBUTEROL SULFATE 90 UG/1
4 INHALANT RESPIRATORY (INHALATION) PRN
OUTPATIENT
Start: 2025-08-25

## 2025-08-25 RX ORDER — ACETAMINOPHEN 325 MG/1
650 TABLET ORAL
OUTPATIENT
Start: 2025-08-25

## 2025-08-25 RX ORDER — PANTOPRAZOLE SODIUM 40 MG/1
40 FOR SUSPENSION ORAL
COMMUNITY

## 2025-08-25 RX ORDER — HEPARIN 100 UNIT/ML
500 SYRINGE INTRAVENOUS PRN
Status: DISCONTINUED | OUTPATIENT
Start: 2025-08-25 | End: 2025-08-26 | Stop reason: HOSPADM

## 2025-08-25 RX ORDER — GUAIFENESIN AND DEXTROMETHORPHAN HYDROBROMIDE 10; 100 MG/5ML; MG/5ML
10 SYRUP ORAL EVERY 6 HOURS PRN
COMMUNITY

## 2025-08-25 RX ORDER — HYDROCORTISONE SODIUM SUCCINATE 100 MG/2ML
100 INJECTION INTRAMUSCULAR; INTRAVENOUS
OUTPATIENT
Start: 2025-08-25

## 2025-08-25 RX ORDER — SODIUM CHLORIDE 0.9 % (FLUSH) 0.9 %
5-40 SYRINGE (ML) INJECTION PRN
Status: DISCONTINUED | OUTPATIENT
Start: 2025-08-25 | End: 2025-08-26 | Stop reason: HOSPADM

## 2025-08-25 RX ORDER — SODIUM CHLORIDE 0.9 % (FLUSH) 0.9 %
5-40 SYRINGE (ML) INJECTION PRN
Status: CANCELLED | OUTPATIENT
Start: 2025-08-25

## 2025-08-25 RX ORDER — EPINEPHRINE 1 MG/ML
0.3 INJECTION, SOLUTION, CONCENTRATE INTRAVENOUS PRN
OUTPATIENT
Start: 2025-08-25

## 2025-08-25 RX ORDER — GUAR GUM
1 PACKET (EA) ORAL 3 TIMES DAILY
COMMUNITY
Start: 2025-07-16

## 2025-08-25 RX ORDER — ONDANSETRON 2 MG/ML
8 INJECTION INTRAMUSCULAR; INTRAVENOUS
OUTPATIENT
Start: 2025-08-25

## 2025-08-25 RX ORDER — HEPARIN 100 UNIT/ML
500 SYRINGE INTRAVENOUS PRN
OUTPATIENT
Start: 2025-08-25

## 2025-08-25 RX ORDER — HEPARIN SODIUM (PORCINE) LOCK FLUSH IV SOLN 100 UNIT/ML 100 UNIT/ML
500 SOLUTION INTRAVENOUS PRN
Status: CANCELLED | OUTPATIENT
Start: 2025-08-25

## 2025-08-25 RX ORDER — CODEINE PHOSPHATE AND GUAIFENESIN 10; 100 MG/5ML; MG/5ML
5 SOLUTION ORAL EVERY 6 HOURS PRN
COMMUNITY
Start: 2025-07-28

## 2025-08-25 RX ADMIN — HEPARIN 500 UNITS: 100 SYRINGE at 13:59

## 2025-08-25 RX ADMIN — SODIUM CHLORIDE, PRESERVATIVE FREE 10 ML: 5 INJECTION INTRAVENOUS at 13:59

## 2025-08-26 LAB
RAD ONC ARIA COURSE END DATE: NORMAL
RAD ONC ARIA COURSE ID: NORMAL
RAD ONC ARIA COURSE INTENT: NORMAL
RAD ONC ARIA COURSE LAST TREATMENT DATE: NORMAL
RAD ONC ARIA COURSE START DATE: NORMAL
RAD ONC ARIA COURSE TREATMENT ELAPSED DAYS: 27
RAD ONC ARIA FIRST TREATMENT DATE: NORMAL
RAD ONC ARIA PLAN FRACTIONS TREATED TO DATE: 13
RAD ONC ARIA PLAN ID: NORMAL
RAD ONC ARIA PLAN NAME: NORMAL
RAD ONC ARIA PLAN PRESCRIBED DOSE PER FRACTION: 2.5 GY
RAD ONC ARIA PLAN PRIMARY REFERENCE POINT: NORMAL
RAD ONC ARIA PLAN TOTAL FRACTIONS PRESCRIBED: 13
RAD ONC ARIA PLAN TOTAL PRESCRIBED DOSE: 3250 CGY
RAD ONC ARIA REFERENCE POINT DOSAGE GIVEN TO DATE: 32.5 GY
RAD ONC ARIA REFERENCE POINT ID: NORMAL

## 2025-08-27 ENCOUNTER — OFFICE VISIT (OUTPATIENT)
Dept: GASTROENTEROLOGY | Facility: CLINIC | Age: 85
End: 2025-08-27
Payer: MEDICARE

## 2025-08-27 ENCOUNTER — TELEPHONE (OUTPATIENT)
Facility: HOSPITAL | Age: 85
End: 2025-08-27

## 2025-08-27 VITALS
TEMPERATURE: 97.1 F | OXYGEN SATURATION: 97 % | HEIGHT: 65 IN | BODY MASS INDEX: 24.49 KG/M2 | WEIGHT: 147 LBS | SYSTOLIC BLOOD PRESSURE: 108 MMHG | DIASTOLIC BLOOD PRESSURE: 64 MMHG | HEART RATE: 90 BPM

## 2025-08-27 DIAGNOSIS — Z09 HOSPITAL DISCHARGE FOLLOW-UP: Primary | ICD-10-CM

## 2025-08-27 DIAGNOSIS — C15.9 SQUAMOUS CELL ESOPHAGEAL CANCER: ICD-10-CM

## 2025-09-04 ENCOUNTER — CLINICAL DOCUMENTATION (OUTPATIENT)
Dept: HEMATOLOGY | Age: 85
End: 2025-09-04

## 2025-09-04 ENCOUNTER — TELEPHONE (OUTPATIENT)
Dept: HEMATOLOGY | Age: 85
End: 2025-09-04

## (undated) DEVICE — GLOVE,SURG,SENSICARE,ALOE,LF,PF,6: Brand: MEDLINE

## (undated) DEVICE — TBG INSUFFLATION LUER LOCK: Brand: MEDLINE INDUSTRIES, INC.

## (undated) DEVICE — DAVINCI: Brand: MEDLINE INDUSTRIES, INC.

## (undated) DEVICE — SYR LL TP 10ML STRL

## (undated) DEVICE — ARGYLE YANKAUER BULB TIP WITH VENT: Brand: ARGYLE

## (undated) DEVICE — MICRO HVTSA, 0.5G AND HVTSA SOURCEMARK PRODUCT CODE M1206 AND M1206-01: Brand: EXOFIN MICRO HVTSA, 0.5G

## (undated) DEVICE — NEEDLE,22GX1.5",REG,BEVEL: Brand: MEDLINE

## (undated) DEVICE — SYR LUERLOK 30CC

## (undated) DEVICE — THE CHANNEL CLEANING BRUSH IS A NYLON FLEXI BRUSH ATTACHED TO A FLEXIBLE PLASTIC SHEATH DESIGNED TO SAFELY REMOVE DEBRIS FROM FLEXIBLE ENDOSCOPES.

## (undated) DEVICE — SENSR O2 OXIMAX FNGR A/ 18IN NONSTR

## (undated) DEVICE — CUFF,BP,DISP,1 TUBE,ADULT,HP: Brand: MEDLINE

## (undated) DEVICE — Device: Brand: DEFENDO AIR/WATER/SUCTION AND BIOPSY VALVE

## (undated) DEVICE — DEFENDO AIR WATER SUCTION AND BIOPSY VALVE KIT FOR  OLYMPUS: Brand: DEFENDO AIR/WATER/SUCTION AND BIOPSY VALVE

## (undated) DEVICE — MASK,OXYGEN,MED CONC,ADLT,7' TUB, UC: Brand: PENDING

## (undated) DEVICE — BNDR ABD 4PANEL12IN 30TO45IN

## (undated) DEVICE — SYR LUERLOK 5CC

## (undated) DEVICE — SNAR POLYP SENSATION MICRO OVL 13 240X40

## (undated) DEVICE — Device

## (undated) DEVICE — TBG FEED PULL FLOW20 NO/DRUG 20F 4.47MM 150CM

## (undated) DEVICE — YANKAUER,BULB TIP WITH VENT: Brand: ARGYLE

## (undated) DEVICE — KT CLN CLEANOR SCPE

## (undated) DEVICE — FRCP BX RADJAW4 NDL 2.8 240 STD OG

## (undated) DEVICE — 4-PORT MANIFOLD: Brand: NEPTUNE 2

## (undated) DEVICE — THE SINGLE USE ETRAP – POLYP TRAP IS USED FOR SUCTION RETRIEVAL OF ENDOSCOPICALLY REMOVED POLYPS.: Brand: ETRAP

## (undated) DEVICE — ENDOPATH XCEL BLUNT TIP TROCARS WITH SMOOTH SLEEVES: Brand: ENDOPATH XCEL

## (undated) DEVICE — ARM DRAPE

## (undated) DEVICE — PATIENT RETURN ELECTRODE, SINGLE-USE, CONTACT QUALITY MONITORING, ADULT, WITH 9FT CORD, FOR PATIENTS WEIGING OVER 33LBS. (15KG): Brand: MEGADYNE

## (undated) DEVICE — GLV SURG DERMASSURE GRN LF PF 7.0

## (undated) DEVICE — SUT SILK 3/0 SH 30IN K832H

## (undated) DEVICE — GLV SURG SENSICARE W/ALOE PF LF 6.5 STRL

## (undated) DEVICE — CONMED SCOPE SAVER BITE BLOCK, 20X27 MM: Brand: SCOPE SAVER

## (undated) DEVICE — SEAL

## (undated) DEVICE — TBG SMPL FLTR LINE NASL 02/C02 A/ BX/100

## (undated) DEVICE — NDL HYPO ECLPS SFTY 25G 1 1/2IN

## (undated) DEVICE — BLADELESS OBTURATOR: Brand: WECK VISTA

## (undated) DEVICE — ENDOPATH XCEL WITH OPTIVIEW TECHNOLOGY BLADELESS TROCARS WITH STABILITY SLEEVES: Brand: ENDOPATH XCEL OPTIVIEW

## (undated) DEVICE — SUT MNCRYL 4/0 PS2 27IN UD MCP426H